# Patient Record
Sex: FEMALE | Race: WHITE | NOT HISPANIC OR LATINO | Employment: OTHER | URBAN - METROPOLITAN AREA
[De-identification: names, ages, dates, MRNs, and addresses within clinical notes are randomized per-mention and may not be internally consistent; named-entity substitution may affect disease eponyms.]

---

## 2019-12-04 ENCOUNTER — HOSPITAL ENCOUNTER (EMERGENCY)
Facility: HOSPITAL | Age: 79
Discharge: HOME | End: 2019-12-04
Attending: EMERGENCY MEDICINE
Payer: MEDICARE

## 2019-12-04 VITALS
BODY MASS INDEX: 27.38 KG/M2 | TEMPERATURE: 99 F | OXYGEN SATURATION: 99 % | DIASTOLIC BLOOD PRESSURE: 74 MMHG | HEIGHT: 61 IN | RESPIRATION RATE: 18 BRPM | HEART RATE: 72 BPM | WEIGHT: 145 LBS | SYSTOLIC BLOOD PRESSURE: 158 MMHG

## 2019-12-04 DIAGNOSIS — R79.1 SUPRATHERAPEUTIC INTERNATIONAL NORMALIZED RATIO (INR): Primary | ICD-10-CM

## 2019-12-04 DIAGNOSIS — Z79.01 CHRONIC ANTICOAGULATION: ICD-10-CM

## 2019-12-04 DIAGNOSIS — Z87.898 HISTORY OF EPISTAXIS: ICD-10-CM

## 2019-12-04 DIAGNOSIS — Z86.79 HISTORY OF ATRIAL FIBRILLATION: ICD-10-CM

## 2019-12-04 LAB
ALBUMIN SERPL BCP-MCNC: 3.3 G/DL (ref 3.5–5.2)
ALP SERPL-CCNC: 92 U/L (ref 55–135)
ALT SERPL W/O P-5'-P-CCNC: 15 U/L (ref 10–44)
ANION GAP SERPL CALC-SCNC: 10 MMOL/L (ref 8–16)
APTT BLDCRRT: 56.1 SEC (ref 21–32)
AST SERPL-CCNC: 18 U/L (ref 10–40)
BASOPHILS # BLD AUTO: 0.04 K/UL (ref 0–0.2)
BASOPHILS NFR BLD: 0.6 % (ref 0–1.9)
BILIRUB SERPL-MCNC: 0.7 MG/DL (ref 0.1–1)
BUN SERPL-MCNC: 20 MG/DL (ref 8–23)
CALCIUM SERPL-MCNC: 9.1 MG/DL (ref 8.7–10.5)
CHLORIDE SERPL-SCNC: 106 MMOL/L (ref 95–110)
CO2 SERPL-SCNC: 25 MMOL/L (ref 23–29)
CREAT SERPL-MCNC: 0.8 MG/DL (ref 0.5–1.4)
DIFFERENTIAL METHOD: ABNORMAL
EOSINOPHIL # BLD AUTO: 0.1 K/UL (ref 0–0.5)
EOSINOPHIL NFR BLD: 1.8 % (ref 0–8)
ERYTHROCYTE [DISTWIDTH] IN BLOOD BY AUTOMATED COUNT: 14 % (ref 11.5–14.5)
EST. GFR  (AFRICAN AMERICAN): >60 ML/MIN/1.73 M^2
EST. GFR  (NON AFRICAN AMERICAN): >60 ML/MIN/1.73 M^2
GLUCOSE SERPL-MCNC: 110 MG/DL (ref 70–110)
HCT VFR BLD AUTO: 37.6 % (ref 37–48.5)
HGB BLD-MCNC: 11.9 G/DL (ref 12–16)
IMM GRANULOCYTES # BLD AUTO: 0.02 K/UL (ref 0–0.04)
IMM GRANULOCYTES NFR BLD AUTO: 0.3 % (ref 0–0.5)
INR PPP: 6.5 (ref 0.8–1.2)
LYMPHOCYTES # BLD AUTO: 1.7 K/UL (ref 1–4.8)
LYMPHOCYTES NFR BLD: 23.7 % (ref 18–48)
MCH RBC QN AUTO: 29.5 PG (ref 27–31)
MCHC RBC AUTO-ENTMCNC: 31.6 G/DL (ref 32–36)
MCV RBC AUTO: 93 FL (ref 82–98)
MONOCYTES # BLD AUTO: 1 K/UL (ref 0.3–1)
MONOCYTES NFR BLD: 13.3 % (ref 4–15)
NEUTROPHILS # BLD AUTO: 4.4 K/UL (ref 1.8–7.7)
NEUTROPHILS NFR BLD: 60.3 % (ref 38–73)
NRBC BLD-RTO: 0 /100 WBC
PLATELET # BLD AUTO: 270 K/UL (ref 150–350)
PMV BLD AUTO: 10 FL (ref 9.2–12.9)
POTASSIUM SERPL-SCNC: 3.7 MMOL/L (ref 3.5–5.1)
PROT SERPL-MCNC: 6.8 G/DL (ref 6–8.4)
PROTHROMBIN TIME: 65.6 SEC (ref 9–12.5)
RBC # BLD AUTO: 4.04 M/UL (ref 4–5.4)
SODIUM SERPL-SCNC: 141 MMOL/L (ref 136–145)
WBC # BLD AUTO: 7.22 K/UL (ref 3.9–12.7)

## 2019-12-04 PROCEDURE — 80053 COMPREHEN METABOLIC PANEL: CPT

## 2019-12-04 PROCEDURE — 85730 THROMBOPLASTIN TIME PARTIAL: CPT

## 2019-12-04 PROCEDURE — 36415 COLL VENOUS BLD VENIPUNCTURE: CPT

## 2019-12-04 PROCEDURE — 99283 EMERGENCY DEPT VISIT LOW MDM: CPT

## 2019-12-04 PROCEDURE — 85025 COMPLETE CBC W/AUTO DIFF WBC: CPT

## 2019-12-04 PROCEDURE — 85610 PROTHROMBIN TIME: CPT

## 2019-12-04 RX ORDER — ARIPIPRAZOLE 5 MG/1
TABLET ORAL
Status: ON HOLD | COMMUNITY
Start: 2019-10-02 | End: 2022-07-23

## 2019-12-04 RX ORDER — WARFARIN 2.5 MG/1
TABLET ORAL
COMMUNITY
Start: 2019-10-16

## 2019-12-04 RX ORDER — METOPROLOL TARTRATE 25 MG/1
25 TABLET, FILM COATED ORAL
COMMUNITY
Start: 2019-10-16 | End: 2022-07-31

## 2019-12-04 RX ORDER — LISINOPRIL 10 MG/1
10 TABLET ORAL
COMMUNITY
Start: 2019-10-16 | End: 2022-07-31

## 2019-12-04 RX ORDER — TRAMADOL HYDROCHLORIDE 50 MG/1
50 TABLET ORAL
COMMUNITY
Start: 2019-09-25 | End: 2022-07-31

## 2019-12-04 RX ORDER — TRIAMTERENE/HYDROCHLOROTHIAZID 37.5-25 MG
0.5 TABLET ORAL 2 TIMES DAILY
COMMUNITY
Start: 2019-10-16 | End: 2023-11-29

## 2019-12-05 NOTE — ED NOTES
Pt and pt family member request to be moved from bed 16 due to construction noise.  Pt moved to  to initiate workup.  Charge nurse, Jena, notified.

## 2019-12-05 NOTE — DISCHARGE INSTRUCTIONS
Please hold your Coumadin/Warfarin for the next 2 days and get your Coumadin level recheck on Friday.

## 2019-12-05 NOTE — ED PROVIDER NOTES
12/4/2019, 6:47 PM   History obtained from the patient      History of Present Illness: Serena Post is a 79 y.o. female patient who presents to the Emergency Department for elevated INR (7.0). Sent by home health for further evaluation. Associated s/s include irritability and nose bleed this morning.     6:48 PM: Pt was placed back in [facility]. I explained to pt that due to lack of available rooms pt was seen by myself to begin the workup. Pt understands they will be seen and dispositioned by the next available physician. Pt voices understanding and agrees with plan. Pt also understands the longer than normal wait time. I am removing myself from the care of pt and pt will now be assigned to the next available physician.     - Harlan Trivedi Jr., [facility]    SCRIBE #1 NOTE: I, More Brady, am scribing for, and in the presence of, Chely Bailey MD. I have scribed the entire note.       History     Chief Complaint   Patient presents with    Abnormal Lab     Home Health saw pt today after patient had a nose bleed today. PT-INR elevated     Review of patient's allergies indicates:   Allergen Reactions    Amlodipine Other (See Comments)     Not sure    Chlorthalidone Other (See Comments)     Not sure    Clonidine Other (See Comments)     Not sure    Codeine Other (See Comments)     Not sure    Escitalopram Other (See Comments)     Not sure    Lisinopril Other (See Comments)     Not sure    Losartan Other (See Comments)     Not sure    Meperidine Other (See Comments)     Not sure    Morphine Other (See Comments)     Not sure         History of Present Illness     HPI    12/4/2019, 7:50 PM  History obtained from the patient and family members      History of Present Illness: Serena Post is a 79 y.o. female patient with a PMHx of Afib, CVA, and s/p TANYA, heart valve replacement, and pacemaker who presents to the Emergency Department for evaluation of an abnormal PT-INR level (7.0) which was noted  today by home health. Pt had an episode of epistaxis earlier today when home health came to visit the pt. Blood levels were then drawn which showed an elevated PT-INR. Pt was advised to come to the ED for further evaluation. Pt takes 5 mg Coumadin every evening. Pt's INR was last checked on 11/13/19 with a level of 2.4. Symptoms are constant and moderate in severity. No mitigating or exacerbating factors reported. Associated sxs include epistaxis (earlier today) . Patient denies any fever/ chills, SOB, cough, CP, palpations, numbness, HA, dizziness, lightheadedness, rash, wound, abdominal pain, n/v/d, back/ neck pain, sore throat, congestion, dysuria, hematuria, localized weakness, recent injuries/ falls, and all other sxs at this time. No prior tx reported. No further complaints or concerns at this time.     Arrival mode: Personal vehicle      PCP: Primary Doctor No        Past Medical History:  Past Medical History:   Diagnosis Date    A-fib     Anticoagulant long-term use     Cancer     brain tumor, 10 years ago    Hx of heart valve replacement with mechanical valve     Pacemaker     Stroke     2007, residual weakness       Past Surgical History:  Past Surgical History:   Procedure Laterality Date    CHOLECYSTECTOMY      HYSTERECTOMY      TONSILLECTOMY           Family History:  History reviewed. No pertinent family history.    Social History:  Social History     Tobacco Use    Smoking status: Never Smoker   Substance and Sexual Activity    Alcohol use: Not Currently    Drug use: Never    Sexual activity: Unknown        Review of Systems     Review of Systems   Constitutional: Negative for chills and fever.        + Abnormal INR level (7.0)   HENT: Positive for nosebleeds (earlier today). Negative for congestion and sore throat.    Respiratory: Negative for cough and shortness of breath.    Cardiovascular: Negative for chest pain and palpitations.   Gastrointestinal: Negative for abdominal pain,  "diarrhea, nausea and vomiting.   Genitourinary: Negative for dysuria and hematuria.   Musculoskeletal: Negative for back pain and neck pain.        - Recent injuries/ falls   Skin: Negative for rash and wound.   Neurological: Negative for dizziness, weakness, light-headedness, numbness and headaches.   All other systems reviewed and are negative.     Physical Exam     Initial Vitals [12/04/19 1847]   BP Pulse Resp Temp SpO2   (!) 149/83 68 16 98.3 °F (36.8 °C) 96 %      MAP       --          Physical Exam  Nursing Notes and Vital Signs Reviewed.   Constitutional: Patient is in no obvious distress. Well-developed and well-nourished. No obvious bleeding noted.   Head: Atraumatic. Normocephalic.  Eyes: PERRL. EOM intact. Conjunctivae are not pale. No scleral icterus.  ENT: Mucous membranes are moist. Oropharynx is clear and symmetric. No septal hematoma. No epistaxis.   Neck: Supple. Full ROM. No lymphadenopathy.  Cardiovascular: Regular rate. Regular rhythm. No murmurs, rubs, or gallops. Distal pulses are 2+ and symmetric.  Pulmonary/Chest: No respiratory distress. Clear to auscultation bilaterally. No wheezing or rales.  Abdominal: Soft and non-distended.  There is no tenderness.  No rebound, guarding, or rigidity. Good bowel sounds.  Genitourinary: No CVA tenderness  Musculoskeletal: Moves all extremities. No obvious deformities. No edema. No calf tenderness.  Skin: Warm and dry.  Neurological:  Alert, awake, and appropriate.  Normal speech.  No acute focal neurological deficits are appreciated.  Psychiatric: Normal affect. Good eye contact. Appropriate in content.     ED Course   Procedures  ED Vital Signs:  Vitals:    12/04/19 1847 12/1942 12/04/19 2042   BP: (!) 149/83 (!) 167/81 (!) 158/74   Pulse: 68 71 72   Resp: 16 18 18   Temp: 98.3 °F (36.8 °C)  98.6 °F (37 °C)   TempSrc: Oral  Oral   SpO2: 96% 96% 99%   Weight: 65.8 kg (145 lb)     Height: 5' 1" (1.549 m)         Abnormal Lab Results:  Labs Reviewed "   CBC W/ AUTO DIFFERENTIAL - Abnormal; Notable for the following components:       Result Value    Hemoglobin 11.9 (*)     Mean Corpuscular Hemoglobin Conc 31.6 (*)     All other components within normal limits   COMPREHENSIVE METABOLIC PANEL - Abnormal; Notable for the following components:    Albumin 3.3 (*)     All other components within normal limits   PROTIME-INR - Abnormal; Notable for the following components:    Prothrombin Time 65.6 (*)     INR 6.5 (*)     All other components within normal limits    Narrative:       INR critical result(s) called and verbal readback obtained from   davian Mcdaniel RN by MANUEL 12/04/2019 20:15   APTT - Abnormal; Notable for the following components:    aPTT 56.1 (*)     All other components within normal limits        All Lab Results:  Results for orders placed or performed during the hospital encounter of 12/04/19   CBC auto differential   Result Value Ref Range    WBC 7.22 3.90 - 12.70 K/uL    RBC 4.04 4.00 - 5.40 M/uL    Hemoglobin 11.9 (L) 12.0 - 16.0 g/dL    Hematocrit 37.6 37.0 - 48.5 %    Mean Corpuscular Volume 93 82 - 98 fL    Mean Corpuscular Hemoglobin 29.5 27.0 - 31.0 pg    Mean Corpuscular Hemoglobin Conc 31.6 (L) 32.0 - 36.0 g/dL    RDW 14.0 11.5 - 14.5 %    Platelets 270 150 - 350 K/uL    MPV 10.0 9.2 - 12.9 fL    Immature Granulocytes 0.3 0.0 - 0.5 %    Gran # (ANC) 4.4 1.8 - 7.7 K/uL    Immature Grans (Abs) 0.02 0.00 - 0.04 K/uL    Lymph # 1.7 1.0 - 4.8 K/uL    Mono # 1.0 0.3 - 1.0 K/uL    Eos # 0.1 0.0 - 0.5 K/uL    Baso # 0.04 0.00 - 0.20 K/uL    nRBC 0 0 /100 WBC    Gran% 60.3 38.0 - 73.0 %    Lymph% 23.7 18.0 - 48.0 %    Mono% 13.3 4.0 - 15.0 %    Eosinophil% 1.8 0.0 - 8.0 %    Basophil% 0.6 0.0 - 1.9 %    Differential Method Automated    Comprehensive metabolic panel   Result Value Ref Range    Sodium 141 136 - 145 mmol/L    Potassium 3.7 3.5 - 5.1 mmol/L    Chloride 106 95 - 110 mmol/L    CO2 25 23 - 29 mmol/L    Glucose 110 70 - 110 mg/dL    BUN, Bld 20  8 - 23 mg/dL    Creatinine 0.8 0.5 - 1.4 mg/dL    Calcium 9.1 8.7 - 10.5 mg/dL    Total Protein 6.8 6.0 - 8.4 g/dL    Albumin 3.3 (L) 3.5 - 5.2 g/dL    Total Bilirubin 0.7 0.1 - 1.0 mg/dL    Alkaline Phosphatase 92 55 - 135 U/L    AST 18 10 - 40 U/L    ALT 15 10 - 44 U/L    Anion Gap 10 8 - 16 mmol/L    eGFR if African American >60 >60 mL/min/1.73 m^2    eGFR if non African American >60 >60 mL/min/1.73 m^2   Protime-INR   Result Value Ref Range    Prothrombin Time 65.6 (H) 9.0 - 12.5 sec    INR 6.5 (HH) 0.8 - 1.2   APTT   Result Value Ref Range    aPTT 56.1 (H) 21.0 - 32.0 sec       Imaging Results:  Imaging Results    None                 The Emergency Provider reviewed the vital signs and test results, which are outlined above.     ED Discussion     8:20 PM: Reassessed pt at this time. Advised pt to hold Coumadin for two days and have it rechecked on Friday. Discussed with pt all pertinent ED information and results. Discussed pt dx and plan of tx. Gave pt all f/u and return to the ED instructions. All questions and concerns were addressed at this time. Pt expresses understanding of information and instructions, and is comfortable with plan to discharge. Pt is stable for discharge.    I discussed with patient and/or family/caretaker that evaluation in the ED does not suggest any emergent or life threatening medical conditions requiring immediate intervention beyond what was provided in the ED, and I believe patient is safe for discharge.  Regardless, an unremarkable evaluation in the ED does not preclude the development or presence of a serious of life threatening condition. As such, patient was instructed to return immediately for any worsening or change in current symptoms.         Medical Decision Making:   Clinical Tests:   Lab Tests: Ordered and Reviewed           ED Medication(s):  Medications - No data to display    Discharge Medication List as of 12/4/2019  8:29 PM          Follow-up Information     Local  Coumadin clinic or Home Health Nurse. Schedule an appointment as soon as possible for a visit in 2 days.    Why:  Return to the Emergency Room, If symptoms worsen                     Scribe Attestation:   Scribe #1: I performed the above scribed service and the documentation accurately describes the services I performed. I attest to the accuracy of the note.     Attending:   Physician Attestation Statement for Scribe #1: I, Chely Bailey MD, personally performed the services described in this documentation, as scribed by More Brady, in my presence, and it is both accurate and complete.           Clinical Impression       ICD-10-CM ICD-9-CM   1. Supratherapeutic international normalized ratio (INR) R79.1 790.92   2. Chronic anticoagulation Z79.01 V58.61   3. History of atrial fibrillation Z86.79 V12.59   4. History of epistaxis Z87.898 V12.69       Disposition:   Disposition: Discharged  Condition: Stable           Chely Bailey MD  12/04/19 9970

## 2020-02-17 ENCOUNTER — HOSPITAL ENCOUNTER (EMERGENCY)
Facility: HOSPITAL | Age: 80
Discharge: HOME | End: 2020-02-17
Attending: EMERGENCY MEDICINE
Payer: MEDICARE

## 2020-02-17 VITALS
WEIGHT: 142 LBS | BODY MASS INDEX: 26.83 KG/M2 | SYSTOLIC BLOOD PRESSURE: 134 MMHG | DIASTOLIC BLOOD PRESSURE: 68 MMHG | TEMPERATURE: 99 F | OXYGEN SATURATION: 99 % | RESPIRATION RATE: 18 BRPM | HEART RATE: 80 BPM
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
## 2020-02-17 DIAGNOSIS — S52.125A CLOSED NONDISPLACED FRACTURE OF HEAD OF LEFT RADIUS, INITIAL ENCOUNTER: Primary | ICD-10-CM

## 2020-02-17 DIAGNOSIS — N39.0 ACUTE LOWER UTI: ICD-10-CM

## 2020-02-17 LAB
BACTERIA #/AREA URNS HPF: ABNORMAL /HPF
BILIRUB UR QL STRIP: NEGATIVE
CLARITY UR: CLEAR
COLOR UR: YELLOW
GLUCOSE UR QL STRIP: NEGATIVE
HGB UR QL STRIP: NEGATIVE
KETONES UR QL STRIP: NEGATIVE
LEUKOCYTE ESTERASE UR QL STRIP: NEGATIVE
MICROSCOPIC COMMENT: ABNORMAL
NITRITE UR QL STRIP: POSITIVE
PH UR STRIP: 6 [PH] (ref 5–8)
PROT UR QL STRIP: NEGATIVE
RBC #/AREA URNS HPF: 2 /HPF (ref 0–4)
SP GR UR STRIP: 1.02 (ref 1–1.03)
SQUAMOUS #/AREA URNS HPF: 0 /HPF
URN SPEC COLLECT METH UR: ABNORMAL
UROBILINOGEN UR STRIP-ACNC: NEGATIVE EU/DL
WBC #/AREA URNS HPF: 25 /HPF (ref 0–5)

## 2020-02-17 PROCEDURE — 81000 URINALYSIS NONAUTO W/SCOPE: CPT

## 2020-02-17 PROCEDURE — 87088 URINE BACTERIA CULTURE: CPT

## 2020-02-17 PROCEDURE — 99284 EMERGENCY DEPT VISIT MOD MDM: CPT | Mod: 25

## 2020-02-17 PROCEDURE — 87186 SC STD MICRODIL/AGAR DIL: CPT

## 2020-02-17 PROCEDURE — 87077 CULTURE AEROBIC IDENTIFY: CPT

## 2020-02-17 PROCEDURE — 87086 URINE CULTURE/COLONY COUNT: CPT

## 2020-02-17 PROCEDURE — 25000003 PHARM REV CODE 250: Performed by: EMERGENCY MEDICINE

## 2020-02-17 RX ORDER — CEPHALEXIN 500 MG/1
500 CAPSULE ORAL 4 TIMES DAILY
Qty: 28 CAPSULE | Refills: 0 | Status: SHIPPED | OUTPATIENT
Start: 2020-02-17 | End: 2020-02-24

## 2020-02-17 RX ORDER — CEPHALEXIN 500 MG/1
500 CAPSULE ORAL
Status: COMPLETED | OUTPATIENT
Start: 2020-02-17 | End: 2020-02-17

## 2020-02-17 RX ORDER — TRAMADOL HYDROCHLORIDE 50 MG/1
50 TABLET ORAL EVERY 6 HOURS PRN
Qty: 12 TABLET | Refills: 0 | Status: SHIPPED | OUTPATIENT
Start: 2020-02-17 | End: 2020-02-27

## 2020-02-17 RX ADMIN — CEPHALEXIN 500 MG: 500 CAPSULE ORAL at 07:02

## 2020-02-17 NOTE — ED PROVIDER NOTES
SCRIBE #1 NOTE: I, Lisa Parkinson, am scribing for, and in the presence of, Christiano Watters Jr., MD. I have scribed the entire note.       History     Chief Complaint   Patient presents with    Fall     patient states she tripped over her walker, -LOC +coumadin, pain to L arm and shoulder      Review of patient's allergies indicates:   Allergen Reactions    Amlodipine Other (See Comments)     Not sure    Chlorthalidone Other (See Comments)     Not sure    Clonidine Other (See Comments)     Not sure    Codeine Other (See Comments)     Not sure    Escitalopram Other (See Comments)     Not sure    Lisinopril Other (See Comments)     Not sure    Losartan Other (See Comments)     Not sure    Meperidine Other (See Comments)     Not sure    Morphine Other (See Comments)     Not sure         History of Present Illness     HPI    2/17/2020, 4:52 PM  History obtained from the patient      History of Present Illness: Serena Post is a 79 y.o. female patient with a h/o a-fib, pacemaker, who presents to the Emergency Department for evaluation of a fall which onset PTA. Pt reports her walker fell beneath her as she fell back. Pt denies LOC. Pt's son reports pt has had darker urine than usual and would like for her to get a urinalysis. Symptoms are constant and moderate in severity. No mitigating or exacerbating factors reported. Associated sxs include neck pain, L arm pain, and L shoulder pain. Patient denies any fever, sore throat, LOC, SOB, CP, n/v/d, dysuria, back pain, HA, rash, bruises/blds easily, and all other sxs at this time. No prior Tx reported. No further complaints or concerns at this time.       Arrival mode: EMS    PCP: Primary Doctor No      Past Medical History:  Past Medical History:   Diagnosis Date    A-fib     Anticoagulant long-term use     Cancer     brain tumor, 10 years ago    Hx of heart valve replacement with mechanical valve     Pacemaker     Stroke     2007, residual weakness        Past Surgical History:  Past Surgical History:   Procedure Laterality Date    CHOLECYSTECTOMY      HYSTERECTOMY      TONSILLECTOMY           Family History:  History reviewed. No pertinent family history.    Social History:   Social History     Tobacco Use    Smoking status: Never Smoker   Substance and Sexual Activity    Alcohol use: Not Currently    Drug use: Never    Sexual activity: Unknown        Review of Systems     Review of Systems   Constitutional: Negative for fever.   HENT: Negative for sore throat.    Respiratory: Negative for shortness of breath.    Cardiovascular: Negative for chest pain.   Gastrointestinal: Negative for diarrhea, nausea and vomiting.   Genitourinary: Negative for dysuria.   Musculoskeletal: Positive for neck pain. Negative for back pain.        (+) L arm pain  (+) L shoulder pain   Skin: Negative for rash.   Neurological: Negative for headaches.        (-) LOC   Hematological: Does not bruise/bleed easily.   All other systems reviewed and are negative.       Physical Exam     Initial Vitals [02/17/20 1610]   BP Pulse Resp Temp SpO2   (!) 137/57 90 18 98.9 °F (37.2 °C) 98 %      MAP       --          Physical Exam  GEN:  Alert and oriented to person place and time.  No acute distress.  HEENT:  Normocephalic atraumatic. Extraocular muscles intact bilaterally. No evidence of entrapment.  No nasal deformity.  Nasal septum is midline.  There is no septal hematoma.  Tympanic membranes are normal. No hemotympanum.  Negative Harris sign. No CSF leak  CV:.  Regular rate and rhythm without gallops murmurs or rubs. 2+ pulses bilateral upper and lower extremities.  PULM:  Clear to auscultation bilaterally. No respiratory distress    Gi:  Soft nontender nondistended with normoactive bowel sounds  :.  No CVA tenderness. No suprapubic tenderness.  MS:  There is no point C/T/L/S tenderness. Normal spinal curvature.  Full active range of motion of the neck.  Pelvis is stable nontender.   There is no chest wall tenderness.  Clavicles are nontender. Is mild tenderness over the left proximal humerus.  Full active range of motion shoulder and elbow.  All other bones been palpated and joints ranged fully without tenderness or deformity.  NEURO:  II-XII intact bilaterally. No focal lateralizing signs. Bilateral median radial ulnar musculocutaneous axillary nerves are intact on exam.  SKIN:  Intact. No rash or laceration.       ED Course   Orthopedic Injury  Date/Time: 2020 7:13 PM  Performed by: Christiano Watters Jr., MD  Authorized by: Christiano Watters Jr., MD     Consent Done?:  Yes  Universal Protocol:     Verbal consent obtained?: Yes      Risks and benefits: Risks, benefits and alternatives were discussed      Consent given by:  Patient    Patient states understanding of procedure being performed: Yes      Patient's understanding of procedure matches consent: Yes      Required items: Required blood products, implants, devices and special equipment avialable      Patient identity confirmed:  , name and verbally with patient  Injury:     Injury location:  Forearm    Location details:  Left forearm    Injury type:  Fracture    Fracture type: radial head      Fracture type: radial head        Pre-procedure assessment:     Neurovascular status: Neurovascularly intact      Distal perfusion: normal      Neurological function: normal      Range of motion: reduced      Local anesthesia used?: No      Patient sedated?: No        Selections made in this section will also lock the Injury type section above.:     Manipulation performed?: No      Immobilization:  Sling    Complications: No      Specimens: No      Implants: No    Post-procedure assessment:     Neurovascular status: Neurovascularly intact      Distal perfusion: normal      Neurological function: normal      Range of motion: improved      Patient tolerance:  Patient tolerated the procedure well with no immediate complications      ED Vital  Signs:  Vitals:    02/17/20 1610 02/17/20 1706 02/17/20 1823   BP: (!) 137/57  132/60   Pulse: 90  87   Resp: 18  16   Temp: 98.9 °F (37.2 °C)     SpO2: 98%  99%   Weight:  64.4 kg (142 lb)        Abnormal Lab Results:  Labs Reviewed   URINALYSIS, REFLEX TO URINE CULTURE - Abnormal; Notable for the following components:       Result Value    Nitrite, UA Positive (*)     All other components within normal limits    Narrative:     Preferred Collection Type->Urine, Clean Catch   URINALYSIS MICROSCOPIC - Abnormal; Notable for the following components:    WBC, UA 25 (*)     Bacteria Moderate (*)     All other components within normal limits    Narrative:     Preferred Collection Type->Urine, Clean Catch   CULTURE, URINE        All Lab Results:  Results for orders placed or performed during the hospital encounter of 02/17/20   Urinalysis, Reflex to Urine Culture Urine, Clean Catch   Result Value Ref Range    Specimen UA Urine, Catheterized     Color, UA Yellow Yellow, Straw, Kamla    Appearance, UA Clear Clear    pH, UA 6.0 5.0 - 8.0    Specific Gravity, UA 1.020 1.005 - 1.030    Protein, UA Negative Negative    Glucose, UA Negative Negative    Ketones, UA Negative Negative    Bilirubin (UA) Negative Negative    Occult Blood UA Negative Negative    Nitrite, UA Positive (A) Negative    Urobilinogen, UA Negative <2.0 EU/dL    Leukocytes, UA Negative Negative   Urinalysis Microscopic   Result Value Ref Range    RBC, UA 2 0 - 4 /hpf    WBC, UA 25 (H) 0 - 5 /hpf    Bacteria Moderate (A) None-Occ /hpf    Squam Epithel, UA 0 /hpf    Microscopic Comment SEE COMMENT          Imaging Results          X-Ray Elbow Complete Left (Final result)  Result time 02/17/20 17:27:20    Final result by Emery Arrieta MD (02/17/20 17:27:20)                 Impression:      1.  Radial head fracture noted, of unknown acuity.  Is difficult to determine if there is an elbow joint effusion due to the presence of marked degenerative changes of the  elbow joint with multiple intra-articular loose bodies within the posterior joint.    2.  Soft tissue swelling overlies the olecranon process, concerning for olecranon bursitis or possibly related to trauma.      Electronically signed by: Emery Arrieta MD  Date:    02/17/2020  Time:    17:27             Narrative:    EXAMINATION:  XR ELBOW COMPLETE 3 VIEW LEFT    CLINICAL HISTORY:  Unspecified fall, initial encounter    TECHNIQUE:  AP, lateral, and oblique views of the left elbow were performed.    COMPARISON:  None    FINDINGS:  There is a radial head fracture, mildly displaced.  The acuity of this finding is unknown, as there are marked degenerative changes of the elbow joint with multiple intra-articular loose bodies seen within the posterior elbow joint. It is difficult to determine if there is an elbow joint effusion.    Soft tissue swelling overlies the olecranon process.                               X-Ray Cervical Spine AP And Lateral (Final result)  Result time 02/17/20 17:28:58    Final result by Emery Arrieta MD (02/17/20 17:28:58)                 Impression:      1.  Negative for obvious acute process involving the cervical spine.    2.  Multilevel marginal spondylosis.  Disc heights and vertebral body heights appear well maintained.    3.  Incidental findings as noted above.      Electronically signed by: Emery Arrieta MD  Date:    02/17/2020  Time:    17:28             Narrative:    EXAMINATION:  XR CERVICAL SPINE AP LATERAL    CLINICAL HISTORY:  Unspecified fall, initial encounter    COMPARISON:  No comparison studies are available.    FINDINGS:  There is grossly normal alignment of the 7 cervical vertebra. Multilevel marginal spondylosis.  The vertebral body heights and intervertebral disc heights are   well maintained. The posterior elements are intact and the prevertebral soft tissues are normal thickness. The orientation of the dens and the lateral masses are normal.    The lung apices are  clear. Dual lead right subclavian pacemaker and median sternotomy wires noted.  Aortic calcifications.                               CT Head Without Contrast (Final result)  Result time 02/17/20 17:15:56    Final result by Emery Arrieta MD (02/17/20 17:15:56)                 Impression:      1.  Negative for acute intracranial process. Negative for hemorrhage, or skull fracture.    2.  Atrophy, intracranial atherosclerotic disease and small vessel ischemic changes.  Bilateral basal ganglia lacunar infarcts.    3.  Nonemergent findings as described above.    All CT scans at this facility are performed  using dose modulation techniques as appropriate to performed exam including the following:  automated exposure control; adjustment of mA and/or kV according to the patients size (this includes techniques or standardized protocols for targeted exams where dose is matched to indication/reason for exam: i.e. extremities or head);  iterative reconstruction technique.      Electronically signed by: Emery Arrieta MD  Date:    02/17/2020  Time:    17:15             Narrative:    EXAMINATION:  CT HEAD WITHOUT CONTRAST    CLINICAL HISTORY:  Head trauma, intracranial venous inj suspected;    TECHNIQUE:  Axial images through the brain and posterior fossa were obtained without the use of IV contrast.  Sagittal and coronal reconstructions are provided for review.    COMPARISON:  No comparison studies are available.    FINDINGS:  The ventricles are midline and the CSF spaces are prominent.  The gray-white matter junction is well preserved. Negative for intracranial vascular abnormalities. Negative for mass, mass effect, cerebral edema, hemorrhage or abnormal fluid collections.  Intracranial atherosclerotic disease.  Small vessel ischemic changes.  Small bilateral basal ganglia lacunar infarcts.    The skull and scalp are intact.  Leftward nasal septal deviation.  The   paranasal sinuses, mastoid air cells, middle ears and ear  canals are clear. The globes are intact.                                 The Emergency Provider reviewed the vital signs and test results, which are outlined above.     ED Discussion       7:27 PM: Reassessed pt at this time.  Pt states her condition has improved at this time. Discussed with pt all pertinent ED information and results. Discussed pt dx and plan of tx. Gave pt all f/u and return to the ED instructions. All questions and concerns were addressed at this time. Pt expresses understanding of information and instructions, and is comfortable with plan to discharge. Pt is stable for discharge.    I discussed with patient and/or family/caretaker that evaluation in the ED does not suggest any emergent or life threatening medical conditions requiring immediate intervention beyond what was provided in the ED, and I believe patient is safe for discharge.  Regardless, an unremarkable evaluation in the ED does not preclude the development or presence of a serious of life threatening condition. As such, patient was instructed to return immediately for any worsening or change in current symptoms.    7:32 PM  Patient is stable nontoxic.  She has mild radial head fracture and is now in a sling for this.  Advised patient to follow up with Orthopedics use ibuprofen for pain and Ultram for breakthrough pain she is not requesting pain medication at this time.  She also has a mild bladder infection which will treat with Keflex.  Cultures are pending.  Discussed all findings with the patient as well as the plan of care.  She verbalized understanding room with all and seems reliable.     MDM        Medical Decision Making:   Clinical Tests:   Lab Tests: Ordered and Reviewed  Radiological Study: Ordered and Reviewed           ED Medication(s):  Medications   cephALEXin capsule 500 mg (500 mg Oral Given 2/17/20 1921)       New Prescriptions    No medications on file               Scribe Attestation:   Scribe #1: I performed the  above scribed service and the documentation accurately describes the services I performed. I attest to the accuracy of the note.     Attending:   Physician Attestation Statement for Scribe #1: I, Christiano Watters Jr., MD, personally performed the services described in this documentation, as scribed by Lisa Parkinson, in my presence, and it is both accurate and complete.           Clinical Impression       ICD-10-CM ICD-9-CM   1. Closed nondisplaced fracture of head of left radius, initial encounter S52.125A 813.05   2. Fall W19.XXXA E888.9   3. Acute lower UTI N39.0 599.0       Disposition:   Disposition: Discharged  Condition: Stable         Christiano Watters Jr., MD  02/17/20 1932

## 2020-02-17 NOTE — ED NOTES
Patient identifiers verified and correct for Serena A Post.    Pt son reports pt is at baseline. Pt alert and oriented only to person, place, event- NOT time. Pt reports neck/head/L elbow pain after fall. Son reports pt fell by backing up out of restroom with walker. She fell backwards. Denies LOC.   APPEARANCE: Patient resting comfortably and in no acute distress, patient is clean and well groomed, patient's clothing is properly fastened.  SKIN: The skin is warm and dry, color consistent with ethnicity, patient has normal skin turgor and moist mucus membranes, skin intact, no breakdown or bruising noted.  MUSCULOSKELETAL: Patient moving all extremities spontaneously.  RESPIRATORY: Airway is open and patent, respirations are spontaneous.  CARDIAC: Patient has a normal rate, no periphreal edema noted, capillary refill < 3 seconds.  ABDOMEN: Soft and non tender to palpation.

## 2020-02-18 NOTE — ED NOTES
Pt lying in bed comfortably. AAO x 4. Resp even and unlabored with equal chest rise and fall. Skin warm and dry. 20G PIV noted to L AC. Flushes well, drg CDI. Side rails up x 2. Call light within reach. No distress noted. Pt denies any needs or assist at this time.

## 2020-02-18 NOTE — DISCHARGE INSTRUCTIONS
Have a small fracture of her left radial head.  Keep the sling in place.  Use ibuprofen for pain. Ultram for breakthrough pain. Also have a mild bladderInfection.  Take Keflex for this infection.  Follow up with Dr. Wahl at next available.  Call tomorrow for an appointment.  Return as needed for any worsening symptoms, problems, questions or concerns.

## 2020-02-20 LAB — BACTERIA UR CULT: ABNORMAL

## 2020-03-11 ENCOUNTER — HOSPITAL ENCOUNTER (EMERGENCY)
Facility: HOSPITAL | Age: 80
Discharge: HOME | End: 2020-03-12
Attending: EMERGENCY MEDICINE
Payer: MEDICARE

## 2020-03-11 DIAGNOSIS — R41.82 ALTERED MENTAL STATUS: ICD-10-CM

## 2020-03-11 DIAGNOSIS — N39.0 URINARY TRACT INFECTION WITHOUT HEMATURIA, SITE UNSPECIFIED: Primary | ICD-10-CM

## 2020-03-11 LAB
ALBUMIN SERPL BCP-MCNC: 3.6 G/DL (ref 3.5–5.2)
ALP SERPL-CCNC: 83 U/L (ref 55–135)
ALT SERPL W/O P-5'-P-CCNC: 12 U/L (ref 10–44)
ANION GAP SERPL CALC-SCNC: 13 MMOL/L (ref 8–16)
APTT BLDCRRT: 33.9 SEC (ref 21–32)
AST SERPL-CCNC: 14 U/L (ref 10–40)
BACTERIA #/AREA URNS HPF: ABNORMAL /HPF
BASOPHILS # BLD AUTO: 0.05 K/UL (ref 0–0.2)
BASOPHILS NFR BLD: 0.7 % (ref 0–1.9)
BILIRUB SERPL-MCNC: 0.5 MG/DL (ref 0.1–1)
BILIRUB UR QL STRIP: NEGATIVE
BUN SERPL-MCNC: 29 MG/DL (ref 8–23)
CALCIUM SERPL-MCNC: 9.5 MG/DL (ref 8.7–10.5)
CHLORIDE SERPL-SCNC: 103 MMOL/L (ref 95–110)
CLARITY UR: CLEAR
CO2 SERPL-SCNC: 24 MMOL/L (ref 23–29)
COLOR UR: YELLOW
CREAT SERPL-MCNC: 1.2 MG/DL (ref 0.5–1.4)
DIFFERENTIAL METHOD: ABNORMAL
EOSINOPHIL # BLD AUTO: 0.1 K/UL (ref 0–0.5)
EOSINOPHIL NFR BLD: 2.1 % (ref 0–8)
ERYTHROCYTE [DISTWIDTH] IN BLOOD BY AUTOMATED COUNT: 13.4 % (ref 11.5–14.5)
EST. GFR  (AFRICAN AMERICAN): 50 ML/MIN/1.73 M^2
EST. GFR  (NON AFRICAN AMERICAN): 44 ML/MIN/1.73 M^2
GLUCOSE SERPL-MCNC: 120 MG/DL (ref 70–110)
GLUCOSE UR QL STRIP: NEGATIVE
HCT VFR BLD AUTO: 40.3 % (ref 37–48.5)
HGB BLD-MCNC: 12.8 G/DL (ref 12–16)
HGB UR QL STRIP: ABNORMAL
IMM GRANULOCYTES # BLD AUTO: 0.01 K/UL (ref 0–0.04)
IMM GRANULOCYTES NFR BLD AUTO: 0.1 % (ref 0–0.5)
INR PPP: 1.6 (ref 0.8–1.2)
KETONES UR QL STRIP: NEGATIVE
LACTATE SERPL-SCNC: 1.5 MMOL/L (ref 0.5–2.2)
LEUKOCYTE ESTERASE UR QL STRIP: ABNORMAL
LYMPHOCYTES # BLD AUTO: 2 K/UL (ref 1–4.8)
LYMPHOCYTES NFR BLD: 29.1 % (ref 18–48)
MCH RBC QN AUTO: 29.4 PG (ref 27–31)
MCHC RBC AUTO-ENTMCNC: 31.8 G/DL (ref 32–36)
MCV RBC AUTO: 92 FL (ref 82–98)
MICROSCOPIC COMMENT: ABNORMAL
MONOCYTES # BLD AUTO: 0.7 K/UL (ref 0.3–1)
MONOCYTES NFR BLD: 10.3 % (ref 4–15)
NEUTROPHILS # BLD AUTO: 3.9 K/UL (ref 1.8–7.7)
NEUTROPHILS NFR BLD: 57.7 % (ref 38–73)
NITRITE UR QL STRIP: POSITIVE
NRBC BLD-RTO: 0 /100 WBC
PH UR STRIP: 6 [PH] (ref 5–8)
PLATELET # BLD AUTO: 314 K/UL (ref 150–350)
PMV BLD AUTO: 9.4 FL (ref 9.2–12.9)
POTASSIUM SERPL-SCNC: 3.7 MMOL/L (ref 3.5–5.1)
PROT SERPL-MCNC: 7.4 G/DL (ref 6–8.4)
PROT UR QL STRIP: NEGATIVE
PROTHROMBIN TIME: 16.5 SEC (ref 9–12.5)
RBC # BLD AUTO: 4.36 M/UL (ref 4–5.4)
RBC #/AREA URNS HPF: 2 /HPF (ref 0–4)
SODIUM SERPL-SCNC: 140 MMOL/L (ref 136–145)
SP GR UR STRIP: 1.02 (ref 1–1.03)
TROPONIN I SERPL DL<=0.01 NG/ML-MCNC: <0.006 NG/ML (ref 0–0.03)
URN SPEC COLLECT METH UR: ABNORMAL
UROBILINOGEN UR STRIP-ACNC: NEGATIVE EU/DL
WBC # BLD AUTO: 6.73 K/UL (ref 3.9–12.7)
WBC #/AREA URNS HPF: 6 /HPF (ref 0–5)
WBC CLUMPS URNS QL MICRO: ABNORMAL

## 2020-03-11 PROCEDURE — 80053 COMPREHEN METABOLIC PANEL: CPT

## 2020-03-11 PROCEDURE — 81000 URINALYSIS NONAUTO W/SCOPE: CPT

## 2020-03-11 PROCEDURE — 85730 THROMBOPLASTIN TIME PARTIAL: CPT

## 2020-03-11 PROCEDURE — 99285 EMERGENCY DEPT VISIT HI MDM: CPT | Mod: 25

## 2020-03-11 PROCEDURE — 25000003 PHARM REV CODE 250: Performed by: EMERGENCY MEDICINE

## 2020-03-11 PROCEDURE — 84484 ASSAY OF TROPONIN QUANT: CPT

## 2020-03-11 PROCEDURE — 36415 COLL VENOUS BLD VENIPUNCTURE: CPT

## 2020-03-11 PROCEDURE — 93005 ELECTROCARDIOGRAM TRACING: CPT

## 2020-03-11 PROCEDURE — 93010 EKG 12-LEAD: ICD-10-PCS | Mod: ,,, | Performed by: INTERNAL MEDICINE

## 2020-03-11 PROCEDURE — 85610 PROTHROMBIN TIME: CPT

## 2020-03-11 PROCEDURE — 83605 ASSAY OF LACTIC ACID: CPT

## 2020-03-11 PROCEDURE — 93010 ELECTROCARDIOGRAM REPORT: CPT | Mod: ,,, | Performed by: INTERNAL MEDICINE

## 2020-03-11 PROCEDURE — 85025 COMPLETE CBC W/AUTO DIFF WBC: CPT

## 2020-03-11 PROCEDURE — 63600175 PHARM REV CODE 636 W HCPCS: Performed by: EMERGENCY MEDICINE

## 2020-03-11 PROCEDURE — 96365 THER/PROPH/DIAG IV INF INIT: CPT

## 2020-03-11 RX ORDER — WARFARIN SODIUM 5 MG/1
5 TABLET ORAL
Status: COMPLETED | OUTPATIENT
Start: 2020-03-11 | End: 2020-03-11

## 2020-03-11 RX ORDER — WARFARIN SODIUM 5 MG/1
5 TABLET ORAL DAILY
Status: DISCONTINUED | OUTPATIENT
Start: 2020-03-12 | End: 2020-03-12 | Stop reason: HOSPADM

## 2020-03-11 RX ORDER — CIPROFLOXACIN 500 MG/1
500 TABLET ORAL 2 TIMES DAILY
Qty: 10 TABLET | Refills: 0 | Status: ON HOLD | OUTPATIENT
Start: 2020-03-11 | End: 2022-07-26 | Stop reason: HOSPADM

## 2020-03-11 RX ADMIN — CEFTRIAXONE 1 G: 1 INJECTION, SOLUTION INTRAVENOUS at 11:03

## 2020-03-11 RX ADMIN — WARFARIN SODIUM 5 MG: 5 TABLET ORAL at 09:03

## 2020-03-12 VITALS
DIASTOLIC BLOOD PRESSURE: 70 MMHG | RESPIRATION RATE: 20 BRPM | SYSTOLIC BLOOD PRESSURE: 118 MMHG | OXYGEN SATURATION: 96 % | HEART RATE: 64 BPM | BODY MASS INDEX: 26.83 KG/M2 | TEMPERATURE: 98 F | HEIGHT: 61 IN

## 2020-03-12 NOTE — ED PROVIDER NOTES
"SCRIBE #1 NOTE: I, Elizabeth Cummins, am scribing for, and in the presence of, Wilfred Gonzales MD. I have scribed the entire note.       History     Chief Complaint   Patient presents with    Altered Mental Status     patient son reports patient began to become altered today. states has been having a UTI on and off     Review of patient's allergies indicates:   Allergen Reactions    Amlodipine Other (See Comments)     Not sure    Chlorthalidone Other (See Comments)     Not sure    Clonidine Other (See Comments)     Not sure    Codeine Other (See Comments)     Not sure    Escitalopram Other (See Comments)     Not sure    Lisinopril Other (See Comments)     Not sure    Losartan Other (See Comments)     Not sure    Meperidine Other (See Comments)     Not sure    Morphine Other (See Comments)     Not sure         History of Present Illness     HPI    3/11/2020, 8:33 PM  History obtained from the son and patient      History of Present Illness: Serena Post is a 79 y.o. female patient with a PMHx of Afib, stroke who presents to the Emergency Department for AMS which onset gradually a few hrs PTA. Son reports pt seems "confused and puzzled." He reports she was at her baseline around 2 PM. He reports pt has recurrent UTIs. Son reports a home health nurse instructed them to increase pt's Coumadin dose today because her "level was low." Symptoms are constant and moderate in severity. No mitigating or exacerbating factors reported. No associated sxs reported. Patient/son denies any fever, chills, N/V/D, CP, SOB, dizziness, extremity numbness/weakness, cough, HA, abd pain, and all other sxs at this time. Son reports pt hasn't taken Coumadin today yet. No further complaints or concerns at this time.       Arrival mode: Personal vehicle      PCP: Primary Doctor No        Past Medical History:  Past Medical History:   Diagnosis Date    A-fib     Anticoagulant long-term use     Cancer     brain tumor, 10 years ago    Hx " of heart valve replacement with mechanical valve     Pacemaker     Stroke     2007, residual weakness       Past Surgical History:  Past Surgical History:   Procedure Laterality Date    CHOLECYSTECTOMY      HYSTERECTOMY      TONSILLECTOMY           Family History:  History reviewed. No pertinent family history.    Social History:  Social History     Tobacco Use    Smoking status: Never Smoker   Substance and Sexual Activity    Alcohol use: Not Currently    Drug use: Never    Sexual activity: Unknown        Review of Systems     Review of Systems   Constitutional: Negative for chills and fever.   HENT: Negative for sore throat.    Respiratory: Negative for cough and shortness of breath.    Cardiovascular: Negative for chest pain.   Gastrointestinal: Negative for abdominal pain, diarrhea, nausea and vomiting.   Genitourinary: Negative for dysuria.   Musculoskeletal: Negative for back pain.   Skin: Negative for rash.   Neurological: Negative for dizziness, weakness, numbness and headaches.   Hematological: Does not bruise/bleed easily.   Psychiatric/Behavioral: Positive for confusion.   All other systems reviewed and are negative.       Physical Exam     Initial Vitals [03/11/20 1937]   BP Pulse Resp Temp SpO2   115/63 61 18 97.9 °F (36.6 °C) 96 %      MAP       --          Physical Exam  Nursing Notes and Vital Signs Reviewed.  Constitutional: Patient is in no acute distress. Well-developed and well-nourished.  Head: Atraumatic. Normocephalic.  Eyes: PERRL. EOM intact. Conjunctivae are not pale. No scleral icterus.  ENT: Mucous membranes are moist. Oropharynx is clear and symmetric.    Neck: Supple. Full ROM. No lymphadenopathy.  Cardiovascular: Regular rate. Regular rhythm. No murmurs, rubs, or gallops. Distal pulses are 2+ and symmetric.  Pulmonary/Chest: No respiratory distress. Clear to auscultation bilaterally. No wheezing or rales.  Abdominal: Soft and non-distended.  There is no tenderness.  No  "rebound, guarding, or rigidity. Good bowel sounds.  Genitourinary: No CVA tenderness  Musculoskeletal: Moves all extremities. No obvious deformities. No edema. No calf tenderness.  Skin: Warm and dry.  Neurological: Patient is alert and oriented to person and place, not time (son reports this is pt's baseline). Pupils ERRL and EOM normal. Cranial nerves II-XII are intact. Strength is full bilaterally; it is equal and 5/5 in bilateral upper and lower extremities. There is no pronator drift of outstretched arms. Speech is clear and normal. No acute focal neurological deficits noted.  Psychiatric: Normal affect. Good eye contact. Appropriate in content.     ED Course   Procedures  ED Vital Signs:  Vitals:    03/11/20 1937 03/11/20 2000 03/11/20 2017 03/11/20 2145   BP: 115/63 110/64  115/62   Pulse: 61 71 67 73   Resp: 18 17  13   Temp: 97.9 °F (36.6 °C)      TempSrc: Oral      SpO2: 96% 95%  96%   Height: 5' 1" (1.549 m)       03/11/20 2300 03/12/20 0000   BP: 111/66 118/70   Pulse: 60 64   Resp: 20 20   Temp: 97.8 °F (36.6 °C) 97.9 °F (36.6 °C)   TempSrc: Oral Oral   SpO2: 96% 96%   Height:         Abnormal Lab Results:  Labs Reviewed   CBC W/ AUTO DIFFERENTIAL - Abnormal; Notable for the following components:       Result Value    Mean Corpuscular Hemoglobin Conc 31.8 (*)     All other components within normal limits   COMPREHENSIVE METABOLIC PANEL - Abnormal; Notable for the following components:    Glucose 120 (*)     BUN, Bld 29 (*)     eGFR if  50 (*)     eGFR if non  44 (*)     All other components within normal limits   PROTIME-INR - Abnormal; Notable for the following components:    Prothrombin Time 16.5 (*)     INR 1.6 (*)     All other components within normal limits   APTT - Abnormal; Notable for the following components:    aPTT 33.9 (*)     All other components within normal limits   URINALYSIS, REFLEX TO URINE CULTURE - Abnormal; Notable for the following components:    " Occult Blood UA Trace (*)     Nitrite, UA Positive (*)     Leukocytes, UA Trace (*)     All other components within normal limits    Narrative:     Preferred Collection Type->Urine, Catheterized   URINALYSIS MICROSCOPIC - Abnormal; Notable for the following components:    WBC, UA 6 (*)     Bacteria Few (*)     All other components within normal limits    Narrative:     Preferred Collection Type->Urine, Catheterized   LACTIC ACID, PLASMA   TROPONIN I        All Lab Results:  Results for orders placed or performed during the hospital encounter of 03/11/20   CBC auto differential   Result Value Ref Range    WBC 6.73 3.90 - 12.70 K/uL    RBC 4.36 4.00 - 5.40 M/uL    Hemoglobin 12.8 12.0 - 16.0 g/dL    Hematocrit 40.3 37.0 - 48.5 %    Mean Corpuscular Volume 92 82 - 98 fL    Mean Corpuscular Hemoglobin 29.4 27.0 - 31.0 pg    Mean Corpuscular Hemoglobin Conc 31.8 (L) 32.0 - 36.0 g/dL    RDW 13.4 11.5 - 14.5 %    Platelets 314 150 - 350 K/uL    MPV 9.4 9.2 - 12.9 fL    Immature Granulocytes 0.1 0.0 - 0.5 %    Gran # (ANC) 3.9 1.8 - 7.7 K/uL    Immature Grans (Abs) 0.01 0.00 - 0.04 K/uL    Lymph # 2.0 1.0 - 4.8 K/uL    Mono # 0.7 0.3 - 1.0 K/uL    Eos # 0.1 0.0 - 0.5 K/uL    Baso # 0.05 0.00 - 0.20 K/uL    nRBC 0 0 /100 WBC    Gran% 57.7 38.0 - 73.0 %    Lymph% 29.1 18.0 - 48.0 %    Mono% 10.3 4.0 - 15.0 %    Eosinophil% 2.1 0.0 - 8.0 %    Basophil% 0.7 0.0 - 1.9 %    Differential Method Automated    Comprehensive metabolic panel   Result Value Ref Range    Sodium 140 136 - 145 mmol/L    Potassium 3.7 3.5 - 5.1 mmol/L    Chloride 103 95 - 110 mmol/L    CO2 24 23 - 29 mmol/L    Glucose 120 (H) 70 - 110 mg/dL    BUN, Bld 29 (H) 8 - 23 mg/dL    Creatinine 1.2 0.5 - 1.4 mg/dL    Calcium 9.5 8.7 - 10.5 mg/dL    Total Protein 7.4 6.0 - 8.4 g/dL    Albumin 3.6 3.5 - 5.2 g/dL    Total Bilirubin 0.5 0.1 - 1.0 mg/dL    Alkaline Phosphatase 83 55 - 135 U/L    AST 14 10 - 40 U/L    ALT 12 10 - 44 U/L    Anion Gap 13 8 - 16 mmol/L     eGFR if African American 50 (A) >60 mL/min/1.73 m^2    eGFR if non African American 44 (A) >60 mL/min/1.73 m^2   Protime-INR   Result Value Ref Range    Prothrombin Time 16.5 (H) 9.0 - 12.5 sec    INR 1.6 (H) 0.8 - 1.2   APTT   Result Value Ref Range    aPTT 33.9 (H) 21.0 - 32.0 sec   Lactic acid, plasma   Result Value Ref Range    Lactate (Lactic Acid) 1.5 0.5 - 2.2 mmol/L   Urinalysis, Reflex to Urine Culture Urine, Catheterized   Result Value Ref Range    Specimen UA Urine, Clean Catch     Color, UA Yellow Yellow, Straw, Kamla    Appearance, UA Clear Clear    pH, UA 6.0 5.0 - 8.0    Specific Gravity, UA 1.025 1.005 - 1.030    Protein, UA Negative Negative    Glucose, UA Negative Negative    Ketones, UA Negative Negative    Bilirubin (UA) Negative Negative    Occult Blood UA Trace (A) Negative    Nitrite, UA Positive (A) Negative    Urobilinogen, UA Negative <2.0 EU/dL    Leukocytes, UA Trace (A) Negative   Troponin I   Result Value Ref Range    Troponin I <0.006 0.000 - 0.026 ng/mL   Urinalysis Microscopic   Result Value Ref Range    RBC, UA 2 0 - 4 /hpf    WBC, UA 6 (H) 0 - 5 /hpf    WBC Clumps, UA Rare None-Rare    Bacteria Few (A) None-Occ /hpf    Microscopic Comment SEE COMMENT          Imaging Results:  Imaging Results          X-Ray Chest AP Portable (Final result)  Result time 03/11/20 21:34:00    Final result by Сергей Mccurdy MD (03/11/20 21:34:00)                 Impression:      Multi lead pacemaker.  Heart size normal.  Remote median sternotomy.  Atherosclerotic tortuous thoracic aorta.  Remote valvular repair.      Electronically signed by: Сергей Mccurdy MD  Date:    03/11/2020  Time:    21:34             Narrative:    EXAMINATION:  XR CHEST AP PORTABLE    CLINICAL HISTORY:  Altered mental status, unspecified    TECHNIQUE:  Single frontal view of the chest was performed.    COMPARISON:  None    FINDINGS:  The lungs are clear, with normal appearance of pulmonary vasculature.  No pleural effusion or  pneumothorax.    The cardiac silhouette is normal in size. Atherosclerotic tortuous thoracic aorta.                               CT Head Without Contrast (Final result)  Result time 03/11/20 20:53:13    Final result by Сергей Mccurdy MD (03/11/20 20:53:13)                 Impression:      Atrophy.  Small vessel disease.  Intracranial atherosclerotic calcifications.  No acute intracranial abnormality.    All CT scans at this facility use dose modulation, iterative reconstruction and/or weight based dosing when appropriate to reduce radiation dose to as low as reasonably achievable.      Electronically signed by: Сергей Mccurdy MD  Date:    03/11/2020  Time:    20:53             Narrative:    EXAMINATION:  CT HEAD WITHOUT CONTRAST    CLINICAL HISTORY:  Confusion/delirium, altered LOC, unexplained;    TECHNIQUE:  Axial CT images of the head were obtained without  contrast.    COMPARISON:  02/17/2020    FINDINGS:  Ventricles, sulci, and cisterns are symmetric without evidence of mass effect.  Low-density changes in deep white matter both cerebral hemispheres consistent with small vessel disease.  Intracranial atherosclerotic calcifications..  No intra or extraaxial masses, hemorrhages, abnormal fluid collections or abnormal calcifications. The cranium and extracranial structures are unremarkable.  Visualized sinuses and mastoid air cells are clear.                                 The EKG was ordered, reviewed, and independently interpreted by the ED provider.  Interpretation time: 2032  Rate: 60 BPM  Rhythm: atrial-paced rhythm with prolonged AV conduction  Interpretation: RBBB. L anterior fascicular block. Bifascicular block. Possible Lateral infarct, age undetermined. No STEMI.         The Emergency Provider reviewed the vital signs and test results, which are outlined above.     ED Discussion       11:09 PM: Reassessed pt at this time.  Pt states her condition has improved at this time. Discussed with pt all pertinent  ED information and results. Discussed pt dx and plan of tx. Gave pt all f/u and return to the ED instructions. All questions and concerns were addressed at this time. Pt expresses understanding of information and instructions, and is comfortable with plan to discharge. Pt is stable for discharge.    I discussed with patient and/or family/caretaker that evaluation in the ED does not suggest any emergent or life threatening medical conditions requiring immediate intervention beyond what was provided in the ED, and I believe patient is safe for discharge.  Regardless, an unremarkable evaluation in the ED does not preclude the development or presence of a serious of life threatening condition. As such, patient was instructed to return immediately for any worsening or change in current symptoms.         Medical Decision Making:   Clinical Tests:   Lab Tests: Ordered and Reviewed  Radiological Study: Ordered and Reviewed  Medical Tests: Ordered and Reviewed           ED Medication(s):  Medications   warfarin (COUMADIN) tablet 5 mg (5 mg Oral Given 3/11/20 6423)   cefTRIAXone (ROCEPHIN) 1 g in dextrose 5 % 50 mL IVPB (0 g Intravenous Stopped 3/11/20 3155)       Discharge Medication List as of 3/11/2020 11:01 PM      START taking these medications    Details   ciprofloxacin HCl (CIPRO) 500 MG tablet Take 1 tablet (500 mg total) by mouth 2 (two) times daily., Starting Wed 3/11/2020, Print             Follow-up Information     Your doctor In 2 days.           [facility]    Specialty:  Emergency Medicine  Why:  As needed, If symptoms worsen  Contact information:  [address]  [place] [address] [phone]                     Scribe Attestation:   Scribe #1: I performed the above scribed service and the documentation accurately describes the services I performed. I attest to the accuracy of the note.     Attending:   Physician Attestation Statement for Scribe #1: Wilfred GUERRA  MD Janet, personally performed the services described in this documentation, as scribed by Elizabeth Cummins, in my presence, and it is both accurate and complete.           Clinical Impression       ICD-10-CM ICD-9-CM   1. Urinary tract infection without hematuria, site unspecified N39.0 599.0   2. Altered mental status R41.82 780.97       Disposition:   Disposition: Discharged  Condition: Stable         Wilfred Gonzales MD  03/12/20 0569

## 2020-05-21 ENCOUNTER — HOSPITAL ENCOUNTER (EMERGENCY)
Facility: HOSPITAL | Age: 80
Discharge: HOME | End: 2020-05-21
Attending: FAMILY MEDICINE
Payer: MEDICARE

## 2020-05-21 VITALS
TEMPERATURE: 99 F | BODY MASS INDEX: 26.83 KG/M2 | RESPIRATION RATE: 15 BRPM | DIASTOLIC BLOOD PRESSURE: 61 MMHG | OXYGEN SATURATION: 96 % | HEART RATE: 68 BPM | SYSTOLIC BLOOD PRESSURE: 117 MMHG | HEIGHT: 61 IN

## 2020-05-21 DIAGNOSIS — R07.9 CHEST PAIN: Primary | ICD-10-CM

## 2020-05-21 LAB
ALBUMIN SERPL BCP-MCNC: 3.3 G/DL (ref 3.5–5.2)
ALP SERPL-CCNC: 78 U/L (ref 55–135)
ALT SERPL W/O P-5'-P-CCNC: 14 U/L (ref 10–44)
ANION GAP SERPL CALC-SCNC: 8 MMOL/L (ref 8–16)
AST SERPL-CCNC: 18 U/L (ref 10–40)
BASOPHILS # BLD AUTO: 0.06 K/UL (ref 0–0.2)
BASOPHILS NFR BLD: 0.9 % (ref 0–1.9)
BILIRUB SERPL-MCNC: 0.3 MG/DL (ref 0.1–1)
BNP SERPL-MCNC: 114 PG/ML (ref 0–99)
BUN SERPL-MCNC: 36 MG/DL (ref 8–23)
CALCIUM SERPL-MCNC: 9 MG/DL (ref 8.7–10.5)
CHLORIDE SERPL-SCNC: 105 MMOL/L (ref 95–110)
CO2 SERPL-SCNC: 23 MMOL/L (ref 23–29)
CREAT SERPL-MCNC: 1 MG/DL (ref 0.5–1.4)
DIFFERENTIAL METHOD: ABNORMAL
EOSINOPHIL # BLD AUTO: 0.2 K/UL (ref 0–0.5)
EOSINOPHIL NFR BLD: 2.7 % (ref 0–8)
ERYTHROCYTE [DISTWIDTH] IN BLOOD BY AUTOMATED COUNT: 13.7 % (ref 11.5–14.5)
EST. GFR  (AFRICAN AMERICAN): >60 ML/MIN/1.73 M^2
EST. GFR  (NON AFRICAN AMERICAN): 54 ML/MIN/1.73 M^2
GLUCOSE SERPL-MCNC: 118 MG/DL (ref 70–110)
HCT VFR BLD AUTO: 39.1 % (ref 37–48.5)
HGB BLD-MCNC: 12.2 G/DL (ref 12–16)
IMM GRANULOCYTES # BLD AUTO: 0.03 K/UL (ref 0–0.04)
IMM GRANULOCYTES NFR BLD AUTO: 0.4 % (ref 0–0.5)
INR PPP: 2.5 (ref 0.8–1.2)
LYMPHOCYTES # BLD AUTO: 2.6 K/UL (ref 1–4.8)
LYMPHOCYTES NFR BLD: 37.9 % (ref 18–48)
MCH RBC QN AUTO: 29.3 PG (ref 27–31)
MCHC RBC AUTO-ENTMCNC: 31.2 G/DL (ref 32–36)
MCV RBC AUTO: 94 FL (ref 82–98)
MONOCYTES # BLD AUTO: 0.8 K/UL (ref 0.3–1)
MONOCYTES NFR BLD: 11.6 % (ref 4–15)
NEUTROPHILS # BLD AUTO: 3.1 K/UL (ref 1.8–7.7)
NEUTROPHILS NFR BLD: 46.5 % (ref 38–73)
NRBC BLD-RTO: 0 /100 WBC
PLATELET # BLD AUTO: 286 K/UL (ref 150–350)
PMV BLD AUTO: 9.4 FL (ref 9.2–12.9)
POTASSIUM SERPL-SCNC: 4 MMOL/L (ref 3.5–5.1)
PROT SERPL-MCNC: 7.1 G/DL (ref 6–8.4)
PROTHROMBIN TIME: 25.5 SEC (ref 9–12.5)
RBC # BLD AUTO: 4.16 M/UL (ref 4–5.4)
SODIUM SERPL-SCNC: 136 MMOL/L (ref 136–145)
TROPONIN I SERPL DL<=0.01 NG/ML-MCNC: 0.01 NG/ML (ref 0–0.03)
WBC # BLD AUTO: 6.72 K/UL (ref 3.9–12.7)

## 2020-05-21 PROCEDURE — 85610 PROTHROMBIN TIME: CPT

## 2020-05-21 PROCEDURE — 96374 THER/PROPH/DIAG INJ IV PUSH: CPT

## 2020-05-21 PROCEDURE — 63600175 PHARM REV CODE 636 W HCPCS: Performed by: FAMILY MEDICINE

## 2020-05-21 PROCEDURE — 80053 COMPREHEN METABOLIC PANEL: CPT

## 2020-05-21 PROCEDURE — 25000003 PHARM REV CODE 250: Performed by: FAMILY MEDICINE

## 2020-05-21 PROCEDURE — 83880 ASSAY OF NATRIURETIC PEPTIDE: CPT

## 2020-05-21 PROCEDURE — 93005 ELECTROCARDIOGRAM TRACING: CPT

## 2020-05-21 PROCEDURE — 99285 EMERGENCY DEPT VISIT HI MDM: CPT | Mod: 25

## 2020-05-21 PROCEDURE — 93010 EKG 12-LEAD: ICD-10-PCS | Mod: ,,, | Performed by: INTERNAL MEDICINE

## 2020-05-21 PROCEDURE — 36415 COLL VENOUS BLD VENIPUNCTURE: CPT

## 2020-05-21 PROCEDURE — 85025 COMPLETE CBC W/AUTO DIFF WBC: CPT

## 2020-05-21 PROCEDURE — 93010 ELECTROCARDIOGRAM REPORT: CPT | Mod: ,,, | Performed by: INTERNAL MEDICINE

## 2020-05-21 PROCEDURE — 84484 ASSAY OF TROPONIN QUANT: CPT

## 2020-05-21 RX ORDER — ASPIRIN 325 MG
325 TABLET ORAL
Status: COMPLETED | OUTPATIENT
Start: 2020-05-21 | End: 2020-05-21

## 2020-05-21 RX ORDER — KETOROLAC TROMETHAMINE 30 MG/ML
15 INJECTION, SOLUTION INTRAMUSCULAR; INTRAVENOUS
Status: COMPLETED | OUTPATIENT
Start: 2020-05-21 | End: 2020-05-21

## 2020-05-21 RX ADMIN — KETOROLAC TROMETHAMINE 15 MG: 30 INJECTION, SOLUTION INTRAMUSCULAR; INTRAVENOUS at 10:05

## 2020-05-21 RX ADMIN — ASPIRIN 325 MG: 325 TABLET, FILM COATED ORAL at 10:05

## 2020-05-22 NOTE — ED NOTES
Pt complains of left side lateral chest pain.  States worse with movement and inspiration.  No pain while at rest.

## 2020-05-22 NOTE — ED PROVIDER NOTES
SCRIBE #1 NOTE: I, Herman Ryan, am scribing for, and in the presence of, Emili Kenney MD. I have scribed the entire note.      History      Chief Complaint   Patient presents with    Chest Pain     patient reports midsternal chest pain for the past few hours       Review of patient's allergies indicates:   Allergen Reactions    Amlodipine Other (See Comments)     Not sure    Chlorthalidone Other (See Comments)     Not sure    Clonidine Other (See Comments)     Not sure    Codeine Other (See Comments)     Not sure    Escitalopram Other (See Comments)     Not sure    Lisinopril Other (See Comments)     Not sure    Losartan Other (See Comments)     Not sure    Meperidine Other (See Comments)     Not sure    Methadone Other (See Comments)     Not sure  Not sure      Morphine Other (See Comments)     Not sure    Nebivolol Other (See Comments)     Not sure  Not sure      Nitrofurantoin Other (See Comments)     Not sure  Not sure      Omeprazole Other (See Comments)     Not sure  Not sure      Pravastatin Other (See Comments)     Not sure  Not sure      Propoxyphene Other (See Comments)     Not sure  Not sure      Sulfamethoxazole-trimethoprim Other (See Comments)     Not sure  Not sure          HPI   HPI    5/21/2020, 10:23 PM   History obtained from the patient      History of Present Illness: Serena Post is a 79 y.o. female patient who presents to the Emergency Department for midsternal chest pain, onset 3 hours PTA. Symptoms are constant and moderate in severity. Sxs are worse when leaning forward, or when taking deep breaths. No associated sxs reported. Patient denies any fever, chills,n/v/d, SOB, weakness, numbness, dizziness, headache, and all other sxs at this time. No prior Tx reported. No further complaints or concerns at this time.     Arrival mode: Personal vehicle    PCP: Primary Doctor No       Past Medical History:  Past Medical History:   Diagnosis Date    A-fib      Anticoagulant long-term use     Cancer     brain tumor, 10 years ago    Hx of heart valve replacement with mechanical valve     Pacemaker     Stroke     2007, residual weakness       Past Surgical History:  Past Surgical History:   Procedure Laterality Date    CHOLECYSTECTOMY      HYSTERECTOMY      TONSILLECTOMY           Family History:  No family history on file.    Social History:  Social History     Tobacco Use    Smoking status: Never Smoker   Substance and Sexual Activity    Alcohol use: Not Currently    Drug use: Never    Sexual activity: Not on file       ROS   Review of Systems   Constitutional: Negative for chills, diaphoresis, fatigue and fever.   HENT: Negative for sore throat.    Respiratory: Negative for shortness of breath.    Cardiovascular: Positive for chest pain (midsternal).   Gastrointestinal: Negative for diarrhea, nausea and vomiting.   Genitourinary: Negative for dysuria.   Musculoskeletal: Negative for back pain.   Skin: Negative for rash.   Neurological: Negative for dizziness, weakness, light-headedness, numbness and headaches.   Hematological: Does not bruise/bleed easily.   All other systems reviewed and are negative.    Physical Exam      Initial Vitals [05/21/20 2125]   BP Pulse Resp Temp SpO2   122/65 70 20 98.7 °F (37.1 °C) 96 %      MAP       --          Physical Exam  Nursing Notes and Vital Signs Reviewed.  Constitutional: Patient is in no acute distress. Well-developed and well-nourished.  Head: Atraumatic. Normocephalic.  Eyes: PERRL. EOM intact. Conjunctivae are not pale. No scleral icterus.  ENT: Mucous membranes are moist. Oropharynx is clear and symmetric.    Neck: Supple. Full ROM. No lymphadenopathy.  Cardiovascular: Regular rate. Regular rhythm. No murmurs, rubs, or gallops. Distal pulses are 2+ and symmetric.  Pulmonary/Chest: Reproducible chest wall pain. No respiratory distress. Clear to auscultation bilaterally. No wheezing or rales.  Abdominal: Soft and  "non-distended.  There is no tenderness.  No rebound, guarding, or rigidity.   Musculoskeletal: Moves all extremities. No obvious deformities. No edema.  Skin: Warm and dry.  Neurological:  Alert, awake, and appropriate.  Normal speech.  No acute focal neurological deficits are appreciated.  Psychiatric: Normal affect. Good eye contact. Appropriate in content.    ED Course    Procedures  ED Vital Signs:  Vitals:    05/21/20 2125 05/21/20 2155 05/21/20 2216 05/21/20 2301   BP: 122/65  123/65 117/61   Pulse: 70 66 65 68   Resp: 20  15 15   Temp: 98.7 °F (37.1 °C)      TempSrc: Oral      SpO2: 96%  97% 96%   Height: 5' 1" (1.549 m)          Abnormal Lab Results:  Labs Reviewed   CBC W/ AUTO DIFFERENTIAL - Abnormal; Notable for the following components:       Result Value    Mean Corpuscular Hemoglobin Conc 31.2 (*)     All other components within normal limits   COMPREHENSIVE METABOLIC PANEL - Abnormal; Notable for the following components:    Glucose 118 (*)     BUN, Bld 36 (*)     Albumin 3.3 (*)     eGFR if non  54 (*)     All other components within normal limits   B-TYPE NATRIURETIC PEPTIDE - Abnormal; Notable for the following components:     (*)     All other components within normal limits   PROTIME-INR - Abnormal; Notable for the following components:    Prothrombin Time 25.5 (*)     INR 2.5 (*)     All other components within normal limits   TROPONIN I        All Lab Results:  Results for orders placed or performed during the hospital encounter of 05/21/20   CBC auto differential   Result Value Ref Range    WBC 6.72 3.90 - 12.70 K/uL    RBC 4.16 4.00 - 5.40 M/uL    Hemoglobin 12.2 12.0 - 16.0 g/dL    Hematocrit 39.1 37.0 - 48.5 %    Mean Corpuscular Volume 94 82 - 98 fL    Mean Corpuscular Hemoglobin 29.3 27.0 - 31.0 pg    Mean Corpuscular Hemoglobin Conc 31.2 (L) 32.0 - 36.0 g/dL    RDW 13.7 11.5 - 14.5 %    Platelets 286 150 - 350 K/uL    MPV 9.4 9.2 - 12.9 fL    Immature Granulocytes " 0.4 0.0 - 0.5 %    Gran # (ANC) 3.1 1.8 - 7.7 K/uL    Immature Grans (Abs) 0.03 0.00 - 0.04 K/uL    Lymph # 2.6 1.0 - 4.8 K/uL    Mono # 0.8 0.3 - 1.0 K/uL    Eos # 0.2 0.0 - 0.5 K/uL    Baso # 0.06 0.00 - 0.20 K/uL    nRBC 0 0 /100 WBC    Gran% 46.5 38.0 - 73.0 %    Lymph% 37.9 18.0 - 48.0 %    Mono% 11.6 4.0 - 15.0 %    Eosinophil% 2.7 0.0 - 8.0 %    Basophil% 0.9 0.0 - 1.9 %    Differential Method Automated    Comprehensive metabolic panel   Result Value Ref Range    Sodium 136 136 - 145 mmol/L    Potassium 4.0 3.5 - 5.1 mmol/L    Chloride 105 95 - 110 mmol/L    CO2 23 23 - 29 mmol/L    Glucose 118 (H) 70 - 110 mg/dL    BUN, Bld 36 (H) 8 - 23 mg/dL    Creatinine 1.0 0.5 - 1.4 mg/dL    Calcium 9.0 8.7 - 10.5 mg/dL    Total Protein 7.1 6.0 - 8.4 g/dL    Albumin 3.3 (L) 3.5 - 5.2 g/dL    Total Bilirubin 0.3 0.1 - 1.0 mg/dL    Alkaline Phosphatase 78 55 - 135 U/L    AST 18 10 - 40 U/L    ALT 14 10 - 44 U/L    Anion Gap 8 8 - 16 mmol/L    eGFR if African American >60 >60 mL/min/1.73 m^2    eGFR if non African American 54 (A) >60 mL/min/1.73 m^2   Troponin I #1   Result Value Ref Range    Troponin I 0.006 0.000 - 0.026 ng/mL   B-Type natriuretic peptide (BNP)   Result Value Ref Range     (H) 0 - 99 pg/mL   Protime-INR   Result Value Ref Range    Prothrombin Time 25.5 (H) 9.0 - 12.5 sec    INR 2.5 (H) 0.8 - 1.2     Imaging Results:  Imaging Results          X-Ray Chest AP Portable (Final result)  Result time 05/21/20 22:58:21    Final result by Israel Marshall MD (05/21/20 22:58:21)                 Impression:      Stable chest x-ray with no acute pulmonary infiltrate.      Electronically signed by: Israel Marshall MD  Date:    05/21/2020  Time:    22:58             Narrative:    EXAMINATION:  XR CHEST AP PORTABLE    CLINICAL HISTORY:  Acute chest pain, Chest Pain;    COMPARISON:  03/11/2020    FINDINGS:  Sternal wires and valvular prosthesis noted.  Right pacemaker.    Heart size is normal.  Aortic  atherosclerosis is present.    Chronic scarring at the left lung base.    No acute infiltrate.                               The EKG was ordered, reviewed, and independently interpreted by the ED provider.  Interpretation time: 21:33  Rate: 71 BPM  Rhythm: AV dual-paced rhythm with prolonged AV conduction  Interpretation: No acute ST changes. No STEMI.           The Emergency Provider reviewed the vital signs and test results, which are outlined above.    ED Discussion     10:50 PM: Reassessed pt at this time. Discussed with pt all pertinent ED information and results. Discussed pt dx and plan of tx. Gave pt all f/u and return to the ED instructions. All questions and concerns were addressed at this time. Pt expresses understanding of information and instructions, and is comfortable with plan to discharge. Pt is stable for discharge.    I discussed with patient and/or family/caretaker that evaluation in the ED does not suggest any emergent or life threatening medical conditions requiring immediate intervention beyond what was provided in the ED, and I believe patient is safe for discharge.  Regardless, an unremarkable evaluation in the ED does not preclude the development or presence of a serious of life threatening condition. As such, patient was instructed to return immediately for any worsening or change in current symptoms.         ED Medication(s):  Medications   aspirin tablet 325 mg (325 mg Oral Given 5/21/20 2244)   ketorolac injection 15 mg (15 mg Intravenous Given 5/21/20 2244)     Discharge Medication List as of 5/21/2020 10:43 PM          Follow-up Information     Schedule an appointment as soon as possible for a visit  with Primary Doctor No.    Why:  As needed                   Medical Decision Making    Medical Decision Making:   Clinical Tests:   Lab Tests: Ordered and Reviewed  Radiological Study: Ordered and Reviewed  Medical Tests: Ordered and Reviewed           Scribe Attestation:   Scribe #1: I  performed the above scribed service and the documentation accurately describes the services I performed. I attest to the accuracy of the note.    Attending:   Physician Attestation Statement for Scribe #1: I, Emili Kenney MD, personally performed the services described in this documentation, as scribed by Herman Ryan, in my presence, and it is both accurate and complete.          Clinical Impression       ICD-10-CM ICD-9-CM   1. Chest pain R07.9 786.50       Disposition:   Disposition: Discharged  Condition: Stable         Emili Kenney MD  05/22/20 1045

## 2020-08-06 ENCOUNTER — HOSPITAL ENCOUNTER (EMERGENCY)
Facility: HOSPITAL | Age: 80
Discharge: HOME | End: 2020-08-06
Attending: STUDENT IN AN ORGANIZED HEALTH CARE EDUCATION/TRAINING PROGRAM
Payer: MEDICARE

## 2020-08-06 VITALS
HEIGHT: 61 IN | RESPIRATION RATE: 19 BRPM | BODY MASS INDEX: 26.83 KG/M2 | DIASTOLIC BLOOD PRESSURE: 70 MMHG | SYSTOLIC BLOOD PRESSURE: 139 MMHG | TEMPERATURE: 98 F | HEART RATE: 69 BPM | OXYGEN SATURATION: 97 %

## 2020-08-06 DIAGNOSIS — N39.0 URINARY TRACT INFECTION WITHOUT HEMATURIA, SITE UNSPECIFIED: Primary | ICD-10-CM

## 2020-08-06 DIAGNOSIS — R41.82 AMS (ALTERED MENTAL STATUS): ICD-10-CM

## 2020-08-06 LAB
ALBUMIN SERPL BCP-MCNC: 3.3 G/DL (ref 3.5–5.2)
ALP SERPL-CCNC: 79 U/L (ref 55–135)
ALT SERPL W/O P-5'-P-CCNC: 12 U/L (ref 10–44)
ANION GAP SERPL CALC-SCNC: 10 MMOL/L (ref 8–16)
AST SERPL-CCNC: 16 U/L (ref 10–40)
BACTERIA #/AREA URNS HPF: ABNORMAL /HPF
BASOPHILS # BLD AUTO: 0.05 K/UL (ref 0–0.2)
BASOPHILS NFR BLD: 0.8 % (ref 0–1.9)
BILIRUB SERPL-MCNC: 0.6 MG/DL (ref 0.1–1)
BILIRUB UR QL STRIP: NEGATIVE
BUN SERPL-MCNC: 30 MG/DL (ref 8–23)
CALCIUM SERPL-MCNC: 9 MG/DL (ref 8.7–10.5)
CHLORIDE SERPL-SCNC: 103 MMOL/L (ref 95–110)
CLARITY UR: CLEAR
CO2 SERPL-SCNC: 25 MMOL/L (ref 23–29)
COLOR UR: YELLOW
CREAT SERPL-MCNC: 1.3 MG/DL (ref 0.5–1.4)
DIFFERENTIAL METHOD: ABNORMAL
EOSINOPHIL # BLD AUTO: 0.2 K/UL (ref 0–0.5)
EOSINOPHIL NFR BLD: 2.9 % (ref 0–8)
ERYTHROCYTE [DISTWIDTH] IN BLOOD BY AUTOMATED COUNT: 13.3 % (ref 11.5–14.5)
EST. GFR  (AFRICAN AMERICAN): 45 ML/MIN/1.73 M^2
EST. GFR  (NON AFRICAN AMERICAN): 39 ML/MIN/1.73 M^2
GLUCOSE SERPL-MCNC: 130 MG/DL (ref 70–110)
GLUCOSE UR QL STRIP: NEGATIVE
HCT VFR BLD AUTO: 39.8 % (ref 37–48.5)
HGB BLD-MCNC: 12.7 G/DL (ref 12–16)
HGB UR QL STRIP: ABNORMAL
HYALINE CASTS #/AREA URNS LPF: 1 /LPF
IMM GRANULOCYTES # BLD AUTO: 0.02 K/UL (ref 0–0.04)
IMM GRANULOCYTES NFR BLD AUTO: 0.3 % (ref 0–0.5)
KETONES UR QL STRIP: NEGATIVE
LEUKOCYTE ESTERASE UR QL STRIP: NEGATIVE
LYMPHOCYTES # BLD AUTO: 1.9 K/UL (ref 1–4.8)
LYMPHOCYTES NFR BLD: 29.5 % (ref 18–48)
MAGNESIUM SERPL-MCNC: 2 MG/DL (ref 1.6–2.6)
MCH RBC QN AUTO: 29.8 PG (ref 27–31)
MCHC RBC AUTO-ENTMCNC: 31.9 G/DL (ref 32–36)
MCV RBC AUTO: 93 FL (ref 82–98)
MICROSCOPIC COMMENT: ABNORMAL
MONOCYTES # BLD AUTO: 0.9 K/UL (ref 0.3–1)
MONOCYTES NFR BLD: 13.3 % (ref 4–15)
NEUTROPHILS # BLD AUTO: 3.5 K/UL (ref 1.8–7.7)
NEUTROPHILS NFR BLD: 53.2 % (ref 38–73)
NITRITE UR QL STRIP: POSITIVE
NRBC BLD-RTO: 0 /100 WBC
PH UR STRIP: 6 [PH] (ref 5–8)
PLATELET # BLD AUTO: 303 K/UL (ref 150–350)
PMV BLD AUTO: 9.7 FL (ref 9.2–12.9)
POTASSIUM SERPL-SCNC: 4.1 MMOL/L (ref 3.5–5.1)
PROT SERPL-MCNC: 7.2 G/DL (ref 6–8.4)
PROT UR QL STRIP: NEGATIVE
RBC # BLD AUTO: 4.26 M/UL (ref 4–5.4)
RBC #/AREA URNS HPF: 1 /HPF (ref 0–4)
SODIUM SERPL-SCNC: 138 MMOL/L (ref 136–145)
SP GR UR STRIP: 1.02 (ref 1–1.03)
TROPONIN I SERPL DL<=0.01 NG/ML-MCNC: <0.006 NG/ML (ref 0–0.03)
URN SPEC COLLECT METH UR: ABNORMAL
UROBILINOGEN UR STRIP-ACNC: NEGATIVE EU/DL
WBC # BLD AUTO: 6.48 K/UL (ref 3.9–12.7)
WBC #/AREA URNS HPF: 12 /HPF (ref 0–5)

## 2020-08-06 PROCEDURE — 81000 URINALYSIS NONAUTO W/SCOPE: CPT

## 2020-08-06 PROCEDURE — 93005 ELECTROCARDIOGRAM TRACING: CPT

## 2020-08-06 PROCEDURE — 93010 ELECTROCARDIOGRAM REPORT: CPT | Mod: ,,, | Performed by: INTERNAL MEDICINE

## 2020-08-06 PROCEDURE — 85025 COMPLETE CBC W/AUTO DIFF WBC: CPT

## 2020-08-06 PROCEDURE — 96360 HYDRATION IV INFUSION INIT: CPT

## 2020-08-06 PROCEDURE — 84484 ASSAY OF TROPONIN QUANT: CPT

## 2020-08-06 PROCEDURE — 87186 SC STD MICRODIL/AGAR DIL: CPT

## 2020-08-06 PROCEDURE — 93010 EKG 12-LEAD: ICD-10-PCS | Mod: ,,, | Performed by: INTERNAL MEDICINE

## 2020-08-06 PROCEDURE — 87086 URINE CULTURE/COLONY COUNT: CPT

## 2020-08-06 PROCEDURE — 87088 URINE BACTERIA CULTURE: CPT

## 2020-08-06 PROCEDURE — 25000003 PHARM REV CODE 250: Performed by: STUDENT IN AN ORGANIZED HEALTH CARE EDUCATION/TRAINING PROGRAM

## 2020-08-06 PROCEDURE — 87077 CULTURE AEROBIC IDENTIFY: CPT

## 2020-08-06 PROCEDURE — 83735 ASSAY OF MAGNESIUM: CPT

## 2020-08-06 PROCEDURE — 99285 EMERGENCY DEPT VISIT HI MDM: CPT | Mod: 25

## 2020-08-06 PROCEDURE — 80053 COMPREHEN METABOLIC PANEL: CPT

## 2020-08-06 RX ORDER — OXYMETAZOLINE HCL 0.05 %
1 SPRAY, NON-AEROSOL (ML) NASAL 2 TIMES DAILY
Qty: 15 ML | Refills: 0 | Status: SHIPPED | OUTPATIENT
Start: 2020-08-06 | End: 2020-08-09

## 2020-08-06 RX ORDER — CEPHALEXIN 500 MG/1
500 CAPSULE ORAL EVERY 12 HOURS
Qty: 14 CAPSULE | Refills: 0 | Status: SHIPPED | OUTPATIENT
Start: 2020-08-06 | End: 2020-08-13

## 2020-08-06 RX ORDER — OXYMETAZOLINE HCL 0.05 %
1 SPRAY, NON-AEROSOL (ML) NASAL 2 TIMES DAILY
Qty: 15 ML | Refills: 0 | COMMUNITY
Start: 2020-08-06 | End: 2020-08-06 | Stop reason: SDUPTHER

## 2020-08-06 RX ADMIN — SODIUM CHLORIDE 1000 ML: 0.9 INJECTION, SOLUTION INTRAVENOUS at 05:08

## 2020-08-06 NOTE — ED PROVIDER NOTES
SCRIBE #1 NOTE: I, Eric Hankins, am scribing for, and in the presence of, Jacob Sanz MD. I have scribed the entire note.       History     Chief Complaint   Patient presents with    Altered Mental Status     son states pt was more confused and sleepy yesterday     Dysuria     son states pt complained about burning with urination     Review of patient's allergies indicates:   Allergen Reactions    Amlodipine Other (See Comments)     Not sure    Chlorthalidone Other (See Comments)     Not sure    Clonidine Other (See Comments)     Not sure    Codeine Other (See Comments)     Not sure    Escitalopram Other (See Comments)     Not sure    Lisinopril Other (See Comments)     Not sure    Losartan Other (See Comments)     Not sure    Meperidine Other (See Comments)     Not sure    Methadone Other (See Comments)     Not sure  Not sure      Morphine Other (See Comments)     Not sure    Nebivolol Other (See Comments)     Not sure  Not sure      Nitrofurantoin Other (See Comments)     Not sure  Not sure      Omeprazole Other (See Comments)     Not sure  Not sure      Pravastatin Other (See Comments)     Not sure  Not sure      Propoxyphene Other (See Comments)     Not sure  Not sure      Sulfamethoxazole-trimethoprim Other (See Comments)     Not sure  Not sure           History of Present Illness     HPI    8/6/2020, 4:32 PM   History obtained from the son      History of Present Illness: Serena Post is a 80 y.o. female patient with a PMHx of Afib, Cancer, heart valve replacement, and stroke who presents to the Emergency Department for evaluation of AMS which onset gradually x 1 day ago. Pt's son states pt is more confused and sleepy lately and that she is not as responsive as she usually is.     Symptoms are constant and moderate in severity. No mitigating or exacerbating factors reported. Associated sxs include dysuria, confusion, and fatigue. History limited due to AMS. No prior Tx reported.  No further complaints or concerns at this time.        Arrival mode: Personal vehicle     PCP: Primary Doctor No        Past Medical History:  Past Medical History:   Diagnosis Date    A-fib     Anticoagulant long-term use     Cancer     brain tumor, 10 years ago    Hx of heart valve replacement with mechanical valve     Pacemaker     Stroke     2007, residual weakness       Past Surgical History:  Past Surgical History:   Procedure Laterality Date    CHOLECYSTECTOMY      HYSTERECTOMY      TONSILLECTOMY           Family History:  History reviewed. No pertinent family history.      Social History:  Social History     Tobacco Use    Smoking status: Never Smoker   Substance and Sexual Activity    Alcohol use: Not Currently    Drug use: Never    Sexual activity: Unknown         Review of Systems     Review of Systems   Unable to perform ROS: Mental status change   Constitutional: Positive for fatigue.   Genitourinary: Positive for dysuria.   Psychiatric/Behavioral: Positive for confusion.        Physical Exam     Initial Vitals [08/06/20 1625]   BP Pulse Resp Temp SpO2   113/60 68 18 97.9 °F (36.6 °C) 95 %      MAP       --          Physical Exam  Nursing Notes and Vital Signs Reviewed.  Constitutional: Patient is in no acute distress. Well-developed and well-nourished.  Head: Atraumatic. Normocephalic.  Eyes: PERRL. EOM intact. Conjunctivae are not pale. No scleral icterus.  ENT: Mucous membranes are moist. Oropharynx is clear and symmetric.    Neck: Supple. Full ROM. No lymphadenopathy.  Cardiovascular: Regular rate. Regular rhythm. No murmurs, rubs, or gallops. Distal pulses are 2+ and symmetric.  Pulmonary/Chest: No respiratory distress. Clear to auscultation bilaterally. No wheezing or rales.  Abdominal: Soft and non-distended.  There is no tenderness.  No rebound, guarding, or rigidity. Good bowel sounds.  Genitourinary: No CVA tenderness  Musculoskeletal: Moves all extremities. No obvious  "deformities. No edema. No calf tenderness.  Skin: Warm and dry.  Neurological: Patient is alert and oriented to self. Pupils ERRL and EOM normal. Cranial nerves II-XII are intact. Strength is full bilaterally; it is equal and 5/5 in bilateral upper and lower extremities. There is no pronator drift of outstretched arms. Light touch sense is intact. Speech is clear and normal. Decreased responsiveness. No acute focal neurological deficits noted.   Psychiatric: Normal affect. Good eye contact. Appropriate in content.     ED Course   Procedures  ED Vital Signs:  Vitals:    08/06/20 1625 08/06/20 1650 08/06/20 1651 08/06/20 1701   BP: 113/60 137/64  (!) 146/66   Pulse: 68  69 64   Resp: 18      Temp: 97.9 °F (36.6 °C)      TempSrc: Oral      SpO2: 95% 96% 96% 95%   Height: 5' 1" (1.549 m)       08/06/20 1732 08/06/20 1809 08/06/20 1810 08/06/20 2007   BP: (!) 143/67 127/77  139/70   Pulse: 61 67 64 69   Resp: 17  16 19   Temp:       TempSrc:       SpO2: 97%  98% 97%   Height:        08/06/20 2015   BP:    Pulse:    Resp:    Temp: 98 °F (36.7 °C)   TempSrc:    SpO2:    Height:        Abnormal Lab Results:  Labs Reviewed   CBC W/ AUTO DIFFERENTIAL - Abnormal; Notable for the following components:       Result Value    Mean Corpuscular Hemoglobin Conc 31.9 (*)     All other components within normal limits   COMPREHENSIVE METABOLIC PANEL - Abnormal; Notable for the following components:    Glucose 130 (*)     BUN, Bld 30 (*)     Albumin 3.3 (*)     eGFR if  45 (*)     eGFR if non  39 (*)     All other components within normal limits   URINALYSIS, REFLEX TO URINE CULTURE - Abnormal; Notable for the following components:    Occult Blood UA Trace (*)     Nitrite, UA Positive (*)     All other components within normal limits    Narrative:     Specimen Source->Urine   URINALYSIS MICROSCOPIC - Abnormal; Notable for the following components:    WBC, UA 12 (*)     Bacteria Many (*)     All other " components within normal limits    Narrative:     Specimen Source->Urine   CULTURE, URINE   TROPONIN I   MAGNESIUM        All Lab Results:  Results for orders placed or performed during the hospital encounter of 08/06/20   CBC auto differential   Result Value Ref Range    WBC 6.48 3.90 - 12.70 K/uL    RBC 4.26 4.00 - 5.40 M/uL    Hemoglobin 12.7 12.0 - 16.0 g/dL    Hematocrit 39.8 37.0 - 48.5 %    Mean Corpuscular Volume 93 82 - 98 fL    Mean Corpuscular Hemoglobin 29.8 27.0 - 31.0 pg    Mean Corpuscular Hemoglobin Conc 31.9 (L) 32.0 - 36.0 g/dL    RDW 13.3 11.5 - 14.5 %    Platelets 303 150 - 350 K/uL    MPV 9.7 9.2 - 12.9 fL    Immature Granulocytes 0.3 0.0 - 0.5 %    Gran # (ANC) 3.5 1.8 - 7.7 K/uL    Immature Grans (Abs) 0.02 0.00 - 0.04 K/uL    Lymph # 1.9 1.0 - 4.8 K/uL    Mono # 0.9 0.3 - 1.0 K/uL    Eos # 0.2 0.0 - 0.5 K/uL    Baso # 0.05 0.00 - 0.20 K/uL    nRBC 0 0 /100 WBC    Gran% 53.2 38.0 - 73.0 %    Lymph% 29.5 18.0 - 48.0 %    Mono% 13.3 4.0 - 15.0 %    Eosinophil% 2.9 0.0 - 8.0 %    Basophil% 0.8 0.0 - 1.9 %    Differential Method Automated    Comprehensive metabolic panel   Result Value Ref Range    Sodium 138 136 - 145 mmol/L    Potassium 4.1 3.5 - 5.1 mmol/L    Chloride 103 95 - 110 mmol/L    CO2 25 23 - 29 mmol/L    Glucose 130 (H) 70 - 110 mg/dL    BUN, Bld 30 (H) 8 - 23 mg/dL    Creatinine 1.3 0.5 - 1.4 mg/dL    Calcium 9.0 8.7 - 10.5 mg/dL    Total Protein 7.2 6.0 - 8.4 g/dL    Albumin 3.3 (L) 3.5 - 5.2 g/dL    Total Bilirubin 0.6 0.1 - 1.0 mg/dL    Alkaline Phosphatase 79 55 - 135 U/L    AST 16 10 - 40 U/L    ALT 12 10 - 44 U/L    Anion Gap 10 8 - 16 mmol/L    eGFR if African American 45 (A) >60 mL/min/1.73 m^2    eGFR if non African American 39 (A) >60 mL/min/1.73 m^2   Troponin I   Result Value Ref Range    Troponin I <0.006 0.000 - 0.026 ng/mL   Magnesium   Result Value Ref Range    Magnesium 2.0 1.6 - 2.6 mg/dL   Urinalysis, Reflex to Urine Culture Urine, Clean Catch    Specimen: Urine    Result Value Ref Range    Specimen UA Urine, Catheterized     Color, UA Yellow Yellow, Straw, Kamla    Appearance, UA Clear Clear    pH, UA 6.0 5.0 - 8.0    Specific Gravity, UA 1.025 1.005 - 1.030    Protein, UA Negative Negative    Glucose, UA Negative Negative    Ketones, UA Negative Negative    Bilirubin (UA) Negative Negative    Occult Blood UA Trace (A) Negative    Nitrite, UA Positive (A) Negative    Urobilinogen, UA Negative <2.0 EU/dL    Leukocytes, UA Negative Negative   Urinalysis Microscopic   Result Value Ref Range    RBC, UA 1 0 - 4 /hpf    WBC, UA 12 (H) 0 - 5 /hpf    Bacteria Many (A) None-Occ /hpf    Hyaline Casts, UA 1 0-1/lpf /lpf    Microscopic Comment SEE COMMENT          Imaging Results:  Imaging Results          CT Head Without Contrast (Final result)  Result time 08/06/20 18:20:37    Final result by Sara Knight MD (Timothy) (08/06/20 18:20:37)                 Impression:      No acute intracranial abnormality.    All CT scans at this facility use dose modulation, iterative reconstructions, and/or weight base dosing when appropriate to reduce radiation dose to as low as reasonably achievable.      Electronically signed by: Sara Knight MD  Date:    08/06/2020  Time:    18:20             Narrative:    EXAMINATION:  CT HEAD WITHOUT CONTRAST    CLINICAL HISTORY:  Altered mental status;    COMPARISON:  Comparison 03/11/2020    FINDINGS:  No intracranial acute hemorrhage or acute focal brain parenchymal abnormality is identified.  Stable atrophy with chronic small vessel ischemic changes.  Calvarium is intact.    Motion artifact limits evaluation.                                 The EKG was ordered, reviewed, and independently interpreted by the ED provider.  Interpretation time: 1653  Rate: 66 BPM  Rhythm: Sinus rhythm with 1st degree AV block  Interpretation: RBBB. Left anterior fascicular block. Bifascicular block. Q-II, III, AVF. No STEMI.         The Emergency Provider reviewed the  vital signs and test results, which are outlined above.     ED Discussion     6:52 PM: Reassessed pt at this time.  Pt states her condition has improved at this time. Discussed with pt all pertinent ED information and results. Discussed pt dx and plan of tx. Gave pt all f/u and return to the ED instructions. All questions and concerns were addressed at this time. Pt expresses understanding of information and instructions, and is comfortable with plan to discharge. Pt is stable for discharge.    I discussed with patient and/or family/caretaker that evaluation in the ED does not suggest any emergent or life threatening medical conditions requiring immediate intervention beyond what was provided in the ED, and I believe patient is safe for discharge.  Regardless, an unremarkable evaluation in the ED does not preclude the development or presence of a serious of life threatening condition. As such, patient was instructed to return immediately for any worsening or change in current symptoms.     ED Medication(s):  Medications   sodium chloride 0.9% bolus 1,000 mL (0 mLs Intravenous Stopped 8/6/20 1800)       Discharge Medication List as of 8/6/2020  6:51 PM      START taking these medications    Details   cephALEXin (KEFLEX) 500 MG capsule Take 1 capsule (500 mg total) by mouth every 12 (twelve) hours. for 7 days, Starting Thu 8/6/2020, Until Thu 8/13/2020, Print             Follow-up Information     Your Primary Care Provider. Schedule an appointment as soon as possible for a visit in 3 days.    Why: For follow-up on today's visit.           Go to  [facility] - .    Specialty: Emergency Medicine  Why: As needed, If symptoms worsen  Contact information:  [address]  [facility] [address] [phone]                       Medical Decision Making:   Independently Interpreted Test(s):   I have ordered and independently interpreted EKG Reading(s) - see prior notes  Clinical Tests:    Lab Tests: Ordered and Reviewed  Radiological Study: Ordered and Reviewed  Medical Tests: Ordered and Reviewed  ED Management:  80-year-old  female presents due to decreased mental status and concern for family that she has a urinary tract infection.  Her son who is accompanying her says that she has very mental status from day-to-day and this is around where she is normally on the low day.  She is essentially wheelchair-bound.  CT of the head was normal.  Patient was without any neurological deficits indicative of CVA.  Given IV fluid bolus due to concerns for dehydration while in the emergency department.  Urinalysis shows evidence of possible urinary tract infection.  No signs of systemic infection concerning for sepsis.  Patient was given prescription for Keflex.  Told to follow-up with primary care provider.  Family understands plan to return to emergency department should symptoms worsen or change in character.             Scribe Attestation:   Scribe #1: I performed the above scribed service and the documentation accurately describes the services I performed. I attest to the accuracy of the note.     Attending:   Physician Attestation Statement for Scribe #1: I, Jacob Sanz MD, personally performed the services described in this documentation, as scribed by Eric Hankins, in my presence, and it is both accurate and complete.           Clinical Impression       ICD-10-CM ICD-9-CM   1. Urinary tract infection without hematuria, site unspecified  N39.0 599.0   2. AMS (altered mental status)  R41.82 780.97       Disposition:   Disposition: Discharged  Condition: Stable       Jacob Sanz MD  08/06/20 9148

## 2020-08-09 LAB
BACTERIA UR CULT: ABNORMAL
BACTERIA UR CULT: ABNORMAL

## 2020-11-19 ENCOUNTER — HOSPITAL ENCOUNTER (EMERGENCY)
Facility: HOSPITAL | Age: 80
Discharge: HOME | End: 2020-11-19
Attending: EMERGENCY MEDICINE
Payer: MEDICARE

## 2020-11-19 VITALS
HEART RATE: 68 BPM | BODY MASS INDEX: 43.5 KG/M2 | OXYGEN SATURATION: 96 % | SYSTOLIC BLOOD PRESSURE: 112 MMHG | WEIGHT: 230.38 LBS | DIASTOLIC BLOOD PRESSURE: 68 MMHG | HEIGHT: 61 IN | TEMPERATURE: 99 F | RESPIRATION RATE: 18 BRPM

## 2020-11-19 DIAGNOSIS — I95.9 HYPOTENSION, UNSPECIFIED HYPOTENSION TYPE: Primary | ICD-10-CM

## 2020-11-19 DIAGNOSIS — R41.82 ALTERED MENTAL STATE: ICD-10-CM

## 2020-11-19 DIAGNOSIS — R53.1 WEAKNESS: ICD-10-CM

## 2020-11-19 LAB
ALBUMIN SERPL BCP-MCNC: 3.3 G/DL (ref 3.5–5.2)
ALP SERPL-CCNC: 86 U/L (ref 55–135)
ALT SERPL W/O P-5'-P-CCNC: 10 U/L (ref 10–44)
ANION GAP SERPL CALC-SCNC: 9 MMOL/L (ref 8–16)
APTT BLDCRRT: 37.6 SEC (ref 21–32)
AST SERPL-CCNC: 17 U/L (ref 10–40)
BASOPHILS # BLD AUTO: 0.03 K/UL (ref 0–0.2)
BASOPHILS NFR BLD: 0.4 % (ref 0–1.9)
BILIRUB SERPL-MCNC: 0.7 MG/DL (ref 0.1–1)
BILIRUB UR QL STRIP: NEGATIVE
BNP SERPL-MCNC: 124 PG/ML (ref 0–99)
BUN SERPL-MCNC: 24 MG/DL (ref 8–23)
CALCIUM SERPL-MCNC: 9 MG/DL (ref 8.7–10.5)
CHLORIDE SERPL-SCNC: 100 MMOL/L (ref 95–110)
CLARITY UR: CLEAR
CO2 SERPL-SCNC: 27 MMOL/L (ref 23–29)
COLOR UR: YELLOW
CREAT SERPL-MCNC: 1.3 MG/DL (ref 0.5–1.4)
DIFFERENTIAL METHOD: ABNORMAL
EOSINOPHIL # BLD AUTO: 0.1 K/UL (ref 0–0.5)
EOSINOPHIL NFR BLD: 1.6 % (ref 0–8)
ERYTHROCYTE [DISTWIDTH] IN BLOOD BY AUTOMATED COUNT: 13.4 % (ref 11.5–14.5)
EST. GFR  (AFRICAN AMERICAN): 45 ML/MIN/1.73 M^2
EST. GFR  (NON AFRICAN AMERICAN): 39 ML/MIN/1.73 M^2
GLUCOSE SERPL-MCNC: 102 MG/DL (ref 70–110)
GLUCOSE UR QL STRIP: NEGATIVE
HCT VFR BLD AUTO: 40.5 % (ref 37–48.5)
HGB BLD-MCNC: 12.8 G/DL (ref 12–16)
HGB UR QL STRIP: ABNORMAL
IMM GRANULOCYTES # BLD AUTO: 0.03 K/UL (ref 0–0.04)
IMM GRANULOCYTES NFR BLD AUTO: 0.4 % (ref 0–0.5)
INR PPP: 2.1 (ref 0.8–1.2)
KETONES UR QL STRIP: NEGATIVE
LACTATE SERPL-SCNC: 0.9 MMOL/L (ref 0.5–2.2)
LACTATE SERPL-SCNC: 1.4 MMOL/L (ref 0.5–2.2)
LEUKOCYTE ESTERASE UR QL STRIP: NEGATIVE
LYMPHOCYTES # BLD AUTO: 1.8 K/UL (ref 1–4.8)
LYMPHOCYTES NFR BLD: 23.2 % (ref 18–48)
MAGNESIUM SERPL-MCNC: 2.1 MG/DL (ref 1.6–2.6)
MCH RBC QN AUTO: 29.6 PG (ref 27–31)
MCHC RBC AUTO-ENTMCNC: 31.6 G/DL (ref 32–36)
MCV RBC AUTO: 94 FL (ref 82–98)
MONOCYTES # BLD AUTO: 0.7 K/UL (ref 0.3–1)
MONOCYTES NFR BLD: 9.2 % (ref 4–15)
NEUTROPHILS # BLD AUTO: 5 K/UL (ref 1.8–7.7)
NEUTROPHILS NFR BLD: 65.2 % (ref 38–73)
NITRITE UR QL STRIP: NEGATIVE
NRBC BLD-RTO: 0 /100 WBC
PH UR STRIP: 7 [PH] (ref 5–8)
PHOSPHATE SERPL-MCNC: 4 MG/DL (ref 2.7–4.5)
PLATELET # BLD AUTO: 298 K/UL (ref 150–350)
PMV BLD AUTO: 9.6 FL (ref 9.2–12.9)
POTASSIUM SERPL-SCNC: 4.2 MMOL/L (ref 3.5–5.1)
PROCALCITONIN SERPL IA-MCNC: 0.14 NG/ML
PROT SERPL-MCNC: 7.3 G/DL (ref 6–8.4)
PROT UR QL STRIP: NEGATIVE
PROTHROMBIN TIME: 21.9 SEC (ref 9–12.5)
RBC # BLD AUTO: 4.32 M/UL (ref 4–5.4)
SARS-COV-2 RDRP RESP QL NAA+PROBE: NEGATIVE
SODIUM SERPL-SCNC: 136 MMOL/L (ref 136–145)
SP GR UR STRIP: 1.02 (ref 1–1.03)
TROPONIN I SERPL DL<=0.01 NG/ML-MCNC: <0.006 NG/ML (ref 0–0.03)
URN SPEC COLLECT METH UR: ABNORMAL
UROBILINOGEN UR STRIP-ACNC: NEGATIVE EU/DL
WBC # BLD AUTO: 7.7 K/UL (ref 3.9–12.7)

## 2020-11-19 PROCEDURE — 85610 PROTHROMBIN TIME: CPT

## 2020-11-19 PROCEDURE — 83605 ASSAY OF LACTIC ACID: CPT | Mod: 91

## 2020-11-19 PROCEDURE — U0002 COVID-19 LAB TEST NON-CDC: HCPCS

## 2020-11-19 PROCEDURE — 93010 ELECTROCARDIOGRAM REPORT: CPT | Mod: GW,,, | Performed by: INTERNAL MEDICINE

## 2020-11-19 PROCEDURE — 87040 BLOOD CULTURE FOR BACTERIA: CPT | Mod: 59

## 2020-11-19 PROCEDURE — 80053 COMPREHEN METABOLIC PANEL: CPT

## 2020-11-19 PROCEDURE — 84145 PROCALCITONIN (PCT): CPT

## 2020-11-19 PROCEDURE — 85025 COMPLETE CBC W/AUTO DIFF WBC: CPT

## 2020-11-19 PROCEDURE — 93010 EKG 12-LEAD: ICD-10-PCS | Mod: GW,,, | Performed by: INTERNAL MEDICINE

## 2020-11-19 PROCEDURE — 25000003 PHARM REV CODE 250: Performed by: EMERGENCY MEDICINE

## 2020-11-19 PROCEDURE — 84100 ASSAY OF PHOSPHORUS: CPT

## 2020-11-19 PROCEDURE — 93005 ELECTROCARDIOGRAM TRACING: CPT

## 2020-11-19 PROCEDURE — 83735 ASSAY OF MAGNESIUM: CPT

## 2020-11-19 PROCEDURE — 81003 URINALYSIS AUTO W/O SCOPE: CPT

## 2020-11-19 PROCEDURE — 83880 ASSAY OF NATRIURETIC PEPTIDE: CPT

## 2020-11-19 PROCEDURE — 96360 HYDRATION IV INFUSION INIT: CPT

## 2020-11-19 PROCEDURE — 99285 EMERGENCY DEPT VISIT HI MDM: CPT | Mod: 25

## 2020-11-19 PROCEDURE — 84484 ASSAY OF TROPONIN QUANT: CPT

## 2020-11-19 PROCEDURE — 85730 THROMBOPLASTIN TIME PARTIAL: CPT

## 2020-11-19 RX ADMIN — SODIUM CHLORIDE 500 ML: 0.9 INJECTION, SOLUTION INTRAVENOUS at 06:11

## 2020-11-19 NOTE — ED PROVIDER NOTES
11/19/2020, 12:44 PM   History obtained from the patient      History of Present Illness: Serena Post is a 80 y.o. female patient who presents to the Emergency Department for AMS and SOB which onset 3 days ago. Son reports low BP at home.    12:44 PM: Pt understands they will be seen and dispositioned by the next available physician. Pt voices understanding and agrees with plan. Pt also understands the longer than normal wait time. I am removing myself from the care of pt and pt will now be assigned to the next available physician.     ELTON Trivedi FNP-C      SCRIBE #1 NOTE: I, Marni Cosbyon, am scribing for, and in the presence of, Fernando Lynn MD. I have scribed the entire note.      History      Chief Complaint   Patient presents with    Hypotension     Pt family reports hypotension x 1 week, AMS and drowsiness x 3 days       Review of patient's allergies indicates:   Allergen Reactions    Amlodipine Other (See Comments)     Not sure    Chlorthalidone Other (See Comments)     Not sure    Clonidine Other (See Comments)     Not sure    Codeine Other (See Comments)     Not sure    Escitalopram Other (See Comments)     Not sure    Lisinopril Other (See Comments)     Not sure    Losartan Other (See Comments)     Not sure    Meperidine Other (See Comments)     Not sure    Methadone Other (See Comments)     Not sure  Not sure      Morphine Other (See Comments)     Not sure    Nebivolol Other (See Comments)     Not sure  Not sure      Nitrofurantoin Other (See Comments)     Not sure  Not sure      Omeprazole Other (See Comments)     Not sure  Not sure      Pravastatin Other (See Comments)     Not sure  Not sure      Propoxyphene Other (See Comments)     Not sure  Not sure      Sulfamethoxazole-trimethoprim Other (See Comments)     Not sure  Not sure          HPI   HPI    11/19/2020, 6:57 PM   History obtained from the nursing attendant and family   HPI limited due to: mental status change      History  "of Present Illness: Serena Post is a 80 y.o. female patient with a PMHx of A-Fib, CHF, HTN, Pacemaker, and Stroke who presents to the Emergency Department for evaluation of low blood pressure. Per family, patient has been hypotensive for the past week with decrease in mental status for x3 days now. Patient is a poor historian and states "I dont know" when asked who and what brought her into the ED. No further complaints or concerns at this time.       Arrival mode: Personal vehicle      PCP: Primary Doctor No       Past Medical History:  Past Medical History:   Diagnosis Date    A-fib     Anticoagulant long-term use     Aortic valve disease     Cancer     brain tumor, 10 years ago    Cardiomyopathy     CHF (congestive heart failure)     Hx of heart valve replacement with mechanical valve     Hyperlipidemia     Hypertension     Pacemaker     Pacemaker     Stroke     2007, residual weakness       Past Surgical History:  Past Surgical History:   Procedure Laterality Date    AORTIC VALVE REPLACEMENT      CHOLECYSTECTOMY      CORONARY ARTERY BYPASS GRAFT      HYSTERECTOMY      INSERTION OF PACEMAKER      TONSILLECTOMY           Family History:  Family History   Problem Relation Age of Onset    No Known Problems Mother     No Known Problems Father        Social History:  Social History     Tobacco Use    Smoking status: Never Smoker    Smokeless tobacco: Never Used   Substance and Sexual Activity    Alcohol use: Not Currently    Drug use: Never    Sexual activity: Unknown       ROS   Review of Systems   Unable to perform ROS: Mental status change     Physical Exam      Initial Vitals [11/19/20 1245]   BP Pulse Resp Temp SpO2   110/60 75 18 98.5 °F (36.9 °C) (!) 93 %      MAP       --          Physical Exam  Nursing Notes and Vital Signs Reviewed.  Constitutional: Patient is in no acute distress. Obese.  Head: Atraumatic. Normocephalic.  Eyes: PERRL. EOM intact. Conjunctivae are not pale. No " "scleral icterus.  ENT: Mucous membranes are moist. Oropharynx is clear and symmetric.    Neck: Supple. Full ROM. No lymphadenopathy.  Cardiovascular: Regular rate. Regular rhythm. No murmurs, rubs, or gallops. Distal pulses are 2+ and symmetric.  Pulmonary/Chest: No respiratory distress. Clear to auscultation bilaterally. No wheezing or rales.  Abdominal: Soft and non-distended.  There is no tenderness.    Musculoskeletal: Moves all extremities. No obvious deformities. Symmetrical edema to the BLE. No calf tenderness.  Skin: Warm and dry.  Neurological:  Alert, awake, and appropriate. No acute focal neurological deficits are appreciated.  Psychiatric: Normal affect. Good eye contact. Appropriate in content.    ED Course    Procedures  ED Vital Signs:  Vitals:    11/19/20 1245 11/19/20 1528 11/19/20 1529 11/19/20 1530   BP: 110/60  125/76    Pulse: 75   69   Resp: 18      Temp: 98.5 °F (36.9 °C)      TempSrc: Oral      SpO2: (!) 93%      Weight:  104.5 kg (230 lb 6.1 oz)     Height: 5' 1" (1.549 m)       11/19/20 1531 11/19/20 1607 11/19/20 1631 11/19/20 1656   BP: 121/75 127/69 130/69    Pulse: 73 65 70    Resp:  (!) 22 20 18   Temp:       TempSrc:       SpO2: 96% 97% 96%    Weight:       Height:        11/19/20 1701 11/19/20 1831 11/19/20 2000 11/19/20 2001   BP: 121/70 113/69 112/68    Pulse: 66 73 68    Resp:    18   Temp:       TempSrc:       SpO2: 96% 98% 96%    Weight:       Height:           Abnormal Lab Results:  Labs Reviewed   CBC W/ AUTO DIFFERENTIAL - Abnormal; Notable for the following components:       Result Value    MCHC 31.6 (*)     All other components within normal limits   COMPREHENSIVE METABOLIC PANEL - Abnormal; Notable for the following components:    BUN 24 (*)     Albumin 3.3 (*)     eGFR if  45 (*)     eGFR if non  39 (*)     All other components within normal limits   URINALYSIS, REFLEX TO URINE CULTURE - Abnormal; Notable for the following components:    " Occult Blood UA Trace (*)     All other components within normal limits    Narrative:     Specimen Source->Urine   APTT - Abnormal; Notable for the following components:    aPTT 37.6 (*)     All other components within normal limits   PROTIME-INR - Abnormal; Notable for the following components:    Prothrombin Time 21.9 (*)     INR 2.1 (*)     All other components within normal limits   B-TYPE NATRIURETIC PEPTIDE - Abnormal; Notable for the following components:     (*)     All other components within normal limits   CULTURE, BLOOD    Narrative:     Aerobic and anaerobic   CULTURE, BLOOD    Narrative:     Aerobic and anaerobic   LACTIC ACID, PLASMA   MAGNESIUM   PHOSPHORUS   TROPONIN I   PROCALCITONIN   SARS-COV-2 RNA AMPLIFICATION, QUAL   HEPATITIS C ANTIBODY   LACTIC ACID, PLASMA        All Lab Results:  Results for orders placed or performed during the hospital encounter of 11/19/20   Blood culture x two cultures. Draw prior to antibiotics.    Specimen: Peripheral, Antecubital, Right; Blood   Result Value Ref Range    Blood Culture, Routine No Growth to date     Blood Culture, Routine No Growth to date    Blood culture x two cultures. Draw prior to antibiotics.    Specimen: Peripheral, Hand, Left; Blood   Result Value Ref Range    Blood Culture, Routine No Growth to date     Blood Culture, Routine No Growth to date    CBC auto differential   Result Value Ref Range    WBC 7.70 3.90 - 12.70 K/uL    RBC 4.32 4.00 - 5.40 M/uL    Hemoglobin 12.8 12.0 - 16.0 g/dL    Hematocrit 40.5 37.0 - 48.5 %    MCV 94 82 - 98 fL    MCH 29.6 27.0 - 31.0 pg    MCHC 31.6 (L) 32.0 - 36.0 g/dL    RDW 13.4 11.5 - 14.5 %    Platelets 298 150 - 350 K/uL    MPV 9.6 9.2 - 12.9 fL    Immature Granulocytes 0.4 0.0 - 0.5 %    Gran # (ANC) 5.0 1.8 - 7.7 K/uL    Immature Grans (Abs) 0.03 0.00 - 0.04 K/uL    Lymph # 1.8 1.0 - 4.8 K/uL    Mono # 0.7 0.3 - 1.0 K/uL    Eos # 0.1 0.0 - 0.5 K/uL    Baso # 0.03 0.00 - 0.20 K/uL    nRBC 0 0 /100  WBC    Gran % 65.2 38.0 - 73.0 %    Lymph % 23.2 18.0 - 48.0 %    Mono % 9.2 4.0 - 15.0 %    Eosinophil % 1.6 0.0 - 8.0 %    Basophil % 0.4 0.0 - 1.9 %    Differential Method Automated    Comprehensive metabolic panel   Result Value Ref Range    Sodium 136 136 - 145 mmol/L    Potassium 4.2 3.5 - 5.1 mmol/L    Chloride 100 95 - 110 mmol/L    CO2 27 23 - 29 mmol/L    Glucose 102 70 - 110 mg/dL    BUN 24 (H) 8 - 23 mg/dL    Creatinine 1.3 0.5 - 1.4 mg/dL    Calcium 9.0 8.7 - 10.5 mg/dL    Total Protein 7.3 6.0 - 8.4 g/dL    Albumin 3.3 (L) 3.5 - 5.2 g/dL    Total Bilirubin 0.7 0.1 - 1.0 mg/dL    Alkaline Phosphatase 86 55 - 135 U/L    AST 17 10 - 40 U/L    ALT 10 10 - 44 U/L    Anion Gap 9 8 - 16 mmol/L    eGFR if African American 45 (A) >60 mL/min/1.73 m^2    eGFR if non African American 39 (A) >60 mL/min/1.73 m^2   Lactic acid, plasma #1   Result Value Ref Range    Lactate (Lactic Acid) 1.4 0.5 - 2.2 mmol/L   Urinalysis, Reflex to Urine Culture Urine, Clean Catch    Specimen: Urine   Result Value Ref Range    Specimen UA Urine, Clean Catch     Color, UA Yellow Yellow, Straw, Kamla    Appearance, UA Clear Clear    pH, UA 7.0 5.0 - 8.0    Specific Gravity, UA 1.020 1.005 - 1.030    Protein, UA Negative Negative    Glucose, UA Negative Negative    Ketones, UA Negative Negative    Bilirubin (UA) Negative Negative    Occult Blood UA Trace (A) Negative    Nitrite, UA Negative Negative    Urobilinogen, UA Negative <2.0 EU/dL    Leukocytes, UA Negative Negative   Magnesium   Result Value Ref Range    Magnesium 2.1 1.6 - 2.6 mg/dL   Phosphorus   Result Value Ref Range    Phosphorus 4.0 2.7 - 4.5 mg/dL   APTT   Result Value Ref Range    aPTT 37.6 (H) 21.0 - 32.0 sec   Protime-INR   Result Value Ref Range    Prothrombin Time 21.9 (H) 9.0 - 12.5 sec    INR 2.1 (H) 0.8 - 1.2   Brain natriuretic peptide   Result Value Ref Range     (H) 0 - 99 pg/mL   Troponin I   Result Value Ref Range    Troponin I <0.006 0.000 - 0.026  ng/mL   Procalcitonin   Result Value Ref Range    Procalcitonin 0.14 <0.25 ng/mL   COVID-19 Rapid Screening   Result Value Ref Range    SARS-CoV-2 RNA, Amplification, Qual Negative Negative   Lactic acid, plasma #2   Result Value Ref Range    Lactate (Lactic Acid) 0.9 0.5 - 2.2 mmol/L         Imaging Results:  Imaging Results          X-Ray Chest AP Portable (Final result)  Result time 11/19/20 14:40:18    Final result by Emery Arrieta MD (11/19/20 14:40:18)                 Impression:      1.  Negative for acute process involving the chest.    2.  Stable findings as noted above.      Electronically signed by: Emery Arrieta MD  Date:    11/19/2020  Time:    14:40             Narrative:    EXAMINATION:  XR CHEST AP PORTABLE    CLINICAL HISTORY:  Sepsis;    COMPARISON:  May 21, 2020    FINDINGS:  Patient's chin overlies the right lung apex.  Stable left lower lobe scarring or chronic atelectasis.  The lungs are free of new pulmonary opacities.  The cardiac silhouette size is on the upper limits of normal.  The trachea is midline and the mediastinal width is normal. Negative for focal infiltrate, effusion or pneumothorax. Pulmonary vasculature is normal. Negative for osseous abnormalities. Tortuous aorta with calcifications of the aortic knob.  There are degenerative changes of the spine and both shoulder girdles.  Stable dual lead right subclavian pacemaker, median sternotomy wires and prosthetic valve device in place.  There are bilateral cardiophrenic fat pads.                               CT Head Without Contrast (Final result)  Result time 11/19/20 14:06:16    Final result by Emery Arrieta MD (11/19/20 14:06:16)                 Impression:      1.  Negative for acute intracranial process. Negative for hemorrhage, or skull fracture.    2.  Cerebral atrophy noted.  Intracranial atherosclerotic disease noted.  Small vessel ischemic changes noted.  Bilateral insular cortex lacunar infarcts again seen.    3.   Persistent mild patchy inferior right mastoid air cell disease, nonspecific finding.  Other stable findings as noted above.    All CT scans at this facility are performed  using dose modulation techniques as appropriate to performed exam including the following:  automated exposure control; adjustment of mA and/or kV according to the patients size (this includes techniques or standardized protocols for targeted exams where dose is matched to indication/reason for exam: i.e. extremities or head);  iterative reconstruction technique.      Electronically signed by: Emery Arrieta MD  Date:    11/19/2020  Time:    14:06             Narrative:    EXAMINATION:  CT HEAD WITHOUT CONTRAST    CLINICAL HISTORY:  Altered mental status;    TECHNIQUE:  Axial images through the brain and posterior fossa were obtained without the use of IV contrast.  Sagittal and coronal reconstructions are provided for review.    COMPARISON:  August 6, 2020    FINDINGS:  The ventricles are midline and the CSF spaces are prominent.  The gray-white matter junction is well preserved. Negative for intracranial vascular abnormalities. Negative for mass, mass effect, cerebral edema, hemorrhage or abnormal fluid collections.  Intracranial atherosclerotic disease noted.  Small vessel ischemic changes noted.  Small bilateral insular cortex lacunar infarcts again seen.    The skull and scalp are intact.  Patchy right mastoid air cell disease.  The rest of the paranasal sinuses, mastoid air cells, middle ears and ear canals are clear. The globes are intact.  Postoperative changes to the lens regions.                                    The EKG was ordered, reviewed, and independently interpreted by the ED provider.  Interpretation time: 15:48  Rate: 73 BPM  Rhythm: AV dual-paced rhythm   Interpretation: No acute ST changes. No STEMI.      The Emergency Provider reviewed the vital signs and test results, which are outlined above.    ED Discussion     7:11 PM:  Family reports that patient's mental status today is her baseline typically and that she does not communicate often. Additionally, family notes that the lowest her blood pressure has been was 97/43.    7:54 PM: Reassessed pt at this time. Patient's family agree with treatment plan and is comfortable having the patient discharged home. Discussed with pt all pertinent ED information and results. Discussed pt dx and plan of tx. Gave pt all f/u and return to the ED instructions. All questions and concerns were addressed at this time. Pt expresses understanding of information and instructions, and is comfortable with plan to discharge. Pt is stable for discharge.    I discussed with patient and/or family/caretaker that evaluation in the ED does not suggest any emergent or life threatening medical conditions requiring immediate intervention beyond what was provided in the ED, and I believe patient is safe for discharge.  Regardless, an unremarkable evaluation in the ED does not preclude the development or presence of a serious of life threatening condition. As such, patient was instructed to return immediately for any worsening or change in current symptoms.           ED Medication(s):  Medications   sodium chloride 0.9% bolus 500 mL (0 mLs Intravenous Stopped 11/19/20 1946)     Current Discharge Medication List          Follow-up Information     Your primary care physician In 1 day.                 Medical Decision Making    Medical Decision Making:   Clinical Tests:   Lab Tests: Ordered and Reviewed  Radiological Study: Ordered and Reviewed  Medical Tests: Ordered and Reviewed           Scribe Attestation:   Scribe #1: I performed the above scribed service and the documentation accurately describes the services I performed. I attest to the accuracy of the note.    Attending:   Physician Attestation Statement for Scribe #1: I, Fernando Lynn MD, personally performed the services described in this documentation, as scribed  by Marni Druant, in my presence, and it is both accurate and complete.          Clinical Impression       ICD-10-CM ICD-9-CM   1. Hypotension, unspecified hypotension type  I95.9 458.9   2. Altered mental state  R41.82 780.97   3. Weakness  R53.1 780.79       Disposition:   Disposition: Discharged  Condition: Stable           Fernando Lynn MD  11/21/20 1312

## 2020-11-24 LAB
BACTERIA BLD CULT: NORMAL
BACTERIA BLD CULT: NORMAL

## 2021-06-08 ENCOUNTER — HOSPITAL ENCOUNTER (EMERGENCY)
Facility: HOSPITAL | Age: 81
Discharge: HOME | End: 2021-06-09
Attending: EMERGENCY MEDICINE
Payer: MEDICARE

## 2021-06-08 DIAGNOSIS — N39.0 URINARY TRACT INFECTION WITH HEMATURIA, SITE UNSPECIFIED: ICD-10-CM

## 2021-06-08 DIAGNOSIS — R53.1 WEAKNESS: Primary | ICD-10-CM

## 2021-06-08 DIAGNOSIS — R31.9 URINARY TRACT INFECTION WITH HEMATURIA, SITE UNSPECIFIED: ICD-10-CM

## 2021-06-08 LAB
ALBUMIN SERPL BCP-MCNC: 3.2 G/DL (ref 3.5–5.2)
ALP SERPL-CCNC: 100 U/L (ref 55–135)
ALT SERPL W/O P-5'-P-CCNC: 14 U/L (ref 10–44)
ANION GAP SERPL CALC-SCNC: 12 MMOL/L (ref 8–16)
AST SERPL-CCNC: 22 U/L (ref 10–40)
BASOPHILS # BLD AUTO: 0.05 K/UL (ref 0–0.2)
BASOPHILS NFR BLD: 0.6 % (ref 0–1.9)
BILIRUB SERPL-MCNC: 0.7 MG/DL (ref 0.1–1)
BILIRUB UR QL STRIP: NEGATIVE
BUN SERPL-MCNC: 19 MG/DL (ref 8–23)
CALCIUM SERPL-MCNC: 8.4 MG/DL (ref 8.7–10.5)
CHLORIDE SERPL-SCNC: 102 MMOL/L (ref 95–110)
CLARITY UR: CLEAR
CO2 SERPL-SCNC: 23 MMOL/L (ref 23–29)
COLOR UR: YELLOW
CREAT SERPL-MCNC: 1.4 MG/DL (ref 0.5–1.4)
CTP QC/QA: YES
DIFFERENTIAL METHOD: ABNORMAL
EOSINOPHIL # BLD AUTO: 0.2 K/UL (ref 0–0.5)
EOSINOPHIL NFR BLD: 1.7 % (ref 0–8)
ERYTHROCYTE [DISTWIDTH] IN BLOOD BY AUTOMATED COUNT: 14.2 % (ref 11.5–14.5)
EST. GFR  (AFRICAN AMERICAN): 42 ML/MIN/1.73 M^2
EST. GFR  (NON AFRICAN AMERICAN): 36 ML/MIN/1.73 M^2
GLUCOSE SERPL-MCNC: 109 MG/DL (ref 70–110)
GLUCOSE UR QL STRIP: NEGATIVE
HCT VFR BLD AUTO: 41.6 % (ref 37–48.5)
HCV AB SERPL QL IA: NEGATIVE
HGB BLD-MCNC: 13.2 G/DL (ref 12–16)
HGB UR QL STRIP: ABNORMAL
IMM GRANULOCYTES # BLD AUTO: 0.04 K/UL (ref 0–0.04)
IMM GRANULOCYTES NFR BLD AUTO: 0.5 % (ref 0–0.5)
INR PPP: 3.5 (ref 0.8–1.2)
KETONES UR QL STRIP: ABNORMAL
LACTATE SERPL-SCNC: 1.8 MMOL/L (ref 0.5–2.2)
LEUKOCYTE ESTERASE UR QL STRIP: ABNORMAL
LYMPHOCYTES # BLD AUTO: 1.7 K/UL (ref 1–4.8)
LYMPHOCYTES NFR BLD: 19.7 % (ref 18–48)
MAGNESIUM SERPL-MCNC: 2 MG/DL (ref 1.6–2.6)
MCH RBC QN AUTO: 29.2 PG (ref 27–31)
MCHC RBC AUTO-ENTMCNC: 31.7 G/DL (ref 32–36)
MCV RBC AUTO: 92 FL (ref 82–98)
MONOCYTES # BLD AUTO: 1.1 K/UL (ref 0.3–1)
MONOCYTES NFR BLD: 12.1 % (ref 4–15)
NEUTROPHILS # BLD AUTO: 5.8 K/UL (ref 1.8–7.7)
NEUTROPHILS NFR BLD: 65.4 % (ref 38–73)
NITRITE UR QL STRIP: POSITIVE
NRBC BLD-RTO: 0 /100 WBC
PH UR STRIP: 6 [PH] (ref 5–8)
PHOSPHATE SERPL-MCNC: 4.1 MG/DL (ref 2.7–4.5)
PLATELET # BLD AUTO: 299 K/UL (ref 150–450)
PMV BLD AUTO: 9.5 FL (ref 9.2–12.9)
POTASSIUM SERPL-SCNC: 4.3 MMOL/L (ref 3.5–5.1)
PROCALCITONIN SERPL IA-MCNC: 0.14 NG/ML
PROT SERPL-MCNC: 7.6 G/DL (ref 6–8.4)
PROT UR QL STRIP: NEGATIVE
PROTHROMBIN TIME: 36.2 SEC (ref 9–12.5)
RBC # BLD AUTO: 4.52 M/UL (ref 4–5.4)
SARS-COV-2 RDRP RESP QL NAA+PROBE: NEGATIVE
SODIUM SERPL-SCNC: 137 MMOL/L (ref 136–145)
SP GR UR STRIP: 1.02 (ref 1–1.03)
TROPONIN I SERPL DL<=0.01 NG/ML-MCNC: 0.01 NG/ML (ref 0–0.03)
URN SPEC COLLECT METH UR: ABNORMAL
UROBILINOGEN UR STRIP-ACNC: 1 EU/DL
WBC # BLD AUTO: 8.84 K/UL (ref 3.9–12.7)

## 2021-06-08 PROCEDURE — 87088 URINE BACTERIA CULTURE: CPT | Performed by: EMERGENCY MEDICINE

## 2021-06-08 PROCEDURE — 87077 CULTURE AEROBIC IDENTIFY: CPT | Performed by: EMERGENCY MEDICINE

## 2021-06-08 PROCEDURE — 84145 PROCALCITONIN (PCT): CPT | Performed by: EMERGENCY MEDICINE

## 2021-06-08 PROCEDURE — 85610 PROTHROMBIN TIME: CPT | Performed by: EMERGENCY MEDICINE

## 2021-06-08 PROCEDURE — 93005 ELECTROCARDIOGRAM TRACING: CPT

## 2021-06-08 PROCEDURE — 80053 COMPREHEN METABOLIC PANEL: CPT | Performed by: EMERGENCY MEDICINE

## 2021-06-08 PROCEDURE — 87040 BLOOD CULTURE FOR BACTERIA: CPT | Mod: 59 | Performed by: EMERGENCY MEDICINE

## 2021-06-08 PROCEDURE — 93010 ELECTROCARDIOGRAM REPORT: CPT | Mod: GW,,, | Performed by: INTERNAL MEDICINE

## 2021-06-08 PROCEDURE — 86803 HEPATITIS C AB TEST: CPT | Performed by: EMERGENCY MEDICINE

## 2021-06-08 PROCEDURE — 81000 URINALYSIS NONAUTO W/SCOPE: CPT | Performed by: EMERGENCY MEDICINE

## 2021-06-08 PROCEDURE — U0002 COVID-19 LAB TEST NON-CDC: HCPCS | Performed by: EMERGENCY MEDICINE

## 2021-06-08 PROCEDURE — 96361 HYDRATE IV INFUSION ADD-ON: CPT

## 2021-06-08 PROCEDURE — 83605 ASSAY OF LACTIC ACID: CPT | Performed by: EMERGENCY MEDICINE

## 2021-06-08 PROCEDURE — 87186 SC STD MICRODIL/AGAR DIL: CPT | Mod: 59 | Performed by: EMERGENCY MEDICINE

## 2021-06-08 PROCEDURE — 84100 ASSAY OF PHOSPHORUS: CPT | Performed by: EMERGENCY MEDICINE

## 2021-06-08 PROCEDURE — 83735 ASSAY OF MAGNESIUM: CPT | Performed by: EMERGENCY MEDICINE

## 2021-06-08 PROCEDURE — 25000003 PHARM REV CODE 250: Performed by: EMERGENCY MEDICINE

## 2021-06-08 PROCEDURE — 87086 URINE CULTURE/COLONY COUNT: CPT | Performed by: EMERGENCY MEDICINE

## 2021-06-08 PROCEDURE — 84484 ASSAY OF TROPONIN QUANT: CPT | Performed by: EMERGENCY MEDICINE

## 2021-06-08 PROCEDURE — 99285 EMERGENCY DEPT VISIT HI MDM: CPT | Mod: 25

## 2021-06-08 PROCEDURE — 93010 EKG 12-LEAD: ICD-10-PCS | Mod: GW,,, | Performed by: INTERNAL MEDICINE

## 2021-06-08 PROCEDURE — 85025 COMPLETE CBC W/AUTO DIFF WBC: CPT | Performed by: EMERGENCY MEDICINE

## 2021-06-08 RX ADMIN — SODIUM CHLORIDE 3162 ML: 0.9 INJECTION, SOLUTION INTRAVENOUS at 10:06

## 2021-06-09 VITALS
WEIGHT: 232.38 LBS | SYSTOLIC BLOOD PRESSURE: 132 MMHG | HEIGHT: 62 IN | DIASTOLIC BLOOD PRESSURE: 81 MMHG | HEART RATE: 67 BPM | OXYGEN SATURATION: 93 % | TEMPERATURE: 99 F | RESPIRATION RATE: 33 BRPM | BODY MASS INDEX: 42.76 KG/M2

## 2021-06-09 LAB
BACTERIA #/AREA URNS HPF: ABNORMAL /HPF
HYALINE CASTS #/AREA URNS LPF: 2 /LPF
MICROSCOPIC COMMENT: ABNORMAL
RBC #/AREA URNS HPF: 3 /HPF (ref 0–4)
WBC #/AREA URNS HPF: 55 /HPF (ref 0–5)
WBC CLUMPS URNS QL MICRO: ABNORMAL

## 2021-06-09 PROCEDURE — 63600175 PHARM REV CODE 636 W HCPCS: Performed by: EMERGENCY MEDICINE

## 2021-06-09 PROCEDURE — 96374 THER/PROPH/DIAG INJ IV PUSH: CPT

## 2021-06-09 RX ORDER — CEFUROXIME AXETIL 500 MG/1
500 TABLET ORAL 2 TIMES DAILY
Qty: 14 TABLET | Refills: 0 | Status: SHIPPED | OUTPATIENT
Start: 2021-06-09 | End: 2021-06-16

## 2021-06-09 RX ADMIN — CEFTRIAXONE 1 G: 1 INJECTION, SOLUTION INTRAVENOUS at 12:06

## 2021-06-13 LAB
BACTERIA BLD CULT: ABNORMAL

## 2021-06-14 LAB — BACTERIA UR CULT: ABNORMAL

## 2021-07-21 ENCOUNTER — HOSPITAL ENCOUNTER (EMERGENCY)
Facility: HOSPITAL | Age: 81
Discharge: HOME | End: 2021-07-22
Attending: EMERGENCY MEDICINE
Payer: MEDICARE

## 2021-07-21 DIAGNOSIS — I10 ESSENTIAL HYPERTENSION: Primary | ICD-10-CM

## 2021-07-21 DIAGNOSIS — R07.9 CHEST PAIN: ICD-10-CM

## 2021-07-21 LAB
ALBUMIN SERPL BCP-MCNC: 3.1 G/DL (ref 3.5–5.2)
ALP SERPL-CCNC: 90 U/L (ref 55–135)
ALT SERPL W/O P-5'-P-CCNC: 12 U/L (ref 10–44)
ANION GAP SERPL CALC-SCNC: 10 MMOL/L (ref 8–16)
AST SERPL-CCNC: 16 U/L (ref 10–40)
BASOPHILS # BLD AUTO: 0.05 K/UL (ref 0–0.2)
BASOPHILS NFR BLD: 0.6 % (ref 0–1.9)
BILIRUB SERPL-MCNC: 0.7 MG/DL (ref 0.1–1)
BNP SERPL-MCNC: 138 PG/ML (ref 0–99)
BUN SERPL-MCNC: 17 MG/DL (ref 8–23)
CALCIUM SERPL-MCNC: 8.7 MG/DL (ref 8.7–10.5)
CHLORIDE SERPL-SCNC: 104 MMOL/L (ref 95–110)
CO2 SERPL-SCNC: 23 MMOL/L (ref 23–29)
CREAT SERPL-MCNC: 1 MG/DL (ref 0.5–1.4)
DIFFERENTIAL METHOD: NORMAL
EOSINOPHIL # BLD AUTO: 0.2 K/UL (ref 0–0.5)
EOSINOPHIL NFR BLD: 2.1 % (ref 0–8)
ERYTHROCYTE [DISTWIDTH] IN BLOOD BY AUTOMATED COUNT: 13.8 % (ref 11.5–14.5)
EST. GFR  (AFRICAN AMERICAN): >60 ML/MIN/1.73 M^2
EST. GFR  (NON AFRICAN AMERICAN): 54 ML/MIN/1.73 M^2
GLUCOSE SERPL-MCNC: 107 MG/DL (ref 70–110)
HCT VFR BLD AUTO: 39.2 % (ref 37–48.5)
HGB BLD-MCNC: 12.6 G/DL (ref 12–16)
IMM GRANULOCYTES # BLD AUTO: 0.04 K/UL (ref 0–0.04)
IMM GRANULOCYTES NFR BLD AUTO: 0.5 % (ref 0–0.5)
INR PPP: 1.6 (ref 0.8–1.2)
LIPASE SERPL-CCNC: 87 U/L (ref 4–60)
LYMPHOCYTES # BLD AUTO: 2.4 K/UL (ref 1–4.8)
LYMPHOCYTES NFR BLD: 28 % (ref 18–48)
MCH RBC QN AUTO: 29.4 PG (ref 27–31)
MCHC RBC AUTO-ENTMCNC: 32.1 G/DL (ref 32–36)
MCV RBC AUTO: 91 FL (ref 82–98)
MONOCYTES # BLD AUTO: 0.9 K/UL (ref 0.3–1)
MONOCYTES NFR BLD: 11 % (ref 4–15)
NEUTROPHILS # BLD AUTO: 4.9 K/UL (ref 1.8–7.7)
NEUTROPHILS NFR BLD: 57.8 % (ref 38–73)
NRBC BLD-RTO: 0 /100 WBC
PLATELET # BLD AUTO: 274 K/UL (ref 150–450)
PMV BLD AUTO: 9.7 FL (ref 9.2–12.9)
POTASSIUM SERPL-SCNC: 3.7 MMOL/L (ref 3.5–5.1)
PROT SERPL-MCNC: 7 G/DL (ref 6–8.4)
PROTHROMBIN TIME: 17.7 SEC (ref 9–12.5)
RBC # BLD AUTO: 4.29 M/UL (ref 4–5.4)
SODIUM SERPL-SCNC: 137 MMOL/L (ref 136–145)
TROPONIN I SERPL DL<=0.01 NG/ML-MCNC: 0.01 NG/ML (ref 0–0.03)
WBC # BLD AUTO: 8.42 K/UL (ref 3.9–12.7)

## 2021-07-21 PROCEDURE — 84484 ASSAY OF TROPONIN QUANT: CPT | Performed by: EMERGENCY MEDICINE

## 2021-07-21 PROCEDURE — 93010 EKG 12-LEAD: ICD-10-PCS | Mod: GW,,, | Performed by: INTERNAL MEDICINE

## 2021-07-21 PROCEDURE — 96374 THER/PROPH/DIAG INJ IV PUSH: CPT

## 2021-07-21 PROCEDURE — 25000003 PHARM REV CODE 250: Performed by: EMERGENCY MEDICINE

## 2021-07-21 PROCEDURE — 83690 ASSAY OF LIPASE: CPT | Performed by: EMERGENCY MEDICINE

## 2021-07-21 PROCEDURE — 85025 COMPLETE CBC W/AUTO DIFF WBC: CPT | Performed by: EMERGENCY MEDICINE

## 2021-07-21 PROCEDURE — 85610 PROTHROMBIN TIME: CPT | Performed by: EMERGENCY MEDICINE

## 2021-07-21 PROCEDURE — 99284 EMERGENCY DEPT VISIT MOD MDM: CPT | Mod: 25

## 2021-07-21 PROCEDURE — 83880 ASSAY OF NATRIURETIC PEPTIDE: CPT | Performed by: EMERGENCY MEDICINE

## 2021-07-21 PROCEDURE — 93010 ELECTROCARDIOGRAM REPORT: CPT | Mod: GW,,, | Performed by: INTERNAL MEDICINE

## 2021-07-21 PROCEDURE — 80053 COMPREHEN METABOLIC PANEL: CPT | Performed by: EMERGENCY MEDICINE

## 2021-07-21 PROCEDURE — 93005 ELECTROCARDIOGRAM TRACING: CPT

## 2021-07-21 PROCEDURE — 63600175 PHARM REV CODE 636 W HCPCS: Performed by: EMERGENCY MEDICINE

## 2021-07-21 RX ORDER — ONDANSETRON 2 MG/ML
4 INJECTION INTRAMUSCULAR; INTRAVENOUS
Status: COMPLETED | OUTPATIENT
Start: 2021-07-21 | End: 2021-07-21

## 2021-07-21 RX ORDER — ASPIRIN 325 MG
325 TABLET ORAL
Status: COMPLETED | OUTPATIENT
Start: 2021-07-21 | End: 2021-07-21

## 2021-07-21 RX ORDER — NITROGLYCERIN 0.4 MG/1
0.4 TABLET SUBLINGUAL EVERY 5 MIN PRN
Status: DISCONTINUED | OUTPATIENT
Start: 2021-07-21 | End: 2021-07-22 | Stop reason: HOSPADM

## 2021-07-21 RX ADMIN — ASPIRIN 325 MG ORAL TABLET 325 MG: 325 PILL ORAL at 10:07

## 2021-07-21 RX ADMIN — ONDANSETRON 4 MG: 2 INJECTION INTRAMUSCULAR; INTRAVENOUS at 10:07

## 2021-07-22 VITALS
WEIGHT: 246.5 LBS | RESPIRATION RATE: 16 BRPM | BODY MASS INDEX: 45.36 KG/M2 | HEIGHT: 62 IN | HEART RATE: 74 BPM | TEMPERATURE: 98 F | OXYGEN SATURATION: 97 % | SYSTOLIC BLOOD PRESSURE: 137 MMHG | DIASTOLIC BLOOD PRESSURE: 77 MMHG

## 2021-07-22 LAB
BACTERIA #/AREA URNS HPF: ABNORMAL /HPF
BILIRUB UR QL STRIP: NEGATIVE
CLARITY UR: CLEAR
COLOR UR: YELLOW
GLUCOSE UR QL STRIP: NEGATIVE
HGB UR QL STRIP: ABNORMAL
HYALINE CASTS #/AREA URNS LPF: 1 /LPF
KETONES UR QL STRIP: NEGATIVE
LEUKOCYTE ESTERASE UR QL STRIP: NEGATIVE
MICROSCOPIC COMMENT: ABNORMAL
NITRITE UR QL STRIP: NEGATIVE
PH UR STRIP: 6 [PH] (ref 5–8)
PROT UR QL STRIP: NEGATIVE
RBC #/AREA URNS HPF: 8 /HPF (ref 0–4)
SP GR UR STRIP: 1.02 (ref 1–1.03)
TROPONIN I SERPL DL<=0.01 NG/ML-MCNC: <0.006 NG/ML (ref 0–0.03)
URN SPEC COLLECT METH UR: ABNORMAL
UROBILINOGEN UR STRIP-ACNC: NEGATIVE EU/DL
WBC #/AREA URNS HPF: 6 /HPF (ref 0–5)
WBC CLUMPS URNS QL MICRO: ABNORMAL

## 2021-07-22 PROCEDURE — 81000 URINALYSIS NONAUTO W/SCOPE: CPT | Performed by: EMERGENCY MEDICINE

## 2021-07-22 PROCEDURE — 84484 ASSAY OF TROPONIN QUANT: CPT | Performed by: EMERGENCY MEDICINE

## 2022-07-22 ENCOUNTER — HOSPITAL ENCOUNTER (INPATIENT)
Facility: HOSPITAL | Age: 82
LOS: 4 days | Discharge: HOSPICE | DRG: 871 | End: 2022-07-26
Attending: EMERGENCY MEDICINE | Admitting: INTERNAL MEDICINE
Payer: MEDICARE

## 2022-07-22 DIAGNOSIS — R65.20 SEVERE SEPSIS: Primary | ICD-10-CM

## 2022-07-22 DIAGNOSIS — R41.82 ALTERED MENTAL STATUS: ICD-10-CM

## 2022-07-22 DIAGNOSIS — U07.1 COVID-19 VIRUS INFECTION: ICD-10-CM

## 2022-07-22 DIAGNOSIS — N17.9 ACUTE RENAL FAILURE, UNSPECIFIED ACUTE RENAL FAILURE TYPE: ICD-10-CM

## 2022-07-22 DIAGNOSIS — R79.89 TROPONIN I ABOVE REFERENCE RANGE: ICD-10-CM

## 2022-07-22 DIAGNOSIS — N39.0 ACUTE LOWER UTI: ICD-10-CM

## 2022-07-22 DIAGNOSIS — A41.9 SEVERE SEPSIS: Primary | ICD-10-CM

## 2022-07-22 DIAGNOSIS — U07.1 COVID-19 VIRUS DETECTED: ICD-10-CM

## 2022-07-22 LAB
ALBUMIN SERPL BCP-MCNC: 3.1 G/DL (ref 3.5–5.2)
ALP SERPL-CCNC: 117 U/L (ref 55–135)
ALT SERPL W/O P-5'-P-CCNC: 23 U/L (ref 10–44)
ANION GAP SERPL CALC-SCNC: 16 MMOL/L (ref 8–16)
AST SERPL-CCNC: 63 U/L (ref 10–40)
BACTERIA #/AREA URNS HPF: ABNORMAL /HPF
BASOPHILS # BLD AUTO: 0.02 K/UL (ref 0–0.2)
BASOPHILS NFR BLD: 0.5 % (ref 0–1.9)
BILIRUB SERPL-MCNC: 0.4 MG/DL (ref 0.1–1)
BILIRUB UR QL STRIP: NEGATIVE
BNP SERPL-MCNC: 66 PG/ML (ref 0–99)
BUN SERPL-MCNC: 45 MG/DL (ref 8–23)
CALCIUM SERPL-MCNC: 8.2 MG/DL (ref 8.7–10.5)
CHLORIDE SERPL-SCNC: 99 MMOL/L (ref 95–110)
CK SERPL-CCNC: 230 U/L (ref 20–180)
CLARITY UR: ABNORMAL
CO2 SERPL-SCNC: 19 MMOL/L (ref 23–29)
COLOR UR: YELLOW
CREAT SERPL-MCNC: 4.8 MG/DL (ref 0.5–1.4)
CTP QC/QA: YES
DIFFERENTIAL METHOD: ABNORMAL
EOSINOPHIL # BLD AUTO: 0 K/UL (ref 0–0.5)
EOSINOPHIL NFR BLD: 0.3 % (ref 0–8)
ERYTHROCYTE [DISTWIDTH] IN BLOOD BY AUTOMATED COUNT: 13.7 % (ref 11.5–14.5)
EST. GFR  (AFRICAN AMERICAN): 9 ML/MIN/1.73 M^2
EST. GFR  (NON AFRICAN AMERICAN): 8 ML/MIN/1.73 M^2
GLUCOSE SERPL-MCNC: 142 MG/DL (ref 70–110)
GLUCOSE UR QL STRIP: NEGATIVE
HCT VFR BLD AUTO: 41.5 % (ref 37–48.5)
HGB BLD-MCNC: 13.8 G/DL (ref 12–16)
HGB UR QL STRIP: NEGATIVE
HYALINE CASTS #/AREA URNS LPF: 21 /LPF
IMM GRANULOCYTES # BLD AUTO: 0.01 K/UL (ref 0–0.04)
IMM GRANULOCYTES NFR BLD AUTO: 0.3 % (ref 0–0.5)
KETONES UR QL STRIP: NEGATIVE
LACTATE SERPL-SCNC: 3.3 MMOL/L (ref 0.5–2.2)
LEUKOCYTE ESTERASE UR QL STRIP: ABNORMAL
LYMPHOCYTES # BLD AUTO: 1.4 K/UL (ref 1–4.8)
LYMPHOCYTES NFR BLD: 36.3 % (ref 18–48)
MCH RBC QN AUTO: 30.4 PG (ref 27–31)
MCHC RBC AUTO-ENTMCNC: 33.3 G/DL (ref 32–36)
MCV RBC AUTO: 91 FL (ref 82–98)
MICROSCOPIC COMMENT: ABNORMAL
MONOCYTES # BLD AUTO: 0.6 K/UL (ref 0.3–1)
MONOCYTES NFR BLD: 16.4 % (ref 4–15)
NEUTROPHILS # BLD AUTO: 1.8 K/UL (ref 1.8–7.7)
NEUTROPHILS NFR BLD: 46.2 % (ref 38–73)
NITRITE UR QL STRIP: NEGATIVE
NRBC BLD-RTO: 0 /100 WBC
PH UR STRIP: 6 [PH] (ref 5–8)
PLATELET # BLD AUTO: 236 K/UL (ref 150–450)
PMV BLD AUTO: 10.7 FL (ref 9.2–12.9)
POTASSIUM SERPL-SCNC: 4.3 MMOL/L (ref 3.5–5.1)
PROCALCITONIN SERPL IA-MCNC: 0.39 NG/ML
PROT SERPL-MCNC: 7.9 G/DL (ref 6–8.4)
PROT UR QL STRIP: NEGATIVE
RBC # BLD AUTO: 4.54 M/UL (ref 4–5.4)
SARS-COV-2 RDRP RESP QL NAA+PROBE: POSITIVE
SODIUM SERPL-SCNC: 134 MMOL/L (ref 136–145)
SP GR UR STRIP: 1.01 (ref 1–1.03)
SQUAMOUS #/AREA URNS HPF: 7 /HPF
TROPONIN I SERPL DL<=0.01 NG/ML-MCNC: 0.08 NG/ML (ref 0–0.03)
URN SPEC COLLECT METH UR: ABNORMAL
UROBILINOGEN UR STRIP-ACNC: NEGATIVE EU/DL
WBC # BLD AUTO: 3.83 K/UL (ref 3.9–12.7)
WBC #/AREA URNS HPF: 27 /HPF (ref 0–5)

## 2022-07-22 PROCEDURE — 27000207 HC ISOLATION

## 2022-07-22 PROCEDURE — 85025 COMPLETE CBC W/AUTO DIFF WBC: CPT | Performed by: EMERGENCY MEDICINE

## 2022-07-22 PROCEDURE — 87077 CULTURE AEROBIC IDENTIFY: CPT | Performed by: EMERGENCY MEDICINE

## 2022-07-22 PROCEDURE — 83605 ASSAY OF LACTIC ACID: CPT | Performed by: EMERGENCY MEDICINE

## 2022-07-22 PROCEDURE — 96360 HYDRATION IV INFUSION INIT: CPT

## 2022-07-22 PROCEDURE — 11000001 HC ACUTE MED/SURG PRIVATE ROOM

## 2022-07-22 PROCEDURE — 63600175 PHARM REV CODE 636 W HCPCS: Performed by: EMERGENCY MEDICINE

## 2022-07-22 PROCEDURE — 81000 URINALYSIS NONAUTO W/SCOPE: CPT | Performed by: EMERGENCY MEDICINE

## 2022-07-22 PROCEDURE — 87088 URINE BACTERIA CULTURE: CPT | Performed by: EMERGENCY MEDICINE

## 2022-07-22 PROCEDURE — 25000003 PHARM REV CODE 250: Performed by: EMERGENCY MEDICINE

## 2022-07-22 PROCEDURE — 93005 ELECTROCARDIOGRAM TRACING: CPT

## 2022-07-22 PROCEDURE — 80053 COMPREHEN METABOLIC PANEL: CPT | Performed by: EMERGENCY MEDICINE

## 2022-07-22 PROCEDURE — U0002 COVID-19 LAB TEST NON-CDC: HCPCS | Performed by: EMERGENCY MEDICINE

## 2022-07-22 PROCEDURE — 87040 BLOOD CULTURE FOR BACTERIA: CPT | Mod: 59 | Performed by: EMERGENCY MEDICINE

## 2022-07-22 PROCEDURE — C1751 CATH, INF, PER/CENT/MIDLINE: HCPCS

## 2022-07-22 PROCEDURE — 84145 PROCALCITONIN (PCT): CPT | Performed by: EMERGENCY MEDICINE

## 2022-07-22 PROCEDURE — 93010 ELECTROCARDIOGRAM REPORT: CPT | Mod: ,,, | Performed by: INTERNAL MEDICINE

## 2022-07-22 PROCEDURE — 93010 EKG 12-LEAD: ICD-10-PCS | Mod: ,,, | Performed by: INTERNAL MEDICINE

## 2022-07-22 PROCEDURE — 36573 INSJ PICC RS&I 5 YR+: CPT

## 2022-07-22 PROCEDURE — 87186 SC STD MICRODIL/AGAR DIL: CPT | Performed by: EMERGENCY MEDICINE

## 2022-07-22 PROCEDURE — 84484 ASSAY OF TROPONIN QUANT: CPT | Performed by: EMERGENCY MEDICINE

## 2022-07-22 PROCEDURE — 96365 THER/PROPH/DIAG IV INF INIT: CPT | Mod: 59

## 2022-07-22 PROCEDURE — 87086 URINE CULTURE/COLONY COUNT: CPT | Performed by: EMERGENCY MEDICINE

## 2022-07-22 PROCEDURE — 99291 CRITICAL CARE FIRST HOUR: CPT | Mod: 25

## 2022-07-22 PROCEDURE — 82550 ASSAY OF CK (CPK): CPT | Performed by: EMERGENCY MEDICINE

## 2022-07-22 PROCEDURE — 83880 ASSAY OF NATRIURETIC PEPTIDE: CPT | Performed by: EMERGENCY MEDICINE

## 2022-07-22 RX ORDER — CEFEPIME HYDROCHLORIDE 1 G/50ML
1 INJECTION, SOLUTION INTRAVENOUS
Status: COMPLETED | OUTPATIENT
Start: 2022-07-22 | End: 2022-07-22

## 2022-07-22 RX ADMIN — CEFEPIME 1 G: 1 INJECTION, POWDER, FOR SOLUTION INTRAMUSCULAR; INTRAVENOUS at 07:07

## 2022-07-22 RX ADMIN — SODIUM CHLORIDE, SODIUM LACTATE, POTASSIUM CHLORIDE, AND CALCIUM CHLORIDE 3195 ML: .6; .31; .03; .02 INJECTION, SOLUTION INTRAVENOUS at 07:07

## 2022-07-23 PROBLEM — I47.10 SVT (SUPRAVENTRICULAR TACHYCARDIA): Status: ACTIVE | Noted: 2019-10-01

## 2022-07-23 PROBLEM — I50.20 SYSTOLIC CONGESTIVE HEART FAILURE: Status: ACTIVE | Noted: 2019-10-01

## 2022-07-23 PROBLEM — Z99.3 WHEELCHAIR BOUND: Status: ACTIVE | Noted: 2019-09-26

## 2022-07-23 PROBLEM — I10 ESSENTIAL HYPERTENSION: Status: ACTIVE | Noted: 2019-09-26

## 2022-07-23 PROBLEM — Z95.2 HISTORY OF AORTIC VALVE REPLACEMENT: Status: ACTIVE | Noted: 2019-09-26

## 2022-07-23 PROBLEM — R53.81 PHYSICAL DECONDITIONING: Status: ACTIVE | Noted: 2019-09-26

## 2022-07-23 PROBLEM — I48.0 PAROXYSMAL ATRIAL FIBRILLATION: Status: ACTIVE | Noted: 2019-09-26

## 2022-07-23 PROBLEM — N30.01 ACUTE CYSTITIS WITH HEMATURIA: Status: ACTIVE | Noted: 2022-07-23

## 2022-07-23 PROBLEM — Z79.01 ANTICOAGULANT LONG-TERM USE: Status: ACTIVE | Noted: 2019-09-26

## 2022-07-23 PROBLEM — M06.9 RHEUMATOID ARTHRITIS: Status: ACTIVE | Noted: 2019-09-25

## 2022-07-23 PROBLEM — Z85.828 HISTORY OF BASAL CELL CARCINOMA: Status: ACTIVE | Noted: 2019-10-01

## 2022-07-23 PROBLEM — H25.9 AGE-RELATED CATARACT OF BOTH EYES: Status: ACTIVE | Noted: 2019-09-26

## 2022-07-23 PROBLEM — N17.9 AKI (ACUTE KIDNEY INJURY): Status: ACTIVE | Noted: 2022-07-23

## 2022-07-23 PROBLEM — Z95.0 PACEMAKER: Status: ACTIVE | Noted: 2019-09-26

## 2022-07-23 PROBLEM — A41.9 SEPSIS: Status: ACTIVE | Noted: 2022-07-23

## 2022-07-23 PROBLEM — E78.5 DYSLIPIDEMIA: Status: ACTIVE | Noted: 2019-09-26

## 2022-07-23 PROBLEM — U07.1 COVID-19: Status: ACTIVE | Noted: 2022-07-23

## 2022-07-23 PROBLEM — Z86.718 HISTORY OF DVT (DEEP VEIN THROMBOSIS): Status: ACTIVE | Noted: 2019-09-26

## 2022-07-23 LAB
ANION GAP SERPL CALC-SCNC: 13 MMOL/L (ref 8–16)
BASOPHILS # BLD AUTO: 0.01 K/UL (ref 0–0.2)
BASOPHILS NFR BLD: 0.3 % (ref 0–1.9)
BUN SERPL-MCNC: 42 MG/DL (ref 8–23)
CALCIUM SERPL-MCNC: 7.7 MG/DL (ref 8.7–10.5)
CHLORIDE SERPL-SCNC: 102 MMOL/L (ref 95–110)
CO2 SERPL-SCNC: 23 MMOL/L (ref 23–29)
CREAT SERPL-MCNC: 3.8 MG/DL (ref 0.5–1.4)
DIFFERENTIAL METHOD: ABNORMAL
EOSINOPHIL # BLD AUTO: 0 K/UL (ref 0–0.5)
EOSINOPHIL NFR BLD: 0 % (ref 0–8)
ERYTHROCYTE [DISTWIDTH] IN BLOOD BY AUTOMATED COUNT: 13.5 % (ref 11.5–14.5)
EST. GFR  (AFRICAN AMERICAN): 12 ML/MIN/1.73 M^2
EST. GFR  (NON AFRICAN AMERICAN): 11 ML/MIN/1.73 M^2
GLUCOSE SERPL-MCNC: 82 MG/DL (ref 70–110)
HCT VFR BLD AUTO: 37.2 % (ref 37–48.5)
HGB BLD-MCNC: 12.4 G/DL (ref 12–16)
IMM GRANULOCYTES # BLD AUTO: 0 K/UL (ref 0–0.04)
IMM GRANULOCYTES NFR BLD AUTO: 0 % (ref 0–0.5)
INR PPP: 4.7 (ref 0.8–1.2)
LACTATE SERPL-SCNC: 1.2 MMOL/L (ref 0.5–2.2)
LYMPHOCYTES # BLD AUTO: 1.8 K/UL (ref 1–4.8)
LYMPHOCYTES NFR BLD: 52.9 % (ref 18–48)
MCH RBC QN AUTO: 30.2 PG (ref 27–31)
MCHC RBC AUTO-ENTMCNC: 33.3 G/DL (ref 32–36)
MCV RBC AUTO: 91 FL (ref 82–98)
MONOCYTES # BLD AUTO: 0.6 K/UL (ref 0.3–1)
MONOCYTES NFR BLD: 16.5 % (ref 4–15)
NEUTROPHILS # BLD AUTO: 1.1 K/UL (ref 1.8–7.7)
NEUTROPHILS NFR BLD: 30.3 % (ref 38–73)
NRBC BLD-RTO: 0 /100 WBC
PLATELET # BLD AUTO: 173 K/UL (ref 150–450)
PLATELET BLD QL SMEAR: ABNORMAL
PMV BLD AUTO: 10 FL (ref 9.2–12.9)
POTASSIUM SERPL-SCNC: 3.9 MMOL/L (ref 3.5–5.1)
PROTHROMBIN TIME: 46.7 SEC (ref 9–12.5)
RBC # BLD AUTO: 4.1 M/UL (ref 4–5.4)
SODIUM SERPL-SCNC: 138 MMOL/L (ref 136–145)
WBC # BLD AUTO: 3.46 K/UL (ref 3.9–12.7)

## 2022-07-23 PROCEDURE — 83605 ASSAY OF LACTIC ACID: CPT | Performed by: EMERGENCY MEDICINE

## 2022-07-23 PROCEDURE — 99900035 HC TECH TIME PER 15 MIN (STAT)

## 2022-07-23 PROCEDURE — 27000207 HC ISOLATION

## 2022-07-23 PROCEDURE — 36415 COLL VENOUS BLD VENIPUNCTURE: CPT | Performed by: INTERNAL MEDICINE

## 2022-07-23 PROCEDURE — 21400001 HC TELEMETRY ROOM

## 2022-07-23 PROCEDURE — 63600175 PHARM REV CODE 636 W HCPCS: Performed by: INTERNAL MEDICINE

## 2022-07-23 PROCEDURE — 25000003 PHARM REV CODE 250: Performed by: INTERNAL MEDICINE

## 2022-07-23 PROCEDURE — 80048 BASIC METABOLIC PNL TOTAL CA: CPT | Performed by: INTERNAL MEDICINE

## 2022-07-23 PROCEDURE — 27000221 HC OXYGEN, UP TO 24 HOURS

## 2022-07-23 PROCEDURE — 94761 N-INVAS EAR/PLS OXIMETRY MLT: CPT

## 2022-07-23 PROCEDURE — 85025 COMPLETE CBC W/AUTO DIFF WBC: CPT | Performed by: INTERNAL MEDICINE

## 2022-07-23 PROCEDURE — 85610 PROTHROMBIN TIME: CPT | Performed by: INTERNAL MEDICINE

## 2022-07-23 RX ORDER — SODIUM CHLORIDE 0.9 % (FLUSH) 0.9 %
10 SYRINGE (ML) INJECTION EVERY 8 HOURS PRN
Status: DISCONTINUED | OUTPATIENT
Start: 2022-07-23 | End: 2022-07-26 | Stop reason: HOSPADM

## 2022-07-23 RX ORDER — SODIUM CHLORIDE 9 MG/ML
INJECTION, SOLUTION INTRAVENOUS CONTINUOUS
Status: DISCONTINUED | OUTPATIENT
Start: 2022-07-23 | End: 2022-07-25

## 2022-07-23 RX ORDER — HYDROCODONE BITARTRATE AND ACETAMINOPHEN 5; 325 MG/1; MG/1
1 TABLET ORAL EVERY 6 HOURS PRN
Status: DISCONTINUED | OUTPATIENT
Start: 2022-07-23 | End: 2022-07-26 | Stop reason: HOSPADM

## 2022-07-23 RX ORDER — IBUPROFEN 200 MG
24 TABLET ORAL
Status: DISCONTINUED | OUTPATIENT
Start: 2022-07-23 | End: 2022-07-26 | Stop reason: HOSPADM

## 2022-07-23 RX ORDER — POLYETHYLENE GLYCOL 3350 17 G/17G
17 POWDER, FOR SOLUTION ORAL DAILY PRN
Status: DISCONTINUED | OUTPATIENT
Start: 2022-07-23 | End: 2022-07-26 | Stop reason: HOSPADM

## 2022-07-23 RX ORDER — SODIUM CHLORIDE 0.9 % (FLUSH) 0.9 %
10 SYRINGE (ML) INJECTION EVERY 8 HOURS
Status: DISCONTINUED | OUTPATIENT
Start: 2022-07-23 | End: 2022-07-23

## 2022-07-23 RX ORDER — NALOXONE HCL 0.4 MG/ML
0.02 VIAL (ML) INJECTION
Status: DISCONTINUED | OUTPATIENT
Start: 2022-07-23 | End: 2022-07-26 | Stop reason: HOSPADM

## 2022-07-23 RX ORDER — ARIPIPRAZOLE 5 MG/1
5 TABLET ORAL DAILY
Status: DISCONTINUED | OUTPATIENT
Start: 2022-07-23 | End: 2022-07-26 | Stop reason: HOSPADM

## 2022-07-23 RX ORDER — TALC
6 POWDER (GRAM) TOPICAL NIGHTLY PRN
Status: DISCONTINUED | OUTPATIENT
Start: 2022-07-23 | End: 2022-07-26 | Stop reason: HOSPADM

## 2022-07-23 RX ORDER — GLUCAGON 1 MG
1 KIT INJECTION
Status: DISCONTINUED | OUTPATIENT
Start: 2022-07-23 | End: 2022-07-26 | Stop reason: HOSPADM

## 2022-07-23 RX ORDER — ACETAMINOPHEN 325 MG/1
650 TABLET ORAL EVERY 4 HOURS PRN
Status: DISCONTINUED | OUTPATIENT
Start: 2022-07-23 | End: 2022-07-26 | Stop reason: HOSPADM

## 2022-07-23 RX ORDER — METOPROLOL TARTRATE 25 MG/1
12.5 TABLET ORAL 2 TIMES DAILY
Status: DISCONTINUED | OUTPATIENT
Start: 2022-07-23 | End: 2022-07-26 | Stop reason: HOSPADM

## 2022-07-23 RX ORDER — IBUPROFEN 200 MG
16 TABLET ORAL
Status: DISCONTINUED | OUTPATIENT
Start: 2022-07-23 | End: 2022-07-26 | Stop reason: HOSPADM

## 2022-07-23 RX ORDER — MUPIROCIN 20 MG/G
OINTMENT TOPICAL 2 TIMES DAILY
Status: DISCONTINUED | OUTPATIENT
Start: 2022-07-23 | End: 2022-07-26 | Stop reason: HOSPADM

## 2022-07-23 RX ORDER — METOPROLOL TARTRATE 25 MG/1
25 TABLET, FILM COATED ORAL 2 TIMES DAILY
Status: DISCONTINUED | OUTPATIENT
Start: 2022-07-23 | End: 2022-07-23

## 2022-07-23 RX ADMIN — MUPIROCIN: 20 OINTMENT TOPICAL at 08:07

## 2022-07-23 RX ADMIN — ARIPIPRAZOLE 5 MG: 5 TABLET ORAL at 09:07

## 2022-07-23 RX ADMIN — DEXAMETHASONE 6 MG: 4 TABLET ORAL at 05:07

## 2022-07-23 RX ADMIN — SODIUM CHLORIDE 500 ML: 0.9 INJECTION, SOLUTION INTRAVENOUS at 12:07

## 2022-07-23 RX ADMIN — CEFTRIAXONE 2 G: 2 INJECTION, SOLUTION INTRAVENOUS at 08:07

## 2022-07-23 RX ADMIN — SODIUM CHLORIDE: 0.9 INJECTION, SOLUTION INTRAVENOUS at 11:07

## 2022-07-23 RX ADMIN — SODIUM CHLORIDE: 0.9 INJECTION, SOLUTION INTRAVENOUS at 05:07

## 2022-07-23 RX ADMIN — METOPROLOL TARTRATE 25 MG: 25 TABLET, FILM COATED ORAL at 09:07

## 2022-07-23 RX ADMIN — METOPROLOL TARTRATE 12.5 MG: 25 TABLET, FILM COATED ORAL at 08:07

## 2022-07-23 NOTE — ASSESSMENT & PLAN NOTE
Patient with acute kidney injury likely due to IVVD/dehydration MARCUS is currently stable. Labs reviewed- Renal function/electrolytes with CrCl cannot be calculated (Unknown ideal weight.). according to latest data. Monitor urine output and serial BMP and adjust therapy as needed. Avoid nephrotoxins and renally dose meds for GFR listed above.   Check renal ultrasound

## 2022-07-23 NOTE — ASSESSMENT & PLAN NOTE
- COVID-19 testing   - Infection Control notified     - Isolation:   - Airborne, Contact and Droplet Precautions  - Cohort patients into COVID units              - Limit visitors per hospital policy              - Consolidating lab draws, nursing care, provider visits, and interventions      - Management:  Supplemental O2 to maintain SpO2 >92%  Continuous/intermittent Pulse Ox  Albuterol treatment PRN    Advance Care Planning     Will start Remdisivir , monitor clinical progress , will add decadron if hypoxia c

## 2022-07-23 NOTE — PLAN OF CARE
81 y/o WF with a PMHx of Aortic mechanical valve , HTN , Afib on coumadin and CHF admitted with a Dx of UTI, MARCUS ,  COVID-19 infection and metabolic encephalopathy . She is on 4 lt by nasal canula . Started on decadron 6 mg po qd . Remdesevir on hold due to MARCUS  And GFR< 30 . Cont IVF and IVAB .  Pharmacy consulted  For coumadin management . Tx reviewed .

## 2022-07-23 NOTE — ED PROVIDER NOTES
SCRIBE #1 NOTE: I, Emery Jc, am scribing for, and in the presence of, Christiano Watters Jr., MD. I have scribed the entire note.       History     Chief Complaint   Patient presents with    Altered Mental Status     Pt not able to give SS. Appears confused to time, person, place, Fall 2 nights ago and struck head. Only CC nausea. Hypotensive in triage. On ABX for UTI.     Review of patient's allergies indicates:   Allergen Reactions    Amlodipine Other (See Comments)     Not sure    Chlorthalidone Other (See Comments)     Not sure    Clonidine Other (See Comments)     Not sure    Codeine Other (See Comments)     Not sure    Escitalopram Other (See Comments)     Not sure    Lisinopril Other (See Comments)     Not sure    Losartan Other (See Comments)     Not sure    Meperidine Other (See Comments)     Not sure    Methadone Other (See Comments)     Not sure  Not sure      Morphine Other (See Comments)     Not sure    Nebivolol Other (See Comments)     Not sure  Not sure      Nitrofurantoin Other (See Comments)     Not sure  Not sure      Omeprazole Other (See Comments)     Not sure  Not sure      Pravastatin Other (See Comments)     Not sure  Not sure      Propoxyphene Other (See Comments)     Not sure  Not sure      Sulfamethoxazole-trimethoprim Other (See Comments)     Not sure  Not sure           History of Present Illness     HPI    7/22/2022, 7:07 PM  History obtained from the patient and spouse      History of Present Illness: Serena Post is a 82 y.o. female patient with a PMHx of A-fib, HTN, and CHF who presents to the Emergency Department for evaluation of weakness. Symptoms are constant and moderate in severity. No mitigating or exacerbating factors reported. Associated sxs include fever, chills, cough, abdominal pain, appetite change, and diarrhea.. Patient denies any blood in stool, n/v, CP, HA, dizziness, and all other sxs at this time.  No further complaints or concerns at this  time.       Arrival mode: Personal vehicle    PCP: Primary Doctor No        Past Medical History:  Past Medical History:   Diagnosis Date    A-fib     Anticoagulant long-term use     Aortic valve disease     Cancer     brain tumor, 10 years ago    Cardiomyopathy     CHF (congestive heart failure)     Hx of heart valve replacement with mechanical valve     Hyperlipidemia     Hypertension     Pacemaker     Pacemaker     Stroke     2007, residual weakness       Past Surgical History:  Past Surgical History:   Procedure Laterality Date    AORTIC VALVE REPLACEMENT      CHOLECYSTECTOMY      CORONARY ARTERY BYPASS GRAFT      HYSTERECTOMY      INSERTION OF PACEMAKER      TONSILLECTOMY           Family History:  Family History   Problem Relation Age of Onset    No Known Problems Mother     No Known Problems Father        Social History:  Social History     Tobacco Use    Smoking status: Never Smoker    Smokeless tobacco: Never Used   Substance and Sexual Activity    Alcohol use: Not Currently    Drug use: Never    Sexual activity: Not on file        Review of Systems     Review of Systems   Constitutional: Positive for appetite change, chills and fever.   HENT: Negative for sore throat.    Respiratory: Positive for cough. Negative for shortness of breath.    Cardiovascular: Negative for chest pain.   Gastrointestinal: Positive for abdominal pain and diarrhea. Negative for blood in stool, nausea and vomiting.   Genitourinary: Negative for dysuria.   Musculoskeletal: Negative for back pain.   Skin: Negative for rash.   Neurological: Positive for weakness. Negative for dizziness and headaches.   Hematological: Does not bruise/bleed easily.   All other systems reviewed and are negative.       Physical Exam     Initial Vitals   BP Pulse Resp Temp SpO2   07/22/22 1853 07/22/22 1853 07/22/22 1853 07/22/22 2226 07/22/22 1853   (!) 64/53 64 14 98.2 °F (36.8 °C) (!) 91 %      MAP       --                  Physical Exam  Nursing Notes and Vital Signs Reviewed.  Constitutional: Patient is in no acute distress. Well-developed and well-nourished.  Head: Atraumatic. Normocephalic.  Eyes:  EOM intact.  No scleral icterus.  ENT: Mucous membranes are moist.  Nares clear   Neck:  Full ROM. No JVD.  Cardiovascular: Regular rate. Regular rhythm No murmurs, rubs, or gallops. Distal pulses are 2+ and symmetric  Pulmonary/Chest: No respiratory distress. Clear to auscultation bilaterally. No wheezing or rales.  Equal chest wall rise bilaterally  Abdominal: Soft and non-distended.  There is no tenderness.  No rebound, guarding, or rigidity. Good bowel sounds.  Genitourinary: No CVA tenderness.  No suprapubic tenderness  Musculoskeletal: Moves all extremities. No obvious deformities.  5 x 5 strength in all extremities   Skin: Warm and dry.  Neurological:  Alert, awake, and appropriate.  Normal speech.  No acute focal neurological deficits are appreciated.  Two through 12 intact bilaterally.  Psychiatric: Normal affect. Good eye contact. Appropriate in content.       ED Course   Critical Care    Date/Time: 7/22/2022 7:34 PM  Performed by: Christiano Watters Jr., MD  Authorized by: Christiano Watters Jr., MD   Direct patient critical care time: 35 minutes  Additional history critical care time: 6 minutes  Ordering / reviewing critical care time: 8 minutes  Documentation critical care time: 4 minutes  Consulting other physicians critical care time: 5 minutes  Consult with family critical care time: 5 minutes  Total critical care time (exclusive of procedural time) : 63 minutes  Critical care time was exclusive of teaching time and separately billable procedures and treating other patients.  Critical care was necessary to treat or prevent imminent or life-threatening deterioration of the following conditions: sepsis (hypotension).  Critical care was time spent personally by me on the following activities: blood draw for specimens,  development of treatment plan with patient or surrogate, discussions with consultants, evaluation of patient's response to treatment, interpretation of cardiac output measurements, examination of patient, obtaining history from patient or surrogate, ordering and performing treatments and interventions, ordering and review of radiographic studies, ordering and review of laboratory studies, pulse oximetry, re-evaluation of patient's condition and review of old charts.        ED Vital Signs:  Vitals:    07/22/22 1853 07/22/22 1903 07/22/22 1906 07/22/22 1907   BP: (!) 64/53   (!) 123/53   Pulse: 64 93  85   Resp: 14   (!) 31   Temp:       TempSrc: Oral      SpO2: (!) 91%   (!) 86%   Weight:   106.5 kg (234 lb 12.8 oz)     07/22/22 1909 07/22/22 1922 07/22/22 1954 07/22/22 2012   BP:  (!) 106/53  (!) 99/56   Pulse: 69 68 64 67   Resp: (!) 21 (!) 44 18 (!) 28   Temp:       TempSrc:       SpO2: 95% (!) 93% 97% 96%   Weight:        07/22/22 2013 07/22/22 2031 07/22/22 2054 07/22/22 2216   BP:  (!) 96/59     Pulse: 68 66 72 68   Resp: (!) 22 (!) 24 (!) 24 (!) 21   Temp:       TempSrc:       SpO2: 96% 97% 96% 97%   Weight:        07/22/22 2221 07/22/22 2226   BP: 118/63    Pulse: 63    Resp: (!) 24    Temp:  98.2 °F (36.8 °C)   TempSrc:  Oral   SpO2: 97%    Weight:         Abnormal Lab Results:  Labs Reviewed   CBC W/ AUTO DIFFERENTIAL - Abnormal; Notable for the following components:       Result Value    WBC 3.83 (*)     Mono % 16.4 (*)     All other components within normal limits   COMPREHENSIVE METABOLIC PANEL - Abnormal; Notable for the following components:    Sodium 134 (*)     CO2 19 (*)     Glucose 142 (*)     BUN 45 (*)     Creatinine 4.8 (*)     Calcium 8.2 (*)     Albumin 3.1 (*)     AST 63 (*)     eGFR if  9 (*)     eGFR if non  8 (*)     All other components within normal limits   LACTIC ACID, PLASMA - Abnormal; Notable for the following components:    Lactate (Lactic Acid) 3.3  (*)     All other components within normal limits   URINALYSIS, REFLEX TO URINE CULTURE - Abnormal; Notable for the following components:    Appearance, UA Hazy (*)     Leukocytes, UA 1+ (*)     All other components within normal limits    Narrative:     Specimen Source->Urine   TROPONIN I - Abnormal; Notable for the following components:    Troponin I 0.079 (*)     All other components within normal limits   PROCALCITONIN - Abnormal; Notable for the following components:    Procalcitonin 0.39 (*)     All other components within normal limits   URINALYSIS MICROSCOPIC - Abnormal; Notable for the following components:    WBC, UA 27 (*)     Bacteria Moderate (*)     Hyaline Casts, UA 21 (*)     All other components within normal limits    Narrative:     Specimen Source->Urine   SARS-COV-2 RDRP GENE - Abnormal; Notable for the following components:    POC Rapid COVID Positive (*)     All other components within normal limits   CULTURE, BLOOD   CULTURE, BLOOD   CULTURE, URINE   B-TYPE NATRIURETIC PEPTIDE   CK   LACTIC ACID, PLASMA   CK        All Lab Results:  Results for orders placed or performed during the hospital encounter of 07/22/22   CBC auto differential   Result Value Ref Range    WBC 3.83 (L) 3.90 - 12.70 K/uL    RBC 4.54 4.00 - 5.40 M/uL    Hemoglobin 13.8 12.0 - 16.0 g/dL    Hematocrit 41.5 37.0 - 48.5 %    MCV 91 82 - 98 fL    MCH 30.4 27.0 - 31.0 pg    MCHC 33.3 32.0 - 36.0 g/dL    RDW 13.7 11.5 - 14.5 %    Platelets 236 150 - 450 K/uL    MPV 10.7 9.2 - 12.9 fL    Immature Granulocytes 0.3 0.0 - 0.5 %    Gran # (ANC) 1.8 1.8 - 7.7 K/uL    Immature Grans (Abs) 0.01 0.00 - 0.04 K/uL    Lymph # 1.4 1.0 - 4.8 K/uL    Mono # 0.6 0.3 - 1.0 K/uL    Eos # 0.0 0.0 - 0.5 K/uL    Baso # 0.02 0.00 - 0.20 K/uL    nRBC 0 0 /100 WBC    Gran % 46.2 38.0 - 73.0 %    Lymph % 36.3 18.0 - 48.0 %    Mono % 16.4 (H) 4.0 - 15.0 %    Eosinophil % 0.3 0.0 - 8.0 %    Basophil % 0.5 0.0 - 1.9 %    Differential Method Automated     Comprehensive metabolic panel   Result Value Ref Range    Sodium 134 (L) 136 - 145 mmol/L    Potassium 4.3 3.5 - 5.1 mmol/L    Chloride 99 95 - 110 mmol/L    CO2 19 (L) 23 - 29 mmol/L    Glucose 142 (H) 70 - 110 mg/dL    BUN 45 (H) 8 - 23 mg/dL    Creatinine 4.8 (H) 0.5 - 1.4 mg/dL    Calcium 8.2 (L) 8.7 - 10.5 mg/dL    Total Protein 7.9 6.0 - 8.4 g/dL    Albumin 3.1 (L) 3.5 - 5.2 g/dL    Total Bilirubin 0.4 0.1 - 1.0 mg/dL    Alkaline Phosphatase 117 55 - 135 U/L    AST 63 (H) 10 - 40 U/L    ALT 23 10 - 44 U/L    Anion Gap 16 8 - 16 mmol/L    eGFR if African American 9 (A) >60 mL/min/1.73 m^2    eGFR if non African American 8 (A) >60 mL/min/1.73 m^2   Lactic acid, plasma #1   Result Value Ref Range    Lactate (Lactic Acid) 3.3 (H) 0.5 - 2.2 mmol/L   Urinalysis, Reflex to Urine Culture Urine, Clean Catch    Specimen: Urine   Result Value Ref Range    Specimen UA Urine, Unspecified     Color, UA Yellow Yellow, Straw, Kamla    Appearance, UA Hazy (A) Clear    pH, UA 6.0 5.0 - 8.0    Specific Gravity, UA 1.010 1.005 - 1.030    Protein, UA Negative Negative    Glucose, UA Negative Negative    Ketones, UA Negative Negative    Bilirubin (UA) Negative Negative    Occult Blood UA Negative Negative    Nitrite, UA Negative Negative    Urobilinogen, UA Negative <2.0 EU/dL    Leukocytes, UA 1+ (A) Negative   Brain natriuretic peptide   Result Value Ref Range    BNP 66 0 - 99 pg/mL   Troponin I   Result Value Ref Range    Troponin I 0.079 (H) 0.000 - 0.026 ng/mL   Procalcitonin   Result Value Ref Range    Procalcitonin 0.39 (H) <0.25 ng/mL   Urinalysis Microscopic   Result Value Ref Range    WBC, UA 27 (H) 0 - 5 /hpf    Bacteria Moderate (A) None-Occ /hpf    Squam Epithel, UA 7 /hpf    Hyaline Casts, UA 21 (A) 0-1/lpf /lpf    Microscopic Comment SEE COMMENT    POCT COVID-19 Rapid Screening   Result Value Ref Range    POC Rapid COVID Positive (A) Negative     Acceptable Yes          Imaging Results:  Imaging  Results          X-Ray Chest AP Portable (Final result)  Result time 07/22/22 21:32:53    Final result by Elidia Cruz MD (07/22/22 21:32:53)                 Impression:      As above      Electronically signed by: Anthony Brenner  Date:    07/22/2022  Time:    21:32             Narrative:    EXAMINATION:  XR CHEST AP PORTABLE    CLINICAL HISTORY:  picc line placement;    TECHNIQUE:  Single frontal view of the chest was performed.    COMPARISON:  None    FINDINGS:  Median sternotomy.  Right pacemaker.  Left-sided PICC line catheter appears with tip in the left para still sternal region.  PICC line may be in a duplicated SVC.  Recommend correlation to blood return.  No definite evidence for pneumonia.Mild perihilar interstitial opacities.                               X-Ray Chest AP Portable (Final result)  Result time 07/22/22 20:02:40    Final result by Elidia Cruz MD (07/22/22 20:02:40)                 Impression:      No pleural effusion pneumothorax or focal consolidation.  Mild perihilar interstitial opacities may relate to element of pulmonary edema and subsegmental atelectasis.      Electronically signed by: Anthony Brenner  Date:    07/22/2022  Time:    20:02             Narrative:    EXAMINATION:  XR CHEST AP PORTABLE    CLINICAL HISTORY:  Sepsis;    TECHNIQUE:  Single frontal view of the chest was performed.    COMPARISON:  None    FINDINGS:  Median sternotomy.  Pacemaker.  Valvular replacement.  Mild perihilar interstitial opacities.  Mild subsegmental atelectasis.    Bones are intact.                               CT Head Without Contrast (Final result)  Result time 07/22/22 19:45:22    Final result by Elidia Cruz MD (07/22/22 19:45:22)                 Impression:      No acute process    All CT scans   are performed using dose optimization techniques including the following: automated exposure control; adjustment of the mA and/or kV; use of iterative reconstruction technique.  Dose modulation was  employed for ALARA by means of: Automated exposure control; adjustment of the mA and/or kV according to patient size (this includes techniques or standardized protocols for targeted exams where dose is matched to indication/reason for exam; i.e. extremities or head); and/or use of iterative reconstructive technique.      Electronically signed by: Anthony Elidia  Date:    07/22/2022  Time:    19:45             Narrative:    EXAMINATION:  CT HEAD WITHOUT CONTRAST    CLINICAL HISTORY:  Mental status change, unknown cause;    TECHNIQUE:  Low dose axial CT images obtained throughout the head without intravenous contrast. Sagittal and coronal reconstructions were performed.    COMPARISON:  None.    FINDINGS:  Atrophy and chronic white matter changes.  Old infarct in the left posterior periventricular region.  This appears new since the prior 2020 exam.  No hemorrhage mass effect or midline shift.                                 The EKG was ordered, reviewed, and independently interpreted by the ED provider.  Interpretation time: 20:07  Rate: 79 BPM  Rhythm: Sinus Rhythm with marked sinus arrythmia with 1st degree AV block with frequent AV dual-paced complexes and premature ventricular complexes or fusion complexes  Interpretation: Low voltage QRS, Prolonged QT. No STEMI.             The Emergency Provider reviewed the vital signs and test results, which are outlined above.     ED Discussion       10:22 PM: 30mL/kg IVF have been administered within 3 hours of identification of SIRS criteria. Vital signs were reviewed and a focal sepsis perfusion assessment was performed.  Skin is warm to the touch.  Cap refill less than 2 seconds.  Cardiopulmonary exam unchanged.  Vital signs have been reviewed.    10:27 PM: Discussed case with Mago Cruz NP (Hospital Medicine). Dr. Jaramillo agrees with current care and management of pt and accepts admission.   Admitting Service: Hospital Medicine  Admitting Physician: Dr. Jaramillo  Admit  to: Inpatient tele    10:28 PM: Re-evaluated pt. I have discussed test results, shared treatment plan, and the need for admission with patient and family at bedside. Pt and family express understanding at this time and agree with all information. All questions answered. Pt and family have no further questions or concerns at this time. Pt is ready for admit.    ED Course as of 07/22/22 2230 Fri Jul 22, 2022 2124 X-Ray Chest AP Portable [RT]      ED Course User Index  [RT] Christiano Watters Jr., MD     Medical Decision Making:   Clinical Tests:   Lab Tests: Ordered and Reviewed  Radiological Study: Ordered and Reviewed  Medical Tests: Ordered and Reviewed           ED Medication(s):  Medications   lactated ringers bolus 3,195 mL (0 mLs Intravenous Stopped 7/22/22 2017)   cefepime in dextrose 5 % 1 gram/50 mL IVPB 1 g (0 g Intravenous Stopped 7/22/22 2020)       New Prescriptions    No medications on file               Scribe Attestation:   Scribe #1: I performed the above scribed service and the documentation accurately describes the services I performed. I attest to the accuracy of the note.     Attending:   Physician Attestation Statement for Scribe #1: I, Christiano Watters Jr., MD, personally performed the services described in this documentation, as scribed by Emery Jc, in my presence, and it is both accurate and complete.           Clinical Impression       ICD-10-CM ICD-9-CM   1. Severe sepsis  A41.9 038.9    R65.20 995.92   2. Altered mental status  R41.82 780.97   3. Acute renal failure, unspecified acute renal failure type  N17.9 584.9   4. COVID-19 virus infection  U07.1 079.89   5. Acute lower UTI  N39.0 599.0   6. Troponin I above reference range  R77.8 790.6       Disposition:   Disposition: Admitted  Condition: Fair       Chirstiano Watters Jr., MD  07/22/22 2230

## 2022-07-23 NOTE — ASSESSMENT & PLAN NOTE
Patient with Long standing persistent (>12 months) atrial fibrillation which is controlled currently with Beta Blocker. Patient is currently in atrial fibrillation.[id number] Score: 3. HASBLED Score: Unable to calculate. Anticoagulation indicated. Anticoagulation done with coumadin.

## 2022-07-23 NOTE — HPI
History was taken from electronic chart . Patient did not provide much history and son is at bed side. History is very limited   82 year old woman with  PMHx of A-fib on coumadin , HTN, and CHF who presents to the Emergency Department for evaluation of weakness over the last week . There is associated history of cough and fever . She did not take the Covid vaccine and son says she will never get the vaccine. Patient denies any blood in stool, nausea,vomiting   and all other sxs at this time.  Labs and imaging test reviwed .  Covid assay - positive   Procal -0.3  Troponin-0.07  Component      Latest Ref Rng & Units 7/23/2022 7/22/2022              Lactate, Rico      0.5 - 2.2 mmol/L 1.2 3.3 (H)   Serum creatinine - 4.8  Chest x-ray -No definite evidence for pneumonia.Mild perihilar interstitial opacities.  ED course-  She was  initially hypotensive with SBP in the 60s but now improved post resuscitation..

## 2022-07-23 NOTE — H&P
[facility] - Emergency Dept.  [facility] Medicine  History & Physical    Patient Name: Serena Post  MRN: [id number]  Patient Class: IP- Inpatient  Admission Date: 7/22/2022  Attending Physician: Felix Villalobos, *   Primary Care Provider: Primary Doctor No         Patient information was obtained from relative(s), past medical records and ER records.     Subjective:     Principal Problem:MARCUS (acute kidney injury)    Chief Complaint:   Chief Complaint   Patient presents with    Altered Mental Status     Pt not able to give SS. Appears confused to time, person, place, Fall 2 nights ago and struck head. Only CC nausea. Hypotensive in triage. On ABX for UTI.        HPI:   History was taken from electronic chart . Patient did not provide much history and son is at bed side. History is very limited   82 year old woman with  PMHx of A-fib on coumadin , HTN, and CHF who presents to the Emergency Department for evaluation of weakness over the last week . There is associated history of cough and fever . She did not take the Covid vaccine and son says she will never get the vaccine. Patient denies any blood in stool, nausea,vomiting   and all other sxs at this time.  Labs and imaging test reviwed .  Covid assay - positive   Procal -0.3  Troponin-0.07  Component      Latest Ref Rng & Units 7/23/2022 7/22/2022              Lactate, Rico      0.5 - 2.2 mmol/L 1.2 3.3 (H)   Serum creatinine - 4.8  Chest x-ray -No definite evidence for pneumonia.Mild perihilar interstitial opacities.  ED course-  She was  initially hypotensive with SBP in the 60s but now improved post resuscitation..        Past Medical History:   Diagnosis Date    A-fib     Anticoagulant long-term use     Aortic valve disease     Cancer     brain tumor, 10 years ago    Cardiomyopathy     CHF (congestive heart failure)     Hx of heart valve replacement with mechanical valve     Hyperlipidemia     Hypertension     Pacemaker     Pacemaker      Stroke     2007, residual weakness       Past Surgical History:   Procedure Laterality Date    AORTIC VALVE REPLACEMENT      CHOLECYSTECTOMY      CORONARY ARTERY BYPASS GRAFT      HYSTERECTOMY      INSERTION OF PACEMAKER      TONSILLECTOMY         Review of patient's allergies indicates:   Allergen Reactions    Amlodipine Other (See Comments)     Not sure    Chlorthalidone Other (See Comments)     Not sure    Clonidine Other (See Comments)     Not sure    Codeine Other (See Comments)     Not sure    Escitalopram Other (See Comments)     Not sure    Lisinopril Other (See Comments)     Not sure    Losartan Other (See Comments)     Not sure    Meperidine Other (See Comments)     Not sure    Methadone Other (See Comments)     Not sure  Not sure      Morphine Other (See Comments)     Not sure    Nebivolol Other (See Comments)     Not sure  Not sure      Nitrofurantoin Other (See Comments)     Not sure  Not sure      Omeprazole Other (See Comments)     Not sure  Not sure      Pravastatin Other (See Comments)     Not sure  Not sure      Propoxyphene Other (See Comments)     Not sure  Not sure      Sulfamethoxazole-trimethoprim Other (See Comments)     Not sure  Not sure         No current facility-administered medications on file prior to encounter.     Current Outpatient Medications on File Prior to Encounter   Medication Sig    ARIPiprazole (ABILIFY) 5 MG Tab TK 1 T PO ONCE D    ciprofloxacin HCl (CIPRO) 500 MG tablet Take 1 tablet (500 mg total) by mouth 2 (two) times daily.    lisinopril 10 MG tablet Take 10 mg by mouth.    metoprolol tartrate (LOPRESSOR) 25 MG tablet Take 25 mg by mouth.    traMADol (ULTRAM) 50 mg tablet Take 50 mg by mouth.    triamterene-hydrochlorothiazide 37.5-25 mg (MAXZIDE-25) 37.5-25 mg per tablet Take 1 tablet by mouth every other day.     warfarin (COUMADIN) 2.5 MG tablet 5 mg.      Family History       Problem Relation (Age of Onset)    No Known Problems Mother,  Father          Tobacco Use    Smoking status: Never Smoker    Smokeless tobacco: Never Used   Substance and Sexual Activity    Alcohol use: Not Currently    Drug use: Never    Sexual activity: Not on file     Review of Systems   Unable to perform ROS: Mental status change   Objective:     Vital Signs (Most Recent):  Temp: 98.2 °F (36.8 °C) (07/22/22 2226)  Pulse: 64 (07/23/22 0522)  Resp: (!) 24 (07/23/22 0522)  BP: 106/68 (07/23/22 0522)  SpO2: 98 % (07/23/22 0522)   Vital Signs (24h Range):  Temp:  [98.2 °F (36.8 °C)] 98.2 °F (36.8 °C)  Pulse:  [61-93] 64  Resp:  [14-44] 24  SpO2:  [86 %-99 %] 98 %  BP: ()/(50-73) 106/68     Weight: 106.5 kg (234 lb 12.8 oz)  Body mass index is 42.95 kg/m².    Physical Exam  Vitals and nursing note reviewed.   Constitutional:       Appearance: She is well-developed.   HENT:      Head: Normocephalic and atraumatic.   Eyes:      Pupils: Pupils are equal, round, and reactive to light.   Neck:      Thyroid: No thyromegaly.      Trachea: No tracheal deviation.   Cardiovascular:      Rate and Rhythm: Normal rate and regular rhythm.   Pulmonary:      Effort: No respiratory distress.      Breath sounds: Wheezing present. No rales.   Abdominal:      General: Bowel sounds are normal. There is no distension.      Palpations: Abdomen is soft.      Tenderness: There is no abdominal tenderness.   Musculoskeletal:      Cervical back: Normal range of motion and neck supple.   Skin:     General: Skin is warm and dry.      Coloration: Skin is not pale.   Neurological:      Mental Status: She is alert. She is disoriented.      Cranial Nerves: No cranial nerve deficit.      Coordination: Coordination normal.   Psychiatric:         Thought Content: Thought content normal.         Judgment: Judgment normal.         CRANIAL NERVES     CN III, IV, VI   Pupils are equal, round, and reactive to light.     Significant Labs: All pertinent labs within the past 24 hours have been reviewed.  BMP:    Recent Labs   Lab 07/22/22 1921   *   *   K 4.3   CL 99   CO2 19*   BUN 45*   CREATININE 4.8*   CALCIUM 8.2*     CMP:   Recent Labs   Lab 07/22/22 1921   *   K 4.3   CL 99   CO2 19*   *   BUN 45*   CREATININE 4.8*   CALCIUM 8.2*   PROT 7.9   ALBUMIN 3.1*   BILITOT 0.4   ALKPHOS 117   AST 63*   ALT 23   ANIONGAP 16   EGFRNONAA 8*       Significant Imaging: I have reviewed all pertinent imaging results/findings within the past 24 hours.    Assessment/Plan:     * MARCUS (acute kidney injury)  Patient with acute kidney injury likely due to IVVD/dehydration MARCUS is currently stable. Labs reviewed- Renal function/electrolytes with CrCl cannot be calculated (Unknown ideal weight.). according to latest data. Monitor urine output and serial BMP and adjust therapy as needed. Avoid nephrotoxins and renally dose meds for GFR listed above.   Check renal ultrasound    Sepsis  Related to UTI, will follow blood and urine culture   Start Rocephin       Acute cystitis with hematuria  Will follow urine cultures, start Rocephin      COVID-19  - COVID-19 testing   - Infection Control notified     - Isolation:   - Airborne, Contact and Droplet Precautions  - Cohort patients into COVID units              - Limit visitors per hospital policy              - Consolidating lab draws, nursing care, provider visits, and interventions      - Management:  Supplemental O2 to maintain SpO2 >92%  Continuous/intermittent Pulse Ox  Albuterol treatment PRN    Advance Care Planning     Will start Remdisivir , monitor clinical progress , will add decadron if hypoxia c    Paroxysmal atrial fibrillation  Patient with Long standing persistent (>12 months) atrial fibrillation which is controlled currently with Beta Blocker. Patient is currently in atrial fibrillation.[id number] Score: 3. HASBLED Score: Unable to calculate. Anticoagulation indicated. Anticoagulation done with coumadin.        Pacemaker  Continue telemetry monitoring        Essential hypertension    She was hypotensive in the Ed , will hold  Lisinopril     Anticoagulant long-term use    Will resume coumadin, pharmacy consult       VTE Risk Mitigation (From admission, onward)         Ordered     IP VTE HIGH RISK PATIENT  Once         07/23/22 0320     Place sequential compression device  Until discontinued         07/23/22 0320                   Reyes Jaramillo MD  Department of Hospital Medicine   [facility] - Emergency Dept.

## 2022-07-23 NOTE — SUBJECTIVE & OBJECTIVE
Past Medical History:   Diagnosis Date    A-fib     Anticoagulant long-term use     Aortic valve disease     Cancer     brain tumor, 10 years ago    Cardiomyopathy     CHF (congestive heart failure)     Hx of heart valve replacement with mechanical valve     Hyperlipidemia     Hypertension     Pacemaker     Pacemaker     Stroke     2007, residual weakness       Past Surgical History:   Procedure Laterality Date    AORTIC VALVE REPLACEMENT      CHOLECYSTECTOMY      CORONARY ARTERY BYPASS GRAFT      HYSTERECTOMY      INSERTION OF PACEMAKER      TONSILLECTOMY         Review of patient's allergies indicates:   Allergen Reactions    Amlodipine Other (See Comments)     Not sure    Chlorthalidone Other (See Comments)     Not sure    Clonidine Other (See Comments)     Not sure    Codeine Other (See Comments)     Not sure    Escitalopram Other (See Comments)     Not sure    Lisinopril Other (See Comments)     Not sure    Losartan Other (See Comments)     Not sure    Meperidine Other (See Comments)     Not sure    Methadone Other (See Comments)     Not sure  Not sure      Morphine Other (See Comments)     Not sure    Nebivolol Other (See Comments)     Not sure  Not sure      Nitrofurantoin Other (See Comments)     Not sure  Not sure      Omeprazole Other (See Comments)     Not sure  Not sure      Pravastatin Other (See Comments)     Not sure  Not sure      Propoxyphene Other (See Comments)     Not sure  Not sure      Sulfamethoxazole-trimethoprim Other (See Comments)     Not sure  Not sure         No current facility-administered medications on file prior to encounter.     Current Outpatient Medications on File Prior to Encounter   Medication Sig    ARIPiprazole (ABILIFY) 5 MG Tab TK 1 T PO ONCE D    ciprofloxacin HCl (CIPRO) 500 MG tablet Take 1 tablet (500 mg total) by mouth 2 (two) times daily.    lisinopril 10 MG tablet Take 10 mg by mouth.    metoprolol tartrate (LOPRESSOR) 25 MG tablet Take 25 mg by mouth.    traMADol  (ULTRAM) 50 mg tablet Take 50 mg by mouth.    triamterene-hydrochlorothiazide 37.5-25 mg (MAXZIDE-25) 37.5-25 mg per tablet Take 1 tablet by mouth every other day.     warfarin (COUMADIN) 2.5 MG tablet 5 mg.      Family History       Problem Relation (Age of Onset)    No Known Problems Mother, Father          Tobacco Use    Smoking status: Never Smoker    Smokeless tobacco: Never Used   Substance and Sexual Activity    Alcohol use: Not Currently    Drug use: Never    Sexual activity: Not on file     Review of Systems   Unable to perform ROS: Mental status change   Objective:     Vital Signs (Most Recent):  Temp: 98.2 °F (36.8 °C) (07/22/22 2226)  Pulse: 64 (07/23/22 0522)  Resp: (!) 24 (07/23/22 0522)  BP: 106/68 (07/23/22 0522)  SpO2: 98 % (07/23/22 0522)   Vital Signs (24h Range):  Temp:  [98.2 °F (36.8 °C)] 98.2 °F (36.8 °C)  Pulse:  [61-93] 64  Resp:  [14-44] 24  SpO2:  [86 %-99 %] 98 %  BP: ()/(50-73) 106/68     Weight: 106.5 kg (234 lb 12.8 oz)  Body mass index is 42.95 kg/m².    Physical Exam  Vitals and nursing note reviewed.   Constitutional:       Appearance: She is well-developed.   HENT:      Head: Normocephalic and atraumatic.   Eyes:      Pupils: Pupils are equal, round, and reactive to light.   Neck:      Thyroid: No thyromegaly.      Trachea: No tracheal deviation.   Cardiovascular:      Rate and Rhythm: Normal rate and regular rhythm.   Pulmonary:      Effort: No respiratory distress.      Breath sounds: Wheezing present. No rales.   Abdominal:      General: Bowel sounds are normal. There is no distension.      Palpations: Abdomen is soft.      Tenderness: There is no abdominal tenderness.   Musculoskeletal:      Cervical back: Normal range of motion and neck supple.   Skin:     General: Skin is warm and dry.      Coloration: Skin is not pale.   Neurological:      Mental Status: She is alert. She is disoriented.      Cranial Nerves: No cranial nerve deficit.      Coordination: Coordination  normal.   Psychiatric:         Thought Content: Thought content normal.         Judgment: Judgment normal.         CRANIAL NERVES     CN III, IV, VI   Pupils are equal, round, and reactive to light.     Significant Labs: All pertinent labs within the past 24 hours have been reviewed.  BMP:   Recent Labs   Lab 07/22/22 1921   *   *   K 4.3   CL 99   CO2 19*   BUN 45*   CREATININE 4.8*   CALCIUM 8.2*     CMP:   Recent Labs   Lab 07/22/22 1921   *   K 4.3   CL 99   CO2 19*   *   BUN 45*   CREATININE 4.8*   CALCIUM 8.2*   PROT 7.9   ALBUMIN 3.1*   BILITOT 0.4   ALKPHOS 117   AST 63*   ALT 23   ANIONGAP 16   EGFRNONAA 8*       Significant Imaging: I have reviewed all pertinent imaging results/findings within the past 24 hours.

## 2022-07-23 NOTE — PROCEDURES
Serena Post is a 82 y.o. female patient.    Pulse: 72 (07/22/22 2054)  Resp: (!) 24 (07/22/22 2054)  BP: (!) 96/59 (07/22/22 2031)  SpO2: 96 % (07/22/22 2054)  Weight: 106.5 kg (234 lb 12.8 oz) (07/22/22 1906)    PICC  Date/Time: 7/22/2022 9:15 PM  Performed by: Edwin Coulter RN  Consent Done: Yes  Time out: Immediately prior to procedure a time out was called to verify the correct patient, procedure, equipment, support staff and site/side marked as required  Indications: med administration and vascular access  Anesthesia: local infiltration  Local anesthetic: lidocaine 1% without epinephrine  Anesthetic Total (mL): 2  Preparation: skin prepped with chlorhexidine (without alcohol)  Skin prep agent dried: skin prep agent completely dried prior to procedure  Sterile barriers: all five maximum sterile barriers used - cap, mask, sterile gown, sterile gloves, and large sterile sheet  Hand hygiene: hand hygiene performed prior to central venous catheter insertion  Location details: left basilic  Catheter type: double lumen  Catheter size: 4 Fr  Catheter Length: 38cm    Ultrasound guidance: yes  Vessel Caliber: medium, compressibility normal  Needle advanced into vessel with real time Ultrasound guidance.  Guidewire confirmed in vessel.  Sterile sheath used.  Number of attempts: 1  Post-procedure: blood return through all ports, chlorhexidine patch and sterile dressing applied  Estimated blood loss (mL): 0  Specimens: No  Implants: No  Assessment: placement verified by x-ray, free fluid flow, no pneumothorax on x-ray and successful placement          Edwin Coulter RN  7/22/2022

## 2022-07-23 NOTE — PLAN OF CARE
Got message from patient about prenatal vitamins.  These have been sent in yesterday.  Called patient said she did not know they have been called in set if they were not available at the pharmacy to let me know   POC reviewed with patient. VSS stable, medications administered as ordered throughout shift. No acute issues or needs at this time. Safety remains in place.    Problem: Adult Inpatient Plan of Care  Goal: Plan of Care Review  Outcome: Ongoing, Progressing  Goal: Patient-Specific Goal (Individualized)  Outcome: Ongoing, Progressing  Goal: Absence of Hospital-Acquired Illness or Injury  Outcome: Ongoing, Progressing  Goal: Optimal Comfort and Wellbeing  Outcome: Ongoing, Progressing  Goal: Readiness for Transition of Care  Outcome: Ongoing, Progressing     Problem: Bariatric Environmental Safety  Goal: Safety Maintained with Care  Outcome: Ongoing, Progressing     Problem: Adjustment to Illness (Sepsis/Septic Shock)  Goal: Optimal Coping  Outcome: Ongoing, Progressing     Problem: Bleeding (Sepsis/Septic Shock)  Goal: Absence of Bleeding  Outcome: Ongoing, Progressing     Problem: Glycemic Control Impaired (Sepsis/Septic Shock)  Goal: Blood Glucose Level Within Desired Range  Outcome: Ongoing, Progressing     Problem: Infection Progression (Sepsis/Septic Shock)  Goal: Absence of Infection Signs and Symptoms  Outcome: Ongoing, Progressing     Problem: Nutrition Impaired (Sepsis/Septic Shock)  Goal: Optimal Nutrition Intake  Outcome: Ongoing, Progressing     Problem: Fluid and Electrolyte Imbalance (Acute Kidney Injury/Impairment)  Goal: Fluid and Electrolyte Balance  Outcome: Ongoing, Progressing     Problem: Oral Intake Inadequate (Acute Kidney Injury/Impairment)  Goal: Optimal Nutrition Intake  Outcome: Ongoing, Progressing     Problem: Renal Function Impairment (Acute Kidney Injury/Impairment)  Goal: Effective Renal Function  Outcome: Ongoing, Progressing     Problem: Infection  Goal: Absence of Infection Signs and Symptoms  Outcome: Ongoing, Progressing     Problem: Skin Injury Risk Increased  Goal: Skin Health and Integrity  Outcome: Ongoing, Progressing     Problem: Fall Injury Risk  Goal: Absence of Fall and  Fall-Related Injury  Outcome: Ongoing, Progressing

## 2022-07-23 NOTE — CONSULTS
Pharmacy Consult Note: Warfarin    Pharmacy is now consulted to dose warfarin by request of Reyes Jaramillo MD.    Indication: paroxysmal atrial fibrillation, s/p Aortic valve replacement w/ a mechanical valve, history of DVT  INR goal: 2.5 - 3.5    Today's INR: 4.7    Today's labs:  Hgb = 12.4 g/dL  Plt = 173 K/uL    Potential Drug/Disease Interactions:  Acetaminophen and ceftriaxone may interact with warfarin and affect INR.     Home dose:  1.25 mg every Tues, Thurs, Sun  2.5 mg on all other days    Recent Dosing History:  Patient stated that she last took a dose on Thursday, 7/21/22.    Due to an elevated INR level, no dose will be given today.      Daily INR levels are ordered.  A Coumadin restriction is included in the diet orders.    Pharmacy will continue to monitor daily INR levels and make dosing adjustments, if necessary.   Thank you for allowing us to participate in this patient's care.  Venu Laguerre, PharmD 7/23/2022 11:31 AM

## 2022-07-24 LAB
ALBUMIN SERPL BCP-MCNC: 2.6 G/DL (ref 3.5–5.2)
ALP SERPL-CCNC: 89 U/L (ref 55–135)
ALT SERPL W/O P-5'-P-CCNC: 19 U/L (ref 10–44)
ANION GAP SERPL CALC-SCNC: 9 MMOL/L (ref 8–16)
AST SERPL-CCNC: 34 U/L (ref 10–40)
BASOPHILS # BLD AUTO: ABNORMAL K/UL (ref 0–0.2)
BASOPHILS NFR BLD: 0 % (ref 0–1.9)
BILIRUB SERPL-MCNC: 0.3 MG/DL (ref 0.1–1)
BNP SERPL-MCNC: 292 PG/ML (ref 0–99)
BUN SERPL-MCNC: 32 MG/DL (ref 8–23)
CALCIUM SERPL-MCNC: 7.8 MG/DL (ref 8.7–10.5)
CHLORIDE SERPL-SCNC: 106 MMOL/L (ref 95–110)
CO2 SERPL-SCNC: 21 MMOL/L (ref 23–29)
CREAT SERPL-MCNC: 2.4 MG/DL (ref 0.5–1.4)
CRP SERPL-MCNC: 9.1 MG/L (ref 0–8.2)
D DIMER PPP IA.FEU-MCNC: 0.39 MG/L FEU
DIFFERENTIAL METHOD: ABNORMAL
EOSINOPHIL # BLD AUTO: ABNORMAL K/UL (ref 0–0.5)
EOSINOPHIL NFR BLD: 0 % (ref 0–8)
ERYTHROCYTE [DISTWIDTH] IN BLOOD BY AUTOMATED COUNT: 13.7 % (ref 11.5–14.5)
ERYTHROCYTE [SEDIMENTATION RATE] IN BLOOD BY WESTERGREN METHOD: 45 MM/HR (ref 0–20)
EST. GFR  (AFRICAN AMERICAN): 21 ML/MIN/1.73 M^2
EST. GFR  (NON AFRICAN AMERICAN): 19 ML/MIN/1.73 M^2
FERRITIN SERPL-MCNC: 468 NG/ML (ref 20–300)
GLUCOSE SERPL-MCNC: 137 MG/DL (ref 70–110)
HCT VFR BLD AUTO: 36.2 % (ref 37–48.5)
HGB BLD-MCNC: 11.7 G/DL (ref 12–16)
IMM GRANULOCYTES # BLD AUTO: ABNORMAL K/UL (ref 0–0.04)
IMM GRANULOCYTES NFR BLD AUTO: ABNORMAL % (ref 0–0.5)
INR PPP: 5.1 (ref 0.8–1.2)
LYMPHOCYTES # BLD AUTO: ABNORMAL K/UL (ref 1–4.8)
LYMPHOCYTES NFR BLD: 32 % (ref 18–48)
MCH RBC QN AUTO: 29.6 PG (ref 27–31)
MCHC RBC AUTO-ENTMCNC: 32.3 G/DL (ref 32–36)
MCV RBC AUTO: 92 FL (ref 82–98)
MONOCYTES # BLD AUTO: ABNORMAL K/UL (ref 0.3–1)
MONOCYTES NFR BLD: 4 % (ref 4–15)
NEUTROPHILS NFR BLD: 64 % (ref 38–73)
NRBC BLD-RTO: 0 /100 WBC
PLATELET # BLD AUTO: 176 K/UL (ref 150–450)
PMV BLD AUTO: 10.2 FL (ref 9.2–12.9)
POTASSIUM SERPL-SCNC: 4.2 MMOL/L (ref 3.5–5.1)
PROCALCITONIN SERPL IA-MCNC: 0.17 NG/ML
PROT SERPL-MCNC: 6.2 G/DL (ref 6–8.4)
PROTHROMBIN TIME: 50.6 SEC (ref 9–12.5)
RBC # BLD AUTO: 3.95 M/UL (ref 4–5.4)
SODIUM SERPL-SCNC: 136 MMOL/L (ref 136–145)
TROPONIN I SERPL DL<=0.01 NG/ML-MCNC: 0.04 NG/ML (ref 0–0.03)
WBC # BLD AUTO: 1.34 K/UL (ref 3.9–12.7)

## 2022-07-24 PROCEDURE — 21400001 HC TELEMETRY ROOM

## 2022-07-24 PROCEDURE — 85007 BL SMEAR W/DIFF WBC COUNT: CPT | Performed by: INTERNAL MEDICINE

## 2022-07-24 PROCEDURE — 82728 ASSAY OF FERRITIN: CPT | Performed by: INTERNAL MEDICINE

## 2022-07-24 PROCEDURE — 84484 ASSAY OF TROPONIN QUANT: CPT | Performed by: INTERNAL MEDICINE

## 2022-07-24 PROCEDURE — 63600175 PHARM REV CODE 636 W HCPCS: Performed by: INTERNAL MEDICINE

## 2022-07-24 PROCEDURE — 94761 N-INVAS EAR/PLS OXIMETRY MLT: CPT

## 2022-07-24 PROCEDURE — 85379 FIBRIN DEGRADATION QUANT: CPT | Performed by: INTERNAL MEDICINE

## 2022-07-24 PROCEDURE — 27000221 HC OXYGEN, UP TO 24 HOURS

## 2022-07-24 PROCEDURE — 85610 PROTHROMBIN TIME: CPT | Performed by: INTERNAL MEDICINE

## 2022-07-24 PROCEDURE — 84145 PROCALCITONIN (PCT): CPT | Performed by: INTERNAL MEDICINE

## 2022-07-24 PROCEDURE — 85027 COMPLETE CBC AUTOMATED: CPT | Performed by: INTERNAL MEDICINE

## 2022-07-24 PROCEDURE — 25000003 PHARM REV CODE 250: Performed by: INTERNAL MEDICINE

## 2022-07-24 PROCEDURE — 85651 RBC SED RATE NONAUTOMATED: CPT | Performed by: INTERNAL MEDICINE

## 2022-07-24 PROCEDURE — 27000207 HC ISOLATION

## 2022-07-24 PROCEDURE — 36415 COLL VENOUS BLD VENIPUNCTURE: CPT | Performed by: INTERNAL MEDICINE

## 2022-07-24 PROCEDURE — 83880 ASSAY OF NATRIURETIC PEPTIDE: CPT | Performed by: INTERNAL MEDICINE

## 2022-07-24 PROCEDURE — 86140 C-REACTIVE PROTEIN: CPT | Performed by: INTERNAL MEDICINE

## 2022-07-24 PROCEDURE — 99900035 HC TECH TIME PER 15 MIN (STAT)

## 2022-07-24 PROCEDURE — 80053 COMPREHEN METABOLIC PANEL: CPT | Performed by: INTERNAL MEDICINE

## 2022-07-24 RX ORDER — LINEZOLID 2 MG/ML
600 INJECTION, SOLUTION INTRAVENOUS
Status: DISCONTINUED | OUTPATIENT
Start: 2022-07-24 | End: 2022-07-26 | Stop reason: HOSPADM

## 2022-07-24 RX ADMIN — MUPIROCIN: 20 OINTMENT TOPICAL at 08:07

## 2022-07-24 RX ADMIN — LINEZOLID 600 MG: 600 INJECTION, SOLUTION INTRAVENOUS at 09:07

## 2022-07-24 RX ADMIN — METOPROLOL TARTRATE 12.5 MG: 25 TABLET, FILM COATED ORAL at 09:07

## 2022-07-24 RX ADMIN — MUPIROCIN: 20 OINTMENT TOPICAL at 09:07

## 2022-07-24 RX ADMIN — DEXAMETHASONE 6 MG: 4 TABLET ORAL at 09:07

## 2022-07-24 RX ADMIN — LINEZOLID 600 MG: 600 INJECTION, SOLUTION INTRAVENOUS at 08:07

## 2022-07-24 RX ADMIN — SODIUM CHLORIDE: 0.9 INJECTION, SOLUTION INTRAVENOUS at 09:07

## 2022-07-24 RX ADMIN — METOPROLOL TARTRATE 12.5 MG: 25 TABLET, FILM COATED ORAL at 08:07

## 2022-07-24 NOTE — CONSULTS
Pharmacy Consult Note: Warfarin     Indication: paroxysmal atrial fibrillation, s/p Aortic valve replacement w/ a mechanical valve, history of DVT  INR goal: 2.5 - 3.5     Today's INR: 5.1     Today's labs:  Hgb = 11.7 g/dL  Plt = 176 K/uL     Potential Drug/Disease Interactions:  Acetaminophen and ceftriaxone may affect INR and increase the risk of bleeding.      Home dose:  1.25 mg every Tues, Thurs, Sun  2.5 mg on all other days     Recent Dosing History:  7/23 - Dose held due to supra-therapeutic INR     No dose will be given today, due to an elevated INR level.        Daily INR levels are ordered.  A Coumadin restriction is included in the diet orders.     Pharmacy will continue to monitor daily INR levels and make dosing adjustments, if necessary.   Thank you for allowing us to participate in this patient's care.  Andres Ribeiro, PharmD 7/24/2022 10:50 AM

## 2022-07-24 NOTE — PLAN OF CARE
Aox4. Able to verbalize needs & follow commands. Calm & cooperative throughout shift.   POC reviewed with pt & son. Interventions implemented as appropriate.    VSS; Paced rhythm w/ BBB & PVCs on tele-monitor.  On 2L NC. Respiratory treatments as ordered.    BLAKE PICC; saline locked. PIV patent w/ NS infusing at 75 mL/hr. Integrity maintained.  Receiving IV abx. No adverse reactions noted.  On Coumadin. Bleeding precautions.  Moisture associated redness to groin noted. Citric skin barrier cream applied. Four eyes on skin w/ Rosaura, PCT.  No c/o pain.    Incontinence pad changed as needed. External female catheter in place.   Bed bound. Activity ad stephen. Frequent position changes encouraged. Turned q2h.  Educated on s/sx of pressure injury;  verbalized understanding.  NADN. Resting quietly in bed.   Free of falls. Hourly rounding complete.   All safety measures remain in place. SR up x2; bed low & locked. Bed alarm on & audible. Call light w/in reach.   Will continue to monitor throughout shift.

## 2022-07-24 NOTE — ASSESSMENT & PLAN NOTE
- COVID-19 testing   - Infection Control notified     - Isolation:   - Airborne, Contact and Droplet Precautions  - Cohort patients into COVID units              - Limit visitors per hospital policy              - Consolidating lab draws, nursing care, provider visits, and interventions      - Management:  Supplemental O2 to maintain SpO2 >92%  Continuous/intermittent Pulse Ox  Albuterol treatment PRN    Advance Care Planning     Remdesevir on HOLD due to GFR< than 30   Started on decadron  6 mg po qd f

## 2022-07-24 NOTE — ASSESSMENT & PLAN NOTE
This patient does have evidence of infective focus  My overall impression is sepsis. Vital signs were reviewed and noted in progress note.  Antibiotics given-   Antibiotics (From admission, onward)            Start     Stop Route Frequency Ordered    07/24/22 0830  linezolid 600 mg/300 mL IVPB 600 mg         -- IV Every 12 hours (non-standard times) 07/24/22 0729    07/23/22 2100  mupirocin 2 % ointment         07/28 2059 Nasl 2 times daily 07/23/22 1639        Cultures were taken-   Microbiology Results (last 7 days)     Procedure Component Value Units Date/Time    Urine culture [[id number]]  (Abnormal) Collected: 07/22/22 2155    Order Status: Completed Specimen: Urine Updated: 07/24/22 0627     Urine Culture, Routine ENTEROCOCCUS SPECIES  > 100,000 cfu/ml  Identification and susceptibility pending  No other significant isolate      Narrative:      Specimen Source->Urine    Blood culture x two cultures. Draw prior to antibiotics. [[id number]] Collected: 07/22/22 1922    Order Status: Completed Specimen: Blood from Peripheral, Hand, Right Updated: 07/24/22 0614     Blood Culture, Routine No Growth to date      No Growth to date    Narrative:      Aerobic and anaerobic    Blood culture x two cultures. Draw prior to antibiotics. [[id number]] Collected: 07/22/22 1915    Order Status: Completed Specimen: Blood from Peripheral, Wrist, Left Updated: 07/24/22 0614     Blood Culture, Routine No Growth to date      No Growth to date    Narrative:      Aerobic and anaerobic        Latest lactate reviewed, they are-  Recent Labs   Lab 07/22/22 1921 07/23/22  0051   LACTATE 3.3* 1.2       Organ dysfunction indicated by Acute kidney injury  Source- Urine    Source control Achieved by- IVAB

## 2022-07-24 NOTE — SUBJECTIVE & OBJECTIVE
Interval History:     Review of Systems   Unable to perform ROS: Other   Constitutional:  Positive for activity change and fatigue.   HENT: Negative.     Eyes: Negative.    Respiratory: Negative.     Cardiovascular: Negative.    Gastrointestinal: Negative.    Endocrine: Negative.    Genitourinary: Negative.    Musculoskeletal: Negative.    Skin: Negative.    Allergic/Immunologic: Negative.    Neurological:  Positive for weakness.   Hematological: Negative.    Psychiatric/Behavioral:  Positive for confusion.    Objective:     Vital Signs (Most Recent):  Temp: 98.7 °F (37.1 °C) (07/24/22 0419)  Pulse: 67 (07/24/22 0759)  Resp: 18 (07/24/22 0759)  BP: 123/65 (07/24/22 0759)  SpO2: (!) 94 % (07/24/22 0759)   Vital Signs (24h Range):  Temp:  [97.6 °F (36.4 °C)-98.7 °F (37.1 °C)] 98.7 °F (37.1 °C)  Pulse:  [56-86] 67  Resp:  [16-20] 18  SpO2:  [91 %-96 %] 94 %  BP: ()/(47-66) 123/65     Weight: 105 kg (231 lb 7.7 oz)  Body mass index is 43.74 kg/m².  No intake or output data in the 24 hours ending 07/24/22 0951   Physical Exam  Vitals and nursing note reviewed.   Constitutional:       Appearance: She is well-developed.   HENT:      Head: Normocephalic and atraumatic.      Nose: Nose normal. No congestion or rhinorrhea.   Eyes:      Pupils: Pupils are equal, round, and reactive to light.   Neck:      Thyroid: No thyromegaly.      Trachea: No tracheal deviation.   Cardiovascular:      Rate and Rhythm: Normal rate and regular rhythm.   Pulmonary:      Effort: No respiratory distress.      Breath sounds: No wheezing or rales.   Abdominal:      General: Bowel sounds are normal. There is no distension.      Palpations: Abdomen is soft.      Tenderness: There is no abdominal tenderness.   Musculoskeletal:         General: No swelling. Normal range of motion.      Cervical back: Normal range of motion and neck supple.      Right lower leg: No edema.      Left lower leg: No edema.   Skin:     General: Skin is warm and dry.       Coloration: Skin is not pale.   Neurological:      Mental Status: She is alert. She is disoriented.      Cranial Nerves: No cranial nerve deficit.      Sensory: No sensory deficit.      Motor: No weakness.   Psychiatric:         Thought Content: Thought content normal.         Judgment: Judgment normal.       Significant Labs: All pertinent labs within the past 24 hours have been reviewed.      Significant Imaging: I have reviewed all pertinent imaging results/findings within the past 24 hours.

## 2022-07-24 NOTE — HOSPITAL COURSE
81 y/o WF with a PMHx of Aortic mechanical valve , HTN , Afib on coumadin and CHF admitted with a Dx of UTI, MARCUS ,  COVID-19 infection and metabolic encephalopathy . She is on 4 lt by nasal canula . Started on decadron 6 mg po qd . Remdesevir on hold due to MARCUS  And GFR< 30 . Cont IVF and IVAB .  Pharmacy consulted  For coumadin management . Tx reviewed   7/24 The urine cx is (+) for enterococus  and IVAB changed from rocephin to zyvox . The kidney function is improving . The Retroperitoneal US did not show any acute finding .   7/25 She is more alert and awake today . She is tolerating  po intake . The kidney  function is improving .  Remdesevir on Hold due to GFR < 30 .  Case D/W Pt and son . Urine cx (+) for enterococcus pending final result .   7/26/22-treat Enterococcus UTI with Zyvox. She qualifies for supplemental oxygen per desat study. She has oxygen already with [facility]. She will resume hospice care once discharged. She will continue dexamethasone for total of 10 days. She is stable for discharge.

## 2022-07-24 NOTE — PROGRESS NOTES
[facility] - Telemetry ([facility] Medicine  Progress Note    Patient Name: Serena Post  MRN: [id number]  Patient Class: IP- Inpatient   Admission Date: 7/22/2022  Length of Stay: 2 days  Attending Physician: Felix Villalobos, *  Primary Care Provider: Primary Doctor No        Subjective:     Principal Problem:COVID-19        HPI:  History was taken from electronic chart . Patient did not provide much history and son is at bed side. History is very limited   82 year old woman with  PMHx of A-fib on coumadin , HTN, and CHF who presents to the Emergency Department for evaluation of weakness over the last week . There is associated history of cough and fever . She did not take the Covid vaccine and son says she will never get the vaccine. Patient denies any blood in stool, nausea,vomiting   and all other sxs at this time.  Labs and imaging test reviwed .  Covid assay - positive   Procal -0.3  Troponin-0.07  Component      Latest Ref Rng & Units 7/23/2022 7/22/2022              Lactate, Rico      0.5 - 2.2 mmol/L 1.2 3.3 (H)   Serum creatinine - 4.8  Chest x-ray -No definite evidence for pneumonia.Mild perihilar interstitial opacities.  ED course-  She was  initially hypotensive with SBP in the 60s but now improved post resuscitation..        Overview/Hospital Course:  81 y/o WF with a PMHx of Aortic mechanical valve , HTN , Afib on coumadin and CHF admitted with a Dx of UTI, MARCUS ,  COVID-19 infection and metabolic encephalopathy . She is on 4 lt by nasal canula . Started on decadron 6 mg po qd . Remdesevir on hold due to MARCUS  And GFR< 30 . Cont IVF and IVAB .  Pharmacy consulted  For coumadin management . Tx reviewed   7/24 The urine cx is (+) for enterococus  and IVAB changed from rocephin to zyvox . The kidney function is improving . The Retroperitoneal US did not show any acute finding .       Interval History:     Review of Systems   Unable to perform ROS: Other   Constitutional:  Positive for  activity change and fatigue.   HENT: Negative.     Eyes: Negative.    Respiratory: Negative.     Cardiovascular: Negative.    Gastrointestinal: Negative.    Endocrine: Negative.    Genitourinary: Negative.    Musculoskeletal: Negative.    Skin: Negative.    Allergic/Immunologic: Negative.    Neurological:  Positive for weakness.   Hematological: Negative.    Psychiatric/Behavioral:  Positive for confusion.    Objective:     Vital Signs (Most Recent):  Temp: 98.7 °F (37.1 °C) (07/24/22 0419)  Pulse: 67 (07/24/22 0759)  Resp: 18 (07/24/22 0759)  BP: 123/65 (07/24/22 0759)  SpO2: (!) 94 % (07/24/22 0759)   Vital Signs (24h Range):  Temp:  [97.6 °F (36.4 °C)-98.7 °F (37.1 °C)] 98.7 °F (37.1 °C)  Pulse:  [56-86] 67  Resp:  [16-20] 18  SpO2:  [91 %-96 %] 94 %  BP: ()/(47-66) 123/65     Weight: 105 kg (231 lb 7.7 oz)  Body mass index is 43.74 kg/m².  No intake or output data in the 24 hours ending 07/24/22 0951   Physical Exam  Vitals and nursing note reviewed.   Constitutional:       Appearance: She is well-developed.   HENT:      Head: Normocephalic and atraumatic.      Nose: Nose normal. No congestion or rhinorrhea.   Eyes:      Pupils: Pupils are equal, round, and reactive to light.   Neck:      Thyroid: No thyromegaly.      Trachea: No tracheal deviation.   Cardiovascular:      Rate and Rhythm: Normal rate and regular rhythm.   Pulmonary:      Effort: No respiratory distress.      Breath sounds: No wheezing or rales.   Abdominal:      General: Bowel sounds are normal. There is no distension.      Palpations: Abdomen is soft.      Tenderness: There is no abdominal tenderness.   Musculoskeletal:         General: No swelling. Normal range of motion.      Cervical back: Normal range of motion and neck supple.      Right lower leg: No edema.      Left lower leg: No edema.   Skin:     General: Skin is warm and dry.      Coloration: Skin is not pale.   Neurological:      Mental Status: She is alert. She is disoriented.       Cranial Nerves: No cranial nerve deficit.      Sensory: No sensory deficit.      Motor: No weakness.   Psychiatric:         Thought Content: Thought content normal.         Judgment: Judgment normal.       Significant Labs: All pertinent labs within the past 24 hours have been reviewed.      Significant Imaging: I have reviewed all pertinent imaging results/findings within the past 24 hours.        Assessment/Plan:      * COVID-19  - COVID-19 testing   - Infection Control notified     - Isolation:   - Airborne, Contact and Droplet Precautions  - Cohort patients into COVID units              - Limit visitors per hospital policy              - Consolidating lab draws, nursing care, provider visits, and interventions      - Management:  Supplemental O2 to maintain SpO2 >92%  Continuous/intermittent Pulse Ox  Albuterol treatment PRN    Advance Care Planning     Remdesevir on HOLD due to GFR< than 30   Started on decadron  6 mg po qd f      Sepsis  This patient does have evidence of infective focus  My overall impression is sepsis. Vital signs were reviewed and noted in progress note.  Antibiotics given-   Antibiotics (From admission, onward)            Start     Stop Route Frequency Ordered    07/24/22 0830  linezolid 600 mg/300 mL IVPB 600 mg         -- IV Every 12 hours (non-standard times) 07/24/22 0729    07/23/22 2100  mupirocin 2 % ointment         07/28 2059 Nasl 2 times daily 07/23/22 1639        Cultures were taken-   Microbiology Results (last 7 days)     Procedure Component Value Units Date/Time    Urine culture [[id number]]  (Abnormal) Collected: 07/22/22 2155    Order Status: Completed Specimen: Urine Updated: 07/24/22 0627     Urine Culture, Routine ENTEROCOCCUS SPECIES  > 100,000 cfu/ml  Identification and susceptibility pending  No other significant isolate      Narrative:      Specimen Source->Urine    Blood culture x two cultures. Draw prior to antibiotics. [[id number]] Collected: 07/22/22 1922     Order Status: Completed Specimen: Blood from Peripheral, Hand, Right Updated: 07/24/22 0614     Blood Culture, Routine No Growth to date      No Growth to date    Narrative:      Aerobic and anaerobic    Blood culture x two cultures. Draw prior to antibiotics. [[id number]] Collected: 07/22/22 1915    Order Status: Completed Specimen: Blood from Peripheral, Wrist, Left Updated: 07/24/22 0614     Blood Culture, Routine No Growth to date      No Growth to date    Narrative:      Aerobic and anaerobic        Latest lactate reviewed, they are-  Recent Labs   Lab 07/22/22 1921 07/23/22  0051   LACTATE 3.3* 1.2       Organ dysfunction indicated by Acute kidney injury  Source- Urine    Source control Achieved by- IVAB      Acute cystitis with hematuria  Urine cx (+) for enteroccocus   IVAB change from rocephin to zyvox       MARCUS (acute kidney injury)  Patient with acute kidney injury likely due to IVVD/dehydration MARCUS is currently stable. Labs reviewed- Renal function/electrolytes with Estimated Creatinine Clearance: 20.9 mL/min (A) (based on SCr of 2.4 mg/dL (H)). according to latest data. Monitor urine output and serial BMP and adjust therapy as needed. Avoid nephrotoxins and renally dose meds for GFR listed above.   Check renal ultrasound  Retroperitonel US  wnl  Improving       Paroxysmal atrial fibrillation  Patient with Long standing persistent (>12 months) atrial fibrillation which is controlled currently with Beta Blocker. Patient is currently in atrial fibrillation.[id number] Score: 3. HASBLED Score: Unable to calculate. Anticoagulation indicated. Anticoagulation done with coumadin.        Pacemaker  Continue telemetry monitoring       Essential hypertension  Hypotension  POA  BP is wnl  HOD ACEi due to marcus     Anticoagulant long-term use  pharmacy consult   INR 5      VTE Risk Mitigation (From admission, onward)         Ordered     warfarin (COUMADIN) PLACEHOLDER DOSE ONLY  Daily         07/23/22 1130     IP VTE  HIGH RISK PATIENT  Once         07/23/22 0320     Place sequential compression device  Until discontinued         07/23/22 0320                Discharge Planning   RENE:      Code Status: Full Code   Is the patient medically ready for discharge?:     Reason for patient still in hospital (select all that apply): Treatment                     Felix Villalobos MD  Department of Hospital Medicine   [facility] ([facility])

## 2022-07-24 NOTE — PLAN OF CARE
[facility] - Telemetry (Hospital)  Initial Discharge Assessment       Primary Care Provider: Primary Doctor No    Admission Diagnosis: Altered mental status [R41.82]  Acute lower UTI [N39.0]  Troponin I above reference range [R77.8]  Severe sepsis [A41.9, R65.20]  Acute renal failure, unspecified acute renal failure type [N17.9]  COVID-19 virus infection [U07.1]    Admission Date: 7/22/2022  Expected Discharge Date:          Payor: MEDICARE / Plan: MEDICARE PART A & B / Product Type: Government /     Extended Emergency Contact Information  Primary Emergency Contact: Amish Post  Mobile Phone: [phone]  Relation: Son  Preferred language: English   needed? No    Discharge Plan A:  (tbd)       No Pharmacies Listed    Initial Assessment (most recent)     Adult Discharge Assessment - 07/24/22 1553        Discharge Assessment    Assessment Type Discharge Planning Assessment     Confirmed/corrected address, phone number and insurance Yes     Confirmed Demographics Correct on Facesheet     Source of Information family     When was your last doctors appointment? --   unknown, son states they have been using hospice as their PCP.    Reason For Admission weakness     Lives With child(solange), adult     Facility Arrived From: Home/ED     Do you have help at home or someone to help you manage your care at home? Yes     Who are your caregiver(s) and their phone number(s)? Kb Wellington     Prior to hospitilization cognitive status: Alert/Oriented     Dressing/Bathing Difficulty bathing difficulty, dependent     Equipment Currently Used at Home hospital bed;wheelchair     Patient currently being followed by outpatient case management? Unable to determine (comments)     Do you currently have service(s) that help you manage your care at home? Yes     Name and Contact number of agency [facility]     Is the pt/caregiver preference to resume services with current agency Yes     Do you take prescription medications? Yes     Do  you have prescription coverage? Yes     Do you have any problems affording any of your prescribed medications? No     Is the patient taking medications as prescribed? yes     Who is going to help you get home at discharge? Son Amish     How do you get to doctors appointments? --   n/a    Are you on dialysis? No     Do you take coumadin? Yes     Who monitors your labs? per son hasn't been monitored in about 6 months     Discharge Plan A --   tbd    DME Needed Upon Discharge  --   tbd    Discharge Plan discussed with: Adult children        Relationship/Environment    Name(s) of Who Lives With Patient Son Amish                 PT current with  [facility] prior to admit.  Per Kb Hutchins, they will like to be re-admitted to their services.  Referral sent via care port.

## 2022-07-24 NOTE — ASSESSMENT & PLAN NOTE
Patient with Long standing persistent (>12 months) atrial fibrillation which is controlled currently with Beta Blocker. Patient is currently in atrial fibrillation.[id number] Score: 3. HASBLED Score: Unable to calculate. Anticoagulation indicated. Anticoagulation done with coumadin.

## 2022-07-24 NOTE — ASSESSMENT & PLAN NOTE
Patient with acute kidney injury likely due to IVVD/dehydration MARCUS is currently stable. Labs reviewed- Renal function/electrolytes with Estimated Creatinine Clearance: 20.9 mL/min (A) (based on SCr of 2.4 mg/dL (H)). according to latest data. Monitor urine output and serial BMP and adjust therapy as needed. Avoid nephrotoxins and renally dose meds for GFR listed above.   Check renal ultrasound  Retroperitonel US  wnl  Improving

## 2022-07-24 NOTE — PLAN OF CARE
Problem: Adult Inpatient Plan of Care  Goal: Plan of Care Review  Outcome: Ongoing, Progressing  Goal: Patient-Specific Goal (Individualized)  Outcome: Ongoing, Progressing  Goal: Absence of Hospital-Acquired Illness or Injury  Outcome: Ongoing, Progressing  Goal: Optimal Comfort and Wellbeing  Outcome: Ongoing, Progressing  Goal: Readiness for Transition of Care  Outcome: Ongoing, Progressing     Problem: Bariatric Environmental Safety  Goal: Safety Maintained with Care  Outcome: Ongoing, Progressing     Problem: Adjustment to Illness (Sepsis/Septic Shock)  Goal: Optimal Coping  Outcome: Ongoing, Progressing     Problem: Bleeding (Sepsis/Septic Shock)  Goal: Absence of Bleeding  Outcome: Ongoing, Progressing     Problem: Glycemic Control Impaired (Sepsis/Septic Shock)  Goal: Blood Glucose Level Within Desired Range  Outcome: Ongoing, Progressing     Problem: Infection Progression (Sepsis/Septic Shock)  Goal: Absence of Infection Signs and Symptoms  Outcome: Ongoing, Progressing     Problem: Nutrition Impaired (Sepsis/Septic Shock)  Goal: Optimal Nutrition Intake  Outcome: Ongoing, Progressing     Problem: Fluid and Electrolyte Imbalance (Acute Kidney Injury/Impairment)  Goal: Fluid and Electrolyte Balance  Outcome: Ongoing, Progressing     Problem: Oral Intake Inadequate (Acute Kidney Injury/Impairment)  Goal: Optimal Nutrition Intake  Outcome: Ongoing, Progressing     Problem: Renal Function Impairment (Acute Kidney Injury/Impairment)  Goal: Effective Renal Function  Outcome: Ongoing, Progressing     Problem: Infection  Goal: Absence of Infection Signs and Symptoms  Outcome: Ongoing, Progressing     Problem: Skin Injury Risk Increased  Goal: Skin Health and Integrity  Outcome: Ongoing, Progressing     Problem: Fall Injury Risk  Goal: Absence of Fall and Fall-Related Injury  Outcome: Ongoing, Progressing

## 2022-07-25 LAB
ALBUMIN SERPL BCP-MCNC: 2.7 G/DL (ref 3.5–5.2)
ALP SERPL-CCNC: 78 U/L (ref 55–135)
ALT SERPL W/O P-5'-P-CCNC: 15 U/L (ref 10–44)
ANION GAP SERPL CALC-SCNC: 9 MMOL/L (ref 8–16)
AST SERPL-CCNC: 31 U/L (ref 10–40)
BACTERIA UR CULT: ABNORMAL
BASOPHILS # BLD AUTO: 0 K/UL (ref 0–0.2)
BASOPHILS NFR BLD: 0 % (ref 0–1.9)
BILIRUB SERPL-MCNC: 0.4 MG/DL (ref 0.1–1)
BUN SERPL-MCNC: 28 MG/DL (ref 8–23)
CALCIUM SERPL-MCNC: 8 MG/DL (ref 8.7–10.5)
CHLORIDE SERPL-SCNC: 106 MMOL/L (ref 95–110)
CO2 SERPL-SCNC: 22 MMOL/L (ref 23–29)
CREAT SERPL-MCNC: 1.7 MG/DL (ref 0.5–1.4)
DIFFERENTIAL METHOD: ABNORMAL
EOSINOPHIL # BLD AUTO: 0 K/UL (ref 0–0.5)
EOSINOPHIL NFR BLD: 0 % (ref 0–8)
ERYTHROCYTE [DISTWIDTH] IN BLOOD BY AUTOMATED COUNT: 13.7 % (ref 11.5–14.5)
EST. GFR  (AFRICAN AMERICAN): 32 ML/MIN/1.73 M^2
EST. GFR  (NON AFRICAN AMERICAN): 28 ML/MIN/1.73 M^2
GLUCOSE SERPL-MCNC: 124 MG/DL (ref 70–110)
HCT VFR BLD AUTO: 36.5 % (ref 37–48.5)
HGB BLD-MCNC: 11.4 G/DL (ref 12–16)
IMM GRANULOCYTES # BLD AUTO: 0.01 K/UL (ref 0–0.04)
IMM GRANULOCYTES NFR BLD AUTO: 0.4 % (ref 0–0.5)
INR PPP: 4 (ref 0.8–1.2)
LYMPHOCYTES # BLD AUTO: 0.8 K/UL (ref 1–4.8)
LYMPHOCYTES NFR BLD: 29.1 % (ref 18–48)
MAGNESIUM SERPL-MCNC: 1.7 MG/DL (ref 1.6–2.6)
MCH RBC QN AUTO: 29.5 PG (ref 27–31)
MCHC RBC AUTO-ENTMCNC: 31.2 G/DL (ref 32–36)
MCV RBC AUTO: 94 FL (ref 82–98)
MONOCYTES # BLD AUTO: 0.5 K/UL (ref 0.3–1)
MONOCYTES NFR BLD: 17.2 % (ref 4–15)
NEUTROPHILS # BLD AUTO: 1.4 K/UL (ref 1.8–7.7)
NEUTROPHILS NFR BLD: 53.3 % (ref 38–73)
NRBC BLD-RTO: 0 /100 WBC
PLATELET # BLD AUTO: 195 K/UL (ref 150–450)
PMV BLD AUTO: 10.8 FL (ref 9.2–12.9)
POTASSIUM SERPL-SCNC: 4.1 MMOL/L (ref 3.5–5.1)
PROCALCITONIN SERPL IA-MCNC: 0.15 NG/ML
PROT SERPL-MCNC: 6.3 G/DL (ref 6–8.4)
PROTHROMBIN TIME: 40.1 SEC (ref 9–12.5)
RBC # BLD AUTO: 3.87 M/UL (ref 4–5.4)
SODIUM SERPL-SCNC: 137 MMOL/L (ref 136–145)
TROPONIN I SERPL DL<=0.01 NG/ML-MCNC: 0.03 NG/ML (ref 0–0.03)
WBC # BLD AUTO: 2.61 K/UL (ref 3.9–12.7)

## 2022-07-25 PROCEDURE — 25000003 PHARM REV CODE 250: Performed by: INTERNAL MEDICINE

## 2022-07-25 PROCEDURE — 85025 COMPLETE CBC W/AUTO DIFF WBC: CPT | Performed by: INTERNAL MEDICINE

## 2022-07-25 PROCEDURE — 94761 N-INVAS EAR/PLS OXIMETRY MLT: CPT

## 2022-07-25 PROCEDURE — 84484 ASSAY OF TROPONIN QUANT: CPT | Performed by: INTERNAL MEDICINE

## 2022-07-25 PROCEDURE — 84145 PROCALCITONIN (PCT): CPT | Performed by: INTERNAL MEDICINE

## 2022-07-25 PROCEDURE — 21400001 HC TELEMETRY ROOM

## 2022-07-25 PROCEDURE — 80053 COMPREHEN METABOLIC PANEL: CPT | Performed by: INTERNAL MEDICINE

## 2022-07-25 PROCEDURE — 27000207 HC ISOLATION

## 2022-07-25 PROCEDURE — 85610 PROTHROMBIN TIME: CPT | Performed by: INTERNAL MEDICINE

## 2022-07-25 PROCEDURE — 83735 ASSAY OF MAGNESIUM: CPT | Performed by: INTERNAL MEDICINE

## 2022-07-25 PROCEDURE — 36415 COLL VENOUS BLD VENIPUNCTURE: CPT | Performed by: INTERNAL MEDICINE

## 2022-07-25 PROCEDURE — 63600175 PHARM REV CODE 636 W HCPCS: Performed by: INTERNAL MEDICINE

## 2022-07-25 PROCEDURE — 27000221 HC OXYGEN, UP TO 24 HOURS

## 2022-07-25 RX ORDER — SODIUM CHLORIDE 9 MG/ML
INJECTION, SOLUTION INTRAVENOUS
Status: DISCONTINUED | OUTPATIENT
Start: 2022-07-25 | End: 2022-07-26 | Stop reason: HOSPADM

## 2022-07-25 RX ORDER — MAGNESIUM SULFATE HEPTAHYDRATE 40 MG/ML
2 INJECTION, SOLUTION INTRAVENOUS ONCE
Status: COMPLETED | OUTPATIENT
Start: 2022-07-25 | End: 2022-07-25

## 2022-07-25 RX ADMIN — SODIUM CHLORIDE: 0.9 INJECTION, SOLUTION INTRAVENOUS at 02:07

## 2022-07-25 RX ADMIN — DEXAMETHASONE 6 MG: 4 TABLET ORAL at 08:07

## 2022-07-25 RX ADMIN — ARIPIPRAZOLE 5 MG: 5 TABLET ORAL at 08:07

## 2022-07-25 RX ADMIN — LINEZOLID 600 MG: 600 INJECTION, SOLUTION INTRAVENOUS at 08:07

## 2022-07-25 RX ADMIN — SODIUM CHLORIDE: 0.9 INJECTION, SOLUTION INTRAVENOUS at 12:07

## 2022-07-25 RX ADMIN — MUPIROCIN: 20 OINTMENT TOPICAL at 08:07

## 2022-07-25 RX ADMIN — METOPROLOL TARTRATE 12.5 MG: 25 TABLET, FILM COATED ORAL at 08:07

## 2022-07-25 RX ADMIN — LINEZOLID 600 MG: 600 INJECTION, SOLUTION INTRAVENOUS at 09:07

## 2022-07-25 RX ADMIN — MAGNESIUM SULFATE HEPTAHYDRATE 2 G: 40 INJECTION, SOLUTION INTRAVENOUS at 02:07

## 2022-07-25 NOTE — ASSESSMENT & PLAN NOTE
Patient with Long standing persistent (>12 months) atrial fibrillation which is controlled currently with Beta Blocker. Patient is currently in atrial fibrillation.[id number] Score: 3. HASBLED Score: Unable to calculate. Anticoagulation indicated. Anticoagulation done with coumadin.

## 2022-07-25 NOTE — PLAN OF CARE
Problem: Adult Inpatient Plan of Care  Goal: Patient-Specific Goal (Individualized)  Outcome: Ongoing, Progressing     Pt AAO   Paced on tele   Blood pressures stable   Good urinary out put   IV antibx continued for uti   IVF continues dc'd due to swelling increase upon MD exam   Pts son wants to hold of on remdesivir infusion until the pts kidney function improves   Pt transferred to specialty bed   Pt refuses to turn

## 2022-07-25 NOTE — PLAN OF CARE
Aox4. Able to verbalize needs & follow commands. Calm & cooperative throughout shift.   POC reviewed with pt & son. Interventions implemented as appropriate.    VSS; Paced rhythm w/ BBB & PVCs on tele-monitor.  On 2L NC. Respiratory treatments as ordered.    BLAKE PICC; saline locked. NS infusing at 75 mL/hr. Integrity maintained.  Receiving IV abx. No adverse reactions noted.  On Coumadin. Bleeding precautions.  Moisture associated redness to groin noted. Citric skin barrier cream applied.    No c/o pain.    Incontinence pad changed as needed. External female catheter in place.   Bed bound. Activity ad stephen. Frequent position changes encouraged. Turned q2h.  Educated on s/sx of pressure injury;  verbalized understanding.  NADN. Resting quietly in bed.   Free of falls. Hourly rounding complete.   All safety measures remain in place. SR up x2; bed low & locked. Bed alarm on & audible. Call light w/in reach.   Will continue to monitor throughout shift.

## 2022-07-25 NOTE — ASSESSMENT & PLAN NOTE
This patient does have evidence of infective focus  My overall impression is sepsis. Vital signs were reviewed and noted in progress note.  Antibiotics given-   Antibiotics (From admission, onward)            Start     Stop Route Frequency Ordered    07/24/22 0830  linezolid 600 mg/300 mL IVPB 600 mg         -- IV Every 12 hours (non-standard times) 07/24/22 0729    07/23/22 2100  mupirocin 2 % ointment         07/28 2059 Nasl 2 times daily 07/23/22 1639        Cultures were taken-   Microbiology Results (last 7 days)     Procedure Component Value Units Date/Time    Blood culture x two cultures. Draw prior to antibiotics. [[id number]] Collected: 07/22/22 1915    Order Status: Completed Specimen: Blood from Peripheral, Wrist, Left Updated: 07/25/22 0613     Blood Culture, Routine No Growth to date      No Growth to date      No Growth to date    Narrative:      Aerobic and anaerobic    Blood culture x two cultures. Draw prior to antibiotics. [[id number]] Collected: 07/22/22 1922    Order Status: Completed Specimen: Blood from Peripheral, Hand, Right Updated: 07/25/22 0613     Blood Culture, Routine No Growth to date      No Growth to date      No Growth to date    Narrative:      Aerobic and anaerobic    Urine culture [[id number]]  (Abnormal) Collected: 07/22/22 2155    Order Status: Completed Specimen: Urine Updated: 07/24/22 0627     Urine Culture, Routine ENTEROCOCCUS SPECIES  > 100,000 cfu/ml  Identification and susceptibility pending  No other significant isolate      Narrative:      Specimen Source->Urine        Latest lactate reviewed, they are-  Recent Labs   Lab 07/22/22 1921 07/23/22  0051   LACTATE 3.3* 1.2       Organ dysfunction indicated by Acute kidney injury  Source- Urine    Source control Achieved by- IVAB

## 2022-07-25 NOTE — PROGRESS NOTES
Pharmacy Consult Note: Warfarin     Serena Post 's Coumadin will be dosed and monitored by Pharmacy.      Target INR goal is 2.5-3.5    INR   Date Value Ref Range Status   07/25/2022 4.0 (H) 0.8 - 1.2 Final     Comment:     Coumadin Therapy:  2.0 - 3.0 for INR for all indicators except mechanical heart valves  and antiphospholipid syndromes which should use 2.5 - 3.5.         Indication: paroxysmal atrial fibrillation, s/p Aortic valve replacement w/ a mechanical valve, history of DVT    Home dose: 1.25 mg every Tues, Thurs, Sun  2.5 mg on all other days    Doses will be HELD due to supra-therapeutic INR.     Acetaminophen, ceftriaxone, zyvox, & decadron may affect INR and increase the risk of bleeding.     PT/INR will be monitored daily. Dose adjustments will be made accordingly.      Thank you for allowing us to participate in this patient's care.     Ade Mari 7/25/2022 9:39 AM

## 2022-07-25 NOTE — SUBJECTIVE & OBJECTIVE
Interval History:     Review of Systems   Constitutional:  Positive for activity change and fatigue.   HENT: Negative.     Eyes: Negative.    Respiratory:  Positive for shortness of breath.    Cardiovascular: Negative.    Gastrointestinal: Negative.    Endocrine: Negative.    Genitourinary: Negative.    Musculoskeletal: Negative.    Allergic/Immunologic: Negative.    Neurological: Negative.    Hematological: Negative.    Psychiatric/Behavioral: Negative.     Objective:     Vital Signs (Most Recent):  Temp: 98.6 °F (37 °C) (07/25/22 1133)  Pulse: 69 (07/25/22 1133)  Resp: 20 (07/25/22 1133)  BP: 137/70 (07/25/22 1133)  SpO2: 95 % (07/25/22 1133) Vital Signs (24h Range):  Temp:  [97.6 °F (36.4 °C)-98.6 °F (37 °C)] 98.6 °F (37 °C)  Pulse:  [57-77] 69  Resp:  [18-22] 20  SpO2:  [93 %-95 %] 95 %  BP: (119-137)/(62-74) 137/70     Weight: 110.3 kg (243 lb 2.7 oz)  Body mass index is 45.95 kg/m².    Intake/Output Summary (Last 24 hours) at 7/25/2022 1142  Last data filed at 7/25/2022 0600  Gross per 24 hour   Intake --   Output 702 ml   Net -702 ml      Physical Exam  Vitals and nursing note reviewed.   Constitutional:       Appearance: She is well-developed.   HENT:      Head: Normocephalic and atraumatic.      Nose: Nose normal. No congestion or rhinorrhea.   Eyes:      Pupils: Pupils are equal, round, and reactive to light.   Neck:      Thyroid: No thyromegaly.      Trachea: No tracheal deviation.   Cardiovascular:      Rate and Rhythm: Normal rate and regular rhythm.   Pulmonary:      Effort: No respiratory distress.      Breath sounds: No wheezing or rales.   Abdominal:      General: Bowel sounds are normal. There is no distension.      Palpations: Abdomen is soft.      Tenderness: There is no abdominal tenderness.   Musculoskeletal:         General: No swelling. Normal range of motion.      Cervical back: Normal range of motion and neck supple.      Right lower leg: No edema.      Left lower leg: No edema.   Skin:      General: Skin is warm and dry.      Coloration: Skin is not pale.   Neurological:      General: No focal deficit present.      Mental Status: She is alert and oriented to person, place, and time.      Cranial Nerves: No cranial nerve deficit.      Sensory: No sensory deficit.      Motor: No weakness.   Psychiatric:         Thought Content: Thought content normal.         Judgment: Judgment normal.       Significant Labs: All pertinent labs within the past 24 hours have been reviewed.      Significant Imaging: I have reviewed all pertinent imaging results/findings within the past 24 hours.

## 2022-07-25 NOTE — ASSESSMENT & PLAN NOTE
- COVID-19 testing   - Infection Control notified     - Isolation:   - Airborne, Contact and Droplet Precautions  - Cohort patients into COVID units              - Limit visitors per hospital policy              - Consolidating lab draws, nursing care, provider visits, and interventions      - Management:  Supplemental O2 to maintain SpO2 >92%  Continuous/intermittent Pulse Ox  Albuterol treatment PRN    Advance Care Planning     Remdesevir on HOLD due to GFR< than 30   Cont  on decadron  6 mg po qd f

## 2022-07-25 NOTE — PROGRESS NOTES
[facility] - Telemetry ([facility] Medicine  Progress Note    Patient Name: Serena Post  MRN: [id number]  Patient Class: IP- Inpatient   Admission Date: 7/22/2022  Length of Stay: 3 days  Attending Physician: Felix Villalobos, *  Primary Care Provider: Primary Doctor No        Subjective:     Principal Problem:COVID-19        HPI:  History was taken from electronic chart . Patient did not provide much history and son is at bed side. History is very limited   82 year old woman with  PMHx of A-fib on coumadin , HTN, and CHF who presents to the Emergency Department for evaluation of weakness over the last week . There is associated history of cough and fever . She did not take the Covid vaccine and son says she will never get the vaccine. Patient denies any blood in stool, nausea,vomiting   and all other sxs at this time.  Labs and imaging test reviwed .  Covid assay - positive   Procal -0.3  Troponin-0.07  Component      Latest Ref Rng & Units 7/23/2022 7/22/2022              Lactate, Rico      0.5 - 2.2 mmol/L 1.2 3.3 (H)   Serum creatinine - 4.8  Chest x-ray -No definite evidence for pneumonia.Mild perihilar interstitial opacities.  ED course-  She was  initially hypotensive with SBP in the 60s but now improved post resuscitation..        Overview/Hospital Course:  83 y/o WF with a PMHx of Aortic mechanical valve , HTN , Afib on coumadin and CHF admitted with a Dx of UTI, MARCUS ,  COVID-19 infection and metabolic encephalopathy . She is on 4 lt by nasal canula . Started on decadron 6 mg po qd . Remdesevir on hold due to MARCUS  And GFR< 30 . Cont IVF and IVAB .  Pharmacy consulted  For coumadin management . Tx reviewed   7/24 The urine cx is (+) for enterococus  and IVAB changed from rocephin to zyvox . The kidney function is improving . The Retroperitoneal US did not show any acute finding .   7/25 She is more alert and awake today . She is tolerating  po intake . The kidney  function is improving .   Remdesevir on Hold due to GFR < 30 .  Case D/W Pt and son . Urine cx (+) for enterococcus pending final result .       Interval History:     Review of Systems   Constitutional:  Positive for activity change and fatigue.   HENT: Negative.     Eyes: Negative.    Respiratory:  Positive for shortness of breath.    Cardiovascular: Negative.    Gastrointestinal: Negative.    Endocrine: Negative.    Genitourinary: Negative.    Musculoskeletal: Negative.    Allergic/Immunologic: Negative.    Neurological: Negative.    Hematological: Negative.    Psychiatric/Behavioral: Negative.     Objective:     Vital Signs (Most Recent):  Temp: 98.6 °F (37 °C) (07/25/22 1133)  Pulse: 69 (07/25/22 1133)  Resp: 20 (07/25/22 1133)  BP: 137/70 (07/25/22 1133)  SpO2: 95 % (07/25/22 1133) Vital Signs (24h Range):  Temp:  [97.6 °F (36.4 °C)-98.6 °F (37 °C)] 98.6 °F (37 °C)  Pulse:  [57-77] 69  Resp:  [18-22] 20  SpO2:  [93 %-95 %] 95 %  BP: (119-137)/(62-74) 137/70     Weight: 110.3 kg (243 lb 2.7 oz)  Body mass index is 45.95 kg/m².    Intake/Output Summary (Last 24 hours) at 7/25/2022 1142  Last data filed at 7/25/2022 0600  Gross per 24 hour   Intake --   Output 702 ml   Net -702 ml      Physical Exam  Vitals and nursing note reviewed.   Constitutional:       Appearance: She is well-developed.   HENT:      Head: Normocephalic and atraumatic.      Nose: Nose normal. No congestion or rhinorrhea.   Eyes:      Pupils: Pupils are equal, round, and reactive to light.   Neck:      Thyroid: No thyromegaly.      Trachea: No tracheal deviation.   Cardiovascular:      Rate and Rhythm: Normal rate and regular rhythm.   Pulmonary:      Effort: No respiratory distress.      Breath sounds: No wheezing or rales.   Abdominal:      General: Bowel sounds are normal. There is no distension.      Palpations: Abdomen is soft.      Tenderness: There is no abdominal tenderness.   Musculoskeletal:         General: No swelling. Normal range of motion.      Cervical  back: Normal range of motion and neck supple.      Right lower leg: No edema.      Left lower leg: No edema.   Skin:     General: Skin is warm and dry.      Coloration: Skin is not pale.   Neurological:      General: No focal deficit present.      Mental Status: She is alert and oriented to person, place, and time.      Cranial Nerves: No cranial nerve deficit.      Sensory: No sensory deficit.      Motor: No weakness.   Psychiatric:         Thought Content: Thought content normal.         Judgment: Judgment normal.       Significant Labs: All pertinent labs within the past 24 hours have been reviewed.      Significant Imaging: I have reviewed all pertinent imaging results/findings within the past 24 hours.        Assessment/Plan:      * COVID-19  - COVID-19 testing   - Infection Control notified     - Isolation:   - Airborne, Contact and Droplet Precautions  - Cohort patients into COVID units              - Limit visitors per hospital policy              - Consolidating lab draws, nursing care, provider visits, and interventions      - Management:  Supplemental O2 to maintain SpO2 >92%  Continuous/intermittent Pulse Ox  Albuterol treatment PRN    Advance Care Planning     Remdesevir on HOLD due to GFR< than 30   Cont  on decadron  6 mg po qd f      Sepsis  This patient does have evidence of infective focus  My overall impression is sepsis. Vital signs were reviewed and noted in progress note.  Antibiotics given-   Antibiotics (From admission, onward)            Start     Stop Route Frequency Ordered    07/24/22 0830  linezolid 600 mg/300 mL IVPB 600 mg         -- IV Every 12 hours (non-standard times) 07/24/22 0729    07/23/22 2100  mupirocin 2 % ointment         07/28 2059 Nasl 2 times daily 07/23/22 1639        Cultures were taken-   Microbiology Results (last 7 days)     Procedure Component Value Units Date/Time    Blood culture x two cultures. Draw prior to antibiotics. [[id number]] Collected: 07/22/22 4265     Order Status: Completed Specimen: Blood from Peripheral, Wrist, Left Updated: 07/25/22 0613     Blood Culture, Routine No Growth to date      No Growth to date      No Growth to date    Narrative:      Aerobic and anaerobic    Blood culture x two cultures. Draw prior to antibiotics. [[id number]] Collected: 07/22/22 1922    Order Status: Completed Specimen: Blood from Peripheral, Hand, Right Updated: 07/25/22 0613     Blood Culture, Routine No Growth to date      No Growth to date      No Growth to date    Narrative:      Aerobic and anaerobic    Urine culture [[id number]]  (Abnormal) Collected: 07/22/22 2155    Order Status: Completed Specimen: Urine Updated: 07/24/22 0627     Urine Culture, Routine ENTEROCOCCUS SPECIES  > 100,000 cfu/ml  Identification and susceptibility pending  No other significant isolate      Narrative:      Specimen Source->Urine        Latest lactate reviewed, they are-  Recent Labs   Lab 07/22/22 1921 07/23/22  0051   LACTATE 3.3* 1.2       Organ dysfunction indicated by Acute kidney injury  Source- Urine    Source control Achieved by- IVAB      Acute cystitis with hematuria  Urine cx (+) for enteroccocus   IVAB change from rocephin to zyvox       MARCUS (acute kidney injury)  Patient with acute kidney injury likely due to IVVD/dehydration MARCUS is currently stable. Labs reviewed- Renal function/electrolytes with Estimated Creatinine Clearance: 30.3 mL/min (A) (based on SCr of 1.7 mg/dL (H)). according to latest data. Monitor urine output and serial BMP and adjust therapy as needed. Avoid nephrotoxins and renally dose meds for GFR listed above.   Check renal ultrasound  Retroperitonel US  wnl  Improving       Paroxysmal atrial fibrillation  Patient with Long standing persistent (>12 months) atrial fibrillation which is controlled currently with Beta Blocker. Patient is currently in atrial fibrillation.[id number] Score: 3. HASBLED Score: Unable to calculate. Anticoagulation indicated.  Anticoagulation done with coumadin.        Pacemaker  Continue telemetry monitoring       Essential hypertension  Hypotension  POA  BP is wnl  HOD ACEi due to andreea     Anticoagulant long-term use  pharmacy consult   INR 4      VTE Risk Mitigation (From admission, onward)         Ordered     warfarin (COUMADIN) PLACEHOLDER DOSE ONLY  Daily         07/23/22 1130     IP VTE HIGH RISK PATIENT  Once         07/23/22 0320     Place sequential compression device  Until discontinued         07/23/22 0320                Discharge Planning   RENE:      Code Status: Full Code   Is the patient medically ready for discharge?:     Reason for patient still in hospital (select all that apply): Treatment  Discharge Plan A:  (tbd)                  Felxi Villalobos MD  Department of Hospital Medicine   [facility] - Telemetry ([facility])

## 2022-07-25 NOTE — ASSESSMENT & PLAN NOTE
Patient with acute kidney injury likely due to IVVD/dehydration MARCUS is currently stable. Labs reviewed- Renal function/electrolytes with Estimated Creatinine Clearance: 30.3 mL/min (A) (based on SCr of 1.7 mg/dL (H)). according to latest data. Monitor urine output and serial BMP and adjust therapy as needed. Avoid nephrotoxins and renally dose meds for GFR listed above.   Check renal ultrasound  Retroperitonel US  wnl  Improving

## 2022-07-25 NOTE — PROGRESS NOTES
Notified MD of pts 11 beat run of [email or web address]   Mag rider ordered   Continue to monitor cardiac monitoring

## 2022-07-25 NOTE — PROGRESS NOTES
Pt refusing to turn, educated and encouraged pt multiple times on the purpose of turning and it benefits     Pt refused to turn all shift     Pt agreeable to specialty bed   Transferred pt into a specialty bed

## 2022-07-26 VITALS
TEMPERATURE: 98 F | WEIGHT: 243.19 LBS | BODY MASS INDEX: 45.91 KG/M2 | DIASTOLIC BLOOD PRESSURE: 86 MMHG | OXYGEN SATURATION: 94 % | SYSTOLIC BLOOD PRESSURE: 134 MMHG | RESPIRATION RATE: 18 BRPM | HEIGHT: 61 IN | HEART RATE: 67 BPM

## 2022-07-26 LAB
ANION GAP SERPL CALC-SCNC: 10 MMOL/L (ref 8–16)
BUN SERPL-MCNC: 24 MG/DL (ref 8–23)
CALCIUM SERPL-MCNC: 8.1 MG/DL (ref 8.7–10.5)
CHLORIDE SERPL-SCNC: 106 MMOL/L (ref 95–110)
CO2 SERPL-SCNC: 20 MMOL/L (ref 23–29)
CREAT SERPL-MCNC: 1.4 MG/DL (ref 0.5–1.4)
EST. GFR  (AFRICAN AMERICAN): 41 ML/MIN/1.73 M^2
EST. GFR  (NON AFRICAN AMERICAN): 36 ML/MIN/1.73 M^2
GLUCOSE SERPL-MCNC: 113 MG/DL (ref 70–110)
INR PPP: 3.2 (ref 0.8–1.2)
POTASSIUM SERPL-SCNC: 4.2 MMOL/L (ref 3.5–5.1)
PROTHROMBIN TIME: 32.2 SEC (ref 9–12.5)
SODIUM SERPL-SCNC: 136 MMOL/L (ref 136–145)

## 2022-07-26 PROCEDURE — 36415 COLL VENOUS BLD VENIPUNCTURE: CPT | Performed by: NURSE PRACTITIONER

## 2022-07-26 PROCEDURE — 63600175 PHARM REV CODE 636 W HCPCS: Performed by: INTERNAL MEDICINE

## 2022-07-26 PROCEDURE — 25000003 PHARM REV CODE 250: Performed by: INTERNAL MEDICINE

## 2022-07-26 PROCEDURE — 36415 COLL VENOUS BLD VENIPUNCTURE: CPT | Performed by: INTERNAL MEDICINE

## 2022-07-26 PROCEDURE — 85610 PROTHROMBIN TIME: CPT | Performed by: INTERNAL MEDICINE

## 2022-07-26 PROCEDURE — 27000221 HC OXYGEN, UP TO 24 HOURS

## 2022-07-26 PROCEDURE — 94761 N-INVAS EAR/PLS OXIMETRY MLT: CPT

## 2022-07-26 PROCEDURE — 80048 BASIC METABOLIC PNL TOTAL CA: CPT | Performed by: NURSE PRACTITIONER

## 2022-07-26 RX ORDER — WARFARIN 2.5 MG/1
2.5 TABLET ORAL
Status: DISCONTINUED | OUTPATIENT
Start: 2022-07-27 | End: 2022-07-26 | Stop reason: HOSPADM

## 2022-07-26 RX ORDER — LINEZOLID 600 MG/1
600 TABLET, FILM COATED ORAL EVERY 12 HOURS
Qty: 8 TABLET | Refills: 0 | Status: SHIPPED | OUTPATIENT
Start: 2022-07-26 | End: 2022-07-31

## 2022-07-26 RX ORDER — DEXAMETHASONE 6 MG/1
6 TABLET ORAL DAILY
Qty: 7 TABLET | Refills: 0 | Status: SHIPPED | OUTPATIENT
Start: 2022-07-27 | End: 2022-07-31

## 2022-07-26 RX ADMIN — ARIPIPRAZOLE 5 MG: 5 TABLET ORAL at 09:07

## 2022-07-26 RX ADMIN — WARFARIN SODIUM 1.25 MG: 2.5 TABLET ORAL at 05:07

## 2022-07-26 RX ADMIN — LINEZOLID 600 MG: 600 INJECTION, SOLUTION INTRAVENOUS at 09:07

## 2022-07-26 RX ADMIN — DEXAMETHASONE 6 MG: 4 TABLET ORAL at 09:07

## 2022-07-26 RX ADMIN — MUPIROCIN: 20 OINTMENT TOPICAL at 09:07

## 2022-07-26 RX ADMIN — METOPROLOL TARTRATE 12.5 MG: 25 TABLET, FILM COATED ORAL at 09:07

## 2022-07-26 NOTE — PROGRESS NOTES
Pharmacy Consult Note: Warfarin     Serena Post 's Coumadin will be dosed and monitored by Pharmacy.      Target INR goal is 2.5-3.5    INR   Date Value Ref Range Status   07/26/2022 3.2 (H) 0.8 - 1.2 Final     Comment:     Coumadin Therapy:  2.0 - 3.0 for INR for all indicators except mechanical heart valves  and antiphospholipid syndromes which should use 2.5 - 3.5.         Indication: paroxysmal atrial fibrillation, s/p Aortic valve replacement w/ a mechanical valve, history of DVT  Home dose: 1.25 mg every Tues, Thurs, Sun  2.5 mg on all other days    Patient will be continued on home regimen.      Dose for Today: 1.25 mg     DDI: Acetaminophen, zyvox, & decadron may affect INR and increase the risk of bleeding.     PT/INR will be monitored daily. Dose adjustments will be made accordingly.      Thank you for allowing us to participate in this patient's care.     Ade Mari 7/26/2022 9:30 AM

## 2022-07-26 NOTE — PLAN OF CARE
Home Oxygen Evaluation    Date Performed: 7/26/2022    1) Patient's Home O2 Sat on room air, while at rest: 88        If O2 sats on room air at rest are 88% or below, patient qualifies. No additional testing needed. Document N/A in steps 2 and 3. If 89% or above, complete steps 2.      2) Patient's O2 Sat on room air while exercising: Pt uable to walk      If O2 sats on room air while exercising remain 89% or above patient does not qualify, no further testing needed Document N/A in step 3. If O2 sats on room air while exercising are 88% or below, continue to step 3.      3) Patient's O2 Sat while exercising on O2: 94 at 2 LPM         (Must show improvement from #2 for patients to qualify)    If O2 sats improve on oxygen, patient qualifies for portable oxygen. If not, the patient does not qualify.

## 2022-07-26 NOTE — PLAN OF CARE
[facility] - Telemetry (Hospital)  Discharge Final Note    Primary Care Provider: Primary Doctor No    Expected Discharge Date: 7/26/2022    Final Discharge Note (most recent)     Final Note - 07/26/22 1703        Final Note    Assessment Type Final Discharge Note     Anticipated Discharge Disposition Hospice/Home        Post-Acute Status    Post-Acute Authorization Hospice     Hospice Status Referrals Sent   [facility]    Discharge Delays None known at this time                 Important Message from Medicare

## 2022-07-26 NOTE — PLAN OF CARE
Aox4. Able to verbalize needs & follow commands. Calm & cooperative throughout shift.   POC reviewed with pt. Interventions implemented as appropriate.    VSS; Paced rhythm w/ BBB & PVCs on tele-monitor.  On 2L NC. Respiratory treatments as ordered.    BLAKE PICC; saline locked. Integrity maintained.  Receiving IV abx. No adverse reactions noted.  On Coumadin. Bleeding precautions.  Moisture associated redness to groin noted. Citric skin barrier cream applied.    No c/o pain.    Incontinence pad changed as needed. External female catheter in place.   Bed bound. Activity ad stephen. Frequent position changes encouraged. Turned q2h.  Educated on s/sx of pressure injury;  verbalized understanding. Placed on bariatric bed during day shift.  NADN. Resting quietly in bed.   Free of falls. Hourly rounding complete.   All safety measures remain in place. SR up x2; bed low & locked. Call light w/in reach.   Will continue to monitor throughout shift.

## 2022-07-26 NOTE — NURSING
Telemetry monitor removed and returned to monitor tech.  Discharge instructions reviewed with patient including discharge medications, follow-up appointments, diet, and activity restrictions. LUE PICC line removed. Medication w/pt's son. Son at bedside w/ home portable O2 tank and personal wheelchair. Patient and family member verbalizes understanding and voices no concerns.  Discharged home via wheelchair to private vehicle in no acute distress.  Kaylee Lees RN  7/26/2022 6:07 PM

## 2022-07-26 NOTE — PLAN OF CARE
Spoke with son, Cuong.  They wish to resign with [facility].  Son is bringing portable 02 tank to the hospital for discharge.    Spoke with [facility].  Faxed discharge order and requested clinicals to [facility].  They will re-evaluate patient for admission.  Advised of patient needing home 02.  They will obtain home 02 for the patient if she does not meet criteria for re-admission.

## 2022-07-27 NOTE — DISCHARGE SUMMARY
[facility] - Telemetry ([facility]  [facility] Medicine  Discharge Summary      Patient Name: Serena Post  MRN: [id number]  Patient Class: IP- Inpatient  Admission Date: 7/22/2022  Hospital Length of Stay: 4 days  Discharge Date and Time:  07/27/2022 3:34 PM  Attending Physician: No att. providers found   Discharging Provider: Vicky Benites NP  Primary Care Provider: Primary Doctor Joslyn      HPI:   History was taken from electronic chart . Patient did not provide much history and son is at bed side. History is very limited   82 year old woman with  PMHx of A-fib on coumadin , HTN, and CHF who presents to the Emergency Department for evaluation of weakness over the last week . There is associated history of cough and fever . She did not take the Covid vaccine and son says she will never get the vaccine. Patient denies any blood in stool, nausea,vomiting   and all other sxs at this time.  Labs and imaging test reviwed .  Covid assay - positive   Procal -0.3  Troponin-0.07  Component      Latest Ref Rng & Units 7/23/2022 7/22/2022              Lactate, Rico      0.5 - 2.2 mmol/L 1.2 3.3 (H)   Serum creatinine - 4.8  Chest x-ray -No definite evidence for pneumonia.Mild perihilar interstitial opacities.  ED course-  She was  initially hypotensive with SBP in the 60s but now improved post resuscitation..        * No surgery found *      Hospital Course:   81 y/o WF with a PMHx of Aortic mechanical valve , HTN , Afib on coumadin and CHF admitted with a Dx of UTI, MARCUS ,  COVID-19 infection and metabolic encephalopathy . She is on 4 lt by nasal canula . Started on decadron 6 mg po qd . Remdesevir on hold due to MARCUS  And GFR< 30 . Cont IVF and IVAB .  Pharmacy consulted  For coumadin management . Tx reviewed   7/24 The urine cx is (+) for enterococus  and IVAB changed from rocephin to zyvox . The kidney function is improving . The Retroperitoneal US did not show any acute finding .   7/25 She is more alert and awake  today . She is tolerating  po intake . The kidney  function is improving .  Remdesevir on Hold due to GFR < 30 .  Case D/W Pt and son . Urine cx (+) for enterococcus pending final result .   7/26/22-treat Enterococcus UTI with Zyvox. She qualifies for supplemental oxygen per desat study. She has oxygen already with [facility] hospice. She will resume hospice care once discharged. She will continue dexamethasone for total of 10 days. She is stable for discharge.       Goals of Care Treatment Preferences:  Code Status: Full Code      Consults:     No new Assessment & Plan notes have been filed under this hospital service since the last note was generated.  Service: Hospital Medicine    Final Active Diagnoses:    Diagnosis Date Noted POA    PRINCIPAL PROBLEM:  COVID-19 [U07.1] 07/23/2022 Yes    MARCUS (acute kidney injury) [N17.9] 07/23/2022 Yes    Acute cystitis with hematuria [N30.01] 07/23/2022 Yes    Sepsis [A41.9] 07/23/2022 Yes    Anticoagulant long-term use [Z79.01] 09/26/2019 Not Applicable    Essential hypertension [I10] 09/26/2019 Yes    Pacemaker [Z95.0] 09/26/2019 Yes    Paroxysmal atrial fibrillation [I48.0] 09/26/2019 Yes      Problems Resolved During this Admission:       Discharged Condition: stable    Disposition: Hospice/Home    Follow Up:    Patient Instructions:   No discharge procedures on file.    Significant Diagnostic Studies: Labs:   [facility]   Recent Labs   Lab 07/26/22  0932      K 4.2      CO2 20*   *   BUN 24*   CREATININE 1.4   CALCIUM 8.1*   ANIONGAP 10   ESTGFRAFRICA 41*   EGFRNONAA 36*    and CBC No results for input(s): WBC, HGB, HCT, PLT in the last 48 hours.    Pending Diagnostic Studies:     None         Medications:  Reconciled Home Medications:      Medication List      START taking these medications    dexAMETHasone 6 MG tablet  Commonly known as: DECADRON  Take 1 tablet (6 mg total) by mouth once daily.  for 7 more days     linezolid 600 mg Tab  Commonly known  as: ZYVOX  Take 1 tablet (600 mg total) by mouth every 12 (twelve) hours. for 4 days        CONTINUE taking these medications    lisinopriL 10 MG tablet  Take 10 mg by mouth.     metoprolol tartrate 25 MG tablet  Commonly known as: LOPRESSOR  Take 25 mg by mouth.     traMADoL 50 mg tablet  Commonly known as: ULTRAM  Take 50 mg by mouth.     triamterene-hydrochlorothiazide 37.5-25 mg 37.5-25 mg per tablet  Commonly known as: MAXZIDE-25  Take 1 tablet by mouth every other day.     warfarin 2.5 MG tablet  Commonly known as: COUMADIN  5 mg.        STOP taking these medications    ciprofloxacin HCl 500 MG tablet  Commonly known as: CIPRO            Indwelling Lines/Drains at time of discharge:   Lines/Drains/Airways     None                 Time spent on the discharge of patient: >35 minutes         Vicky Benites NP  Department of Hospital Medicine  [facility] - Telemetry ([facility])

## 2022-07-27 NOTE — PHYSICIAN QUERY
PT Name: Serena Post  MR #: [id number]    DOCUMENTATION CLARIFICATION     CDS/: Maagli Mosley RN CCDS              Contact information:[email or web address]    This form is a permanent document in the medical record.     Query Date: July 27, 2022    By submitting this query, we are merely seeking further clarification of documentation. Please utilize your independent clinical judgment when addressing the question(s) below.    Supporting Clinical Findings Location in Medical Record     Sepsis  Related to UTI, will follow blood and urine culture  Start Rocephin      Acute cystitis with hematuria  Will follow urine cultures, start Rocephin   H&P     The urine cx is (+) for enterococus  and IVAB changed from rocephin to zyvox .   Hospital medicine PN 7/25       Provider, please clarify if there is any clinical correlation between __Enterococcus_______ and ___Sepsis________.           Are the conditions:      [  x] Due to or associated with each other   [  ] Unrelated to each other   [  ] Other explanation (Please Specify): ______________   [  ] Clinically Undetermined                                                                               Please document in your progress notes daily for the duration of treatment until resolved and include in your discharge summary.

## 2022-07-28 LAB
BACTERIA BLD CULT: NORMAL
BACTERIA BLD CULT: NORMAL

## 2022-07-31 ENCOUNTER — HOSPITAL ENCOUNTER (OUTPATIENT)
Facility: HOSPITAL | Age: 82
Discharge: HOSPICE | End: 2022-08-03
Attending: EMERGENCY MEDICINE | Admitting: INTERNAL MEDICINE
Payer: MEDICARE

## 2022-07-31 DIAGNOSIS — R53.1 WEAKNESS: ICD-10-CM

## 2022-07-31 DIAGNOSIS — N17.9 AKI (ACUTE KIDNEY INJURY): Primary | ICD-10-CM

## 2022-07-31 DIAGNOSIS — R07.9 CHEST PAIN: ICD-10-CM

## 2022-07-31 DIAGNOSIS — R10.10 PAIN OF UPPER ABDOMEN: ICD-10-CM

## 2022-07-31 DIAGNOSIS — U07.1 LAB TEST POSITIVE FOR DETECTION OF COVID-19 VIRUS: ICD-10-CM

## 2022-07-31 DIAGNOSIS — N28.9 ACUTE RENAL INSUFFICIENCY: ICD-10-CM

## 2022-07-31 DIAGNOSIS — G93.40 ENCEPHALOPATHY: ICD-10-CM

## 2022-07-31 DIAGNOSIS — R09.02 HYPOXIA: ICD-10-CM

## 2022-07-31 DIAGNOSIS — R19.7 INTRACTABLE DIARRHEA: ICD-10-CM

## 2022-07-31 PROBLEM — R41.82 AMS (ALTERED MENTAL STATUS): Status: ACTIVE | Noted: 2022-07-31

## 2022-07-31 PROBLEM — R74.8 ELEVATED LIPASE: Status: ACTIVE | Noted: 2022-07-31

## 2022-07-31 PROBLEM — A41.9 SEPSIS: Status: RESOLVED | Noted: 2022-07-23 | Resolved: 2022-07-31

## 2022-07-31 LAB
ALBUMIN SERPL BCP-MCNC: 2.9 G/DL (ref 3.5–5.2)
ALP SERPL-CCNC: 81 U/L (ref 55–135)
ALT SERPL W/O P-5'-P-CCNC: 56 U/L (ref 10–44)
ANION GAP SERPL CALC-SCNC: 13 MMOL/L (ref 8–16)
AST SERPL-CCNC: 35 U/L (ref 10–40)
BASOPHILS # BLD AUTO: 0.01 K/UL (ref 0–0.2)
BASOPHILS NFR BLD: 0.1 % (ref 0–1.9)
BILIRUB SERPL-MCNC: 0.9 MG/DL (ref 0.1–1)
BILIRUB UR QL STRIP: NEGATIVE
BNP SERPL-MCNC: 81 PG/ML (ref 0–99)
BUN SERPL-MCNC: 43 MG/DL (ref 8–23)
C DIFF GDH STL QL: NEGATIVE
C DIFF TOX A+B STL QL IA: NEGATIVE
CALCIUM SERPL-MCNC: 8.7 MG/DL (ref 8.7–10.5)
CHLORIDE SERPL-SCNC: 97 MMOL/L (ref 95–110)
CLARITY UR: CLEAR
CO2 SERPL-SCNC: 26 MMOL/L (ref 23–29)
COLOR UR: YELLOW
CREAT SERPL-MCNC: 1.8 MG/DL (ref 0.5–1.4)
DIFFERENTIAL METHOD: ABNORMAL
EOSINOPHIL # BLD AUTO: 0 K/UL (ref 0–0.5)
EOSINOPHIL NFR BLD: 0.2 % (ref 0–8)
ERYTHROCYTE [DISTWIDTH] IN BLOOD BY AUTOMATED COUNT: 13.6 % (ref 11.5–14.5)
EST. GFR  (AFRICAN AMERICAN): 30 ML/MIN/1.73 M^2
EST. GFR  (NON AFRICAN AMERICAN): 26 ML/MIN/1.73 M^2
GLUCOSE SERPL-MCNC: 101 MG/DL (ref 70–110)
GLUCOSE UR QL STRIP: NEGATIVE
HCT VFR BLD AUTO: 40.1 % (ref 37–48.5)
HGB BLD-MCNC: 13.5 G/DL (ref 12–16)
HGB UR QL STRIP: ABNORMAL
HYALINE CASTS #/AREA URNS LPF: 1 /LPF
IMM GRANULOCYTES # BLD AUTO: 0.08 K/UL (ref 0–0.04)
IMM GRANULOCYTES NFR BLD AUTO: 0.8 % (ref 0–0.5)
INR PPP: 2.1 (ref 0.8–1.2)
KETONES UR QL STRIP: NEGATIVE
LACTATE SERPL-SCNC: 1.6 MMOL/L (ref 0.5–2.2)
LEUKOCYTE ESTERASE UR QL STRIP: NEGATIVE
LIPASE SERPL-CCNC: 92 U/L (ref 4–60)
LYMPHOCYTES # BLD AUTO: 1.3 K/UL (ref 1–4.8)
LYMPHOCYTES NFR BLD: 12.9 % (ref 18–48)
MAGNESIUM SERPL-MCNC: 1.6 MG/DL (ref 1.6–2.6)
MCH RBC QN AUTO: 29.9 PG (ref 27–31)
MCHC RBC AUTO-ENTMCNC: 33.7 G/DL (ref 32–36)
MCV RBC AUTO: 89 FL (ref 82–98)
MICROSCOPIC COMMENT: NORMAL
MONOCYTES # BLD AUTO: 2 K/UL (ref 0.3–1)
MONOCYTES NFR BLD: 19.9 % (ref 4–15)
NEUTROPHILS # BLD AUTO: 6.8 K/UL (ref 1.8–7.7)
NEUTROPHILS NFR BLD: 66.1 % (ref 38–73)
NITRITE UR QL STRIP: NEGATIVE
NRBC BLD-RTO: 0 /100 WBC
PH UR STRIP: 7 [PH] (ref 5–8)
PLATELET # BLD AUTO: 235 K/UL (ref 150–450)
PMV BLD AUTO: 10 FL (ref 9.2–12.9)
POTASSIUM SERPL-SCNC: 3.8 MMOL/L (ref 3.5–5.1)
PROT SERPL-MCNC: 6.7 G/DL (ref 6–8.4)
PROT UR QL STRIP: NEGATIVE
PROTHROMBIN TIME: 22 SEC (ref 9–12.5)
RBC # BLD AUTO: 4.52 M/UL (ref 4–5.4)
RBC #/AREA URNS HPF: 1 /HPF (ref 0–4)
SODIUM SERPL-SCNC: 136 MMOL/L (ref 136–145)
SP GR UR STRIP: 1.01 (ref 1–1.03)
TROPONIN I SERPL DL<=0.01 NG/ML-MCNC: 0.02 NG/ML (ref 0–0.03)
URN SPEC COLLECT METH UR: ABNORMAL
UROBILINOGEN UR STRIP-ACNC: NEGATIVE EU/DL
WBC # BLD AUTO: 10.27 K/UL (ref 3.9–12.7)
WBC #/AREA STL HPF: NORMAL /[HPF]
WBC #/AREA URNS HPF: 2 /HPF (ref 0–5)

## 2022-07-31 PROCEDURE — 80053 COMPREHEN METABOLIC PANEL: CPT | Performed by: EMERGENCY MEDICINE

## 2022-07-31 PROCEDURE — 96361 HYDRATE IV INFUSION ADD-ON: CPT

## 2022-07-31 PROCEDURE — 83605 ASSAY OF LACTIC ACID: CPT | Performed by: EMERGENCY MEDICINE

## 2022-07-31 PROCEDURE — 84484 ASSAY OF TROPONIN QUANT: CPT | Performed by: EMERGENCY MEDICINE

## 2022-07-31 PROCEDURE — 93010 ELECTROCARDIOGRAM REPORT: CPT | Mod: ,,, | Performed by: INTERNAL MEDICINE

## 2022-07-31 PROCEDURE — 25000003 PHARM REV CODE 250: Performed by: NURSE PRACTITIONER

## 2022-07-31 PROCEDURE — 87449 NOS EACH ORGANISM AG IA: CPT | Performed by: EMERGENCY MEDICINE

## 2022-07-31 PROCEDURE — 93010 EKG 12-LEAD: ICD-10-PCS | Mod: ,,, | Performed by: INTERNAL MEDICINE

## 2022-07-31 PROCEDURE — 93005 ELECTROCARDIOGRAM TRACING: CPT

## 2022-07-31 PROCEDURE — 85025 COMPLETE CBC W/AUTO DIFF WBC: CPT | Performed by: EMERGENCY MEDICINE

## 2022-07-31 PROCEDURE — 25000003 PHARM REV CODE 250: Performed by: INTERNAL MEDICINE

## 2022-07-31 PROCEDURE — 87045 FECES CULTURE AEROBIC BACT: CPT | Performed by: EMERGENCY MEDICINE

## 2022-07-31 PROCEDURE — 83735 ASSAY OF MAGNESIUM: CPT | Performed by: EMERGENCY MEDICINE

## 2022-07-31 PROCEDURE — 83690 ASSAY OF LIPASE: CPT | Performed by: EMERGENCY MEDICINE

## 2022-07-31 PROCEDURE — 87046 STOOL CULTR AEROBIC BACT EA: CPT | Performed by: EMERGENCY MEDICINE

## 2022-07-31 PROCEDURE — 81000 URINALYSIS NONAUTO W/SCOPE: CPT | Performed by: EMERGENCY MEDICINE

## 2022-07-31 PROCEDURE — G0378 HOSPITAL OBSERVATION PER HR: HCPCS

## 2022-07-31 PROCEDURE — 99285 EMERGENCY DEPT VISIT HI MDM: CPT | Mod: 25

## 2022-07-31 PROCEDURE — 83880 ASSAY OF NATRIURETIC PEPTIDE: CPT | Performed by: EMERGENCY MEDICINE

## 2022-07-31 PROCEDURE — 87427 SHIGA-LIKE TOXIN AG IA: CPT | Mod: 59 | Performed by: EMERGENCY MEDICINE

## 2022-07-31 PROCEDURE — 89055 LEUKOCYTE ASSESSMENT FECAL: CPT | Performed by: EMERGENCY MEDICINE

## 2022-07-31 PROCEDURE — 96360 HYDRATION IV INFUSION INIT: CPT

## 2022-07-31 PROCEDURE — 85610 PROTHROMBIN TIME: CPT | Performed by: EMERGENCY MEDICINE

## 2022-07-31 PROCEDURE — A4216 STERILE WATER/SALINE, 10 ML: HCPCS | Performed by: NURSE PRACTITIONER

## 2022-07-31 PROCEDURE — 25000003 PHARM REV CODE 250: Performed by: EMERGENCY MEDICINE

## 2022-07-31 RX ORDER — HEPARIN SODIUM 5000 [USP'U]/ML
5000 INJECTION, SOLUTION INTRAVENOUS; SUBCUTANEOUS EVERY 8 HOURS
Status: DISCONTINUED | OUTPATIENT
Start: 2022-07-31 | End: 2022-07-31

## 2022-07-31 RX ORDER — LISINOPRIL 5 MG/1
5 TABLET ORAL 2 TIMES DAILY
Status: ON HOLD | COMMUNITY
Start: 2022-06-02 | End: 2024-02-02 | Stop reason: ALTCHOICE

## 2022-07-31 RX ORDER — TALC
6 POWDER (GRAM) TOPICAL NIGHTLY PRN
Status: DISCONTINUED | OUTPATIENT
Start: 2022-07-31 | End: 2022-08-03 | Stop reason: HOSPADM

## 2022-07-31 RX ORDER — METOPROLOL TARTRATE 25 MG/1
25 TABLET, FILM COATED ORAL 2 TIMES DAILY
Status: DISCONTINUED | OUTPATIENT
Start: 2022-07-31 | End: 2022-08-03 | Stop reason: HOSPADM

## 2022-07-31 RX ORDER — NALOXONE HYDROCHLORIDE 1 MG/ML
0.02 INJECTION INTRAMUSCULAR; INTRAVENOUS; SUBCUTANEOUS
Status: DISCONTINUED | OUTPATIENT
Start: 2022-07-31 | End: 2022-08-03 | Stop reason: HOSPADM

## 2022-07-31 RX ORDER — FUROSEMIDE 20 MG/1
20 TABLET ORAL DAILY PRN
Status: ON HOLD | COMMUNITY
Start: 2022-06-21 | End: 2022-08-02 | Stop reason: HOSPADM

## 2022-07-31 RX ORDER — SODIUM CHLORIDE 9 MG/ML
INJECTION, SOLUTION INTRAVENOUS CONTINUOUS
Status: DISCONTINUED | OUTPATIENT
Start: 2022-07-31 | End: 2022-08-01

## 2022-07-31 RX ORDER — PROMETHAZINE HYDROCHLORIDE 25 MG/1
25 TABLET ORAL EVERY 6 HOURS PRN
Status: DISCONTINUED | OUTPATIENT
Start: 2022-07-31 | End: 2022-08-03 | Stop reason: HOSPADM

## 2022-07-31 RX ORDER — CHOLESTYRAMINE 4 G/9G
1 POWDER, FOR SUSPENSION ORAL 2 TIMES DAILY
Status: DISCONTINUED | OUTPATIENT
Start: 2022-07-31 | End: 2022-08-03 | Stop reason: HOSPADM

## 2022-07-31 RX ORDER — HYDRALAZINE HYDROCHLORIDE 20 MG/ML
10 INJECTION INTRAMUSCULAR; INTRAVENOUS EVERY 6 HOURS PRN
Status: DISCONTINUED | OUTPATIENT
Start: 2022-07-31 | End: 2022-08-03 | Stop reason: HOSPADM

## 2022-07-31 RX ORDER — METOPROLOL TARTRATE 25 MG/1
1 TABLET, FILM COATED ORAL 2 TIMES DAILY
COMMUNITY
Start: 2022-07-22

## 2022-07-31 RX ORDER — ACETAMINOPHEN 325 MG/1
650 TABLET ORAL EVERY 8 HOURS PRN
Status: DISCONTINUED | OUTPATIENT
Start: 2022-07-31 | End: 2022-08-03 | Stop reason: HOSPADM

## 2022-07-31 RX ORDER — SODIUM CHLORIDE 0.9 % (FLUSH) 0.9 %
10 SYRINGE (ML) INJECTION EVERY 8 HOURS
Status: DISCONTINUED | OUTPATIENT
Start: 2022-07-31 | End: 2022-08-03 | Stop reason: HOSPADM

## 2022-07-31 RX ORDER — SIMETHICONE 80 MG
1 TABLET,CHEWABLE ORAL 4 TIMES DAILY PRN
Status: DISCONTINUED | OUTPATIENT
Start: 2022-07-31 | End: 2022-08-03 | Stop reason: HOSPADM

## 2022-07-31 RX ORDER — WARFARIN 2.5 MG/1
2.5 TABLET ORAL
Status: DISCONTINUED | OUTPATIENT
Start: 2022-08-01 | End: 2022-08-03 | Stop reason: HOSPADM

## 2022-07-31 RX ORDER — ONDANSETRON 8 MG/1
8 TABLET, ORALLY DISINTEGRATING ORAL EVERY 8 HOURS PRN
Status: DISCONTINUED | OUTPATIENT
Start: 2022-07-31 | End: 2022-08-03 | Stop reason: HOSPADM

## 2022-07-31 RX ORDER — MAG HYDROX/ALUMINUM HYD/SIMETH 200-200-20
30 SUSPENSION, ORAL (FINAL DOSE FORM) ORAL 4 TIMES DAILY PRN
Status: DISCONTINUED | OUTPATIENT
Start: 2022-07-31 | End: 2022-08-03 | Stop reason: HOSPADM

## 2022-07-31 RX ADMIN — WARFARIN SODIUM 1.25 MG: 2.5 TABLET ORAL at 05:07

## 2022-07-31 RX ADMIN — Medication 10 ML: at 03:07

## 2022-07-31 RX ADMIN — SODIUM CHLORIDE 1000 ML: 0.9 INJECTION, SOLUTION INTRAVENOUS at 10:07

## 2022-07-31 RX ADMIN — SODIUM CHLORIDE: 0.9 INJECTION, SOLUTION INTRAVENOUS at 04:07

## 2022-07-31 RX ADMIN — Medication 10 ML: at 09:07

## 2022-07-31 RX ADMIN — CHOLESTYRAMINE 4 G: 4 POWDER, FOR SUSPENSION ORAL at 07:07

## 2022-07-31 NOTE — SUBJECTIVE & OBJECTIVE
Past Medical History:   Diagnosis Date    A-fib     Anticoagulant long-term use     Aortic valve disease     Cancer     brain tumor, 10 years ago    Cardiomyopathy     CHF (congestive heart failure)     Hx of heart valve replacement with mechanical valve     Hyperlipidemia     Hypertension     Pacemaker     Pacemaker     Stroke     2007, residual weakness       Past Surgical History:   Procedure Laterality Date    AORTIC VALVE REPLACEMENT      CHOLECYSTECTOMY      CORONARY ARTERY BYPASS GRAFT      HYSTERECTOMY      INSERTION OF PACEMAKER      TONSILLECTOMY         Review of patient's allergies indicates:   Allergen Reactions    Amlodipine Other (See Comments)     Not sure    Chlorthalidone Other (See Comments)     Not sure    Clonidine Other (See Comments)     Not sure    Codeine Other (See Comments)     Not sure    Escitalopram Other (See Comments)     Not sure    Lisinopril Other (See Comments)     Not sure    Losartan Other (See Comments)     Not sure    Meperidine Other (See Comments)     Not sure    Methadone Other (See Comments)     Not sure      Morphine Other (See Comments)     Not sure    Nebivolol Other (See Comments)     Not sure        Nitrofurantoin Other (See Comments)     Not sure        Omeprazole Other (See Comments)     Not sure      Pravastatin Other (See Comments)     Not sure      Propoxyphene Other (See Comments)     Not sure      Sulfamethoxazole-trimethoprim Other (See Comments)     Not sure           No current facility-administered medications on file prior to encounter.     Current Outpatient Medications on File Prior to Encounter   Medication Sig    furosemide (LASIX) 20 MG tablet Take 20 mg by mouth daily as needed (increased fluid).    lisinopriL (PRINIVIL,ZESTRIL) 5 MG tablet Take 5 mg by mouth 2 (two) times daily.    metoprolol tartrate (LOPRESSOR) 25 MG tablet Take 1 tablet by mouth 2 (two) times daily.    triamterene-hydrochlorothiazide 37.5-25 mg (MAXZIDE-25) 37.5-25 mg per tablet  Take 0.5 tablets by mouth 2 (two) times a day.    warfarin (COUMADIN) 2.5 MG tablet Take 2.5 mg by mouth nightly. on Monday, Wednesday, Friday, and Saturday; take 1/2 tablet (1.25 mg) by mouth nightly on Tuesday, Thursday, and Sunday.    [DISCONTINUED] dexAMETHasone (DECADRON) 6 MG tablet Take 1 tablet (6 mg total) by mouth once daily.  for 7 more days    [DISCONTINUED] linezolid (ZYVOX) 600 mg Tab Take 1 tablet (600 mg total) by mouth every 12 (twelve) hours. for 4 days    [DISCONTINUED] lisinopril 10 MG tablet Take 10 mg by mouth.    [DISCONTINUED] metoprolol tartrate (LOPRESSOR) 25 MG tablet Take 25 mg by mouth.    [DISCONTINUED] traMADol (ULTRAM) 50 mg tablet Take 50 mg by mouth.     Family History       Problem Relation (Age of Onset)    No Known Problems Mother, Father          Tobacco Use    Smoking status: Never Smoker    Smokeless tobacco: Never Used   Substance and Sexual Activity    Alcohol use: Not Currently    Drug use: Never    Sexual activity: Not on file     Review of Systems   Constitutional:  Negative for activity change, appetite change, chills, fatigue and fever.   HENT:  Negative for congestion, dental problem, ear pain, hearing loss, mouth sores, sinus pressure, sore throat, tinnitus and trouble swallowing.    Eyes:  Negative for pain, discharge, redness and visual disturbance.   Respiratory:  Negative for apnea, cough, choking, chest tightness, shortness of breath and wheezing.    Cardiovascular:  Negative for chest pain, palpitations and leg swelling.   Gastrointestinal:  Positive for abdominal pain and diarrhea. Negative for abdominal distention, anal bleeding, blood in stool, constipation, nausea, rectal pain and vomiting.   Endocrine: Negative for cold intolerance, heat intolerance, polydipsia and polyuria.   Genitourinary:  Negative for difficulty urinating, dysuria, flank pain, frequency, hematuria and urgency.   Musculoskeletal:  Negative for arthralgias, back pain, gait problem, joint  swelling, myalgias, neck pain and neck stiffness.   Skin:  Negative for color change, rash and wound.   Allergic/Immunologic: Negative.    Neurological:  Negative for dizziness, tremors, seizures, syncope, speech difficulty, light-headedness and headaches.   Hematological:  Negative for adenopathy. Bruises/bleeds easily.   Psychiatric/Behavioral:  Positive for confusion (mild confusion). Negative for agitation, behavioral problems and sleep disturbance. The patient is not nervous/anxious.    All other systems reviewed and are negative.  Objective:     Vital Signs (Most Recent):  Temp: 97.6 °F (36.4 °C) (07/31/22 0940)  Pulse: 64 (07/31/22 1120)  Resp: 16 (07/31/22 1120)  BP: (!) 105/58 (07/31/22 1120)  SpO2: 99 % (07/31/22 1120)   Vital Signs (24h Range):  Temp:  [97.6 °F (36.4 °C)] 97.6 °F (36.4 °C)  Pulse:  [64-80] 64  Resp:  [16-19] 16  SpO2:  [89 %-99 %] 99 %  BP: ()/(58-60) 105/58     Weight: 95.4 kg (210 lb 5.1 oz)  Body mass index is 39.74 kg/m².    Physical Exam  Vitals and nursing note reviewed.   Constitutional:       General: She is not in acute distress.     Appearance: She is well-developed. She is obese. She is not diaphoretic.   HENT:      Head: Normocephalic and atraumatic.      Nose: Nose normal.   Eyes:      General: No scleral icterus.     Extraocular Movements: Extraocular movements intact.      Pupils: Pupils are equal, round, and reactive to light.   Neck:      Thyroid: No thyromegaly.      Vascular: No JVD.      Trachea: No tracheal deviation.   Cardiovascular:      Rate and Rhythm: Normal rate and regular rhythm.      Heart sounds: Normal heart sounds. No murmur heard.    No friction rub. No gallop.   Pulmonary:      Effort: Pulmonary effort is normal. No respiratory distress.      Breath sounds: Normal breath sounds. No wheezing or rales.   Chest:      Chest wall: No tenderness.   Abdominal:      General: Bowel sounds are normal. There is no distension.      Palpations: Abdomen is  soft. There is no mass.      Tenderness: There is abdominal tenderness.   Genitourinary:     Comments: deferred  Musculoskeletal:         General: No tenderness or deformity. Normal range of motion.      Cervical back: Normal range of motion and neck supple.   Skin:     General: Skin is warm and dry.      Capillary Refill: Capillary refill takes less than 2 seconds.      Coloration: Skin is not pale.      Findings: No erythema or rash.   Neurological:      Mental Status: She is alert and oriented to person, place, and time. Mental status is at baseline.      Cranial Nerves: No cranial nerve deficit.      Motor: No abnormal muscle tone.      Coordination: Coordination normal.   Psychiatric:         Mood and Affect: Mood normal.         Behavior: Behavior normal.         CRANIAL NERVES     CN III, IV, VI   Pupils are equal, round, and reactive to light.     Significant Labs: All pertinent labs within the past 24 hours have been reviewed.  Recent Lab Results         07/31/22  1319   07/31/22  1106   07/31/22  1105        Albumin     2.9       Alkaline Phosphatase     81       ALT     56       Anion Gap     13       Appearance, UA Clear           AST     35       Baso #     0.01       Basophil %     0.1       Bilirubin (UA) Negative           BILIRUBIN TOTAL     0.9  Comment: For infants and newborns, interpretation of results should be based  on gestational age, weight and in agreement with clinical  observations.    Premature Infant recommended reference ranges:  Up to 24 hours.............<8.0 mg/dL  Up to 48 hours............<12.0 mg/dL  3-5 days..................<15.0 mg/dL  6-29 days.................<15.0 mg/dL         BNP     81  Comment: Values of less than 100 pg/ml are consistent with non-CHF populations.       BUN     43       Calcium     8.7       Chloride     97       CO2     26       Color, UA Yellow           Creatinine     1.8       Differential Method     Automated       eGFR if       30       eGFR if non      26  Comment: Calculation used to obtain the estimated glomerular filtration  rate (eGFR) is the CKD-EPI equation.          Eos #     0.0       Eosinophil %     0.2       Glucose     101       Glucose, UA Negative           Gran # (ANC)     6.8       Gran %     66.1       Hematocrit     40.1       Hemoglobin     13.5       Hyaline Casts, UA 1           Immature Grans (Abs)     0.08  Comment: Mild elevation in immature granulocytes is non specific and   can be seen in a variety of conditions including stress response,   acute inflammation, trauma and pregnancy. Correlation with other   laboratory and clinical findings is essential.         Immature Granulocytes     0.8       INR     2.1  Comment: Coumadin Therapy:  2.0 - 3.0 for INR for all indicators except mechanical heart valves  and antiphospholipid syndromes which should use 2.5 - 3.5.         Ketones, UA Negative           Lactate, Rico   1.6  Comment: Falsely low lactic acid results can be found in samples   containing >=13.0 mg/dL total bilirubin and/or >=3.5 mg/dL   direct bilirubin.           Leukocytes, UA Negative           Lipase     92       Lymph #     1.3       Lymph %     12.9       Magnesium     1.6       MCH     29.9       MCHC     33.7       MCV     89       Microscopic Comment SEE COMMENT  Comment: Other formed elements not mentioned in the report are not   present in the microscopic examination.              Mono #     2.0       Mono %     19.9       MPV     10.0       NITRITE UA Negative           nRBC     0       Occult Blood UA 1+           pH, UA 7.0           Platelets     235       Potassium     3.8       PROTEIN TOTAL     6.7       Protein, UA Negative  Comment: Recommend a 24 hour urine protein or a urine   protein/creatinine ratio if globulin induced proteinuria is  clinically suspected.             Protime     22.0       RBC     4.52       RBC, UA 1           RDW     13.6       Sodium     136        Specific Merrick, UA 1.015           Specimen UA Urine, Clean Catch           Troponin I     0.023  Comment: The reference interval for Troponin I represents the 99th percentile   cutoff   for our facility and is consistent with 3rd generation assay   performance.         UROBILINOGEN UA Negative           WBC, UA 2           WBC     10.27               Significant Imaging: I have reviewed all pertinent imaging results/findings within the past 24 hours.

## 2022-07-31 NOTE — ASSESSMENT & PLAN NOTE
Patient with Persistent (7 days or more) atrial fibrillation which is controlled currently with Beta Blocker. Patient is currently in sinus rhythm.[id number] Score: 3. HASBLED Score: Unable to calculate. Anticoagulation indicated. Anticoagulation done with coumadin.  --pharmacy to manage coumadin

## 2022-07-31 NOTE — PHARMACY MED REC
"Admission Medication History     The home medication history was taken by Mohamud Bonner.    You may go to "Admission" then "Reconcile Home Medications" tabs to review and/or act upon these items.      The home medication list has been updated by the Pharmacy department.    Please read ALL comments highlighted in yellow.    Please address this information as you see fit.     Feel free to contact us if you have any questions or require assistance.      The medications listed below were removed from the home medication list. Please reorder if appropriate:  Patient reports no longer taking the following medication(s):   DEXAMETHASONE 6 MG TABLET (THERAPY COMPLETED)   LINEZOLID 600 MG TABLET (THERAPY COMPLETED)   TRAMADOL 50 MG TABLET    Medications listed below were obtained from: Patient/family, Medications brought from home, Analytic software- [employer] and Patient's pharmacy  (Not in a hospital admission)      Potential issues to be addressed PRIOR TO DISCHARGE: NONE    Mohamud Bonner, [facility]  Pharmacy Technician Specialist-Medication History  [facility] [phone]  Secure chat preferred     Current Outpatient Medications on File Prior to Encounter   Medication Sig Dispense Refill Last Dose    furosemide (LASIX) 20 MG tablet Take 20 mg by mouth daily as needed (increased fluid).   7/31/2022 at Unknown time    lisinopriL (PRINIVIL,ZESTRIL) 5 MG tablet Take 5 mg by mouth 2 (two) times daily.   7/31/2022 at Unknown time    metoprolol tartrate (LOPRESSOR) 25 MG tablet Take 1 tablet by mouth 2 (two) times daily.   7/31/2022 at Unknown time    triamterene-hydrochlorothiazide 37.5-25 mg (MAXZIDE-25) 37.5-25 mg per tablet Take 0.5 tablets by mouth 2 (two) times a day.   7/31/2022 at Unknown time    warfarin (COUMADIN) 2.5 MG tablet Take 2.5 mg by mouth nightly. on Monday, Wednesday, Friday, and Saturday; take 1/2 tablet (1.25 mg) by mouth nightly on Tuesday, Thursday, and Sunday.   7/30/2022 at Unknown time    " [DISCONTINUED] dexAMETHasone (DECADRON) 6 MG tablet Take 1 tablet (6 mg total) by mouth once daily.  for 7 more days 7 tablet 0     [DISCONTINUED] linezolid (ZYVOX) 600 mg Tab Take 1 tablet (600 mg total) by mouth every 12 (twelve) hours. for 4 days 8 tablet 0     [DISCONTINUED] lisinopril 10 MG tablet Take 10 mg by mouth.       [DISCONTINUED] metoprolol tartrate (LOPRESSOR) 25 MG tablet Take 25 mg by mouth.       [DISCONTINUED] traMADol (ULTRAM) 50 mg tablet Take 50 mg by mouth.                                .

## 2022-07-31 NOTE — ASSESSMENT & PLAN NOTE
Patient with acute kidney injury likely due to diarrhea MARCUS is currently stable. Labs reviewed- Renal function/electrolytes with Estimated Creatinine Clearance: 26.3 mL/min (A) (based on SCr of 1.8 mg/dL (H)). according to latest data. Monitor urine output and serial BMP and adjust therapy as needed. Avoid nephrotoxins and renally dose meds for GFR listed above.   --gentle IVF's  --repeat BMP in AM

## 2022-07-31 NOTE — PLAN OF CARE
Patient updated on plan of care. Report received from Kendy TRAN. Instructed patient to use call light for assistance, call light in reach.Hourly rounding performed, bed locked, side rails up, and in low position. Bed alarm on. Telemetry on #[id number]. Education provided, questions answered as of now. Patient remained free from falls during shift. Will continue to monitor per unit practice/policy. Ade Lugo RN.

## 2022-07-31 NOTE — CONSULTS
Coumadin Consult Note    Consult day # 1  Indication: paroxysmal atrial fibrillation, mechanical AVR, history of DVT  Goal INR: 2.5-3.5   Today's INR: 2.1 (trending down)  Pt was just here last week with supratherapeutic INR  History from last admission:     -pt reported last took a dose on 7/21     -came to ED on 7/22 & missed dose that evening     -INR on 7/23 = 4.7 --> dose held     -INR on 7/24 = 5.1 --> dose held again     -INR on 7/25 = 4.0 --> dose held (doses held x 4 days total)     -INR on 7/26 = 3.2 --> home dose resumed (given 1.25 mg) then pt was discharged   Home regimen:  1.25 mg every Tues, Thurs, Sun  2.5 mg on all other days     We will continue home regimen & give 1.25 mg dose today    Added Coumadin restriction to diet orders  Daily INR ordered x 3 days  No drug-drug interactions currently      Thank you for allowing us to participate in this patient's care.   Katherine McArdle, Pharm.D. 7/31/2022 4:42 PM

## 2022-07-31 NOTE — HPI
81 y/o female with PMHx of HTN, HLD, CHF, a fib, cardiac pacemaker,  mechanical aortic valve, RA, DVT, CVA, cognitive impairment, UTI, sepsis, alt mental status and recent covid who presented to the ED with c/o diarrhea that onset gradually 1 week ago. Pt was dc'd from this facility on 7/26 for sepsis, UTI, and covid. Sx are episodic and moderate in severity. No exacerbating or mitigating factors reported. Associated sx include abd pain and confusion. Pt denies fever, chills, N/V, blood in stool, dysuria, and all other sx at this time.  ED workup shows: BUN 43, Cr. 1.8, eGFR 26, ALT 56, lipase 92. U/a pending. Stool studies pending  She is a full code and her SDM is her son, Amish  She will be kept on OBS under the care of hospital medicine.

## 2022-07-31 NOTE — ED PROVIDER NOTES
SCRIBE #1 NOTE: I, Reyes Mandel, am scribing for, and in the presence of, Chely Bailey MD. I have scribed the entire note.       History     Chief Complaint   Patient presents with    Diarrhea     Pt son reports that after having COVID on 7/22, pt has had N/V/D, increased fatigue, sleeping more. Pt appears to be AMS in Triage, repeating self multiple times     Review of patient's allergies indicates:   Allergen Reactions    Amlodipine Other (See Comments)     Not sure    Chlorthalidone Other (See Comments)     Not sure    Clonidine Other (See Comments)     Not sure    Codeine Other (See Comments)     Not sure    Escitalopram Other (See Comments)     Not sure    Lisinopril Other (See Comments)     Not sure    Losartan Other (See Comments)     Not sure    Meperidine Other (See Comments)     Not sure    Methadone Other (See Comments)     Not sure      Morphine Other (See Comments)     Not sure    Nebivolol Other (See Comments)     Not sure        Nitrofurantoin Other (See Comments)     Not sure        Omeprazole Other (See Comments)     Not sure      Pravastatin Other (See Comments)     Not sure      Propoxyphene Other (See Comments)     Not sure      Sulfamethoxazole-trimethoprim Other (See Comments)     Not sure             History of Present Illness     HPI    7/31/2022, 9:54 AM  History obtained from the son and patient      History of Present Illness: Serena Post is a 82 y.o. female patient with a PMHx of heart valve replacement, pacemaker, stroke, a-fib, HTN, HLD, CHF who presents to the Emergency Department for evaluation of diarrhea which onset gradually 1 week PTA. Per son, pt has been experiencing diarrhea since being released from the hospital on 7/26. Son says the diarrhea has been constant and yellow in color. Per son, pt was rx linezolid and dexamethasone at last hospital visit. Symptoms are constant and moderate in severity. No mitigating or exacerbating factors reported.  Associated sxs include abdominal pain. Patient denies any fever, chills, n/v, blood in stool, urinary frequency, and all other sxs at this time. No further complaints or concerns at this time.       Arrival mode: Personal vehicle    PCP: Primary Doctor No        Past Medical History:  Past Medical History:   Diagnosis Date    A-fib     Anticoagulant long-term use     Aortic valve disease     Cancer     brain tumor, 10 years ago    Cardiomyopathy     CHF (congestive heart failure)     COVID-19 7/23/2022    Hx of heart valve replacement with mechanical valve     Hyperlipidemia     Hypertension     Pacemaker     Pacemaker     Sepsis 7/23/2022    Stroke     2007, residual weakness       Past Surgical History:  Past Surgical History:   Procedure Laterality Date    AORTIC VALVE REPLACEMENT      CHOLECYSTECTOMY      CORONARY ARTERY BYPASS GRAFT      HYSTERECTOMY      INSERTION OF PACEMAKER      TONSILLECTOMY           Family History:  Family History   Problem Relation Age of Onset    No Known Problems Mother     No Known Problems Father        Social History:  Social History     Tobacco Use    Smoking status: Never Smoker    Smokeless tobacco: Never Used   Substance and Sexual Activity    Alcohol use: Not Currently    Drug use: Never    Sexual activity: Not on file        Review of Systems     Review of Systems   Constitutional: Negative for chills and fever.   HENT: Negative for sore throat.    Respiratory: Negative for shortness of breath.    Cardiovascular: Negative for chest pain.   Gastrointestinal: Positive for abdominal pain and diarrhea. Negative for blood in stool, nausea and vomiting.   Genitourinary: Negative for dysuria and frequency.   Musculoskeletal: Negative for back pain.   Skin: Negative for rash.   Neurological: Negative for weakness.   Hematological: Does not bruise/bleed easily.   All other systems reviewed and are negative.       Physical Exam     Initial Vitals [07/31/22  "0940]   BP Pulse Resp Temp SpO2   98/60 80 18 97.6 °F (36.4 °C) (!) 93 %      MAP       --          Physical Exam  Nursing Notes and Vital Signs Reviewed.  Constitutional: Patient is in mild distress. Morbidly obese.  Head: Atraumatic. Normocephalic.  Eyes: PERRL. EOM intact. Conjunctivae are not pale. No scleral icterus.  ENT: Mucous membranes are moist. Oropharynx is clear and symmetric.    Neck: Supple. Full ROM. No lymphadenopathy.  Cardiovascular: Regular rate. Regular rhythm. No murmurs, rubs, or gallops. Distal pulses are 2+ and symmetric.  Pulmonary/Chest: No respiratory distress. Clear to auscultation bilaterally. No wheezing or rales.  Abdominal: Soft and non-distended. Generalized tenderness.  No rebound, guarding, or rigidity. Good bowel sounds.  Genitourinary: No CVA tenderness  Musculoskeletal: Moves all extremities. No obvious deformities. No edema. No calf tenderness.  Skin: Warm and dry.  Neurological:  Alert, awake, and confused, disoriented, anxious and restless.  Mumbled speech.  No acute focal neurological deficits are appreciated.   Psychiatric: Normal affect. Good eye contact. Appropriate in content. Disoriented.     ED Course   Procedures  ED Vital Signs:  Vitals:    07/31/22 0940 07/31/22 1005 07/31/22 1014 07/31/22 1015   BP: 98/60      Pulse: 80   69   Resp: 18  18    Temp: 97.6 °F (36.4 °C)      TempSrc: Oral      SpO2: (!) 93%  (!) 89%    Weight:  95.4 kg (210 lb 5.1 oz)     Height: 5' 1" (1.549 m)       07/31/22 1017 07/31/22 1020 07/31/22 1115 07/31/22 1120   BP:  100/60 (!) 105/58 (!) 105/58   Pulse:  70 66 64   Resp: 16 19 18 16   Temp:       TempSrc:       SpO2: (!) 93% 97% 99% 99%   Weight:       Height:        07/31/22 1405   BP: 104/61   Pulse: 64   Resp: 16   Temp:    TempSrc:    SpO2: 97%   Weight:    Height:        Abnormal Lab Results:  Labs Reviewed   CBC W/ AUTO DIFFERENTIAL - Abnormal; Notable for the following components:       Result Value    Immature Granulocytes 0.8 " (*)     Immature Grans (Abs) 0.08 (*)     Mono # 2.0 (*)     Lymph % 12.9 (*)     Mono % 19.9 (*)     All other components within normal limits   COMPREHENSIVE METABOLIC PANEL - Abnormal; Notable for the following components:    BUN 43 (*)     Creatinine 1.8 (*)     Albumin 2.9 (*)     ALT 56 (*)     eGFR if  30 (*)     eGFR if non  26 (*)     All other components within normal limits   LIPASE - Abnormal; Notable for the following components:    Lipase 92 (*)     All other components within normal limits   URINALYSIS, REFLEX TO URINE CULTURE - Abnormal; Notable for the following components:    Occult Blood UA 1+ (*)     All other components within normal limits    Narrative:     Specimen Source->Urine   PROTIME-INR - Abnormal; Notable for the following components:    Prothrombin Time 22.0 (*)     INR 2.1 (*)     All other components within normal limits   CLOSTRIDIUM DIFFICILE   CULTURE, STOOL   ENTEROHEMORRHAGIC E.COLI   LACTIC ACID, PLASMA   MAGNESIUM   TROPONIN I   B-TYPE NATRIURETIC PEPTIDE   URINALYSIS MICROSCOPIC    Narrative:     Specimen Source->Urine   WBC, STOOL        All Lab Results:  Results for orders placed or performed during the hospital encounter of 07/31/22   Clostridium difficile EIA    Specimen: Stool   Result Value Ref Range    C. diff Antigen Negative Negative    C difficile Toxins A+B, EIA Negative Negative   Lactic acid, plasma   Result Value Ref Range    Lactate (Lactic Acid) 1.6 0.5 - 2.2 mmol/L   CBC W/ AUTO DIFFERENTIAL   Result Value Ref Range    WBC 10.27 3.90 - 12.70 K/uL    RBC 4.52 4.00 - 5.40 M/uL    Hemoglobin 13.5 12.0 - 16.0 g/dL    Hematocrit 40.1 37.0 - 48.5 %    MCV 89 82 - 98 fL    MCH 29.9 27.0 - 31.0 pg    MCHC 33.7 32.0 - 36.0 g/dL    RDW 13.6 11.5 - 14.5 %    Platelets 235 150 - 450 K/uL    MPV 10.0 9.2 - 12.9 fL    Immature Granulocytes 0.8 (H) 0.0 - 0.5 %    Gran # (ANC) 6.8 1.8 - 7.7 K/uL    Immature Grans (Abs) 0.08 (H) 0.00 - 0.04  K/uL    Lymph # 1.3 1.0 - 4.8 K/uL    Mono # 2.0 (H) 0.3 - 1.0 K/uL    Eos # 0.0 0.0 - 0.5 K/uL    Baso # 0.01 0.00 - 0.20 K/uL    nRBC 0 0 /100 WBC    Gran % 66.1 38.0 - 73.0 %    Lymph % 12.9 (L) 18.0 - 48.0 %    Mono % 19.9 (H) 4.0 - 15.0 %    Eosinophil % 0.2 0.0 - 8.0 %    Basophil % 0.1 0.0 - 1.9 %    Differential Method Automated    Comp. Metabolic Panel   Result Value Ref Range    Sodium 136 136 - 145 mmol/L    Potassium 3.8 3.5 - 5.1 mmol/L    Chloride 97 95 - 110 mmol/L    CO2 26 23 - 29 mmol/L    Glucose 101 70 - 110 mg/dL    BUN 43 (H) 8 - 23 mg/dL    Creatinine 1.8 (H) 0.5 - 1.4 mg/dL    Calcium 8.7 8.7 - 10.5 mg/dL    Total Protein 6.7 6.0 - 8.4 g/dL    Albumin 2.9 (L) 3.5 - 5.2 g/dL    Total Bilirubin 0.9 0.1 - 1.0 mg/dL    Alkaline Phosphatase 81 55 - 135 U/L    AST 35 10 - 40 U/L    ALT 56 (H) 10 - 44 U/L    Anion Gap 13 8 - 16 mmol/L    eGFR if African American 30 (A) >60 mL/min/1.73 m^2    eGFR if non African American 26 (A) >60 mL/min/1.73 m^2   Lipase   Result Value Ref Range    Lipase 92 (H) 4 - 60 U/L   Urinalysis, Reflex to Urine Culture Urine, Catheterized    Specimen: Urine   Result Value Ref Range    Specimen UA Urine, Clean Catch     Color, UA Yellow Yellow, Straw, Kamla    Appearance, UA Clear Clear    pH, UA 7.0 5.0 - 8.0    Specific Gravity, UA 1.015 1.005 - 1.030    Protein, UA Negative Negative    Glucose, UA Negative Negative    Ketones, UA Negative Negative    Bilirubin (UA) Negative Negative    Occult Blood UA 1+ (A) Negative    Nitrite, UA Negative Negative    Urobilinogen, UA Negative <2.0 EU/dL    Leukocytes, UA Negative Negative   Magnesium   Result Value Ref Range    Magnesium 1.6 1.6 - 2.6 mg/dL   Troponin I   Result Value Ref Range    Troponin I 0.023 0.000 - 0.026 ng/mL   Brain natriuretic peptide   Result Value Ref Range    BNP 81 0 - 99 pg/mL   Protime-INR   Result Value Ref Range    Prothrombin Time 22.0 (H) 9.0 - 12.5 sec    INR 2.1 (H) 0.8 - 1.2   Urinalysis  Microscopic   Result Value Ref Range    RBC, UA 1 0 - 4 /hpf    WBC, UA 2 0 - 5 /hpf    Hyaline Casts, UA 1 0-1/lpf /lpf    Microscopic Comment SEE COMMENT          Imaging Results:  Imaging Results          CT Abdomen Pelvis  Without Contrast (Final result)  Result time 07/31/22 10:53:06    Final result by Yasir Mandel MD (07/31/22 10:53:06)                 Impression:      1. Colonic air-fluid levels.  Correlate for diarrheal illness.  No bowel obstruction or focal inflammatory process.  Normal appendix.  2. Left renal cyst, 3 cm.  3. Cholecystectomy.  Hysterectomy.  4. Moderate levoscoliosis lumbar spine with multilevel advanced degenerative disc disease sequela.  Severe right moderate left hip osteoarthritis.  All CT scans at this facility are performed  using dose modulation techniques as appropriate to performed exam including the following:  automated exposure control; adjustment of mA and/or kV according to the patients size (this includes techniques or standardized protocols for targeted exams where dose is matched to indication/reason for exam: i.e. extremities or head);  iterative reconstruction technique.      Electronically signed by: Yasir Mandel MD  Date:    07/31/2022  Time:    10:53             Narrative:    EXAMINATION:  CT ABDOMEN PELVIS WITHOUT CONTRAST    CLINICAL HISTORY:  Abdominal pain, acute, nonlocalized;    TECHNIQUE:  Abdominal and pelvic CT without contrast.  Coronal and sagittal reformations.    COMPARISON:  None    FINDINGS:  Abdominal CT.  Bibasilar pleuroparenchymal bands of atelectasis or scarring.  Otherwise, lung bases are clear.    Normal size heart.  Aortic valve replacement.    Noncontrast evaluation liver, spleen, pancreas, adrenal glands, and kidneys is normal other than a 3 cm cyst mid left kidney.    Normal caliber atherosclerotic aorta.  There is no lymphadenopathy.  Cholecystectomy.  There is no bile duct dilatation.    No bowel obstruction.  The appendix is normal.   No stool retention.  Colonic air-fluid levels.  Correlate for diarrheal illness.  No colonic bowel wall thickening or focal inflammatory stranding.  The GI tract is otherwise unremarkable.    There is moderate levoscoliosis thoracic spine with multilevel advanced degenerative disc disease sequela.    Pelvic CT.  The pelvic ring is intact.  Severe right and moderate left hip osteoarthritis sequela.    Pelvic bowel loops are unremarkable.  Ureters and urinary bladder are normal.    Hysterectomy.  Ovaries are absent.    There is no pelvic mass, free fluid, or lymphadenopathy.                               CT Head Without Contrast (Final result)  Result time 07/31/22 10:46:44    Final result by Yasir Mandel MD (07/31/22 10:46:44)                 Impression:      Stable head CT.  Remote left parietal lobe infarct with encephalomalacia.  Moderate deep white matter microangiopathy.  Atrophy.  No bleed.    All CT scans at this facility are performed  using dose modulation techniques as appropriate to performed exam including the following:  automated exposure control; adjustment of mA and/or kV according to the patients size (this includes techniques or standardized protocols for targeted exams where dose is matched to indication/reason for exam: i.e. extremities or head);  iterative reconstruction technique.      Electronically signed by: Yasir Mandel MD  Date:    07/31/2022  Time:    10:46             Narrative:    EXAMINATION:  CT HEAD WITHOUT CONTRAST    CLINICAL HISTORY:  Stable head CT.  Atrophy.  Moderate deep white matter microangiopathy plate.  Remote left parietal lobe infarction with encephalomalacia.    TECHNIQUE:  Routine noncontrast head CT.    COMPARISON:  07/22/2022.    FINDINGS:  There has been no interval change.    There is no acute intracranial hemorrhage or abnormal extra-axial fluid collection.  There is advanced cerebral atrophy with ventricular-sulcal congruence.  Moderate deep white matter  microangiopathy low attenuation.  Again, left parietal lobe remote infarction sequela with encephalomalacia.  Gray-white differentiation is otherwise well preserved.  There is no additional abnormal increased or decreased density within the brain parenchyma.    There is no intracranial mass or mass effect.    The calvarium is intact.  Visualized paranasal sinuses and mastoid air cells are well aerated.                               X-Ray Chest AP Portable (Final result)  Result time 07/31/22 10:20:43    Final result by Yasir Mandel MD (07/31/22 10:20:43)                 Impression:      No acute process.      Electronically signed by: Yasir Mandel MD  Date:    07/31/2022  Time:    10:20             Narrative:    EXAMINATION:  XR CHEST AP PORTABLE    CLINICAL HISTORY:  Weakness    FINDINGS:  Comparison study 07/25/2022.  There has been interval removal of the left upper extremity PICC line.  Right chest wall cardiac pacemaker with intact leads.  Normal size heart.  Mild aortic tortuosity with aortic arch calcification.  No congestion.  The lungs are clear.                                 The EKG was ordered, reviewed, and independently interpreted by the ED provider.  Interpretation time: 09:49  Rate: 92 BPM  Rhythm: Sinus bradycardia with marked sinus arrhythmia with occasional premature ventricular complexes  Interpretation: Marked ST abnormality, possible inferior subendocardial injury. Prolonged QT. No STEMI.             The Emergency Provider reviewed the vital signs and test results, which are outlined above.     ED Discussion   1:05 PM - Re-evaluation:  Discussed test results, shared treatment plan, and the need for admission with patient and family. They understand and agree to the plan as discussed. Answered questions at this time.         1:23 PM: Discussed case with Salomón Kamara NP (Hospital Medicine). Dr. Espinal agrees with current care and management of pt and accepts admission.   Admitting Service:  Hospital Medicine  Admitting Physician: Dr. Espinal  Admit to: On-M/T           Medical Decision Making:   Clinical Tests:   Lab Tests: Ordered and Reviewed  Radiological Study: Reviewed and Ordered  Medical Tests: Ordered and Reviewed           ED Medication(s):  Medications   metoprolol tartrate (LOPRESSOR) tablet 25 mg (has no administration in time range)   sodium chloride 0.9% flush 10 mL (has no administration in time range)   melatonin tablet 6 mg (has no administration in time range)   acetaminophen tablet 650 mg (has no administration in time range)   naloxone injection 0.02 mg (has no administration in time range)   dextrose 10% bolus 125 mL (has no administration in time range)   dextrose 10% bolus 250 mL (has no administration in time range)   ondansetron disintegrating tablet 8 mg (has no administration in time range)   promethazine tablet 25 mg (has no administration in time range)   simethicone chewable tablet 80 mg (has no administration in time range)   aluminum-magnesium hydroxide-simethicone 200-200-20 mg/5 mL suspension 30 mL (has no administration in time range)   0.9%  NaCl infusion (has no administration in time range)   hydrALAZINE injection 10 mg (has no administration in time range)   sodium chloride 0.9% bolus 1,000 mL (0 mLs Intravenous Stopped 7/31/22 1303)       New Prescriptions    No medications on file               Scribe Attestation:   Scribe #1: I performed the above scribed service and the documentation accurately describes the services I performed. I attest to the accuracy of the note.     Attending:   Physician Attestation Statement for Scribe #1: I, Chely Bailey MD, personally performed the services described in this documentation, as scribed by Reyes Mandel, in my presence, and it is both accurate and complete.           Clinical Impression       ICD-10-CM ICD-9-CM   1. Intractable diarrhea  R19.7 787.91   2. Weakness  R53.1 780.79   3. Lab test positive for detection of  COVID-19 virus  U07.1 079.89   4. Encephalopathy  G93.40 348.30   5. Hypoxia  R09.02 799.02   6. Pain of upper abdomen  R10.10 789.09   7. Acute renal insufficiency  N28.9 593.9   8. Chest pain  R07.9 786.50       Disposition:   Disposition: Placed in Observation  Condition: Stable         Chely Bailey MD  07/31/22 7307
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
## 2022-07-31 NOTE — ASSESSMENT & PLAN NOTE
--pt reports diarrhea X 6 days  --CT abd shows   1. Colonic air-fluid levels.  Correlate for diarrheal illness.  No bowel obstruction or focal inflammatory process.  Normal appendix.  2. Left renal cyst, 3 cm.  3. Cholecystectomy.  Hysterectomy.  4. Moderate levoscoliosis lumbar spine with multilevel advanced degenerative disc disease sequela.  Severe right moderate left hip osteoarthritis.  --stool studies pending, including c diff  --gentle IVF's  --monitor electrolytes  --repeat BMP in AM

## 2022-07-31 NOTE — ASSESSMENT & PLAN NOTE
--UTI vs MARCUS vs long term covid  --CT head shows Stable head CT. Remote left parietal lobe infarct with encephalomalacia. Moderate deep white matter microangiopathy. Atrophy. No bleed.  --u/a pending  --gentle IVF's  --neuro checks

## 2022-07-31 NOTE — H&P
[facility] - Emergency Dept.  [facility] Medicine  History & Physical    Patient Name: Serena Post  MRN: [id number]  Patient Class: OP- Observation  Admission Date: 7/31/2022  Attending Physician: Valente Espinal MD   Primary Care Provider: Primary Doctor No         Patient information was obtained from patient, relative(s), past medical records and ER records.     Subjective:     Principal Problem:Diarrhea    Chief Complaint:   Chief Complaint   Patient presents with    Diarrhea     Pt son reports that after having COVID on 7/22, pt has had N/V/D, increased fatigue, sleeping more. Pt appears to be AMS in Triage, repeating self multiple times        HPI: 81 y/o female with PMHx of HTN, HLD, CHF, a fib, cardiac pacemaker,  mechanical aortic valve, RA, DVT, CVA, cognitive impairment, UTI, sepsis, alt mental status and recent covid who presented to the ED with c/o diarrhea that onset gradually 1 week ago. Pt was dc'd from this facility on 7/26 for sepsis, UTI, and covid. Sx are episodic and moderate in severity. No exacerbating or mitigating factors reported. Associated sx include abd pain and confusion. Pt denies fever, chills, N/V, blood in stool, dysuria, and all other sx at this time.  ED workup shows: BUN 43, Cr. 1.8, eGFR 26, ALT 56, lipase 92. U/a pending. Stool studies pending  She is a full code and her SDM is her son, Amish  She will be kept on OBS under the care of hospital medicine.  Patient is unable to quantify or describe abd pain 2/2 confusion but flinches when I palpate over any part of her abd.    Past Medical History:   Diagnosis Date    A-fib     Anticoagulant long-term use     Aortic valve disease     Cancer     brain tumor, 10 years ago    Cardiomyopathy     CHF (congestive heart failure)     Hx of heart valve replacement with mechanical valve     Hyperlipidemia     Hypertension     Pacemaker     Pacemaker     Stroke     2007, residual weakness       Past Surgical History:   Procedure Laterality  Date    AORTIC VALVE REPLACEMENT      CHOLECYSTECTOMY      CORONARY ARTERY BYPASS GRAFT      HYSTERECTOMY      INSERTION OF PACEMAKER      TONSILLECTOMY         Review of patient's allergies indicates:   Allergen Reactions    Amlodipine Other (See Comments)     Not sure    Chlorthalidone Other (See Comments)     Not sure    Clonidine Other (See Comments)     Not sure    Codeine Other (See Comments)     Not sure    Escitalopram Other (See Comments)     Not sure    Lisinopril Other (See Comments)     Not sure    Losartan Other (See Comments)     Not sure    Meperidine Other (See Comments)     Not sure    Methadone Other (See Comments)     Not sure      Morphine Other (See Comments)     Not sure    Nebivolol Other (See Comments)     Not sure        Nitrofurantoin Other (See Comments)     Not sure        Omeprazole Other (See Comments)     Not sure      Pravastatin Other (See Comments)     Not sure      Propoxyphene Other (See Comments)     Not sure      Sulfamethoxazole-trimethoprim Other (See Comments)     Not sure           No current facility-administered medications on file prior to encounter.     Current Outpatient Medications on File Prior to Encounter   Medication Sig    furosemide (LASIX) 20 MG tablet Take 20 mg by mouth daily as needed (increased fluid).    lisinopriL (PRINIVIL,ZESTRIL) 5 MG tablet Take 5 mg by mouth 2 (two) times daily.    metoprolol tartrate (LOPRESSOR) 25 MG tablet Take 1 tablet by mouth 2 (two) times daily.    triamterene-hydrochlorothiazide 37.5-25 mg (MAXZIDE-25) 37.5-25 mg per tablet Take 0.5 tablets by mouth 2 (two) times a day.    warfarin (COUMADIN) 2.5 MG tablet Take 2.5 mg by mouth nightly. on Monday, Wednesday, Friday, and Saturday; take 1/2 tablet (1.25 mg) by mouth nightly on Tuesday, Thursday, and Sunday.    [DISCONTINUED] dexAMETHasone (DECADRON) 6 MG tablet Take 1 tablet (6 mg total) by mouth once daily.  for 7 more days    [DISCONTINUED] linezolid (ZYVOX) 600 mg Tab Take 1  tablet (600 mg total) by mouth every 12 (twelve) hours. for 4 days    [DISCONTINUED] lisinopril 10 MG tablet Take 10 mg by mouth.    [DISCONTINUED] metoprolol tartrate (LOPRESSOR) 25 MG tablet Take 25 mg by mouth.    [DISCONTINUED] traMADol (ULTRAM) 50 mg tablet Take 50 mg by mouth.     Family History       Problem Relation (Age of Onset)    No Known Problems Mother, Father          Tobacco Use    Smoking status: Never Smoker    Smokeless tobacco: Never Used   Substance and Sexual Activity    Alcohol use: Not Currently    Drug use: Never    Sexual activity: Not on file     Review of Systems   Constitutional:  Negative for activity change, appetite change, chills, fatigue and fever.   HENT:  Negative for congestion, dental problem, ear pain, hearing loss, mouth sores, sinus pressure, sore throat, tinnitus and trouble swallowing.    Eyes:  Negative for pain, discharge, redness and visual disturbance.   Respiratory:  Negative for apnea, cough, choking, chest tightness, shortness of breath and wheezing.    Cardiovascular:  Negative for chest pain, palpitations and leg swelling.   Gastrointestinal:  Positive for abdominal pain and diarrhea. Negative for abdominal distention, anal bleeding, blood in stool, constipation, nausea, rectal pain and vomiting.   Endocrine: Negative for cold intolerance, heat intolerance, polydipsia and polyuria.   Genitourinary:  Negative for difficulty urinating, dysuria, flank pain, frequency, hematuria and urgency.   Musculoskeletal:  Negative for arthralgias, back pain, gait problem, joint swelling, myalgias, neck pain and neck stiffness.   Skin:  Negative for color change, rash and wound.   Allergic/Immunologic: Negative.    Neurological:  Negative for dizziness, tremors, seizures, syncope, speech difficulty, light-headedness and headaches.   Hematological:  Negative for adenopathy. Bruises/bleeds easily.   Psychiatric/Behavioral:  Positive for confusion (mild confusion). Negative for  agitation, behavioral problems and sleep disturbance. The patient is not nervous/anxious.    All other systems reviewed and are negative.  Objective:     Vital Signs (Most Recent):  Temp: 97.6 °F (36.4 °C) (07/31/22 0940)  Pulse: 64 (07/31/22 1120)  Resp: 16 (07/31/22 1120)  BP: (!) 105/58 (07/31/22 1120)  SpO2: 99 % (07/31/22 1120)   Vital Signs (24h Range):  Temp:  [97.6 °F (36.4 °C)] 97.6 °F (36.4 °C)  Pulse:  [64-80] 64  Resp:  [16-19] 16  SpO2:  [89 %-99 %] 99 %  BP: ()/(58-60) 105/58     Weight: 95.4 kg (210 lb 5.1 oz)  Body mass index is 39.74 kg/m².    Physical Exam  Vitals and nursing note reviewed.   Constitutional:       General: She is not in acute distress.     Appearance: She is well-developed. She is obese. She is not diaphoretic.   HENT:      Head: Normocephalic and atraumatic.      Nose: Nose normal.   Eyes:      General: No scleral icterus.     Extraocular Movements: Extraocular movements intact.      Pupils: Pupils are equal, round, and reactive to light.   Neck:      Thyroid: No thyromegaly.      Vascular: No JVD.      Trachea: No tracheal deviation.   Cardiovascular:      Rate and Rhythm: Normal rate and regular rhythm.      Heart sounds: Normal heart sounds. No murmur heard.    No friction rub. No gallop.   Pulmonary:      Effort: Pulmonary effort is normal. No respiratory distress.      Breath sounds: Normal breath sounds. No wheezing or rales.   Chest:      Chest wall: No tenderness.   Abdominal:      General: Bowel sounds are normal. There is no distension.      Palpations: Abdomen is soft. There is no mass.      Tenderness: There is abdominal tenderness.   Genitourinary:     Comments: deferred  Musculoskeletal:         General: No tenderness or deformity. Normal range of motion.      Cervical back: Normal range of motion and neck supple.   Skin:     General: Skin is warm and dry.      Capillary Refill: Capillary refill takes less than 2 seconds.      Coloration: Skin is not pale.       Findings: No erythema or rash.   Neurological:      Mental Status: She is alert and oriented to person, place, and time. Mental status is at baseline.      Cranial Nerves: No cranial nerve deficit.      Motor: No abnormal muscle tone.      Coordination: Coordination normal.   Psychiatric:         Mood and Affect: Mood normal.         Behavior: Behavior normal.         CRANIAL NERVES     CN III, IV, VI   Pupils are equal, round, and reactive to light.     Significant Labs: All pertinent labs within the past 24 hours have been reviewed.  Recent Lab Results         07/31/22  1319   07/31/22  1106   07/31/22  1105        Albumin     2.9       Alkaline Phosphatase     81       ALT     56       Anion Gap     13       Appearance, UA Clear           AST     35       Baso #     0.01       Basophil %     0.1       Bilirubin (UA) Negative           BILIRUBIN TOTAL     0.9  Comment: For infants and newborns, interpretation of results should be based  on gestational age, weight and in agreement with clinical  observations.    Premature Infant recommended reference ranges:  Up to 24 hours.............<8.0 mg/dL  Up to 48 hours............<12.0 mg/dL  3-5 days..................<15.0 mg/dL  6-29 days.................<15.0 mg/dL         BNP     81  Comment: Values of less than 100 pg/ml are consistent with non-CHF populations.       BUN     43       Calcium     8.7       Chloride     97       CO2     26       Color, UA Yellow           Creatinine     1.8       Differential Method     Automated       eGFR if      30       eGFR if non      26  Comment: Calculation used to obtain the estimated glomerular filtration  rate (eGFR) is the CKD-EPI equation.          Eos #     0.0       Eosinophil %     0.2       Glucose     101       Glucose, UA Negative           Gran # (ANC)     6.8       Gran %     66.1       Hematocrit     40.1       Hemoglobin     13.5       Hyaline Casts, UA 1           Immature Grans  (Abs)     0.08  Comment: Mild elevation in immature granulocytes is non specific and   can be seen in a variety of conditions including stress response,   acute inflammation, trauma and pregnancy. Correlation with other   laboratory and clinical findings is essential.         Immature Granulocytes     0.8       INR     2.1  Comment: Coumadin Therapy:  2.0 - 3.0 for INR for all indicators except mechanical heart valves  and antiphospholipid syndromes which should use 2.5 - 3.5.         Ketones, UA Negative           Lactate, Rico   1.6  Comment: Falsely low lactic acid results can be found in samples   containing >=13.0 mg/dL total bilirubin and/or >=3.5 mg/dL   direct bilirubin.           Leukocytes, UA Negative           Lipase     92       Lymph #     1.3       Lymph %     12.9       Magnesium     1.6       MCH     29.9       MCHC     33.7       MCV     89       Microscopic Comment SEE COMMENT  Comment: Other formed elements not mentioned in the report are not   present in the microscopic examination.              Mono #     2.0       Mono %     19.9       MPV     10.0       NITRITE UA Negative           nRBC     0       Occult Blood UA 1+           pH, UA 7.0           Platelets     235       Potassium     3.8       PROTEIN TOTAL     6.7       Protein, UA Negative  Comment: Recommend a 24 hour urine protein or a urine   protein/creatinine ratio if globulin induced proteinuria is  clinically suspected.             Protime     22.0       RBC     4.52       RBC, UA 1           RDW     13.6       Sodium     136       Specific [employer], UA 1.015           Specimen UA Urine, Clean Catch           Troponin I     0.023  Comment: The reference interval for Troponin I represents the 99th percentile   cutoff   for our facility and is consistent with 3rd generation assay   performance.         UROBILINOGEN UA Negative           WBC, UA 2           WBC     10.27               Significant Imaging: I have reviewed all pertinent  imaging results/findings within the past 24 hours.    Assessment/Plan:     * Diarrhea  --pt reports diarrhea X 6 days  --CT abd shows   1. Colonic air-fluid levels.  Correlate for diarrheal illness.  No bowel obstruction or focal inflammatory process.  Normal appendix.  2. Left renal cyst, 3 cm.  3. Cholecystectomy.  Hysterectomy.  4. Moderate levoscoliosis lumbar spine with multilevel advanced degenerative disc disease sequela.  Severe right moderate left hip osteoarthritis.  --stool studies pending, including c diff  --gentle IVF's  --monitor electrolytes  --repeat BMP in AM      Elevated lipase  --gentle IVF's 2/2 heart failure  --repeat lipase in AM      AMS (altered mental status)  --UTI vs MARCUS vs long term covid  --CT head shows Stable head CT. Remote left parietal lobe infarct with encephalomalacia. Moderate deep white matter microangiopathy. Atrophy. No bleed.  --u/a pending  --gentle IVF's  --neuro checks      MARCUS (acute kidney injury)  Patient with acute kidney injury likely due to diarrhea MARCUS is currently stable. Labs reviewed- Renal function/electrolytes with Estimated Creatinine Clearance: 26.3 mL/min (A) (based on SCr of 1.8 mg/dL (H)). according to latest data. Monitor urine output and serial BMP and adjust therapy as needed. Avoid nephrotoxins and renally dose meds for GFR listed above.   --gentle IVF's  --repeat BMP in AM      Paroxysmal atrial fibrillation  Patient with Persistent (7 days or more) atrial fibrillation which is controlled currently with Beta Blocker. Patient is currently in sinus rhythm.[id number] Score: 3. HASBLED Score: Unable to calculate. Anticoagulation indicated. Anticoagulation done with coumadin.  --pharmacy to manage coumadin        Pacemaker  --tele monitoring        History of aortic valve replacement  --on coumadin  --daily INR  --pharmacy to manage coumadin      Essential hypertension  --stable  --hold hctz and lisinopril 2/2 marcus  --continue BB  --PRN  hydralazine      Anticoagulant long-term use  --mechanical aortic valve and a fib  --INR 2.1 on admit  --pharmacy to manage coumadin      VTE Risk Mitigation (From admission, onward)           Ordered     IP VTE HIGH RISK PATIENT  Once         07/31/22 1426     Place sequential compression device  Until discontinued         07/31/22 1426                       BRIAN Jarvis  Department of Hospital Medicine   '[facility] - Emergency Dept.

## 2022-08-01 LAB
ANION GAP SERPL CALC-SCNC: 7 MMOL/L (ref 8–16)
BASOPHILS # BLD AUTO: 0.01 K/UL (ref 0–0.2)
BASOPHILS NFR BLD: 0.1 % (ref 0–1.9)
BUN SERPL-MCNC: 34 MG/DL (ref 8–23)
CALCIUM SERPL-MCNC: 7.7 MG/DL (ref 8.7–10.5)
CHLORIDE SERPL-SCNC: 105 MMOL/L (ref 95–110)
CO2 SERPL-SCNC: 24 MMOL/L (ref 23–29)
CREAT SERPL-MCNC: 1.1 MG/DL (ref 0.5–1.4)
DIFFERENTIAL METHOD: ABNORMAL
EOSINOPHIL # BLD AUTO: 0.1 K/UL (ref 0–0.5)
EOSINOPHIL NFR BLD: 1.6 % (ref 0–8)
ERYTHROCYTE [DISTWIDTH] IN BLOOD BY AUTOMATED COUNT: 13.8 % (ref 11.5–14.5)
EST. GFR  (NO RACE VARIABLE): 51 ML/MIN/1.73 M^2
GLUCOSE SERPL-MCNC: 80 MG/DL (ref 70–110)
HCT VFR BLD AUTO: 41.2 % (ref 37–48.5)
HGB BLD-MCNC: 12.7 G/DL (ref 12–16)
IMM GRANULOCYTES # BLD AUTO: 0.06 K/UL (ref 0–0.04)
IMM GRANULOCYTES NFR BLD AUTO: 0.7 % (ref 0–0.5)
INR PPP: 2 (ref 0.8–1.2)
LIPASE SERPL-CCNC: 72 U/L (ref 4–60)
LYMPHOCYTES # BLD AUTO: 1.1 K/UL (ref 1–4.8)
LYMPHOCYTES NFR BLD: 13.8 % (ref 18–48)
MCH RBC QN AUTO: 28.9 PG (ref 27–31)
MCHC RBC AUTO-ENTMCNC: 30.8 G/DL (ref 32–36)
MCV RBC AUTO: 94 FL (ref 82–98)
MONOCYTES # BLD AUTO: 1.4 K/UL (ref 0.3–1)
MONOCYTES NFR BLD: 16.5 % (ref 4–15)
NEUTROPHILS # BLD AUTO: 5.5 K/UL (ref 1.8–7.7)
NEUTROPHILS NFR BLD: 67.3 % (ref 38–73)
NRBC BLD-RTO: 0 /100 WBC
PLATELET # BLD AUTO: 151 K/UL (ref 150–450)
PLATELET BLD QL SMEAR: ABNORMAL
PMV BLD AUTO: 10.8 FL (ref 9.2–12.9)
POTASSIUM SERPL-SCNC: 4.2 MMOL/L (ref 3.5–5.1)
PROTHROMBIN TIME: 21.2 SEC (ref 9–12.5)
RBC # BLD AUTO: 4.4 M/UL (ref 4–5.4)
SODIUM SERPL-SCNC: 136 MMOL/L (ref 136–145)
WBC # BLD AUTO: 8.19 K/UL (ref 3.9–12.7)

## 2022-08-01 PROCEDURE — 97530 THERAPEUTIC ACTIVITIES: CPT

## 2022-08-01 PROCEDURE — 36415 COLL VENOUS BLD VENIPUNCTURE: CPT | Performed by: NURSE PRACTITIONER

## 2022-08-01 PROCEDURE — 96361 HYDRATE IV INFUSION ADD-ON: CPT

## 2022-08-01 PROCEDURE — 97167 OT EVAL HIGH COMPLEX 60 MIN: CPT

## 2022-08-01 PROCEDURE — 85610 PROTHROMBIN TIME: CPT | Performed by: NURSE PRACTITIONER

## 2022-08-01 PROCEDURE — 25000003 PHARM REV CODE 250: Performed by: NURSE PRACTITIONER

## 2022-08-01 PROCEDURE — 97163 PT EVAL HIGH COMPLEX 45 MIN: CPT

## 2022-08-01 PROCEDURE — 80048 BASIC METABOLIC PNL TOTAL CA: CPT | Performed by: NURSE PRACTITIONER

## 2022-08-01 PROCEDURE — G0378 HOSPITAL OBSERVATION PER HR: HCPCS

## 2022-08-01 PROCEDURE — A4216 STERILE WATER/SALINE, 10 ML: HCPCS | Performed by: NURSE PRACTITIONER

## 2022-08-01 PROCEDURE — 83690 ASSAY OF LIPASE: CPT | Performed by: NURSE PRACTITIONER

## 2022-08-01 PROCEDURE — 25000003 PHARM REV CODE 250: Performed by: INTERNAL MEDICINE

## 2022-08-01 PROCEDURE — 85025 COMPLETE CBC W/AUTO DIFF WBC: CPT | Performed by: NURSE PRACTITIONER

## 2022-08-01 RX ORDER — SODIUM CHLORIDE 9 MG/ML
INJECTION, SOLUTION INTRAVENOUS CONTINUOUS
Status: ACTIVE | OUTPATIENT
Start: 2022-08-01 | End: 2022-08-02

## 2022-08-01 RX ADMIN — SODIUM CHLORIDE: 0.9 INJECTION, SOLUTION INTRAVENOUS at 05:08

## 2022-08-01 RX ADMIN — Medication 10 ML: at 09:08

## 2022-08-01 RX ADMIN — Medication 6 MG: at 09:08

## 2022-08-01 RX ADMIN — METOPROLOL TARTRATE 25 MG: 25 TABLET, FILM COATED ORAL at 09:08

## 2022-08-01 RX ADMIN — SODIUM CHLORIDE: 0.9 INJECTION, SOLUTION INTRAVENOUS at 04:08

## 2022-08-01 RX ADMIN — MICONAZOLE NITRATE: 2 OINTMENT TOPICAL at 04:08

## 2022-08-01 RX ADMIN — Medication 10 ML: at 05:08

## 2022-08-01 RX ADMIN — SODIUM CHLORIDE: 0.9 INJECTION, SOLUTION INTRAVENOUS at 03:08

## 2022-08-01 NOTE — SUBJECTIVE & OBJECTIVE
Interval History: Plan discharge home tomorrow with hospice.     Review of Systems   Constitutional:  Negative for activity change, appetite change, chills, fatigue and fever.   HENT:  Negative for congestion, dental problem, ear pain, hearing loss, mouth sores, sinus pressure, sore throat, tinnitus and trouble swallowing.    Eyes:  Negative for pain, discharge, redness and visual disturbance.   Respiratory:  Negative for apnea, cough, choking, chest tightness, shortness of breath and wheezing.    Cardiovascular:  Negative for chest pain, palpitations and leg swelling.   Gastrointestinal:  Positive for abdominal pain and diarrhea (decreasing). Negative for abdominal distention, anal bleeding, blood in stool, constipation, nausea, rectal pain and vomiting.   Endocrine: Negative for cold intolerance, heat intolerance, polydipsia and polyuria.   Genitourinary:  Negative for difficulty urinating, dysuria, flank pain, frequency, hematuria and urgency.   Musculoskeletal:  Negative for arthralgias, back pain, gait problem, joint swelling, myalgias, neck pain and neck stiffness.   Skin:  Negative for color change, rash and wound.   Allergic/Immunologic: Negative.    Neurological:  Negative for dizziness, tremors, seizures, syncope, speech difficulty, light-headedness and headaches.   Hematological:  Negative for adenopathy. Bruises/bleeds easily.   Psychiatric/Behavioral:  Positive for confusion (mild confusion). Negative for agitation, behavioral problems and sleep disturbance. The patient is not nervous/anxious.    All other systems reviewed and are negative.  Objective:     Vital Signs (Most Recent):  Temp: 97.6 °F (36.4 °C) (08/01/22 1557)  Pulse: 87 (08/01/22 1557)  Resp: 20 (08/01/22 1557)  BP: 115/68 (08/01/22 1557)  SpO2: 98 % (08/01/22 1557) Vital Signs (24h Range):  Temp:  [97.4 °F (36.3 °C)-98.2 °F (36.8 °C)] 97.6 °F (36.4 °C)  Pulse:  [] 87  Resp:  [18-20] 20  SpO2:  [95 %-99 %] 98 %  BP: (110-125)/(63-78)  115/68     Weight: 101.2 kg (223 lb 1.7 oz)  Body mass index is 42.16 kg/m².  No intake or output data in the 24 hours ending 08/01/22 1717   Physical Exam  Vitals and nursing note reviewed.   Constitutional:       General: She is not in acute distress.     Appearance: She is well-developed. She is obese. She is not diaphoretic.   HENT:      Head: Normocephalic and atraumatic.      Nose: Nose normal.   Eyes:      General: No scleral icterus.     Extraocular Movements: Extraocular movements intact.      Pupils: Pupils are equal, round, and reactive to light.   Neck:      Thyroid: No thyromegaly.      Vascular: No JVD.      Trachea: No tracheal deviation.   Cardiovascular:      Rate and Rhythm: Normal rate and regular rhythm.      Heart sounds: Normal heart sounds. No murmur heard.    No friction rub. No gallop.   Pulmonary:      Effort: Pulmonary effort is normal. No respiratory distress.      Breath sounds: Normal breath sounds. No wheezing or rales.   Chest:      Chest wall: No tenderness.   Abdominal:      General: Bowel sounds are normal. There is no distension.      Palpations: Abdomen is soft. There is no mass.      Tenderness: There is no abdominal tenderness.      Comments: Large obese abdomen      Genitourinary:     Comments: deferred  Musculoskeletal:         General: No tenderness or deformity. Normal range of motion.      Cervical back: Normal range of motion and neck supple.   Skin:     General: Skin is warm and dry.      Capillary Refill: Capillary refill takes less than 2 seconds.      Coloration: Skin is not pale.      Findings: No erythema or rash.   Neurological:      Mental Status: She is alert and oriented to person, place, and time. Mental status is at baseline.      Cranial Nerves: No cranial nerve deficit.      Motor: No abnormal muscle tone.      Coordination: Coordination normal.   Psychiatric:         Mood and Affect: Mood normal.         Behavior: Behavior normal.       Significant Labs:  All pertinent labs within the past 24 hours have been reviewed.  CBC:   Recent Labs   Lab 07/31/22  1105 08/01/22  0609   WBC 10.27 8.19   HGB 13.5 12.7   HCT 40.1 41.2    151     CMP:   Recent Labs   Lab 07/31/22  1105 08/01/22  0609    136   K 3.8 4.2   CL 97 105   CO2 26 24    80   BUN 43* 34*   CREATININE 1.8* 1.1   CALCIUM 8.7 7.7*   PROT 6.7  --    ALBUMIN 2.9*  --    BILITOT 0.9  --    ALKPHOS 81  --    AST 35  --    ALT 56*  --    ANIONGAP 13 7*   EGFRNONAA 26*  --        Significant Imaging:     Imaging Results              CT Abdomen Pelvis  Without Contrast (Final result)  Result time 07/31/22 10:53:06      Final result by Yasir Mandel MD (07/31/22 10:53:06)                   Impression:      1. Colonic air-fluid levels.  Correlate for diarrheal illness.  No bowel obstruction or focal inflammatory process.  Normal appendix.  2. Left renal cyst, 3 cm.  3. Cholecystectomy.  Hysterectomy.  4. Moderate levoscoliosis lumbar spine with multilevel advanced degenerative disc disease sequela.  Severe right moderate left hip osteoarthritis.  All CT scans at this facility are performed  using dose modulation techniques as appropriate to performed exam including the following:  automated exposure control; adjustment of mA and/or kV according to the patients size (this includes techniques or standardized protocols for targeted exams where dose is matched to indication/reason for exam: i.e. extremities or head);  iterative reconstruction technique.      Electronically signed by: Yasir Mandel MD  Date:    07/31/2022  Time:    10:53               Narrative:    EXAMINATION:  CT ABDOMEN PELVIS WITHOUT CONTRAST    CLINICAL HISTORY:  Abdominal pain, acute, nonlocalized;    TECHNIQUE:  Abdominal and pelvic CT without contrast.  Coronal and sagittal reformations.    COMPARISON:  None    FINDINGS:  Abdominal CT.  Bibasilar pleuroparenchymal bands of atelectasis or scarring.  Otherwise, lung bases are  clear.    Normal size heart.  Aortic valve replacement.    Noncontrast evaluation liver, spleen, pancreas, adrenal glands, and kidneys is normal other than a 3 cm cyst mid left kidney.    Normal caliber atherosclerotic aorta.  There is no lymphadenopathy.  Cholecystectomy.  There is no bile duct dilatation.    No bowel obstruction.  The appendix is normal.  No stool retention.  Colonic air-fluid levels.  Correlate for diarrheal illness.  No colonic bowel wall thickening or focal inflammatory stranding.  The GI tract is otherwise unremarkable.    There is moderate levoscoliosis thoracic spine with multilevel advanced degenerative disc disease sequela.    Pelvic CT.  The pelvic ring is intact.  Severe right and moderate left hip osteoarthritis sequela.    Pelvic bowel loops are unremarkable.  Ureters and urinary bladder are normal.    Hysterectomy.  Ovaries are absent.    There is no pelvic mass, free fluid, or lymphadenopathy.                                       CT Head Without Contrast (Final result)  Result time 07/31/22 10:46:44      Final result by Yasir Mandel MD (07/31/22 10:46:44)                   Impression:      Stable head CT.  Remote left parietal lobe infarct with encephalomalacia.  Moderate deep white matter microangiopathy.  Atrophy.  No bleed.    All CT scans at this facility are performed  using dose modulation techniques as appropriate to performed exam including the following:  automated exposure control; adjustment of mA and/or kV according to the patients size (this includes techniques or standardized protocols for targeted exams where dose is matched to indication/reason for exam: i.e. extremities or head);  iterative reconstruction technique.      Electronically signed by: Yasir Mandel MD  Date:    07/31/2022  Time:    10:46               Narrative:    EXAMINATION:  CT HEAD WITHOUT CONTRAST    CLINICAL HISTORY:  Stable head CT.  Atrophy.  Moderate deep white matter microangiopathy plate.   Remote left parietal lobe infarction with encephalomalacia.    TECHNIQUE:  Routine noncontrast head CT.    COMPARISON:  07/22/2022.    FINDINGS:  There has been no interval change.    There is no acute intracranial hemorrhage or abnormal extra-axial fluid collection.  There is advanced cerebral atrophy with ventricular-sulcal congruence.  Moderate deep white matter microangiopathy low attenuation.  Again, left parietal lobe remote infarction sequela with encephalomalacia.  Gray-white differentiation is otherwise well preserved.  There is no additional abnormal increased or decreased density within the brain parenchyma.    There is no intracranial mass or mass effect.    The calvarium is intact.  Visualized paranasal sinuses and mastoid air cells are well aerated.                                       X-Ray Chest AP Portable (Final result)  Result time 07/31/22 10:20:43      Final result by Yasir Mandel MD (07/31/22 10:20:43)                   Impression:      No acute process.      Electronically signed by: Yasir Mandel MD  Date:    07/31/2022  Time:    10:20               Narrative:    EXAMINATION:  XR CHEST AP PORTABLE    CLINICAL HISTORY:  Weakness    FINDINGS:  Comparison study 07/25/2022.  There has been interval removal of the left upper extremity PICC line.  Right chest wall cardiac pacemaker with intact leads.  Normal size heart.  Mild aortic tortuosity with aortic arch calcification.  No congestion.  The lungs are clear.

## 2022-08-01 NOTE — NURSING
Pt refusing all medications. NP notified. Continues to refuse repositioning and screaming during any attempts to turn. Attempted to call son, Amish, no answer.

## 2022-08-01 NOTE — PLAN OF CARE
[facility] - Telemetry (Hospital)  Initial Discharge Assessment       Primary Care Provider: Primary Doctor No    Admission Diagnosis: Weakness [R53.1]  Encephalopathy [G93.40]  Acute renal insufficiency [N28.9]  Hypoxia [R09.02]  Pain of upper abdomen [R10.10]  Chest pain [R07.9]  Intractable diarrhea [R19.7]  Lab test positive for detection of COVID-19 virus [U07.1]    Admission Date: 7/31/2022  Expected Discharge Date:     Discharge Barriers Identified: None    Payor: MEDICARE / Plan: MEDICARE PART A & B / Product Type: Government /     Extended Emergency Contact Information  Primary Emergency Contact: PostAmish  Mobile Phone: [phone]  Relation: Son  Preferred language: English   needed? No    Discharge Plan A: Skilled Nursing Facility  Discharge Plan B: Home with family      [id number] [place], [place] - [address]  [address] [facility] [address]  Phone: [phone] Fax: [phone]      Initial Assessment (most recent)     Adult Discharge Assessment - 08/01/22 1509        Discharge Assessment    Assessment Type Discharge Planning Assessment     Confirmed/corrected address, phone number and insurance Yes     Reason No family to provide information/patient unable to answer     Source of Information health record     Communicated RENE with patient/caregiver Date not available/Unable to determine     Reason For Admission diarrhea     Lives With child(solange), adult     Facility Arrived From: home     Do you expect to return to your current living situation? Yes     Do you have help at home or someone to help you manage your care at home? Yes     Who are your caregiver(s) and their phone number(s)? son     Prior to hospitilization cognitive status: Alert/Oriented     Current cognitive status: Unable to Assess;Not Oriented to Time;Not Oriented to Place     Walking or Climbing Stairs Difficulty ambulation difficulty, dependent      Dressing/Bathing Difficulty bathing difficulty, assistance 1 person;dressing difficulty, assistance 1 person     Equipment Currently Used at Home hospital bed     Readmission within 30 days? No     Patient currently being followed by outpatient case management? No     Do you currently have service(s) that help you manage your care at home? --   unknown    Do you take prescription medications? Yes     Do you have prescription coverage? Yes     Do you have any problems affording any of your prescribed medications? TBD     Who is going to help you get home at discharge? unknown     Are you on dialysis? No     Discharge Plan A Skilled Nursing Facility     Discharge Plan B Home with family     DME Needed Upon Discharge  other (see comments)   pending hospital course    Discharge Barriers Identified None               Anticipated DC Dispo: SNF vs possibly home with son, per clinic records patient previously on hospice  Prior Level of Function: unknown, patient owns hospital bed per Epic record review and needs assist dressing/bathing.  PCP: [facility] (Mary Carrillo, [place])  Comments:  Patient not appropriate to answer questions at this time, attempted to contact son for initial assessment, no answer. CM awaiting return call from son. CM attempted to contact PCP clinic as records show patient was previously enrolled in hospice.

## 2022-08-01 NOTE — PLAN OF CARE
P.T. EVAL COMPLETE.  PT CURRENTLY REQUIRES TOTAL ASSIST FOR BED MOBILITY.  P.T. RECOMMENDS SNF AT D/C

## 2022-08-01 NOTE — ASSESSMENT & PLAN NOTE
--pt reports diarrhea X 6 days  --CT abd shows   1. Colonic air-fluid levels.  Correlate for diarrheal illness.  No bowel obstruction or focal inflammatory process.  Normal appendix.  2. Left renal cyst, 3 cm.  3. Cholecystectomy.  Hysterectomy.  4. Moderate levoscoliosis lumbar spine with multilevel advanced degenerative disc disease sequela.  Severe right moderate left hip osteoarthritis.  --stool studies pending, including c diff  --gentle IVF's  --monitor electrolytes  --repeat BMP in AM      8/1/2022  Diarrhea decreasing   Decrease IVF rate to 50 ml/hr

## 2022-08-01 NOTE — ASSESSMENT & PLAN NOTE
Patient with acute kidney injury likely due to diarrhea MARCUS is currently stable. Labs reviewed- Renal function/electrolytes with Estimated Creatinine Clearance: 44.6 mL/min (based on SCr of 1.1 mg/dL). according to latest data. Monitor urine output and serial BMP and adjust therapy as needed. Avoid nephrotoxins and renally dose meds for GFR listed above.   --gentle IVF's  --repeat BMP in AM      8/1/2022  Renal function improve   Creatinine down to 1.1   Will follow

## 2022-08-01 NOTE — PT/OT/SLP EVAL
Physical Therapy Evaluation    Patient Name:  Serena Post   MRN:  [id number]    Recommendations:     Discharge Recommendations:  nursing facility, skilled   Discharge Equipment Recommendations:  (TBD AT NEXT LEVEL OF CARE)   Barriers to discharge: UNKNOWN AT THIS TIME    Assessment:     Serena Post is a 82 y.o. female admitted with a medical diagnosis of Diarrhea.  She presents with the following impairments/functional limitations:  weakness, impaired endurance, impaired functional mobility, gait instability, impaired balance, decreased safety awareness, decreased lower extremity function, decreased upper extremity function, decreased coordination.    Rehab Prognosis: Fair; patient would benefit from acute skilled PT services to address these deficits and reach maximum level of function.    Recent Surgery: * No surgery found *     Plan:     During this hospitalization, patient to be seen 3 x/week to address the identified rehab impairments via gait training, therapeutic activities, therapeutic exercises and progress toward the following goals:    · Plan of Care Expires:  08/15/22    Subjective     Chief Complaint: NONE VERBALIZED BUT PT SCREAMING OUT IN PAIN WITH MOVEMENT TO BLE  Patient/Family Comments/goals:   Pain/Comfort:  · Pain Rating 1: 0/10 (NO PAIN MENTIONED BUT PT SCREAMING OUT IN PAIN WITH ALL MOVEMENT TO BLE)  · Location - Side 1: Bilateral  · Location - Orientation 1: generalized  · Location 1: leg  · Pain Addressed 1: Cessation of Activity, Reposition    Patients cultural, spiritual, Sabianism conflicts given the current situation:      Living Environment:  PT IS POOR HISTORIAN, HOLDING EYES CLOSED ON PURPOSE, MUMBLING, REPEATING MUMBLING WORDS, LIMITED HISTORY OBTAINED FROM PT: PT REPORTS SHE LIVES ALONE AND WAS ABLE TO WALK PTA  Prior to admission, patients level of function was ?.  Equipment used at home:  (UNKNOWN).  DME owned (not currently used): none.  Upon discharge, patient  "will have assistance from ?.    Objective:     Communicated with NURSE KNIGHT prior to session.  Patient found supine with telemetry, oxygen, bed alarm, peripheral IV  upon PT entry to room.    General Precautions: Standard, fall, respiratory, airborne, contact, droplet   Orthopedic Precautions:N/A   Braces: N/A  Respiratory Status: Nasal cannula, flow 3 L/min    Exams:  · Cognitive Exam:  Patient is oriented to Person and UNABLE TO ASSESS FULLY, PT NOT ANSWERING QUESTIONS OR FOLLOWING ANY COMMANDS, HOLDING EYES CLOSED THROUGHOUT EVAL AND TREAT HOWEVER EYES OPENED WIDE WHEN I WAS LEAVING HER ROOM  · Sensation:    · -       Impaired  BLE  · RUE ROM: LIMITED AND PAINFUL WITH PROM, NO AROM DESPITE ENCOURAGEMENT  · LUE ROM: LIMITED AND PAINFUL WITH PROM, NO AROM DESPITE ENCOURAGEMENT  · RLE ROM: LIMITED AND PAINFUL WITH PROM, NO AROM DESPITE ENCOURAGEMENT  · LLE ROM: LIMITED AND PAINFUL WITH PROM, NO AROM DESPITE ENCOURAGEMENT    Functional Mobility:  · Bed Mobility:     · Rolling Left:  dependence  · Rolling Right: dependence  · Scooting: dependence  · Supine to Sit: dependence    Therapeutic Activities and Exercises:   PT EDUCATED IN ROLE OF P.T. AND POC IN ACUTE CARE HOSPITAL SETTING, ENCOURAGED TO INCREASE TIME OOB IN CHAIR TO TOLERANCE, EDUCATED IN AND ENCOURAGED TO PERFORM BLE AND BUE THEREX WHILE SUPINE THROUGHOUT THE DAY TO TOLERANCE: HIP FLEX/EXT, QUAD SET, LAQ, HEEL SLIDES, AP'S, SHOULDER FLEX/EXT, ELBOW FLEX/EXT.  PT NOT AGREEABLE.  LETHARGIC AND MUMBLING THROUGHOUT EVAL AND TREAT, SCREAMING IN PAIN WITH PROM TO BLE AND BUE  PT EDUCATED ON RISK FOR FALLS DUE TO GENERALIZED WEAKNESS, EDUCATED ON "CALL DON'T FALL", ENCOURAGED TO CALL FOR ASSISTANCE WITH ALL NEEDS    AM-PAC 6 CLICK MOBILITY  Total Score:6     Patient left HOB elevated with all lines intact, call button in reach, bed alarm on and NURSE notified.    GOALS:   Multidisciplinary Problems     Physical Therapy Goals        Problem: Physical Therapy    " Goal Priority Disciplines Outcome Goal Variances Interventions   Physical Therapy Goal     PT, PT/OT      Description: LTG'S TO BE MET IN 14 DAYS (8-15-22)  PT WILL REQUIRE FENG FOR BED MOBILITY  PT WILL REQUIRE FENG FOR BED<>CHAIR TF'S  PT WILL AMB 50 FEET WITH RW AND FENG                     History:     Past Medical History:   Diagnosis Date    A-fib     Anticoagulant long-term use     Aortic valve disease     Cancer     brain tumor, 10 years ago    Cardiomyopathy     CHF (congestive heart failure)     COVID-19 7/23/2022    Hx of heart valve replacement with mechanical valve     Hyperlipidemia     Hypertension     Pacemaker     Pacemaker     Sepsis 7/23/2022    Stroke     2007, residual weakness       Past Surgical History:   Procedure Laterality Date    AORTIC VALVE REPLACEMENT      CHOLECYSTECTOMY      CORONARY ARTERY BYPASS GRAFT      HYSTERECTOMY      INSERTION OF PACEMAKER      TONSILLECTOMY         Time Tracking:     PT Received On: 08/01/22  PT Start Time: 0735     PT Stop Time: 0758  PT Total Time (min): 23 min     Billable Minutes: Evaluation 15 and Therapeutic Activity 8    08/01/2022

## 2022-08-01 NOTE — CONSULTS
[facility] - Tele[facility] ([facility])  Wound Care    Patient Name:  Serena Post   MRN:  [id number]  Date: 8/1/2022  Diagnosis: Diarrhea    History:     Past Medical History:   Diagnosis Date    A-fib     Anticoagulant long-term use     Aortic valve disease     Cancer     brain tumor, 10 years ago    Cardiomyopathy     CHF (congestive heart failure)     COVID-19 7/23/2022    Hx of heart valve replacement with mechanical valve     Hyperlipidemia     Hypertension     Pacemaker     Pacemaker     Sepsis 7/23/2022    Stroke     2007, residual weakness       Social History     Socioeconomic History    Marital status:    Tobacco Use    Smoking status: Never Smoker    Smokeless tobacco: Never Used   Substance and Sexual Activity    Alcohol use: Not Currently    Drug use: Never       Precautions:     Allergies as of 07/31/2022 - Reviewed 07/31/2022   Allergen Reaction Noted    Amlodipine Other (See Comments) 09/25/2019    Chlorthalidone Other (See Comments) 09/25/2019    Clonidine Other (See Comments) 09/25/2019    Codeine Other (See Comments) 09/25/2019    Escitalopram Other (See Comments) 09/25/2019    Lisinopril Other (See Comments) 09/25/2019    Losartan Other (See Comments) 09/25/2019    Meperidine Other (See Comments) 09/25/2019    Methadone Other (See Comments) 09/25/2019    Morphine Other (See Comments) 09/25/2019    Nebivolol Other (See Comments) 09/25/2019    Nitrofurantoin Other (See Comments) 09/25/2019    Omeprazole Other (See Comments) 09/25/2019    Pravastatin Other (See Comments) 09/25/2019    Propoxyphene Other (See Comments) 09/25/2019    Sulfamethoxazole-trimethoprim Other (See Comments) 09/25/2019       [facility] Assessment Details/Treatment     Consulted on Ms. Post due to present on admission skin breakdown to bilateral buttocks, bilateral medial and posterior thighs, and bilateral groins. She is resting with eyes closed, does not open eyes or respond verbally at this  time, however, upon turning, does scream out and have nonsensical language.   Bilateral heels intact with blanchable redness noted. Incontinent of urine and stool, currently wearing brief. Brief removed, small amount loose stool noted, incontinence care provided with bath wipes. IAD noted to bilateral buttocks, bilateral upper and medial thighs, and bilateral groins with deep red discoloration and satellite lesions. Recommend avoid diapers, female external urinary management system, Antifungal moisture barrier ointment BID, turn q2 hours, waffle mattress overlay, float heels. Will follow.        08/01/22 1115   WOCN Assessment   WOCN Total Time (mins) 30   Visit Date 08/01/22   Visit Time 1115   Consult Type New   WOCN Speciality Wound   Wound moisture   Continence Type Urinary;Fecal   Intervention assessed;applied;chart review;coordination of care;team conference;orders   Teaching on-going   Pressure Injury Prevention    Check Moisture Management Pad Done   Sacral Foam Dressing Remove   Check Medical Devices Done        Altered Skin Integrity 07/31/22 2000 Groin Incontinence associated dermatitis   Date First Assessed/Time First Assessed: 07/31/22 2000   Altered Skin Integrity Present on Admission: yes  Location: Groin  Primary Wound Type: Incontinence associated dermatitis   Dressing Appearance Open to air   Drainage Amount Scant   Drainage Characteristics/Odor Serous   Appearance Red   Tissue loss description Partial thickness   Periwound Area Satellite lesion   Wound Edges Irregular   Care Cleansed with:;Soap and water;Applied:;Skin Barrier        Altered Skin Integrity 07/31/22 2000 Buttocks Incontinence associated dermatitis   Date First Assessed/Time First Assessed: 07/31/22 2000   Altered Skin Integrity Present on Admission: yes  Location: Buttocks  Primary Wound Type: Incontinence associated dermatitis   Wound Image    Dressing Appearance Open to air   Drainage Amount Scant   Drainage Characteristics/Odor  Serous   Appearance Red   Tissue loss description Partial thickness   Periwound Area Satellite lesion   Wound Edges Irregular   Care Cleansed with:;Soap and water;Applied:;Skin Barrier       08/01/2022

## 2022-08-01 NOTE — CONSULTS
CM received phone call from son regarding patient. Patient was recently with [facility], son states he has now found and is meeting with them tomorrow ([facility]). Son states only d/c plan for patient is home, at baseline patient is able to transfer from bed to w/c and back x1 handheld assist. Patient has w/c and hospital bed. SHIKHA notified MD of conversation via secure chat.    [facility] ([facility])  [phone]  Fax: [phone]

## 2022-08-01 NOTE — PLAN OF CARE
Problem: Adult Inpatient Plan of Care  Goal: Plan of Care Review  Outcome: Ongoing, Not Progressing     Problem: Adult Inpatient Plan of Care  Goal: Readiness for Transition of Care  Outcome: Ongoing, Not Progressing     Problem: Impaired Wound Healing  Goal: Optimal Wound Healing  Outcome: Ongoing, Not Progressing

## 2022-08-01 NOTE — HOSPITAL COURSE
Ms Post is a 82 year old female who presented to [facility] Emergency Room for evaluation of diarrhea that started about one week ago. Creatinine mildly elevated above baseline at 1.8. She was started on IVF's. Today, creatinine treading downward, 1.1 today. Will follow. As of 8/2/2022, patient vitals signs and labs. No distress, no complaint of pain or discomfort. Spoke with patient's son the telephone. He was updated on patient overall condition and treatment plan. As of 8/3/2022, vital signs and labs stable, patient having no distress, doing well. She was seen, examined and deemed suitable for discharge home with Hospice.

## 2022-08-01 NOTE — PROGRESS NOTES
[facility] Medicine  Progress Note    Patient Name: Serena Post  MRN: [id number]  Patient Class: OP- Observation   Admission Date: 7/31/2022  Length of Stay: 0 days  Attending Physician: Valente Espinal MD  Primary Care Provider: Primary Doctor No        Subjective:     Principal Problem:Diarrhea        HPI:  81 y/o female with PMHx of HTN, HLD, CHF, a fib, cardiac pacemaker,  mechanical aortic valve, RA, DVT, CVA, cognitive impairment, UTI, sepsis, alt mental status and recent covid who presented to the ED with c/o diarrhea that onset gradually 1 week ago. Pt was dc'd from this facility on 7/26 for sepsis, UTI, and covid. Sx are episodic and moderate in severity. No exacerbating or mitigating factors reported. Associated sx include abd pain and confusion. Pt denies fever, chills, N/V, blood in stool, dysuria, and all other sx at this time.  ED workup shows: BUN 43, Cr. 1.8, eGFR 26, ALT 56, lipase 92. U/a pending. Stool studies pending  She is a full code and her SDM is her son, Amish      Overview/Hospital Course:  Ms Post is a 82 year old female who presented to [facility] for evaluation of diarrhea that started about one week ago. Creatinine mildly elevated above baseline at 1.8. She was started on IVF's. Today, creatinine treading downward, 1.1 today. Will follow.       Interval History: Plan discharge home tomorrow with hospice.     Review of Systems   Constitutional:  Negative for activity change, appetite change, chills, fatigue and fever.   HENT:  Negative for congestion, dental problem, ear pain, hearing loss, mouth sores, sinus pressure, sore throat, tinnitus and trouble swallowing.    Eyes:  Negative for pain, discharge, redness and visual disturbance.   Respiratory:  Negative for apnea, cough, choking, chest tightness, shortness of breath and wheezing.    Cardiovascular:  Negative for chest pain, palpitations and leg swelling.   Gastrointestinal:  Positive  for abdominal pain and diarrhea (decreasing). Negative for abdominal distention, anal bleeding, blood in stool, constipation, nausea, rectal pain and vomiting.   Endocrine: Negative for cold intolerance, heat intolerance, polydipsia and polyuria.   Genitourinary:  Negative for difficulty urinating, dysuria, flank pain, frequency, hematuria and urgency.   Musculoskeletal:  Negative for arthralgias, back pain, gait problem, joint swelling, myalgias, neck pain and neck stiffness.   Skin:  Negative for color change, rash and wound.   Allergic/Immunologic: Negative.    Neurological:  Negative for dizziness, tremors, seizures, syncope, speech difficulty, light-headedness and headaches.   Hematological:  Negative for adenopathy. Bruises/bleeds easily.   Psychiatric/Behavioral:  Positive for confusion (mild confusion). Negative for agitation, behavioral problems and sleep disturbance. The patient is not nervous/anxious.    All other systems reviewed and are negative.  Objective:     Vital Signs (Most Recent):  Temp: 97.6 °F (36.4 °C) (08/01/22 1557)  Pulse: 87 (08/01/22 1557)  Resp: 20 (08/01/22 1557)  BP: 115/68 (08/01/22 1557)  SpO2: 98 % (08/01/22 1557) Vital Signs (24h Range):  Temp:  [97.4 °F (36.3 °C)-98.2 °F (36.8 °C)] 97.6 °F (36.4 °C)  Pulse:  [] 87  Resp:  [18-20] 20  SpO2:  [95 %-99 %] 98 %  BP: (110-125)/(63-78) 115/68     Weight: 101.2 kg (223 lb 1.7 oz)  Body mass index is 42.16 kg/m².  No intake or output data in the 24 hours ending 08/01/22 1717   Physical Exam  Vitals and nursing note reviewed.   Constitutional:       General: She is not in acute distress.     Appearance: She is well-developed. She is obese. She is not diaphoretic.   HENT:      Head: Normocephalic and atraumatic.      Nose: Nose normal.   Eyes:      General: No scleral icterus.     Extraocular Movements: Extraocular movements intact.      Pupils: Pupils are equal, round, and reactive to light.   Neck:      Thyroid: No thyromegaly.       Vascular: No JVD.      Trachea: No tracheal deviation.   Cardiovascular:      Rate and Rhythm: Normal rate and regular rhythm.      Heart sounds: Normal heart sounds. No murmur heard.    No friction rub. No gallop.   Pulmonary:      Effort: Pulmonary effort is normal. No respiratory distress.      Breath sounds: Normal breath sounds. No wheezing or rales.   Chest:      Chest wall: No tenderness.   Abdominal:      General: Bowel sounds are normal. There is no distension.      Palpations: Abdomen is soft. There is no mass.      Tenderness: There is no abdominal tenderness.      Comments: Large obese abdomen      Genitourinary:     Comments: deferred  Musculoskeletal:         General: No tenderness or deformity. Normal range of motion.      Cervical back: Normal range of motion and neck supple.   Skin:     General: Skin is warm and dry.      Capillary Refill: Capillary refill takes less than 2 seconds.      Coloration: Skin is not pale.      Findings: No erythema or rash.   Neurological:      Mental Status: She is alert and oriented to person, place, and time. Mental status is at baseline.      Cranial Nerves: No cranial nerve deficit.      Motor: No abnormal muscle tone.      Coordination: Coordination normal.   Psychiatric:         Mood and Affect: Mood normal.         Behavior: Behavior normal.       Significant Labs: All pertinent labs within the past 24 hours have been reviewed.  CBC:   Recent Labs   Lab 07/31/22  1105 08/01/22  0609   WBC 10.27 8.19   HGB 13.5 12.7   HCT 40.1 41.2    151     CMP:   Recent Labs   Lab 07/31/22  1105 08/01/22  0609    136   K 3.8 4.2   CL 97 105   CO2 26 24    80   BUN 43* 34*   CREATININE 1.8* 1.1   CALCIUM 8.7 7.7*   PROT 6.7  --    ALBUMIN 2.9*  --    BILITOT 0.9  --    ALKPHOS 81  --    AST 35  --    ALT 56*  --    ANIONGAP 13 7*   EGFRNONAA 26*  --        Significant Imaging:     Imaging Results              CT Abdomen Pelvis  Without Contrast (Final  result)  Result time 07/31/22 10:53:06      Final result by Yasir Mandel MD (07/31/22 10:53:06)                   Impression:      1. Colonic air-fluid levels.  Correlate for diarrheal illness.  No bowel obstruction or focal inflammatory process.  Normal appendix.  2. Left renal cyst, 3 cm.  3. Cholecystectomy.  Hysterectomy.  4. Moderate levoscoliosis lumbar spine with multilevel advanced degenerative disc disease sequela.  Severe right moderate left hip osteoarthritis.  All CT scans at this facility are performed  using dose modulation techniques as appropriate to performed exam including the following:  automated exposure control; adjustment of mA and/or kV according to the patients size (this includes techniques or standardized protocols for targeted exams where dose is matched to indication/reason for exam: i.e. extremities or head);  iterative reconstruction technique.      Electronically signed by: Yasir Mandel MD  Date:    07/31/2022  Time:    10:53               Narrative:    EXAMINATION:  CT ABDOMEN PELVIS WITHOUT CONTRAST    CLINICAL HISTORY:  Abdominal pain, acute, nonlocalized;    TECHNIQUE:  Abdominal and pelvic CT without contrast.  Coronal and sagittal reformations.    COMPARISON:  None    FINDINGS:  Abdominal CT.  Bibasilar pleuroparenchymal bands of atelectasis or scarring.  Otherwise, lung bases are clear.    Normal size heart.  Aortic valve replacement.    Noncontrast evaluation liver, spleen, pancreas, adrenal glands, and kidneys is normal other than a 3 cm cyst mid left kidney.    Normal caliber atherosclerotic aorta.  There is no lymphadenopathy.  Cholecystectomy.  There is no bile duct dilatation.    No bowel obstruction.  The appendix is normal.  No stool retention.  Colonic air-fluid levels.  Correlate for diarrheal illness.  No colonic bowel wall thickening or focal inflammatory stranding.  The GI tract is otherwise unremarkable.    There is moderate levoscoliosis thoracic spine with  multilevel advanced degenerative disc disease sequela.    Pelvic CT.  The pelvic ring is intact.  Severe right and moderate left hip osteoarthritis sequela.    Pelvic bowel loops are unremarkable.  Ureters and urinary bladder are normal.    Hysterectomy.  Ovaries are absent.    There is no pelvic mass, free fluid, or lymphadenopathy.                                       CT Head Without Contrast (Final result)  Result time 07/31/22 10:46:44      Final result by Yasir Mandel MD (07/31/22 10:46:44)                   Impression:      Stable head CT.  Remote left parietal lobe infarct with encephalomalacia.  Moderate deep white matter microangiopathy.  Atrophy.  No bleed.    All CT scans at this facility are performed  using dose modulation techniques as appropriate to performed exam including the following:  automated exposure control; adjustment of mA and/or kV according to the patients size (this includes techniques or standardized protocols for targeted exams where dose is matched to indication/reason for exam: i.e. extremities or head);  iterative reconstruction technique.      Electronically signed by: Yasir Mandel MD  Date:    07/31/2022  Time:    10:46               Narrative:    EXAMINATION:  CT HEAD WITHOUT CONTRAST    CLINICAL HISTORY:  Stable head CT.  Atrophy.  Moderate deep white matter microangiopathy plate.  Remote left parietal lobe infarction with encephalomalacia.    TECHNIQUE:  Routine noncontrast head CT.    COMPARISON:  07/22/2022.    FINDINGS:  There has been no interval change.    There is no acute intracranial hemorrhage or abnormal extra-axial fluid collection.  There is advanced cerebral atrophy with ventricular-sulcal congruence.  Moderate deep white matter microangiopathy low attenuation.  Again, left parietal lobe remote infarction sequela with encephalomalacia.  Gray-white differentiation is otherwise well preserved.  There is no additional abnormal increased or decreased density within  the brain parenchyma.    There is no intracranial mass or mass effect.    The calvarium is intact.  Visualized paranasal sinuses and mastoid air cells are well aerated.                                       X-Ray Chest AP Portable (Final result)  Result time 07/31/22 10:20:43      Final result by Yasir Mandel MD (07/31/22 10:20:43)                   Impression:      No acute process.      Electronically signed by: Yasir Mandel MD  Date:    07/31/2022  Time:    10:20               Narrative:    EXAMINATION:  XR CHEST AP PORTABLE    CLINICAL HISTORY:  Weakness    FINDINGS:  Comparison study 07/25/2022.  There has been interval removal of the left upper extremity PICC line.  Right chest wall cardiac pacemaker with intact leads.  Normal size heart.  Mild aortic tortuosity with aortic arch calcification.  No congestion.  The lungs are clear.                                         Assessment/Plan:      * Diarrhea  --pt reports diarrhea X 6 days  --CT abd shows   1. Colonic air-fluid levels.  Correlate for diarrheal illness.  No bowel obstruction or focal inflammatory process.  Normal appendix.  2. Left renal cyst, 3 cm.  3. Cholecystectomy.  Hysterectomy.  4. Moderate levoscoliosis lumbar spine with multilevel advanced degenerative disc disease sequela.  Severe right moderate left hip osteoarthritis.  --stool studies pending, including c diff  --gentle IVF's  --monitor electrolytes  --repeat BMP in AM      8/1/2022  Diarrhea decreasing   Decrease IVF rate to 50 ml/hr     Elevated lipase  --gentle IVF's 2/2 heart failure  --repeat lipase in AM      AMS (altered mental status)  --UTI vs MARCUS vs long term covid  --CT head shows Stable head CT. Remote left parietal lobe infarct with encephalomalacia. Moderate deep white matter microangiopathy. Atrophy. No bleed.  --u/a pending  --gentle IVF's  --neuro checks      MARCUS (acute kidney injury)  Patient with acute kidney injury likely due to diarrhea MARCUS is currently stable.  Labs reviewed- Renal function/electrolytes with Estimated Creatinine Clearance: 44.6 mL/min (based on SCr of 1.1 mg/dL). according to latest data. Monitor urine output and serial BMP and adjust therapy as needed. Avoid nephrotoxins and renally dose meds for GFR listed above.   --gentle IVF's  --repeat BMP in AM      8/1/2022  Renal function improve   Creatinine down to 1.1   Will follow      Paroxysmal atrial fibrillation  Patient with Persistent (7 days or more) atrial fibrillation which is controlled currently with Beta Blocker. Patient is currently in sinus rhythm.[id number] Score: 3. HASBLED Score: Unable to calculate. Anticoagulation indicated. Anticoagulation done with coumadin.  --pharmacy to manage coumadin        Pacemaker  --tele monitoring        History of aortic valve replacement  --on coumadin  --daily INR  --pharmacy to manage coumadin      Essential hypertension  --stable  --hold hctz and lisinopril 2/2 andreea  --continue BB  --PRN hydralazine      Anticoagulant long-term use  --mechanical aortic valve and a fib  --INR 2.1 on admit  --pharmacy to manage coumadin      8/1/2022  INR 2.0   Pharmacy dosing coumadin        VTE Risk Mitigation (From admission, onward)         Ordered     warfarin (COUMADIN) tablet 2.5 mg  Once per day on Mon Wed Fri Sat         07/31/22 1604     warfarin (COUMADIN) split tablet 1.25 mg  Once per day on Sun Tue Thu         07/31/22 1604     IP VTE HIGH RISK PATIENT  Once         07/31/22 1426     Place sequential compression device  Until discontinued         07/31/22 1426                Discharge Planning   RENE:      Code Status: Full Code   Is the patient medically ready for discharge?:     Reason for patient still in hospital (select all that apply): Patient trending condition and Treatment  Discharge Plan A: Skilled Nursing Facility                  Charlie Merino NP  Department of Hospital Medicine   [facility] - Telemetry ([facility])

## 2022-08-01 NOTE — PROGRESS NOTES
Coumadin Progress Notes:    Day #2  Indication: paroxysmal atrial fibrillation, mechanical AVR, history of DVT  Goal INR: 2.5-3.5   INR = 2 trended down from 2.1 yesterday  Home regimen:  1.25 mg every Tues, Thurs, Sun  2.5 mg on all other days   Pt was just here last week with supratherapeutic INR  Will continue 2.5 mg home dose today  Will continue to monitor daily INR's and make adjustments as needed  Nurse kingsley to inform pharmacy when son is available so we can  him over the phone since he manages her meds/Erika Juárez [facility] 8/1/2022 1:04 PM      /Erika Juárez [facility] 8/1/2022 1:02 PM

## 2022-08-01 NOTE — ASSESSMENT & PLAN NOTE
--mechanical aortic valve and a fib  --INR 2.1 on admit  --pharmacy to manage coumadin      8/1/2022  INR 2.0   Pharmacy dosing coumadin

## 2022-08-01 NOTE — PLAN OF CARE
Plan of care reviewed with pt. A&O x2, disoriented to place and situation. IV intact, dry, and clean. Refused medications. NS infusing at 100 mL/hr. Pt turned q2h. Wound care consult placed. No pain noted. On 2L NC. Neuro check q4h completed. NS on monitor. Bed alarm on. Instructed to call for assistance. Hourly rounding completed.

## 2022-08-01 NOTE — ASSESSMENT & PLAN NOTE
Patient with Persistent (7 days or more) atrial fibrillation which is controlled currently with Beta Blocker. Patient is currently in sinus rhythm.[id number] Score: 3. HASBLED Score: Unable to calculate. Anticoagulation indicated. Anticoagulation done with coumadin.  --pharmacy to manage coumadin

## 2022-08-02 PROBLEM — R74.8 ELEVATED LIPASE: Status: RESOLVED | Noted: 2022-07-31 | Resolved: 2022-08-02

## 2022-08-02 PROBLEM — R19.7 DIARRHEA: Status: RESOLVED | Noted: 2022-07-31 | Resolved: 2022-08-02

## 2022-08-02 PROBLEM — N17.9 AKI (ACUTE KIDNEY INJURY): Status: RESOLVED | Noted: 2022-07-23 | Resolved: 2022-08-02

## 2022-08-02 PROBLEM — R41.82 AMS (ALTERED MENTAL STATUS): Status: RESOLVED | Noted: 2022-07-31 | Resolved: 2022-08-02

## 2022-08-02 LAB
ANION GAP SERPL CALC-SCNC: 8 MMOL/L (ref 8–16)
BASOPHILS # BLD AUTO: 0 K/UL (ref 0–0.2)
BASOPHILS NFR BLD: 0 % (ref 0–1.9)
BUN SERPL-MCNC: 22 MG/DL (ref 8–23)
CALCIUM SERPL-MCNC: 7.9 MG/DL (ref 8.7–10.5)
CHLORIDE SERPL-SCNC: 104 MMOL/L (ref 95–110)
CO2 SERPL-SCNC: 25 MMOL/L (ref 23–29)
CREAT SERPL-MCNC: 1.1 MG/DL (ref 0.5–1.4)
DIFFERENTIAL METHOD: ABNORMAL
EOSINOPHIL # BLD AUTO: 0.1 K/UL (ref 0–0.5)
EOSINOPHIL NFR BLD: 1 % (ref 0–8)
ERYTHROCYTE [DISTWIDTH] IN BLOOD BY AUTOMATED COUNT: 14 % (ref 11.5–14.5)
EST. GFR  (NO RACE VARIABLE): 51 ML/MIN/1.73 M^2
GLUCOSE SERPL-MCNC: 93 MG/DL (ref 70–110)
HCT VFR BLD AUTO: 38.7 % (ref 37–48.5)
HGB BLD-MCNC: 11.8 G/DL (ref 12–16)
IMM GRANULOCYTES # BLD AUTO: 0.02 K/UL (ref 0–0.04)
IMM GRANULOCYTES NFR BLD AUTO: 0.3 % (ref 0–0.5)
INR PPP: 1.9 (ref 0.8–1.2)
LYMPHOCYTES # BLD AUTO: 1.4 K/UL (ref 1–4.8)
LYMPHOCYTES NFR BLD: 19.7 % (ref 18–48)
MCH RBC QN AUTO: 28.8 PG (ref 27–31)
MCHC RBC AUTO-ENTMCNC: 30.5 G/DL (ref 32–36)
MCV RBC AUTO: 94 FL (ref 82–98)
MONOCYTES # BLD AUTO: 1.8 K/UL (ref 0.3–1)
MONOCYTES NFR BLD: 26.1 % (ref 4–15)
NEUTROPHILS # BLD AUTO: 3.7 K/UL (ref 1.8–7.7)
NEUTROPHILS NFR BLD: 52.9 % (ref 38–73)
NRBC BLD-RTO: 0 /100 WBC
PLATELET # BLD AUTO: 180 K/UL (ref 150–450)
PMV BLD AUTO: 10.4 FL (ref 9.2–12.9)
POTASSIUM SERPL-SCNC: 4 MMOL/L (ref 3.5–5.1)
PROTHROMBIN TIME: 19.6 SEC (ref 9–12.5)
RBC # BLD AUTO: 4.1 M/UL (ref 4–5.4)
SODIUM SERPL-SCNC: 137 MMOL/L (ref 136–145)
WBC # BLD AUTO: 7 K/UL (ref 3.9–12.7)

## 2022-08-02 PROCEDURE — 96361 HYDRATE IV INFUSION ADD-ON: CPT

## 2022-08-02 PROCEDURE — 80048 BASIC METABOLIC PNL TOTAL CA: CPT | Performed by: NURSE PRACTITIONER

## 2022-08-02 PROCEDURE — 85610 PROTHROMBIN TIME: CPT | Performed by: NURSE PRACTITIONER

## 2022-08-02 PROCEDURE — 36415 COLL VENOUS BLD VENIPUNCTURE: CPT | Performed by: NURSE PRACTITIONER

## 2022-08-02 PROCEDURE — A4216 STERILE WATER/SALINE, 10 ML: HCPCS | Performed by: NURSE PRACTITIONER

## 2022-08-02 PROCEDURE — 25000003 PHARM REV CODE 250: Performed by: NURSE PRACTITIONER

## 2022-08-02 PROCEDURE — G0378 HOSPITAL OBSERVATION PER HR: HCPCS

## 2022-08-02 PROCEDURE — 85025 COMPLETE CBC W/AUTO DIFF WBC: CPT | Performed by: NURSE PRACTITIONER

## 2022-08-02 RX ORDER — WARFARIN 2.5 MG/1
2.5 TABLET ORAL ONCE
Status: DISCONTINUED | OUTPATIENT
Start: 2022-08-02 | End: 2022-08-03 | Stop reason: HOSPADM

## 2022-08-02 RX ADMIN — Medication 10 ML: at 06:08

## 2022-08-02 NOTE — ASSESSMENT & PLAN NOTE
Patient with acute kidney injury likely due to diarrhea MARCUS is currently stable. Labs reviewed- Renal function/electrolytes with Estimated Creatinine Clearance: 46 mL/min (based on SCr of 1.1 mg/dL). according to latest data. Monitor urine output and serial BMP and adjust therapy as needed. Avoid nephrotoxins and renally dose meds for GFR listed above.   --gentle IVF's  --repeat BMP in AM      8/1/2022  Renal function improve   Creatinine down to 1.1   Will follow

## 2022-08-02 NOTE — PLAN OF CARE
Plan of Care: RN Shift Summary & Bedside Handover Report  08/02/2022 1:57 AM    Pt admitted on 7/31/2022 for Diarrhea under Valente Espinal MD service   Code Status Full Code    Past Medical/ Surgical Hx Past Medical History:   Diagnosis Date    A-fib     Anticoagulant long-term use     Aortic valve disease     Cancer     brain tumor, 10 years ago    Cardiomyopathy     CHF (congestive heart failure)     COVID-19 7/23/2022    Hx of heart valve replacement with mechanical valve     Hyperlipidemia     Hypertension     Pacemaker     Pacemaker     Sepsis 7/23/2022    Stroke     2007, residual weakness      Past Surgical History:   Procedure Laterality Date    AORTIC VALVE REPLACEMENT      CHOLECYSTECTOMY      CORONARY ARTERY BYPASS GRAFT      HYSTERECTOMY      INSERTION OF PACEMAKER      TONSILLECTOMY         HEENT HEENT WDL: WDL except, vision aid   Cognitive/ Neuro Cognitive/Neuro/Behavioral WDL: WDL except  Level of Consciousness (AVPU): alert  Orientation: oriented x 4  Chase Coma Scale Score: 15  Additional Documentation: Chhaya Coma Scale (Group)   Resp Respiratory WDL: WDL except, breath sounds  All Lung Fields Breath Sounds: diminished  Flow (L/min): 3     O2 Device (Oxygen Therapy): nasal cannula   Cardiac Cardiac WDL: WDL  Lead Monitored: Lead II  Rhythm: normal sinus rhythm, conduction defect (BBB noted)  Frequency/Ectopy: Bigeminy  Cardiac/Telemetry Box Number: [id number]   Peripheral/ Neurovascular Peripheral Neurovascular WDL: WDL except, edema   VTE core VTE Required Core Measure: Pharmacological prophylaxis initiated/maintained   GI GI WDL: WDL except, appearance/characteristics  All Quadrants Bowel Sounds: audible and normoactive  Last Bowel Movement: 08/01/22   Diet Diet Cardiac [facility]; Coumadin Restriction    Genitourinary WDL: WDL except, voiding ability/characteristics  Voiding Characteristics: incontinence, external catheter      Skin Skin WDL: WDL except, all  Skin Integrity: bruised  (ecchymotic), drain/device(s), excoriation (excoriated groin area)   Jacobo Score Jacobo Score: 10   Musculoskeletal  Musculoskeletal WDL: WDL except, mobility  General Mobility: significantly impaired   Safety Safety WDL: WDL  Safety Factors: ID band on, upper side rails raised x 2, call light in reach, wheels locked, bed in low position      Fall Risk Score Fall Risk Score: 14   LDA(s) Lines/Drains/Airways       Peripheral Intravenous Line  Duration                  Peripheral IV - Single Lumen 07/31/22 1103 22 G Left Wrist 1 day                   Consults Consults (From admission, onward)          Status Ordering Provider     Inpatient consult to Social Work  Once        Provider:  (Not yet assigned)    Completed DEANNA GODDARD     Pharmacy to dose Warfarin consult  Once        Provider:  (Not yet assigned)    Acknowledged ROEL HONG            Shift events Gave family member education on Coumadin and foods    Plan of Care for RN report  Continue care as ordered, COVID management   Discharge Planning Discharge Plan A: Skilled Nursing Facility    Patient/ family updated on plan of care, all questions answered up to now regarding plan of care, will continue to monitor per hourly rounding & unit practice/ policy    Note: Other exception details noted in flowsheet if not present in summary

## 2022-08-02 NOTE — PLAN OF CARE
CM called and spoke with son, he has not met with Hospice liaison at this time. States he plans to meet with Anupama later this evening. Hospice unable to set up HME or admit patient until consents signed.

## 2022-08-02 NOTE — SUBJECTIVE & OBJECTIVE
Interval History: Awaiting discharge home with Hospice.     Review of Systems   Constitutional:  Negative for activity change, appetite change, chills, fatigue and fever.   HENT:  Negative for congestion, dental problem, ear pain, hearing loss, mouth sores, sinus pressure, sore throat, tinnitus and trouble swallowing.    Eyes:  Negative for pain, discharge, redness and visual disturbance.   Respiratory:  Negative for apnea, cough, choking, chest tightness, shortness of breath and wheezing.    Cardiovascular:  Negative for chest pain, palpitations and leg swelling.   Gastrointestinal:  Negative for abdominal distention, abdominal pain, anal bleeding, blood in stool, constipation, diarrhea (decreasing), nausea, rectal pain and vomiting.   Endocrine: Negative for cold intolerance, heat intolerance, polydipsia and polyuria.   Genitourinary:  Negative for difficulty urinating, dysuria, flank pain, frequency, hematuria and urgency.   Musculoskeletal:  Negative for arthralgias, back pain, gait problem, joint swelling, myalgias, neck pain and neck stiffness.   Skin:  Negative for color change, rash and wound.   Allergic/Immunologic: Negative.    Neurological:  Negative for dizziness, tremors, seizures, syncope, speech difficulty, light-headedness and headaches.   Hematological:  Negative for adenopathy. Bruises/bleeds easily.   Psychiatric/Behavioral:  Positive for confusion (mild confusion). Negative for agitation, behavioral problems and sleep disturbance. The patient is not nervous/anxious.    All other systems reviewed and are negative.  Objective:     Vital Signs (Most Recent):  Temp: 99.3 °F (37.4 °C) (08/02/22 0459)  Pulse: (P) 69 (08/02/22 0520)  Resp: 20 (08/02/22 0459)  BP: (!) 111/59 (08/02/22 0459)  SpO2: 98 % (08/02/22 0459)   Vital Signs (24h Range):  Temp:  [99.2 °F (37.3 °C)-99.7 °F (37.6 °C)] 99.3 °F (37.4 °C)  Pulse:  [69-89] (P) 69  Resp:  [18-22] 20  SpO2:  [95 %-98 %] 98 %  BP: (105-129)/(55-65) 111/59      Weight: 106.9 kg (235 lb 10.8 oz)  Body mass index is 44.53 kg/m².    Intake/Output Summary (Last 24 hours) at 8/2/2022 1713  Last data filed at 8/2/2022 0500  Gross per 24 hour   Intake 1562.48 ml   Output 1000 ml   Net 562.48 ml      Physical Exam  Vitals and nursing note reviewed.   Constitutional:       General: She is not in acute distress.     Appearance: She is well-developed. She is obese. She is not diaphoretic.   HENT:      Head: Normocephalic and atraumatic.      Nose: Nose normal.   Eyes:      General: No scleral icterus.     Extraocular Movements: Extraocular movements intact.      Pupils: Pupils are equal, round, and reactive to light.   Neck:      Thyroid: No thyromegaly.      Vascular: No JVD.      Trachea: No tracheal deviation.   Cardiovascular:      Rate and Rhythm: Normal rate and regular rhythm.      Heart sounds: Normal heart sounds. No murmur heard.    No friction rub. No gallop.   Pulmonary:      Effort: Pulmonary effort is normal. No respiratory distress.      Breath sounds: Normal breath sounds. No wheezing or rales.   Chest:      Chest wall: No tenderness.   Abdominal:      General: Bowel sounds are normal. There is no distension.      Palpations: Abdomen is soft. There is no mass.      Tenderness: There is no abdominal tenderness.      Comments: Large obese abdomen      Genitourinary:     Comments: deferred  Musculoskeletal:         General: No tenderness or deformity. Normal range of motion.      Cervical back: Normal range of motion and neck supple.   Skin:     General: Skin is warm and dry.      Capillary Refill: Capillary refill takes less than 2 seconds.      Coloration: Skin is not pale.      Findings: No erythema or rash.   Neurological:      Mental Status: She is alert and oriented to person, place, and time. Mental status is at baseline.      Cranial Nerves: No cranial nerve deficit.      Motor: No abnormal muscle tone.      Coordination: Coordination normal.   Psychiatric:          Mood and Affect: Mood normal.         Behavior: Behavior normal.       Significant Labs: All pertinent labs within the past 24 hours have been reviewed.  CBC:   Recent Labs   Lab 08/01/22  0609 08/02/22  0512   WBC 8.19 7.00   HGB 12.7 11.8*   HCT 41.2 38.7    180     CMP:   Recent Labs   Lab 08/01/22  0609 08/02/22  0512    137   K 4.2 4.0    104   CO2 24 25   GLU 80 93   BUN 34* 22   CREATININE 1.1 1.1   CALCIUM 7.7* 7.9*   ANIONGAP 7* 8       Significant Imaging:     Imaging Results              CT Abdomen Pelvis  Without Contrast (Final result)  Result time 07/31/22 10:53:06      Final result by Yasir Mandel MD (07/31/22 10:53:06)                   Impression:      1. Colonic air-fluid levels.  Correlate for diarrheal illness.  No bowel obstruction or focal inflammatory process.  Normal appendix.  2. Left renal cyst, 3 cm.  3. Cholecystectomy.  Hysterectomy.  4. Moderate levoscoliosis lumbar spine with multilevel advanced degenerative disc disease sequela.  Severe right moderate left hip osteoarthritis.  All CT scans at this facility are performed  using dose modulation techniques as appropriate to performed exam including the following:  automated exposure control; adjustment of mA and/or kV according to the patients size (this includes techniques or standardized protocols for targeted exams where dose is matched to indication/reason for exam: i.e. extremities or head);  iterative reconstruction technique.      Electronically signed by: Yasir Mandel MD  Date:    07/31/2022  Time:    10:53               Narrative:    EXAMINATION:  CT ABDOMEN PELVIS WITHOUT CONTRAST    CLINICAL HISTORY:  Abdominal pain, acute, nonlocalized;    TECHNIQUE:  Abdominal and pelvic CT without contrast.  Coronal and sagittal reformations.    COMPARISON:  None    FINDINGS:  Abdominal CT.  Bibasilar pleuroparenchymal bands of atelectasis or scarring.  Otherwise, lung bases are clear.    Normal size heart.   Aortic valve replacement.    Noncontrast evaluation liver, spleen, pancreas, adrenal glands, and kidneys is normal other than a 3 cm cyst mid left kidney.    Normal caliber atherosclerotic aorta.  There is no lymphadenopathy.  Cholecystectomy.  There is no bile duct dilatation.    No bowel obstruction.  The appendix is normal.  No stool retention.  Colonic air-fluid levels.  Correlate for diarrheal illness.  No colonic bowel wall thickening or focal inflammatory stranding.  The GI tract is otherwise unremarkable.    There is moderate levoscoliosis thoracic spine with multilevel advanced degenerative disc disease sequela.    Pelvic CT.  The pelvic ring is intact.  Severe right and moderate left hip osteoarthritis sequela.    Pelvic bowel loops are unremarkable.  Ureters and urinary bladder are normal.    Hysterectomy.  Ovaries are absent.    There is no pelvic mass, free fluid, or lymphadenopathy.                                       CT Head Without Contrast (Final result)  Result time 07/31/22 10:46:44      Final result by Yasir Mandel MD (07/31/22 10:46:44)                   Impression:      Stable head CT.  Remote left parietal lobe infarct with encephalomalacia.  Moderate deep white matter microangiopathy.  Atrophy.  No bleed.    All CT scans at this facility are performed  using dose modulation techniques as appropriate to performed exam including the following:  automated exposure control; adjustment of mA and/or kV according to the patients size (this includes techniques or standardized protocols for targeted exams where dose is matched to indication/reason for exam: i.e. extremities or head);  iterative reconstruction technique.      Electronically signed by: Yasir Mandel MD  Date:    07/31/2022  Time:    10:46               Narrative:    EXAMINATION:  CT HEAD WITHOUT CONTRAST    CLINICAL HISTORY:  Stable head CT.  Atrophy.  Moderate deep white matter microangiopathy plate.  Remote left parietal lobe  infarction with encephalomalacia.    TECHNIQUE:  Routine noncontrast head CT.    COMPARISON:  07/22/2022.    FINDINGS:  There has been no interval change.    There is no acute intracranial hemorrhage or abnormal extra-axial fluid collection.  There is advanced cerebral atrophy with ventricular-sulcal congruence.  Moderate deep white matter microangiopathy low attenuation.  Again, left parietal lobe remote infarction sequela with encephalomalacia.  Gray-white differentiation is otherwise well preserved.  There is no additional abnormal increased or decreased density within the brain parenchyma.    There is no intracranial mass or mass effect.    The calvarium is intact.  Visualized paranasal sinuses and mastoid air cells are well aerated.                                       X-Ray Chest AP Portable (Final result)  Result time 07/31/22 10:20:43      Final result by Yasir Mandel MD (07/31/22 10:20:43)                   Impression:      No acute process.      Electronically signed by: Yasir Mandel MD  Date:    07/31/2022  Time:    10:20               Narrative:    EXAMINATION:  XR CHEST AP PORTABLE    CLINICAL HISTORY:  Weakness    FINDINGS:  Comparison study 07/25/2022.  There has been interval removal of the left upper extremity PICC line.  Right chest wall cardiac pacemaker with intact leads.  Normal size heart.  Mild aortic tortuosity with aortic arch calcification.  No congestion.  The lungs are clear.

## 2022-08-02 NOTE — PROGRESS NOTES
Clinical Pharmacy Progress Note: Coumadin Dosing and Monitoring     Day #3    Goal INR: 2.5-3.5  Indication: paroxysmal atrial fibrillation, mechanical AVR, history of DVT  Lab Results   Component Value Date    INR 1.9 (H) 08/02/2022    INR 2.0 (H) 08/01/2022    INR 2.1 (H) 07/31/2022       Home dosing regimen:   1.25 mg every Tues, Thurs, Sun  2.5 mg on all other days     Lab order for daily PT/INR? Yes  Coumadin diet ordered? Yes    Plan:   Current INR 1.9 trended down from 2 yesterday  Give booster dose of 2.5 mg once today @ 1700  - Pharmacy will continue to monitor daily PT/INR. Dose adjustments will be made accordingly.      Thank you for allowing us to participate in this patient's care.      Myrna Nicole PharmD 08/02/2022 2:28 PM

## 2022-08-02 NOTE — ASSESSMENT & PLAN NOTE
Patient with Persistent (7 days or more) atrial fibrillation which is controlled currently with Beta Blocker. Patient is currently in sinus rhythm.[id number] Score: 3. HASBLED Score: Unable to calculate. Anticoagulation indicated. Anticoagulation done with coumadin.  --pharmacy to manage coumadin

## 2022-08-02 NOTE — PROGRESS NOTES
[facility] Medicine  Progress Note    Patient Name: Serena Post  MRN: [id number]  Patient Class: OP- Observation   Admission Date: 7/31/2022  Length of Stay: 0 days  Attending Physician: Valente Espinal MD  Primary Care Provider: Primary Doctor No        Subjective:     Principal Problem:Diarrhea        HPI:  83 y/o female with PMHx of HTN, HLD, CHF, a fib, cardiac pacemaker,  mechanical aortic valve, RA, DVT, CVA, cognitive impairment, UTI, sepsis, alt mental status and recent covid who presented to the ED with c/o diarrhea that onset gradually 1 week ago. Pt was dc'd from this facility on 7/26 for sepsis, UTI, and covid. Sx are episodic and moderate in severity. No exacerbating or mitigating factors reported. Associated sx include abd pain and confusion. Pt denies fever, chills, N/V, blood in stool, dysuria, and all other sx at this time.  ED workup shows: BUN 43, Cr. 1.8, eGFR 26, ALT 56, lipase 92. U/a pending. Stool studies pending  She is a full code and her SDM is her son, Amish      Overview/Hospital Course:  Ms Post is a 82 year old female who presented to [facility] Emergency Room for evaluation of diarrhea that started about one week ago. Creatinine mildly elevated above baseline at 1.8. She was started on IVF's. Today, creatinine treading downward, 1.1 today. Will follow. As of 8/2/2022, patient vitals signs and labs. Awaiting discharge home with hospice.       Interval History: Awaiting discharge home with Hospice.     Review of Systems   Constitutional:  Negative for activity change, appetite change, chills, fatigue and fever.   HENT:  Negative for congestion, dental problem, ear pain, hearing loss, mouth sores, sinus pressure, sore throat, tinnitus and trouble swallowing.    Eyes:  Negative for pain, discharge, redness and visual disturbance.   Respiratory:  Negative for apnea, cough, choking, chest tightness, shortness of breath and wheezing.    Cardiovascular:   Negative for chest pain, palpitations and leg swelling.   Gastrointestinal:  Negative for abdominal distention, abdominal pain, anal bleeding, blood in stool, constipation, diarrhea (decreasing), nausea, rectal pain and vomiting.   Endocrine: Negative for cold intolerance, heat intolerance, polydipsia and polyuria.   Genitourinary:  Negative for difficulty urinating, dysuria, flank pain, frequency, hematuria and urgency.   Musculoskeletal:  Negative for arthralgias, back pain, gait problem, joint swelling, myalgias, neck pain and neck stiffness.   Skin:  Negative for color change, rash and wound.   Allergic/Immunologic: Negative.    Neurological:  Negative for dizziness, tremors, seizures, syncope, speech difficulty, light-headedness and headaches.   Hematological:  Negative for adenopathy. Bruises/bleeds easily.   Psychiatric/Behavioral:  Positive for confusion (mild confusion). Negative for agitation, behavioral problems and sleep disturbance. The patient is not nervous/anxious.    All other systems reviewed and are negative.  Objective:     Vital Signs (Most Recent):  Temp: 99.3 °F (37.4 °C) (08/02/22 0459)  Pulse: (P) 69 (08/02/22 0520)  Resp: 20 (08/02/22 0459)  BP: (!) 111/59 (08/02/22 0459)  SpO2: 98 % (08/02/22 0459)   Vital Signs (24h Range):  Temp:  [99.2 °F (37.3 °C)-99.7 °F (37.6 °C)] 99.3 °F (37.4 °C)  Pulse:  [69-89] (P) 69  Resp:  [18-22] 20  SpO2:  [95 %-98 %] 98 %  BP: (105-129)/(55-65) 111/59     Weight: 106.9 kg (235 lb 10.8 oz)  Body mass index is 44.53 kg/m².    Intake/Output Summary (Last 24 hours) at 8/2/2022 1713  Last data filed at 8/2/2022 0500  Gross per 24 hour   Intake 1562.48 ml   Output 1000 ml   Net 562.48 ml      Physical Exam  Vitals and nursing note reviewed.   Constitutional:       General: She is not in acute distress.     Appearance: She is well-developed. She is obese. She is not diaphoretic.   HENT:      Head: Normocephalic and atraumatic.      Nose: Nose normal.   Eyes:       General: No scleral icterus.     Extraocular Movements: Extraocular movements intact.      Pupils: Pupils are equal, round, and reactive to light.   Neck:      Thyroid: No thyromegaly.      Vascular: No JVD.      Trachea: No tracheal deviation.   Cardiovascular:      Rate and Rhythm: Normal rate and regular rhythm.      Heart sounds: Normal heart sounds. No murmur heard.    No friction rub. No gallop.   Pulmonary:      Effort: Pulmonary effort is normal. No respiratory distress.      Breath sounds: Normal breath sounds. No wheezing or rales.   Chest:      Chest wall: No tenderness.   Abdominal:      General: Bowel sounds are normal. There is no distension.      Palpations: Abdomen is soft. There is no mass.      Tenderness: There is no abdominal tenderness.      Comments: Large obese abdomen      Genitourinary:     Comments: deferred  Musculoskeletal:         General: No tenderness or deformity. Normal range of motion.      Cervical back: Normal range of motion and neck supple.   Skin:     General: Skin is warm and dry.      Capillary Refill: Capillary refill takes less than 2 seconds.      Coloration: Skin is not pale.      Findings: No erythema or rash.   Neurological:      Mental Status: She is alert and oriented to person, place, and time. Mental status is at baseline.      Cranial Nerves: No cranial nerve deficit.      Motor: No abnormal muscle tone.      Coordination: Coordination normal.   Psychiatric:         Mood and Affect: Mood normal.         Behavior: Behavior normal.       Significant Labs: All pertinent labs within the past 24 hours have been reviewed.  CBC:   Recent Labs   Lab 08/01/22  0609 08/02/22  0512   WBC 8.19 7.00   HGB 12.7 11.8*   HCT 41.2 38.7    180     CMP:   Recent Labs   Lab 08/01/22  0609 08/02/22  0512    137   K 4.2 4.0    104   CO2 24 25   GLU 80 93   BUN 34* 22   CREATININE 1.1 1.1   CALCIUM 7.7* 7.9*   ANIONGAP 7* 8       Significant Imaging:     Imaging  Results              CT Abdomen Pelvis  Without Contrast (Final result)  Result time 07/31/22 10:53:06      Final result by Yasir Mandel MD (07/31/22 10:53:06)                   Impression:      1. Colonic air-fluid levels.  Correlate for diarrheal illness.  No bowel obstruction or focal inflammatory process.  Normal appendix.  2. Left renal cyst, 3 cm.  3. Cholecystectomy.  Hysterectomy.  4. Moderate levoscoliosis lumbar spine with multilevel advanced degenerative disc disease sequela.  Severe right moderate left hip osteoarthritis.  All CT scans at this facility are performed  using dose modulation techniques as appropriate to performed exam including the following:  automated exposure control; adjustment of mA and/or kV according to the patients size (this includes techniques or standardized protocols for targeted exams where dose is matched to indication/reason for exam: i.e. extremities or head);  iterative reconstruction technique.      Electronically signed by: Yasir Mandel MD  Date:    07/31/2022  Time:    10:53               Narrative:    EXAMINATION:  CT ABDOMEN PELVIS WITHOUT CONTRAST    CLINICAL HISTORY:  Abdominal pain, acute, nonlocalized;    TECHNIQUE:  Abdominal and pelvic CT without contrast.  Coronal and sagittal reformations.    COMPARISON:  None    FINDINGS:  Abdominal CT.  Bibasilar pleuroparenchymal bands of atelectasis or scarring.  Otherwise, lung bases are clear.    Normal size heart.  Aortic valve replacement.    Noncontrast evaluation liver, spleen, pancreas, adrenal glands, and kidneys is normal other than a 3 cm cyst mid left kidney.    Normal caliber atherosclerotic aorta.  There is no lymphadenopathy.  Cholecystectomy.  There is no bile duct dilatation.    No bowel obstruction.  The appendix is normal.  No stool retention.  Colonic air-fluid levels.  Correlate for diarrheal illness.  No colonic bowel wall thickening or focal inflammatory stranding.  The GI tract is otherwise  unremarkable.    There is moderate levoscoliosis thoracic spine with multilevel advanced degenerative disc disease sequela.    Pelvic CT.  The pelvic ring is intact.  Severe right and moderate left hip osteoarthritis sequela.    Pelvic bowel loops are unremarkable.  Ureters and urinary bladder are normal.    Hysterectomy.  Ovaries are absent.    There is no pelvic mass, free fluid, or lymphadenopathy.                                       CT Head Without Contrast (Final result)  Result time 07/31/22 10:46:44      Final result by Yasir Mandel MD (07/31/22 10:46:44)                   Impression:      Stable head CT.  Remote left parietal lobe infarct with encephalomalacia.  Moderate deep white matter microangiopathy.  Atrophy.  No bleed.    All CT scans at this facility are performed  using dose modulation techniques as appropriate to performed exam including the following:  automated exposure control; adjustment of mA and/or kV according to the patients size (this includes techniques or standardized protocols for targeted exams where dose is matched to indication/reason for exam: i.e. extremities or head);  iterative reconstruction technique.      Electronically signed by: Yasir Mandel MD  Date:    07/31/2022  Time:    10:46               Narrative:    EXAMINATION:  CT HEAD WITHOUT CONTRAST    CLINICAL HISTORY:  Stable head CT.  Atrophy.  Moderate deep white matter microangiopathy plate.  Remote left parietal lobe infarction with encephalomalacia.    TECHNIQUE:  Routine noncontrast head CT.    COMPARISON:  07/22/2022.    FINDINGS:  There has been no interval change.    There is no acute intracranial hemorrhage or abnormal extra-axial fluid collection.  There is advanced cerebral atrophy with ventricular-sulcal congruence.  Moderate deep white matter microangiopathy low attenuation.  Again, left parietal lobe remote infarction sequela with encephalomalacia.  Gray-white differentiation is otherwise well preserved.   There is no additional abnormal increased or decreased density within the brain parenchyma.    There is no intracranial mass or mass effect.    The calvarium is intact.  Visualized paranasal sinuses and mastoid air cells are well aerated.                                       X-Ray Chest AP Portable (Final result)  Result time 07/31/22 10:20:43      Final result by Yasir Mandel MD (07/31/22 10:20:43)                   Impression:      No acute process.      Electronically signed by: Yasir Mandel MD  Date:    07/31/2022  Time:    10:20               Narrative:    EXAMINATION:  XR CHEST AP PORTABLE    CLINICAL HISTORY:  Weakness    FINDINGS:  Comparison study 07/25/2022.  There has been interval removal of the left upper extremity PICC line.  Right chest wall cardiac pacemaker with intact leads.  Normal size heart.  Mild aortic tortuosity with aortic arch calcification.  No congestion.  The lungs are clear.                                         Assessment/Plan:      * Diarrhea  --pt reports diarrhea X 6 days  --CT abd shows   1. Colonic air-fluid levels.  Correlate for diarrheal illness.  No bowel obstruction or focal inflammatory process.  Normal appendix.  2. Left renal cyst, 3 cm.  3. Cholecystectomy.  Hysterectomy.  4. Moderate levoscoliosis lumbar spine with multilevel advanced degenerative disc disease sequela.  Severe right moderate left hip osteoarthritis.  --stool studies pending, including c diff  --gentle IVF's  --monitor electrolytes  --repeat BMP in AM      8/1/2022  Diarrhea decreasing   Decrease IVF rate to 50 ml/hr     AMS (altered mental status)  --UTI vs MARCUS vs long term covid  --CT head shows Stable head CT. Remote left parietal lobe infarct with encephalomalacia. Moderate deep white matter microangiopathy. Atrophy. No bleed.  --u/a pending  --gentle IVF's  --neuro checks      MARCUS (acute kidney injury)  Patient with acute kidney injury likely due to diarrhea MARCUS is currently stable. Labs  reviewed- Renal function/electrolytes with Estimated Creatinine Clearance: 46 mL/min (based on SCr of 1.1 mg/dL). according to latest data. Monitor urine output and serial BMP and adjust therapy as needed. Avoid nephrotoxins and renally dose meds for GFR listed above.   --gentle IVF's  --repeat BMP in AM      8/1/2022  Renal function improve   Creatinine down to 1.1   Will follow      Paroxysmal atrial fibrillation  Patient with Persistent (7 days or more) atrial fibrillation which is controlled currently with Beta Blocker. Patient is currently in sinus rhythm.[id number] Score: 3. HASBLED Score: Unable to calculate. Anticoagulation indicated. Anticoagulation done with coumadin.  --pharmacy to manage coumadin        Pacemaker  --tele monitoring        History of aortic valve replacement  --on coumadin  --daily INR  --pharmacy to manage coumadin      Essential hypertension  --stable  --hold hctz and lisinopril 2/2 andreea  --continue BB  --PRN hydralazine      Anticoagulant long-term use  --mechanical aortic valve and a fib  --INR 2.1 on admit  --pharmacy to manage coumadin      8/1/2022  INR 2.0   Pharmacy dosing coumadin        VTE Risk Mitigation (From admission, onward)         Ordered     warfarin (COUMADIN) split tablet 1.25 mg  Once per day on Sun Tue Thu         08/02/22 1425     warfarin (COUMADIN) tablet 2.5 mg  Once         08/02/22 1426     warfarin (COUMADIN) tablet 2.5 mg  Once per day on Mon Wed Fri Sat         07/31/22 1604     IP VTE HIGH RISK PATIENT  Once         07/31/22 1426     Place sequential compression device  Until discontinued         07/31/22 1426                Discharge Planning   RENE: 8/2/2022     Code Status: Full Code   Is the patient medically ready for discharge?:     Reason for patient still in hospital (select all that apply): Patient trending condition and Treatment  Discharge Plan A: Skilled Nursing Facility                  Charlie Merino NP  Department of Hospital Medicine    [facility] - Telemetry ([facility])

## 2022-08-02 NOTE — CONSULTS
"SHIKHA spoke with Anupama from Hospice agency, confirmed fax was received by [facility] admissions staff. Anupama states son called her today, he is working on "a job" and requested to push hospice meeting back until the afternoon. Anupama aware of anticipated d/c today. CM will f/u with son.  "

## 2022-08-03 VITALS
HEART RATE: 91 BPM | TEMPERATURE: 98 F | BODY MASS INDEX: 45.75 KG/M2 | DIASTOLIC BLOOD PRESSURE: 60 MMHG | HEIGHT: 61 IN | RESPIRATION RATE: 18 BRPM | OXYGEN SATURATION: 93 % | SYSTOLIC BLOOD PRESSURE: 112 MMHG | WEIGHT: 242.31 LBS

## 2022-08-03 LAB
ANION GAP SERPL CALC-SCNC: 7 MMOL/L (ref 8–16)
BASOPHILS # BLD AUTO: 0.01 K/UL (ref 0–0.2)
BASOPHILS NFR BLD: 0.1 % (ref 0–1.9)
BUN SERPL-MCNC: 16 MG/DL (ref 8–23)
CALCIUM SERPL-MCNC: 7.6 MG/DL (ref 8.7–10.5)
CHLORIDE SERPL-SCNC: 104 MMOL/L (ref 95–110)
CO2 SERPL-SCNC: 24 MMOL/L (ref 23–29)
CREAT SERPL-MCNC: 0.8 MG/DL (ref 0.5–1.4)
DIFFERENTIAL METHOD: ABNORMAL
EOSINOPHIL # BLD AUTO: 0.1 K/UL (ref 0–0.5)
EOSINOPHIL NFR BLD: 1.8 % (ref 0–8)
ERYTHROCYTE [DISTWIDTH] IN BLOOD BY AUTOMATED COUNT: 13.5 % (ref 11.5–14.5)
EST. GFR  (NO RACE VARIABLE): >60 ML/MIN/1.73 M^2
GLUCOSE SERPL-MCNC: 99 MG/DL (ref 70–110)
HCT VFR BLD AUTO: 34.9 % (ref 37–48.5)
HGB BLD-MCNC: 11.3 G/DL (ref 12–16)
IMM GRANULOCYTES # BLD AUTO: 0.05 K/UL (ref 0–0.04)
IMM GRANULOCYTES NFR BLD AUTO: 0.7 % (ref 0–0.5)
INR PPP: 1.8 (ref 0.8–1.2)
LYMPHOCYTES # BLD AUTO: 1.5 K/UL (ref 1–4.8)
LYMPHOCYTES NFR BLD: 19.9 % (ref 18–48)
MCH RBC QN AUTO: 29.9 PG (ref 27–31)
MCHC RBC AUTO-ENTMCNC: 32.4 G/DL (ref 32–36)
MCV RBC AUTO: 92 FL (ref 82–98)
MONOCYTES # BLD AUTO: 1.7 K/UL (ref 0.3–1)
MONOCYTES NFR BLD: 22.7 % (ref 4–15)
NEUTROPHILS # BLD AUTO: 4.1 K/UL (ref 1.8–7.7)
NEUTROPHILS NFR BLD: 54.8 % (ref 38–73)
NRBC BLD-RTO: 0 /100 WBC
PLATELET # BLD AUTO: 164 K/UL (ref 150–450)
PMV BLD AUTO: 10.3 FL (ref 9.2–12.9)
POTASSIUM SERPL-SCNC: 3.9 MMOL/L (ref 3.5–5.1)
PROTHROMBIN TIME: 18.7 SEC (ref 9–12.5)
RBC # BLD AUTO: 3.78 M/UL (ref 4–5.4)
SODIUM SERPL-SCNC: 135 MMOL/L (ref 136–145)
WBC # BLD AUTO: 7.4 K/UL (ref 3.9–12.7)

## 2022-08-03 PROCEDURE — 80048 BASIC METABOLIC PNL TOTAL CA: CPT | Performed by: NURSE PRACTITIONER

## 2022-08-03 PROCEDURE — 85610 PROTHROMBIN TIME: CPT | Performed by: NURSE PRACTITIONER

## 2022-08-03 PROCEDURE — 36415 COLL VENOUS BLD VENIPUNCTURE: CPT | Performed by: NURSE PRACTITIONER

## 2022-08-03 PROCEDURE — G0378 HOSPITAL OBSERVATION PER HR: HCPCS

## 2022-08-03 PROCEDURE — 85025 COMPLETE CBC W/AUTO DIFF WBC: CPT | Performed by: NURSE PRACTITIONER

## 2022-08-03 RX ORDER — DOXYLAMINE SUCCINATE 25 MG
TABLET ORAL 2 TIMES DAILY
Qty: 30 G | Refills: 0 | Status: SHIPPED | OUTPATIENT
Start: 2022-08-03 | End: 2024-02-01

## 2022-08-03 NOTE — NURSING
Call received from Anupama with [facility]. Anupama was able to speak with son. Anupama stated pt can be d/c home and hospice NP will come to home to assess pt.

## 2022-08-03 NOTE — PLAN OF CARE
CM spoke with son who is requesting to have staff call when ambulance arrives at hospital. Son is also requesting to have medicated diaper rash cream sent with patient. CM updated IDT team.

## 2022-08-03 NOTE — PLAN OF CARE
Message left with Anupama from [facility] regarding patient. Awaiting return call.

## 2022-08-03 NOTE — PLAN OF CARE
Pt AAOx1. Pt oriented to self. No signs of distress. Able to verbalize needs and follow commands. Pt fearful and agitated. Denies any pain at this time. Vital signs stable. Sinus Rhythm on tele monitor. On 3L via NC.  PIV is patent.  Pt incontinent of b/b. Assist x2. Pt remains free of falls. Meds given as prescribed. Interventions implemented as appropriate. Pt turned q2h by staff. Resting quietly in bed.Bed low. Side rails up x2, wheels locked, call light within reach.

## 2022-08-03 NOTE — PLAN OF CARE
[facility] - Telemetry (Hospital)  Discharge Final Note    Primary Care Provider: Primary Doctor No    Expected Discharge Date: 8/3/2022    Final Discharge Note (most recent)     Final Note - 08/03/22 0904        Final Note    Assessment Type Final Discharge Note     Anticipated Discharge Disposition Hospice/Home     Hospital Resources/Appts/Education Provided Post-Acute resouces added to AVS        Post-Acute Status    Post-Acute Authorization Hospice     Hospice Status Set-up Complete/Auth obtained                 Important Message from Medicare             Contact Info     No, Primary Doctor   Relationship: PCP - General        Next Steps: Follow up    [facility]   Specialty: Home Hospice, Respite Care    [address]  [place] [address]   Phone: [phone]       Next Steps: Follow up    Instructions: HOSPICE        DC Dispo: home with [facility], consents have been signed and orders faxed  Transport: stretcher transport  Family notified:  called son, Amish. Amish states he is calling family at this time to make sure someone is home to accept patient. Amish's son is home.

## 2022-08-03 NOTE — DISCHARGE SUMMARY
[facility] Medicine  Discharge Summary      Patient Name: Serena Post  MRN: [id number]  Patient Class: OP- Observation  Admission Date: 7/31/2022  Hospital Length of Stay: 0 days  Discharge Date and Time: 8/3/2022  1:10 PM  Attending Physician: Valente Espinal MD   Discharging Provider: Charlie Merino NP  Primary Care Provider: Primary Doctor No      HPI:   81 y/o female with PMHx of HTN, HLD, CHF, a fib, cardiac pacemaker,  mechanical aortic valve, RA, DVT, CVA, cognitive impairment, UTI, sepsis, alt mental status and recent covid who presented to the ED with c/o diarrhea that onset gradually 1 week ago. Pt was dc'd from this facility on 7/26 for sepsis, UTI, and covid. Sx are episodic and moderate in severity. No exacerbating or mitigating factors reported. Associated sx include abd pain and confusion. Pt denies fever, chills, N/V, blood in stool, dysuria, and all other sx at this time.  ED workup shows: BUN 43, Cr. 1.8, eGFR 26, ALT 56, lipase 92. U/a pending. Stool studies pending  She is a full code and her SDM is her son, Amish      * No surgery found *      Hospital Course:   Ms Post is a 82 year old female who presented to [facility] Emergency Room for evaluation of diarrhea that started about one week ago. Creatinine mildly elevated above baseline at 1.8. She was started on IVF's. Today, creatinine treading downward, 1.1 today. Will follow. As of 8/2/2022, patient vitals signs and labs. No distress, no complaint of pain or discomfort. Spoke with patient's son the telephone. He was updated on patient overall condition and treatment plan. As of 8/3/2022, vital signs and labs stable, patient having no distress, doing well. She was seen, examined and deemed suitable for discharge home with Hospice.        Goals of Care Treatment Preferences:  Code Status: Full Code      Consults:   Consults (From admission, onward)        Status Ordering Provider     Inpatient consult to  Social Work  Once        Provider:  (Not yet assigned)    Completed OMEGA JIMENEZ     Inpatient consult to Social Work  Once        Provider:  (Not yet assigned)    Completed DEANNA GODDARD          No new Assessment & Plan notes have been filed under this hospital service since the last note was generated.  Service: Hospital Medicine    Final Active Diagnoses:    Diagnosis Date Noted POA    PRINCIPAL PROBLEM:  Diarrhea [R19.7] 07/31/2022 Unknown    AMS (altered mental status) [R41.82] 07/31/2022 Unknown    MARCUS (acute kidney injury) [N17.9] 07/23/2022 Unknown    Anticoagulant long-term use [Z79.01] 09/26/2019 Not Applicable    Essential hypertension [I10] 09/26/2019 Yes    Paroxysmal atrial fibrillation [I48.0] 09/26/2019 Yes    Pacemaker [Z95.0] 09/26/2019 Yes    History of aortic valve replacement [Z95.2] 09/26/2019 Not Applicable      Problems Resolved During this Admission:    Diagnosis Date Noted Date Resolved POA    Elevated lipase [R74.8] 07/31/2022 08/02/2022 Yes       Discharged Condition: stable    Disposition: Hospice/Home    Follow Up:   Follow-up Information     [facility]  HOSPICE Follow up.    Specialties: Home Hospice, Respite Care  Why: HOSPICE  Contact information:  [address] [address] [phone]                     Patient Instructions:      Diet Dysphagia Mechanical Soft     Activity as tolerated       Significant Diagnostic Studies: Labs:   CMP   Recent Labs   Lab 08/02/22  0512 08/03/22  0335    135*   K 4.0 3.9    104   CO2 25 24   GLU 93 99   BUN 22 16   CREATININE 1.1 0.8   CALCIUM 7.9* 7.6*   ANIONGAP 8 7*    and CBC   Recent Labs   Lab 08/02/22  0512 08/03/22  0335   WBC 7.00 7.40   HGB 11.8* 11.3*   HCT 38.7 34.9*    164       Pending Diagnostic Studies:     None         Medications:  Reconciled Home Medications:      Medication List      START taking these medications    miconazole 2 %  cream  Commonly known as: MICOTIN  Apply topically 2 (two) times daily. Apply to bilateral buttocks, thighs, perineum, groins        CONTINUE taking these medications    lisinopriL 5 MG tablet  Commonly known as: PRINIVIL,ZESTRIL  Take 5 mg by mouth 2 (two) times daily.     metoprolol tartrate 25 MG tablet  Commonly known as: LOPRESSOR  Take 1 tablet by mouth 2 (two) times daily.     triamterene-hydrochlorothiazide 37.5-25 mg 37.5-25 mg per tablet  Commonly known as: MAXZIDE-25  Take 0.5 tablets by mouth 2 (two) times a day.     warfarin 2.5 MG tablet  Commonly known as: COUMADIN  Take 2.5 mg by mouth nightly. Take 2.5mg on Monday, Wednesday, Friday, and Saturday;   take 1.25 mg on Tuesday, Thursday, and Sunday.        STOP taking these medications    furosemide 20 MG tablet  Commonly known as: LASIX            Indwelling Lines/Drains at time of discharge:   Lines/Drains/Airways     None                 Time spent on the discharge of patient: 40 minutes         Charlie Merino NP  Department of Hospital Medicine  '[place] - Telemetry ([facility])

## 2022-08-04 LAB
BACTERIA STL CULT: NORMAL
BACTERIA STL CULT: NORMAL

## 2023-11-19 ENCOUNTER — HOSPITAL ENCOUNTER (INPATIENT)
Facility: HOSPITAL | Age: 83
LOS: 3 days | Discharge: HOME HEALTH | DRG: 193 | End: 2023-11-22
Attending: EMERGENCY MEDICINE | Admitting: INTERNAL MEDICINE
Payer: MEDICARE

## 2023-11-19 DIAGNOSIS — N18.31 STAGE 3A CHRONIC KIDNEY DISEASE: ICD-10-CM

## 2023-11-19 DIAGNOSIS — I48.0 PAROXYSMAL ATRIAL FIBRILLATION: ICD-10-CM

## 2023-11-19 DIAGNOSIS — J10.00 PNEUMONIA DUE TO INFLUENZA A VIRUS: ICD-10-CM

## 2023-11-19 DIAGNOSIS — R53.1 WEAKNESS: ICD-10-CM

## 2023-11-19 DIAGNOSIS — I50.23 ACUTE ON CHRONIC SYSTOLIC HEART FAILURE: ICD-10-CM

## 2023-11-19 DIAGNOSIS — R07.9 CHEST PAIN: ICD-10-CM

## 2023-11-19 DIAGNOSIS — R06.00 DYSPNEA, UNSPECIFIED TYPE: ICD-10-CM

## 2023-11-19 DIAGNOSIS — J10.1 INFLUENZA A: Primary | ICD-10-CM

## 2023-11-19 DIAGNOSIS — R93.89 ABNORMAL CHEST X-RAY: ICD-10-CM

## 2023-11-19 DIAGNOSIS — J96.01 ACUTE HYPOXEMIC RESPIRATORY FAILURE: ICD-10-CM

## 2023-11-19 PROBLEM — I71.9 AORTIC ANEURYSM: Status: ACTIVE | Noted: 2019-09-26

## 2023-11-19 PROBLEM — R41.89 COGNITIVE IMPAIRMENT: Status: ACTIVE | Noted: 2019-09-26

## 2023-11-19 PROBLEM — J18.9 PNEUMONIA: Status: ACTIVE | Noted: 2023-11-19

## 2023-11-19 PROBLEM — E78.2 MIXED HYPERLIPIDEMIA: Status: ACTIVE | Noted: 2019-10-16

## 2023-11-19 PROBLEM — D49.6 INTRACRANIAL TUMOR: Status: ACTIVE | Noted: 2019-10-01

## 2023-11-19 PROBLEM — Z86.79 HISTORY OF RHEUMATIC FEVER AS A CHILD: Status: ACTIVE | Noted: 2019-10-16

## 2023-11-19 PROBLEM — I25.10 ARTERIOSCLEROTIC CARDIOVASCULAR DISEASE: Status: ACTIVE | Noted: 2019-10-01

## 2023-11-19 PROBLEM — Z86.73 HISTORY OF STROKE: Status: ACTIVE | Noted: 2019-09-26

## 2023-11-19 LAB
ALBUMIN SERPL BCP-MCNC: 3.2 G/DL (ref 3.5–5.2)
ALP SERPL-CCNC: 87 U/L (ref 55–135)
ALT SERPL W/O P-5'-P-CCNC: 11 U/L (ref 10–44)
ANION GAP SERPL CALC-SCNC: 14 MMOL/L (ref 8–16)
APTT PPP: 41.7 SEC (ref 21–32)
AST SERPL-CCNC: 24 U/L (ref 10–40)
BASOPHILS # BLD AUTO: 0.02 K/UL (ref 0–0.2)
BASOPHILS NFR BLD: 0.3 % (ref 0–1.9)
BILIRUB SERPL-MCNC: 1.3 MG/DL (ref 0.1–1)
BILIRUB UR QL STRIP: NEGATIVE
BNP SERPL-MCNC: 2693 PG/ML (ref 0–99)
BUN SERPL-MCNC: 12 MG/DL (ref 8–23)
CALCIUM SERPL-MCNC: 8.5 MG/DL (ref 8.7–10.5)
CHLORIDE SERPL-SCNC: 104 MMOL/L (ref 95–110)
CLARITY UR: CLEAR
CO2 SERPL-SCNC: 22 MMOL/L (ref 23–29)
COLOR UR: YELLOW
CREAT SERPL-MCNC: 1.2 MG/DL (ref 0.5–1.4)
DIFFERENTIAL METHOD: ABNORMAL
EOSINOPHIL # BLD AUTO: 0 K/UL (ref 0–0.5)
EOSINOPHIL NFR BLD: 0.3 % (ref 0–8)
ERYTHROCYTE [DISTWIDTH] IN BLOOD BY AUTOMATED COUNT: 13.5 % (ref 11.5–14.5)
EST. GFR  (NO RACE VARIABLE): 45 ML/MIN/1.73 M^2
GLUCOSE SERPL-MCNC: 127 MG/DL (ref 70–110)
GLUCOSE UR QL STRIP: NEGATIVE
HCT VFR BLD AUTO: 47.2 % (ref 37–48.5)
HGB BLD-MCNC: 15.7 G/DL (ref 12–16)
HGB UR QL STRIP: ABNORMAL
IMM GRANULOCYTES # BLD AUTO: 0.02 K/UL (ref 0–0.04)
IMM GRANULOCYTES NFR BLD AUTO: 0.3 % (ref 0–0.5)
INFLUENZA A, MOLECULAR: POSITIVE
INFLUENZA B, MOLECULAR: NEGATIVE
INR PPP: 2.3 (ref 0.8–1.2)
KETONES UR QL STRIP: ABNORMAL
LACTATE SERPL-SCNC: 1.3 MMOL/L (ref 0.5–2.2)
LACTATE SERPL-SCNC: 1.4 MMOL/L (ref 0.5–2.2)
LEUKOCYTE ESTERASE UR QL STRIP: NEGATIVE
LYMPHOCYTES # BLD AUTO: 1 K/UL (ref 1–4.8)
LYMPHOCYTES NFR BLD: 14 % (ref 18–48)
MCH RBC QN AUTO: 30.6 PG (ref 27–31)
MCHC RBC AUTO-ENTMCNC: 33.3 G/DL (ref 32–36)
MCV RBC AUTO: 92 FL (ref 82–98)
MONOCYTES # BLD AUTO: 1 K/UL (ref 0.3–1)
MONOCYTES NFR BLD: 13.3 % (ref 4–15)
NEUTROPHILS # BLD AUTO: 5.3 K/UL (ref 1.8–7.7)
NEUTROPHILS NFR BLD: 71.8 % (ref 38–73)
NITRITE UR QL STRIP: NEGATIVE
NRBC BLD-RTO: 0 /100 WBC
PH UR STRIP: 6 [PH] (ref 5–8)
PLATELET # BLD AUTO: 174 K/UL (ref 150–450)
PMV BLD AUTO: 11.1 FL (ref 9.2–12.9)
POTASSIUM SERPL-SCNC: 3.9 MMOL/L (ref 3.5–5.1)
PROCALCITONIN SERPL IA-MCNC: 0.19 NG/ML
PROT SERPL-MCNC: 7.3 G/DL (ref 6–8.4)
PROT UR QL STRIP: ABNORMAL
PROTHROMBIN TIME: 23 SEC (ref 9–12.5)
RBC # BLD AUTO: 5.13 M/UL (ref 4–5.4)
SARS-COV-2 RDRP RESP QL NAA+PROBE: NEGATIVE
SODIUM SERPL-SCNC: 140 MMOL/L (ref 136–145)
SP GR UR STRIP: 1.01 (ref 1–1.03)
SPECIMEN SOURCE: ABNORMAL
TROPONIN I SERPL DL<=0.01 NG/ML-MCNC: 0.09 NG/ML (ref 0–0.03)
TROPONIN I SERPL DL<=0.01 NG/ML-MCNC: 0.09 NG/ML (ref 0–0.03)
TSH SERPL DL<=0.005 MIU/L-ACNC: 2.58 UIU/ML (ref 0.4–4)
URN SPEC COLLECT METH UR: ABNORMAL
UROBILINOGEN UR STRIP-ACNC: NEGATIVE EU/DL
WBC # BLD AUTO: 7.42 K/UL (ref 3.9–12.7)

## 2023-11-19 PROCEDURE — 94640 AIRWAY INHALATION TREATMENT: CPT

## 2023-11-19 PROCEDURE — 63600175 PHARM REV CODE 636 W HCPCS: Performed by: EMERGENCY MEDICINE

## 2023-11-19 PROCEDURE — 36415 COLL VENOUS BLD VENIPUNCTURE: CPT | Performed by: EMERGENCY MEDICINE

## 2023-11-19 PROCEDURE — 84145 PROCALCITONIN (PCT): CPT | Performed by: NURSE PRACTITIONER

## 2023-11-19 PROCEDURE — 81003 URINALYSIS AUTO W/O SCOPE: CPT | Performed by: EMERGENCY MEDICINE

## 2023-11-19 PROCEDURE — 84443 ASSAY THYROID STIM HORMONE: CPT | Performed by: NURSE PRACTITIONER

## 2023-11-19 PROCEDURE — 85610 PROTHROMBIN TIME: CPT | Performed by: EMERGENCY MEDICINE

## 2023-11-19 PROCEDURE — 87502 INFLUENZA DNA AMP PROBE: CPT

## 2023-11-19 PROCEDURE — 99900035 HC TECH TIME PER 15 MIN (STAT)

## 2023-11-19 PROCEDURE — 83880 ASSAY OF NATRIURETIC PEPTIDE: CPT | Performed by: EMERGENCY MEDICINE

## 2023-11-19 PROCEDURE — 25000003 PHARM REV CODE 250: Performed by: INTERNAL MEDICINE

## 2023-11-19 PROCEDURE — 87502 INFLUENZA DNA AMP PROBE: CPT | Performed by: EMERGENCY MEDICINE

## 2023-11-19 PROCEDURE — 93010 ELECTROCARDIOGRAM REPORT: CPT | Mod: ,,, | Performed by: INTERNAL MEDICINE

## 2023-11-19 PROCEDURE — 25500020 PHARM REV CODE 255: Performed by: INTERNAL MEDICINE

## 2023-11-19 PROCEDURE — 80053 COMPREHEN METABOLIC PANEL: CPT | Performed by: EMERGENCY MEDICINE

## 2023-11-19 PROCEDURE — 85730 THROMBOPLASTIN TIME PARTIAL: CPT | Performed by: EMERGENCY MEDICINE

## 2023-11-19 PROCEDURE — 27000207 HC ISOLATION

## 2023-11-19 PROCEDURE — U0002 COVID-19 LAB TEST NON-CDC: HCPCS | Performed by: EMERGENCY MEDICINE

## 2023-11-19 PROCEDURE — 94761 N-INVAS EAR/PLS OXIMETRY MLT: CPT

## 2023-11-19 PROCEDURE — 27000221 HC OXYGEN, UP TO 24 HOURS

## 2023-11-19 PROCEDURE — 99285 EMERGENCY DEPT VISIT HI MDM: CPT | Mod: 25

## 2023-11-19 PROCEDURE — 84484 ASSAY OF TROPONIN QUANT: CPT | Performed by: NURSE PRACTITIONER

## 2023-11-19 PROCEDURE — 93010 EKG 12-LEAD: ICD-10-PCS | Mod: ,,, | Performed by: INTERNAL MEDICINE

## 2023-11-19 PROCEDURE — 87040 BLOOD CULTURE FOR BACTERIA: CPT | Performed by: EMERGENCY MEDICINE

## 2023-11-19 PROCEDURE — 25000242 PHARM REV CODE 250 ALT 637 W/ HCPCS: Performed by: NURSE PRACTITIONER

## 2023-11-19 PROCEDURE — 11000001 HC ACUTE MED/SURG PRIVATE ROOM

## 2023-11-19 PROCEDURE — 93005 ELECTROCARDIOGRAM TRACING: CPT

## 2023-11-19 PROCEDURE — 25000003 PHARM REV CODE 250: Performed by: EMERGENCY MEDICINE

## 2023-11-19 PROCEDURE — 25000242 PHARM REV CODE 250 ALT 637 W/ HCPCS: Performed by: EMERGENCY MEDICINE

## 2023-11-19 PROCEDURE — 85025 COMPLETE CBC W/AUTO DIFF WBC: CPT | Performed by: EMERGENCY MEDICINE

## 2023-11-19 PROCEDURE — 96360 HYDRATION IV INFUSION INIT: CPT

## 2023-11-19 PROCEDURE — 83605 ASSAY OF LACTIC ACID: CPT | Performed by: EMERGENCY MEDICINE

## 2023-11-19 PROCEDURE — 83036 HEMOGLOBIN GLYCOSYLATED A1C: CPT | Performed by: NURSE PRACTITIONER

## 2023-11-19 RX ORDER — OSELTAMIVIR PHOSPHATE 30 MG/1
30 CAPSULE ORAL 2 TIMES DAILY
Status: DISCONTINUED | OUTPATIENT
Start: 2023-11-19 | End: 2023-11-22 | Stop reason: HOSPADM

## 2023-11-19 RX ORDER — OSELTAMIVIR PHOSPHATE 75 MG/1
75 CAPSULE ORAL 2 TIMES DAILY
Status: DISCONTINUED | OUTPATIENT
Start: 2023-11-19 | End: 2023-11-19 | Stop reason: DRUGHIGH

## 2023-11-19 RX ORDER — FUROSEMIDE 10 MG/ML
60 INJECTION INTRAMUSCULAR; INTRAVENOUS
Status: COMPLETED | OUTPATIENT
Start: 2023-11-19 | End: 2023-11-19

## 2023-11-19 RX ORDER — MAG HYDROX/ALUMINUM HYD/SIMETH 200-200-20
30 SUSPENSION, ORAL (FINAL DOSE FORM) ORAL 4 TIMES DAILY PRN
Status: DISCONTINUED | OUTPATIENT
Start: 2023-11-19 | End: 2023-11-22 | Stop reason: HOSPADM

## 2023-11-19 RX ORDER — ONDANSETRON 2 MG/ML
4 INJECTION INTRAMUSCULAR; INTRAVENOUS EVERY 8 HOURS PRN
Status: DISCONTINUED | OUTPATIENT
Start: 2023-11-19 | End: 2023-11-22 | Stop reason: HOSPADM

## 2023-11-19 RX ORDER — POLYETHYLENE GLYCOL 3350 17 G/17G
17 POWDER, FOR SOLUTION ORAL DAILY
Status: DISCONTINUED | OUTPATIENT
Start: 2023-11-20 | End: 2023-11-22 | Stop reason: HOSPADM

## 2023-11-19 RX ORDER — TALC
6 POWDER (GRAM) TOPICAL NIGHTLY PRN
Status: DISCONTINUED | OUTPATIENT
Start: 2023-11-19 | End: 2023-11-22 | Stop reason: HOSPADM

## 2023-11-19 RX ORDER — NALOXONE HCL 0.4 MG/ML
0.02 VIAL (ML) INJECTION
Status: DISCONTINUED | OUTPATIENT
Start: 2023-11-19 | End: 2023-11-22 | Stop reason: HOSPADM

## 2023-11-19 RX ORDER — DEXTROMETHORPHAN HBR, PHENYLEPHRINE HCL, PYRILAMINE MALEATE 7.5; 5; 12.5 MG/5ML; MG/5ML; MG/5ML
10 SYRUP ORAL 2 TIMES DAILY
COMMUNITY
End: 2024-02-01

## 2023-11-19 RX ORDER — IBUPROFEN 200 MG
16 TABLET ORAL
Status: DISCONTINUED | OUTPATIENT
Start: 2023-11-19 | End: 2023-11-22 | Stop reason: HOSPADM

## 2023-11-19 RX ORDER — FUROSEMIDE 10 MG/ML
60 INJECTION INTRAMUSCULAR; INTRAVENOUS
Status: DISCONTINUED | OUTPATIENT
Start: 2023-11-20 | End: 2023-11-22

## 2023-11-19 RX ORDER — PROCHLORPERAZINE EDISYLATE 5 MG/ML
5 INJECTION INTRAMUSCULAR; INTRAVENOUS EVERY 6 HOURS PRN
Status: DISCONTINUED | OUTPATIENT
Start: 2023-11-19 | End: 2023-11-22 | Stop reason: HOSPADM

## 2023-11-19 RX ORDER — FUROSEMIDE 20 MG/1
1 TABLET ORAL EVERY MORNING
Status: ON HOLD | COMMUNITY
Start: 2023-05-15 | End: 2023-11-22 | Stop reason: HOSPADM

## 2023-11-19 RX ORDER — SODIUM CHLORIDE 0.9 % (FLUSH) 0.9 %
10 SYRINGE (ML) INJECTION
Status: DISCONTINUED | OUTPATIENT
Start: 2023-11-19 | End: 2023-11-22 | Stop reason: HOSPADM

## 2023-11-19 RX ORDER — ACETAMINOPHEN 325 MG/1
650 TABLET ORAL EVERY 4 HOURS PRN
Status: DISCONTINUED | OUTPATIENT
Start: 2023-11-19 | End: 2023-11-22 | Stop reason: HOSPADM

## 2023-11-19 RX ORDER — AMOXICILLIN AND CLAVULANATE POTASSIUM 875; 125 MG/1; MG/1
1 TABLET, FILM COATED ORAL
Status: ON HOLD | COMMUNITY
Start: 2023-11-14 | End: 2023-11-22 | Stop reason: HOSPADM

## 2023-11-19 RX ORDER — WARFARIN 2.5 MG/1
2.5 TABLET ORAL
Status: DISCONTINUED | OUTPATIENT
Start: 2023-11-20 | End: 2023-11-19

## 2023-11-19 RX ORDER — ALBUTEROL SULFATE 0.83 MG/ML
2.5 SOLUTION RESPIRATORY (INHALATION)
Status: COMPLETED | OUTPATIENT
Start: 2023-11-19 | End: 2023-11-19

## 2023-11-19 RX ORDER — GLUCAGON 1 MG
1 KIT INJECTION
Status: DISCONTINUED | OUTPATIENT
Start: 2023-11-19 | End: 2023-11-22 | Stop reason: HOSPADM

## 2023-11-19 RX ORDER — IBUPROFEN 200 MG
24 TABLET ORAL
Status: DISCONTINUED | OUTPATIENT
Start: 2023-11-19 | End: 2023-11-22 | Stop reason: HOSPADM

## 2023-11-19 RX ORDER — ALBUTEROL SULFATE 90 UG/1
2 AEROSOL, METERED RESPIRATORY (INHALATION) EVERY 4 HOURS PRN
COMMUNITY
Start: 2023-11-14 | End: 2024-02-23

## 2023-11-19 RX ORDER — SODIUM CHLORIDE 0.9 % (FLUSH) 0.9 %
10 SYRINGE (ML) INJECTION EVERY 12 HOURS PRN
Status: DISCONTINUED | OUTPATIENT
Start: 2023-11-19 | End: 2023-11-22 | Stop reason: HOSPADM

## 2023-11-19 RX ORDER — LEVALBUTEROL INHALATION SOLUTION 0.63 MG/3ML
1.25 SOLUTION RESPIRATORY (INHALATION) EVERY 12 HOURS
Status: DISCONTINUED | OUTPATIENT
Start: 2023-11-19 | End: 2023-11-22 | Stop reason: HOSPADM

## 2023-11-19 RX ORDER — ALBUTEROL SULFATE 0.83 MG/ML
2.5 SOLUTION RESPIRATORY (INHALATION) EVERY 4 HOURS PRN
COMMUNITY

## 2023-11-19 RX ADMIN — LEVALBUTEROL HYDROCHLORIDE 1.25 MG: 0.63 SOLUTION RESPIRATORY (INHALATION) at 07:11

## 2023-11-19 RX ADMIN — SODIUM CHLORIDE 500 ML: 9 INJECTION, SOLUTION INTRAVENOUS at 04:11

## 2023-11-19 RX ADMIN — IOHEXOL 100 ML: 350 INJECTION, SOLUTION INTRAVENOUS at 06:11

## 2023-11-19 RX ADMIN — FUROSEMIDE 60 MG: 10 INJECTION, SOLUTION INTRAMUSCULAR; INTRAVENOUS at 06:11

## 2023-11-19 RX ADMIN — WARFARIN SODIUM 1.25 MG: 2.5 TABLET ORAL at 06:11

## 2023-11-19 RX ADMIN — ALBUTEROL SULFATE 2.5 MG: 2.5 SOLUTION RESPIRATORY (INHALATION) at 04:11

## 2023-11-19 NOTE — ED PROVIDER NOTES
SCRIBE #1 NOTE: I, Randi Zavala, am scribing for, and in the presence of, Olman Phoenix MD. I have scribed the entire note.       History     Chief Complaint   Patient presents with    Pneumonia     Dx PNE left lower Tuesday, placed on ABX, has been having diarrhea, loss of appetite and decreased oral intake. Pt has productive cough     Review of patient's allergies indicates:   Allergen Reactions    Amlodipine Other (See Comments)     Not sure    Chlorthalidone Other (See Comments)     Not sure    Clonidine Other (See Comments)     Not sure    Codeine Other (See Comments)     Not sure    Escitalopram Other (See Comments)     Not sure    Lisinopril Other (See Comments)     Not sure    Losartan Other (See Comments)     Not sure    Meperidine Other (See Comments)     Not sure    Methadone Other (See Comments)     Not sure      Morphine Other (See Comments)     Not sure    Nebivolol Other (See Comments)     Not sure        Nitrofurantoin Other (See Comments)     Not sure        Omeprazole Other (See Comments)     Not sure      Pravastatin Other (See Comments)     Not sure      Propoxyphene Other (See Comments)     Not sure      Sulfamethoxazole-trimethoprim Other (See Comments)     Not sure             History of Present Illness     [facility]    11/19/2023, 3:00 PM  History obtained from the patient and son      History of Present Illness: Serena Post is a 83 y.o. female patient with a PMHx of CHF and Afib who presents to the Emergency Department for evaluation of pneumonia. Pt was dx with pneumonia of the left lower lobe on Tuesday and sent home with augmentin. Pt has experienced a loss of appetite, severe yellow diarrhea, and a productive cough. Son reports she has not eaten or drank anything and has been fatigued for the past few days.Symptoms are constant and moderate in severity. No mitigating or exacerbating factors reported. No other sxs reported. Patient denies any fever, chills, N/V, SOB, and all  other sxs at this time. No other tx reported. No further complaints or concerns at this time.       Arrival mode: Personal vehicle      PCP: Joslyn, Primary Doctor        Past Medical History:  Past Medical History:   Diagnosis Date    A-fib     Anticoagulant long-term use     Aortic valve disease     Cancer     brain tumor, 10 years ago    Cardiomyopathy     CHF (congestive heart failure)     COVID-19 7/23/2022    Hx of heart valve replacement with mechanical valve     Hyperlipidemia     Hypertension     Pacemaker     Pacemaker     Sepsis 7/23/2022    Stroke     2007, residual weakness       Past Surgical History:  Past Surgical History:   Procedure Laterality Date    AORTIC VALVE REPLACEMENT      CHOLECYSTECTOMY      CORONARY ARTERY BYPASS GRAFT      HYSTERECTOMY      INSERTION OF PACEMAKER      TONSILLECTOMY           Family History:  Family History   Problem Relation Age of Onset    No Known Problems Mother     No Known Problems Father        Social History:  Social History     Tobacco Use    Smoking status: Never    Smokeless tobacco: Never   Substance and Sexual Activity    Alcohol use: Not Currently    Drug use: Never    Sexual activity: Not on file        Review of Systems     Review of Systems   Constitutional:  Positive for appetite change (loss) and fatigue. Negative for chills and fever.   HENT:  Negative for congestion and sore throat.    Eyes:  Negative for pain and redness.   Respiratory:  Positive for cough (productive). Negative for shortness of breath.    Cardiovascular:  Negative for chest pain and palpitations.   Gastrointestinal:  Positive for diarrhea. Negative for nausea and vomiting.   Genitourinary:  Negative for dysuria and hematuria.   Musculoskeletal:  Negative for back pain and neck pain.   Skin:  Negative for rash and wound.   Neurological:  Negative for dizziness and weakness.   Psychiatric/Behavioral:  Negative for agitation and confusion.    All other systems reviewed and are  negative.       Physical Exam     Initial Vitals [11/19/23 1412]   BP Pulse Resp Temp SpO2   (!) 152/78 70 18 98.7 °F (37.1 °C) (!) 92 %      MAP       --          Physical Exam  Nursing Notes and Vital Signs Reviewed.  Constitutional: Patient is in no acute distress. Well-developed and well-nourished.  Head: Atraumatic. Normocephalic.  Eyes: PERRL. EOM intact. Conjunctivae are not pale. No scleral icterus.  ENT: Mucous membranes are moist. Oropharynx is clear and symmetric.    Neck: Supple. Full ROM. No lymphadenopathy.  Cardiovascular: Regular rate. Regular rhythm. No murmurs, rubs, or gallops. Distal pulses are 2+ and symmetric.  Pulmonary/Chest: Clear to auscultation bilaterally. No wheezing or rales.  Abdominal: Soft and non-distended.  There is no tenderness.  No rebound, guarding, or rigidity. Good bowel sounds.  Genitourinary: No CVA tenderness  Musculoskeletal: Moves all extremities. No obvious deformities. No edema.   Skin: Warm and dry.  Neurological:  Alert, awake, and appropriate.  Normal speech.  No acute focal neurological deficits are appreciated.  Psychiatric: Normal affect. Good eye contact. Appropriate in content.     ED Course   Critical Care    Date/Time: 11/19/2023 6:56 PM    Performed by: Olman Phoenix MD  Authorized by: Olman Phoenix MD  Total critical care time (exclusive of procedural time) : 0 minutes  Critical care was necessary to treat or prevent imminent or life-threatening deterioration of the following conditions: pneumonia.  Critical care was time spent personally by me on the following activities: blood draw for specimens, development of treatment plan with patient or surrogate, discussions with consultants, interpretation of cardiac output measurements, evaluation of patient's response to treatment, obtaining history from patient or surrogate, ordering and performing treatments and interventions, examination of patient, ordering and review of laboratory studies,  "ordering and review of radiographic studies, pulse oximetry, re-evaluation of patient's condition and review of old charts.        ED Vital Signs:  Vitals:    11/19/23 1412 11/19/23 1609 11/19/23 1626   BP: (!) 152/78  139/72   Pulse: 70  71   Resp: 18  (!) 24   Temp: 98.7 °F (37.1 °C)     TempSrc: Oral     SpO2: (!) 92%  96%   Weight:  102.7 kg (226 lb 6.6 oz)    Height: 5' 3" (1.6 m)         Abnormal Lab Results:  Labs Reviewed   INFLUENZA A & B BY MOLECULAR - Abnormal; Notable for the following components:       Result Value    Influenza A, Molecular Positive (*)     All other components within normal limits   CBC W/ AUTO DIFFERENTIAL - Abnormal; Notable for the following components:    Lymph % 14.0 (*)     All other components within normal limits   COMPREHENSIVE METABOLIC PANEL - Abnormal; Notable for the following components:    CO2 22 (*)     Glucose 127 (*)     Calcium 8.5 (*)     Albumin 3.2 (*)     Total Bilirubin 1.3 (*)     eGFR 45 (*)     All other components within normal limits   URINALYSIS, REFLEX TO URINE CULTURE - Abnormal; Notable for the following components:    Protein, UA Trace (*)     Ketones, UA Trace (*)     Occult Blood UA Trace (*)     All other components within normal limits    Narrative:     Specimen Source->Urine   APTT - Abnormal; Notable for the following components:    aPTT 41.7 (*)     All other components within normal limits   PROTIME-INR - Abnormal; Notable for the following components:    Prothrombin Time 23.0 (*)     INR 2.3 (*)     All other components within normal limits   B-TYPE NATRIURETIC PEPTIDE - Abnormal; Notable for the following components:    BNP 2,693 (*)     All other components within normal limits   CULTURE, BLOOD   CULTURE, BLOOD   LACTIC ACID, PLASMA   SARS-COV-2 RNA AMPLIFICATION, QUAL   B-TYPE NATRIURETIC PEPTIDE   LACTIC ACID, PLASMA   PROCALCITONIN   TROPONIN I   TROPONIN I   HEMOGLOBIN A1C   TSH        All Lab Results:  Results for orders placed or " performed during the hospital encounter of 11/19/23   Influenza A & B by Molecular    Specimen: Nasopharyngeal Swab   Result Value Ref Range    Influenza A, Molecular Positive (A) Negative    Influenza B, Molecular Negative Negative    Flu A & B Source NP    CBC auto differential   Result Value Ref Range    WBC 7.42 3.90 - 12.70 K/uL    RBC 5.13 4.00 - 5.40 M/uL    Hemoglobin 15.7 12.0 - 16.0 g/dL    Hematocrit 47.2 37.0 - 48.5 %    MCV 92 82 - 98 fL    MCH 30.6 27.0 - 31.0 pg    MCHC 33.3 32.0 - 36.0 g/dL    RDW 13.5 11.5 - 14.5 %    Platelets 174 150 - 450 K/uL    MPV 11.1 9.2 - 12.9 fL    Immature Granulocytes 0.3 0.0 - 0.5 %    Gran # (ANC) 5.3 1.8 - 7.7 K/uL    Immature Grans (Abs) 0.02 0.00 - 0.04 K/uL    Lymph # 1.0 1.0 - 4.8 K/uL    Mono # 1.0 0.3 - 1.0 K/uL    Eos # 0.0 0.0 - 0.5 K/uL    Baso # 0.02 0.00 - 0.20 K/uL    nRBC 0 0 /100 WBC    Gran % 71.8 38.0 - 73.0 %    Lymph % 14.0 (L) 18.0 - 48.0 %    Mono % 13.3 4.0 - 15.0 %    Eosinophil % 0.3 0.0 - 8.0 %    Basophil % 0.3 0.0 - 1.9 %    Differential Method Automated    Comprehensive metabolic panel   Result Value Ref Range    Sodium 140 136 - 145 mmol/L    Potassium 3.9 3.5 - 5.1 mmol/L    Chloride 104 95 - 110 mmol/L    CO2 22 (L) 23 - 29 mmol/L    Glucose 127 (H) 70 - 110 mg/dL    BUN 12 8 - 23 mg/dL    Creatinine 1.2 0.5 - 1.4 mg/dL    Calcium 8.5 (L) 8.7 - 10.5 mg/dL    Total Protein 7.3 6.0 - 8.4 g/dL    Albumin 3.2 (L) 3.5 - 5.2 g/dL    Total Bilirubin 1.3 (H) 0.1 - 1.0 mg/dL    Alkaline Phosphatase 87 55 - 135 U/L    AST 24 10 - 40 U/L    ALT 11 10 - 44 U/L    eGFR 45 (A) >60 mL/min/1.73 m^2    Anion Gap 14 8 - 16 mmol/L   Lactic acid, plasma #1   Result Value Ref Range    Lactate (Lactic Acid) 1.3 0.5 - 2.2 mmol/L   Urinalysis, Reflex to Urine Culture Urine, Clean Catch    Specimen: Urine   Result Value Ref Range    Specimen UA Urine, Clean Catch     Color, UA Yellow Yellow, Straw, Kamla    Appearance, UA Clear Clear    pH, UA 6.0 5.0 - 8.0     Specific Gravity, UA 1.015 1.005 - 1.030    Protein, UA Trace (A) Negative    Glucose, UA Negative Negative    Ketones, UA Trace (A) Negative    Bilirubin (UA) Negative Negative    Occult Blood UA Trace (A) Negative    Nitrite, UA Negative Negative    Urobilinogen, UA Negative <2.0 EU/dL    Leukocytes, UA Negative Negative   APTT   Result Value Ref Range    aPTT 41.7 (H) 21.0 - 32.0 sec   Protime-INR   Result Value Ref Range    Prothrombin Time 23.0 (H) 9.0 - 12.5 sec    INR 2.3 (H) 0.8 - 1.2   COVID-19 Rapid Screening   Result Value Ref Range    SARS-CoV-2 RNA, Amplification, Qual Negative Negative   BNP   Result Value Ref Range    BNP 2,693 (H) 0 - 99 pg/mL         Imaging Results:  Imaging Results              CTA Chest Non-Coronary (PE Studies) (In process)  Result time 11/19/23 18:50:20                     X-Ray Chest AP Portable (Final result)  Result time 11/19/23 15:46:26      Final result by Weston Escobar MD (11/19/23 15:46:26)                   Impression:      No acute abnormality.      Electronically signed by: Weston Escobar  Date:    11/19/2023  Time:    15:46               Narrative:    EXAMINATION:  XR CHEST AP PORTABLE    CLINICAL HISTORY:  Sepsis;    TECHNIQUE:  Single frontal view of the chest was performed.    COMPARISON:  None    FINDINGS:  Right chest cardiac pacing device.    The lungs are clear, with normal appearance of pulmonary vasculature and no pleural effusion or pneumothorax.    The cardiac silhouette is normal in size. The hilar and mediastinal contours are unremarkable.    Bones are intact.                                       The EKG was ordered, reviewed, and independently interpreted by the ED provider.  Interpretation time: 14:56  Rate: 75 BPM  Rhythm: Atrial-paced rhythm with prolonged AV conduction  Interpretation: Left axis deviation. Right bundle branch block, Possible lateral infarct, age undetermined. Inferior infarct, age undetermined. No STEMI.             The  Emergency Provider reviewed the vital signs and test results, which are outlined above.     ED Discussion       6:26 PM: Discussed case with Mary Fontanez NP (Hospitalist). Dr. Cantu agrees with current care and management of pt and accepts admission.   Admitting Service: Hospital Medicine  Admitting Physician: Dr. Cantu  Admit to: inpatient /tele    6:27 PM: Re-evaluated pt. I have discussed test results, shared treatment plan, and the need for admission with patient and family at bedside. Pt and family express understanding at this time and agree with all information. All questions answered. Pt and family have no further questions or concerns at this time. Pt is ready for admit.           Medical Decision Making  Patient with recent diagnosis of shortness breath and pneumonia just finished a course of Augmentin    Amount and/or Complexity of Data Reviewed  Independent Historian:      Details: Son  Labs: ordered. Decision-making details documented in ED Course.  Radiology: ordered. Decision-making details documented in ED Course.  ECG/medicine tests: ordered. Decision-making details documented in ED Course.     Details: No STEMI  Discussion of management or test interpretation with external provider(s): Patient with sure worsening shortness breath at home.  Workup positive for influenza A.  Her BNP was very elevated 2402.  Will plan to admit her to the hospital for shortness of breath and CHF workup.  She was given fluids 500 mg but now Lasix has been ordered 6 mg.  We will also get Tamiflu for the flu    Risk  Prescription drug management.  Decision regarding hospitalization.  Risk Details: Differential diagnoses:  Pneumonia, Congestive heart failure,  Acute Myocardial infarction, Pleural effusion, Pulmonary edema, Pulmonary embolism, Electrolyte imbalance, Infectious etiology, pneumothorax, Anemia, Deconditioning, bronchospasm, COPD exacerbation, Asthma exacerbation, Pneumothorax,                 ED  Medication(s):  Medications   oseltamivir capsule 30 mg (has no administration in time range)   sodium chloride 0.9% flush 10 mL (has no administration in time range)   melatonin tablet 6 mg (has no administration in time range)   ondansetron injection 4 mg (has no administration in time range)   prochlorperazine injection Soln 5 mg (has no administration in time range)   polyethylene glycol packet 17 g (has no administration in time range)   aluminum-magnesium hydroxide-simethicone 200-200-20 mg/5 mL suspension 30 mL (has no administration in time range)   acetaminophen tablet 650 mg (has no administration in time range)   naloxone 0.4 mg/mL injection 0.02 mg (has no administration in time range)   glucose chewable tablet 16 g (has no administration in time range)   glucose chewable tablet 24 g (has no administration in time range)   glucagon (human recombinant) injection 1 mg (has no administration in time range)   dextrose 10% bolus 125 mL 125 mL (has no administration in time range)   dextrose 10% bolus 250 mL 250 mL (has no administration in time range)   sodium chloride 0.9% flush 10 mL (has no administration in time range)   furosemide injection 60 mg (has no administration in time range)   warfarin (COUMADIN) tablet 2.5 mg (has no administration in time range)   warfarin (COUMADIN) split tablet 1.25 mg (1.25 mg Oral Given 11/19/23 1851)   levalbuterol nebulizer solution 1.2495 mg (has no administration in time range)   albuterol nebulizer solution 2.5 mg (2.5 mg Nebulization Given 11/19/23 1626)   sodium chloride 0.9% bolus 500 mL 500 mL (0 mLs Intravenous Stopped 11/19/23 1809)   furosemide injection 60 mg (60 mg Intravenous Given 11/19/23 1851)   iohexoL (OMNIPAQUE 350) injection 100 mL (100 mLs Intravenous Given 11/19/23 1838)       New Prescriptions    No medications on file               Scribe Attestation:   Scribe #1: I performed the above scribed service and the documentation accurately describes the  services I performed. I attest to the accuracy of the note.     Attending:   Physician Attestation Statement for Scribe #1: I, Olman Phoenix MD, personally performed the services described in this documentation, as scribed by Randi Zavala, in my presence, and it is both accurate and complete.           Clinical Impression       ICD-10-CM ICD-9-CM   1. Influenza A  J10.1 487.1   2. Weakness  R53.1 780.79   3. Chest pain  R07.9 786.50   4. Abnormal chest x-ray  R93.89 793.2   5. Dyspnea, unspecified type  R06.00 786.09       Disposition:   Disposition: Admitted  Condition: Fair         Olman Phoenix MD  11/19/23 2892

## 2023-11-19 NOTE — ASSESSMENT & PLAN NOTE
+ Flu A, recent Dx PNA at Urgent care, denies significant improvement with Abx, IH    --Xopenex Q12H Neb  --Tamiflu 75mg PO BID x5 days  --Tele

## 2023-11-19 NOTE — PROGRESS NOTES
Pharmacist Renal Dose Adjustment Note    Serena Post is a 83 y.o. female being treated with the medication tamiflu     Patient Data:    Vital Signs (Most Recent):  Temp: 98.7 °F (37.1 °C) (11/19/23 1412)  Pulse: 71 (11/19/23 1626)  Resp: (!) 24 (11/19/23 1626)  BP: 139/72 (11/19/23 1626)  SpO2: 96 % (11/19/23 1626) Vital Signs (72h Range):  Temp:  [98.7 °F (37.1 °C)]   Pulse:  [70-71]   Resp:  [18-24]   BP: (139-152)/(72-78)   SpO2:  [92 %-96 %]      Recent Labs   Lab 11/19/23  1620   CREATININE 1.2     Serum creatinine: 1.2 mg/dL 11/19/23 1620  Estimated creatinine clearance: 42.1 mL/min    Medication:tamiflu 75mg BID will be changed to tamiflu 30mg BID for crcl 30-60 ml/min    Pharmacist's Name: Ade Mari  Pharmacist's Extension: [phone]

## 2023-11-19 NOTE — ASSESSMENT & PLAN NOTE
REcent Dx PNA at , reviewed CXR report. No images to review in Care Everywhere. Reported Left lower lobe PNA.     --Hold on Abx for now, CXR did not show PNA  --CTA of chest

## 2023-11-20 PROBLEM — E87.6 HYPOKALEMIA: Status: ACTIVE | Noted: 2023-11-20

## 2023-11-20 LAB
ALBUMIN SERPL BCP-MCNC: 3.1 G/DL (ref 3.5–5.2)
ALP SERPL-CCNC: 85 U/L (ref 55–135)
ALT SERPL W/O P-5'-P-CCNC: 11 U/L (ref 10–44)
ANION GAP SERPL CALC-SCNC: 17 MMOL/L (ref 8–16)
AORTIC ROOT ANNULUS: 3.51 CM
ASCENDING AORTA: 4.24 CM
AST SERPL-CCNC: 22 U/L (ref 10–40)
AV INDEX (PROSTH): 0.44
AV MEAN GRADIENT: 23 MMHG
AV PEAK GRADIENT: 41 MMHG
AV REGURGITATION PRESSURE HALF TIME: 608.64 MS
AV VALVE AREA BY VELOCITY RATIO: 1.57 CM²
AV VALVE AREA: 1.45 CM²
AV VELOCITY RATIO: 0.48
BASOPHILS # BLD AUTO: 0.03 K/UL (ref 0–0.2)
BASOPHILS NFR BLD: 0.4 % (ref 0–1.9)
BILIRUB SERPL-MCNC: 1.2 MG/DL (ref 0.1–1)
BSA FOR ECHO PROCEDURE: 2.07 M2
BUN SERPL-MCNC: 12 MG/DL (ref 8–23)
CALCIUM SERPL-MCNC: 8.4 MG/DL (ref 8.7–10.5)
CHLORIDE SERPL-SCNC: 102 MMOL/L (ref 95–110)
CO2 SERPL-SCNC: 24 MMOL/L (ref 23–29)
CREAT SERPL-MCNC: 1.1 MG/DL (ref 0.5–1.4)
CV ECHO LV RWT: 0.52 CM
DIFFERENTIAL METHOD: ABNORMAL
DOP CALC AO PEAK VEL: 3.2 M/S
DOP CALC AO VTI: 72.3 CM
DOP CALC LVOT AREA: 3.3 CM2
DOP CALC LVOT DIAMETER: 2.04 CM
DOP CALC LVOT PEAK VEL: 1.54 M/S
DOP CALC LVOT STROKE VOLUME: 104.54 CM3
DOP CALC RVOT PEAK VEL: 0.69 M/S
DOP CALC RVOT VTI: 13.5 CM
DOP CALCLVOT PEAK VEL VTI: 32 CM
E WAVE DECELERATION TIME: 190 MSEC
E/A RATIO: 0.45
ECHO LV POSTERIOR WALL: 1.16 CM (ref 0.6–1.1)
EOSINOPHIL # BLD AUTO: 0 K/UL (ref 0–0.5)
EOSINOPHIL NFR BLD: 0.4 % (ref 0–8)
ERYTHROCYTE [DISTWIDTH] IN BLOOD BY AUTOMATED COUNT: 13.7 % (ref 11.5–14.5)
EST. GFR  (NO RACE VARIABLE): 50 ML/MIN/1.73 M^2
ESTIMATED AVG GLUCOSE: 108 MG/DL (ref 68–131)
FRACTIONAL SHORTENING: 26 % (ref 28–44)
GLUCOSE SERPL-MCNC: 101 MG/DL (ref 70–110)
HBA1C MFR BLD: 5.4 % (ref 4–5.6)
HCT VFR BLD AUTO: 49.5 % (ref 37–48.5)
HGB BLD-MCNC: 15.8 G/DL (ref 12–16)
IMM GRANULOCYTES # BLD AUTO: 0.01 K/UL (ref 0–0.04)
IMM GRANULOCYTES NFR BLD AUTO: 0.1 % (ref 0–0.5)
INR PPP: 2.8 (ref 0.8–1.2)
INTERVENTRICULAR SEPTUM: 1.56 CM (ref 0.6–1.1)
IVC DIAMETER: 1.63 CM
LA WIDTH: 3.3 CM
LEFT ATRIUM SIZE: 3.25 CM
LEFT INTERNAL DIMENSION IN SYSTOLE: 3.32 CM (ref 2.1–4)
LEFT VENTRICLE DIASTOLIC VOLUME INDEX: 45.98 ML/M2
LEFT VENTRICLE DIASTOLIC VOLUME: 91.05 ML
LEFT VENTRICLE MASS INDEX: 119 G/M2
LEFT VENTRICLE SYSTOLIC VOLUME INDEX: 22.7 ML/M2
LEFT VENTRICLE SYSTOLIC VOLUME: 44.9 ML
LEFT VENTRICULAR INTERNAL DIMENSION IN DIASTOLE: 4.47 CM (ref 3.5–6)
LEFT VENTRICULAR MASS: 235.54 G
LVOT MG: 5.88 MMHG
LVOT MV: 1.09 CM/S
LYMPHOCYTES # BLD AUTO: 2 K/UL (ref 1–4.8)
LYMPHOCYTES NFR BLD: 28.9 % (ref 18–48)
MAGNESIUM SERPL-MCNC: 1.7 MG/DL (ref 1.6–2.6)
MCH RBC QN AUTO: 29.8 PG (ref 27–31)
MCHC RBC AUTO-ENTMCNC: 31.9 G/DL (ref 32–36)
MCV RBC AUTO: 93 FL (ref 82–98)
MONOCYTES # BLD AUTO: 1 K/UL (ref 0.3–1)
MONOCYTES NFR BLD: 14.2 % (ref 4–15)
MV PEAK A VEL: 0.94 M/S
MV PEAK E VEL: 0.42 M/S
MV STENOSIS PRESSURE HALF TIME: 55.1 MS
MV VALVE AREA P 1/2 METHOD: 3.99 CM2
NEUTROPHILS # BLD AUTO: 3.9 K/UL (ref 1.8–7.7)
NEUTROPHILS NFR BLD: 56 % (ref 38–73)
NRBC BLD-RTO: 0 /100 WBC
PHOSPHATE SERPL-MCNC: 3.3 MG/DL (ref 2.7–4.5)
PISA AR MAX VEL: 3.82 M/S
PISA TR MAX VEL: 1.93 M/S
PLATELET # BLD AUTO: 171 K/UL (ref 150–450)
PMV BLD AUTO: 11.8 FL (ref 9.2–12.9)
POTASSIUM SERPL-SCNC: 3.4 MMOL/L (ref 3.5–5.1)
PROT SERPL-MCNC: 7.3 G/DL (ref 6–8.4)
PROTHROMBIN TIME: 27.4 SEC (ref 9–12.5)
PV MEAN GRADIENT: 1 MMHG
RA PRESSURE ESTIMATED: 8 MMHG
RBC # BLD AUTO: 5.3 M/UL (ref 4–5.4)
RV TB RVSP: 10 MMHG
SODIUM SERPL-SCNC: 143 MMOL/L (ref 136–145)
STJ: 3.67 CM
TR MAX PG: 15 MMHG
TROPONIN I SERPL DL<=0.01 NG/ML-MCNC: 0.09 NG/ML (ref 0–0.03)
TV REST PULMONARY ARTERY PRESSURE: 23 MMHG
WBC # BLD AUTO: 6.98 K/UL (ref 3.9–12.7)
Z-SCORE OF LEFT VENTRICULAR DIMENSION IN END DIASTOLE: -2.46
Z-SCORE OF LEFT VENTRICULAR DIMENSION IN END SYSTOLE: -0.44

## 2023-11-20 PROCEDURE — 11000001 HC ACUTE MED/SURG PRIVATE ROOM

## 2023-11-20 PROCEDURE — 99223 PR INITIAL HOSPITAL CARE,LEVL III: ICD-10-PCS | Mod: ,,, | Performed by: PHYSICIAN ASSISTANT

## 2023-11-20 PROCEDURE — 27000207 HC ISOLATION

## 2023-11-20 PROCEDURE — 63600175 PHARM REV CODE 636 W HCPCS: Performed by: NURSE PRACTITIONER

## 2023-11-20 PROCEDURE — 85610 PROTHROMBIN TIME: CPT | Performed by: INTERNAL MEDICINE

## 2023-11-20 PROCEDURE — 80053 COMPREHEN METABOLIC PANEL: CPT | Performed by: NURSE PRACTITIONER

## 2023-11-20 PROCEDURE — 25000242 PHARM REV CODE 250 ALT 637 W/ HCPCS: Performed by: NURSE PRACTITIONER

## 2023-11-20 PROCEDURE — 25000003 PHARM REV CODE 250: Performed by: NURSE PRACTITIONER

## 2023-11-20 PROCEDURE — 84484 ASSAY OF TROPONIN QUANT: CPT | Performed by: NURSE PRACTITIONER

## 2023-11-20 PROCEDURE — 94640 AIRWAY INHALATION TREATMENT: CPT

## 2023-11-20 PROCEDURE — 99223 1ST HOSP IP/OBS HIGH 75: CPT | Mod: ,,, | Performed by: PHYSICIAN ASSISTANT

## 2023-11-20 PROCEDURE — 84100 ASSAY OF PHOSPHORUS: CPT | Performed by: NURSE PRACTITIONER

## 2023-11-20 PROCEDURE — 25000003 PHARM REV CODE 250: Performed by: INTERNAL MEDICINE

## 2023-11-20 PROCEDURE — 36415 COLL VENOUS BLD VENIPUNCTURE: CPT | Performed by: NURSE PRACTITIONER

## 2023-11-20 PROCEDURE — 83735 ASSAY OF MAGNESIUM: CPT | Performed by: NURSE PRACTITIONER

## 2023-11-20 PROCEDURE — 94799 UNLISTED PULMONARY SVC/PX: CPT | Mod: XB

## 2023-11-20 PROCEDURE — 85025 COMPLETE CBC W/AUTO DIFF WBC: CPT | Performed by: NURSE PRACTITIONER

## 2023-11-20 PROCEDURE — 99900035 HC TECH TIME PER 15 MIN (STAT)

## 2023-11-20 PROCEDURE — 36415 COLL VENOUS BLD VENIPUNCTURE: CPT | Performed by: INTERNAL MEDICINE

## 2023-11-20 RX ORDER — WARFARIN 2.5 MG/1
2.5 TABLET ORAL
Status: DISCONTINUED | OUTPATIENT
Start: 2023-11-22 | End: 2023-11-22

## 2023-11-20 RX ORDER — DOXYCYCLINE HYCLATE 100 MG
100 TABLET ORAL EVERY 12 HOURS
Status: DISCONTINUED | OUTPATIENT
Start: 2023-11-20 | End: 2023-11-22 | Stop reason: HOSPADM

## 2023-11-20 RX ADMIN — OSELTAMIVIR PHOSPHATE 30 MG: 30 CAPSULE ORAL at 01:11

## 2023-11-20 RX ADMIN — OSELTAMIVIR PHOSPHATE 30 MG: 30 CAPSULE ORAL at 09:11

## 2023-11-20 RX ADMIN — POTASSIUM BICARBONATE 25 MEQ: 978 TABLET, EFFERVESCENT ORAL at 05:11

## 2023-11-20 RX ADMIN — LEVALBUTEROL HYDROCHLORIDE 1.25 MG: 0.63 SOLUTION RESPIRATORY (INHALATION) at 08:11

## 2023-11-20 RX ADMIN — FUROSEMIDE 60 MG: 10 INJECTION, SOLUTION INTRAMUSCULAR; INTRAVENOUS at 05:11

## 2023-11-20 RX ADMIN — LEVALBUTEROL HYDROCHLORIDE 1.25 MG: 0.63 SOLUTION RESPIRATORY (INHALATION) at 07:11

## 2023-11-20 RX ADMIN — DOXYCYCLINE HYCLATE 100 MG: 100 TABLET, COATED ORAL at 09:11

## 2023-11-20 RX ADMIN — DOXYCYCLINE HYCLATE 100 MG: 100 TABLET, COATED ORAL at 11:11

## 2023-11-20 RX ADMIN — WARFARIN SODIUM 1.25 MG: 2.5 TABLET ORAL at 05:11

## 2023-11-20 RX ADMIN — POLYETHYLENE GLYCOL 3350 17 G: 17 POWDER, FOR SOLUTION ORAL at 09:11

## 2023-11-20 NOTE — H&P
OPending sale to [facility] - Emergency Dept.  Hospital Medicine  History & Physical    Patient Name: Serena Post  MRN: [id number]  Patient Class: IP- Inpatient  Admission Date: 11/19/2023  Attending Physician: Laz Cantu MD   Primary Care Provider: Joslyn, Primary Doctor         Patient information was obtained from relative(s), past medical records, and ER records.     Subjective:     Principal Problem:<principal problem not specified>    Chief Complaint:   Chief Complaint   Patient presents with    Pneumonia     Dx PNE left lower Tuesday, placed on ABX, has been having diarrhea, loss of appetite and decreased oral intake. Pt has productive cough        HPI: 83 y.o. female patient with a PMHx of CHF, HTN, CKD and Afib who presents to the Emergency Department for evaluation of pneumonia. Pt was dx with pneumonia of the left lower lobe on 11/14 at urgent care, prescribed Augmentin, Albuterol IH, Decongestant. Pt has experienced a loss of appetite, severe yellow diarrhea, and a productive cough. Son reports she has not eaten or drank anything and has been fatigued for the past few days.Symptoms are constant and moderate in severity. No mitigating or exacerbating factors reported. No other sxs reported. Patient denies any fever, chills, N/V, SOB, and all other sxs at this time. No other tx reported. In the ED, vitals: 152/78, 70, 18, 98.7, 92% RA. Labs: INR: 2.3, CO2: 22, Glu: 127, Bili: 1.3, BNP: 2693. CXR: No acute findings per report. + FLU A. Treated with Neb, initiated on tamiflu, diuretic. Patient is a full code. Admitted to hospital medicine for management of Acute on Chronic CHF, Influenza, Acute Respiratory Failure.     Past Medical History:   Diagnosis Date    A-fib     Anticoagulant long-term use     Aortic valve disease     Cancer     brain tumor, 10 years ago    Cardiomyopathy     CHF (congestive heart failure)     COVID-19 7/23/2022    Hx of heart valve replacement with mechanical valve     Hyperlipidemia      Hypertension     Pacemaker     Pacemaker     Sepsis 7/23/2022    Stroke     2007, residual weakness       Past Surgical History:   Procedure Laterality Date    AORTIC VALVE REPLACEMENT      CHOLECYSTECTOMY      CORONARY ARTERY BYPASS GRAFT      HYSTERECTOMY      INSERTION OF PACEMAKER      TONSILLECTOMY         Review of patient's allergies indicates:   Allergen Reactions    Amlodipine Other (See Comments)     Not sure    Chlorthalidone Other (See Comments)     Not sure    Clonidine Other (See Comments)     Not sure    Codeine Other (See Comments)     Not sure    Escitalopram Other (See Comments)     Not sure    Lisinopril Other (See Comments)     Not sure    Losartan Other (See Comments)     Not sure    Meperidine Other (See Comments)     Not sure    Methadone Other (See Comments)     Not sure      Morphine Other (See Comments)     Not sure    Nebivolol Other (See Comments)     Not sure        Nitrofurantoin Other (See Comments)     Not sure        Omeprazole Other (See Comments)     Not sure      Pravastatin Other (See Comments)     Not sure      Propoxyphene Other (See Comments)     Not sure      Sulfamethoxazole-trimethoprim Other (See Comments)     Not sure           No current facility-administered medications on file prior to encounter.     Current Outpatient Medications on File Prior to Encounter   Medication Sig    albuterol (PROVENTIL) 2.5 mg /3 mL (0.083 %) nebulizer solution Inhale 2.5 mg into the lungs.    albuterol (PROVENTIL/VENTOLIN HFA) 90 mcg/actuation inhaler Inhale 2 puffs into the lungs.    amoxicillin-clavulanate 875-125mg (AUGMENTIN) 875-125 mg per tablet Take 1 tablet by mouth.    furosemide (LASIX) 20 MG tablet Take 1 tablet by mouth every morning.    lisinopriL (PRINIVIL,ZESTRIL) 5 MG tablet Take 5 mg by mouth 2 (two) times daily.    metoprolol tartrate (LOPRESSOR) 25 MG tablet Take 1 tablet by mouth 2 (two) times daily.    miconazole (MICOTIN) 2 % cream Apply topically 2 (two) times daily.  Apply to bilateral buttocks, thighs, perineum, groins    POLYTUSSIN DM,PYRILAMINE, 12.5-5-7.5 mg/5 mL Liqd Take 10 mLs by mouth 2 (two) times daily.    triamterene-hydrochlorothiazide 37.5-25 mg (MAXZIDE-25) 37.5-25 mg per tablet Take 0.5 tablets by mouth 2 (two) times a day.    warfarin (COUMADIN) 2.5 MG tablet Take 2.5 mg by mouth nightly. Take 2.5mg on Monday, Wednesday, Friday  take 1.25 mg on Tuesday, Thursday, Saturday and Sunday     Family History       Problem Relation (Age of Onset)    No Known Problems Mother, Father          Tobacco Use    Smoking status: Never    Smokeless tobacco: Never   Substance and Sexual Activity    Alcohol use: Not Currently    Drug use: Never    Sexual activity: Not on file     Review of Systems   Unable to perform ROS: Dementia     Objective:     Vital Signs (Most Recent):  Temp: 98.7 °F (37.1 °C) (11/19/23 1412)  Pulse: 71 (11/19/23 1626)  Resp: (!) 24 (11/19/23 1626)  BP: 139/72 (11/19/23 1626)  SpO2: 96 % (11/19/23 1626) Vital Signs (24h Range):  Temp:  [98.7 °F (37.1 °C)] 98.7 °F (37.1 °C)  Pulse:  [70-71] 71  Resp:  [18-24] 24  SpO2:  [92 %-96 %] 96 %  BP: (139-152)/(72-78) 139/72     Weight: 102.7 kg (226 lb 6.6 oz)  Body mass index is 40.11 kg/m².     Physical Exam  Vitals and nursing note reviewed.   Constitutional:       General: She is not in acute distress.     Appearance: She is well-developed. She is obese. She is ill-appearing.      Interventions: Nasal cannula in place.   HENT:      Head: Normocephalic and atraumatic.      Right Ear: Hearing and external ear normal.      Left Ear: Hearing and external ear normal.      Nose: No rhinorrhea.      Right Sinus: No maxillary sinus tenderness or frontal sinus tenderness.      Left Sinus: No maxillary sinus tenderness or frontal sinus tenderness.      Mouth/Throat:      Mouth: No oral lesions.      Pharynx: Uvula midline.   Eyes:      General:         Right eye: No discharge.         Left eye: No discharge.       Conjunctiva/sclera: Conjunctivae normal.      Pupils: Pupils are equal, round, and reactive to light.   Neck:      Thyroid: No thyromegaly.      Vascular: No carotid bruit.      Trachea: No tracheal deviation.   Cardiovascular:      Rate and Rhythm: Normal rate and regular rhythm.      Pulses:           Dorsalis pedis pulses are 2+ on the right side and 2+ on the left side.      Heart sounds: S1 normal and S2 normal. Heart sounds are distant. No murmur heard.  Pulmonary:      Effort: Pulmonary effort is normal. No respiratory distress.      Breath sounds: Examination of the right-lower field reveals decreased breath sounds. Examination of the left-lower field reveals decreased breath sounds. Decreased breath sounds present.   Abdominal:      General: Bowel sounds are decreased. There is distension.      Palpations: Abdomen is soft. There is no mass.      Tenderness: There is no abdominal tenderness.   Musculoskeletal:         General: Normal range of motion.      Cervical back: Normal range of motion.   Lymphadenopathy:      Cervical: No cervical adenopathy.      Upper Body:      Right upper body: No supraclavicular adenopathy.      Left upper body: No supraclavicular adenopathy.   Skin:     General: Skin is warm and dry.      Capillary Refill: Capillary refill takes less than 2 seconds.      Findings: No rash.   Neurological:      Mental Status: She is lethargic and disoriented.      Sensory: No sensory deficit.      Coordination: Coordination normal.      Gait: Gait normal.   Psychiatric:         Cognition and Memory: She exhibits impaired recent memory and impaired remote memory.              CRANIAL NERVES     CN III, IV, VI   Pupils are equal, round, and reactive to light.       Significant Labs: All pertinent labs within the past 24 hours have been reviewed.  CBC:   Recent Labs   Lab 11/19/23  1620   WBC 7.42   HGB 15.7   HCT 47.2        CMP:   Recent Labs   Lab 11/19/23  1620      K 3.9       CO2 22*   *   BUN 12   CREATININE 1.2   CALCIUM 8.5*   PROT 7.3   ALBUMIN 3.2*   BILITOT 1.3*   ALKPHOS 87   AST 24   ALT 11   ANIONGAP 14     Cardiac Markers:   Recent Labs   Lab 11/19/23  1659   BNP 2,693*       Significant Imaging: I have reviewed all pertinent imaging results/findings within the past 24 hours.  Assessment/Plan:     Abnormal chest x-ray  Per review of CXR in ED, enlarged heart border when compared to CXR 7/2023. + viral infection.    --CTA  --ECHO      Acute hypoxemic respiratory failure  Patient with Hypoxic Respiratory failure which is Acute.  she is not on home oxygen. Supplemental oxygen was provided and noted-      .   Signs/symptoms of respiratory failure include- tachypnea, increased work of breathing, and wheezing. Contributing diagnoses includes - CHF and Pneumonia Labs and images were reviewed. Patient Has not had a recent ABG. Will treat underlying causes and adjust management of respiratory failure as follows-     --Diuresis  --Eval for Lung Pathology- CTA    Acute on chronic systolic heart failure  Patient is identified as having Systolic (HFrEF) heart failure that is Acute on chronic. CHF is currently uncontrolled due to Rales/crackles on pulmonary exam. Latest ECHO performed and demonstrates- No results found for this or any previous visit.  . Continue Beta Blocker and Furosemide and monitor clinical status closely. Monitor on telemetry. Patient is on CHF pathway.  Monitor strict Is&Os and daily weights.  Place on fluid restriction of 1.5 L. Continue to stress to patient importance of self efficacy and  on diet for CHF. Last BNP reviewed- and noted below   Recent Labs   Lab 11/19/23  1659   BNP 2,693*   .     --ECHO  --Lasix 60mg BID x2 doses, reeval in AM      Pneumonia due to influenza A virus  REcent Dx PNA at , reviewed CXR report. No images to review in Care Everywhere. Reported Left lower lobe PNA.     --Hold on Abx for now, CXR did not show PNA  --CTA of  chest       Influenza A  + Flu A, recent Dx PNA at Urgent care, denies significant improvement with Abx, IH    --Xopenex Q12H Neb  --Tamiflu 75mg PO BID x5 days  --Tele        Anticoagulant long-term use  Chronic mgmt with Coumadin, INR: 2.3 on admission    --Consult Pharmacy to dose      VTE Risk Mitigation (From admission, onward)           Ordered     warfarin (COUMADIN) tablet 2.5 mg  Once per day on Mon Wed Fri 11/19/23 1818     warfarin (COUMADIN) split tablet 1.25 mg  Once per day on Sun Tue Thu Sat         11/19/23 1818     IP VTE HIGH RISK PATIENT  Once         11/19/23 1747     Place sequential compression device  Until discontinued         11/19/23 1747     Reason for No Pharmacological VTE Prophylaxis  Once        Question:  Reasons:  Answer:  Already adequately anticoagulated on oral Anticoagulants    11/19/23 1747                               Pharmacist Renal Dose Adjustment Note    Serena Post is a 83 y.o. female being treated with the medication tamiflu     Patient Data:    Vital Signs (Most Recent):  Temp: 98.7 °F (37.1 °C) (11/19/23 1412)  Pulse: 71 (11/19/23 1626)  Resp: (!) 24 (11/19/23 1626)  BP: 139/72 (11/19/23 1626)  SpO2: 96 % (11/19/23 1626) Vital Signs (72h Range):  Temp:  [98.7 °F (37.1 °C)]   Pulse:  [70-71]   Resp:  [18-24]   BP: (139-152)/(72-78)   SpO2:  [92 %-96 %]      Recent Labs   Lab 11/19/23  1620   CREATININE 1.2     Serum creatinine: 1.2 mg/dL 11/19/23 1620  Estimated creatinine clearance: 42.1 mL/min    Medication:tamiflu 75mg BID will be changed to tamiflu 30mg BID for crcl 30-60 ml/min    Pharmacist's Name: Ade Mari  Pharmacist's Extension: [phone]      Mary Fontanez NP  Department of Hospital Medicine  [facility] - Emergency Dept.

## 2023-11-20 NOTE — ASSESSMENT & PLAN NOTE
Per review of CXR in ED, enlarged heart border when compared to CXR 7/2023. + viral infection.    --CTA negative for pulmonary embolus but large food bolus and midesophagus  --ECHO    Left Ventricle: The left ventricle is normal in size. Normal wall thickness. There is mild concentric hypertrophy. Normal wall motion. There is normal systolic function with a visually estimated ejection fraction of 55 - 60%. Grade I diastolic dysfunction.    Right Ventricle: Normal right ventricular cavity size. Wall thickness is normal. Right ventricle wall motion  is normal. Systolic function is normal.    Aortic Valve: There is mild aortic valve sclerosis. There is moderate stenosis. Aortic valve area by VTI is 1.45 cm². Aortic valve peak velocity is 3.20 m/s. Mean gradient is 23 mmHg. The dimensionless index is 0.44. There is mild aortic regurgitation.    Mitral Valve: There is mild regurgitation.    Tricuspid Valve: There is mild regurgitation.    IVC/SVC: Intermediate venous pressure at 8 mmHg.

## 2023-11-20 NOTE — CONSULTS
Food & Nutrition Education    Diet Education: Heart Failure  Time Spent: 5 minutes.    Learners: Pt    Nutrition Education provided with handouts:  Healthy-Heart Nutrition Therapy, Fluid-Restricted Diet, Low-Sodium Nutrition Therapy ([email or web address].org)    Comments:  83 y.o. female patient with a PMHx of CHF, HTN, CKD and Afib who presents to the Emergency Department for evaluation of pneumonia. Pt was dx with pneumonia of the left lower lobe on 11/14 at urgent care, prescribed Augmentin, Albuterol IH, Decongestant. Pt has experienced a loss of appetite, severe yellow diarrhea, and a productive cough. Son reports she has not eaten or drank anything and has been fatigued for the past few days.Symptoms are constant and moderate in severity. No mitigating or exacerbating factors reported. No other sxs reported. Patient denies any fever, chills, N/V, SOB, and all other sxs at this time. RD consulted to provide heart failure nutrition education.    Pt currently not appropriate for nutrition education. Pt remains not oriented. Spoke with pt RN whom will keep a close eye on pt fluid intake to ensure the pt does not consume excess fluids. RD will continue to follow and provide nutrition education during RD follow up on when appropriate.    NFPE not performed, NFPE will be assessed during RD follow up.    Provided handout with dietitian's contact information to RN who will attach with discharge documents.    Please re-consult as needed.    Thank You!   Sen Garcia, Registration Eligible, Provisional LDN

## 2023-11-20 NOTE — ASSESSMENT & PLAN NOTE
CT suggested there may be a food bolus, but patient and son deny any symptoms of dysphagia. Patient not in distress and tolerating her secretions.   Could consider a dose of glucagon, but would recommend starting a clear liquid diet. She needs to be upright while doing this. Monitor for symptoms suggestive of dysphagia.   No plans for inpatient EGD while being treated for PNA and INR >1.5.   Outpatient GI follow up.   Contact GI if any questions.

## 2023-11-20 NOTE — HPI
83 y.o. female patient with a PMHx of CHF, HTN, CKD and Afib who presents to the Emergency Department for evaluation of pneumonia. Pt was dx with pneumonia of the left lower lobe on 11/14 at urgent care, prescribed Augmentin, Albuterol IH, Decongestant. Pt has experienced a loss of appetite, severe yellow diarrhea, and a productive cough. Son reports she has not eaten or drank anything and has been fatigued for the past few days.Symptoms are constant and moderate in severity. No mitigating or exacerbating factors reported. No other sxs reported. Patient denies any fever, chills, N/V, SOB, and all other sxs at this time. No other tx reported. In the ED, vitals: 152/78, 70, 18, 98.7, 92% RA. Labs: INR: 2.3, CO2: 22, Glu: 127, Bili: 1.3, BNP: 2693. CXR: No acute findings per report. + FLU A. Treated with Neb, initiated on tamiflu, diuretic. Patient is a full code. Admitted to hospital medicine for management of Acute on Chronic CHF, Influenza, Acute Respiratory Failure.

## 2023-11-20 NOTE — PHARMACY MED REC
"  Admission Medication History     The home medication history was taken by Goldie Mason.    You may go to "Admission" then "Reconcile Home Medications" tabs to review and/or act upon these items.     The home medication list has been updated by the Pharmacy department.   Please read ALL comments highlighted in yellow.   Please address this information as you see fit.    Feel free to contact us if you have any questions or require assistance.      The medications listed below were removed from the home medication list. Please reorder if appropriate:  Patient reports no longer taking the following medication(s):  Lasix 20mg        Medications listed below were obtained from: Patient/family and Analytic software- [employer], patient had male in the room who helped  (Not in a hospital admission)      LAST [facility] REC COMPLETED:     Goldiemarquita Mason  [id number]      Current Outpatient Medications on File Prior to Encounter   Medication Sig Dispense Refill Last Dose    albuterol (PROVENTIL) 2.5 mg /3 mL (0.083 %) nebulizer solution Inhale 2.5 mg into the lungs.   Past Week    albuterol (PROVENTIL/VENTOLIN HFA) 90 mcg/actuation inhaler Inhale 2 puffs into the lungs.   Past Week    amoxicillin-clavulanate 875-125mg (AUGMENTIN) 875-125 mg per tablet Take 1 tablet by mouth.   11/18/2023    furosemide (LASIX) 20 MG tablet Take 1 tablet by mouth every morning.       lisinopriL (PRINIVIL,ZESTRIL) 5 MG tablet Take 5 mg by mouth 2 (two) times daily.   11/19/2023    metoprolol tartrate (LOPRESSOR) 25 MG tablet Take 1 tablet by mouth 2 (two) times daily.   11/19/2023    miconazole (MICOTIN) 2 % cream Apply topically 2 (two) times daily. Apply to bilateral buttocks, thighs, perineum, groins 30 g 0 11/18/2023    POLYTUSSIN DM,PYRILAMINE, 12.5-5-7.5 mg/5 mL Liqd Take 10 mLs by mouth 2 (two) times daily.   11/18/2023    triamterene-hydrochlorothiazide 37.5-25 mg (MAXZIDE-25) 37.5-25 mg per tablet Take 0.5 tablets by mouth 2 (two) times a " day.   11/18/2023    warfarin (COUMADIN) 2.5 MG tablet Take 2.5 mg by mouth nightly. Take 2.5mg on Monday, Wednesday, Friday  take 1.25 mg on Tuesday, Thursday, Saturday and Sunday 11/18/2023                         .

## 2023-11-20 NOTE — PROGRESS NOTES
[facility] Medicine  Progress Note    Patient Name: Serena Post  MRN: [id number]  Patient Class: IP- Inpatient   Admission Date: 11/19/2023  Length of Stay: 1 days  Attending Physician: June Acosta MD  Primary Care Provider: Joslyn, Primary Doctor        Subjective:     Principal Problem:Pneumonia due to influenza A virus        HPI:  83 y.o. female patient with a PMHx of CHF, HTN, CKD and Afib who presents to the Emergency Department for evaluation of pneumonia. Pt was dx with pneumonia of the left lower lobe on 11/14 at urgent care, prescribed Augmentin, Albuterol IH, Decongestant. Pt has experienced a loss of appetite, severe yellow diarrhea, and a productive cough. Son reports she has not eaten or drank anything and has been fatigued for the past few days.Symptoms are constant and moderate in severity. No mitigating or exacerbating factors reported. No other sxs reported. Patient denies any fever, chills, N/V, SOB, and all other sxs at this time. No other tx reported. In the ED, vitals: 152/78, 70, 18, 98.7, 92% RA. Labs: INR: 2.3, CO2: 22, Glu: 127, Bili: 1.3, BNP: 2693. CXR: No acute findings per report. + FLU A. Treated with Neb, initiated on tamiflu, diuretic. Patient is a full code. Admitted to hospital medicine for management of Acute on Chronic CHF, Influenza, Acute Respiratory Failure.     Overview/Hospital Course:  83-year-old female admitted with left lower lobe pneumonia with cough, fever, chills, nausea, vomiting.  The diarrhea began after taking amoxicillin for pneumonia.  Patient and son report no further diarrhea but significant skin breakdown secondary to same.    Interval History:  Patient seen and examined with son at bedside.  Reports diarrhea after initiation of amoxicillin for presumed pneumonia.  Wound care consult for skin abrasions.  CT scan of chest revealed large food bolus in esophagus.  GI consulted with no recommendations for endoscopy but aspiration  precautions and clear liquid diet to advance as tolerated.    Review of Systems   Constitutional:  Positive for activity change, appetite change, fatigue and fever.   Respiratory:  Negative for cough and shortness of breath.    Cardiovascular:  Negative for chest pain and leg swelling.   Gastrointestinal:  Positive for diarrhea. Negative for nausea and vomiting.   Neurological:  Positive for weakness.   All other systems reviewed and are negative.    Objective:     Vital Signs (Most Recent):  Temp: 97.8 °F (36.6 °C) (11/20/23 1605)  Pulse: 108 (11/20/23 1605)  Resp: 20 (11/20/23 1605)  BP: 134/67 (11/20/23 1605)  SpO2: (!) 90 % (11/20/23 1605) Vital Signs (24h Range):  Temp:  [97.7 °F (36.5 °C)-97.9 °F (36.6 °C)] 97.8 °F (36.6 °C)  Pulse:  [] 108  Resp:  [18-24] 20  SpO2:  [90 %-97 %] 90 %  BP: (100-134)/(57-69) 134/67     Weight: 96.2 kg (212 lb)  Body mass index is 37.55 kg/m².    Intake/Output Summary (Last 24 hours) at 11/20/2023 1744  Last data filed at 11/20/2023 1730  Gross per 24 hour   Intake 250 ml   Output 1900 ml   Net -1650 ml         Physical Exam  Vitals reviewed.   Constitutional:       Appearance: Normal appearance. She is ill-appearing (chronically).   HENT:      Head: Normocephalic and atraumatic.      Mouth/Throat:      Mouth: Mucous membranes are moist.      Pharynx: Oropharynx is clear.   Eyes:      Extraocular Movements: Extraocular movements intact.      Conjunctiva/sclera: Conjunctivae normal.   Cardiovascular:      Rate and Rhythm: Regular rhythm. Tachycardia present.      Pulses: Normal pulses.      Heart sounds: Normal heart sounds.   Pulmonary:      Effort: Pulmonary effort is normal.      Breath sounds: Normal breath sounds.   Abdominal:      General: Bowel sounds are normal. There is no distension.      Palpations: Abdomen is soft. There is no mass.      Tenderness: There is no abdominal tenderness. There is no guarding or rebound.      Hernia: No hernia is present.    Musculoskeletal:         General: Normal range of motion.      Cervical back: Normal range of motion and neck supple.   Skin:     General: Skin is warm and dry.   Neurological:      General: No focal deficit present.      Mental Status: She is alert. Mental status is at baseline.             Significant Labs: All pertinent labs within the past 24 hours have been reviewed.  Blood Culture:   Recent Labs   Lab 11/19/23  1620 11/19/23  1635   LABBLOO No Growth to date No Growth to date     CBC:   Recent Labs   Lab 11/19/23  1620 11/20/23  0713   WBC 7.42 6.98   HGB 15.7 15.8   HCT 47.2 49.5*    171     CMP:   Recent Labs   Lab 11/19/23  1620 11/20/23  0713    143   K 3.9 3.4*    102   CO2 22* 24   * 101   BUN 12 12   CREATININE 1.2 1.1   CALCIUM 8.5* 8.4*   PROT 7.3 7.3   ALBUMIN 3.2* 3.1*   BILITOT 1.3* 1.2*   ALKPHOS 87 85   AST 24 22   ALT 11 11   ANIONGAP 14 17*       Significant Imaging: I have reviewed all pertinent imaging results/findings within the past 24 hours.    Assessment/Plan:      * Pneumonia due to influenza A virus  REcent Dx PNA at , reviewed CXR report. No images to review in Care Everywhere. Reported Left lower lobe PNA.     --treat with doxycycline due to underlying influenza, CXR did not show PNA  --CTA of chest negative for PE      Hypokalemia  Patient has hypokalemia which is Acute and currently controlled. Most recent potassium levels reviewed-   Lab Results   Component Value Date    K 3.4 (L) 11/20/2023   . Will continue potassium replacement per protocol and recheck repeat levels after replacement com leted.    Replete     Abnormal chest CT  Per review of CXR in ED, enlarged heart border when compared to CXR 7/2023. + viral infection.    --CTA negative for pulmonary embolus but large food bolus and midesophagus  --ECHO    Left Ventricle: The left ventricle is normal in size. Normal wall thickness. There is mild concentric hypertrophy. Normal wall motion. There is  normal systolic function with a visually estimated ejection fraction of 55 - 60%. Grade I diastolic dysfunction.    Right Ventricle: Normal right ventricular cavity size. Wall thickness is normal. Right ventricle wall motion  is normal. Systolic function is normal.    Aortic Valve: There is mild aortic valve sclerosis. There is moderate stenosis. Aortic valve area by VTI is 1.45 cm². Aortic valve peak velocity is 3.20 m/s. Mean gradient is 23 mmHg. The dimensionless index is 0.44. There is mild aortic regurgitation.    Mitral Valve: There is mild regurgitation.    Tricuspid Valve: There is mild regurgitation.    IVC/SVC: Intermediate venous pressure at 8 mmHg.      Acute hypoxemic respiratory failure  Patient with Hypoxic Respiratory failure which is Acute.  she is not on home oxygen. Supplemental oxygen was provided and noted-      .   Signs/symptoms of respiratory failure include- tachypnea, increased work of breathing, and wheezing. Contributing diagnoses includes - CHF and Pneumonia Labs and images were reviewed. Patient Has not had a recent ABG. Will treat underlying causes and adjust management of respiratory failure as follows-     --Diuresis  --Eval for Lung Pathology- CTA negative for pulmonary embolus    Acute on chronic systolic heart failure  Patient is identified as having Systolic (HFrEF) heart failure that is Acute on chronic. CHF is currently uncontrolled due to Rales/crackles on pulmonary exam. Latest ECHO performed and demonstrates- No results found for this or any previous visit.  . Continue Beta Blocker and Furosemide and monitor clinical status closely. Monitor on telemetry. Patient is on CHF pathway.  Monitor strict Is&Os and daily weights.  Place on fluid restriction of 1.5 L. Continue to stress to patient importance of self efficacy and  on diet for CHF. Last BNP reviewed- and noted below   Recent Labs   Lab 11/19/23  1659   BNP 2,693*     .     --ECHO-as above  --Lasix 60mg BID x2  doses, reeval in AM      Influenza A  + Flu A, recent Dx PNA at Urgent care, denies significant improvement with Abx, IH    --Xopenex Q12H Neb  --Tamiflu 75mg PO BID x5 days          Anticoagulant long-term use  Chronic mgmt with Coumadin, INR: 2.3 on admission    --Consult Pharmacy to dose      VTE Risk Mitigation (From admission, onward)           Ordered     warfarin (COUMADIN) tablet 2.5 mg  Once per day on Mon Wed Fri 11/20/23 1049     warfarin (COUMADIN) split tablet 1.25 mg  Once per day on Sun Tue Thu Sat         11/20/23 1049     IP VTE HIGH RISK PATIENT  Once         11/19/23 1747     Place sequential compression device  Until discontinued         11/19/23 1747     Reason for No Pharmacological VTE Prophylaxis  Once        Question:  Reasons:  Answer:  Already adequately anticoagulated on oral Anticoagulants    11/19/23 1747                    Discharge Planning   RENE:      Code Status: Full Code   Is the patient medically ready for discharge?:     Reason for patient still in hospital (select all that apply): Patient trending condition, Laboratory test, and PT / OT recommendations  Discharge Plan A: Home Health                  June Boykin MD  Department of Hospital Medicine   [facility] - Med Surg

## 2023-11-20 NOTE — SUBJECTIVE & OBJECTIVE
Past Medical History:   Diagnosis Date    A-fib     Anticoagulant long-term use     Aortic valve disease     Cancer     brain tumor, 10 years ago    Cardiomyopathy     CHF (congestive heart failure)     COVID-19 7/23/2022    Hx of heart valve replacement with mechanical valve     Hyperlipidemia     Hypertension     Pacemaker     Pacemaker     Sepsis 7/23/2022    Stroke     2007, residual weakness       Past Surgical History:   Procedure Laterality Date    AORTIC VALVE REPLACEMENT      CHOLECYSTECTOMY      CORONARY ARTERY BYPASS GRAFT      HYSTERECTOMY      INSERTION OF PACEMAKER      TONSILLECTOMY         Review of patient's allergies indicates:   Allergen Reactions    Amlodipine Other (See Comments)     Not sure    Chlorthalidone Other (See Comments)     Not sure    Clonidine Other (See Comments)     Not sure    Codeine Other (See Comments)     Not sure    Escitalopram Other (See Comments)     Not sure    Lisinopril Other (See Comments)     Not sure    Losartan Other (See Comments)     Not sure    Meperidine Other (See Comments)     Not sure    Methadone Other (See Comments)     Not sure      Morphine Other (See Comments)     Not sure    Nebivolol Other (See Comments)     Not sure        Nitrofurantoin Other (See Comments)     Not sure        Omeprazole Other (See Comments)     Not sure      Pravastatin Other (See Comments)     Not sure      Propoxyphene Other (See Comments)     Not sure      Sulfamethoxazole-trimethoprim Other (See Comments)     Not sure           No current facility-administered medications on file prior to encounter.     Current Outpatient Medications on File Prior to Encounter   Medication Sig    albuterol (PROVENTIL) 2.5 mg /3 mL (0.083 %) nebulizer solution Inhale 2.5 mg into the lungs.    albuterol (PROVENTIL/VENTOLIN HFA) 90 mcg/actuation inhaler Inhale 2 puffs into the lungs.    amoxicillin-clavulanate 875-125mg (AUGMENTIN) 875-125 mg per tablet Take 1 tablet by mouth.    furosemide  (LASIX) 20 MG tablet Take 1 tablet by mouth every morning.    lisinopriL (PRINIVIL,ZESTRIL) 5 MG tablet Take 5 mg by mouth 2 (two) times daily.    metoprolol tartrate (LOPRESSOR) 25 MG tablet Take 1 tablet by mouth 2 (two) times daily.    miconazole (MICOTIN) 2 % cream Apply topically 2 (two) times daily. Apply to bilateral buttocks, thighs, perineum, groins    POLYTUSSIN DM,PYRILAMINE, 12.5-5-7.5 mg/5 mL Liqd Take 10 mLs by mouth 2 (two) times daily.    triamterene-hydrochlorothiazide 37.5-25 mg (MAXZIDE-25) 37.5-25 mg per tablet Take 0.5 tablets by mouth 2 (two) times a day.    warfarin (COUMADIN) 2.5 MG tablet Take 2.5 mg by mouth nightly. Take 2.5mg on Monday, Wednesday, Friday  take 1.25 mg on Tuesday, Thursday, Saturday and Sunday     Family History       Problem Relation (Age of Onset)    No Known Problems Mother, Father          Tobacco Use    Smoking status: Never    Smokeless tobacco: Never   Substance and Sexual Activity    Alcohol use: Not Currently    Drug use: Never    Sexual activity: Not on file     Review of Systems   Unable to perform ROS: Dementia     Objective:     Vital Signs (Most Recent):  Temp: 98.7 °F (37.1 °C) (11/19/23 1412)  Pulse: 71 (11/19/23 1626)  Resp: (!) 24 (11/19/23 1626)  BP: 139/72 (11/19/23 1626)  SpO2: 96 % (11/19/23 1626) Vital Signs (24h Range):  Temp:  [98.7 °F (37.1 °C)] 98.7 °F (37.1 °C)  Pulse:  [70-71] 71  Resp:  [18-24] 24  SpO2:  [92 %-96 %] 96 %  BP: (139-152)/(72-78) 139/72     Weight: 102.7 kg (226 lb 6.6 oz)  Body mass index is 40.11 kg/m².     Physical Exam  Vitals and nursing note reviewed.   Constitutional:       General: She is not in acute distress.     Appearance: She is well-developed. She is obese. She is ill-appearing.      Interventions: Nasal cannula in place.   HENT:      Head: Normocephalic and atraumatic.      Right Ear: Hearing and external ear normal.      Left Ear: Hearing and external ear normal.      Nose: No rhinorrhea.      Right Sinus: No  maxillary sinus tenderness or frontal sinus tenderness.      Left Sinus: No maxillary sinus tenderness or frontal sinus tenderness.      Mouth/Throat:      Mouth: No oral lesions.      Pharynx: Uvula midline.   Eyes:      General:         Right eye: No discharge.         Left eye: No discharge.      Conjunctiva/sclera: Conjunctivae normal.      Pupils: Pupils are equal, round, and reactive to light.   Neck:      Thyroid: No thyromegaly.      Vascular: No carotid bruit.      Trachea: No tracheal deviation.   Cardiovascular:      Rate and Rhythm: Normal rate and regular rhythm.      Pulses:           Dorsalis pedis pulses are 2+ on the right side and 2+ on the left side.      Heart sounds: S1 normal and S2 normal. Heart sounds are distant. No murmur heard.  Pulmonary:      Effort: Pulmonary effort is normal. No respiratory distress.      Breath sounds: Examination of the right-lower field reveals decreased breath sounds. Examination of the left-lower field reveals decreased breath sounds. Decreased breath sounds present.   Abdominal:      General: Bowel sounds are decreased. There is distension.      Palpations: Abdomen is soft. There is no mass.      Tenderness: There is no abdominal tenderness.   Musculoskeletal:         General: Normal range of motion.      Cervical back: Normal range of motion.   Lymphadenopathy:      Cervical: No cervical adenopathy.      Upper Body:      Right upper body: No supraclavicular adenopathy.      Left upper body: No supraclavicular adenopathy.   Skin:     General: Skin is warm and dry.      Capillary Refill: Capillary refill takes less than 2 seconds.      Findings: No rash.   Neurological:      Mental Status: She is lethargic and disoriented.      Sensory: No sensory deficit.      Coordination: Coordination normal.      Gait: Gait normal.   Psychiatric:         Cognition and Memory: She exhibits impaired recent memory and impaired remote memory.              CRANIAL NERVES     CN  III, IV, VI   Pupils are equal, round, and reactive to light.       Significant Labs: All pertinent labs within the past 24 hours have been reviewed.  CBC:   Recent Labs   Lab 11/19/23  1620   WBC 7.42   HGB 15.7   HCT 47.2        CMP:   Recent Labs   Lab 11/19/23  1620      K 3.9      CO2 22*   *   BUN 12   CREATININE 1.2   CALCIUM 8.5*   PROT 7.3   ALBUMIN 3.2*   BILITOT 1.3*   ALKPHOS 87   AST 24   ALT 11   ANIONGAP 14     Cardiac Markers:   Recent Labs   Lab 11/19/23  1659   BNP 2,693*       Significant Imaging: I have reviewed all pertinent imaging results/findings within the past 24 hours.

## 2023-11-20 NOTE — CONSULTS
[facility] Surg  Wound Care    Patient Name:  Serena Post   MRN:  [id number]  Date: 11/20/2023  Diagnosis: <principal problem not specified>    History:     Past Medical History:   Diagnosis Date    A-fib     Anticoagulant long-term use     Aortic valve disease     Cancer     brain tumor, 10 years ago    Cardiomyopathy     CHF (congestive heart failure)     COVID-19 7/23/2022    Hx of heart valve replacement with mechanical valve     Hyperlipidemia     Hypertension     Pacemaker     Pacemaker     Sepsis 7/23/2022    Stroke     2007, residual weakness       Social History     Socioeconomic History    Marital status:    Tobacco Use    Smoking status: Never    Smokeless tobacco: Never   Substance and Sexual Activity    Alcohol use: Not Currently    Drug use: Never       Precautions:     Allergies as of 11/19/2023 - Reviewed 11/19/2023   Allergen Reaction Noted    Amlodipine Other (See Comments) 09/25/2019    Chlorthalidone Other (See Comments) 09/25/2019    Clonidine Other (See Comments) 09/25/2019    Codeine Other (See Comments) 09/25/2019    Escitalopram Other (See Comments) 09/25/2019    Lisinopril Other (See Comments) 09/25/2019    Losartan Other (See Comments) 09/25/2019    Meperidine Other (See Comments) 09/25/2019    Methadone Other (See Comments) 09/25/2019    Morphine Other (See Comments) 09/25/2019    Nebivolol Other (See Comments) 09/25/2019    Nitrofurantoin Other (See Comments) 09/25/2019    Omeprazole Other (See Comments) 09/25/2019    Pravastatin Other (See Comments) 09/25/2019    Propoxyphene Other (See Comments) 09/25/2019    Sulfamethoxazole-trimethoprim Other (See Comments) 09/25/2019       [facility] Assessment Details/Treatment     Consulted on this 82 y/o F patient due to present on admission altered skin integrity. She is awake and alert, not oriented, does follow commands. Male family member at bedside.  Bilateral heels with blanchable redness noted, heel offloading boots  applied.  MASD to perineum and buttocks with redness, moisture barrier paste applied, turned with foam wedge.       11/20/23 1015   WOCN Assessment   WOCN Total Time (mins) 45   Visit Date 11/20/23   Visit Time 1015   Consult Type New   WOCN Speciality Wound   Wound moisture   Intervention assessed;applied;chart review;coordination of care;team conference;orders   Teaching on-going;complication;discharge        Altered Skin Integrity 11/19/23 2300 Perineum #1 Moisture associated dermatitis   Date First Assessed/Time First Assessed: 11/19/23 2300   Altered Skin Integrity Present on Admission - Did Patient arrive to the hospital with altered skin?: yes  Location: Perineum  Wound Number: #1  Primary Wound Type: Moisture associated dermatitis   Drainage Amount None   Appearance Red;Moist   Tissue loss description Not applicable   Care Cleansed with:;Soap and water;Applied:;Skin Barrier        Altered Skin Integrity 11/19/23 2300 Buttocks Incontinence associated dermatitis   Date First Assessed/Time First Assessed: 11/19/23 2300   Altered Skin Integrity Present on Admission - Did Patient arrive to the hospital with altered skin?: yes  Location: Buttocks  Primary Wound Type: Incontinence associated dermatitis   Wound Image    Dressing Appearance Open to air   Drainage Amount None   Appearance Red   Tissue loss description Not applicable   Care Cleansed with:;Soap and water;Applied:;Skin Barrier       Recommendations made to primary team for moisture management, avoid diapers, pressure injury prevention interventions . Orders placed.     11/20/2023

## 2023-11-20 NOTE — PLAN OF CARE
Discussed poc with pt, pt verbalized understanding    Purposeful rounding every 2hours    VS wnl  Cardiac monitoring in use, pt is NSR, tele monitor #[id number]  Fall precautions in place, remains injury free  Pt denies c/o pain and nausea  Pain and nausea under control with PRN meds    IVFs  Accurate I&Os  Abx given as prescribed  Bed locked at lowest position  Call light within reach    Chart check complete  Will cont with POC

## 2023-11-20 NOTE — PROGRESS NOTES
Pharmacy Consult Note: Warfarin     Serena Post 's Coumadin will be dosed and monitored by Pharmacy.      Target INR goal is 2-3     INR   Date Value Ref Range Status   11/19/2023 2.3 (H) 0.8 - 1.2 Final     Comment:     Coumadin Therapy:  2.0 - 3.0 for INR for all indicators except mechanical heart valves  and antiphospholipid syndromes which should use 2.5 - 3.5.         Indication: afib, hx of DVT   Home dose: 2.5 mg MWF and 1.25 mg all other days     Patient will be continued on home regimen.    Dose for Today: 1.25 mg     PT/INR will be monitored daily. Dose adjustments will be made accordingly.      Thank you for allowing us to participate in this patient's care.     Ade Mari 11/19/2023 6:22 PM

## 2023-11-20 NOTE — PLAN OF CARE
[facility] - Med Surg  Initial Discharge Assessment       Primary Care Provider: No, Primary Doctor    Admission Diagnosis: Weakness [R53.1]  Influenza A [J10.1]  Abnormal chest x-ray [R93.89]  Chest pain [R07.9]  Dyspnea, unspecified type [R06.00]    Admission Date: 11/19/2023  Expected Discharge Date: Per Atending    Transition of Care Barriers: None    Payor: MEDICARE / Plan: MEDICARE PART A & B / Product Type: Government /     Extended Emergency Contact Information  Primary Emergency Contact: Amish Post  Mobile Phone: [phone]  Relation: Son  Preferred language: English   needed? No    Discharge Plan A: [id number] - [place], [place] - [address]  [address] [facility] [address]  Phone: [phone] Fax: [phone]      Initial Assessment (most recent)       Adult Discharge Assessment - 11/20/23 1119          Discharge Assessment    Assessment Type Discharge Planning Assessment     Confirmed/corrected address, phone number and insurance Yes     Confirmed Demographics Correct on Facesheet     Source of Information family     Communicated RENE with patient/caregiver Date not available/Unable to determine     Reason For Admission weakness     People in Home child(solange), adult     Do you expect to return to your current living situation? Yes     Do you have help at home or someone to help you manage your care at home? Yes     Who are your caregiver(s) and their phone number(s)? family and a nurse for 24/7 supervision     Walking or Climbing Stairs ambulation difficulty, assistance 1 person     Dressing/Bathing bathing difficulty, assistance 1 person     Home Accessibility wheelchair accessible     Home Layout Able to live on 1st floor     Equipment Currently Used at Home walker, rolling;wheelchair;shower chair;bedside commode     Readmission within 30 days? No     Patient currently being followed by outpatient case  management? No     Do you currently have service(s) that help you manage your care at home? No     Do you take prescription medications? Yes     Do you have prescription coverage? Yes     Coverage medicare     Do you have any problems affording any of your prescribed medications? No     Is the patient taking medications as prescribed? yes     Who is going to help you get home at discharge? amily     How do you get to doctors appointments? family or friend will provide     Are you on dialysis? No     Do you take coumadin? No     DME Needed Upon Discharge  none     Discharge Plan discussed with: Adult children     Transition of Care Barriers None     Discharge Plan A Home Health                   Pt lives with sonAmish, and has 24/7 care from son and a Nurse at home. Pt has all HME needed. HH and [facility] in the past; doesn't wish to resume hospice care upon d/c.

## 2023-11-20 NOTE — ASSESSMENT & PLAN NOTE
Patient with Hypoxic Respiratory failure which is Acute.  she is not on home oxygen. Supplemental oxygen was provided and noted-      .   Signs/symptoms of respiratory failure include- tachypnea, increased work of breathing, and wheezing. Contributing diagnoses includes - CHF and Pneumonia Labs and images were reviewed. Patient Has not had a recent ABG. Will treat underlying causes and adjust management of respiratory failure as follows-     --Diuresis  --Eval for Lung Pathology- CTA negative for pulmonary embolus

## 2023-11-20 NOTE — SUBJECTIVE & OBJECTIVE
Past Medical History:   Diagnosis Date    A-fib     Anticoagulant long-term use     Aortic valve disease     Cancer     brain tumor, 10 years ago    Cardiomyopathy     CHF (congestive heart failure)     COVID-19 7/23/2022    Hx of heart valve replacement with mechanical valve     Hyperlipidemia     Hypertension     Pacemaker     Pacemaker     Sepsis 7/23/2022    Stroke     2007, residual weakness       Past Surgical History:   Procedure Laterality Date    AORTIC VALVE REPLACEMENT      CHOLECYSTECTOMY      CORONARY ARTERY BYPASS GRAFT      HYSTERECTOMY      INSERTION OF PACEMAKER      TONSILLECTOMY         Review of patient's allergies indicates:   Allergen Reactions    Amlodipine Other (See Comments)     Not sure    Chlorthalidone Other (See Comments)     Not sure    Clonidine Other (See Comments)     Not sure    Codeine Other (See Comments)     Not sure    Escitalopram Other (See Comments)     Not sure    Lisinopril Other (See Comments)     Not sure    Losartan Other (See Comments)     Not sure    Meperidine Other (See Comments)     Not sure    Methadone Other (See Comments)     Not sure      Morphine Other (See Comments)     Not sure    Nebivolol Other (See Comments)     Not sure        Nitrofurantoin Other (See Comments)     Not sure        Omeprazole Other (See Comments)     Not sure      Pravastatin Other (See Comments)     Not sure      Propoxyphene Other (See Comments)     Not sure      Sulfamethoxazole-trimethoprim Other (See Comments)     Not sure         Family History       Problem Relation (Age of Onset)    No Known Problems Mother, Father          Tobacco Use    Smoking status: Never    Smokeless tobacco: Never   Substance and Sexual Activity    Alcohol use: Not Currently    Drug use: Never    Sexual activity: Not on file     Review of Systems   Constitutional:  Negative for fever.   Respiratory:  Negative for shortness of breath.    Cardiovascular:  Negative for chest pain.   Gastrointestinal:   Positive for diarrhea (Per son, she had some at home which was worse after starting antibiotics; none since admit). Negative for blood in stool.   Genitourinary:  Negative for difficulty urinating.     Objective:     Vital Signs (Most Recent):  Temp: 97.9 °F (36.6 °C) (11/20/23 0721)  Pulse: 80 (11/20/23 0852)  Resp: 18 (11/20/23 0852)  BP: (!) 115/57 (11/20/23 0721)  SpO2: 95 % (11/20/23 0852) Vital Signs (24h Range):  Temp:  [97.7 °F (36.5 °C)-98.7 °F (37.1 °C)] 97.9 °F (36.6 °C)  Pulse:  [67-96] 80  Resp:  [18-24] 18  SpO2:  [92 %-97 %] 95 %  BP: (100-152)/(57-78) 115/57     Weight: 96.2 kg (212 lb 1.3 oz) (11/20/23 0600)  Body mass index is 37.57 kg/m².      Intake/Output Summary (Last 24 hours) at 11/20/2023 0936  Last data filed at 11/20/2023 0620  Gross per 24 hour   Intake --   Output 1200 ml   Net -1200 ml       Lines/Drains/Airways       Peripheral Intravenous Line  Duration                  Peripheral IV - Single Lumen 11/19/23 1615 20 G Right Antecubital <1 day                     Physical Exam  Constitutional:       General: She is not in acute distress.     Appearance: Normal appearance. She is well-developed.   HENT:      Head: Normocephalic and atraumatic.   Eyes:      Extraocular Movements: Extraocular movements intact.   Cardiovascular:      Rate and Rhythm: Normal rate and regular rhythm.      Heart sounds: Murmur heard.   Pulmonary:      Effort: Pulmonary effort is normal. No respiratory distress.      Breath sounds: Normal breath sounds. No wheezing.   Abdominal:      General: Bowel sounds are normal. There is no distension.      Palpations: Abdomen is soft. There is no mass.      Tenderness: There is no abdominal tenderness.   Musculoskeletal:      Right lower leg: No edema.      Left lower leg: No edema.   Skin:     General: Skin is warm and dry.      Findings: No rash.   Neurological:      Mental Status: She is alert.      Cranial Nerves: No cranial nerve deficit.   Psychiatric:          Behavior: Behavior normal.          Significant Labs:  CBC:   Recent Labs   Lab 11/19/23  1620 11/20/23  0713   WBC 7.42 6.98   HGB 15.7 15.8   HCT 47.2 49.5*    171     CMP:   Recent Labs   Lab 11/20/23  0713      CALCIUM 8.4*   ALBUMIN 3.1*   PROT 7.3      K 3.4*   CO2 24      BUN 12   CREATININE 1.1   ALKPHOS 85   ALT 11   AST 22   BILITOT 1.2*     Coagulation:   Recent Labs   Lab 11/19/23  1620 11/20/23  0713   INR 2.3* 2.8*   APTT 41.7*  --        Significant Imaging:  Imaging results within the past 24 hours have been reviewed.

## 2023-11-20 NOTE — ASSESSMENT & PLAN NOTE
Patient has hypokalemia which is Acute and currently controlled. Most recent potassium levels reviewed-   Lab Results   Component Value Date    K 3.4 (L) 11/20/2023   . Will continue potassium replacement per protocol and recheck repeat levels after replacement com leted.    Replete

## 2023-11-20 NOTE — HOSPITAL COURSE
83-year-old female admitted with left lower lobe pneumonia with cough, fever, chills, nausea, vomiting.  The diarrhea began after taking amoxicillin for pneumonia.  Patient and son report no further diarrhea but significant skin breakdown secondary to same. Instructions for keeping pt dry and clean given. Diarrhea resolved.   Pt started on Doxy for pneumonia asssociated with influenza. The had CTA of chest negative for PE.but with large food bolus seen. Pt seen by ST and able to tolerate diet. She was instructed to use aspiration precautions.   Her SOB improved and she was stable to dc home.  She will complete a course of tamiflu.  Pt seen and examined on day of discharge and stable for dc.

## 2023-11-20 NOTE — SUBJECTIVE & OBJECTIVE
Interval History:  Patient seen and examined with son at bedside.  Reports diarrhea after initiation of amoxicillin for presumed pneumonia.  Wound care consult for skin abrasions.  CT scan of chest revealed large food bolus in esophagus.  GI consulted with no recommendations for endoscopy but aspiration precautions and clear liquid diet to advance as tolerated.    Review of Systems   Constitutional:  Positive for activity change, appetite change, fatigue and fever.   Respiratory:  Negative for cough and shortness of breath.    Cardiovascular:  Negative for chest pain and leg swelling.   Gastrointestinal:  Positive for diarrhea. Negative for nausea and vomiting.   Neurological:  Positive for weakness.   All other systems reviewed and are negative.    Objective:     Vital Signs (Most Recent):  Temp: 97.8 °F (36.6 °C) (11/20/23 1605)  Pulse: 108 (11/20/23 1605)  Resp: 20 (11/20/23 1605)  BP: 134/67 (11/20/23 1605)  SpO2: (!) 90 % (11/20/23 1605) Vital Signs (24h Range):  Temp:  [97.7 °F (36.5 °C)-97.9 °F (36.6 °C)] 97.8 °F (36.6 °C)  Pulse:  [] 108  Resp:  [18-24] 20  SpO2:  [90 %-97 %] 90 %  BP: (100-134)/(57-69) 134/67     Weight: 96.2 kg (212 lb)  Body mass index is 37.55 kg/m².    Intake/Output Summary (Last 24 hours) at 11/20/2023 1744  Last data filed at 11/20/2023 1730  Gross per 24 hour   Intake 250 ml   Output 1900 ml   Net -1650 ml         Physical Exam  Vitals reviewed.   Constitutional:       Appearance: Normal appearance. She is ill-appearing (chronically).   HENT:      Head: Normocephalic and atraumatic.      Mouth/Throat:      Mouth: Mucous membranes are moist.      Pharynx: Oropharynx is clear.   Eyes:      Extraocular Movements: Extraocular movements intact.      Conjunctiva/sclera: Conjunctivae normal.   Cardiovascular:      Rate and Rhythm: Regular rhythm. Tachycardia present.      Pulses: Normal pulses.      Heart sounds: Normal heart sounds.   Pulmonary:      Effort: Pulmonary effort is  normal.      Breath sounds: Normal breath sounds.   Abdominal:      General: Bowel sounds are normal. There is no distension.      Palpations: Abdomen is soft. There is no mass.      Tenderness: There is no abdominal tenderness. There is no guarding or rebound.      Hernia: No hernia is present.   Musculoskeletal:         General: Normal range of motion.      Cervical back: Normal range of motion and neck supple.   Skin:     General: Skin is warm and dry.   Neurological:      General: No focal deficit present.      Mental Status: She is alert. Mental status is at baseline.             Significant Labs: All pertinent labs within the past 24 hours have been reviewed.  Blood Culture:   Recent Labs   Lab 11/19/23  1620 11/19/23  1635   LABBLOO No Growth to date No Growth to date     CBC:   Recent Labs   Lab 11/19/23  1620 11/20/23  0713   WBC 7.42 6.98   HGB 15.7 15.8   HCT 47.2 49.5*    171     CMP:   Recent Labs   Lab 11/19/23  1620 11/20/23  0713    143   K 3.9 3.4*    102   CO2 22* 24   * 101   BUN 12 12   CREATININE 1.2 1.1   CALCIUM 8.5* 8.4*   PROT 7.3 7.3   ALBUMIN 3.2* 3.1*   BILITOT 1.3* 1.2*   ALKPHOS 87 85   AST 24 22   ALT 11 11   ANIONGAP 14 17*       Significant Imaging: I have reviewed all pertinent imaging results/findings within the past 24 hours.

## 2023-11-20 NOTE — ASSESSMENT & PLAN NOTE
Patient is identified as having Systolic (HFrEF) heart failure that is Acute on chronic. CHF is currently uncontrolled due to Rales/crackles on pulmonary exam. Latest ECHO performed and demonstrates- No results found for this or any previous visit.  . Continue Beta Blocker and Furosemide and monitor clinical status closely. Monitor on telemetry. Patient is on CHF pathway.  Monitor strict Is&Os and daily weights.  Place on fluid restriction of 1.5 L. Continue to stress to patient importance of self efficacy and  on diet for CHF. Last BNP reviewed- and noted below   Recent Labs   Lab 11/19/23  1659   BNP 2,693*   .     --ECHO  --Lasix 60mg BID x2 doses, reeval in AM

## 2023-11-20 NOTE — ASSESSMENT & PLAN NOTE
+ Flu A, recent Dx PNA at Urgent care, denies significant improvement with Abx, IH    --Xopenex Q12H Neb  --Tamiflu 75mg PO BID x5 days

## 2023-11-20 NOTE — PROGRESS NOTES
Pharmacy Consult Note: Warfarin     Serena Post 's Coumadin will be dosed and monitored by Pharmacy.      Target INR goal is 2-3.    INR   Date Value Ref Range Status   11/20/2023 2.8 (H) 0.8 - 1.2 Final     Comment:     Coumadin Therapy:  2.0 - 3.0 for INR for all indicators except mechanical heart valves  and antiphospholipid syndromes which should use 2.5 - 3.5.         Indication: afib with history of DVT (acute CHF exacerbation)  Home dose: 2.5 mg MWF, 1.25 mg all other days  Patient will be given a reduced dose today for jump in INR of 0.5.   Dose for Today: 1.25 mg    PT/INR will be monitored daily. Dose adjustments will be made accordingly.      Thank you for allowing us to participate in this patient's care.     Sabiha Villa, PharmD 11/20/2023 10:53 AM

## 2023-11-20 NOTE — ASSESSMENT & PLAN NOTE
Per review of CXR in ED, enlarged heart border when compared to CXR 7/2023. + viral infection.    --CTA  --ECHO

## 2023-11-20 NOTE — CONSULTS
[facility]  Gastroenterology  Consult Note    Patient Name: Serena Post  MRN: [id number]  Admission Date: 11/19/2023  Hospital Length of Stay: 1 days  Code Status: Full Code   Attending Provider: June Acosta MD   Consulting Provider: Nirav Molina PA-C  Primary Care Physician: No, Primary Doctor  Principal Problem:<principal problem not specified>    Inpatient consult to Gastroenterology  Consult performed by: Nirav Molina PA-C  Consult ordered by: Mary Fontanez NP  Reason for consult: Food bolus      Subjective:     HPI:  The patient presented to the ER for decreased appetite and diarrhea. She was diagnosed with pneumonia a few days ago and started on antibiotics. She is noted to be positive for flu. Antibiotics stopped and Tamiflu started. WBC count normal, and she is afebrile. INR 2.8, Hgb 15.8, lactate 1.4, procal 0.19, BNP 2,693. She was given 60 mg of furosemide. CXR was normal. CTA chest showed clear lungs but there was a suspected food bolus in the mid-esophagus. The patient and her son deny any signs of dysphagia. She denies vomiting.      Past Medical History:   Diagnosis Date    A-fib     Anticoagulant long-term use     Aortic valve disease     Cancer     brain tumor, 10 years ago    Cardiomyopathy     CHF (congestive heart failure)     COVID-19 7/23/2022    Hx of heart valve replacement with mechanical valve     Hyperlipidemia     Hypertension     Pacemaker     Pacemaker     Sepsis 7/23/2022    Stroke     2007, residual weakness       Past Surgical History:   Procedure Laterality Date    AORTIC VALVE REPLACEMENT      CHOLECYSTECTOMY      CORONARY ARTERY BYPASS GRAFT      HYSTERECTOMY      INSERTION OF PACEMAKER      TONSILLECTOMY         Review of patient's allergies indicates:   Allergen Reactions    Amlodipine Other (See Comments)     Not sure    Chlorthalidone Other (See Comments)     Not sure    Clonidine Other (See Comments)     Not sure     Codeine Other (See Comments)     Not sure    Escitalopram Other (See Comments)     Not sure    Lisinopril Other (See Comments)     Not sure    Losartan Other (See Comments)     Not sure    Meperidine Other (See Comments)     Not sure    Methadone Other (See Comments)     Not sure      Morphine Other (See Comments)     Not sure    Nebivolol Other (See Comments)     Not sure        Nitrofurantoin Other (See Comments)     Not sure        Omeprazole Other (See Comments)     Not sure      Pravastatin Other (See Comments)     Not sure      Propoxyphene Other (See Comments)     Not sure      Sulfamethoxazole-trimethoprim Other (See Comments)     Not sure         Family History       Problem Relation (Age of Onset)    No Known Problems Mother, Father          Tobacco Use    Smoking status: Never    Smokeless tobacco: Never   Substance and Sexual Activity    Alcohol use: Not Currently    Drug use: Never    Sexual activity: Not on file     Review of Systems   Constitutional:  Negative for fever.   Respiratory:  Negative for shortness of breath.    Cardiovascular:  Negative for chest pain.   Gastrointestinal:  Positive for diarrhea (Per son, she had some at home which was worse after starting antibiotics; none since admit). Negative for blood in stool.   Genitourinary:  Negative for difficulty urinating.     Objective:     Vital Signs (Most Recent):  Temp: 97.9 °F (36.6 °C) (11/20/23 0721)  Pulse: 80 (11/20/23 0852)  Resp: 18 (11/20/23 0852)  BP: (!) 115/57 (11/20/23 0721)  SpO2: 95 % (11/20/23 0852) Vital Signs (24h Range):  Temp:  [97.7 °F (36.5 °C)-98.7 °F (37.1 °C)] 97.9 °F (36.6 °C)  Pulse:  [67-96] 80  Resp:  [18-24] 18  SpO2:  [92 %-97 %] 95 %  BP: (100-152)/(57-78) 115/57     Weight: 96.2 kg (212 lb 1.3 oz) (11/20/23 0600)  Body mass index is 37.57 kg/m².      Intake/Output Summary (Last 24 hours) at 11/20/2023 0936  Last data filed at 11/20/2023 0620  Gross per 24 hour   Intake --   Output 1200 ml    Net -1200 ml       Lines/Drains/Airways       Peripheral Intravenous Line  Duration                  Peripheral IV - Single Lumen 11/19/23 1615 20 G Right Antecubital <1 day                     Physical Exam  Constitutional:       General: She is not in acute distress.     Appearance: Normal appearance. She is well-developed.   HENT:      Head: Normocephalic and atraumatic.   Eyes:      Extraocular Movements: Extraocular movements intact.   Cardiovascular:      Rate and Rhythm: Normal rate and regular rhythm.      Heart sounds: Murmur heard.   Pulmonary:      Effort: Pulmonary effort is normal. No respiratory distress.      Breath sounds: Normal breath sounds. No wheezing.   Abdominal:      General: Bowel sounds are normal. There is no distension.      Palpations: Abdomen is soft. There is no mass.      Tenderness: There is no abdominal tenderness.   Musculoskeletal:      Right lower leg: No edema.      Left lower leg: No edema.   Skin:     General: Skin is warm and dry.      Findings: No rash.   Neurological:      Mental Status: She is alert.      Cranial Nerves: No cranial nerve deficit.   Psychiatric:         Behavior: Behavior normal.          Significant Labs:  CBC:   Recent Labs   Lab 11/19/23  1620 11/20/23  0713   WBC 7.42 6.98   HGB 15.7 15.8   HCT 47.2 49.5*    171     CMP:   Recent Labs   Lab 11/20/23  0713      CALCIUM 8.4*   ALBUMIN 3.1*   PROT 7.3      K 3.4*   CO2 24      BUN 12   CREATININE 1.1   ALKPHOS 85   ALT 11   AST 22   BILITOT 1.2*     Coagulation:   Recent Labs   Lab 11/19/23  1620 11/20/23  0713   INR 2.3* 2.8*   APTT 41.7*  --        Significant Imaging:  Imaging results within the past 24 hours have been reviewed.  Assessment/Plan:     Pulmonary  Pneumonia due to influenza A virus  Treatment per  team.     Other  Abnormal chest CT  CT suggested there may be a food bolus, but patient and son deny any symptoms of dysphagia. Patient not in distress and  tolerating her secretions.   Could consider a dose of glucagon, but would recommend starting a clear liquid diet. She needs to be upright while doing this. Monitor for symptoms suggestive of dysphagia.   No plans for inpatient EGD while being treated for PNA and INR >1.5.   Outpatient GI follow up.   Contact GI if any questions.     Thank you for your consult. I will follow-up with patient. Please contact us if you have any additional questions.    Nirav Molina PA-C  Gastroenterology  [facility]

## 2023-11-20 NOTE — ASSESSMENT & PLAN NOTE
REcent Dx PNA at , reviewed CXR report. No images to review in Care Everywhere. Reported Left lower lobe PNA.     --treat with doxycycline due to underlying influenza, CXR did not show PNA  --CTA of chest negative for PE

## 2023-11-20 NOTE — ASSESSMENT & PLAN NOTE
Patient is identified as having Systolic (HFrEF) heart failure that is Acute on chronic. CHF is currently uncontrolled due to Rales/crackles on pulmonary exam. Latest ECHO performed and demonstrates- No results found for this or any previous visit.  . Continue Beta Blocker and Furosemide and monitor clinical status closely. Monitor on telemetry. Patient is on CHF pathway.  Monitor strict Is&Os and daily weights.  Place on fluid restriction of 1.5 L. Continue to stress to patient importance of self efficacy and  on diet for CHF. Last BNP reviewed- and noted below   Recent Labs   Lab 11/19/23  1659   BNP 2,693*     .     --ECHO-as above  --Lasix 60mg BID x2 doses, reeval in AM

## 2023-11-20 NOTE — HPI
The patient presented to the ER for decreased appetite and diarrhea. She was diagnosed with pneumonia a few days ago and started on antibiotics. She is noted to be positive for flu. Antibiotics stopped and Tamiflu started. WBC count normal, and she is afebrile. INR 2.8, Hgb 15.8, lactate 1.4, procal 0.19, BNP 2,693. She was given 60 mg of furosemide. CXR was normal. CTA chest showed clear lungs but there was a suspected food bolus in the mid-esophagus. The patient and her son deny any signs of dysphagia. She denies vomiting.

## 2023-11-20 NOTE — ASSESSMENT & PLAN NOTE
Patient with Hypoxic Respiratory failure which is Acute.  she is not on home oxygen. Supplemental oxygen was provided and noted-      .   Signs/symptoms of respiratory failure include- tachypnea, increased work of breathing, and wheezing. Contributing diagnoses includes - CHF and Pneumonia Labs and images were reviewed. Patient Has not had a recent ABG. Will treat underlying causes and adjust management of respiratory failure as follows-     --Diuresis  --Eval for Lung Pathology- CTA

## 2023-11-21 LAB
ALBUMIN SERPL BCP-MCNC: 3 G/DL (ref 3.5–5.2)
ANION GAP SERPL CALC-SCNC: 17 MMOL/L (ref 8–16)
BUN SERPL-MCNC: 17 MG/DL (ref 8–23)
CALCIUM SERPL-MCNC: 8.5 MG/DL (ref 8.7–10.5)
CHLORIDE SERPL-SCNC: 102 MMOL/L (ref 95–110)
CO2 SERPL-SCNC: 24 MMOL/L (ref 23–29)
CREAT SERPL-MCNC: 1.4 MG/DL (ref 0.5–1.4)
EST. GFR  (NO RACE VARIABLE): 38 ML/MIN/1.73 M^2
GLUCOSE SERPL-MCNC: 98 MG/DL (ref 70–110)
INR PPP: 2.8 (ref 0.8–1.2)
PHOSPHATE SERPL-MCNC: 3.2 MG/DL (ref 2.7–4.5)
POTASSIUM SERPL-SCNC: 3.8 MMOL/L (ref 3.5–5.1)
PROTHROMBIN TIME: 28.5 SEC (ref 9–12.5)
SODIUM SERPL-SCNC: 143 MMOL/L (ref 136–145)

## 2023-11-21 PROCEDURE — 25000003 PHARM REV CODE 250: Performed by: INTERNAL MEDICINE

## 2023-11-21 PROCEDURE — 99900035 HC TECH TIME PER 15 MIN (STAT)

## 2023-11-21 PROCEDURE — 94761 N-INVAS EAR/PLS OXIMETRY MLT: CPT

## 2023-11-21 PROCEDURE — 85610 PROTHROMBIN TIME: CPT | Performed by: INTERNAL MEDICINE

## 2023-11-21 PROCEDURE — 36415 COLL VENOUS BLD VENIPUNCTURE: CPT | Performed by: INTERNAL MEDICINE

## 2023-11-21 PROCEDURE — 97163 PT EVAL HIGH COMPLEX 45 MIN: CPT

## 2023-11-21 PROCEDURE — 27000221 HC OXYGEN, UP TO 24 HOURS

## 2023-11-21 PROCEDURE — 11000001 HC ACUTE MED/SURG PRIVATE ROOM

## 2023-11-21 PROCEDURE — 80069 RENAL FUNCTION PANEL: CPT | Performed by: INTERNAL MEDICINE

## 2023-11-21 PROCEDURE — 97530 THERAPEUTIC ACTIVITIES: CPT

## 2023-11-21 PROCEDURE — 25000242 PHARM REV CODE 250 ALT 637 W/ HCPCS: Performed by: NURSE PRACTITIONER

## 2023-11-21 PROCEDURE — 94799 UNLISTED PULMONARY SVC/PX: CPT | Mod: XB

## 2023-11-21 PROCEDURE — 92610 EVALUATE SWALLOWING FUNCTION: CPT

## 2023-11-21 PROCEDURE — 27000207 HC ISOLATION

## 2023-11-21 PROCEDURE — 63600175 PHARM REV CODE 636 W HCPCS: Performed by: NURSE PRACTITIONER

## 2023-11-21 PROCEDURE — 94640 AIRWAY INHALATION TREATMENT: CPT

## 2023-11-21 PROCEDURE — 97167 OT EVAL HIGH COMPLEX 60 MIN: CPT

## 2023-11-21 PROCEDURE — 25000003 PHARM REV CODE 250: Performed by: NURSE PRACTITIONER

## 2023-11-21 RX ADMIN — DOXYCYCLINE HYCLATE 100 MG: 100 TABLET, COATED ORAL at 09:11

## 2023-11-21 RX ADMIN — DOXYCYCLINE HYCLATE 100 MG: 100 TABLET, COATED ORAL at 08:11

## 2023-11-21 RX ADMIN — LEVALBUTEROL HYDROCHLORIDE 1.25 MG: 0.63 SOLUTION RESPIRATORY (INHALATION) at 07:11

## 2023-11-21 RX ADMIN — POTASSIUM BICARBONATE 25 MEQ: 978 TABLET, EFFERVESCENT ORAL at 09:11

## 2023-11-21 RX ADMIN — FUROSEMIDE 60 MG: 10 INJECTION, SOLUTION INTRAMUSCULAR; INTRAVENOUS at 05:11

## 2023-11-21 RX ADMIN — OSELTAMIVIR PHOSPHATE 30 MG: 30 CAPSULE ORAL at 08:11

## 2023-11-21 RX ADMIN — ACETAMINOPHEN 650 MG: 325 TABLET ORAL at 08:11

## 2023-11-21 RX ADMIN — POLYETHYLENE GLYCOL 3350 17 G: 17 POWDER, FOR SOLUTION ORAL at 09:11

## 2023-11-21 RX ADMIN — LEVALBUTEROL HYDROCHLORIDE 1.25 MG: 0.63 SOLUTION RESPIRATORY (INHALATION) at 08:11

## 2023-11-21 RX ADMIN — WARFARIN SODIUM 1.25 MG: 2.5 TABLET ORAL at 06:11

## 2023-11-21 RX ADMIN — POTASSIUM BICARBONATE 25 MEQ: 978 TABLET, EFFERVESCENT ORAL at 08:11

## 2023-11-21 RX ADMIN — OSELTAMIVIR PHOSPHATE 30 MG: 30 CAPSULE ORAL at 09:11

## 2023-11-21 NOTE — ASSESSMENT & PLAN NOTE
Per review of CXR in ED, enlarged heart border when compared to CXR 7/2023. + viral infection.    --CTA negative for pulmonary embolus but large food bolus and midesophagus  --ECHO    Left Ventricle: The left ventricle is normal in size. Normal wall thickness. There is mild concentric hypertrophy. Normal wall motion. There is normal systolic function with a visually estimated ejection fraction of 55 - 60%. Grade I diastolic dysfunction.    Right Ventricle: Normal right ventricular cavity size. Wall thickness is normal. Right ventricle wall motion  is normal. Systolic function is normal.    Aortic Valve: There is mild aortic valve sclerosis. There is moderate stenosis. Aortic valve area by VTI is 1.45 cm². Aortic valve peak velocity is 3.20 m/s. Mean gradient is 23 mmHg. The dimensionless index is 0.44. There is mild aortic regurgitation.    Mitral Valve: There is mild regurgitation.    Tricuspid Valve: There is mild regurgitation.    IVC/SVC: Intermediate venous pressure at 8 mmHg.

## 2023-11-21 NOTE — PT/OT/SLP EVAL
"Speech Language Pathology Evaluation  Bedside Swallow    Patient Name:  Serena Post   MRN:  [id number]  Admitting Diagnosis: Pneumonia due to influenza A virus    Recommendations:                 General Recommendations:   ongoing swallow assessment for diet advancement and tolerance   Diet recommendations:   , Thin liquids - IDDSI Level 0, Full liquids   Aspiration Precautions: HOB to 90 degrees and Remain upright 30 minutes post meal   General Precautions: Standard, fall, droplet  Communication strategies:  none    Assessment:   Pt admitted with Influenza A, hypokalemia, acute hypoxemic respiratory failure, and acute on chronic CHF.  Chest x-ray did not show pneumonia and chest CT showed possible food bolus in mid esophagus.  GI consulted and no need for EGD at this time as patient is tolerating her secretions with no N/V.  SLP consulted for a bedside swallow evaluation.    Pt only agreeable to minimal intake of thin liquids as she stated "I just want to sleep and my stomach hurts".  Pt with no overt signs of swallow difficulty with intake of thin liquids, however pt will require a full swallow evaluation to assess for dysphagia.      SLP recommends to continue liquid diet (IDDSI 0) with ongoing swallow assessment when patient agreeable.     History:     Past Medical History:   Diagnosis Date    A-fib     Anticoagulant long-term use     Aortic valve disease     Cancer     brain tumor, 10 years ago    Cardiomyopathy     CHF (congestive heart failure)     COVID-19 7/23/2022    Hx of heart valve replacement with mechanical valve     Hyperlipidemia     Hypertension     Pacemaker     Pacemaker     Sepsis 7/23/2022    Stroke     2007, residual weakness       Past Surgical History:   Procedure Laterality Date    AORTIC VALVE REPLACEMENT      CHOLECYSTECTOMY      CORONARY ARTERY BYPASS GRAFT      HYSTERECTOMY      INSERTION OF PACEMAKER      TONSILLECTOMY         Subjective   Serena Post is a 83 y.o. " female with a PMHx of CHF, HTN, CKD and Afib who presents to the Emergency Department for evaluation of pneumonia. Pt was dx with pneumonia of the left lower lobe on 11/14 at urgent care, prescribed Augmentin, Albuterol IH, Decongestant. Pt has experienced a loss of appetite, severe yellow diarrhea, and a productive cough. Son reports she has not eaten or drank anything and has been fatigued for the past few days.     Pt was seen at bedside with no family present.  She is sleepy and required cueing to participate in SLP evaluation.     Chest x-ray 11/19/23:  FINDINGS:  Right chest cardiac pacing device.     The lungs are clear, with normal appearance of pulmonary vasculature and no pleural effusion or pneumothorax.     The cardiac silhouette is normal in size. The hilar and mediastinal contours are unremarkable.     Bones are intact.    Patient goals: none stated     Pain/Comfort:  Pain Rating 1:  (pt reported that her stomach hurt but did not assign a rating or specify location)  Pain Addressed 1: Reposition, Distraction    Respiratory Status: Nasal cannula, flow 3 L/min    Objective:     Oral Musculature Evaluation  Oral Musculature: general weakness    Bedside Swallow Eval:   Swallowing assessed with head of bed at 90 degrees and patient awake.  She was able to hold the cup for intake of thin liquids.  Pt took 3 sips of water via cup rim with no overt signs of swallow difficulty observed, however she refused further trails due to fatigue.      No family at bedside to provide history, and pt's cognition is questionable.  She reported that at times she has a globus sensation with nausea and vomiting.  Per EPIC review no previous dysphagia has been reported.      Goals:   Multidisciplinary Problems       SLP Goals          Problem: SLP    Goal Priority Disciplines Outcome   SLP Goal     SLP Ongoing, Progressing   Description: 1. Pt will tolerate lest restrictive diet without overt signs of swallow difficulty                         Plan:     Patient to be seen:  2 x/week   Plan of Care expires:  11/28/23  Plan of Care reviewed with:  patient   SLP Follow-Up:  Yes       Discharge recommendations:  Low Intensity Therapy   Barriers to Discharge:  None    Time Tracking:     SLP Treatment Date:   11/21/23  Speech Start Time:  1055  Speech Stop Time:  1106     Speech Total Time (min):  11 min    Billable Minutes: Eval Swallow and Oral Function 11    11/21/2023

## 2023-11-21 NOTE — PROGRESS NOTES
Pharmacy Consult Note: Warfarin     Serena Post 's Coumadin will be dosed and monitored by Pharmacy.      Target INR goal is 2-3.     INR   Date Value Ref Range Status   11/21/2023 2.8 (H) 0.8 - 1.2 Final     Comment:     Coumadin Therapy:  2.0 - 3.0 for INR for all indicators except mechanical heart valves  and antiphospholipid syndromes which should use 2.5 - 3.5.         Indication: afib  Home dose: 2.5 mg MWF, 1.25 mg all other days  Patient will be continued on home dose today.    Dose for Today: 1.25 mg    PT/INR will be monitored daily. Dose adjustments will be made accordingly.      Thank you for allowing us to participate in this patient's care.     Sabiha Villa, PharmD 11/21/2023 9:28 AM

## 2023-11-21 NOTE — PLAN OF CARE
Free from injury. Bed alarm set. Purewick in use. Tried to pocket meds. No s/s of acute distress. 12hr chart check complete.

## 2023-11-21 NOTE — SUBJECTIVE & OBJECTIVE
Interval History:  patient seen and examined.  Son is not at bedside.  She is drinking her breakfast.  She denies any worsening shortness of breath reports diarrhea has improved.    Review of Systems   Constitutional:  Positive for activity change, appetite change and fatigue. Negative for fever.   Respiratory:  Negative for cough and shortness of breath.    Cardiovascular:  Negative for chest pain and leg swelling.   Gastrointestinal:  Negative for diarrhea, nausea and vomiting.   Neurological:  Positive for weakness.   All other systems reviewed and are negative.    Objective:     Vital Signs (Most Recent):  Temp: 98.7 °F (37.1 °C) (11/21/23 1707)  Pulse: 86 (11/21/23 1707)  Resp: 18 (11/21/23 1707)  BP: (!) 119/56 (11/21/23 1707)  SpO2: 95 % (11/21/23 1707) Vital Signs (24h Range):  Temp:  [97 °F (36.1 °C)-98.7 °F (37.1 °C)] 98.7 °F (37.1 °C)  Pulse:  [] 86  Resp:  [18] 18  SpO2:  [93 %-96 %] 95 %  BP: (101-123)/(56-78) 119/56     Weight: 95.7 kg (210 lb 15.7 oz)  Body mass index is 37.37 kg/m².    Intake/Output Summary (Last 24 hours) at 11/21/2023 1737  Last data filed at 11/21/2023 1321  Gross per 24 hour   Intake 300 ml   Output 700 ml   Net -400 ml         Physical Exam  Vitals reviewed.   Constitutional:       Appearance: Normal appearance. She is ill-appearing (chronically).   HENT:      Head: Normocephalic and atraumatic.      Mouth/Throat:      Mouth: Mucous membranes are moist.      Pharynx: Oropharynx is clear.   Eyes:      Extraocular Movements: Extraocular movements intact.      Conjunctiva/sclera: Conjunctivae normal.   Cardiovascular:      Rate and Rhythm: Normal rate and regular rhythm.      Pulses: Normal pulses.      Heart sounds: Normal heart sounds.   Pulmonary:      Effort: Pulmonary effort is normal.      Breath sounds: Normal breath sounds.   Abdominal:      General: Bowel sounds are normal. There is no distension.      Palpations: Abdomen is soft. There is no mass.      Tenderness:  "There is no abdominal tenderness. There is no guarding or rebound.      Hernia: No hernia is present.   Musculoskeletal:         General: Normal range of motion.      Cervical back: Normal range of motion and neck supple.   Skin:     General: Skin is warm and dry.      Findings: Lesion present.   Neurological:      General: No focal deficit present.      Mental Status: She is alert. Mental status is at baseline.             Significant Labs: All pertinent labs within the past 24 hours have been reviewed.  Blood Culture: No results for input(s): "LABBLOO" in the last 48 hours.  CBC:   Recent Labs   Lab 11/20/23  0713   WBC 6.98   HGB 15.8   HCT 49.5*        CMP:   Recent Labs   Lab 11/20/23  0713 11/21/23  0542    143   K 3.4* 3.8    102   CO2 24 24    98   BUN 12 17   CREATININE 1.1 1.4   CALCIUM 8.4* 8.5*   PROT 7.3  --    ALBUMIN 3.1* 3.0*   BILITOT 1.2*  --    ALKPHOS 85  --    AST 22  --    ALT 11  --    ANIONGAP 17* 17*       Significant Imaging: I have reviewed all pertinent imaging results/findings within the past 24 hours.  "

## 2023-11-21 NOTE — PROGRESS NOTES
[facility] Medicine  Progress Note    Patient Name: Serena Post  MRN: [id number]  Patient Class: IP- Inpatient   Admission Date: 11/19/2023  Length of Stay: 2 days  Attending Physician: June Acosta MD  Primary Care Provider: Joslyn, Primary Doctor        Subjective:     Principal Problem:Pneumonia due to influenza A virus        HPI:  83 y.o. female patient with a PMHx of CHF, HTN, CKD and Afib who presents to the Emergency Department for evaluation of pneumonia. Pt was dx with pneumonia of the left lower lobe on 11/14 at urgent care, prescribed Augmentin, Albuterol IH, Decongestant. Pt has experienced a loss of appetite, severe yellow diarrhea, and a productive cough. Son reports she has not eaten or drank anything and has been fatigued for the past few days.Symptoms are constant and moderate in severity. No mitigating or exacerbating factors reported. No other sxs reported. Patient denies any fever, chills, N/V, SOB, and all other sxs at this time. No other tx reported. In the ED, vitals: 152/78, 70, 18, 98.7, 92% RA. Labs: INR: 2.3, CO2: 22, Glu: 127, Bili: 1.3, BNP: 2693. CXR: No acute findings per report. + FLU A. Treated with Neb, initiated on tamiflu, diuretic. Patient is a full code. Admitted to hospital medicine for management of Acute on Chronic CHF, Influenza, Acute Respiratory Failure.     Overview/Hospital Course:  83-year-old female admitted with left lower lobe pneumonia with cough, fever, chills, nausea, vomiting.  The diarrhea began after taking amoxicillin for pneumonia.  Patient and son report no further diarrhea but significant skin breakdown secondary to same.    Interval History:  patient seen and examined.  Son is not at bedside.  She is drinking her breakfast.  She denies any worsening shortness of breath reports diarrhea has improved.    Review of Systems   Constitutional:  Positive for activity change, appetite change and fatigue. Negative for fever.    Respiratory:  Negative for cough and shortness of breath.    Cardiovascular:  Negative for chest pain and leg swelling.   Gastrointestinal:  Negative for diarrhea, nausea and vomiting.   Neurological:  Positive for weakness.   All other systems reviewed and are negative.    Objective:     Vital Signs (Most Recent):  Temp: 98.7 °F (37.1 °C) (11/21/23 1707)  Pulse: 86 (11/21/23 1707)  Resp: 18 (11/21/23 1707)  BP: (!) 119/56 (11/21/23 1707)  SpO2: 95 % (11/21/23 1707) Vital Signs (24h Range):  Temp:  [97 °F (36.1 °C)-98.7 °F (37.1 °C)] 98.7 °F (37.1 °C)  Pulse:  [] 86  Resp:  [18] 18  SpO2:  [93 %-96 %] 95 %  BP: (101-123)/(56-78) 119/56     Weight: 95.7 kg (210 lb 15.7 oz)  Body mass index is 37.37 kg/m².    Intake/Output Summary (Last 24 hours) at 11/21/2023 1737  Last data filed at 11/21/2023 1321  Gross per 24 hour   Intake 300 ml   Output 700 ml   Net -400 ml         Physical Exam  Vitals reviewed.   Constitutional:       Appearance: Normal appearance. She is ill-appearing (chronically).   HENT:      Head: Normocephalic and atraumatic.      Mouth/Throat:      Mouth: Mucous membranes are moist.      Pharynx: Oropharynx is clear.   Eyes:      Extraocular Movements: Extraocular movements intact.      Conjunctiva/sclera: Conjunctivae normal.   Cardiovascular:      Rate and Rhythm: Normal rate and regular rhythm.      Pulses: Normal pulses.      Heart sounds: Normal heart sounds.   Pulmonary:      Effort: Pulmonary effort is normal.      Breath sounds: Normal breath sounds.   Abdominal:      General: Bowel sounds are normal. There is no distension.      Palpations: Abdomen is soft. There is no mass.      Tenderness: There is no abdominal tenderness. There is no guarding or rebound.      Hernia: No hernia is present.   Musculoskeletal:         General: Normal range of motion.      Cervical back: Normal range of motion and neck supple.   Skin:     General: Skin is warm and dry.      Findings: Lesion present.  "  Neurological:      General: No focal deficit present.      Mental Status: She is alert. Mental status is at baseline.             Significant Labs: All pertinent labs within the past 24 hours have been reviewed.  Blood Culture: No results for input(s): "LABBLOO" in the last 48 hours.  CBC:   Recent Labs   Lab 11/20/23  0713   WBC 6.98   HGB 15.8   HCT 49.5*        CMP:   Recent Labs   Lab 11/20/23  0713 11/21/23  0542    143   K 3.4* 3.8    102   CO2 24 24    98   BUN 12 17   CREATININE 1.1 1.4   CALCIUM 8.4* 8.5*   PROT 7.3  --    ALBUMIN 3.1* 3.0*   BILITOT 1.2*  --    ALKPHOS 85  --    AST 22  --    ALT 11  --    ANIONGAP 17* 17*       Significant Imaging: I have reviewed all pertinent imaging results/findings within the past 24 hours.    Assessment/Plan:      * Pneumonia due to influenza A virus  REcent Dx PNA at , reviewed CXR report. No images to review in Care Everywhere. Reported Left lower lobe PNA.     --treat with doxycycline due to underlying influenza, CXR did not show PNA  --CTA of chest negative for PE      Hypokalemia  Patient has hypokalemia which is Acute and currently controlled. Most recent potassium levels reviewed-   Lab Results   Component Value Date    K 3.8 11/21/2023   . Will continue potassium replacement per protocol and recheck repeat levels after replacement com leted.    Replete     Abnormal chest CT  Per review of CXR in ED, enlarged heart border when compared to CXR 7/2023. + viral infection.    --CTA negative for pulmonary embolus but large food bolus and midesophagus  --ECHO    Left Ventricle: The left ventricle is normal in size. Normal wall thickness. There is mild concentric hypertrophy. Normal wall motion. There is normal systolic function with a visually estimated ejection fraction of 55 - 60%. Grade I diastolic dysfunction.    Right Ventricle: Normal right ventricular cavity size. Wall thickness is normal. Right ventricle wall motion  is normal. " Systolic function is normal.    Aortic Valve: There is mild aortic valve sclerosis. There is moderate stenosis. Aortic valve area by VTI is 1.45 cm². Aortic valve peak velocity is 3.20 m/s. Mean gradient is 23 mmHg. The dimensionless index is 0.44. There is mild aortic regurgitation.    Mitral Valve: There is mild regurgitation.    Tricuspid Valve: There is mild regurgitation.    IVC/SVC: Intermediate venous pressure at 8 mmHg.      Acute hypoxemic respiratory failure  Patient with Hypoxic Respiratory failure which is Acute.  she is not on home oxygen. Supplemental oxygen was provided and noted- Oxygen Concentration (%):  [32] 32    .   Signs/symptoms of respiratory failure include- tachypnea, increased work of breathing, and wheezing. Contributing diagnoses includes - CHF and Pneumonia Labs and images were reviewed. Patient Has not had a recent ABG. Will treat underlying causes and adjust management of respiratory failure as follows-     --Diuresis  --Eval for Lung Pathology- CTA negative for pulmonary embolus    Acute on chronic systolic heart failure  Patient is identified as having Systolic (HFrEF) heart failure that is Acute on chronic. CHF is currently uncontrolled due to Rales/crackles on pulmonary exam. Latest ECHO performed and demonstrates- No results found for this or any previous visit.  . Continue Beta Blocker and Furosemide and monitor clinical status closely. Monitor on telemetry. Patient is on CHF pathway.  Monitor strict Is&Os and daily weights.  Place on fluid restriction of 1.5 L. Continue to stress to patient importance of self efficacy and  on diet for CHF. Last BNP reviewed- and noted below   Recent Labs   Lab 11/19/23  1659   BNP 2,693*     .     --ECHO-as above  --Lasix 60mg BID x2 doses, reeval in AM      Influenza A  + Flu A, recent Dx PNA at Urgent care, denies significant improvement with Abx, IH    --Xopenex Q12H Neb  --Tamiflu 75mg PO BID x5 days          Anticoagulant long-term  use  Chronic mgmt with Coumadin, INR: 2.3 on admission    --Consult Pharmacy to dose      VTE Risk Mitigation (From admission, onward)           Ordered     warfarin (COUMADIN) tablet 2.5 mg  Once per day on Mon Wed Fri 11/20/23 1049     warfarin (COUMADIN) split tablet 1.25 mg  Once per day on Sun Tue Thu Sat         11/20/23 1049     IP VTE HIGH RISK PATIENT  Once         11/19/23 1747     Place sequential compression device  Until discontinued         11/19/23 1747     Reason for No Pharmacological VTE Prophylaxis  Once        Question:  Reasons:  Answer:  Already adequately anticoagulated on oral Anticoagulants    11/19/23 1747                    Discharge Planning   RENE:      Code Status: Full Code   Is the patient medically ready for discharge?: No    Reason for patient still in hospital (select all that apply): Patient trending condition, Treatment, and PT / OT recommendations  Discharge Plan A: Home Health                  June Boykin MD  Department of Hospital Medicine   [facility] - Med Surg

## 2023-11-21 NOTE — ASSESSMENT & PLAN NOTE
Patient has hypokalemia which is Acute and currently controlled. Most recent potassium levels reviewed-   Lab Results   Component Value Date    K 3.8 11/21/2023   . Will continue potassium replacement per protocol and recheck repeat levels after replacement com leted.    Replete

## 2023-11-21 NOTE — PT/OT/SLP EVAL
Physical Therapy Evaluation and Discharge Note    Patient Name:  Serena Post   MRN:  [id number]    Recommendations:     Discharge Recommendations: No Therapy Indicated (24 HOUR CAREGIVERS)  Discharge Equipment Recommendations: lift device, hospital bed   Barriers to discharge: None    Assessment:     Serena Post is a 83 y.o. female admitted with a medical diagnosis of Pneumonia due to influenza A virus. .  At this time, patient is functioning at their prior level of function and does not require further acute PT services.     Recent Surgery: * No surgery found *      Plan:     During this hospitalization, patient does not require further acute PT services.  Please re-consult if situation changes.      Subjective     Chief Complaint: WEAKNESS   Patient/Family Comments/goals: NONE STATED   Pain/Comfort:  Location 1:  (REPORTED L SHOULDER PAIN HOWEVER NO NUMBER REPORTED)    Patients cultural, spiritual, Lutheran conflicts given the current situation:      Living Environment:   PT LIVES AT HOME WITH SON IN A ONE STORY HOME WITH 24 HOUR CAREGIVERS.   Prior to admission, patients level of function was  TOTAL CARE AT HOME FOR GROSS FUNC MOBIILTY. PT IS NONAMBULATORY .  Equipment used at home: wheelchair.  DME owned (not currently used): none.  Upon discharge, patient will have assistance from SON AND CAREGIVERS.    Objective:     Communicated with NURSE IVLLALOBOS AND EPIC CHART REVIEW  prior to session.  Patient found supine with peripheral IV, oxygen, PureWick upon PT entry to room.    General Precautions: Standard, fall, droplet    Orthopedic Precautions:N/A   Braces: N/A  Respiratory Status: Room air    Exams:  Postural Exam:  Patient presented with the following abnormalities:    -       Rounded shoulders  -       Forward head  -       Posterior pelvic tilt  RLE ROM: LIMITED  RLE Strength: LIMITED  LLE ROM: LIMITED  LLE Strength: LIMITED    Functional Mobility:  Bed Mobility:     Rolling Left:   maximal assistance and of 2 persons  Scooting: maximal assistance and of 2 persons  Supine to Sit: maximal assistance and of 2 persons  Sit to Supine: maximal assistance and of 2 persons    AM-PAC 6 CLICK MOBILITY  Total Score:8       Treatment and Education:  PT SEATED EOB WITH CONT POST LEAN AND MAX A TO MAINTAIN EOB. PT UNABLE TO SUPPORT SELF EOB WITH B UE SUPPORT. PT RETURNED SUP IN BED WITH MAX A X 2 AND SCOOTED TO HOB WITH BED IN [place] AND TOTAL A X 2. P.T. DONNED PRESSURE RELIEF BOOTS AND SET BED IN CHAIR POSITION. PT LEFT WITH ALL NEEDS MET .     AM-PAC 6 CLICK MOBILITY  Total Score:8     Patient left HOB elevated with all lines intact and call button in reach.    GOALS:   Multidisciplinary Problems       Physical Therapy Goals       Not on file                    History:     Past Medical History:   Diagnosis Date    A-fib     Anticoagulant long-term use     Aortic valve disease     Cancer     brain tumor, 10 years ago    Cardiomyopathy     CHF (congestive heart failure)     COVID-19 7/23/2022    Hx of heart valve replacement with mechanical valve     Hyperlipidemia     Hypertension     Pacemaker     Pacemaker     Sepsis 7/23/2022    Stroke     2007, residual weakness       Past Surgical History:   Procedure Laterality Date    AORTIC VALVE REPLACEMENT      CHOLECYSTECTOMY      CORONARY ARTERY BYPASS GRAFT      HYSTERECTOMY      INSERTION OF PACEMAKER      TONSILLECTOMY         Time Tracking:     PT Received On: 11/21/23  PT Start Time: 1000     PT Stop Time: 1023  PT Total Time (min): 23 min     Billable Minutes: Evaluation 13 and Therapeutic Activity 10      11/21/2023

## 2023-11-21 NOTE — PT/OT/SLP EVAL
Occupational Therapy   Evaluation and Discharge Note    Name: Serena Post  MRN: [id number]  Admitting Diagnosis: Pneumonia due to influenza A virus  Recent Surgery: * No surgery found *      Recommendations:     Discharge Recommendations: No Therapy Indicated  Discharge Equipment Recommendations: lift device, to be determined by next level of care  Barriers to discharge:       Assessment:     Serena Post is a 83 y.o. female with a medical diagnosis of Pneumonia due to influenza A virus. At this time, patient is functioning at their prior level of function and does not require further acute OT services.     Plan:     During this hospitalization, patient does not require further acute OT services.  Please re-consult if situation changes.    Plan of Care Reviewed with:      Subjective     Chief Complaint: DEBILITY AND GENERALIZED WEAKNESS  Patient/Family Comments/goals: RETURN HOME WITH 24 HOUR SITTERS    Occupational Profile:  Living Environment: LIVES  WITH SON    Previous level of function:   TOTAL A WITH  ADL'S AND FUNCTIONAL MOBILITY  Roles and Routines: CARE GIVER  Equipment Used at home: wheelchair  Assistance upon Discharge:     Pain/Comfort:  Pain Rating 1: 7/10  Location - Side 1: Bilateral  Location - Orientation 1: lower  Location 1: leg  Pain Rating Post-Intervention 2: 0/10    Patients cultural, spiritual, Hoahaoism conflicts given the current situation:      Objective:     Communicated with: NURSE AND EPIC CHART REVIEW prior to session.  Patient found HOB elevated with telemetry upon OT entry to room.    General Precautions: Standard, fall  Orthopedic Precautions: N/A  Braces: N/A  Respiratory Status: Nasal cannula, flow 4 L/min     Occupational Performance:    Bed Mobility:    Patient completed Rolling/Turning to Left with  total assistance and 2 persons  Patient completed Rolling/Turning to Right with total assistance and 2 persons  Patient completed Scooting/Bridging with total  assistance and 2 persons  Patient completed Supine to Sit with total assistance and 2 persons  Patient completed Sit to Supine with total assistance and 2 persons    Activities of Daily Living:  Upper Body Dressing: maximal assistance    Lower Body Dressing: total assistance .    Cognitive/Visual Perceptual:  Cognitive/Psychosocial Skills:     -       Oriented to: Person and Place   -       Follows Commands/attention:Easily distracted  -       Communication: SLURRED  -       Memory: UNABLE TO ASSESS  -       Safety awareness/insight to disability: impaired     Physical Exam:  Upper Extremity Range of Motion:     -       Right Upper Extremity: WFL  -       Left Upper Extremity: WFL  Upper Extremity Strength:    -       Right Upper Extremity: MMT: 2/5 GROSSLY  -       Left Upper Extremity: MMT: 2/5 GROSSLY   Strength:    -       Right Upper Extremity: MMT: -3/5 GROSSLY  -       Left Upper Extremity: MMT: -3/5 GROSSLY    AMPAC 6 Click ADL:  AMPAC Total Score: 11    Treatment & Education:  Patient educated on role of OT in acute setting and benefits of REMAINING SUPINE IN BED, WITH BED IN CHAIR POSITION.  ENCOURAGE TO PERFORM B UE ROM EXERCISE PT DISCHARGED FROM SKILLED O.T. DUE TO NO ANTICIPATED FUNCTIONAL GAINS.Patient stated understanding and in agreement with POC.       Patient left HOB elevated with all lines intact, call button in reach, bed alarm on, and NURSE notified    GOALS:   Multidisciplinary Problems       Occupational Therapy Goals       Not on file                    History:     Past Medical History:   Diagnosis Date    A-fib     Anticoagulant long-term use     Aortic valve disease     Cancer     brain tumor, 10 years ago    Cardiomyopathy     CHF (congestive heart failure)     COVID-19 7/23/2022    Hx of heart valve replacement with mechanical valve     Hyperlipidemia     Hypertension     Pacemaker     Pacemaker     Sepsis 7/23/2022    Stroke     2007, residual weakness         Past Surgical  History:   Procedure Laterality Date    AORTIC VALVE REPLACEMENT      CHOLECYSTECTOMY      CORONARY ARTERY BYPASS GRAFT      HYSTERECTOMY      INSERTION OF PACEMAKER      TONSILLECTOMY         Time Tracking:     OT Date of Treatment: 11/21/23  OT Start Time: 0905  OT Stop Time: 0930  OT Total Time (min): 25 min    Billable Minutes:Evaluation 15 MINUTES  Therapeutic Activity 10 MINUTES    11/21/2023

## 2023-11-21 NOTE — ASSESSMENT & PLAN NOTE
Patient with Hypoxic Respiratory failure which is Acute.  she is not on home oxygen. Supplemental oxygen was provided and noted- Oxygen Concentration (%):  [32] 32    .   Signs/symptoms of respiratory failure include- tachypnea, increased work of breathing, and wheezing. Contributing diagnoses includes - CHF and Pneumonia Labs and images were reviewed. Patient Has not had a recent ABG. Will treat underlying causes and adjust management of respiratory failure as follows-     --Diuresis  --Eval for Lung Pathology- CTA negative for pulmonary embolus

## 2023-11-22 VITALS
TEMPERATURE: 98 F | WEIGHT: 211.88 LBS | BODY MASS INDEX: 37.54 KG/M2 | SYSTOLIC BLOOD PRESSURE: 120 MMHG | HEIGHT: 63 IN | RESPIRATION RATE: 18 BRPM | DIASTOLIC BLOOD PRESSURE: 74 MMHG | OXYGEN SATURATION: 94 % | HEART RATE: 77 BPM

## 2023-11-22 LAB
ALBUMIN SERPL BCP-MCNC: 3.1 G/DL (ref 3.5–5.2)
ANION GAP SERPL CALC-SCNC: 14 MMOL/L (ref 8–16)
BUN SERPL-MCNC: 22 MG/DL (ref 8–23)
CALCIUM SERPL-MCNC: 8.7 MG/DL (ref 8.7–10.5)
CHLORIDE SERPL-SCNC: 102 MMOL/L (ref 95–110)
CO2 SERPL-SCNC: 26 MMOL/L (ref 23–29)
CREAT SERPL-MCNC: 1.5 MG/DL (ref 0.5–1.4)
EST. GFR  (NO RACE VARIABLE): 35 ML/MIN/1.73 M^2
GLUCOSE SERPL-MCNC: 118 MG/DL (ref 70–110)
INR PPP: 3.2 (ref 0.8–1.2)
PHOSPHATE SERPL-MCNC: 2.6 MG/DL (ref 2.7–4.5)
POCT GLUCOSE: 118 MG/DL (ref 70–110)
POTASSIUM SERPL-SCNC: 3.4 MMOL/L (ref 3.5–5.1)
PROTHROMBIN TIME: 31 SEC (ref 9–12.5)
SODIUM SERPL-SCNC: 142 MMOL/L (ref 136–145)

## 2023-11-22 PROCEDURE — 80069 RENAL FUNCTION PANEL: CPT | Performed by: INTERNAL MEDICINE

## 2023-11-22 PROCEDURE — 94618 PULMONARY STRESS TESTING: CPT

## 2023-11-22 PROCEDURE — 94640 AIRWAY INHALATION TREATMENT: CPT

## 2023-11-22 PROCEDURE — 36415 COLL VENOUS BLD VENIPUNCTURE: CPT | Performed by: INTERNAL MEDICINE

## 2023-11-22 PROCEDURE — 85610 PROTHROMBIN TIME: CPT | Performed by: INTERNAL MEDICINE

## 2023-11-22 PROCEDURE — 27000221 HC OXYGEN, UP TO 24 HOURS

## 2023-11-22 PROCEDURE — 25000242 PHARM REV CODE 250 ALT 637 W/ HCPCS: Performed by: NURSE PRACTITIONER

## 2023-11-22 PROCEDURE — 94761 N-INVAS EAR/PLS OXIMETRY MLT: CPT

## 2023-11-22 PROCEDURE — 94799 UNLISTED PULMONARY SVC/PX: CPT | Mod: XB

## 2023-11-22 PROCEDURE — 25000003 PHARM REV CODE 250: Performed by: INTERNAL MEDICINE

## 2023-11-22 PROCEDURE — 99900035 HC TECH TIME PER 15 MIN (STAT)

## 2023-11-22 PROCEDURE — 25000003 PHARM REV CODE 250: Performed by: NURSE PRACTITIONER

## 2023-11-22 PROCEDURE — 63600175 PHARM REV CODE 636 W HCPCS: Performed by: NURSE PRACTITIONER

## 2023-11-22 RX ORDER — TORSEMIDE 20 MG/1
20 TABLET ORAL DAILY
Qty: 30 TABLET | Refills: 0 | Status: SHIPPED | OUTPATIENT
Start: 2023-11-23 | End: 2024-02-01 | Stop reason: ALTCHOICE

## 2023-11-22 RX ORDER — DOXYCYCLINE HYCLATE 100 MG
100 TABLET ORAL EVERY 12 HOURS
Qty: 5 TABLET | Refills: 0 | Status: SHIPPED | OUTPATIENT
Start: 2023-11-22 | End: 2023-11-25

## 2023-11-22 RX ORDER — OSELTAMIVIR PHOSPHATE 30 MG/1
30 CAPSULE ORAL 2 TIMES DAILY
Qty: 3 CAPSULE | Refills: 0 | Status: SHIPPED | OUTPATIENT
Start: 2023-11-22 | End: 2023-11-24

## 2023-11-22 RX ORDER — TORSEMIDE 10 MG/1
20 TABLET ORAL DAILY
Status: DISCONTINUED | OUTPATIENT
Start: 2023-11-23 | End: 2023-11-22 | Stop reason: HOSPADM

## 2023-11-22 RX ADMIN — LEVALBUTEROL HYDROCHLORIDE 1.25 MG: 0.63 SOLUTION RESPIRATORY (INHALATION) at 07:11

## 2023-11-22 RX ADMIN — POLYETHYLENE GLYCOL 3350 17 G: 17 POWDER, FOR SOLUTION ORAL at 10:11

## 2023-11-22 RX ADMIN — FUROSEMIDE 60 MG: 10 INJECTION, SOLUTION INTRAMUSCULAR; INTRAVENOUS at 06:11

## 2023-11-22 RX ADMIN — POTASSIUM BICARBONATE 25 MEQ: 978 TABLET, EFFERVESCENT ORAL at 10:11

## 2023-11-22 RX ADMIN — OSELTAMIVIR PHOSPHATE 30 MG: 30 CAPSULE ORAL at 10:11

## 2023-11-22 RX ADMIN — DOXYCYCLINE HYCLATE 100 MG: 100 TABLET, COATED ORAL at 10:11

## 2023-11-22 NOTE — PLAN OF CARE
[facility] - Med Surg  Discharge Final Note    Primary Care Provider: No, Primary Doctor    Expected Discharge Date: 11/22/2023    Final Discharge Note (most recent)       Final Note - 11/22/23 1258          Final Note    Assessment Type Final Discharge Note     Anticipated Discharge Disposition Home-Health Care [facility]     Hospital Resources/Appts/Education Provided Post-Acute resouces added to AVS        Post-Acute Status    Post-Acute Authorization Home Health;HME     HME Status Pending Delivery     Home Health Status Set-up Complete/Auth obtained     Discharge Delays None known at this time                     Important Message from Medicare  Important Message from Medicare regarding Discharge Appeal Rights: Given to patient/caregiver, Explained to patient/caregiver, Signed/date by patient/caregiver     Date IMM was signed: 11/22/23  Time IMM was signed: 0945     Follow-up providers       No, Primary Doctor   Relationship: PCP - General        Next Steps: Follow up in 3 day(s)    Instructions: ESTABLISH CARE              After-discharge care                Durable Medical Equipment       [facility]   Service: Durable Medical Equipment    [address] [address]   Phone: [phone]                 Home Medical Care       [facility]   Service: Home Health Services    [address] [address]   Phone: [phone]                             [facility] arranged; plan for admit on Friday.     Carlie lift and O2 pending approval and delivery by [facility].     No additional d/c needs.     [address] unable to provide O2 and carlie lift at this time due to issues with insurance. Ashley spoke with Ade at [facility] who requested that  send the referral; Ashley agreed. Portable tank to be delivered to bedside by Ashley. Carlie lift to be delivered by [facility] on Friday.      Ashley left message for pt's son, Amish, to call [facility] once they are  home for concentrator delivery.

## 2023-11-22 NOTE — PLAN OF CARE
Discussed poc with pt, pt verbalized understanding    Purposeful rounding every 2hours    VS wnl  Fall precautions in place, remains injury free  Pt denies c/o pain or nausea at this time  Pain and nausea under control with PRN meds    IVFs  Accurate I&Os  Abx given as prescribed  Bed locked at lowest position  Call light within reach    Chart check complete  Will cont with POC

## 2023-11-22 NOTE — PLAN OF CARE
Patient is up HOB. No difficulty breathing noted at this time. Patient remains on 2 L NC. Patient remains of neb treatment. No S/S of hypo/hyperglycemia noted at this time .  Problem: Adult Inpatient Plan of Care  Goal: Plan of Care Review  Outcome: Ongoing, Progressing  Goal: Patient-Specific Goal (Individualized)  Outcome: Ongoing, Progressing  Flowsheets (Taken 11/22/2023 0321)  Anxieties, Fears or Concerns: none at this time  Individualized Care Needs: none  Goal: Absence of Hospital-Acquired Illness or Injury  Outcome: Ongoing, Progressing  Goal: Optimal Comfort and Wellbeing  Outcome: Ongoing, Progressing  Goal: Readiness for Transition of Care  Outcome: Ongoing, Progressing     Problem: Bariatric Environmental Safety  Goal: Safety Maintained with Care  Outcome: Ongoing, Progressing     Problem: Fluid and Electrolyte Imbalance (Acute Kidney Injury/Impairment)  Goal: Fluid and Electrolyte Balance  Outcome: Ongoing, Progressing     Problem: Oral Intake Inadequate (Acute Kidney Injury/Impairment)  Goal: Optimal Nutrition Intake  Outcome: Ongoing, Progressing     Problem: Renal Function Impairment (Acute Kidney Injury/Impairment)  Goal: Effective Renal Function  Outcome: Ongoing, Progressing     Problem: Infection  Goal: Absence of Infection Signs and Symptoms  Outcome: Ongoing, Progressing     Problem: Skin Injury Risk Increased  Goal: Skin Health and Integrity  Outcome: Ongoing, Progressing     Problem: Impaired Wound Healing  Goal: Optimal Wound Healing  Outcome: Ongoing, Progressing

## 2023-11-22 NOTE — PROGRESS NOTES
Clinical Pharmacy Progress Note: Coumadin Dosing and Monitoring     Indication: Atrial fibrillation with H/O DVT  Goal INR: 2-3    Lab Results   Component Value Date    INR 3.2 (H) 11/22/2023    INR 2.8 (H) 11/21/2023    INR 2.8 (H) 11/20/2023     INR: 3.2, supratherapeutic  H/H: 15.8/49.5, stable  Plts: 171, stable    Recent dosing history: Received warfarin 1.25 mg from 11/19-11/21/23  Home dosing regimen: 2.5 mg MWF, 1.25 mg all other days   Potential Drug interactions: Doxycycline may increase anticoagulant effects of warfarin     Lab order for daily PT/INR? Yes  Coumadin diet ordered? Yes    Plan:   - Hold warfarin dose today. INR is supra-therapeutic  - Pharmacy will continue to monitor daily PT/INR. Dose adjustments will be made accordingly.      Thank you for allowing us to participate in this patient's care.      Myrna Nicole PharmD 11/22/2023 12:28 PM

## 2023-11-22 NOTE — RESPIRATORY THERAPY
Home Oxygen Evaluation - [facility] - Cardiopulmonary Department      Date Performed: 11/22/2023      1) Patient's Home O2 Sat on room air, while at rest: Room Air SpO2 At Rest: 91 %        If O2 sats on room air at rest are 88% or below, patient qualifies.  Document O2 liter flow needed in Step 2.  If O2 sats are 89% or above, complete Step 3.        2)  If patient is not ambulated and O2 sats are <88%, what is the O2 liter flow required to meet ordered saturation?      If O2 sats on room air while exercising remain 89% or above patient does not qualify, no further testing needed Document N/A in step 3. If O2 sats on room air while exercising are 88% or below, continue to Step 4.    3) Patient's O2 Sat on room air while exercising: Room Air SpO2 During Ambulation: (!) 88 %        4) Patient's O2 Sat while exercising on O2: SpO2 During Ambulation on O2: 95 % at Ambulation O2 LPM: 2 LPM         (Must show improvement from #4 for patients to qualify)

## 2023-11-24 ENCOUNTER — TELEPHONE (OUTPATIENT)
Dept: CARDIOLOGY | Facility: CLINIC | Age: 83
End: 2023-11-24
Payer: MEDICARE

## 2023-11-24 ENCOUNTER — PATIENT OUTREACH (OUTPATIENT)
Dept: ADMINISTRATIVE | Facility: HOSPITAL | Age: 83
End: 2023-11-24
Payer: MEDICARE

## 2023-11-24 LAB — BACTERIA BLD CULT: NORMAL

## 2023-11-24 NOTE — TELEPHONE ENCOUNTER
I called pt. No answer. LVM for her to return my call.     ----- Message from Yary Hay LPN sent at 2023  2:44 PM CST -----  Regardinhr

## 2023-11-24 NOTE — PROGRESS NOTES
Called patient to confirm hospital follow up scheduled on 11/27/2023.   Patient is confirmed Encouraged patient to bring all medications and BP cuff to follow up visit.     Spoke with Amish (son) to confirm appt. States she does not have a home bp machine.

## 2023-11-24 NOTE — TELEPHONE ENCOUNTER
Returned call to Power with [facility].  Ashton called to find out if patient's INR is supposed to be checked by home health.  Advised Power that this is a patient of [facility].  Power states she will contact [facility].

## 2023-11-25 LAB — BACTERIA BLD CULT: NORMAL

## 2023-11-27 ENCOUNTER — TELEPHONE (OUTPATIENT)
Dept: CARDIOLOGY | Facility: CLINIC | Age: 83
End: 2023-11-27
Payer: MEDICARE

## 2023-11-27 ENCOUNTER — LAB VISIT (OUTPATIENT)
Dept: LAB | Facility: HOSPITAL | Age: 83
End: 2023-11-27
Attending: FAMILY MEDICINE
Payer: MEDICARE

## 2023-11-27 ENCOUNTER — OFFICE VISIT (OUTPATIENT)
Dept: FAMILY MEDICINE | Facility: CLINIC | Age: 83
End: 2023-11-27
Attending: FAMILY MEDICINE
Payer: MEDICARE

## 2023-11-27 VITALS
DIASTOLIC BLOOD PRESSURE: 72 MMHG | SYSTOLIC BLOOD PRESSURE: 124 MMHG | TEMPERATURE: 99 F | BODY MASS INDEX: 37.56 KG/M2 | OXYGEN SATURATION: 96 % | HEART RATE: 73 BPM | WEIGHT: 212 LBS | HEIGHT: 63 IN

## 2023-11-27 DIAGNOSIS — R73.01 IFG (IMPAIRED FASTING GLUCOSE): ICD-10-CM

## 2023-11-27 DIAGNOSIS — R30.0 DYSURIA: ICD-10-CM

## 2023-11-27 DIAGNOSIS — I50.31 CHF (CONGESTIVE HEART FAILURE), NYHA CLASS II, ACUTE, DIASTOLIC: ICD-10-CM

## 2023-11-27 DIAGNOSIS — J96.00 ACUTE RESPIRATORY FAILURE, UNSPECIFIED WHETHER WITH HYPOXIA OR HYPERCAPNIA: ICD-10-CM

## 2023-11-27 DIAGNOSIS — I50.31 CHF (CONGESTIVE HEART FAILURE), NYHA CLASS II, ACUTE, DIASTOLIC: Primary | ICD-10-CM

## 2023-11-27 DIAGNOSIS — J10.1 INFLUENZA A: ICD-10-CM

## 2023-11-27 DIAGNOSIS — E87.6 HYPOKALEMIA: ICD-10-CM

## 2023-11-27 PROBLEM — N17.9 AKI (ACUTE KIDNEY INJURY): Status: RESOLVED | Noted: 2022-07-23 | Resolved: 2023-11-27

## 2023-11-27 PROBLEM — R19.7 DIARRHEA: Status: RESOLVED | Noted: 2022-07-31 | Resolved: 2023-11-27

## 2023-11-27 PROBLEM — R41.82 AMS (ALTERED MENTAL STATUS): Status: RESOLVED | Noted: 2022-07-31 | Resolved: 2023-11-27

## 2023-11-27 PROBLEM — Z99.3 WHEELCHAIR DEPENDENCE: Status: RESOLVED | Noted: 2019-09-26 | Resolved: 2023-11-27

## 2023-11-27 PROBLEM — R53.81 PHYSICAL DECONDITIONING: Status: RESOLVED | Noted: 2019-09-26 | Resolved: 2023-11-27

## 2023-11-27 PROBLEM — N30.01 ACUTE CYSTITIS WITH HEMATURIA: Status: RESOLVED | Noted: 2022-07-23 | Resolved: 2023-11-27

## 2023-11-27 PROBLEM — J96.01 ACUTE HYPOXEMIC RESPIRATORY FAILURE: Status: RESOLVED | Noted: 2023-11-19 | Resolved: 2023-11-27

## 2023-11-27 PROBLEM — R93.89 ABNORMAL CHEST CT: Status: RESOLVED | Noted: 2023-11-19 | Resolved: 2023-11-27

## 2023-11-27 PROBLEM — J10.00 PNEUMONIA DUE TO INFLUENZA A VIRUS: Status: RESOLVED | Noted: 2023-11-19 | Resolved: 2023-11-27

## 2023-11-27 LAB
ALBUMIN SERPL BCP-MCNC: 3 G/DL (ref 3.5–5.2)
ALP SERPL-CCNC: 102 U/L (ref 55–135)
ALT SERPL W/O P-5'-P-CCNC: 13 U/L (ref 10–44)
ANION GAP SERPL CALC-SCNC: 12 MMOL/L (ref 8–16)
AST SERPL-CCNC: 27 U/L (ref 10–40)
BACTERIA #/AREA URNS AUTO: ABNORMAL /HPF
BILIRUB SERPL-MCNC: 1.5 MG/DL (ref 0.1–1)
BNP SERPL-MCNC: 445 PG/ML (ref 0–99)
BUN SERPL-MCNC: 33 MG/DL (ref 8–23)
CALCIUM SERPL-MCNC: 8.8 MG/DL (ref 8.7–10.5)
CHLORIDE SERPL-SCNC: 104 MMOL/L (ref 95–110)
CO2 SERPL-SCNC: 26 MMOL/L (ref 23–29)
CREAT SERPL-MCNC: 1.3 MG/DL (ref 0.5–1.4)
EST. GFR  (NO RACE VARIABLE): 41.3 ML/MIN/1.73 M^2
ESTIMATED AVG GLUCOSE: 100 MG/DL (ref 68–131)
GLUCOSE SERPL-MCNC: 109 MG/DL (ref 70–110)
HBA1C MFR BLD: 5.1 % (ref 4–5.6)
MICROSCOPIC COMMENT: ABNORMAL
NON-SQ EPI CELLS #/AREA URNS AUTO: 9 /HPF
POTASSIUM SERPL-SCNC: 3.5 MMOL/L (ref 3.5–5.1)
PROT SERPL-MCNC: 7.1 G/DL (ref 6–8.4)
RBC #/AREA URNS AUTO: 16 /HPF (ref 0–4)
SODIUM SERPL-SCNC: 142 MMOL/L (ref 136–145)
SQUAMOUS #/AREA URNS AUTO: 16 /HPF
WBC #/AREA URNS AUTO: 56 /HPF (ref 0–5)
YEAST UR QL AUTO: ABNORMAL

## 2023-11-27 PROCEDURE — 87086 URINE CULTURE/COLONY COUNT: CPT | Performed by: FAMILY MEDICINE

## 2023-11-27 PROCEDURE — 99999 PR PBB SHADOW E&M-EST. PATIENT-LVL IV: CPT | Mod: PBBFAC,,, | Performed by: FAMILY MEDICINE

## 2023-11-27 PROCEDURE — 83036 HEMOGLOBIN GLYCOSYLATED A1C: CPT | Performed by: FAMILY MEDICINE

## 2023-11-27 PROCEDURE — 36415 COLL VENOUS BLD VENIPUNCTURE: CPT | Mod: PO | Performed by: FAMILY MEDICINE

## 2023-11-27 PROCEDURE — 81001 URINALYSIS AUTO W/SCOPE: CPT | Performed by: FAMILY MEDICINE

## 2023-11-27 PROCEDURE — 99213 PR OFFICE/OUTPT VISIT, EST, LEVL III, 20-29 MIN: ICD-10-PCS | Mod: S$PBB,,, | Performed by: FAMILY MEDICINE

## 2023-11-27 PROCEDURE — 99214 OFFICE O/P EST MOD 30 MIN: CPT | Mod: PBBFAC,PO | Performed by: FAMILY MEDICINE

## 2023-11-27 PROCEDURE — 83880 ASSAY OF NATRIURETIC PEPTIDE: CPT | Performed by: FAMILY MEDICINE

## 2023-11-27 PROCEDURE — 99213 OFFICE O/P EST LOW 20 MIN: CPT | Mod: S$PBB,,, | Performed by: FAMILY MEDICINE

## 2023-11-27 PROCEDURE — 99999 PR PBB SHADOW E&M-EST. PATIENT-LVL IV: ICD-10-PCS | Mod: PBBFAC,,, | Performed by: FAMILY MEDICINE

## 2023-11-27 PROCEDURE — 80053 COMPREHEN METABOLIC PANEL: CPT | Performed by: FAMILY MEDICINE

## 2023-11-27 NOTE — PROGRESS NOTES
"Subjective:       Patient ID: Serena Post is a 83 y.o. female.    Chief Complaint: Hospital Follow Up    Transitional Care Note    Family and/or Caretaker present at visit?  Ye .  Diagnostic tests reviewed/disposition: I have reviewed all completed as well as pending diagnostic tests at the time of discharge.  Disease/illness education: yes  Home health/community services discussion/referrals: Patient has home health established at  [facility] .   Establishment or re-establishment of referral orders for community resources: No other necessary community resources.   Discussion with other health care providers: No discussion with other health care providers necessary.        83 y old female with S CHF , s/v AVR , albin . HTN  Stage 3ck, cognitive impairment , CVA  f/u on hospitalization  today on  11/19 .  3 d prior she was diagnosed with pneumonia at   and  was treated outpatient with Augmentin , albuterol and decongestant . She started having diarrhea and anorexia , hence son transported her to get evaluated "In the ED, vitals: 152/78, 70, 18, 98.7, 92% RA. Labs: INR: 2.3, CO2: 22, Glu: 127, Bili: 1.3, BNP: 2693. CXR: No acute findings per report. + FLU A. Treated with Neb, initiated on tamiflu, diuretic. Patient is a full code.     CT lungs did not demonstrated pneumonia . She was started on Tamiflu .. She had large bolus in epiphegus. Aspirations precautions were followed . She also qualified for outpatient oxygen . She was on 1.5  of fluid restrictions, lasix was continued. Currently on torsemide . Discharged on 11/22. NO salt restriction at home  . No ARIZA , sleeps with one pillow. She has not scheduled bert with Card. She lives with son and a nurse takes care of her when  he is at work .       Review of Systems   Constitutional:  Positive for activity change.   Psychiatric/Behavioral:  Positive for agitation.        Objective:      Physical Exam  Constitutional:       General: She is sleeping. She is not in " acute distress.     Appearance: She is obese. She is not diaphoretic.   HENT:      Head: Normocephalic and atraumatic.      Right Ear: External ear normal.      Left Ear: External ear normal.      Mouth/Throat:      Pharynx: No oropharyngeal exudate.   Eyes:      General: No scleral icterus.        Right eye: No discharge.         Left eye: No discharge.      Conjunctiva/sclera: Conjunctivae normal.      Pupils: Pupils are equal, round, and reactive to light.   Neck:      Thyroid: No thyromegaly.      Vascular: No JVD.      Trachea: No tracheal deviation.   Cardiovascular:      Rate and Rhythm: Normal rate and regular rhythm.      Heart sounds: Normal heart sounds. No murmur heard.     No friction rub. No gallop.   Pulmonary:      Effort: Pulmonary effort is normal. No respiratory distress.      Breath sounds: No stridor. Examination of the right-middle field reveals rhonchi. Examination of the left-middle field reveals rhonchi. Rhonchi present. No wheezing or rales.   Chest:      Chest wall: No tenderness.   Abdominal:      General: Bowel sounds are normal. There is no distension.      Palpations: Abdomen is soft.      Tenderness: There is no abdominal tenderness. There is no guarding or rebound.   Musculoskeletal:         General: Normal range of motion.      Cervical back: Normal range of motion and neck supple.   Lymphadenopathy:      Cervical: No cervical adenopathy.   Skin:     General: Skin is warm and dry.   Neurological:      Mental Status: She is oriented to person, place, and time.   Psychiatric:         Behavior: Behavior is uncooperative.         Thought Content: Thought content normal.         Judgment: Judgment normal.         Assessment:     Serena was seen today for hospital follow up.    Diagnoses and all orders for this visit:    CHF (congestive heart failure), NYHA class II, acute, diastolic  -     Comprehensive Metabolic Panel; Future  -     BNP; Future    IFG (impaired fasting glucose)  -      Hemoglobin A1C; Future    Dysuria  -     Urine culture; Future  -     Urinalysis Microscopic    Influenza A    Hypokalemia    Acute respiratory failure, unspecified whether with hypoxia or hypercapnia       Plan:   Fluid restriction . Advised to schedule hosp f.u bert with Card   Labs   Urine  Resolved   Labs   Resolved   Declined recommended vaccinations . F./u in 1 m

## 2023-11-27 NOTE — TELEPHONE ENCOUNTER
[facility]) hospital discharge 48-72 hour phone follow up completed.     Most Recent Hospital Discharge Date: 11/22/23  Last admission Diagnosis/chief complaint:  Pneumonia due to influenza A virus    TCC nurse Navigator spoke with pts son Amish     Current Patient reported weight: 212lbs at pcp office last week    Current Patient reported blood pressure and heart rate: 124/72 P73, O2 sats 95-96%    Pt reports the following:  []  Shortness of Breath with Activity  []  Shortness of Breath at rest   []  Fatigue  []  Edema   [] Chest pain or tightness  [] Weight Increase since discharge  [x] None of the above    Medications:   Discharge medication reviewed with pt.  Pt reports having medication list available and has all medications at home for use per list.     Education:   Reviewed key points as listed below.     Recommend 2 -3 gram sodium restriction and 1500 cc-2000 cc fluid restriction.  Encourage physical activity with graded exercise program.  Requested patient to weigh themselves daily, and to notify us if their weight increases by more than 3 lbs in 1 day or 5 lbs in 3 days.       Watch for these Signs and Symptoms: If any of these occur, contact [facility] immediately:   Increase in shortness of breath with movement   Increase in swelling in your legs and ankles   Weight gain of more than 3 pounds in a day or 5 pounds in 3 days.   Difficulty breathing when you are lying down   Worsening fatigue or tiredness   Stomach bloating, a full feeling or a loss of appetite   Increased coughing--especially when you are lying down      Pt was able to verbalize back to nurse in their own words correct diet/fluid restrictions, necessity for exercise, warning signs and symptoms, when and how to contact their TCC team.      Pt educated on follow-up plan while in [facility] program to include:   Week 1 -  F/u appt with Provider and HF nurse (Date) pt sees Dr PABLO Perkins at [facility]. Amish wants to call to  see if they can get in sooner with Dr Perkins. If not he says he can call us back and we can see pt for 1 time visit until they can get in with Dr Perkins   Week 2-5 - In person/ Virtual/ phone call check ins    Week 5-7 - Pt will discharge from [facility] and transition to longterm care provider (Cardiology/PCP/ Advanced Heart Failure).      Patient active on myChart? No      Pt given the following contact information for ease of communication: [phone] (Mon-Fri, 8a-5p) & for urgent issues on the weekend call Lino on-call [phone] to page the Cardiology MD on call.  Pt also encouraged utilize [facility] messaging as well.      Will follow up with pt at first clinic visit and HF nurse navigator available for pt questions, issues or concerns.

## 2023-11-27 NOTE — PROGRESS NOTES
Subjective:       Patient ID: Serena Post is a 83 y.o. female.    Chief Complaint: Hospital Follow Up    HPI  Review of Systems    Objective:      Physical Exam    Assessment:       1. CHF (congestive heart failure), NYHA class II, acute, diastolic    2. IFG (impaired fasting glucose)    3. Dysuria        Plan:     Serena was seen today for hospital follow up.    Diagnoses and all orders for this visit:    CHF (congestive heart failure), NYHA class II, acute, diastolic  -     Comprehensive Metabolic Panel; Future  -     BNP; Future    IFG (impaired fasting glucose)  -     Hemoglobin A1C; Future    Dysuria  -     Urine culture; Future  -     Urinalysis Microscopic

## 2023-11-28 ENCOUNTER — TELEPHONE (OUTPATIENT)
Dept: FAMILY MEDICINE | Facility: CLINIC | Age: 83
End: 2023-11-28
Payer: MEDICARE

## 2023-11-28 DIAGNOSIS — R31.9 HEMATURIA, UNSPECIFIED TYPE: Primary | ICD-10-CM

## 2023-11-28 RX ORDER — CEFDINIR 300 MG/1
300 CAPSULE ORAL 2 TIMES DAILY
Qty: 20 CAPSULE | Refills: 0 | Status: SHIPPED | OUTPATIENT
Start: 2023-11-28 | End: 2023-12-08

## 2023-11-29 ENCOUNTER — OFFICE VISIT (OUTPATIENT)
Dept: CARDIOLOGY | Facility: CLINIC | Age: 83
End: 2023-11-29
Payer: MEDICARE

## 2023-11-29 VITALS
SYSTOLIC BLOOD PRESSURE: 100 MMHG | BODY MASS INDEX: 34.91 KG/M2 | DIASTOLIC BLOOD PRESSURE: 60 MMHG | WEIGHT: 197 LBS | HEIGHT: 63 IN

## 2023-11-29 DIAGNOSIS — I71.9 AORTIC ANEURYSM, UNSPECIFIED PORTION OF AORTA, UNSPECIFIED WHETHER RUPTURED: ICD-10-CM

## 2023-11-29 DIAGNOSIS — I48.0 PAROXYSMAL ATRIAL FIBRILLATION: Primary | ICD-10-CM

## 2023-11-29 DIAGNOSIS — E66.01 SEVERE OBESITY (BMI 35.0-39.9) WITH COMORBIDITY: Primary | ICD-10-CM

## 2023-11-29 DIAGNOSIS — I50.23 ACUTE ON CHRONIC SYSTOLIC HEART FAILURE: ICD-10-CM

## 2023-11-29 LAB
BACTERIA UR CULT: NORMAL
BACTERIA UR CULT: NORMAL

## 2023-11-29 PROCEDURE — 99204 PR OFFICE/OUTPT VISIT, NEW, LEVL IV, 45-59 MIN: ICD-10-PCS | Mod: S$PBB,,, | Performed by: PHYSICIAN ASSISTANT

## 2023-11-29 PROCEDURE — 99213 OFFICE O/P EST LOW 20 MIN: CPT | Mod: PBBFAC | Performed by: PHYSICIAN ASSISTANT

## 2023-11-29 PROCEDURE — 99204 OFFICE O/P NEW MOD 45 MIN: CPT | Mod: S$PBB,,, | Performed by: PHYSICIAN ASSISTANT

## 2023-11-29 PROCEDURE — 99999 PR PBB SHADOW E&M-EST. PATIENT-LVL III: ICD-10-PCS | Mod: PBBFAC,,, | Performed by: PHYSICIAN ASSISTANT

## 2023-11-29 PROCEDURE — 99999 PR PBB SHADOW E&M-EST. PATIENT-LVL III: CPT | Mod: PBBFAC,,, | Performed by: PHYSICIAN ASSISTANT

## 2023-11-29 NOTE — PROGRESS NOTES
[facility] Provider Note (Initial Clinic) Consult Note    Date of original referral:   Age: 83 y.o.  Gender: female  Ethnicity: Not  or /a   Number of admissions for CHF within the preceding year:    Duration of CHF:   Type of Congestive Heart Failure: Diastolic   Etiology: Non-ischemic    Social history:   Enrolled in Infusion suite: no    Diagnostic Labs:   EKG - 11/20/2023  CXR - 11/19/2023  ECHO - 11/20/2023  Stress test -   Stress echo -   Pharmacologic stress -   Cardiac catheterization -    Cardiac MRI -     Lab Results   Component Value Date     11/27/2023     11/22/2023    K 3.5 11/27/2023    K 3.4 (L) 11/22/2023     11/27/2023     11/22/2023    CO2 26 11/27/2023    CO2 26 11/22/2023     11/27/2023     (H) 11/22/2023    BUN 33 (H) 11/27/2023    BUN 22 11/22/2023    CREATININE 1.3 11/27/2023    CREATININE 1.5 (H) 11/22/2023    CALCIUM 8.8 11/27/2023    CALCIUM 8.7 11/22/2023    PROT 7.1 11/27/2023    PROT 7.3 11/20/2023    ALBUMIN 3.0 (L) 11/27/2023    ALBUMIN 3.1 (L) 11/22/2023    BILITOT 1.5 (H) 11/27/2023    BILITOT 1.2 (H) 11/20/2023    ALKPHOS 102 11/27/2023    ALKPHOS 85 11/20/2023    AST 27 11/27/2023    AST 22 11/20/2023    ALT 13 11/27/2023    ALT 11 11/20/2023    ANIONGAP 12 11/27/2023    ANIONGAP 14 11/22/2023    ESTGFRAFRICA 30 (A) 07/31/2022    ESTGFRAFRICA 41 (A) 07/26/2022    EGFRNONAA 26 (A) 07/31/2022    EGFRNONAA 36 (A) 07/26/2022       Lab Results   Component Value Date    WBC 6.98 11/20/2023    WBC 7.42 11/19/2023    RBC 5.30 11/20/2023    RBC 5.13 11/19/2023    HGB 15.8 11/20/2023    HGB 15.7 11/19/2023    HCT 49.5 (H) 11/20/2023    HCT 47.2 11/19/2023    MCV 93 11/20/2023    MCV 92 11/19/2023    MCH 29.8 11/20/2023    MCH 30.6 11/19/2023    MCHC 31.9 (L) 11/20/2023    MCHC 33.3 11/19/2023    RDW 13.7 11/20/2023    RDW 13.5 11/19/2023     11/20/2023     11/19/2023    MPV 11.8 11/20/2023    MPV 11.1 11/19/2023    IMMGR 0.1  11/20/2023    IMMGR 0.3 11/19/2023    IGABS 0.01 11/20/2023    IGABS 0.02 11/19/2023    LYMPH 2.0 11/20/2023    LYMPH 28.9 11/20/2023    MONO 1.0 11/20/2023    MONO 14.2 11/20/2023    EOS 0.0 11/20/2023    EOS 0.0 11/19/2023    BASO 0.03 11/20/2023    BASO 0.02 11/19/2023    NRBC 0 11/20/2023    NRBC 0 11/19/2023    GRAN 3.9 11/20/2023    GRAN 56.0 11/20/2023    EOSINOPHIL 0.4 11/20/2023    EOSINOPHIL 0.3 11/19/2023    BASOPHIL 0.4 11/20/2023    BASOPHIL 0.3 11/19/2023    PLTEST Appears normal 08/01/2022    PLTEST Appears normal 07/23/2022       Lab Results   Component Value Date     (H) 11/27/2023    BNP 2,693 (H) 11/19/2023    MG 1.7 11/20/2023    MG 1.6 07/31/2022    PHOS 2.6 (L) 11/22/2023    PHOS 3.2 11/21/2023    TROPONINI 0.090 (H) 11/20/2023    TROPONINI 0.092 (H) 11/19/2023    TROPONINI 0.092 (H) 11/19/2023    HGBA1C 5.1 11/27/2023    HGBA1C 5.4 11/19/2023    TSH 2.576 11/19/2023       Lab Results   Component Value Date    FERRITIN 468 (H) 07/24/2022    CHOL 225 (H) 11/01/2022    TRIG 180 11/01/2022    HDL 48 11/01/2022    LDLCALC 141 (H) 11/01/2022    CHOLHDL 4.7 (H) 11/01/2022    COLORU Yellow 11/19/2023    APPEARANCEUA Clear 11/19/2023    PHUR 6.0 11/19/2023    SPECGRAV 1.015 11/19/2023    PROTEINUA Trace (A) 11/19/2023    GLUCUA Negative 11/19/2023    KETONESU Trace (A) 11/19/2023    BILIRUBINUA Negative 11/19/2023    OCCULTUA Trace (A) 11/19/2023    NITRITE Negative 11/19/2023    LEUKOCYTESUR Negative 11/19/2023       List all implanted cardiac devices:     Cardiomems: no    Current Outpatient Medications on File Prior to Visit   Medication Sig Dispense Refill    albuterol (PROVENTIL) 2.5 mg /3 mL (0.083 %) nebulizer solution Inhale 2.5 mg into the lungs.      albuterol (PROVENTIL/VENTOLIN HFA) 90 mcg/actuation inhaler Inhale 2 puffs into the lungs.      cefdinir (OMNICEF) 300 MG capsule Take 1 capsule (300 mg total) by mouth 2 (two) times daily. for 10 days 20 capsule 0    lisinopriL  "(PRINIVIL,ZESTRIL) 5 MG tablet Take 5 mg by mouth 2 (two) times daily.      metoprolol tartrate (LOPRESSOR) 25 MG tablet Take 1 tablet by mouth 2 (two) times daily.      miconazole (MICOTIN) 2 % cream Apply topically 2 (two) times daily. Apply to bilateral buttocks, thighs, perineum, groins 30 g 0    POLYTUSSIN DM,PYRILAMINE, 12.5-5-7.5 mg/5 mL Liqd Take 10 mLs by mouth 2 (two) times daily.      potassium bicarbonate (K-LYTE) disintegrating tablet Take 1 tablet (25 mEq total) by mouth 2 (two) times a day. 60 tablet 0    torsemide (DEMADEX) 20 MG Tab Take 1 tablet (20 mg total) by mouth once daily. 30 tablet 0    warfarin (COUMADIN) 2.5 MG tablet Take 2.5 mg by mouth nightly. Take 2.5mg on Monday, Wednesday, Friday  take 1.25 mg on Tuesday, Thursday, Saturday and Sunday      triamterene-hydrochlorothiazide 37.5-25 mg (MAXZIDE-25) 37.5-25 mg per tablet Take 0.5 tablets by mouth 2 (two) times a day.       No current facility-administered medications on file prior to visit.         HPI: 83 y old female with S CHF , s/v AVR , albin . HTN  Stage 3ck, cognitive impairment , CVA  f/u on hospitalization  today on  11/19 .  3 d prior she was diagnosed with pneumonia at   and  was treated outpatient with Augmentin , albuterol and decongestant . She started having diarrhea and anorexia , hence son transported her to get evaluated "In the ED, vitals: 152/78, 70, 18, 98.7, 92% RA. Labs: INR: 2.3, CO2: 22, Glu: 127, Bili: 1.3, BNP: 2693. CXR: No acute findings per report. + FLU A. Treated with Neb, initiated on tamiflu, diuretic. Patient is a full code.      CT lungs did not demonstrated pneumonia . She was started on Tamiflu .. She had large bolus in epiphegus. Aspirations precautions were followed . She also qualified for outpatient oxygen . She was on 1.5  of fluid restrictions, lasix was continued. Currently on torsemide . Discharged on 11/22. NO salt restriction at home  . No ARIZA , sleeps with one pillow. She has not " scheduled bert with Card. She lives with son and a nurse takes care of her when  he is at work .      No dc summary available    Here today for first visit, feels ok    No SOB, PND, orthopnea    Le edema improving    Watching Na    Stopped lasix before admit for diarrhea         PHYSICAL:   Vitals:    11/29/23 0902   BP: 100/60   Pulse: (P) 100      Wt Readings from Last 3 Encounters:   11/29/23 89.4 kg (197 lb)   11/27/23 96.2 kg (212 lb)   11/22/23 96.1 kg (211 lb 13.8 oz)       JVD: no,    Heart rhythm: regular  Cardiac murmur: No    S3: no  S4: no  Lungs: clear  Liver span: 10 cm:   Hepatojugular reflux: no  Edema: no,       ASSESSMENT: chronic diastolic HF    PLAN:      Patient Instructions:   Instruct the patient to notify this clinic if HH, a physician or an advanced care provider wants to change medication one of their HF medications   Activity and Diet restrictions:   Recommend 2-3 gram sodium restriction and 1500cc- 2000cc fluid restriction.  Encourage physical activity with graded exercise program.  Requested patient to weigh themselves daily, and to notify us if their weight increases by more than 3 lbs in 1 day or 5 lbs in 3 days.    Assigned dry weight on home scale: 89 kg  Medication changes (include current dose and changed dose)  HF education done  Went over Na and fluid restriction  Continue torsemide 20 mg daily  ACE for GDMT  Close HF episode as she will see her regular cardiologist Dr Perkins next month

## 2023-11-30 NOTE — PHYSICIAN QUERY
PT Name: Serena Post  MR #: [id number]     DOCUMENTATION CLARIFICATION      CDS/: Shannon Chiang RN              Contact information:[email or web address]    This form is a permanent document in the medical record.     Query Date: November 30, 2023    Dear Provider,  By submitting this query, we are merely seeking further clarification of documentation.  Please utilize your independent clinical judgment when addressing the question(s) below.     The Medical Record contains the following:    Supporting Clinical Findings Location in Medical Record   Pneumonia due to influenza A virus  REcent Dx PNA at , reviewed CXR report. No images to review in Care Everywhere. Reported Left lower lobe PNA.   --Hold on Abx for now, CXR did not show PNA  --CTA of chest      Influenza A  + Flu A, recent Dx PNA at Urgent care, denies significant improvement with Abx, IH     --Xopenex Q12H Neb  --Tamiflu 75mg PO BID x5 days   H&P   Pneumonia due to influenza A virus  Treatment per HM team.      Other  Abnormal chest CT  CT suggested there may be a food bolus, but patient and son deny any symptoms of dysphagia. Patient not in distress and tolerating her secretions.   Could consider a dose of glucagon, but would recommend starting a clear liquid diet. She needs to be upright while doing this. Monitor for symptoms suggestive of dysphagia.   No plans for inpatient EGD while being treated for PNA and INR >1.5.   GI note 11-20   --treat with doxycycline due to underlying influenza,  HM note 11-21         Please clarify if the Pneumonia diagnosis has been:    [ x ] Ruled In   [  ] Ruled Out   [  ] Other/Clarification of findings (please specify): _______________    [   ] Clinically undetermined     Please document in your progress notes daily for the duration of treatment, until resolved, and include in your discharge summary.    Form No. [id number]

## 2023-12-11 NOTE — DISCHARGE SUMMARY
[facility] Medicine  Discharge Summary      Patient Name: Serena Post  MRN: [id number]  ANDRES: [id number]  Patient Class: IP- Inpatient  Admission Date: 11/19/2023  Hospital Length of Stay: 3 days  Discharge Date and Time: 11/22/2023  6:30 PM  Attending Physician: Joslyn att. providers found   Discharging Provider: June Boykin MD  Primary Care Provider: Joslyn, Primary Doctor    Primary Care Team: Networked reference to record PCT     HPI:   83 y.o. female patient with a PMHx of CHF, HTN, CKD and Afib who presents to the Emergency Department for evaluation of pneumonia. Pt was dx with pneumonia of the left lower lobe on 11/14 at urgent care, prescribed Augmentin, Albuterol IH, Decongestant. Pt has experienced a loss of appetite, severe yellow diarrhea, and a productive cough. Son reports she has not eaten or drank anything and has been fatigued for the past few days.Symptoms are constant and moderate in severity. No mitigating or exacerbating factors reported. No other sxs reported. Patient denies any fever, chills, N/V, SOB, and all other sxs at this time. No other tx reported. In the ED, vitals: 152/78, 70, 18, 98.7, 92% RA. Labs: INR: 2.3, CO2: 22, Glu: 127, Bili: 1.3, BNP: 2693. CXR: No acute findings per report. + FLU A. Treated with Neb, initiated on tamiflu, diuretic. Patient is a full code. Admitted to hospital medicine for management of Acute on Chronic CHF, Influenza, Acute Respiratory Failure.     * No surgery found *      Hospital Course:   83-year-old female admitted with left lower lobe pneumonia with cough, fever, chills, nausea, vomiting.  The diarrhea began after taking amoxicillin for pneumonia.  Patient and son report no further diarrhea but significant skin breakdown secondary to same. Instructions for keeping pt dry and clean given. Diarrhea resolved.   Pt started on Doxy for pneumonia asssociated with influenza. The had CTA of chest negative for PE.but with large food  bolus seen. Pt seen by ST and able to tolerate diet. She was instructed to use aspiration precautions.   Her SOB improved and she was stable to dc home.  She will complete a course of tamiflu.  Pt seen and examined on day of discharge and stable for dc.     Goals of Care Treatment Preferences:  Code Status: Full Code      Consults:   Consults (From admission, onward)          Status Ordering Provider     Inpatient consult to Social Work  Once        Provider:  (Not yet assigned)    Completed TRACY VICKERS     Inpatient consult to Gastroenterology  Once        Provider:  Nirav Molina PA-C    Completed CHRISTINA LOVE.     Inpatient consult to Registered Dietitian/Nutritionist  Once        Provider:  (Not yet assigned)    Completed CHRISTINA LOVE.     Inpatient consult to Social Work/Case Management  Once        Provider:  (Not yet assigned)    Completed CHRISTINA LOVE.            Cardiac/Vascular  Acute on chronic systolic heart failure  Patient is identified as having Systolic (HFrEF) heart failure that is Acute on chronic. CHF is currently uncontrolled due to Rales/crackles on pulmonary exam.   . Continue Beta Blocker and Furosemide and monitor clinical status closely. Monitor on telemetry.  Place on fluid restriction of 1.5 L. Continue to stress to patient importance of self efficacy and  on diet for CHF. Last BNP reviewed- and noted below      --ECHO-as above  --Lasix 60mg BID x2 doses      Other  Anticoagulant long-term use  Chronic mgmt with Coumadin, INR: 2.3 on admission    --Pharmacy consutled to manage in hospital    Abnormal chest CT  Per review of CXR in ED, enlarged heart border when compared to CXR 7/2023. + viral infection.     --CTA negative for pulmonary embolus but large food bolus and midesophagus  --ECHO    Left Ventricle: The left ventricle is normal in size. Normal wall thickness. There is mild concentric hypertrophy. Normal wall motion. There is normal systolic function  with a visually estimated ejection fraction of 55 - 60%. Grade I diastolic dysfunction.    Right Ventricle: Normal right ventricular cavity size. Wall thickness is normal. Right ventricle wall motion  is normal. Systolic function is normal.    Aortic Valve: There is mild aortic valve sclerosis. There is moderate stenosis. Aortic valve area by VTI is 1.45 cm². Aortic valve peak velocity is 3.20 m/s. Mean gradient is 23 mmHg. The dimensionless index is 0.44. There is mild aortic regurgitation.    Mitral Valve: There is mild regurgitation.    Tricuspid Valve: There is mild regurgitation.    IVC/SVC: Intermediate venous pressure at 8 mmHg.        Acute hypoxemic respiratory failure  Patient with Hypoxic Respiratory failure which is Acute.  she is not on home oxygen. Supplemental oxygen was provided and noted- Oxygen Concentration (%):  [32] 32     .   Signs/symptoms of respiratory failure include- tachypnea, increased work of breathing, and wheezing. Contributing diagnoses includes - CHF and Pneumonia Labs and images were reviewed. Patient Has not had a recent ABG. Will treat underlying causes and adjust management of respiratory failure as follows-      --Diuresis  --Eval for Lung Pathology- CTA negative for pulmonary embolus     Acute on chronic systolic heart failure  Patient is identified as having Systolic (HFrEF) heart failure that is Acute on chronic. CHF is currently uncontrolled due to Rales/crackles on pulmonary exam. Latest ECHO performed and demonstrates- No results found for this or any previous visit.  . Continue Beta Blocker and Furosemide and monitor clinical status closely. Monitor on telemetry. Patient is on CHF pathway.  Monitor strict Is&Os and daily weights.  Place on fluid restriction of 1.5 L. Continue to stress to patient importance of self efficacy and  on diet for CHF. Last BNP reviewed- and noted below       Recent Labs   Lab 11/19/23  1659   BNP 2,693*      .      --ECHO-as  "above  --Lasix 60mg BID x2 doses, reeval in AM        Influenza A  + Flu A, recent Dx PNA at Urgent care, denies significant improvement with Abx, IH     --Xopenex Q12H Neb  --Tamiflu 75mg PO BID x5 days     Final Active Diagnoses:    Diagnosis Date Noted POA    Acute on chronic systolic heart failure [I50.23] 11/19/2023 Yes    Anticoagulant long-term use [Z79.01] 09/26/2019 Not Applicable      Problems Resolved During this Admission:    Diagnosis Date Noted Date Resolved POA    PRINCIPAL PROBLEM:  Pneumonia due to influenza A virus [J10.00] 11/19/2023 11/27/2023 Yes    Hypokalemia [E87.6] 11/20/2023 11/27/2023 Yes    Influenza A [J10.1] 11/19/2023 11/27/2023 Yes    Acute hypoxemic respiratory failure [J96.01] 11/19/2023 11/27/2023 Yes    Abnormal chest CT [R93.89] 11/19/2023 11/27/2023 Yes       Discharged Condition: fair    Disposition: Home or Self Care    Follow Up:   Contact information for follow-up providers       No, Primary Doctor Follow up in 3 day(s).    Why: ESTABLISH CARE                     Contact information for after-discharge care       Durable Medical Equipment       [employer] .    Service: Durable Medical Equipment  Contact information:  [address]  [facility] [address] [phone]                     Home Medical Care       [facility] .    Service: Home Health Services  Contact information:  [address]  [place] [address] [phone]                                 Patient Instructions:      HOSPITAL BED FOR HOME USE     Order Specific Question Answer Comments   Type: Semi-electric    Length of need (1-99 months): 99    Does patient have medical equipment at home? wheelchair    Height: 5' 3" (1.6 m)    Weight: 96.1 kg (211 lb 13.8 oz)    Please check all that apply: Patient requires frequent changes in body position and/or has an immediate need for a change in body position.    Please check all that " "apply: Patient requires the head of bed to be elevated more than 30 degrees most of the time due to congestive heart failure, chronic pulmonary disease, or aspiration.  Pillows and wedges have been considered and ruled out.      PATIENT (NANCY) LIFT FOR HOME USE   Order Comments: acute hypoxemic respiratory failure     Order Specific Question Answer Comments   Height: 5' 3" (1.6 m)    Weight: 96.1 kg (211 lb 13.8 oz)    Does patient have medical equipment at home? wheelchair    Length of need (1-99 months): 99      OXYGEN FOR HOME USE     Order Specific Question Answer Comments   Liter Flow 2    Duration Continuous    Qualifying Test Performed at: Activity    Oxygen saturation at rest 91    Oxygen saturation with activity 88    Oxygen saturation with activity on oxygen 95    Portable mode: pulse dose acceptable    Mode: Conserving device    Route nasal cannula    Device: home concentrator with portable tanks    Length of need (in months): 3 mos    Patient condition with qualifying saturation CHF    Additional information:  acute hypoxic respiratory failure   Height: 5' 3" (1.6 m)    Weight: 96.1 kg (211 lb 13.8 oz)    Alternative treatment measures have been tried or considered and deemed clinically ineffective. Yes      Ambulatory referral/consult to Home Health   Standing Status: Future   Referral Priority: Routine Referral Type: Home Health   Referral Reason: Specialty Services Required   Requested Specialty: Home Health Services   Number of Visits Requested: 1     Ambulatory referral/consult to [facility]   Standing Status: Future   Referral Priority: Routine Referral Type: Consultation   Referral Reason: Specialty Services Required   Number of Visits Requested: 1     Diet Cardiac   Order Comments: MINCED MOIST DIET     Diet Cardiac   Order Comments: MINCED MOIST EATEN SITTING UP WITH ASPIRATION PRECAUTIONS     Call MD for:  temperature >100.4     Call MD for:  persistent nausea and vomiting or " diarrhea     Call MD for:  severe uncontrolled pain     Call MD for:  redness, tenderness, or signs of infection (pain, swelling, redness, odor or green/yellow discharge around incision site)     Call MD for:  difficulty breathing or increased cough     Call MD for:  severe persistent headache     Call MD for:  worsening rash     Call MD for:  persistent dizziness, light-headedness, or visual disturbances     Call MD for:  increased confusion or weakness     Notify your health care provider if you experience any of the following:  temperature >100.4     Notify your health care provider if you experience any of the following:  persistent nausea and vomiting or diarrhea     Notify your health care provider if you experience any of the following:  severe uncontrolled pain     Notify your health care provider if you experience any of the following:  difficulty breathing or increased cough     Notify your health care provider if you experience any of the following:  increased confusion or weakness     Notify your health care provider if you experience any of the following:  persistent dizziness, light-headedness, or visual disturbances     Activity as tolerated     Activity as tolerated       Significant Diagnostic Studies: Labs: All labs within the past 24 hours have been reviewed    Pending Diagnostic Studies:       None           Medications:  Reconciled Home Medications:      Medication List        START taking these medications      potassium bicarbonate disintegrating tablet  Commonly known as: K-LYTE  Take 1 tablet (25 mEq total) by mouth 2 (two) times a day.     torsemide 20 MG Tab  Commonly known as: DEMADEX  Take 1 tablet (20 mg total) by mouth once daily.            CONTINUE taking these medications      * albuterol 2.5 mg /3 mL (0.083 %) nebulizer solution  Commonly known as: PROVENTIL  Inhale 2.5 mg into the lungs.     * albuterol 90 mcg/actuation inhaler  Commonly known as: PROVENTIL/VENTOLIN HFA  Inhale 2  puffs into the lungs.     lisinopriL 5 MG tablet  Commonly known as: PRINIVIL,ZESTRIL  Take 5 mg by mouth 2 (two) times daily.     metoprolol tartrate 25 MG tablet  Commonly known as: LOPRESSOR  Take 1 tablet by mouth 2 (two) times daily.     miconazole 2 % cream  Commonly known as: MICOTIN  Apply topically 2 (two) times daily. Apply to bilateral buttocks, thighs, perineum, groins     POLYTUSSIN DM(PYRILAMINE) 12.5-5-7.5 mg/5 mL Liqd  Generic drug: pyrilamine-phenylephrine-DM  Take 10 mLs by mouth 2 (two) times daily.     warfarin 2.5 MG tablet  Commonly known as: COUMADIN  Take 2.5 mg by mouth nightly. Take 2.5mg on Monday, Wednesday, Friday  take 1.25 mg on Tuesday, Thursday, Saturday and Sunday           * This list has 2 medication(s) that are the same as other medications prescribed for you. Read the directions carefully, and ask your doctor or other care provider to review them with you.                STOP taking these medications      amoxicillin-clavulanate 875-125mg 875-125 mg per tablet  Commonly known as: AUGMENTIN     furosemide 20 MG tablet  Commonly known as: LASIX            ASK your doctor about these medications      doxycycline 100 MG tablet  Commonly known as: VIBRA-TABS  Take 1 tablet (100 mg total) by mouth every 12 (twelve) hours. for 5 doses  Ask about: Should I take this medication?     oseltamivir 30 MG capsule  Commonly known as: TAMIFLU  Take 1 capsule (30 mg total) by mouth 2 (two) times daily. for 3 doses  Ask about: Should I take this medication?              Indwelling Lines/Drains at time of discharge:   Lines/Drains/Airways       None                   Time spent on the discharge of patient: 45 minutes         June Boykin MD  Department of Hospital Medicine  [facility]

## 2023-12-11 NOTE — ASSESSMENT & PLAN NOTE
Patient is identified as having Systolic (HFrEF) heart failure that is Acute on chronic. CHF is currently uncontrolled due to Rales/crackles on pulmonary exam. Latest ECHO performed and demonstrates- No results found for this or any previous visit.  . Continue Beta Blocker and Furosemide and monitor clinical status closely. Monitor on telemetry. Patient is on CHF pathway.  Monitor strict Is&Os and daily weights.  Place on fluid restriction of 1.5 L. Continue to stress to patient importance of self efficacy and  on diet for CHF. Last BNP reviewed- and noted below     .     --ECHO-as above  --Lasix 60mg BID x2 doses

## 2023-12-14 ENCOUNTER — TELEPHONE (OUTPATIENT)
Dept: FAMILY MEDICINE | Facility: CLINIC | Age: 83
End: 2023-12-14
Payer: MEDICARE

## 2023-12-14 NOTE — TELEPHONE ENCOUNTER
----- Message from Izzy Hill sent at 12/14/2023  4:09 PM CST -----  Fly [facility] is requesting labs for a work up.Please call back at [phone]

## 2023-12-14 NOTE — TELEPHONE ENCOUNTER
Spoke to Fly with [facility] , she states that pt went to urgent care yesterday because the son called her this morning and they did an x-ray which was clear but urgent care provider recommended work up of labs? She was calling to see what Dr. Ascencio recommended. Please advise?

## 2023-12-15 NOTE — TELEPHONE ENCOUNTER
Needs to be seen today . Did medical provider have a differential diagnosis based on CXR findings ?

## 2023-12-23 ENCOUNTER — EXTERNAL HOME HEALTH (OUTPATIENT)
Dept: HOME HEALTH SERVICES | Facility: HOSPITAL | Age: 83
End: 2023-12-23
Payer: MEDICARE

## 2024-02-01 ENCOUNTER — HOSPITAL ENCOUNTER (INPATIENT)
Facility: HOSPITAL | Age: 84
LOS: 3 days | Discharge: HOME HEALTH | DRG: 291 | End: 2024-02-05
Attending: EMERGENCY MEDICINE | Admitting: INTERNAL MEDICINE
Payer: MEDICARE

## 2024-02-01 DIAGNOSIS — I50.43 ACUTE ON CHRONIC COMBINED SYSTOLIC AND DIASTOLIC CONGESTIVE HEART FAILURE: ICD-10-CM

## 2024-02-01 DIAGNOSIS — I50.9 HEART FAILURE, UNSPECIFIED HF CHRONICITY, UNSPECIFIED HEART FAILURE TYPE: ICD-10-CM

## 2024-02-01 DIAGNOSIS — I50.23 ACUTE ON CHRONIC SYSTOLIC HEART FAILURE: ICD-10-CM

## 2024-02-01 DIAGNOSIS — Z79.01 ANTICOAGULANT LONG-TERM USE: ICD-10-CM

## 2024-02-01 DIAGNOSIS — N39.0 URINARY TRACT INFECTION WITHOUT HEMATURIA, SITE UNSPECIFIED: Primary | ICD-10-CM

## 2024-02-01 DIAGNOSIS — N30.01 ACUTE CYSTITIS WITH HEMATURIA: ICD-10-CM

## 2024-02-01 LAB
ALBUMIN SERPL BCP-MCNC: 3.5 G/DL (ref 3.5–5.2)
ALP SERPL-CCNC: 103 U/L (ref 55–135)
ALT SERPL W/O P-5'-P-CCNC: 8 U/L (ref 10–44)
ANION GAP SERPL CALC-SCNC: 12 MMOL/L (ref 8–16)
AST SERPL-CCNC: 18 U/L (ref 10–40)
BACTERIA #/AREA URNS HPF: ABNORMAL /HPF
BASOPHILS # BLD AUTO: 0.07 K/UL (ref 0–0.2)
BASOPHILS NFR BLD: 0.8 % (ref 0–1.9)
BILIRUB SERPL-MCNC: 1.8 MG/DL (ref 0.1–1)
BILIRUB UR QL STRIP: NEGATIVE
BNP SERPL-MCNC: 2464 PG/ML (ref 0–99)
BUN SERPL-MCNC: 13 MG/DL (ref 8–23)
CALCIUM SERPL-MCNC: 8.7 MG/DL (ref 8.7–10.5)
CHLORIDE SERPL-SCNC: 104 MMOL/L (ref 95–110)
CLARITY UR: ABNORMAL
CO2 SERPL-SCNC: 25 MMOL/L (ref 23–29)
COLOR UR: YELLOW
CREAT SERPL-MCNC: 1.2 MG/DL (ref 0.5–1.4)
DIFFERENTIAL METHOD BLD: NORMAL
EOSINOPHIL # BLD AUTO: 0.2 K/UL (ref 0–0.5)
EOSINOPHIL NFR BLD: 2.8 % (ref 0–8)
ERYTHROCYTE [DISTWIDTH] IN BLOOD BY AUTOMATED COUNT: 14.4 % (ref 11.5–14.5)
EST. GFR  (NO RACE VARIABLE): 45 ML/MIN/1.73 M^2
GLUCOSE SERPL-MCNC: 117 MG/DL (ref 70–110)
GLUCOSE UR QL STRIP: NEGATIVE
HCT VFR BLD AUTO: 45.6 % (ref 37–48.5)
HGB BLD-MCNC: 15 G/DL (ref 12–16)
HGB UR QL STRIP: ABNORMAL
HYALINE CASTS #/AREA URNS LPF: 0 /LPF
IMM GRANULOCYTES # BLD AUTO: 0.02 K/UL (ref 0–0.04)
IMM GRANULOCYTES NFR BLD AUTO: 0.2 % (ref 0–0.5)
INFLUENZA A, MOLECULAR: NEGATIVE
INFLUENZA B, MOLECULAR: NEGATIVE
KETONES UR QL STRIP: NEGATIVE
LACTATE SERPL-SCNC: 1.5 MMOL/L (ref 0.5–2.2)
LACTATE SERPL-SCNC: 1.8 MMOL/L (ref 0.5–2.2)
LEUKOCYTE ESTERASE UR QL STRIP: ABNORMAL
LYMPHOCYTES # BLD AUTO: 2 K/UL (ref 1–4.8)
LYMPHOCYTES NFR BLD: 22.9 % (ref 18–48)
MAGNESIUM SERPL-MCNC: 1.8 MG/DL (ref 1.6–2.6)
MCH RBC QN AUTO: 30.6 PG (ref 27–31)
MCHC RBC AUTO-ENTMCNC: 32.9 G/DL (ref 32–36)
MCV RBC AUTO: 93 FL (ref 82–98)
MICROSCOPIC COMMENT: ABNORMAL
MONOCYTES # BLD AUTO: 0.9 K/UL (ref 0.3–1)
MONOCYTES NFR BLD: 10.6 % (ref 4–15)
NEUTROPHILS # BLD AUTO: 5.3 K/UL (ref 1.8–7.7)
NEUTROPHILS NFR BLD: 62.7 % (ref 38–73)
NITRITE UR QL STRIP: POSITIVE
NRBC BLD-RTO: 0 /100 WBC
PH UR STRIP: 6 [PH] (ref 5–8)
PLATELET # BLD AUTO: 223 K/UL (ref 150–450)
PMV BLD AUTO: 11 FL (ref 9.2–12.9)
POTASSIUM SERPL-SCNC: 3.6 MMOL/L (ref 3.5–5.1)
PROCALCITONIN SERPL IA-MCNC: 0.06 NG/ML
PROT SERPL-MCNC: 7.8 G/DL (ref 6–8.4)
PROT UR QL STRIP: ABNORMAL
RBC # BLD AUTO: 4.9 M/UL (ref 4–5.4)
RBC #/AREA URNS HPF: 24 /HPF (ref 0–4)
SARS-COV-2 RDRP RESP QL NAA+PROBE: NEGATIVE
SODIUM SERPL-SCNC: 141 MMOL/L (ref 136–145)
SP GR UR STRIP: 1.02 (ref 1–1.03)
SPECIMEN SOURCE: NORMAL
TROPONIN I SERPL DL<=0.01 NG/ML-MCNC: 0.04 NG/ML (ref 0–0.03)
TROPONIN I SERPL DL<=0.01 NG/ML-MCNC: 0.04 NG/ML (ref 0–0.03)
URN SPEC COLLECT METH UR: ABNORMAL
UROBILINOGEN UR STRIP-ACNC: NEGATIVE EU/DL
WBC # BLD AUTO: 8.51 K/UL (ref 3.9–12.7)
WBC #/AREA URNS HPF: >100 /HPF (ref 0–5)
WBC CLUMPS URNS QL MICRO: ABNORMAL

## 2024-02-01 PROCEDURE — G0378 HOSPITAL OBSERVATION PER HR: HCPCS

## 2024-02-01 PROCEDURE — 96365 THER/PROPH/DIAG IV INF INIT: CPT

## 2024-02-01 PROCEDURE — 87088 URINE BACTERIA CULTURE: CPT | Performed by: EMERGENCY MEDICINE

## 2024-02-01 PROCEDURE — 36415 COLL VENOUS BLD VENIPUNCTURE: CPT | Performed by: EMERGENCY MEDICINE

## 2024-02-01 PROCEDURE — 80053 COMPREHEN METABOLIC PANEL: CPT | Performed by: PHYSICIAN ASSISTANT

## 2024-02-01 PROCEDURE — 84484 ASSAY OF TROPONIN QUANT: CPT | Mod: 91 | Performed by: PHYSICIAN ASSISTANT

## 2024-02-01 PROCEDURE — 87040 BLOOD CULTURE FOR BACTERIA: CPT | Mod: 59 | Performed by: EMERGENCY MEDICINE

## 2024-02-01 PROCEDURE — 83735 ASSAY OF MAGNESIUM: CPT | Performed by: NURSE PRACTITIONER

## 2024-02-01 PROCEDURE — 87502 INFLUENZA DNA AMP PROBE: CPT | Performed by: EMERGENCY MEDICINE

## 2024-02-01 PROCEDURE — 83036 HEMOGLOBIN GLYCOSYLATED A1C: CPT | Performed by: NURSE PRACTITIONER

## 2024-02-01 PROCEDURE — 84484 ASSAY OF TROPONIN QUANT: CPT | Performed by: NURSE PRACTITIONER

## 2024-02-01 PROCEDURE — 87077 CULTURE AEROBIC IDENTIFY: CPT | Performed by: EMERGENCY MEDICINE

## 2024-02-01 PROCEDURE — 63600175 PHARM REV CODE 636 W HCPCS: Performed by: EMERGENCY MEDICINE

## 2024-02-01 PROCEDURE — 81000 URINALYSIS NONAUTO W/SCOPE: CPT | Performed by: EMERGENCY MEDICINE

## 2024-02-01 PROCEDURE — 93010 ELECTROCARDIOGRAM REPORT: CPT | Mod: ,,, | Performed by: STUDENT IN AN ORGANIZED HEALTH CARE EDUCATION/TRAINING PROGRAM

## 2024-02-01 PROCEDURE — 87186 SC STD MICRODIL/AGAR DIL: CPT | Performed by: EMERGENCY MEDICINE

## 2024-02-01 PROCEDURE — 25000003 PHARM REV CODE 250: Performed by: EMERGENCY MEDICINE

## 2024-02-01 PROCEDURE — U0002 COVID-19 LAB TEST NON-CDC: HCPCS | Performed by: EMERGENCY MEDICINE

## 2024-02-01 PROCEDURE — 83605 ASSAY OF LACTIC ACID: CPT | Mod: 91 | Performed by: EMERGENCY MEDICINE

## 2024-02-01 PROCEDURE — 87086 URINE CULTURE/COLONY COUNT: CPT | Performed by: EMERGENCY MEDICINE

## 2024-02-01 PROCEDURE — 93005 ELECTROCARDIOGRAM TRACING: CPT

## 2024-02-01 PROCEDURE — 85025 COMPLETE CBC W/AUTO DIFF WBC: CPT | Performed by: PHYSICIAN ASSISTANT

## 2024-02-01 PROCEDURE — 96375 TX/PRO/DX INJ NEW DRUG ADDON: CPT

## 2024-02-01 PROCEDURE — 99285 EMERGENCY DEPT VISIT HI MDM: CPT | Mod: 25

## 2024-02-01 PROCEDURE — 83880 ASSAY OF NATRIURETIC PEPTIDE: CPT | Performed by: PHYSICIAN ASSISTANT

## 2024-02-01 PROCEDURE — 84145 PROCALCITONIN (PCT): CPT | Performed by: EMERGENCY MEDICINE

## 2024-02-01 RX ORDER — ONDANSETRON HYDROCHLORIDE 2 MG/ML
4 INJECTION, SOLUTION INTRAVENOUS EVERY 6 HOURS PRN
Status: DISCONTINUED | OUTPATIENT
Start: 2024-02-01 | End: 2024-02-05 | Stop reason: HOSPADM

## 2024-02-01 RX ORDER — FUROSEMIDE 10 MG/ML
40 INJECTION INTRAMUSCULAR; INTRAVENOUS
Status: COMPLETED | OUTPATIENT
Start: 2024-02-01 | End: 2024-02-01

## 2024-02-01 RX ORDER — FUROSEMIDE 10 MG/ML
20 INJECTION INTRAMUSCULAR; INTRAVENOUS DAILY
Status: DISCONTINUED | OUTPATIENT
Start: 2024-02-02 | End: 2024-02-02

## 2024-02-01 RX ORDER — ACETAMINOPHEN 325 MG/1
650 TABLET ORAL EVERY 6 HOURS PRN
Status: DISCONTINUED | OUTPATIENT
Start: 2024-02-01 | End: 2024-02-05 | Stop reason: HOSPADM

## 2024-02-01 RX ORDER — FUROSEMIDE 20 MG/1
20 TABLET ORAL EVERY MORNING
COMMUNITY

## 2024-02-01 RX ORDER — SODIUM CHLORIDE 0.9 % (FLUSH) 0.9 %
10 SYRINGE (ML) INJECTION
Status: DISCONTINUED | OUTPATIENT
Start: 2024-02-01 | End: 2024-02-05 | Stop reason: HOSPADM

## 2024-02-01 RX ADMIN — FUROSEMIDE 40 MG: 10 INJECTION, SOLUTION INTRAMUSCULAR; INTRAVENOUS at 06:02

## 2024-02-01 RX ADMIN — CEFTRIAXONE 1 G: 1 INJECTION, POWDER, FOR SOLUTION INTRAMUSCULAR; INTRAVENOUS at 06:02

## 2024-02-01 NOTE — ED PROVIDER NOTES
SCRIBE #1 NOTE: I, Genesis Pearce, am scribing for, and in the presence of, Zelalem Landon MD. I have scribed the entire note.       History     Chief Complaint   Patient presents with    Hypotension     Per family cp and low bp while at home starting yesterday. +pacemaker and heart valve replacement.      Review of patient's allergies indicates:   Allergen Reactions    Amlodipine Other (See Comments)     Not sure    Chlorthalidone Other (See Comments)     Not sure    Clonidine Other (See Comments)     Not sure    Codeine Other (See Comments)     Not sure    Escitalopram Other (See Comments)     Not sure    Lisinopril Other (See Comments)     Not sure    Losartan Other (See Comments)     Not sure    Meperidine Other (See Comments)     Not sure    Methadone Other (See Comments)     Not sure      Morphine Other (See Comments)     Not sure    Nebivolol Other (See Comments)     Not sure        Nitrofurantoin Other (See Comments)     Not sure        Omeprazole Other (See Comments)     Not sure      Pravastatin Other (See Comments)     Not sure      Propoxyphene Other (See Comments)     Not sure      Sulfamethoxazole-trimethoprim Other (See Comments)     Not sure             History of Present Illness     HPI    2/1/2024, 5:08 PM  History obtained from the spouse and patient      History of Present Illness: Serena Post is a 83 y.o. female patient with a PMHx of CVA, Afib, HTN, HLD, and CHF who presents to the Emergency Department for evaluation of fatigue which onset 2-3 days ago. Pt's home health nurse has been recording low blood pressures for the past 2 days including 86/49 and 93/44. Pt's  reports her being more short of breath than usual. Pt has oxygen at home which she uses PRN. Pt also has a pacemaker in place. Symptoms are constant and moderate in severity. No mitigating or exacerbating factors reported. Associated sxs include . Patient denies any fever, chills, nausea, vomiting, and all other  sxs at this time. No Prior Tx reported. No further complaints or concerns at this time.       Arrival mode: Personal vehicle  PCP: No, Primary Doctor        Past Medical History:  Past Medical History:   Diagnosis Date    A-fib     Anticoagulant long-term use     Aortic valve disease     Cancer     brain tumor, 10 years ago    Cardiomyopathy     CHF (congestive heart failure)     COVID-19 7/23/2022    Hx of heart valve replacement with mechanical valve     Hyperlipidemia     Hypertension     Pacemaker     Pacemaker     Sepsis 7/23/2022    Stroke     2007, residual weakness       Past Surgical History:  Past Surgical History:   Procedure Laterality Date    AORTIC VALVE REPLACEMENT      CHOLECYSTECTOMY      CORONARY ARTERY BYPASS GRAFT      HYSTERECTOMY      INSERTION OF PACEMAKER      TONSILLECTOMY           Family History:  Family History   Problem Relation Age of Onset    No Known Problems Mother     No Known Problems Father        Social History:  Social History     Tobacco Use    Smoking status: Never    Smokeless tobacco: Never   Substance and Sexual Activity    Alcohol use: Not Currently    Drug use: Never    Sexual activity: Not on file        Review of Systems     Review of Systems   Constitutional:  Positive for fatigue. Negative for chills and fever.   HENT:  Negative for congestion and sore throat.    Respiratory:  Positive for shortness of breath.    Cardiovascular:  Negative for chest pain.   Gastrointestinal:  Negative for nausea and vomiting.   Genitourinary:  Negative for dysuria.   Musculoskeletal:  Negative for back pain.   Skin:  Negative for rash.   Neurological:  Negative for weakness and headaches.   Hematological:  Does not bruise/bleed easily.   All other systems reviewed and are negative.     Physical Exam     Initial Vitals [02/01/24 1633]   BP Pulse Resp Temp SpO2   117/66 83 18 97.9 °F (36.6 °C) (!) 94 %      MAP       --          Physical Exam  Nursing Notes and Vital Signs  Reviewed.  Constitutional: Patient is in no apparent distress. Well-developed and well-nourished.  Head: Atraumatic. Normocephalic.  Eyes: PERRL. EOM intact. Conjunctivae are not pale. No scleral icterus.  ENT: Mucous membranes are moist. Oropharynx is clear and symmetric.    Neck: Supple. Full ROM. No lymphadenopathy.  Cardiovascular: Regular rate. Irregularly irregular rhythm. Pacemaker to left upper chest wall. No murmurs, rubs, or gallops. Distal pulses are 2+ and symmetric.  Pulmonary/Chest: No respiratory distress. Clear to auscultation bilaterally. No wheezing or rales.  Abdominal: Soft and non-distended.  There is no tenderness.  No rebound, guarding, or rigidity. Good bowel sounds.  Genitourinary: No CVA tenderness  Musculoskeletal: Moves all extremities. No obvious deformities. No edema. No calf tenderness.  Skin: Warm and dry.  Neurological:  Alert, awake, and appropriate.  Normal speech.  No acute focal neurological deficits are appreciated.  Psychiatric: Normal affect. Good eye contact. Appropriate in content.     ED Course   Procedures  ED Vital Signs:  Vitals:    02/03/24 0326 02/03/24 0425 02/03/24 0429 02/03/24 0712   BP:  (!) 105/58     Pulse: 79 75 73 71   Resp:  18  18   Temp:  98.4 °F (36.9 °C)     TempSrc:  Oral     SpO2:  96%  96%   Weight:       Height:        02/03/24 0748 02/03/24 0800 02/03/24 1109 02/03/24 1500   BP: (!) 88/45  (!) 99/55    Pulse: 71 74 76 83   Resp: 18      Temp: 97.7 °F (36.5 °C)  97.8 °F (36.6 °C)    TempSrc: Oral  Oral    SpO2: 96%  (!) 93%    Weight:       Height:        02/03/24 1546 02/03/24 1946 02/03/24 2000 02/03/24 2319   BP: (!) 115/55 (!) 89/50  (!) 86/46   Pulse: 83 85 81 80   Resp:  16  18   Temp: 98 °F (36.7 °C) 98.5 °F (36.9 °C)  97.5 °F (36.4 °C)   TempSrc: Oral Oral  Oral   SpO2: 97% 96%  (!) 94%   Weight:       Height:        02/04/24 0325 02/04/24 0400 02/04/24 0800   BP: (!) 88/52  (!) 99/53   Pulse: 85 80 79   Resp: 18  (!) 21   Temp: 98.1 °F (36.7  °C)  97.7 °F (36.5 °C)   TempSrc: Oral  Oral   SpO2: 96%  97%   Weight:      Height:          Abnormal Lab Results:  Labs Reviewed   COMPREHENSIVE METABOLIC PANEL - Abnormal; Notable for the following components:       Result Value    Glucose 117 (*)     Total Bilirubin 1.8 (*)     ALT 8 (*)     eGFR 45 (*)     All other components within normal limits   TROPONIN I - Abnormal; Notable for the following components:    Troponin I 0.038 (*)     All other components within normal limits   B-TYPE NATRIURETIC PEPTIDE - Abnormal; Notable for the following components:    BNP 2,464 (*)     All other components within normal limits   URINALYSIS, REFLEX TO URINE CULTURE - Abnormal; Notable for the following components:    Appearance, UA Cloudy (*)     Protein, UA 1+ (*)     Occult Blood UA 1+ (*)     Nitrite, UA Positive (*)     Leukocytes, UA 3+ (*)     All other components within normal limits    Narrative:     Specimen Source->Urine   URINALYSIS MICROSCOPIC - Abnormal; Notable for the following components:    RBC, UA 24 (*)     WBC, UA >100 (*)     WBC Clumps, UA Many (*)     Bacteria Many (*)     All other components within normal limits    Narrative:     Specimen Source->Urine   INFLUENZA A & B BY MOLECULAR   CBC W/ AUTO DIFFERENTIAL   LACTIC ACID, PLASMA   PROCALCITONIN   SARS-COV-2 RNA AMPLIFICATION, QUAL        All Lab Results:  Results for orders placed or performed during the hospital encounter of 02/01/24   Blood culture x two cultures. Draw prior to antibiotics.    Specimen: Peripheral, Antecubital, Right; Blood   Result Value Ref Range    Blood Culture, Routine No Growth to date     Blood Culture, Routine No Growth to date     Blood Culture, Routine No Growth to date    Blood culture x two cultures. Draw prior to antibiotics.    Specimen: Peripheral, Antecubital, Right; Blood   Result Value Ref Range    Blood Culture, Routine No Growth to date     Blood Culture, Routine No Growth to date     Blood Culture, Routine  No Growth to date    Influenza A & B by Molecular    Specimen: Nasopharyngeal Swab   Result Value Ref Range    Influenza A, Molecular Negative Negative    Influenza B, Molecular Negative Negative    Flu A & B Source Nasal swab    Urine culture    Specimen: Urine   Result Value Ref Range    Urine Culture, Routine KLEBSIELLA PNEUMONIAE  > 100,000 cfu/ml   (A)        Susceptibility    Klebsiella pneumoniae - CULTURE, URINE     Amp/Sulbactam <=8/4 Sensitive mcg/mL     Amox/K Clav'ate <=8/4 Sensitive mcg/mL     Ceftriaxone <=1 Sensitive mcg/mL     Cefazolin <=2 Sensitive mcg/mL     Ciprofloxacin <=1 Sensitive mcg/mL     Cefepime <=2 Sensitive mcg/mL     Ertapenem <=0.5 Sensitive mcg/mL     Nitrofurantoin <=32 Sensitive mcg/mL     Gentamicin <=4 Sensitive mcg/mL     Levofloxacin <=2 Sensitive mcg/mL     Meropenem <=1 Sensitive mcg/mL     Piperacillin/Tazo <=16 Sensitive mcg/mL     Trimeth/Sulfa <=2/38 Sensitive mcg/mL     Tobramycin <=4 Sensitive mcg/mL   CBC auto differential   Result Value Ref Range    WBC 8.51 3.90 - 12.70 K/uL    RBC 4.90 4.00 - 5.40 M/uL    Hemoglobin 15.0 12.0 - 16.0 g/dL    Hematocrit 45.6 37.0 - 48.5 %    MCV 93 82 - 98 fL    MCH 30.6 27.0 - 31.0 pg    MCHC 32.9 32.0 - 36.0 g/dL    RDW 14.4 11.5 - 14.5 %    Platelets 223 150 - 450 K/uL    MPV 11.0 9.2 - 12.9 fL    Immature Granulocytes 0.2 0.0 - 0.5 %    Gran # (ANC) 5.3 1.8 - 7.7 K/uL    Immature Grans (Abs) 0.02 0.00 - 0.04 K/uL    Lymph # 2.0 1.0 - 4.8 K/uL    Mono # 0.9 0.3 - 1.0 K/uL    Eos # 0.2 0.0 - 0.5 K/uL    Baso # 0.07 0.00 - 0.20 K/uL    nRBC 0 0 /100 WBC    Gran % 62.7 38.0 - 73.0 %    Lymph % 22.9 18.0 - 48.0 %    Mono % 10.6 4.0 - 15.0 %    Eosinophil % 2.8 0.0 - 8.0 %    Basophil % 0.8 0.0 - 1.9 %    Differential Method Automated    Comprehensive metabolic panel   Result Value Ref Range    Sodium 141 136 - 145 mmol/L    Potassium 3.6 3.5 - 5.1 mmol/L    Chloride 104 95 - 110 mmol/L    CO2 25 23 - 29 mmol/L    Glucose 117 (H) 70 -  110 mg/dL    BUN 13 8 - 23 mg/dL    Creatinine 1.2 0.5 - 1.4 mg/dL    Calcium 8.7 8.7 - 10.5 mg/dL    Total Protein 7.8 6.0 - 8.4 g/dL    Albumin 3.5 3.5 - 5.2 g/dL    Total Bilirubin 1.8 (H) 0.1 - 1.0 mg/dL    Alkaline Phosphatase 103 55 - 135 U/L    AST 18 10 - 40 U/L    ALT 8 (L) 10 - 44 U/L    eGFR 45 (A) >60 mL/min/1.73 m^2    Anion Gap 12 8 - 16 mmol/L   Troponin I #1   Result Value Ref Range    Troponin I 0.038 (H) 0.000 - 0.026 ng/mL   BNP   Result Value Ref Range    BNP 2,464 (H) 0 - 99 pg/mL   Lactic acid, plasma #1   Result Value Ref Range    Lactate (Lactic Acid) 1.5 0.5 - 2.2 mmol/L   Lactic acid, plasma #2   Result Value Ref Range    Lactate (Lactic Acid) 1.8 0.5 - 2.2 mmol/L   Urinalysis, Reflex to Urine Culture Urine, Catheterized    Specimen: Urine   Result Value Ref Range    Specimen UA Urine, Catheterized     Color, UA Yellow Yellow, Straw, Kamla    Appearance, UA Cloudy (A) Clear    pH, UA 6.0 5.0 - 8.0    Specific Gravity, UA 1.020 1.005 - 1.030    Protein, UA 1+ (A) Negative    Glucose, UA Negative Negative    Ketones, UA Negative Negative    Bilirubin (UA) Negative Negative    Occult Blood UA 1+ (A) Negative    Nitrite, UA Positive (A) Negative    Urobilinogen, UA Negative <2.0 EU/dL    Leukocytes, UA 3+ (A) Negative   Procalcitonin   Result Value Ref Range    Procalcitonin 0.06 <0.25 ng/mL   COVID-19 Rapid Screening   Result Value Ref Range    SARS-CoV-2 RNA, Amplification, Qual Negative Negative   Urinalysis Microscopic   Result Value Ref Range    RBC, UA 24 (H) 0 - 4 /hpf    WBC, UA >100 (H) 0 - 5 /hpf    WBC Clumps, UA Many (A) None-Rare    Bacteria Many (A) None-Occ /hpf    Hyaline Casts, UA 0 0-1/lpf /lpf    Microscopic Comment SEE COMMENT    Hemoglobin A1c   Result Value Ref Range    Hemoglobin A1C 5.1 4.0 - 5.6 %    Estimated Avg Glucose 100 68 - 131 mg/dL   Magnesium   Result Value Ref Range    Magnesium 1.8 1.6 - 2.6 mg/dL   Troponin I   Result Value Ref Range    Troponin I 0.040 (H)  0.000 - 0.026 ng/mL   Troponin I   Result Value Ref Range    Troponin I 0.043 (H) 0.000 - 0.026 ng/mL   Basic metabolic panel   Result Value Ref Range    Sodium 140 136 - 145 mmol/L    Potassium 2.9 (L) 3.5 - 5.1 mmol/L    Chloride 105 95 - 110 mmol/L    CO2 27 23 - 29 mmol/L    Glucose 92 70 - 110 mg/dL    BUN 11 8 - 23 mg/dL    Creatinine 0.9 0.5 - 1.4 mg/dL    Calcium 8.7 8.7 - 10.5 mg/dL    Anion Gap 8 8 - 16 mmol/L    eGFR >60 >60 mL/min/1.73 m^2   Protime-INR   Result Value Ref Range    Prothrombin Time 18.6 (H) 9.0 - 12.5 sec    INR 1.8 (H) 0.8 - 1.2   Magnesium   Result Value Ref Range    Magnesium 1.7 1.6 - 2.6 mg/dL   Basic metabolic panel   Result Value Ref Range    Sodium 139 136 - 145 mmol/L    Potassium 4.4 3.5 - 5.1 mmol/L    Chloride 105 95 - 110 mmol/L    CO2 22 (L) 23 - 29 mmol/L    Glucose 91 70 - 110 mg/dL    BUN 14 8 - 23 mg/dL    Creatinine 1.4 0.5 - 1.4 mg/dL    Calcium 8.9 8.7 - 10.5 mg/dL    Anion Gap 12 8 - 16 mmol/L    eGFR 38 (A) >60 mL/min/1.73 m^2   CBC Auto Differential   Result Value Ref Range    WBC 6.55 3.90 - 12.70 K/uL    RBC 4.74 4.00 - 5.40 M/uL    Hemoglobin 14.2 12.0 - 16.0 g/dL    Hematocrit 43.9 37.0 - 48.5 %    MCV 93 82 - 98 fL    MCH 30.0 27.0 - 31.0 pg    MCHC 32.3 32.0 - 36.0 g/dL    RDW 14.4 11.5 - 14.5 %    Platelets 224 150 - 450 K/uL    MPV 11.5 9.2 - 12.9 fL    Immature Granulocytes 0.2 0.0 - 0.5 %    Gran # (ANC) 3.3 1.8 - 7.7 K/uL    Immature Grans (Abs) 0.01 0.00 - 0.04 K/uL    Lymph # 2.2 1.0 - 4.8 K/uL    Mono # 0.8 0.3 - 1.0 K/uL    Eos # 0.3 0.0 - 0.5 K/uL    Baso # 0.04 0.00 - 0.20 K/uL    nRBC 0 0 /100 WBC    Gran % 49.9 38.0 - 73.0 %    Lymph % 33.0 18.0 - 48.0 %    Mono % 12.2 4.0 - 15.0 %    Eosinophil % 4.1 0.0 - 8.0 %    Basophil % 0.6 0.0 - 1.9 %    Differential Method Automated    Magnesium   Result Value Ref Range    Magnesium 2.6 1.6 - 2.6 mg/dL   Protime-INR   Result Value Ref Range    Prothrombin Time 17.3 (H) 9.0 - 12.5 sec    INR 1.7 (H) 0.8 -  1.2   Basic metabolic panel   Result Value Ref Range    Sodium 138 136 - 145 mmol/L    Potassium 4.1 3.5 - 5.1 mmol/L    Chloride 106 95 - 110 mmol/L    CO2 25 23 - 29 mmol/L    Glucose 99 70 - 110 mg/dL    BUN 20 8 - 23 mg/dL    Creatinine 1.5 (H) 0.5 - 1.4 mg/dL    Calcium 8.4 (L) 8.7 - 10.5 mg/dL    Anion Gap 7 (L) 8 - 16 mmol/L    eGFR 35 (A) >60 mL/min/1.73 m^2   Magnesium   Result Value Ref Range    Magnesium 2.5 1.6 - 2.6 mg/dL   Protime-INR   Result Value Ref Range    Prothrombin Time 18.8 (H) 9.0 - 12.5 sec    INR 1.9 (H) 0.8 - 1.2         Imaging Results:  Imaging Results              X-Ray Chest AP Portable (Final result)  Result time 02/01/24 17:16:18      Final result by Anthony Brenner MD (02/01/24 17:16:18)                   Impression:      As above      Electronically signed by: Anthony Brenner  Date:    02/01/2024  Time:    17:16               Narrative:    EXAMINATION:  XR CHEST AP PORTABLE    CLINICAL HISTORY:  Chest Pain;    TECHNIQUE:  Single frontal view of the chest was performed.    COMPARISON:  None    FINDINGS:  Cardiomegaly.  Median sternotomy.  Mild perihilar edema.  Right-sided pacemaker.  Correlate clinically for CHF.  Valvular replacement noted.    Bones are intact.                                       The EKG was ordered, reviewed, and independently interpreted by the ED provider.  Interpretation time: 16:38  Rate: 84 BPM  Rhythm: Paced rhythm  Interpretation: Left ventricular hypertrophy with QRS widening an repolarization abnormality. Inferior infarct, age undetermined. No STEMI.               The Emergency Provider reviewed the vital signs and test results, which are outlined above.     ED Discussion       6:23 PM: Discussed case with Fer Ochoa MD ([facility]). Dr. Ochoa agrees with current care and management of pt and accepts admission.   Admitting Service: Hospital Medicine   Admitting Physician: Dr. Ochoa  Admit to: obs tele    6:24 PM: Re-evaluated pt. I  have discussed test results, shared treatment plan, and the need for admission with patient and family at bedside. Pt and family express understanding at this time and agree with all information. All questions answered. Pt and family have no further questions or concerns at this time. Pt is ready for admit.           Medical Decision Making  DDx includes HF, UTI, Sepsis, ACS    Amount and/or Complexity of Data Reviewed  Labs: ordered. Decision-making details documented in ED Course.  Radiology: ordered and independent interpretation performed. Decision-making details documented in ED Course.  ECG/medicine tests: ordered and independent interpretation performed. Decision-making details documented in ED Course.    Risk  Prescription drug management.                ED Medication(s):  Medications   cefTRIAXone (Rocephin) 1 g in dextrose 5 % in water (D5W) 100 mL IVPB (MB+) (0 g Intravenous Stopped 2/3/24 1834)   sodium chloride 0.9% flush 10 mL (has no administration in time range)   ondansetron injection 4 mg (4 mg Intravenous Given 2/2/24 1223)   acetaminophen tablet 650 mg (has no administration in time range)   warfarin (COUMADIN) tablet 2.5 mg (has no administration in time range)   enoxaparin injection 90 mg (90 mg Subcutaneous Given 2/4/24 0910)   warfarin (COUMADIN) split tablet 1.25 mg (has no administration in time range)   furosemide injection 40 mg (40 mg Intravenous Given 2/1/24 1807)   cefTRIAXone (Rocephin) 1 g in dextrose 5 % in water (D5W) 100 mL IVPB (MB+) (0 g Intravenous Stopped 2/1/24 1912)   magnesium oxide tablet 400 mg (400 mg Oral Given 2/2/24 1112)   potassium chloride SA CR tablet 40 mEq (40 mEq Oral Given 2/2/24 1112)   warfarin (COUMADIN) tablet 4 mg (4 mg Oral Given 2/2/24 1733)   magnesium sulfate 2g in water 50mL IVPB (premix) (0 g Intravenous Stopped 2/2/24 1932)   potassium chloride SA CR tablet 40 mEq (40 mEq Oral Given 2/2/24 1732)   warfarin (COUMADIN) tablet 2.5 mg (2.5 mg Oral  Given 2/3/24 0510)       Current Discharge Medication List                  Scribe Attestation:   Scribe #1: I performed the above scribed service and the documentation accurately describes the services I performed. I attest to the accuracy of the note.     Attending:   Physician Attestation Statement for Scribe #1: I, Zelalem Landon MD, personally performed the services described in this documentation, as scribed by Genesis Pearce, in my presence, and it is both accurate and complete.           Clinical Impression       ICD-10-CM ICD-9-CM   1. Urinary tract infection without hematuria, site unspecified  N39.0 599.0   2. Heart failure, unspecified HF chronicity, unspecified heart failure type  I50.9 428.9       Disposition:   Disposition: Placed in Observation  Condition: Fair         Zelalem Landon MD  02/04/24 1042

## 2024-02-01 NOTE — Clinical Note
Diagnosis: Chest pain [531594]   Future Attending Provider: LASHELL WETZEL [[id number]]   Special Needs:: No Special Needs [1]

## 2024-02-02 PROBLEM — I50.9 ACUTE EXACERBATION OF CHF (CONGESTIVE HEART FAILURE): Status: ACTIVE | Noted: 2024-02-02

## 2024-02-02 PROBLEM — N30.01 ACUTE CYSTITIS WITH HEMATURIA: Status: ACTIVE | Noted: 2024-02-02

## 2024-02-02 PROBLEM — R79.89 ELEVATED TROPONIN: Status: ACTIVE | Noted: 2024-02-02

## 2024-02-02 LAB
ANION GAP SERPL CALC-SCNC: 8 MMOL/L (ref 8–16)
BUN SERPL-MCNC: 11 MG/DL (ref 8–23)
CALCIUM SERPL-MCNC: 8.7 MG/DL (ref 8.7–10.5)
CHLORIDE SERPL-SCNC: 105 MMOL/L (ref 95–110)
CO2 SERPL-SCNC: 27 MMOL/L (ref 23–29)
CREAT SERPL-MCNC: 0.9 MG/DL (ref 0.5–1.4)
EST. GFR  (NO RACE VARIABLE): >60 ML/MIN/1.73 M^2
ESTIMATED AVG GLUCOSE: 100 MG/DL (ref 68–131)
GLUCOSE SERPL-MCNC: 92 MG/DL (ref 70–110)
HBA1C MFR BLD: 5.1 % (ref 4–5.6)
INR PPP: 1.8 (ref 0.8–1.2)
MAGNESIUM SERPL-MCNC: 1.7 MG/DL (ref 1.6–2.6)
POTASSIUM SERPL-SCNC: 2.9 MMOL/L (ref 3.5–5.1)
PROTHROMBIN TIME: 18.6 SEC (ref 9–12.5)
SODIUM SERPL-SCNC: 140 MMOL/L (ref 136–145)
TROPONIN I SERPL DL<=0.01 NG/ML-MCNC: 0.04 NG/ML (ref 0–0.03)

## 2024-02-02 PROCEDURE — 25000003 PHARM REV CODE 250: Performed by: NURSE PRACTITIONER

## 2024-02-02 PROCEDURE — 84484 ASSAY OF TROPONIN QUANT: CPT | Performed by: NURSE PRACTITIONER

## 2024-02-02 PROCEDURE — 36415 COLL VENOUS BLD VENIPUNCTURE: CPT | Performed by: NURSE PRACTITIONER

## 2024-02-02 PROCEDURE — 21400001 HC TELEMETRY ROOM

## 2024-02-02 PROCEDURE — 85610 PROTHROMBIN TIME: CPT | Performed by: NURSE PRACTITIONER

## 2024-02-02 PROCEDURE — 63600175 PHARM REV CODE 636 W HCPCS: Performed by: INTERNAL MEDICINE

## 2024-02-02 PROCEDURE — 25000003 PHARM REV CODE 250: Performed by: INTERNAL MEDICINE

## 2024-02-02 PROCEDURE — 80048 BASIC METABOLIC PNL TOTAL CA: CPT | Performed by: NURSE PRACTITIONER

## 2024-02-02 PROCEDURE — 63600175 PHARM REV CODE 636 W HCPCS: Performed by: NURSE PRACTITIONER

## 2024-02-02 PROCEDURE — 83735 ASSAY OF MAGNESIUM: CPT | Performed by: NURSE PRACTITIONER

## 2024-02-02 PROCEDURE — 11000001 HC ACUTE MED/SURG PRIVATE ROOM

## 2024-02-02 RX ORDER — WARFARIN 1 MG/1
4 TABLET ORAL ONCE
Status: COMPLETED | OUTPATIENT
Start: 2024-02-02 | End: 2024-02-02

## 2024-02-02 RX ORDER — LANOLIN ALCOHOL/MO/W.PET/CERES
400 CREAM (GRAM) TOPICAL ONCE
Status: COMPLETED | OUTPATIENT
Start: 2024-02-02 | End: 2024-02-02

## 2024-02-02 RX ORDER — POTASSIUM CHLORIDE 7.45 MG/ML
10 INJECTION INTRAVENOUS
Status: DISCONTINUED | OUTPATIENT
Start: 2024-02-02 | End: 2024-02-02

## 2024-02-02 RX ORDER — ENOXAPARIN SODIUM 100 MG/ML
1 INJECTION SUBCUTANEOUS EVERY 12 HOURS
Status: DISCONTINUED | OUTPATIENT
Start: 2024-02-02 | End: 2024-02-05 | Stop reason: HOSPADM

## 2024-02-02 RX ORDER — POTASSIUM CHLORIDE 20 MEQ/1
40 TABLET, EXTENDED RELEASE ORAL ONCE
Status: DISCONTINUED | OUTPATIENT
Start: 2024-02-02 | End: 2024-02-02

## 2024-02-02 RX ORDER — POTASSIUM CHLORIDE 20 MEQ/1
40 TABLET, EXTENDED RELEASE ORAL ONCE
Status: COMPLETED | OUTPATIENT
Start: 2024-02-02 | End: 2024-02-02

## 2024-02-02 RX ORDER — METOPROLOL TARTRATE 25 MG/1
25 TABLET, FILM COATED ORAL 2 TIMES DAILY
Status: DISCONTINUED | OUTPATIENT
Start: 2024-02-02 | End: 2024-02-04

## 2024-02-02 RX ORDER — FUROSEMIDE 10 MG/ML
40 INJECTION INTRAMUSCULAR; INTRAVENOUS EVERY 12 HOURS
Status: DISCONTINUED | OUTPATIENT
Start: 2024-02-02 | End: 2024-02-04

## 2024-02-02 RX ORDER — LISINOPRIL 5 MG/1
5 TABLET ORAL 2 TIMES DAILY
Status: DISCONTINUED | OUTPATIENT
Start: 2024-02-02 | End: 2024-02-03

## 2024-02-02 RX ORDER — WARFARIN 2.5 MG/1
2.5 TABLET ORAL
Status: DISCONTINUED | OUTPATIENT
Start: 2024-02-05 | End: 2024-02-05 | Stop reason: HOSPADM

## 2024-02-02 RX ORDER — MAGNESIUM SULFATE HEPTAHYDRATE 40 MG/ML
2 INJECTION, SOLUTION INTRAVENOUS ONCE
Status: COMPLETED | OUTPATIENT
Start: 2024-02-02 | End: 2024-02-02

## 2024-02-02 RX ADMIN — ENOXAPARIN SODIUM 90 MG: 100 INJECTION SUBCUTANEOUS at 03:02

## 2024-02-02 RX ADMIN — MAGNESIUM OXIDE TAB 400 MG (241.3 MG ELEMENTAL MG) 400 MG: 400 (241.3 MG) TAB at 11:02

## 2024-02-02 RX ADMIN — ENOXAPARIN SODIUM 90 MG: 100 INJECTION SUBCUTANEOUS at 08:02

## 2024-02-02 RX ADMIN — POTASSIUM CHLORIDE 10 MEQ: 7.46 INJECTION, SOLUTION INTRAVENOUS at 11:02

## 2024-02-02 RX ADMIN — CEFTRIAXONE 1 G: 1 INJECTION, POWDER, FOR SOLUTION INTRAMUSCULAR; INTRAVENOUS at 08:02

## 2024-02-02 RX ADMIN — ONDANSETRON 4 MG: 2 INJECTION INTRAMUSCULAR; INTRAVENOUS at 12:02

## 2024-02-02 RX ADMIN — METOPROLOL TARTRATE 25 MG: 25 TABLET, FILM COATED ORAL at 11:02

## 2024-02-02 RX ADMIN — FUROSEMIDE 40 MG: 10 INJECTION, SOLUTION INTRAMUSCULAR; INTRAVENOUS at 11:02

## 2024-02-02 RX ADMIN — LISINOPRIL 5 MG: 5 TABLET ORAL at 11:02

## 2024-02-02 RX ADMIN — POTASSIUM CHLORIDE 40 MEQ: 1500 TABLET, EXTENDED RELEASE ORAL at 11:02

## 2024-02-02 RX ADMIN — WARFARIN SODIUM 4 MG: 1 TABLET ORAL at 05:02

## 2024-02-02 RX ADMIN — MAGNESIUM SULFATE HEPTAHYDRATE 2 G: 2 INJECTION, SOLUTION INTRAVENOUS at 05:02

## 2024-02-02 RX ADMIN — POTASSIUM CHLORIDE 40 MEQ: 1500 TABLET, EXTENDED RELEASE ORAL at 05:02

## 2024-02-02 NOTE — CONSULTS
"                    Food & Nutrition Education    Diet Education: Heart Failure  Time Spent: 15 minutes.    Learners: Pt    Nutrition Education provided with handouts:  Healthy-Heart Nutrition Therapy, Fluid-Restricted Diet, Low-Sodium Nutrition Therapy ([email or web address])    Past Medical History:   Diagnosis Date    A-fib     Anticoagulant long-term use     Aortic valve disease     Cancer     brain tumor, 10 years ago    Cardiomyopathy     CHF (congestive heart failure)     COVID-19 7/23/2022    Hx of heart valve replacement with mechanical valve     Hyperlipidemia     Hypertension     Pacemaker     Pacemaker     Sepsis 7/23/2022    Stroke     2007, residual weakness     Patient Active Problem List   Diagnosis    Anticoagulant long-term use    Age-related cataract of both eyes    Dyslipidemia    Essential hypertension    H/O mechanical aortic valve replacement    History of basal cell carcinoma    History of DVT (deep vein thrombosis)    Pacemaker    Paroxysmal atrial fibrillation    SVT (supraventricular tachycardia)    Systolic congestive heart failure    Rheumatoid arthritis    Arteriosclerotic cardiovascular disease    Cognitive impairment    History of rheumatic fever as a child    History of stroke    Intracranial tumor    Mixed hyperlipidemia    Stage 3a chronic kidney disease    Aortic aneurysm    Acute on chronic systolic heart failure    Hypokalemia    Severe obesity (BMI 35.0-39.9) with comorbidity    Acute cystitis with hematuria    Elevated troponin    Acute exacerbation of CHF (congestive heart failure)       Comments:  Dietitian educated patient on low sodium diet and fluid restriction related to hospital diagnosis. Discussed the importance of limiting sodium to 2,000 mg per day and reading food labels to avoid further complications of CHF. Discussed using salt free seasonings (Mrs. Mehdi and [employer] "No Salt") and other herbs and spices in meals to enhance flavor without additional " sodium. Discussed 1500 ml fluid restriction per MD and dietary sources of fluid. Dietitian recommended using a cup with measurements for fluids and to try to consume small sips spread throughout the day rather than a lot at one time.    The pt remained engaged throughout entire nutrition education. The pt states she has not followed fluid restrictions prior to current admission however, she states she understands the information presented and will begin accounting for all sources of fluids/sodium throughout the day. She states her son and home health nurse prepares all meals. She will discuss information provided to son and home health nurse so they understand how to prepare meals moving forward. The states she has no further questions or concerns. Pt states her appetite is slightly decreased however, she is not sure why and how long appetite has been decreased. Spoke with pt nurse whom states he believes pt appetite is normal. However, recommended to order pt ONS. Dietitian will continue to follow pt during current admission and monitor PO intake.      NFPE not performed, pt appears well nourished.    All questions and concerns answered.    Provided handout with dietitian's contact information.    *Please re-consult as needed.    Thank You!   Sen Garcia, Registration Eligible, Provisional LDN

## 2024-02-02 NOTE — SUBJECTIVE & OBJECTIVE
Interval History: Pt admitted overnight for management of CHF exacerbation and UTI. She is Aox 3, reports cough productive of clear/white sputum, reports shortness of breath is improved. Denies CP, dizziness, abd pain.     Review of Systems  Objective:     Vital Signs (Most Recent):  Temp: 98.6 °F (37 °C) (02/02/24 1212)  Pulse: 75 (02/02/24 1434)  Resp: 18 (02/02/24 1212)  BP: (!) 113/56 (02/02/24 1212)  SpO2: (!) 92 % (02/02/24 1212) Vital Signs (24h Range):  Temp:  [97.5 °F (36.4 °C)-98.9 °F (37.2 °C)] 98.6 °F (37 °C)  Pulse:  [71-84] 75  Resp:  [15-20] 18  SpO2:  [92 %-95 %] 92 %  BP: (109-144)/(56-77) 113/56     Weight:  (completed)  Body mass index is 33.68 kg/m².    Intake/Output Summary (Last 24 hours) at 2/2/2024 1512  Last data filed at 2/2/2024 0400  Gross per 24 hour   Intake 100 ml   Output 950 ml   Net -850 ml         Physical Exam  Vitals and nursing note reviewed.   Constitutional:       General: She is not in acute distress.     Appearance: She is ill-appearing.   Cardiovascular:      Rate and Rhythm: Normal rate and regular rhythm.      Heart sounds: Murmur heard.   Pulmonary:      Effort: Pulmonary effort is normal.      Comments: Diminished breath sounds bilaterally, no WRR, on room air   Abdominal:      General: Bowel sounds are normal. There is no distension.      Palpations: Abdomen is soft.      Tenderness: There is no abdominal tenderness.   Musculoskeletal:      Right lower leg: No edema.      Left lower leg: No edema.   Neurological:      Mental Status: She is alert and oriented to person, place, and time. Mental status is at baseline.             Significant Labs: All pertinent labs within the past 24 hours have been reviewed.    Significant Imaging: I have reviewed all pertinent imaging results/findings within the past 24 hours.

## 2024-02-02 NOTE — ASSESSMENT & PLAN NOTE
Likely secondary to CHF exacerbation.  Denies any chest pain.  Plan:  -tele monitoring   -trend troponins   -repeat EKG p.r.n.  -cardiology consult

## 2024-02-02 NOTE — ASSESSMENT & PLAN NOTE
Urinalysis revealed:   Lab Results   Component Value Date    COLORU Yellow 02/01/2024    APPEARANCEUA Cloudy (A) 02/01/2024    SPECGRAV 1.020 02/01/2024    PHUR 6.0 02/01/2024    PROTEINUA 1+ (A) 02/01/2024    GLUCUA Negative 02/01/2024    KETONESU Negative 02/01/2024    NITRITE Positive (A) 02/01/2024    UROBILINOGEN Negative 02/01/2024    BILIRUBINUA Negative 02/01/2024    LEUKOCYTESUR 3+ (A) 02/01/2024    RBCUA 24 (H) 02/01/2024    WBCUA >100 (H) 02/01/2024    BACTERIA Many (A) 02/01/2024    HYALINECASTS 0 02/01/2024   Received 1 g Rocephin in ED.  Plan:  -UA culture pending  -Continue Abx

## 2024-02-02 NOTE — HOSPITAL COURSE
Pt continued on IV diuretics. Continued on IV Rocephin pending urine culture. Anticipate dc home in AM on PO abx as bp improves.     2/5- looks and feels better, stronger, no CP or SOB, no dysuria. She is eating drinking well and feels ready to go home. She was seen and examined and deemed stable for discharge home today with HH.

## 2024-02-02 NOTE — PROGRESS NOTES
Pharmacy Brief Progress Note:    Patient educated on warfarin indication, side effects, and drug interactions. Discussed importance of medication compliance and INR monitoring and reviewed signs of abnormal bleeding. Patient given warfarin educational handout. Patient expressed understanding and had no further questions.    Thank you for allowing us to participate in this patient's care.     Herminia Alcantara 2/2/2024 2:18 PM

## 2024-02-02 NOTE — HPI
Serena Post is a 83 y.o. female with a PMH  has a past medical history of A-fib, Anticoagulant long-term use, Aortic valve disease, Cancer, Cardiomyopathy, CHF (congestive heart failure), COVID-19 (7/23/2022), heart valve replacement with mechanical valve, Hyperlipidemia, Hypertension, Pacemaker, Pacemaker, Sepsis (7/23/2022), and Stroke.  Presented to the ER for evaluation generalized fatigued and low blood pressure readings over the last 2 days.  Patient also has reported worsening shortness of breath from baseline and states she has been having to use her home oxygen more frequently.  Denies any excessive fluid or salt consumption.  Denies any recent illnesses or sick contacts.  Denies any other symptoms such as dysuria, hematuria, malodorous urine, fever, aches, chills, sweats, headache, lightheadedness, dizziness, chest pain, palpitations, abdominal pain, or any other symptoms at this time.    ER workup mostly unremarkable with the exception to elevated BNP level of 2, 464 and elevated troponin level of 0.038.  Chest x-ray revealed: [Cardiomegaly.  Median sternotomy.  Mild perihilar edema.  Right-sided pacemaker.  Correlate clinically for CHF.  Valvular replacement noted ].  UA consistent with acute cystitis.  Patient received 1 g of Rocephin in ED. patient also received 40 mg of Lasix IV in ED. BP has been normotensive during hospital visit.  Hospital Medicine consulted to admit patient for UTI and elevated troponin.  Patient will be admitted under observation status.    PCP: No, Primary Doctor

## 2024-02-02 NOTE — ASSESSMENT & PLAN NOTE
Creatine stable for now. BMP reviewed- noted Estimated Creatinine Clearance: 39.7 mL/min (based on SCr of 1.2 mg/dL). according to latest data. Based on current GFR, CKD stage is stage 3 - GFR 30-59.  Monitor UOP and serial BMP and adjust therapy as needed. Renally dose meds. Avoid nephrotoxic medications and procedures.

## 2024-02-02 NOTE — SUBJECTIVE & OBJECTIVE
Past Medical History:   Diagnosis Date    A-fib     Anticoagulant long-term use     Aortic valve disease     Cancer     brain tumor, 10 years ago    Cardiomyopathy     CHF (congestive heart failure)     COVID-19 7/23/2022    Hx of heart valve replacement with mechanical valve     Hyperlipidemia     Hypertension     Pacemaker     Pacemaker     Sepsis 7/23/2022    Stroke     2007, residual weakness       Past Surgical History:   Procedure Laterality Date    AORTIC VALVE REPLACEMENT      CHOLECYSTECTOMY      CORONARY ARTERY BYPASS GRAFT      HYSTERECTOMY      INSERTION OF PACEMAKER      TONSILLECTOMY         Review of patient's allergies indicates:   Allergen Reactions    Amlodipine Other (See Comments)     Not sure    Chlorthalidone Other (See Comments)     Not sure    Clonidine Other (See Comments)     Not sure    Codeine Other (See Comments)     Not sure    Escitalopram Other (See Comments)     Not sure    Lisinopril Other (See Comments)     Not sure    Losartan Other (See Comments)     Not sure    Meperidine Other (See Comments)     Not sure    Methadone Other (See Comments)     Not sure      Morphine Other (See Comments)     Not sure    Nebivolol Other (See Comments)     Not sure        Nitrofurantoin Other (See Comments)     Not sure        Omeprazole Other (See Comments)     Not sure      Pravastatin Other (See Comments)     Not sure      Propoxyphene Other (See Comments)     Not sure      Sulfamethoxazole-trimethoprim Other (See Comments)     Not sure           No current facility-administered medications on file prior to encounter.     Current Outpatient Medications on File Prior to Encounter   Medication Sig    furosemide (LASIX) 20 MG tablet Take 20 mg by mouth every morning.    lisinopriL (PRINIVIL,ZESTRIL) 5 MG tablet Take 5 mg by mouth 2 (two) times daily.    metoprolol tartrate (LOPRESSOR) 25 MG tablet Take 1 tablet by mouth 2 (two) times daily.    warfarin (COUMADIN) 2.5 MG tablet Take 2.5 mg by mouth  every Monday and Friday.  Take 1.25 mg by mouth all other days.    albuterol (PROVENTIL) 2.5 mg /3 mL (0.083 %) nebulizer solution Take 2.5 mg by nebulization every 4 (four) hours as needed.    albuterol (PROVENTIL/VENTOLIN HFA) 90 mcg/actuation inhaler Inhale 2 puffs into the lungs every 4 (four) hours as needed.     Family History       Problem Relation (Age of Onset)    No Known Problems Mother, Father          Tobacco Use    Smoking status: Never    Smokeless tobacco: Never   Substance and Sexual Activity    Alcohol use: Not Currently    Drug use: Never    Sexual activity: Not on file     Review of Systems   Respiratory:  Positive for shortness of breath. Negative for cough and wheezing.    Cardiovascular:  Negative for chest pain, palpitations and leg swelling.   Genitourinary:  Negative for difficulty urinating, dysuria, flank pain, frequency and hematuria.   All other systems reviewed and are negative.    Objective:     Vital Signs (Most Recent):  Temp: 97.8 °F (36.6 °C) (02/02/24 0017)  Pulse: 76 (02/02/24 0017)  Resp: 18 (02/02/24 0017)  BP: 129/66 (02/02/24 0017)  SpO2: (!) 93 % (02/02/24 0017) Vital Signs (24h Range):  Temp:  [97.8 °F (36.6 °C)-98 °F (36.7 °C)] 97.8 °F (36.6 °C)  Pulse:  [71-84] 76  Resp:  [15-20] 18  SpO2:  [93 %-95 %] 93 %  BP: (109-144)/(57-77) 129/66     Weight:  (completed)  Body mass index is 33.68 kg/m².     Physical Exam  Vitals and nursing note reviewed.   Constitutional:       General: She is awake. She is not in acute distress.     Appearance: Normal appearance. She is well-developed and well-groomed. She is not ill-appearing, toxic-appearing or diaphoretic.   HENT:      Head: Normocephalic and atraumatic.   Eyes:      Extraocular Movements: Extraocular movements intact.      Conjunctiva/sclera: Conjunctivae normal.   Cardiovascular:      Rate and Rhythm: Normal rate and regular rhythm.      Heart sounds: Murmur heard.   Pulmonary:      Effort: Pulmonary effort is normal.       Breath sounds: Decreased breath sounds present. No wheezing, rhonchi or rales.   Abdominal:      General: Bowel sounds are normal.      Palpations: Abdomen is soft.      Tenderness: There is no abdominal tenderness.   Musculoskeletal:      Cervical back: Normal range of motion and neck supple.      Comments: 5/5 strength throughout   Skin:     General: Skin is warm and dry.      Capillary Refill: Capillary refill takes less than 2 seconds.   Neurological:      General: No focal deficit present.      Mental Status: She is alert and oriented to person, place, and time. Mental status is at baseline.      GCS: GCS eye subscore is 4. GCS verbal subscore is 5. GCS motor subscore is 6.      Cranial Nerves: Cranial nerves 2-12 are intact.      Sensory: Sensation is intact.   Psychiatric:         Mood and Affect: Mood normal.         Speech: Speech normal.         Behavior: Behavior normal. Behavior is cooperative.        LABS:  Recent Results (from the past 24 hour(s))   CBC auto differential    Collection Time: 02/01/24  5:01 PM   Result Value Ref Range    WBC 8.51 3.90 - 12.70 K/uL    RBC 4.90 4.00 - 5.40 M/uL    Hemoglobin 15.0 12.0 - 16.0 g/dL    Hematocrit 45.6 37.0 - 48.5 %    MCV 93 82 - 98 fL    MCH 30.6 27.0 - 31.0 pg    MCHC 32.9 32.0 - 36.0 g/dL    RDW 14.4 11.5 - 14.5 %    Platelets 223 150 - 450 K/uL    MPV 11.0 9.2 - 12.9 fL    Immature Granulocytes 0.2 0.0 - 0.5 %    Gran # (ANC) 5.3 1.8 - 7.7 K/uL    Immature Grans (Abs) 0.02 0.00 - 0.04 K/uL    Lymph # 2.0 1.0 - 4.8 K/uL    Mono # 0.9 0.3 - 1.0 K/uL    Eos # 0.2 0.0 - 0.5 K/uL    Baso # 0.07 0.00 - 0.20 K/uL    nRBC 0 0 /100 WBC    Gran % 62.7 38.0 - 73.0 %    Lymph % 22.9 18.0 - 48.0 %    Mono % 10.6 4.0 - 15.0 %    Eosinophil % 2.8 0.0 - 8.0 %    Basophil % 0.8 0.0 - 1.9 %    Differential Method Automated    Comprehensive metabolic panel    Collection Time: 02/01/24  5:01 PM   Result Value Ref Range    Sodium 141 136 - 145 mmol/L    Potassium 3.6 3.5 -  5.1 mmol/L    Chloride 104 95 - 110 mmol/L    CO2 25 23 - 29 mmol/L    Glucose 117 (H) 70 - 110 mg/dL    BUN 13 8 - 23 mg/dL    Creatinine 1.2 0.5 - 1.4 mg/dL    Calcium 8.7 8.7 - 10.5 mg/dL    Total Protein 7.8 6.0 - 8.4 g/dL    Albumin 3.5 3.5 - 5.2 g/dL    Total Bilirubin 1.8 (H) 0.1 - 1.0 mg/dL    Alkaline Phosphatase 103 55 - 135 U/L    AST 18 10 - 40 U/L    ALT 8 (L) 10 - 44 U/L    eGFR 45 (A) >60 mL/min/1.73 m^2    Anion Gap 12 8 - 16 mmol/L   Troponin I #1    Collection Time: 02/01/24  5:01 PM   Result Value Ref Range    Troponin I 0.038 (H) 0.000 - 0.026 ng/mL   BNP    Collection Time: 02/01/24  5:01 PM   Result Value Ref Range    BNP 2,464 (H) 0 - 99 pg/mL   COVID-19 Rapid Screening    Collection Time: 02/01/24  5:03 PM   Result Value Ref Range    SARS-CoV-2 RNA, Amplification, Qual Negative Negative   Influenza A & B by Molecular    Collection Time: 02/01/24  5:03 PM    Specimen: Nasopharyngeal Swab   Result Value Ref Range    Influenza A, Molecular Negative Negative    Influenza B, Molecular Negative Negative    Flu A & B Source Nasal swab    Lactic acid, plasma #1    Collection Time: 02/01/24  5:26 PM   Result Value Ref Range    Lactate (Lactic Acid) 1.5 0.5 - 2.2 mmol/L   Procalcitonin    Collection Time: 02/01/24  5:26 PM   Result Value Ref Range    Procalcitonin 0.06 <0.25 ng/mL   Urinalysis, Reflex to Urine Culture Urine, Catheterized    Collection Time: 02/01/24  5:37 PM    Specimen: Urine   Result Value Ref Range    Specimen UA Urine, Catheterized     Color, UA Yellow Yellow, Straw, Kamla    Appearance, UA Cloudy (A) Clear    pH, UA 6.0 5.0 - 8.0    Specific Gravity, UA 1.020 1.005 - 1.030    Protein, UA 1+ (A) Negative    Glucose, UA Negative Negative    Ketones, UA Negative Negative    Bilirubin (UA) Negative Negative    Occult Blood UA 1+ (A) Negative    Nitrite, UA Positive (A) Negative    Urobilinogen, UA Negative <2.0 EU/dL    Leukocytes, UA 3+ (A) Negative   Urinalysis Microscopic     Collection Time: 02/01/24  5:37 PM   Result Value Ref Range    RBC, UA 24 (H) 0 - 4 /hpf    WBC, UA >100 (H) 0 - 5 /hpf    WBC Clumps, UA Many (A) None-Rare    Bacteria Many (A) None-Occ /hpf    Hyaline Casts, UA 0 0-1/lpf /lpf    Microscopic Comment SEE COMMENT    Magnesium    Collection Time: 02/01/24  8:58 PM   Result Value Ref Range    Magnesium 1.8 1.6 - 2.6 mg/dL   Troponin I    Collection Time: 02/01/24  8:58 PM   Result Value Ref Range    Troponin I 0.040 (H) 0.000 - 0.026 ng/mL   Lactic acid, plasma #2    Collection Time: 02/01/24  9:23 PM   Result Value Ref Range    Lactate (Lactic Acid) 1.8 0.5 - 2.2 mmol/L   Troponin I    Collection Time: 02/02/24 12:33 AM   Result Value Ref Range    Troponin I 0.043 (H) 0.000 - 0.026 ng/mL       RADIOLOGY  X-Ray Chest AP Portable    Result Date: 2/1/2024  EXAMINATION: XR CHEST AP PORTABLE CLINICAL HISTORY: Chest Pain; TECHNIQUE: Single frontal view of the chest was performed. COMPARISON: None FINDINGS: Cardiomegaly.  Median sternotomy.  Mild perihilar edema.  Right-sided pacemaker.  Correlate clinically for CHF.  Valvular replacement noted. Bones are intact.     As above Electronically signed by: Anthony Brenner Date:    02/01/2024 Time:    17:16      EKG    MICROBIOLOGY    MDM     Amount and/or Complexity of Data Reviewed  Clinical lab tests: reviewed  Tests in the radiology section of CPT®: reviewed  Tests in the medicine section of CPT®: reviewed  Discussion of test results with the performing providers: yes  Decide to obtain previous medical records or to obtain history from someone other than the patient: yes  Obtain history from someone other than the patient: yes  Review and summarize past medical records: yes  Discuss the patient with other providers: yes  Independent visualization of images, tracings, or specimens: yes

## 2024-02-02 NOTE — ASSESSMENT & PLAN NOTE
Patient with Paroxysmal (<7 days) atrial fibrillation which is controlled currently with Beta Blocker. Patient is currently in paced rhythm. [id number] Score: 4. HASBLED Score: . Anticoagulation indicated. Anticoagulation done with coumadin .

## 2024-02-02 NOTE — PHARMACY MED REC
"Admission Medication History     The home medication history was taken by Naseem Chandra.    You may go to "Admission" then "Reconcile Home Medications" tabs to review and/or act upon these items.     The home medication list has been updated by the Pharmacy department.   Please read ALL comments highlighted in yellow.   Please address this information as you see fit.    Feel free to contact us if you have any questions or require assistance.      Medications listed below were obtained from: Patient/family and Analytic software- [employer]  (Not in a hospital admission)        Naseem Chandra  [id number]    Current Outpatient Medications on File Prior to Encounter   Medication Sig Dispense Refill Last Dose    furosemide (LASIX) 20 MG tablet Take 20 mg by mouth every morning.   2/1/2024    lisinopriL (PRINIVIL,ZESTRIL) 5 MG tablet Take 5 mg by mouth 2 (two) times daily.   2/1/2024    metoprolol tartrate (LOPRESSOR) 25 MG tablet Take 1 tablet by mouth 2 (two) times daily.   2/1/2024    warfarin (COUMADIN) 2.5 MG tablet Take 2.5 mg by mouth every Monday and Friday.  Take 1.25 mg by mouth all other days.   1/31/2024    albuterol (PROVENTIL) 2.5 mg /3 mL (0.083 %) nebulizer solution Take 2.5 mg by nebulization every 4 (four) hours as needed.       albuterol (PROVENTIL/VENTOLIN HFA) 90 mcg/actuation inhaler Inhale 2 puffs into the lungs every 4 (four) hours as needed.      .          "

## 2024-02-02 NOTE — ASSESSMENT & PLAN NOTE
This patient has long term use on an anticoagulant with Select Anticoagulant(s): Warfarin/Coumadin. Their long term anticoagulation will be Held or Continued: continued. They are on long term anticoagulation due to Reason for Anticoagulation: Atrial fibrillation, Mechanical heart valve, and DVT/PE.

## 2024-02-02 NOTE — ASSESSMENT & PLAN NOTE
Chronic, controlled. Latest blood pressure and vitals reviewed-     Temp:  [97.8 °F (36.6 °C)-98 °F (36.7 °C)]   Pulse:  [71-84]   Resp:  [15-20]   BP: (109-144)/(57-77)   SpO2:  [93 %-95 %] .   Home meds for hypertension were reviewed and noted below.   Hypertension Medications               furosemide (LASIX) 20 MG tablet Take 20 mg by mouth every morning.    lisinopriL (PRINIVIL,ZESTRIL) 5 MG tablet Take 5 mg by mouth 2 (two) times daily.    metoprolol tartrate (LOPRESSOR) 25 MG tablet Take 1 tablet by mouth 2 (two) times daily.            While in the hospital, will manage blood pressure as follows; Continue home antihypertensive regimen    Will utilize p.r.n. blood pressure medication only if patient's blood pressure greater than 160/100 and she develops symptoms such as worsening chest pain or shortness of breath.

## 2024-02-02 NOTE — H&P
[facility] Medicine  History & Physical    Patient Name: Serena Post  MRN: [id number]  Patient Class: OP- Observation  Admission Date: 2/1/2024  Attending Physician: Fer Ochoa MD   Primary Care Provider: Joslyn Primary Doctor         Patient information was obtained from patient, past medical records, and ER records.     Subjective:     Principal Problem:Acute exacerbation of CHF (congestive heart failure)    Chief Complaint:   Chief Complaint   Patient presents with    Hypotension     Per family cp and low bp while at home starting yesterday. +pacemaker and heart valve replacement.         HPI: Serena Post is a 83 y.o. female with a PMH  has a past medical history of A-fib, Anticoagulant long-term use, Aortic valve disease, Cancer, Cardiomyopathy, CHF (congestive heart failure), COVID-19 (7/23/2022), heart valve replacement with mechanical valve, Hyperlipidemia, Hypertension, Pacemaker, Pacemaker, Sepsis (7/23/2022), and Stroke.  Presented to the ER for evaluation generalized fatigued and low blood pressure readings over the last 2 days.  Patient also has reported worsening shortness of breath from baseline and states she has been having to use her home oxygen more frequently.  Denies any excessive fluid or salt consumption.  Denies any recent illnesses or sick contacts.  Denies any other symptoms such as dysuria, hematuria, malodorous urine, fever, aches, chills, sweats, headache, lightheadedness, dizziness, chest pain, palpitations, abdominal pain, or any other symptoms at this time.    ER workup mostly unremarkable with the exception to elevated BNP level of 2, 464 and elevated troponin level of 0.038.  Chest x-ray revealed: [Cardiomegaly.  Median sternotomy.  Mild perihilar edema.  Right-sided pacemaker.  Correlate clinically for CHF.  Valvular replacement noted ].  UA consistent with acute cystitis.  Patient received 1 g of Rocephin in ED. patient also received 40  mg of Lasix IV in ED. BP has been normotensive during hospital visit.  Hospital Medicine consulted to admit patient for UTI and elevated troponin.  Patient will be admitted under observation status.    PCP: No, Primary Doctor    Past Medical History:   Diagnosis Date    A-fib     Anticoagulant long-term use     Aortic valve disease     Cancer     brain tumor, 10 years ago    Cardiomyopathy     CHF (congestive heart failure)     COVID-19 7/23/2022    Hx of heart valve replacement with mechanical valve     Hyperlipidemia     Hypertension     Pacemaker     Pacemaker     Sepsis 7/23/2022    Stroke     2007, residual weakness       Past Surgical History:   Procedure Laterality Date    AORTIC VALVE REPLACEMENT      CHOLECYSTECTOMY      CORONARY ARTERY BYPASS GRAFT      HYSTERECTOMY      INSERTION OF PACEMAKER      TONSILLECTOMY         Review of patient's allergies indicates:   Allergen Reactions    Amlodipine Other (See Comments)     Not sure    Chlorthalidone Other (See Comments)     Not sure    Clonidine Other (See Comments)     Not sure    Codeine Other (See Comments)     Not sure    Escitalopram Other (See Comments)     Not sure    Lisinopril Other (See Comments)     Not sure    Losartan Other (See Comments)     Not sure    Meperidine Other (See Comments)     Not sure    Methadone Other (See Comments)     Not sure      Morphine Other (See Comments)     Not sure    Nebivolol Other (See Comments)     Not sure        Nitrofurantoin Other (See Comments)     Not sure        Omeprazole Other (See Comments)     Not sure      Pravastatin Other (See Comments)     Not sure      Propoxyphene Other (See Comments)     Not sure      Sulfamethoxazole-trimethoprim Other (See Comments)     Not sure           No current facility-administered medications on file prior to encounter.     Current Outpatient Medications on File Prior to Encounter   Medication Sig    furosemide (LASIX) 20 MG tablet Take 20 mg by mouth every morning.     lisinopriL (PRINIVIL,ZESTRIL) 5 MG tablet Take 5 mg by mouth 2 (two) times daily.    metoprolol tartrate (LOPRESSOR) 25 MG tablet Take 1 tablet by mouth 2 (two) times daily.    warfarin (COUMADIN) 2.5 MG tablet Take 2.5 mg by mouth every Monday and Friday.  Take 1.25 mg by mouth all other days.    albuterol (PROVENTIL) 2.5 mg /3 mL (0.083 %) nebulizer solution Take 2.5 mg by nebulization every 4 (four) hours as needed.    albuterol (PROVENTIL/VENTOLIN HFA) 90 mcg/actuation inhaler Inhale 2 puffs into the lungs every 4 (four) hours as needed.     Family History       Problem Relation (Age of Onset)    No Known Problems Mother, Father          Tobacco Use    Smoking status: Never    Smokeless tobacco: Never   Substance and Sexual Activity    Alcohol use: Not Currently    Drug use: Never    Sexual activity: Not on file     Review of Systems   Respiratory:  Positive for shortness of breath. Negative for cough and wheezing.    Cardiovascular:  Negative for chest pain, palpitations and leg swelling.   Genitourinary:  Negative for difficulty urinating, dysuria, flank pain, frequency and hematuria.   All other systems reviewed and are negative.    Objective:     Vital Signs (Most Recent):  Temp: 97.8 °F (36.6 °C) (02/02/24 0017)  Pulse: 76 (02/02/24 0017)  Resp: 18 (02/02/24 0017)  BP: 129/66 (02/02/24 0017)  SpO2: (!) 93 % (02/02/24 0017) Vital Signs (24h Range):  Temp:  [97.8 °F (36.6 °C)-98 °F (36.7 °C)] 97.8 °F (36.6 °C)  Pulse:  [71-84] 76  Resp:  [15-20] 18  SpO2:  [93 %-95 %] 93 %  BP: (109-144)/(57-77) 129/66     Weight:  (completed)  Body mass index is 33.68 kg/m².     Physical Exam  Vitals and nursing note reviewed.   Constitutional:       General: She is awake. She is not in acute distress.     Appearance: Normal appearance. She is well-developed and well-groomed. She is not ill-appearing, toxic-appearing or diaphoretic.   HENT:      Head: Normocephalic and atraumatic.   Eyes:      Extraocular Movements:  Extraocular movements intact.      Conjunctiva/sclera: Conjunctivae normal.   Cardiovascular:      Rate and Rhythm: Normal rate and regular rhythm.      Heart sounds: Murmur heard.   Pulmonary:      Effort: Pulmonary effort is normal.      Breath sounds: Decreased breath sounds present. No wheezing, rhonchi or rales.   Abdominal:      General: Bowel sounds are normal.      Palpations: Abdomen is soft.      Tenderness: There is no abdominal tenderness.   Musculoskeletal:      Cervical back: Normal range of motion and neck supple.      Comments: 5/5 strength throughout   Skin:     General: Skin is warm and dry.      Capillary Refill: Capillary refill takes less than 2 seconds.   Neurological:      General: No focal deficit present.      Mental Status: She is alert and oriented to person, place, and time. Mental status is at baseline.      GCS: GCS eye subscore is 4. GCS verbal subscore is 5. GCS motor subscore is 6.      Cranial Nerves: Cranial nerves 2-12 are intact.      Sensory: Sensation is intact.   Psychiatric:         Mood and Affect: Mood normal.         Speech: Speech normal.         Behavior: Behavior normal. Behavior is cooperative.        LABS:  Recent Results (from the past 24 hour(s))   CBC auto differential    Collection Time: 02/01/24  5:01 PM   Result Value Ref Range    WBC 8.51 3.90 - 12.70 K/uL    RBC 4.90 4.00 - 5.40 M/uL    Hemoglobin 15.0 12.0 - 16.0 g/dL    Hematocrit 45.6 37.0 - 48.5 %    MCV 93 82 - 98 fL    MCH 30.6 27.0 - 31.0 pg    MCHC 32.9 32.0 - 36.0 g/dL    RDW 14.4 11.5 - 14.5 %    Platelets 223 150 - 450 K/uL    MPV 11.0 9.2 - 12.9 fL    Immature Granulocytes 0.2 0.0 - 0.5 %    Gran # (ANC) 5.3 1.8 - 7.7 K/uL    Immature Grans (Abs) 0.02 0.00 - 0.04 K/uL    Lymph # 2.0 1.0 - 4.8 K/uL    Mono # 0.9 0.3 - 1.0 K/uL    Eos # 0.2 0.0 - 0.5 K/uL    Baso # 0.07 0.00 - 0.20 K/uL    nRBC 0 0 /100 WBC    Gran % 62.7 38.0 - 73.0 %    Lymph % 22.9 18.0 - 48.0 %    Mono % 10.6 4.0 - 15.0 %     Eosinophil % 2.8 0.0 - 8.0 %    Basophil % 0.8 0.0 - 1.9 %    Differential Method Automated    Comprehensive metabolic panel    Collection Time: 02/01/24  5:01 PM   Result Value Ref Range    Sodium 141 136 - 145 mmol/L    Potassium 3.6 3.5 - 5.1 mmol/L    Chloride 104 95 - 110 mmol/L    CO2 25 23 - 29 mmol/L    Glucose 117 (H) 70 - 110 mg/dL    BUN 13 8 - 23 mg/dL    Creatinine 1.2 0.5 - 1.4 mg/dL    Calcium 8.7 8.7 - 10.5 mg/dL    Total Protein 7.8 6.0 - 8.4 g/dL    Albumin 3.5 3.5 - 5.2 g/dL    Total Bilirubin 1.8 (H) 0.1 - 1.0 mg/dL    Alkaline Phosphatase 103 55 - 135 U/L    AST 18 10 - 40 U/L    ALT 8 (L) 10 - 44 U/L    eGFR 45 (A) >60 mL/min/1.73 m^2    Anion Gap 12 8 - 16 mmol/L   Troponin I #1    Collection Time: 02/01/24  5:01 PM   Result Value Ref Range    Troponin I 0.038 (H) 0.000 - 0.026 ng/mL   BNP    Collection Time: 02/01/24  5:01 PM   Result Value Ref Range    BNP 2,464 (H) 0 - 99 pg/mL   COVID-19 Rapid Screening    Collection Time: 02/01/24  5:03 PM   Result Value Ref Range    SARS-CoV-2 RNA, Amplification, Qual Negative Negative   Influenza A & B by Molecular    Collection Time: 02/01/24  5:03 PM    Specimen: Nasopharyngeal Swab   Result Value Ref Range    Influenza A, Molecular Negative Negative    Influenza B, Molecular Negative Negative    Flu A & B Source Nasal swab    Lactic acid, plasma #1    Collection Time: 02/01/24  5:26 PM   Result Value Ref Range    Lactate (Lactic Acid) 1.5 0.5 - 2.2 mmol/L   Procalcitonin    Collection Time: 02/01/24  5:26 PM   Result Value Ref Range    Procalcitonin 0.06 <0.25 ng/mL   Urinalysis, Reflex to Urine Culture Urine, Catheterized    Collection Time: 02/01/24  5:37 PM    Specimen: Urine   Result Value Ref Range    Specimen UA Urine, Catheterized     Color, UA Yellow Yellow, Straw, Kamla    Appearance, UA Cloudy (A) Clear    pH, UA 6.0 5.0 - 8.0    Specific Gravity, UA 1.020 1.005 - 1.030    Protein, UA 1+ (A) Negative    Glucose, UA Negative Negative     Ketones, UA Negative Negative    Bilirubin (UA) Negative Negative    Occult Blood UA 1+ (A) Negative    Nitrite, UA Positive (A) Negative    Urobilinogen, UA Negative <2.0 EU/dL    Leukocytes, UA 3+ (A) Negative   Urinalysis Microscopic    Collection Time: 02/01/24  5:37 PM   Result Value Ref Range    RBC, UA 24 (H) 0 - 4 /hpf    WBC, UA >100 (H) 0 - 5 /hpf    WBC Clumps, UA Many (A) None-Rare    Bacteria Many (A) None-Occ /hpf    Hyaline Casts, UA 0 0-1/lpf /lpf    Microscopic Comment SEE COMMENT    Magnesium    Collection Time: 02/01/24  8:58 PM   Result Value Ref Range    Magnesium 1.8 1.6 - 2.6 mg/dL   Troponin I    Collection Time: 02/01/24  8:58 PM   Result Value Ref Range    Troponin I 0.040 (H) 0.000 - 0.026 ng/mL   Lactic acid, plasma #2    Collection Time: 02/01/24  9:23 PM   Result Value Ref Range    Lactate (Lactic Acid) 1.8 0.5 - 2.2 mmol/L   Troponin I    Collection Time: 02/02/24 12:33 AM   Result Value Ref Range    Troponin I 0.043 (H) 0.000 - 0.026 ng/mL       RADIOLOGY  X-Ray Chest AP Portable    Result Date: 2/1/2024  EXAMINATION: XR CHEST AP PORTABLE CLINICAL HISTORY: Chest Pain; TECHNIQUE: Single frontal view of the chest was performed. COMPARISON: None FINDINGS: Cardiomegaly.  Median sternotomy.  Mild perihilar edema.  Right-sided pacemaker.  Correlate clinically for CHF.  Valvular replacement noted. Bones are intact.     As above Electronically signed by: Anthony Brenner Date:    02/01/2024 Time:    17:16      EKG    MICROBIOLOGY    MDM     Amount and/or Complexity of Data Reviewed  Clinical lab tests: reviewed  Tests in the radiology section of CPT®: reviewed  Tests in the medicine section of CPT®: reviewed  Discussion of test results with the performing providers: yes  Decide to obtain previous medical records or to obtain history from someone other than the patient: yes  Obtain history from someone other than the patient: yes  Review and summarize past medical records: yes  Discuss the  patient with other providers: yes  Independent visualization of images, tracings, or specimens: yes        Assessment/Plan:     * Acute exacerbation of CHF (congestive heart failure)  Patient is identified as having Systolic (HFrEF) heart failure that is Acute on chronic. CHF is currently uncontrolled due to Dyspnea not returned to baseline after 40mg lasix doses of IV diuretic and Pulmonary edema/pleural effusion on CXR. Latest ECHO performed and demonstrates- Results for orders placed during the hospital encounter of 11/19/23    Echo Saline Bubble? No    Interpretation Summary    Left Ventricle: The left ventricle is normal in size. Normal wall thickness. There is mild concentric hypertrophy. Normal wall motion. There is normal systolic function with a visually estimated ejection fraction of 55 - 60%. Grade I diastolic dysfunction.    Right Ventricle: Normal right ventricular cavity size. Wall thickness is normal. Right ventricle wall motion  is normal. Systolic function is normal.    Aortic Valve: There is mild aortic valve sclerosis. There is moderate stenosis. Aortic valve area by VTI is 1.45 cm². Aortic valve peak velocity is 3.20 m/s. Mean gradient is 23 mmHg. The dimensionless index is 0.44. There is mild aortic regurgitation.    Mitral Valve: There is mild regurgitation.    Tricuspid Valve: There is mild regurgitation.    IVC/SVC: Intermediate venous pressure at 8 mmHg.  . Continue Beta Blocker, ACE/ARB, and Furosemide and monitor clinical status closely. Monitor on telemetry. Patient is on CHF pathway.  Monitor strict Is&Os and daily weights.  Place on fluid restriction of 1.5 L. Cardiology has been consulted. Continue to stress to patient importance of self efficacy and  on diet for CHF. Last BNP reviewed- and noted below   Recent Labs   Lab 02/01/24  1701   BNP 2,464*       Elevated troponin  Likely secondary to CHF exacerbation.  Denies any chest pain.  Plan:  -tele monitoring   -trend troponins    -repeat EKG p.r.n.  -cardiology consult      Acute cystitis with hematuria   Urinalysis revealed:   Lab Results   Component Value Date    COLORU Yellow 02/01/2024    APPEARANCEUA Cloudy (A) 02/01/2024    SPECGRAV 1.020 02/01/2024    PHUR 6.0 02/01/2024    PROTEINUA 1+ (A) 02/01/2024    GLUCUA Negative 02/01/2024    KETONESU Negative 02/01/2024    NITRITE Positive (A) 02/01/2024    UROBILINOGEN Negative 02/01/2024    BILIRUBINUA Negative 02/01/2024    LEUKOCYTESUR 3+ (A) 02/01/2024    RBCUA 24 (H) 02/01/2024    WBCUA >100 (H) 02/01/2024    BACTERIA Many (A) 02/01/2024    HYALINECASTS 0 02/01/2024   Received 1 g Rocephin in ED.  Plan:  -UA culture pending  -Continue Abx                Anticoagulant long-term use  This patient has long term use on an anticoagulant with Select Anticoagulant(s): Warfarin/Coumadin. Their long term anticoagulation will be Held or Continued: continued. They are on long term anticoagulation due to Reason for Anticoagulation: Atrial fibrillation, Mechanical heart valve, and DVT/PE.     Dyslipidemia  Patient is chronically on statin.will continue for now. Last Lipid Panel:   Lab Results   Component Value Date    CHOL 225 (H) 11/01/2022    HDL 48 11/01/2022    LDLCALC 141 (H) 11/01/2022    TRIG 180 11/01/2022    CHOLHDL 4.7 (H) 11/01/2022     Plan:  -Continue home medication  -low fat/low calorie diet        Essential hypertension  Chronic, controlled. Latest blood pressure and vitals reviewed-     Temp:  [97.8 °F (36.6 °C)-98 °F (36.7 °C)]   Pulse:  [71-84]   Resp:  [15-20]   BP: (109-144)/(57-77)   SpO2:  [93 %-95 %] .   Home meds for hypertension were reviewed and noted below.   Hypertension Medications               furosemide (LASIX) 20 MG tablet Take 20 mg by mouth every morning.    lisinopriL (PRINIVIL,ZESTRIL) 5 MG tablet Take 5 mg by mouth 2 (two) times daily.    metoprolol tartrate (LOPRESSOR) 25 MG tablet Take 1 tablet by mouth 2 (two) times daily.            While in the hospital,  will manage blood pressure as follows; Continue home antihypertensive regimen    Will utilize p.r.n. blood pressure medication only if patient's blood pressure greater than 160/100 and she develops symptoms such as worsening chest pain or shortness of breath.    Paroxysmal atrial fibrillation  Patient with Paroxysmal (<7 days) atrial fibrillation which is controlled currently with Beta Blocker. Patient is currently in paced rhythm. [id number] Score: 4. HASBLED Score: . Anticoagulation indicated. Anticoagulation done with coumadin .    Stage 3a chronic kidney disease  Creatine stable for now. BMP reviewed- noted Estimated Creatinine Clearance: 39.7 mL/min (based on SCr of 1.2 mg/dL). according to latest data. Based on current GFR, CKD stage is stage 3 - GFR 30-59.  Monitor UOP and serial BMP and adjust therapy as needed. Renally dose meds. Avoid nephrotoxic medications and procedures.      VTE Risk Mitigation (From admission, onward)           Ordered     warfarin (COUMADIN) split tablet 1.25 mg  Once per day on Sun Tue Wed Thu Sat         02/02/24 0221     warfarin (COUMADIN) tablet 2.5 mg  Once per day on Mon Fri 02/02/24 0221     IP VTE HIGH RISK PATIENT  Once         02/01/24 1917     Place sequential compression device  Until discontinued         02/01/24 1917                  //Core Measures   -DVT proph: SCDs, coumadin  -Code status Full    -Surrogate:son      Components of this note were documented using a voice recognition system and are subject to errors not corrected at the time the document was proof read. Please contact the author for any clarifications.       Omi Ochoa NP  Department of Hospital Medicine  [facility]

## 2024-02-02 NOTE — PROGRESS NOTES
Pharmacy Warfarin Consult Note    Patient goes to [facility] for INR anticoag visits    Home dose per med history: 2.5mg on Monday and Friday, 1.25mg all other days, will double check with patient while counseling  Dx. Afib, hx of DVT, hx of aortic valve replacement; mechanical (+ prior thrombus)   Goal INR=2.5-3.5    INR=1.8 today   Will give patient a booster 4mg dose today since usual dose is 2.5 mg (1.6x daily dose)   Asking about possible bridge since patient has multiple risk factors  Okay per Dr. Reynoso to add lovenox bridge, 1mg/kg q12    Daily INR ordered  Patient needs to be educated     Thank you for your consult,   Herminia Alcantara, Pharm D 2/2/2024 11:46 AM

## 2024-02-02 NOTE — PLAN OF CARE
Discussed poc with pt, pt verbalized understanding    VS wnl  Cardiac monitoring in use  Fall precautions in place, remains injury free  nausea under control with PRN meds    Accurate I&Os. Purewick in place  Abx given as prescribed  Bed locked at lowest position  Call light within reach    Chart check complete  Will cont with POC

## 2024-02-02 NOTE — PROGRESS NOTES
[facility] Medicine  Progress Note    Patient Name: Serena Post  MRN: [id number]  Patient Class: IP- Inpatient   Admission Date: 2/1/2024  Length of Stay: 0 days  Attending Physician: Deandra Reynoso MD  Primary Care Provider: Joslyn Primary Doctor        Subjective:     Principal Problem:Acute exacerbation of CHF (congestive heart failure)        HPI:  Serena Post is a 83 y.o. female with a PMH  has a past medical history of A-fib, Anticoagulant long-term use, Aortic valve disease, Cancer, Cardiomyopathy, CHF (congestive heart failure), COVID-19 (7/23/2022), heart valve replacement with mechanical valve, Hyperlipidemia, Hypertension, Pacemaker, Pacemaker, Sepsis (7/23/2022), and Stroke.  Presented to the ER for evaluation generalized fatigued and low blood pressure readings over the last 2 days.  Patient also has reported worsening shortness of breath from baseline and states she has been having to use her home oxygen more frequently.  Denies any excessive fluid or salt consumption.  Denies any recent illnesses or sick contacts.  Denies any other symptoms such as dysuria, hematuria, malodorous urine, fever, aches, chills, sweats, headache, lightheadedness, dizziness, chest pain, palpitations, abdominal pain, or any other symptoms at this time.    ER workup mostly unremarkable with the exception to elevated BNP level of 2, 464 and elevated troponin level of 0.038.  Chest x-ray revealed: [Cardiomegaly.  Median sternotomy.  Mild perihilar edema.  Right-sided pacemaker.  Correlate clinically for CHF.  Valvular replacement noted ].  UA consistent with acute cystitis.  Patient received 1 g of Rocephin in ED. patient also received 40 mg of Lasix IV in ED. BP has been normotensive during hospital visit.  Hospital Medicine consulted to admit patient for UTI and elevated troponin.  Patient will be admitted under observation status.    PCP: Joslyn Primary Doctor    Overview/Hospital Course:  Pt  continued on IV diuretics. Continued on IV Rocephin pending urine culture.     Interval History: Pt admitted overnight for management of CHF exacerbation and UTI. She is Aox 3, reports cough productive of clear/white sputum, reports shortness of breath is improved. Denies CP, dizziness, abd pain.     Review of Systems  Objective:     Vital Signs (Most Recent):  Temp: 98.6 °F (37 °C) (02/02/24 1212)  Pulse: 75 (02/02/24 1434)  Resp: 18 (02/02/24 1212)  BP: (!) 113/56 (02/02/24 1212)  SpO2: (!) 92 % (02/02/24 1212) Vital Signs (24h Range):  Temp:  [97.5 °F (36.4 °C)-98.9 °F (37.2 °C)] 98.6 °F (37 °C)  Pulse:  [71-84] 75  Resp:  [15-20] 18  SpO2:  [92 %-95 %] 92 %  BP: (109-144)/(56-77) 113/56     Weight:  (completed)  Body mass index is 33.68 kg/m².    Intake/Output Summary (Last 24 hours) at 2/2/2024 1512  Last data filed at 2/2/2024 0400  Gross per 24 hour   Intake 100 ml   Output 950 ml   Net -850 ml         Physical Exam  Vitals and nursing note reviewed.   Constitutional:       General: She is not in acute distress.     Appearance: She is ill-appearing.   Cardiovascular:      Rate and Rhythm: Normal rate and regular rhythm.      Heart sounds: Murmur heard.   Pulmonary:      Effort: Pulmonary effort is normal.      Comments: Diminished breath sounds bilaterally, no WRR, on room air   Abdominal:      General: Bowel sounds are normal. There is no distension.      Palpations: Abdomen is soft.      Tenderness: There is no abdominal tenderness.   Musculoskeletal:      Right lower leg: No edema.      Left lower leg: No edema.   Neurological:      Mental Status: She is alert and oriented to person, place, and time. Mental status is at baseline.             Significant Labs: All pertinent labs within the past 24 hours have been reviewed.    Significant Imaging: I have reviewed all pertinent imaging results/findings within the past 24 hours.    Assessment/Plan:      * Acute exacerbation of CHF (congestive heart  failure)  Patient is identified as having Diastolic (HFpEF) heart failure that is Acute on chronic. CHF is currently uncontrolled due to Dyspnea not returned to baseline after 40mg lasix doses of IV diuretic and Pulmonary edema/pleural effusion on CXR. Latest ECHO performed and demonstrates- Results for orders placed during the hospital encounter of 11/19/23    Echo Saline Bubble? No    Interpretation Summary    Left Ventricle: The left ventricle is normal in size. Normal wall thickness. There is mild concentric hypertrophy. Normal wall motion. There is normal systolic function with a visually estimated ejection fraction of 55 - 60%. Grade I diastolic dysfunction.    Right Ventricle: Normal right ventricular cavity size. Wall thickness is normal. Right ventricle wall motion  is normal. Systolic function is normal.    Aortic Valve: There is mild aortic valve sclerosis. There is moderate stenosis. Aortic valve area by VTI is 1.45 cm². Aortic valve peak velocity is 3.20 m/s. Mean gradient is 23 mmHg. The dimensionless index is 0.44. There is mild aortic regurgitation.    Mitral Valve: There is mild regurgitation.    Tricuspid Valve: There is mild regurgitation.    IVC/SVC: Intermediate venous pressure at 8 mmHg.  . Continue Beta Blocker, ACE/ARB, and Furosemide and monitor clinical status closely. Monitor on telemetry. Patient is on CHF pathway.  Monitor strict Is&Os and daily weights.  Place on fluid restriction of 1.5 L. Cardiology has been consulted. Continue to stress to patient importance of self efficacy and  on diet for CHF. Last BNP reviewed- and noted below   Recent Labs   Lab 02/01/24  1701   BNP 2,464*     - Continue diuresis with IV lasix   - Continue metoprolol, lisinopril   - strict Is and Os     Acute cystitis with hematuria  - Continue IV Rocephin   - urine culture pending     Elevated troponin  Trop plateau at 0.04, pt without CP  Cont home Coumadin   Continue Metoprolol, Lisinopril   Per review  of O/P cardiology notes, pt not on ASA due to fall risk   Tele monitoring     Paroxysmal atrial fibrillation  Patient with Paroxysmal (<7 days) atrial fibrillation which is controlled currently with Beta Blocker. Patient is currently in paced rhythm. [id number] Score: 4. HASBLED Score: . Anticoagulation indicated. Anticoagulation done with coumadin .\  Hx pacemaker   Continue Metoprolol   Continue Coumadin   Monitor tele     H/O mechanical aortic valve replacement  - INR goal 2.5-3.5  - Continue Coumadin + lovenox bridge while INR subtherapeutic- discussed with pharmacy   - Monitor INR daily     Anticoagulant long-term use  This patient has long term use on an anticoagulant with Select Anticoagulant(s): Warfarin/Coumadin. Their long term anticoagulation will be Held or Continued: continued. They are on long term anticoagulation due to Reason for Anticoagulation: Atrial fibrillation, Mechanical heart valve, and DVT/PE.   Pharmacy following for coumadin dosing and monitoring   Monitor daily INR     Stage 3a chronic kidney disease  Creatine stable for now. BMP reviewed- noted Estimated Creatinine Clearance: 52.9 mL/min (based on SCr of 0.9 mg/dL). according to latest data. Based on current GFR, CKD stage is stage 3 - GFR 30-59.  Monitor UOP and serial BMP and adjust therapy as needed. Renally dose meds. Avoid nephrotoxic medications and procedures.  Monitor BMP with diuresis     Hypokalemia  Patient has hypokalemia which is Acute on Chronic and currently uncontrolled. Most recent potassium levels reviewed-   Lab Results   Component Value Date    K 2.9 (L) 02/02/2024   . Will continue potassium replacement per protocol and recheck repeat levels after replacement completed.  - obtain mg level            VTE Risk Mitigation (From admission, onward)           Ordered     warfarin (COUMADIN) tablet 2.5 mg  Once per day on Mon Fri 02/02/24 0221     warfarin (COUMADIN) split tablet 1.25 mg  Once per day on Sun Tue Wed  Thu Sat         02/02/24 0221     warfarin (COUMADIN) tablet 4 mg  Once         02/02/24 1143     enoxaparin injection 90 mg  Every 12 hours         02/02/24 1144     IP VTE HIGH RISK PATIENT  Once         02/01/24 1917     Place sequential compression device  Until discontinued         02/01/24 1917                    Discharge Planning   RENE:      Code Status: Full Code   Is the patient medically ready for discharge?: No    Reason for patient still in hospital (select all that apply): Patient trending condition and Treatment  Discharge Plan A: Home Health                  Deandra Reynoso MD  Department of Hospital Medicine   [facility] - [facility] S[facility]

## 2024-02-02 NOTE — ASSESSMENT & PLAN NOTE
Creatine stable for now. BMP reviewed- noted Estimated Creatinine Clearance: 52.9 mL/min (based on SCr of 0.9 mg/dL). according to latest data. Based on current GFR, CKD stage is stage 3 - GFR 30-59.  Monitor UOP and serial BMP and adjust therapy as needed. Renally dose meds. Avoid nephrotoxic medications and procedures.  Monitor BMP with diuresis

## 2024-02-02 NOTE — ASSESSMENT & PLAN NOTE
Trop plateau at 0.04, pt without CP  Cont home Coumadin   Continue Metoprolol, Lisinopril   Per review of O/P cardiology notes, pt not on ASA due to fall risk   Tele monitoring

## 2024-02-02 NOTE — ASSESSMENT & PLAN NOTE
Patient is chronically on statin.will continue for now. Last Lipid Panel:   Lab Results   Component Value Date    CHOL 225 (H) 11/01/2022    HDL 48 11/01/2022    LDLCALC 141 (H) 11/01/2022    TRIG 180 11/01/2022    CHOLHDL 4.7 (H) 11/01/2022     Plan:  -Continue home medication  -low fat/low calorie diet

## 2024-02-02 NOTE — PLAN OF CARE
[facility]  Initial Discharge Assessment       Primary Care Provider: No, Primary Doctor    Admission Diagnosis: Chest pain [R07.9]  Urinary tract infection without hematuria, site unspecified [N39.0]  Heart failure, unspecified HF chronicity, unspecified heart failure type [I50.9]    Admission Date: 2/1/2024  Expected Discharge Date: Per Attending     Transition of Care Barriers: None    Payor: MEDICARE / Plan: MEDICARE PART A & B / Product Type: Government /     Extended Emergency Contact Information  Primary Emergency Contact: Amish Post  Mobile Phone: [phone]  Relation: Son  Preferred language: English   needed? No    Discharge Plan A: [id number] - [place], [place] - [address]  [address] [facility] [address]  Phone: [phone] Fax: [phone]      Initial Assessment (most recent)       Adult Discharge Assessment - 02/02/24 1115          Discharge Assessment    Assessment Type Discharge Planning Assessment     Confirmed/corrected address, phone number and insurance Yes     Confirmed Demographics Correct on Facesheet     Source of Information patient     Communicated RENE with patient/caregiver Date not available/Unable to determine     People in Home child(solange), adult     Do you expect to return to your current living situation? Yes     Do you have help at home or someone to help you manage your care at home? Yes     Who are your caregiver(s) and their phone number(s)? caregiver 5-6 days/week     Prior to hospitilization cognitive status: Alert/Oriented     Current cognitive status: Alert/Oriented     Walking or Climbing Stairs Difficulty yes     Walking or Climbing Stairs ambulation difficulty, requires equipment     Home Accessibility wheelchair accessible     Home Layout Able to live on 1st floor     Equipment Currently Used at Home wheelchair     Readmission within 30 days? No     Patient currently  being followed by outpatient case management? No     Do you currently have service(s) that help you manage your care at home? Yes     Name and Contact number of agency [facility] and a caregiver     Is the pt/caregiver preference to resume services with current agency Yes     Do you take prescription medications? Yes     Do you have prescription coverage? Yes     Coverage Medicare     Do you have any problems affording any of your prescribed medications? No     Is the patient taking medications as prescribed? yes     Who is going to help you get home at discharge? family     How do you get to doctors appointments? family or friend will provide     Are you on dialysis? No     Do you take coumadin? No     Discharge Plan A Home Health     DME Needed Upon Discharge  none     Discharge Plan discussed with: Patient     Transition of Care Barriers None                   Sw met with patient to complete assessment and to discuss d/c planning needs. Pt is current with [facility]; plan to resume upon d/c.

## 2024-02-02 NOTE — ASSESSMENT & PLAN NOTE
Patient with Paroxysmal (<7 days) atrial fibrillation which is controlled currently with Beta Blocker. Patient is currently in paced rhythm. [id number] Score: 4. HASBLED Score: . Anticoagulation indicated. Anticoagulation done with coumadin .\  Hx pacemaker   Continue Metoprolol   Continue Coumadin   Monitor tele

## 2024-02-02 NOTE — ASSESSMENT & PLAN NOTE
Patient is identified as having Diastolic (HFpEF) heart failure that is Acute on chronic. CHF is currently uncontrolled due to Dyspnea not returned to baseline after 40mg lasix doses of IV diuretic and Pulmonary edema/pleural effusion on CXR. Latest ECHO performed and demonstrates- Results for orders placed during the hospital encounter of 11/19/23    Echo Saline Bubble? No    Interpretation Summary    Left Ventricle: The left ventricle is normal in size. Normal wall thickness. There is mild concentric hypertrophy. Normal wall motion. There is normal systolic function with a visually estimated ejection fraction of 55 - 60%. Grade I diastolic dysfunction.    Right Ventricle: Normal right ventricular cavity size. Wall thickness is normal. Right ventricle wall motion  is normal. Systolic function is normal.    Aortic Valve: There is mild aortic valve sclerosis. There is moderate stenosis. Aortic valve area by VTI is 1.45 cm². Aortic valve peak velocity is 3.20 m/s. Mean gradient is 23 mmHg. The dimensionless index is 0.44. There is mild aortic regurgitation.    Mitral Valve: There is mild regurgitation.    Tricuspid Valve: There is mild regurgitation.    IVC/SVC: Intermediate venous pressure at 8 mmHg.  . Continue Beta Blocker, ACE/ARB, and Furosemide and monitor clinical status closely. Monitor on telemetry. Patient is on CHF pathway.  Monitor strict Is&Os and daily weights.  Place on fluid restriction of 1.5 L. Cardiology has been consulted. Continue to stress to patient importance of self efficacy and  on diet for CHF. Last BNP reviewed- and noted below   Recent Labs   Lab 02/01/24  1701   BNP 2,464*     - Continue diuresis with IV lasix   - Continue metoprolol, lisinopril   - strict Is and Os

## 2024-02-02 NOTE — PLAN OF CARE
Pt AAOX4, admitted for UTI, initiated on IV antibiotic therapy, instructed on use of call bell and to call for assist, verbalized understanding, verbalizes no complaints at present

## 2024-02-02 NOTE — ASSESSMENT & PLAN NOTE
- INR goal 2.5-3.5  - Continue Coumadin + lovenox bridge while INR subtherapeutic- discussed with pharmacy   - Monitor INR daily

## 2024-02-02 NOTE — ASSESSMENT & PLAN NOTE
Patient has hypokalemia which is Acute on Chronic and currently uncontrolled. Most recent potassium levels reviewed-   Lab Results   Component Value Date    K 2.9 (L) 02/02/2024   . Will continue potassium replacement per protocol and recheck repeat levels after replacement completed.  - obtain mg level

## 2024-02-02 NOTE — ASSESSMENT & PLAN NOTE
This patient has long term use on an anticoagulant with Select Anticoagulant(s): Warfarin/Coumadin. Their long term anticoagulation will be Held or Continued: continued. They are on long term anticoagulation due to Reason for Anticoagulation: Atrial fibrillation, Mechanical heart valve, and DVT/PE.   Pharmacy following for coumadin dosing and monitoring   Monitor daily INR

## 2024-02-02 NOTE — ASSESSMENT & PLAN NOTE
Patient is identified as having Systolic (HFrEF) heart failure that is Acute on chronic. CHF is currently uncontrolled due to Dyspnea not returned to baseline after 40mg lasix doses of IV diuretic and Pulmonary edema/pleural effusion on CXR. Latest ECHO performed and demonstrates- Results for orders placed during the hospital encounter of 11/19/23    Echo Saline Bubble? No    Interpretation Summary    Left Ventricle: The left ventricle is normal in size. Normal wall thickness. There is mild concentric hypertrophy. Normal wall motion. There is normal systolic function with a visually estimated ejection fraction of 55 - 60%. Grade I diastolic dysfunction.    Right Ventricle: Normal right ventricular cavity size. Wall thickness is normal. Right ventricle wall motion  is normal. Systolic function is normal.    Aortic Valve: There is mild aortic valve sclerosis. There is moderate stenosis. Aortic valve area by VTI is 1.45 cm². Aortic valve peak velocity is 3.20 m/s. Mean gradient is 23 mmHg. The dimensionless index is 0.44. There is mild aortic regurgitation.    Mitral Valve: There is mild regurgitation.    Tricuspid Valve: There is mild regurgitation.    IVC/SVC: Intermediate venous pressure at 8 mmHg.  . Continue Beta Blocker, ACE/ARB, and Furosemide and monitor clinical status closely. Monitor on telemetry. Patient is on CHF pathway.  Monitor strict Is&Os and daily weights.  Place on fluid restriction of 1.5 L. Cardiology has been consulted. Continue to stress to patient importance of self efficacy and  on diet for CHF. Last BNP reviewed- and noted below   Recent Labs   Lab 02/01/24  1701   BNP 2,464*

## 2024-02-03 LAB
ANION GAP SERPL CALC-SCNC: 12 MMOL/L (ref 8–16)
BASOPHILS # BLD AUTO: 0.04 K/UL (ref 0–0.2)
BASOPHILS NFR BLD: 0.6 % (ref 0–1.9)
BUN SERPL-MCNC: 14 MG/DL (ref 8–23)
CALCIUM SERPL-MCNC: 8.9 MG/DL (ref 8.7–10.5)
CHLORIDE SERPL-SCNC: 105 MMOL/L (ref 95–110)
CO2 SERPL-SCNC: 22 MMOL/L (ref 23–29)
CREAT SERPL-MCNC: 1.4 MG/DL (ref 0.5–1.4)
DIFFERENTIAL METHOD BLD: NORMAL
EOSINOPHIL # BLD AUTO: 0.3 K/UL (ref 0–0.5)
EOSINOPHIL NFR BLD: 4.1 % (ref 0–8)
ERYTHROCYTE [DISTWIDTH] IN BLOOD BY AUTOMATED COUNT: 14.4 % (ref 11.5–14.5)
EST. GFR  (NO RACE VARIABLE): 38 ML/MIN/1.73 M^2
GLUCOSE SERPL-MCNC: 91 MG/DL (ref 70–110)
HCT VFR BLD AUTO: 43.9 % (ref 37–48.5)
HGB BLD-MCNC: 14.2 G/DL (ref 12–16)
IMM GRANULOCYTES # BLD AUTO: 0.01 K/UL (ref 0–0.04)
IMM GRANULOCYTES NFR BLD AUTO: 0.2 % (ref 0–0.5)
INR PPP: 1.7 (ref 0.8–1.2)
LYMPHOCYTES # BLD AUTO: 2.2 K/UL (ref 1–4.8)
LYMPHOCYTES NFR BLD: 33 % (ref 18–48)
MAGNESIUM SERPL-MCNC: 2.6 MG/DL (ref 1.6–2.6)
MCH RBC QN AUTO: 30 PG (ref 27–31)
MCHC RBC AUTO-ENTMCNC: 32.3 G/DL (ref 32–36)
MCV RBC AUTO: 93 FL (ref 82–98)
MONOCYTES # BLD AUTO: 0.8 K/UL (ref 0.3–1)
MONOCYTES NFR BLD: 12.2 % (ref 4–15)
NEUTROPHILS # BLD AUTO: 3.3 K/UL (ref 1.8–7.7)
NEUTROPHILS NFR BLD: 49.9 % (ref 38–73)
NRBC BLD-RTO: 0 /100 WBC
PLATELET # BLD AUTO: 224 K/UL (ref 150–450)
PMV BLD AUTO: 11.5 FL (ref 9.2–12.9)
POTASSIUM SERPL-SCNC: 4.4 MMOL/L (ref 3.5–5.1)
PROTHROMBIN TIME: 17.3 SEC (ref 9–12.5)
RBC # BLD AUTO: 4.74 M/UL (ref 4–5.4)
SODIUM SERPL-SCNC: 139 MMOL/L (ref 136–145)
WBC # BLD AUTO: 6.55 K/UL (ref 3.9–12.7)

## 2024-02-03 PROCEDURE — 80048 BASIC METABOLIC PNL TOTAL CA: CPT | Performed by: NURSE PRACTITIONER

## 2024-02-03 PROCEDURE — 25000003 PHARM REV CODE 250: Performed by: NURSE PRACTITIONER

## 2024-02-03 PROCEDURE — 36415 COLL VENOUS BLD VENIPUNCTURE: CPT | Performed by: INTERNAL MEDICINE

## 2024-02-03 PROCEDURE — 85610 PROTHROMBIN TIME: CPT | Performed by: INTERNAL MEDICINE

## 2024-02-03 PROCEDURE — 63600175 PHARM REV CODE 636 W HCPCS: Performed by: INTERNAL MEDICINE

## 2024-02-03 PROCEDURE — 85025 COMPLETE CBC W/AUTO DIFF WBC: CPT | Performed by: INTERNAL MEDICINE

## 2024-02-03 PROCEDURE — 27000221 HC OXYGEN, UP TO 24 HOURS

## 2024-02-03 PROCEDURE — 11000001 HC ACUTE MED/SURG PRIVATE ROOM

## 2024-02-03 PROCEDURE — 63600175 PHARM REV CODE 636 W HCPCS: Performed by: NURSE PRACTITIONER

## 2024-02-03 PROCEDURE — 83735 ASSAY OF MAGNESIUM: CPT | Performed by: INTERNAL MEDICINE

## 2024-02-03 PROCEDURE — 25000003 PHARM REV CODE 250: Performed by: INTERNAL MEDICINE

## 2024-02-03 PROCEDURE — 21400001 HC TELEMETRY ROOM

## 2024-02-03 PROCEDURE — 94761 N-INVAS EAR/PLS OXIMETRY MLT: CPT

## 2024-02-03 RX ORDER — WARFARIN 2.5 MG/1
2.5 TABLET ORAL ONCE
Status: COMPLETED | OUTPATIENT
Start: 2024-02-03 | End: 2024-02-03

## 2024-02-03 RX ADMIN — CEFTRIAXONE 1 G: 1 INJECTION, POWDER, FOR SOLUTION INTRAMUSCULAR; INTRAVENOUS at 06:02

## 2024-02-03 RX ADMIN — WARFARIN SODIUM 2.5 MG: 2.5 TABLET ORAL at 05:02

## 2024-02-03 RX ADMIN — ENOXAPARIN SODIUM 90 MG: 100 INJECTION SUBCUTANEOUS at 09:02

## 2024-02-03 NOTE — ASSESSMENT & PLAN NOTE
Patient is identified as having Diastolic (HFpEF) heart failure that is Acute on chronic. CHF is currently uncontrolled due to Dyspnea not returned to baseline after 40mg lasix doses of IV diuretic and Pulmonary edema/pleural effusion on CXR. Latest ECHO performed and demonstrates- Results for orders placed during the hospital encounter of 11/19/23    Echo Saline Bubble? No    Interpretation Summary    Left Ventricle: The left ventricle is normal in size. Normal wall thickness. There is mild concentric hypertrophy. Normal wall motion. There is normal systolic function with a visually estimated ejection fraction of 55 - 60%. Grade I diastolic dysfunction.    Right Ventricle: Normal right ventricular cavity size. Wall thickness is normal. Right ventricle wall motion  is normal. Systolic function is normal.    Aortic Valve: There is mild aortic valve sclerosis. There is moderate stenosis. Aortic valve area by VTI is 1.45 cm². Aortic valve peak velocity is 3.20 m/s. Mean gradient is 23 mmHg. The dimensionless index is 0.44. There is mild aortic regurgitation.    Mitral Valve: There is mild regurgitation.    Tricuspid Valve: There is mild regurgitation.    IVC/SVC: Intermediate venous pressure at 8 mmHg.  . Continue Beta Blocker, ACE/ARB, and Furosemide and monitor clinical status closely. Monitor on telemetry. Patient is on CHF pathway.  Monitor strict Is&Os and daily weights.  Place on fluid restriction of 1.5 L. Cardiology has been consulted. Continue to stress to patient importance of self efficacy and  on diet for CHF. Last BNP reviewed- and noted below   Recent Labs   Lab 02/01/24  1701   BNP 2,464*     - Hold diuresis  - Hold metoprolol, lisinopril   - strict Is and Os

## 2024-02-03 NOTE — PROGRESS NOTES
[facility] Medicine  Progress Note    Patient Name: Serena Post  MRN: [id number]  Patient Class: IP- Inpatient   Admission Date: 2/1/2024  Length of Stay: 1 days  Attending Physician: Deandra Reynoso MD  Primary Care Provider: Joslyn Primary Doctor        Subjective:     Principal Problem:Acute exacerbation of CHF (congestive heart failure)        HPI:  Serena Post is a 83 y.o. female with a PMH  has a past medical history of A-fib, Anticoagulant long-term use, Aortic valve disease, Cancer, Cardiomyopathy, CHF (congestive heart failure), COVID-19 (7/23/2022), heart valve replacement with mechanical valve, Hyperlipidemia, Hypertension, Pacemaker, Pacemaker, Sepsis (7/23/2022), and Stroke.  Presented to the ER for evaluation generalized fatigued and low blood pressure readings over the last 2 days.  Patient also has reported worsening shortness of breath from baseline and states she has been having to use her home oxygen more frequently.  Denies any excessive fluid or salt consumption.  Denies any recent illnesses or sick contacts.  Denies any other symptoms such as dysuria, hematuria, malodorous urine, fever, aches, chills, sweats, headache, lightheadedness, dizziness, chest pain, palpitations, abdominal pain, or any other symptoms at this time.    ER workup mostly unremarkable with the exception to elevated BNP level of 2, 464 and elevated troponin level of 0.038.  Chest x-ray revealed: [Cardiomegaly.  Median sternotomy.  Mild perihilar edema.  Right-sided pacemaker.  Correlate clinically for CHF.  Valvular replacement noted ].  UA consistent with acute cystitis.  Patient received 1 g of Rocephin in ED. patient also received 40 mg of Lasix IV in ED. BP has been normotensive during hospital visit.  Hospital Medicine consulted to admit patient for UTI and elevated troponin.  Patient will be admitted under observation status.    PCP: Joslyn Primary Doctor    Overview/Hospital Course:  Pt  continued on IV diuretics. Continued on IV Rocephin pending urine culture.     Interval History: NAEON. BP low this AM, diuretics and anti-hypertensives held. Pt seen with son at bedside, reports some lightheadedness. Denies CP, SOB. Sats stable on 2L (pt uses 2-3 at home per son). Denies abd pain, n/v.     Review of Systems  Objective:     Vital Signs (Most Recent):  Temp: 97.8 °F (36.6 °C) (02/03/24 1109)  Pulse: 76 (02/03/24 1109)  Resp: 18 (02/03/24 0748)  BP: (!) 99/55 (02/03/24 1109)  SpO2: (!) 93 % (02/03/24 1109) Vital Signs (24h Range):  Temp:  [97.5 °F (36.4 °C)-98.4 °F (36.9 °C)] 97.8 °F (36.6 °C)  Pulse:  [70-80] 76  Resp:  [18-24] 18  SpO2:  [91 %-96 %] 93 %  BP: ()/(45-83) 99/55     Weight:  (completed)  Body mass index is 33.68 kg/m².    Intake/Output Summary (Last 24 hours) at 2/3/2024 1419  Last data filed at 2/2/2024 1929  Gross per 24 hour   Intake 480 ml   Output 300 ml   Net 180 ml         Physical Exam  Vitals and nursing note reviewed.   Constitutional:       General: She is not in acute distress.     Appearance: Ill appearance: chronically ill appearing.   Cardiovascular:      Rate and Rhythm: Normal rate and regular rhythm.      Heart sounds: Murmur heard.      No friction rub. No gallop.   Pulmonary:      Effort: Pulmonary effort is normal.      Breath sounds: Normal breath sounds. No wheezing, rhonchi or rales.      Comments: On 2L NC   Abdominal:      General: Bowel sounds are normal. There is no distension.      Palpations: Abdomen is soft.      Tenderness: There is no abdominal tenderness. There is no guarding or rebound.   Musculoskeletal:      Right lower leg: No edema.      Left lower leg: No edema.   Neurological:      Mental Status: She is alert and oriented to person, place, and time. Mental status is at baseline.             Significant Labs: All pertinent labs within the past 24 hours have been reviewed.    Significant Imaging: I have reviewed all pertinent imaging  results/findings within the past 24 hours.    Assessment/Plan:      * Acute exacerbation of CHF (congestive heart failure)  Patient is identified as having Diastolic (HFpEF) heart failure that is Acute on chronic. CHF is currently uncontrolled due to Dyspnea not returned to baseline after 40mg lasix doses of IV diuretic and Pulmonary edema/pleural effusion on CXR. Latest ECHO performed and demonstrates- Results for orders placed during the hospital encounter of 11/19/23    Echo Saline Bubble? No    Interpretation Summary    Left Ventricle: The left ventricle is normal in size. Normal wall thickness. There is mild concentric hypertrophy. Normal wall motion. There is normal systolic function with a visually estimated ejection fraction of 55 - 60%. Grade I diastolic dysfunction.    Right Ventricle: Normal right ventricular cavity size. Wall thickness is normal. Right ventricle wall motion  is normal. Systolic function is normal.    Aortic Valve: There is mild aortic valve sclerosis. There is moderate stenosis. Aortic valve area by VTI is 1.45 cm². Aortic valve peak velocity is 3.20 m/s. Mean gradient is 23 mmHg. The dimensionless index is 0.44. There is mild aortic regurgitation.    Mitral Valve: There is mild regurgitation.    Tricuspid Valve: There is mild regurgitation.    IVC/SVC: Intermediate venous pressure at 8 mmHg.  . Continue Beta Blocker, ACE/ARB, and Furosemide and monitor clinical status closely. Monitor on telemetry. Patient is on CHF pathway.  Monitor strict Is&Os and daily weights.  Place on fluid restriction of 1.5 L. Cardiology has been consulted. Continue to stress to patient importance of self efficacy and  on diet for CHF. Last BNP reviewed- and noted below   Recent Labs   Lab 02/01/24  1701   BNP 2,464*     - Hold diuresis  - Hold metoprolol, lisinopril   - strict Is and Os     Acute cystitis with hematuria  - Continue IV Rocephin   - urine culture with > 100K CFU GNR, speciation and  sensitivities pending                 Elevated troponin  Trop plateau at 0.04, pt without CP  Cont home Coumadin   Continue Metoprolol. Son reports pt was taken off Lisinopril by her cardiologist due to low BP   Per review of O/P cardiology notes, pt not on ASA due to fall risk   Tele monitoring     Paroxysmal atrial fibrillation  Patient with Paroxysmal (<7 days) atrial fibrillation which is controlled currently with Beta Blocker. Patient is currently in paced rhythm. [id number] Score: 4. HASBLED Score: . Anticoagulation indicated. Anticoagulation done with coumadin .  Hx pacemaker   Continue Metoprolol   Continue Coumadin   Monitor tele     H/O mechanical aortic valve replacement  - INR goal has been 2-3 per the son, followed by Dr. Lydia Perkins as outpatient   - Continue Coumadin + lovenox bridge while INR subtherapeutic   - Monitor INR daily     Anticoagulant long-term use  This patient has long term use on an anticoagulant with Select Anticoagulant(s): Warfarin/Coumadin. Their long term anticoagulation will be Held or Continued: continued. They are on long term anticoagulation due to Reason for Anticoagulation: Atrial fibrillation, Mechanical heart valve, and DVT/PE.   Pharmacy following for coumadin dosing and monitoring   Monitor daily INR     Stage 3a chronic kidney disease  Creatine stable for now. BMP reviewed- noted Estimated Creatinine Clearance: 34 mL/min (based on SCr of 1.4 mg/dL). according to latest data. Based on current GFR, CKD stage is stage 3 - GFR 30-59.  Monitor UOP and serial BMP and adjust therapy as needed. Renally dose meds. Avoid nephrotoxic medications and procedures.  Hold diuresis   Monitor BMP       Hypokalemia  Patient has hypokalemia which is Acute on Chronic and currently controlled. Most recent potassium levels reviewed-   Lab Results   Component Value Date    K 4.4 02/03/2024   . Will continue potassium replacement per protocol and recheck repeat levels after replacement  completed.      Essential hypertension  Chronic, controlled. Latest blood pressure and vitals reviewed-     Temp:  [97.8 °F (36.6 °C)-98 °F (36.7 °C)]   Pulse:  [71-84]   Resp:  [15-20]   BP: (109-144)/(57-77)   SpO2:  [93 %-95 %] .   Home meds for hypertension were reviewed and noted below.   Hypertension Medications               furosemide (LASIX) 20 MG tablet Take 20 mg by mouth every morning.    lisinopriL (PRINIVIL,ZESTRIL) 5 MG tablet Take 5 mg by mouth 2 (two) times daily.    metoprolol tartrate (LOPRESSOR) 25 MG tablet Take 1 tablet by mouth 2 (two) times daily.            While in the hospital, will manage blood pressure as follows; Continue home antihypertensive regimen    Will utilize p.r.n. blood pressure medication only if patient's blood pressure greater than 160/100 and she develops symptoms such as worsening chest pain or shortness of breath.    Dyslipidemia  Patient is chronically on statin.will continue for now. Last Lipid Panel:   Lab Results   Component Value Date    CHOL 225 (H) 11/01/2022    HDL 48 11/01/2022    LDLCALC 141 (H) 11/01/2022    TRIG 180 11/01/2022    CHOLHDL 4.7 (H) 11/01/2022     Plan:  -Continue home medication  -low fat/low calorie diet          VTE Risk Mitigation (From admission, onward)           Ordered     warfarin (COUMADIN) tablet 2.5 mg  Once per day on Mon Fri 02/02/24 0221     warfarin (COUMADIN) split tablet 1.25 mg  Once per day on Sun Tue Wed Thu Sat         02/02/24 0221     enoxaparin injection 90 mg  Every 12 hours         02/02/24 1144     IP VTE HIGH RISK PATIENT  Once         02/01/24 1917     Place sequential compression device  Until discontinued         02/01/24 1917                    Discharge Planning   RENE:      Code Status: Full Code   Is the patient medically ready for discharge?: No    Reason for patient still in hospital (select all that apply): Patient trending condition, Laboratory test, and Treatment  Discharge Plan A: Home Health                   Deandra Reynoso MD  Department of Hospital Medicine   [facility]

## 2024-02-03 NOTE — PLAN OF CARE
Discussed poc with pt, pt verbalized understanding     Cardiac monitoring in use  Fall precautions in place, remains injury free  nausea under control with PRN meds     Accurate I&Os. Purewick in place  Abx given as prescribed  Bed locked at lowest position  Call light within reach     Chart check complete  Will cont with POC

## 2024-02-03 NOTE — SUBJECTIVE & OBJECTIVE
Interval History: NAEON. BP low this AM, diuretics and anti-hypertensives held. Pt seen with son at bedside, reports some lightheadedness. Denies CP, SOB. Sats stable on 2L (pt uses 2-3 at home per son). Denies abd pain, n/v.     Review of Systems  Objective:     Vital Signs (Most Recent):  Temp: 97.8 °F (36.6 °C) (02/03/24 1109)  Pulse: 76 (02/03/24 1109)  Resp: 18 (02/03/24 0748)  BP: (!) 99/55 (02/03/24 1109)  SpO2: (!) 93 % (02/03/24 1109) Vital Signs (24h Range):  Temp:  [97.5 °F (36.4 °C)-98.4 °F (36.9 °C)] 97.8 °F (36.6 °C)  Pulse:  [70-80] 76  Resp:  [18-24] 18  SpO2:  [91 %-96 %] 93 %  BP: ()/(45-83) 99/55     Weight:  (completed)  Body mass index is 33.68 kg/m².    Intake/Output Summary (Last 24 hours) at 2/3/2024 1419  Last data filed at 2/2/2024 1929  Gross per 24 hour   Intake 480 ml   Output 300 ml   Net 180 ml         Physical Exam  Vitals and nursing note reviewed.   Constitutional:       General: She is not in acute distress.     Appearance: Ill appearance: chronically ill appearing.   Cardiovascular:      Rate and Rhythm: Normal rate and regular rhythm.      Heart sounds: Murmur heard.      No friction rub. No gallop.   Pulmonary:      Effort: Pulmonary effort is normal.      Breath sounds: Normal breath sounds. No wheezing, rhonchi or rales.      Comments: On 2L NC   Abdominal:      General: Bowel sounds are normal. There is no distension.      Palpations: Abdomen is soft.      Tenderness: There is no abdominal tenderness. There is no guarding or rebound.   Musculoskeletal:      Right lower leg: No edema.      Left lower leg: No edema.   Neurological:      Mental Status: She is alert and oriented to person, place, and time. Mental status is at baseline.             Significant Labs: All pertinent labs within the past 24 hours have been reviewed.    Significant Imaging: I have reviewed all pertinent imaging results/findings within the past 24 hours.

## 2024-02-03 NOTE — PROGRESS NOTES
"  Clinical Pharmacy: Coumadin Progress Note    Indication: mechanical AVR, hx of DVT, atrial fibrillation   According to cardiology note from 1/15/24, "She is not on aspirin due to fall risk. Her INR goal for now is 2-3 (although if she continues to have episodes of atrial fibrillation the ideal goal would be 2.5-3.5)"  Dr. Reynoso wants to target 2.0-3.0 for now -- has pacemaker (only a couple of episodes of Afib every now & then)  Today's INR = 1.7 (down from 1.8)  Home dose per med history: 2.5 mg on Mondays & Fridays, 1.25 mg all other days   2/2: pt received booster dose of 4 mg x 1  2/3: will give booster dose of 2.5 mg x 1 today (double her usual dose)  Also on Lovenox bridge  H&H and PLT are stable/WNL  Pt educated by a pharmacist on 2/2    We will monitor daily INR & make dosage adjustments as needed.  Thank you for allowing us to participate in this patient's care.   Katherine McArdle, Pharm.D. 2/3/2024 3:06 PM    "

## 2024-02-03 NOTE — ASSESSMENT & PLAN NOTE
Creatine stable for now. BMP reviewed- noted Estimated Creatinine Clearance: 34 mL/min (based on SCr of 1.4 mg/dL). according to latest data. Based on current GFR, CKD stage is stage 3 - GFR 30-59.  Monitor UOP and serial BMP and adjust therapy as needed. Renally dose meds. Avoid nephrotoxic medications and procedures.  Hold diuresis   Monitor BMP

## 2024-02-03 NOTE — PLAN OF CARE
Discussed poc with pt and family, verbalized understanding     Purposeful rounding every 2hours    VS wnl  Cardiac monitoring in use  Fall precautions in place, remains injury free  Pt denies c/o n/v and pain    Accurate I&Os  Abx given as prescribed  Bed locked at lowest position  Call light within reach    Chart check complete  Will cont with POC    Problem: Infection  Goal: Absence of Infection Signs and Symptoms  Outcome: Ongoing, Progressing     Problem: Adult Inpatient Plan of Care  Goal: Plan of Care Review  Outcome: Ongoing, Progressing  Goal: Patient-Specific Goal (Individualized)  Outcome: Ongoing, Progressing  Goal: Absence of Hospital-Acquired Illness or Injury  Outcome: Ongoing, Progressing  Goal: Optimal Comfort and Wellbeing  Outcome: Ongoing, Progressing  Goal: Readiness for Transition of Care  Outcome: Ongoing, Progressing     Problem: Impaired Wound Healing  Goal: Optimal Wound Healing  Outcome: Ongoing, Progressing     Problem: Skin Injury Risk Increased  Goal: Skin Health and Integrity  Outcome: Ongoing, Progressing     Problem: Fall Injury Risk  Goal: Absence of Fall and Fall-Related Injury  Outcome: Ongoing, Progressing

## 2024-02-03 NOTE — ASSESSMENT & PLAN NOTE
Patient with Paroxysmal (<7 days) atrial fibrillation which is controlled currently with Beta Blocker. Patient is currently in paced rhythm. [id number] Score: 4. HASBLED Score: . Anticoagulation indicated. Anticoagulation done with coumadin .  Hx pacemaker   Continue Metoprolol   Continue Coumadin   Monitor tele

## 2024-02-03 NOTE — ASSESSMENT & PLAN NOTE
Trop plateau at 0.04, pt without CP  Cont home Coumadin   Continue Metoprolol. Son reports pt was taken off Lisinopril by her cardiologist due to low BP   Per review of O/P cardiology notes, pt not on ASA due to fall risk   Tele monitoring

## 2024-02-03 NOTE — ASSESSMENT & PLAN NOTE
- INR goal has been 2-3 per the son, followed by Dr. Lydia Perkins as outpatient   - Continue Coumadin + lovenox bridge while INR subtherapeutic   - Monitor INR daily

## 2024-02-03 NOTE — ASSESSMENT & PLAN NOTE
- Continue IV Rocephin   - urine culture with > 100K CFU GNR, speciation and sensitivities pending

## 2024-02-03 NOTE — ASSESSMENT & PLAN NOTE
Patient has hypokalemia which is Acute on Chronic and currently controlled. Most recent potassium levels reviewed-   Lab Results   Component Value Date    K 4.4 02/03/2024   . Will continue potassium replacement per protocol and recheck repeat levels after replacement completed.

## 2024-02-04 LAB
ANION GAP SERPL CALC-SCNC: 7 MMOL/L (ref 8–16)
BACTERIA UR CULT: ABNORMAL
BUN SERPL-MCNC: 20 MG/DL (ref 8–23)
CALCIUM SERPL-MCNC: 8.4 MG/DL (ref 8.7–10.5)
CHLORIDE SERPL-SCNC: 106 MMOL/L (ref 95–110)
CO2 SERPL-SCNC: 25 MMOL/L (ref 23–29)
CREAT SERPL-MCNC: 1.5 MG/DL (ref 0.5–1.4)
EST. GFR  (NO RACE VARIABLE): 35 ML/MIN/1.73 M^2
GLUCOSE SERPL-MCNC: 99 MG/DL (ref 70–110)
INR PPP: 1.9 (ref 0.8–1.2)
MAGNESIUM SERPL-MCNC: 2.5 MG/DL (ref 1.6–2.6)
POTASSIUM SERPL-SCNC: 4.1 MMOL/L (ref 3.5–5.1)
PROTHROMBIN TIME: 18.8 SEC (ref 9–12.5)
SODIUM SERPL-SCNC: 138 MMOL/L (ref 136–145)

## 2024-02-04 PROCEDURE — 63600175 PHARM REV CODE 636 W HCPCS: Performed by: INTERNAL MEDICINE

## 2024-02-04 PROCEDURE — 80048 BASIC METABOLIC PNL TOTAL CA: CPT | Performed by: NURSE PRACTITIONER

## 2024-02-04 PROCEDURE — 25000003 PHARM REV CODE 250: Performed by: INTERNAL MEDICINE

## 2024-02-04 PROCEDURE — 36415 COLL VENOUS BLD VENIPUNCTURE: CPT | Performed by: INTERNAL MEDICINE

## 2024-02-04 PROCEDURE — 21400001 HC TELEMETRY ROOM

## 2024-02-04 PROCEDURE — 83735 ASSAY OF MAGNESIUM: CPT | Performed by: INTERNAL MEDICINE

## 2024-02-04 PROCEDURE — 11000001 HC ACUTE MED/SURG PRIVATE ROOM

## 2024-02-04 PROCEDURE — 85610 PROTHROMBIN TIME: CPT | Performed by: INTERNAL MEDICINE

## 2024-02-04 RX ORDER — CEFDINIR 300 MG/1
300 CAPSULE ORAL EVERY 12 HOURS
Status: DISCONTINUED | OUTPATIENT
Start: 2024-02-04 | End: 2024-02-05 | Stop reason: HOSPADM

## 2024-02-04 RX ORDER — CEFDINIR 300 MG/1
300 CAPSULE ORAL EVERY 12 HOURS
Status: DISCONTINUED | OUTPATIENT
Start: 2024-02-04 | End: 2024-02-04

## 2024-02-04 RX ADMIN — CEFDINIR 300 MG: 300 CAPSULE ORAL at 10:02

## 2024-02-04 RX ADMIN — WARFARIN SODIUM 1.25 MG: 2.5 TABLET ORAL at 06:02

## 2024-02-04 RX ADMIN — ENOXAPARIN SODIUM 90 MG: 100 INJECTION SUBCUTANEOUS at 08:02

## 2024-02-04 RX ADMIN — ENOXAPARIN SODIUM 90 MG: 100 INJECTION SUBCUTANEOUS at 09:02

## 2024-02-04 RX ADMIN — CEFDINIR 300 MG: 300 CAPSULE ORAL at 02:02

## 2024-02-04 NOTE — PLAN OF CARE
Discussed POC with patient, Pt verbalized understanding, purposeful rounding, Cardiac monitoring in use, tele monitor [id number], Fall precautions in place, patient remains injury free, call light within reach, will continue with plan of care.       Problem: Adult Inpatient Plan of Care  Goal: Plan of Care Review  Outcome: Ongoing, Progressing  Goal: Patient-Specific Goal (Individualized)  Outcome: Ongoing, Progressing  Goal: Absence of Hospital-Acquired Illness or Injury  Outcome: Ongoing, Progressing  Goal: Optimal Comfort and Wellbeing  Outcome: Ongoing, Progressing  Goal: Readiness for Transition of Care  Outcome: Ongoing, Progressing

## 2024-02-04 NOTE — PROGRESS NOTES
[facility] Medicine  Progress Note    Patient Name: Serena Post  MRN: [id number]  Patient Class: IP- Inpatient   Admission Date: 2/1/2024  Length of Stay: 2 days  Attending Physician: Deandra Reynoso MD  Primary Care Provider: Joslyn Primary Doctor        Subjective:     Principal Problem:Acute exacerbation of CHF (congestive heart failure)        HPI:  Serena Post is a 83 y.o. female with a PMH  has a past medical history of A-fib, Anticoagulant long-term use, Aortic valve disease, Cancer, Cardiomyopathy, CHF (congestive heart failure), COVID-19 (7/23/2022), heart valve replacement with mechanical valve, Hyperlipidemia, Hypertension, Pacemaker, Pacemaker, Sepsis (7/23/2022), and Stroke.  Presented to the ER for evaluation generalized fatigued and low blood pressure readings over the last 2 days.  Patient also has reported worsening shortness of breath from baseline and states she has been having to use her home oxygen more frequently.  Denies any excessive fluid or salt consumption.  Denies any recent illnesses or sick contacts.  Denies any other symptoms such as dysuria, hematuria, malodorous urine, fever, aches, chills, sweats, headache, lightheadedness, dizziness, chest pain, palpitations, abdominal pain, or any other symptoms at this time.    ER workup mostly unremarkable with the exception to elevated BNP level of 2, 464 and elevated troponin level of 0.038.  Chest x-ray revealed: [Cardiomegaly.  Median sternotomy.  Mild perihilar edema.  Right-sided pacemaker.  Correlate clinically for CHF.  Valvular replacement noted ].  UA consistent with acute cystitis.  Patient received 1 g of Rocephin in ED. patient also received 40 mg of Lasix IV in ED. BP has been normotensive during hospital visit.  Hospital Medicine consulted to admit patient for UTI and elevated troponin.  Patient will be admitted under observation status.    PCP: Joslyn Primary Doctor    Overview/Hospital Course:  Pt  continued on IV diuretics. Continued on IV Rocephin pending urine culture. Anticipate dc home in AM on PO abx as bp improves.     Interval History: NAEON. PT seen with son at bedside. Diuretics held yesterday and today due to hypotension, pt asymptomatic. Reports she feels ok. Denies CP, SOB, cough, abd pain.     Review of Systems  Objective:     Vital Signs (Most Recent):  Temp: 97.7 °F (36.5 °C) (02/04/24 1151)  Pulse: 82 (02/04/24 1151)  Resp: 20 (02/04/24 1151)  BP: (!) 97/52 (02/04/24 1151)  SpO2: 95 % (02/04/24 1151) Vital Signs (24h Range):  Temp:  [97.5 °F (36.4 °C)-98.5 °F (36.9 °C)] 97.7 °F (36.5 °C)  Pulse:  [79-85] 82  Resp:  [16-21] 20  SpO2:  [94 %-97 %] 95 %  BP: ()/(46-55) 97/52     Weight:  (completed)  Body mass index is 33.68 kg/m².    Intake/Output Summary (Last 24 hours) at 2/4/2024 1330  Last data filed at 2/4/2024 0820  Gross per 24 hour   Intake 633.82 ml   Output 150 ml   Net 483.82 ml         Physical Exam  Vitals and nursing note reviewed.   Constitutional:       General: She is not in acute distress.     Appearance: Ill appearance: chronic.   Cardiovascular:      Rate and Rhythm: Normal rate and regular rhythm.      Heart sounds: No murmur heard.     No friction rub. No gallop.      Comments: + click   Pulmonary:      Effort: Pulmonary effort is normal.      Breath sounds: Normal breath sounds. No wheezing or rales.      Comments: On 2L NC O2  Abdominal:      General: Bowel sounds are normal. There is no distension.      Palpations: Abdomen is soft.      Tenderness: There is no abdominal tenderness. There is no guarding or rebound.   Musculoskeletal:      Right lower leg: No edema.      Left lower leg: No edema.   Neurological:      Mental Status: She is alert and oriented to person, place, and time. Mental status is at baseline.             Significant Labs: All pertinent labs within the past 24 hours have been reviewed.    Significant Imaging: I have reviewed all pertinent imaging  results/findings within the past 24 hours.    Assessment/Plan:      * Acute exacerbation of CHF (congestive heart failure)  Patient is identified as having Diastolic (HFpEF) heart failure that is Acute on chronic. CHF is currently uncontrolled due to Dyspnea not returned to baseline after 40mg lasix doses of IV diuretic and Pulmonary edema/pleural effusion on CXR. Latest ECHO performed and demonstrates- Results for orders placed during the hospital encounter of 11/19/23    Echo Saline Bubble? No    Interpretation Summary    Left Ventricle: The left ventricle is normal in size. Normal wall thickness. There is mild concentric hypertrophy. Normal wall motion. There is normal systolic function with a visually estimated ejection fraction of 55 - 60%. Grade I diastolic dysfunction.    Right Ventricle: Normal right ventricular cavity size. Wall thickness is normal. Right ventricle wall motion  is normal. Systolic function is normal.    Aortic Valve: There is mild aortic valve sclerosis. There is moderate stenosis. Aortic valve area by VTI is 1.45 cm². Aortic valve peak velocity is 3.20 m/s. Mean gradient is 23 mmHg. The dimensionless index is 0.44. There is mild aortic regurgitation.    Mitral Valve: There is mild regurgitation.    Tricuspid Valve: There is mild regurgitation.    IVC/SVC: Intermediate venous pressure at 8 mmHg.  . Continue Beta Blocker, ACE/ARB, and Furosemide and monitor clinical status closely. Monitor on telemetry. Patient is on CHF pathway.  Monitor strict Is&Os and daily weights.  Place on fluid restriction of 1.5 L. Cardiology has been consulted. Continue to stress to patient importance of self efficacy and  on diet for CHF. Last BNP reviewed- and noted below   Recent Labs   Lab 02/01/24  1701   BNP 2,464*     - Hold diuresis  - Hold metoprolol due to hypotension  - Pt was taken off ACE as outpatient due to hypotension per son   - repeat BNP with AM labs   - strict Is and Os     Acute cystitis  with hematuria  - urine culture with > 100K CFU  pansensitive Klebsiella PNA   - transition from IV Rocephin to PO Cefdinir                  Elevated troponin  Trop plateau at 0.04, pt without CP  Cont home Coumadin   Continue Metoprolol. Son reports pt was taken off Lisinopril by her cardiologist due to low BP   Per review of O/P cardiology notes, pt not on ASA due to fall risk   Tele monitoring     Paroxysmal atrial fibrillation  Patient with Paroxysmal (<7 days) atrial fibrillation which is controlled currently with Beta Blocker. Patient is currently in paced rhythm. [id number] Score: 4. HASBLED Score: . Anticoagulation indicated. Anticoagulation done with coumadin .  Hx pacemaker   Metop held due to hypotension, resume as BP improves   Continue Coumadin   Monitor tele     H/O mechanical aortic valve replacement  - INR goal has been 2-3 per the son, followed by Dr. Lydia Perkins as outpatient   - Continue Coumadin + lovenox bridge while INR subtherapeutic   - Monitor INR daily     Anticoagulant long-term use  This patient has long term use on an anticoagulant with Select Anticoagulant(s): Warfarin/Coumadin. Their long term anticoagulation will be Held or Continued: continued. They are on long term anticoagulation due to Reason for Anticoagulation: Atrial fibrillation, Mechanical heart valve, and DVT/PE.   Pharmacy following for coumadin dosing and monitoring   Monitor daily INR     Stage 3a chronic kidney disease  Creatine stable for now. BMP reviewed- noted Estimated Creatinine Clearance: 34 mL/min (based on SCr of 1.4 mg/dL). according to latest data. Based on current GFR, CKD stage is stage 3 - GFR 30-59.  Monitor UOP and serial BMP and adjust therapy as needed. Renally dose meds. Avoid nephrotoxic medications and procedures.  Hold diuresis   Monitor BMP       Hypokalemia  Patient has hypokalemia which is Acute on Chronic and currently controlled. Most recent potassium levels reviewed-   Lab Results    Component Value Date    K 4.4 02/03/2024   . Will continue potassium replacement per protocol and recheck repeat levels after replacement completed.      Essential hypertension  Chronic, controlled. Latest blood pressure and vitals reviewed-     Temp:  [97.8 °F (36.6 °C)-98 °F (36.7 °C)]   Pulse:  [71-84]   Resp:  [15-20]   BP: (109-144)/(57-77)   SpO2:  [93 %-95 %] .   Home meds for hypertension were reviewed and noted below.   Hypertension Medications               furosemide (LASIX) 20 MG tablet Take 20 mg by mouth every morning.    lisinopriL (PRINIVIL,ZESTRIL) 5 MG tablet Take 5 mg by mouth 2 (two) times daily.    metoprolol tartrate (LOPRESSOR) 25 MG tablet Take 1 tablet by mouth 2 (two) times daily.            While in the hospital, will manage blood pressure as follows; Continue home antihypertensive regimen    Will utilize p.r.n. blood pressure medication only if patient's blood pressure greater than 160/100 and she develops symptoms such as worsening chest pain or shortness of breath.    Dyslipidemia  Patient is chronically on statin.will continue for now. Last Lipid Panel:   Lab Results   Component Value Date    CHOL 225 (H) 11/01/2022    HDL 48 11/01/2022    LDLCALC 141 (H) 11/01/2022    TRIG 180 11/01/2022    CHOLHDL 4.7 (H) 11/01/2022     Plan:  -Continue home medication  -low fat/low calorie diet          VTE Risk Mitigation (From admission, onward)           Ordered     warfarin (COUMADIN) tablet 2.5 mg  Once per day on Mon Fri 02/02/24 0221     warfarin (COUMADIN) split tablet 1.25 mg  Once per day on Sun Tue Wed Thu Sat         02/03/24 1500     enoxaparin injection 90 mg  Every 12 hours         02/02/24 1144     IP VTE HIGH RISK PATIENT  Once         02/01/24 1917     Place sequential compression device  Until discontinued         02/01/24 1917                    Discharge Planning   RENE:      Code Status: Full Code   Is the patient medically ready for discharge?: No    Reason for  patient still in hospital (select all that apply): Patient trending condition  Discharge Plan A: Home Health                  Deandra Reynoso MD  Department of Hospital Medicine   [facility] - Med [facility]

## 2024-02-04 NOTE — PROGRESS NOTES
Clinical Pharmacy: Coumadin Progress Note    Consult day # 3  Indication: mechanical AVR + atrial fibrillation + hx of DVT  Goal INR: should be 2.5-3.5 but current target is 2-3 per Dr. Reynoso due to frequent falls   Today's INR: 1.9 (up from 1.7, appropriate increase)  Home regimen: 2.5 mg on Mondays & Fridays, 1.25 mg all other days   2/2: pt received booster dose of 4 mg x 1  2/3: pt received booster dose of 2.5 mg x 1 (double her usual dose)  Also on Lovenox bridge --> appropriate to continue  Plan for today: continue home regimen with 1.25 mg dose today   Pt was educated on 2/2   Daily INR ordered    We will monitor daily INR & make dosage adjustments as needed.  Thank you for allowing us to participate in this patient's care.   Katherine McArdle, Pharm.D. 2/4/2024 4:13 PM

## 2024-02-04 NOTE — ASSESSMENT & PLAN NOTE
Patient with Paroxysmal (<7 days) atrial fibrillation which is controlled currently with Beta Blocker. Patient is currently in paced rhythm. [id number] Score: 4. HASBLED Score: . Anticoagulation indicated. Anticoagulation done with coumadin .  Hx pacemaker   Metop held due to hypotension, resume as BP improves   Continue Coumadin   Monitor tele

## 2024-02-04 NOTE — ASSESSMENT & PLAN NOTE
- urine culture with > 100K CFU  pansensitive Klebsiella PNA   - transition from IV Rocephin to PO Cefdinir

## 2024-02-04 NOTE — SUBJECTIVE & OBJECTIVE
Interval History: NAEON. PT seen with son at bedside. Diuretics held yesterday and today due to hypotension, pt asymptomatic. Reports she feels ok. Denies CP, SOB, cough, abd pain.     Review of Systems  Objective:     Vital Signs (Most Recent):  Temp: 97.7 °F (36.5 °C) (02/04/24 1151)  Pulse: 82 (02/04/24 1151)  Resp: 20 (02/04/24 1151)  BP: (!) 97/52 (02/04/24 1151)  SpO2: 95 % (02/04/24 1151) Vital Signs (24h Range):  Temp:  [97.5 °F (36.4 °C)-98.5 °F (36.9 °C)] 97.7 °F (36.5 °C)  Pulse:  [79-85] 82  Resp:  [16-21] 20  SpO2:  [94 %-97 %] 95 %  BP: ()/(46-55) 97/52     Weight:  (completed)  Body mass index is 33.68 kg/m².    Intake/Output Summary (Last 24 hours) at 2/4/2024 1330  Last data filed at 2/4/2024 0820  Gross per 24 hour   Intake 633.82 ml   Output 150 ml   Net 483.82 ml         Physical Exam  Vitals and nursing note reviewed.   Constitutional:       General: She is not in acute distress.     Appearance: Ill appearance: chronic.   Cardiovascular:      Rate and Rhythm: Normal rate and regular rhythm.      Heart sounds: No murmur heard.     No friction rub. No gallop.      Comments: + click   Pulmonary:      Effort: Pulmonary effort is normal.      Breath sounds: Normal breath sounds. No wheezing or rales.      Comments: On 2L NC O2  Abdominal:      General: Bowel sounds are normal. There is no distension.      Palpations: Abdomen is soft.      Tenderness: There is no abdominal tenderness. There is no guarding or rebound.   Musculoskeletal:      Right lower leg: No edema.      Left lower leg: No edema.   Neurological:      Mental Status: She is alert and oriented to person, place, and time. Mental status is at baseline.             Significant Labs: All pertinent labs within the past 24 hours have been reviewed.    Significant Imaging: I have reviewed all pertinent imaging results/findings within the past 24 hours.

## 2024-02-04 NOTE — PLAN OF CARE
Remains injury free. Denies c/o pain. Cardiac monitoring in progress. Call bell in reach. Purposeful rounding. No s/s acute distress.

## 2024-02-04 NOTE — ASSESSMENT & PLAN NOTE
Patient is identified as having Diastolic (HFpEF) heart failure that is Acute on chronic. CHF is currently uncontrolled due to Dyspnea not returned to baseline after 40mg lasix doses of IV diuretic and Pulmonary edema/pleural effusion on CXR. Latest ECHO performed and demonstrates- Results for orders placed during the hospital encounter of 11/19/23    Echo Saline Bubble? No    Interpretation Summary    Left Ventricle: The left ventricle is normal in size. Normal wall thickness. There is mild concentric hypertrophy. Normal wall motion. There is normal systolic function with a visually estimated ejection fraction of 55 - 60%. Grade I diastolic dysfunction.    Right Ventricle: Normal right ventricular cavity size. Wall thickness is normal. Right ventricle wall motion  is normal. Systolic function is normal.    Aortic Valve: There is mild aortic valve sclerosis. There is moderate stenosis. Aortic valve area by VTI is 1.45 cm². Aortic valve peak velocity is 3.20 m/s. Mean gradient is 23 mmHg. The dimensionless index is 0.44. There is mild aortic regurgitation.    Mitral Valve: There is mild regurgitation.    Tricuspid Valve: There is mild regurgitation.    IVC/SVC: Intermediate venous pressure at 8 mmHg.  . Continue Beta Blocker, ACE/ARB, and Furosemide and monitor clinical status closely. Monitor on telemetry. Patient is on CHF pathway.  Monitor strict Is&Os and daily weights.  Place on fluid restriction of 1.5 L. Cardiology has been consulted. Continue to stress to patient importance of self efficacy and  on diet for CHF. Last BNP reviewed- and noted below   Recent Labs   Lab 02/01/24  1701   BNP 2,464*     - Hold diuresis  - Hold metoprolol due to hypotension  - Pt was taken off ACE as outpatient due to hypotension per son   - repeat BNP with AM labs   - strict Is and Os

## 2024-02-05 VITALS
WEIGHT: 196.19 LBS | SYSTOLIC BLOOD PRESSURE: 103 MMHG | HEART RATE: 81 BPM | RESPIRATION RATE: 18 BRPM | OXYGEN SATURATION: 95 % | HEIGHT: 64 IN | BODY MASS INDEX: 33.49 KG/M2 | TEMPERATURE: 98 F | DIASTOLIC BLOOD PRESSURE: 58 MMHG

## 2024-02-05 PROBLEM — I50.9 ACUTE EXACERBATION OF CHF (CONGESTIVE HEART FAILURE): Status: RESOLVED | Noted: 2024-02-02 | Resolved: 2024-02-05

## 2024-02-05 PROBLEM — R79.89 ELEVATED TROPONIN: Status: RESOLVED | Noted: 2024-02-02 | Resolved: 2024-02-05

## 2024-02-05 PROBLEM — E87.6 HYPOKALEMIA: Status: RESOLVED | Noted: 2023-11-20 | Resolved: 2024-02-05

## 2024-02-05 PROBLEM — N30.01 ACUTE CYSTITIS WITH HEMATURIA: Status: RESOLVED | Noted: 2024-02-02 | Resolved: 2024-02-05

## 2024-02-05 LAB
ANION GAP SERPL CALC-SCNC: 11 MMOL/L (ref 8–16)
BASOPHILS # BLD AUTO: 0.05 K/UL (ref 0–0.2)
BASOPHILS NFR BLD: 0.9 % (ref 0–1.9)
BUN SERPL-MCNC: 19 MG/DL (ref 8–23)
CALCIUM SERPL-MCNC: 8.2 MG/DL (ref 8.7–10.5)
CHLORIDE SERPL-SCNC: 103 MMOL/L (ref 95–110)
CO2 SERPL-SCNC: 22 MMOL/L (ref 23–29)
CREAT SERPL-MCNC: 1.1 MG/DL (ref 0.5–1.4)
DIFFERENTIAL METHOD BLD: ABNORMAL
EOSINOPHIL # BLD AUTO: 0.4 K/UL (ref 0–0.5)
EOSINOPHIL NFR BLD: 6.2 % (ref 0–8)
ERYTHROCYTE [DISTWIDTH] IN BLOOD BY AUTOMATED COUNT: 14.2 % (ref 11.5–14.5)
EST. GFR  (NO RACE VARIABLE): 50 ML/MIN/1.73 M^2
GLUCOSE SERPL-MCNC: 84 MG/DL (ref 70–110)
HCT VFR BLD AUTO: 40.8 % (ref 37–48.5)
HGB BLD-MCNC: 13 G/DL (ref 12–16)
IMM GRANULOCYTES # BLD AUTO: 0.01 K/UL (ref 0–0.04)
IMM GRANULOCYTES NFR BLD AUTO: 0.2 % (ref 0–0.5)
INR PPP: 1.9 (ref 0.8–1.2)
LYMPHOCYTES # BLD AUTO: 1.7 K/UL (ref 1–4.8)
LYMPHOCYTES NFR BLD: 29.8 % (ref 18–48)
MAGNESIUM SERPL-MCNC: 2.4 MG/DL (ref 1.6–2.6)
MCH RBC QN AUTO: 30.4 PG (ref 27–31)
MCHC RBC AUTO-ENTMCNC: 31.9 G/DL (ref 32–36)
MCV RBC AUTO: 95 FL (ref 82–98)
MONOCYTES # BLD AUTO: 0.7 K/UL (ref 0.3–1)
MONOCYTES NFR BLD: 12.9 % (ref 4–15)
NEUTROPHILS # BLD AUTO: 2.9 K/UL (ref 1.8–7.7)
NEUTROPHILS NFR BLD: 50 % (ref 38–73)
NRBC BLD-RTO: 0 /100 WBC
PLATELET # BLD AUTO: 180 K/UL (ref 150–450)
PMV BLD AUTO: 11.3 FL (ref 9.2–12.9)
POTASSIUM SERPL-SCNC: 4 MMOL/L (ref 3.5–5.1)
PROTHROMBIN TIME: 19.6 SEC (ref 9–12.5)
RBC # BLD AUTO: 4.28 M/UL (ref 4–5.4)
SODIUM SERPL-SCNC: 136 MMOL/L (ref 136–145)
WBC # BLD AUTO: 5.68 K/UL (ref 3.9–12.7)

## 2024-02-05 PROCEDURE — 85025 COMPLETE CBC W/AUTO DIFF WBC: CPT | Performed by: INTERNAL MEDICINE

## 2024-02-05 PROCEDURE — 80048 BASIC METABOLIC PNL TOTAL CA: CPT | Performed by: NURSE PRACTITIONER

## 2024-02-05 PROCEDURE — 25000003 PHARM REV CODE 250: Performed by: INTERNAL MEDICINE

## 2024-02-05 PROCEDURE — 36415 COLL VENOUS BLD VENIPUNCTURE: CPT | Performed by: INTERNAL MEDICINE

## 2024-02-05 PROCEDURE — 63600175 PHARM REV CODE 636 W HCPCS: Performed by: INTERNAL MEDICINE

## 2024-02-05 PROCEDURE — 85610 PROTHROMBIN TIME: CPT | Performed by: INTERNAL MEDICINE

## 2024-02-05 PROCEDURE — 83735 ASSAY OF MAGNESIUM: CPT | Performed by: INTERNAL MEDICINE

## 2024-02-05 RX ORDER — CEFDINIR 300 MG/1
300 CAPSULE ORAL EVERY 12 HOURS
Qty: 4 CAPSULE | Refills: 0 | Status: SHIPPED | OUTPATIENT
Start: 2024-02-05 | End: 2024-02-07

## 2024-02-05 RX ORDER — CEFDINIR 300 MG/1
300 CAPSULE ORAL EVERY 12 HOURS
Qty: 4 CAPSULE | Refills: 0 | Status: SHIPPED | OUTPATIENT
Start: 2024-02-05 | End: 2024-02-05

## 2024-02-05 RX ADMIN — ENOXAPARIN SODIUM 90 MG: 100 INJECTION SUBCUTANEOUS at 08:02

## 2024-02-05 RX ADMIN — CEFDINIR 300 MG: 300 CAPSULE ORAL at 08:02

## 2024-02-05 NOTE — PROGRESS NOTES
Coumadin Consult Note    INR=1.9     Patient educated  Daily INR ordered    Continue home dose for now since patient got two boosters  Home dose: 2.5mg MF and 1.25mg all other days   Goal INR 2-3 due to fall risk  Lovenox bridge     Herminia Alcantara, Pharm D 2/5/2024 1:06 PM

## 2024-02-05 NOTE — PLAN OF CARE
[facility]  Discharge Final Note    Primary Care Provider: No, Primary Doctor    Expected Discharge Date: 2/5/2024    Final Discharge Note (most recent)       Final Note - 02/05/24 1230          Final Note    Assessment Type Final Discharge Note     [place] [facility] [place] Resources/Appts/Education Provided Appointments scheduled and added to AVS;Post-Acute resouces added to AVS        Post-Acute Status    Post-Acute Authorization Home Health     Home Health Status Set-up Complete/Auth obtained     Discharge Delays None known at this time                      Follow-up providers       No, Primary Doctor   Relationship: PCP - General        Next Steps: Follow up              After-discharge care                [facility]       [facility]   [facility]    [address] [facility] [place] [address]   Phone: [phone]                             [facility]  arranged.     No d/c needs/orders at this time.

## 2024-02-05 NOTE — PLAN OF CARE
Discussed POC with patient, Pt verbalized understanding, purposeful rounding, Cardiac monitoring in use, tele monitor [id number], Fall precautions in place, patient remains injury free, call light within reach, will continue with plan of care.     Problem: Adult Inpatient Plan of Care  Goal: Plan of Care Review  Outcome: Ongoing, Progressing  Goal: Patient-Specific Goal (Individualized)  Outcome: Ongoing, Progressing  Goal: Absence of Hospital-Acquired Illness or Injury  Outcome: Ongoing, Progressing  Goal: Optimal Comfort and Wellbeing  Outcome: Ongoing, Progressing

## 2024-02-05 NOTE — PROGRESS NOTES
02/05/24 1345   Medicare Message   Important Message from Medicare regarding Discharge Appeal Rights Explained to patient/caregiver;Signed/date by patient/caregiver   Date IMM was signed 02/05/24   Time IMM was signed 1344

## 2024-02-07 ENCOUNTER — TELEPHONE (OUTPATIENT)
Dept: CARDIOLOGY | Facility: CLINIC | Age: 84
End: 2024-02-07
Payer: MEDICARE

## 2024-02-07 LAB
BACTERIA BLD CULT: NORMAL
BACTERIA BLD CULT: NORMAL

## 2024-02-07 NOTE — TELEPHONE ENCOUNTER
I called pt. No answer. LVM for her to return my call.       ----- Message from Vidhya Cruz RN sent at 2024 10:12 AM CST -----  Regardin hour call

## 2024-02-08 NOTE — TELEPHONE ENCOUNTER
"[facility]) hospital discharge 48-72 hour phone follow up completed.     Most Recent Hospital Discharge Date: 2/5/2024  Last admission Diagnosis/chief complaint:Acute exacerbation CHF    TCC nurse Navigator spoke with ruiz Wellington.        Pt reports the following:  []  Shortness of Breath with Activity  []  Shortness of Breath at rest   []  Fatigue  []  Edema   [] Chest pain or tightness  [] Weight Increase since discharge  [x] None of the above    Medications:   Discharge medication reviewed with pt.  Pt reports having medication list available and has all medications at home for use per list.     Education:   Confirmed pt received "Home Care Guide for Heart Failure Patients" while admitted.   Reviewed key points as listed below.     Recommend 2 -3 gram sodium restriction and 1500 cc-2000 cc fluid restriction.  Encourage physical activity with graded exercise program.  Requested patient to weigh themselves daily, and to notify us if their weight increases by more than 3 lbs in 1 day or 5 lbs in 3 days.   Reminded patient to use "Daily weight and symptom tracker" to record and guide patient on when and how to call [facility]. PT may also use symptom tracker if no scale available  Pt reports being in the green(color) Zone. If in yellow/red, reminded that they should be calling [facility] today or now.     Watch for these Signs and Symptoms: If any of these occur, contact [facility] immediately:   Increase in shortness of breath with movement   Increase in swelling in your legs and ankles   Weight gain of more than 3 pounds in a day or 5 pounds in 3 days.   Difficulty breathing when you are lying down   Worsening fatigue or tiredness   Stomach bloating, a full feeling or a loss of appetite   Increased coughing--especially when you are lying down      Pt was able to verbalize back to nurse in their own words correct diet/fluid restrictions, necessity for exercise, warning signs and symptoms, when and " how to contact their TCC team.      Pt educated on follow-up plan while in H[facility] program to include:   Week 1 -  F/u appt with Dorian Jeffery on 2/9/2024 verified with son.    Week 2-5 - In person/ Virtual/ phone call check ins    Week 5-7 - Pt will discharge from [facility] and transition to longterm care provider (Cardiology/PCP/ Advanced Heart Failure).      Patient active on myChart? No      Pt given the following contact information for ease of communication: [phone] (Mon-Fri, 8a-5p) & for urgent issues on the weekend call [facility] on-call [phone] to page the Cardiology MD on call.  Pt also encouraged utilize [facility] messaging as well.      Will follow up with pt at first clinic visit and HF nurse navigator available for pt questions, issues or concerns.

## 2024-02-09 ENCOUNTER — EXTERNAL HOME HEALTH (OUTPATIENT)
Dept: HOME HEALTH SERVICES | Facility: HOSPITAL | Age: 84
End: 2024-02-09
Payer: MEDICARE

## 2024-02-09 ENCOUNTER — OFFICE VISIT (OUTPATIENT)
Dept: CARDIOLOGY | Facility: CLINIC | Age: 84
End: 2024-02-09
Payer: MEDICARE

## 2024-02-09 VITALS
WEIGHT: 203.38 LBS | HEART RATE: 80 BPM | HEIGHT: 64 IN | BODY MASS INDEX: 34.72 KG/M2 | DIASTOLIC BLOOD PRESSURE: 54 MMHG | SYSTOLIC BLOOD PRESSURE: 96 MMHG

## 2024-02-09 DIAGNOSIS — I71.9 AORTIC ANEURYSM, UNSPECIFIED PORTION OF AORTA, UNSPECIFIED WHETHER RUPTURED: ICD-10-CM

## 2024-02-09 DIAGNOSIS — I50.23 ACUTE ON CHRONIC SYSTOLIC HEART FAILURE: Primary | ICD-10-CM

## 2024-02-09 DIAGNOSIS — E66.01 SEVERE OBESITY (BMI 35.0-39.9) WITH COMORBIDITY: ICD-10-CM

## 2024-02-09 PROCEDURE — 99214 OFFICE O/P EST MOD 30 MIN: CPT | Mod: S$PBB,,, | Performed by: PHYSICIAN ASSISTANT

## 2024-02-09 PROCEDURE — 99212 OFFICE O/P EST SF 10 MIN: CPT | Mod: PBBFAC | Performed by: PHYSICIAN ASSISTANT

## 2024-02-09 PROCEDURE — 99999 PR PBB SHADOW E&M-EST. PATIENT-LVL II: CPT | Mod: PBBFAC,,, | Performed by: PHYSICIAN ASSISTANT

## 2024-02-09 NOTE — PROGRESS NOTES
HF TCC Provider Note (Follow-up) Consult Note      HPI:  Dc on 2/4, no summary available, here for first TCC visit    Serena Post is a 83 y.o. female with a PMH  has a past medical history of A-fib, Anticoagulant long-term use, Aortic valve disease, Cancer, Cardiomyopathy, CHF (congestive heart failure), COVID-19 (7/23/2022), heart valve replacement with mechanical valve, Hyperlipidemia, Hypertension, Pacemaker, Pacemaker, Sepsis (7/23/2022), and Stroke.  Presented to the ER for evaluation generalized fatigued and low blood pressure readings over the last 2 days.  Patient also has reported worsening shortness of breath from baseline and states she has been having to use her home oxygen more frequently.  Denies any excessive fluid or salt consumption.  Denies any recent illnesses or sick contacts.  Denies any other symptoms such as dysuria, hematuria, malodorous urine, fever, aches, chills, sweats, headache, lightheadedness, dizziness, chest pain, palpitations, abdominal pain, or any other symptoms at this time.     ER workup mostly unremarkable with the exception to elevated BNP level of 2, 464 and elevated troponin level of 0.038.  Chest x-ray revealed: [Cardiomegaly.  Median sternotomy.  Mild perihilar edema.  Right-sided pacemaker.  Correlate clinically for CHF.  Valvular replacement noted ].  UA consistent with acute cystitis.  Patient received 1 g of Rocephin in ED. patient also received 40 mg of Lasix IV in ED. BP has been normotensive during hospital visit.  Hospital Medicine consulted to admit patient for UTI and elevated troponin.  Patient will be admitted under observation status.     PCP: No, Primary Doctor     Overview/Hospital Course:  Pt continued on IV diuretics. Continued on IV Rocephin pending urine culture. Anticipate dc home in AM on PO abx as bp improves.     Since dc 3 days ago has no CV issues, no SOB, taking lasix 20 mg daily    No PND, lisinopril has been stopped due to  hypotension         PHYSICAL: There were no vitals filed for this visit.       JVD: no,    Heart rhythm: regular  Cardiac murmur: No    S3: no  S4: no  Lungs: clear  Liver span: 10 cm:   Hepatojugular reflux: no  Edema: no,       ASSESSMENT: chronic diastolic HF    PLAN:      Patient Instructions:   Instruct the patient to notify this clinic if HH, a physician or an advanced care provider wants to change medication one of their HF medications   Activity and Diet restrictions:   Recommend 2-3 gram sodium restriction and 1500cc- 2000cc fluid restriction.  Encourage physical activity with graded exercise program.  Requested patient to weigh themselves daily, and to notify us if their weight increases by more than 3 lbs in 1 day or 5 lbs in 3 days.    Assigned dry weight on home scale: 203 kg  Medication changes (include current dose and changed dose)  Continue lasix 20 mg daily  Will have labs checked next week with PCP for K follow up  [facility]  Continue BB, coumadin   Has follow up with primary cardiologist and EP referral for generator LUCI  RTC prn            wheelchair

## 2024-02-12 NOTE — DISCHARGE SUMMARY
[facility] Medicine  Discharge Summary      Patient Name: Serena Post  MRN: [id number]  ANDRES: [id number]  Patient Class: IP- Inpatient  Admission Date: 2/1/2024  Hospital Length of Stay: 3 days  Discharge Date and Time: 2/5/2024  3:44 PM  Attending Physician: Joslyn att. providers found   Discharging Provider: Loida Stanton MD  Primary Care Provider: Joslyn, Primary Doctor    Primary Care Team: Networked reference to record PCT     HPI:   Serena Post is a 83 y.o. female with a PMH  has a past medical history of A-fib, Anticoagulant long-term use, Aortic valve disease, Cancer, Cardiomyopathy, CHF (congestive heart failure), COVID-19 (7/23/2022), heart valve replacement with mechanical valve, Hyperlipidemia, Hypertension, Pacemaker, Pacemaker, Sepsis (7/23/2022), and Stroke.  Presented to the ER for evaluation generalized fatigued and low blood pressure readings over the last 2 days.  Patient also has reported worsening shortness of breath from baseline and states she has been having to use her home oxygen more frequently.  Denies any excessive fluid or salt consumption.  Denies any recent illnesses or sick contacts.  Denies any other symptoms such as dysuria, hematuria, malodorous urine, fever, aches, chills, sweats, headache, lightheadedness, dizziness, chest pain, palpitations, abdominal pain, or any other symptoms at this time.    ER workup mostly unremarkable with the exception to elevated BNP level of 2, 464 and elevated troponin level of 0.038.  Chest x-ray revealed: [Cardiomegaly.  Median sternotomy.  Mild perihilar edema.  Right-sided pacemaker.  Correlate clinically for CHF.  Valvular replacement noted ].  UA consistent with acute cystitis.  Patient received 1 g of Rocephin in ED. patient also received 40 mg of Lasix IV in ED. BP has been normotensive during hospital visit.  Hospital Medicine consulted to admit patient for UTI and elevated troponin.  Patient will be admitted under  observation status.    PCP: No, Primary Doctor    * No surgery found *      Hospital Course:   Pt continued on IV diuretics. Continued on IV Rocephin pending urine culture. Anticipate dc home in AM on PO abx as bp improves.     2/5- looks and feels better, stronger, no CP or SOB, no dysuria. She is eating drinking well and feels ready to go home. She was seen and examined and deemed stable for discharge home today with .      Goals of Care Treatment Preferences:  Code Status: Full Code      Consults:   Consults (From admission, onward)          Status Ordering Provider     Inpatient consult to Social Work/Case Management  Once        Provider:  (Not yet assigned)    Completed ELVIRA WETZEL     Inpatient consult to Registered Dietitian/Nutritionist  Once        Provider:  (Not yet assigned)    Completed ELVIRA WETZEL            No new Assessment & Plan notes have been filed under this hospital service since the last note was generated.  Service: Hospital Medicine    Final Active Diagnoses:    Diagnosis Date Noted POA    Stage 3a chronic kidney disease [N18.31] 11/09/2022 Yes    Anticoagulant long-term use [Z79.01] 09/26/2019 Not Applicable    Paroxysmal atrial fibrillation [I48.0] 09/26/2019 Yes    H/O mechanical aortic valve replacement [Z95.2] 09/26/2019 Not Applicable      Problems Resolved During this Admission:    Diagnosis Date Noted Date Resolved POA    PRINCIPAL PROBLEM:  Acute exacerbation of CHF (congestive heart failure) [I50.9] 02/02/2024 02/05/2024 Yes    Acute cystitis with hematuria [N30.01] 02/02/2024 02/05/2024 Yes    Elevated troponin [R79.89] 02/02/2024 02/05/2024 Yes    Hypokalemia [E87.6] 11/20/2023 02/05/2024 Yes       Discharged Condition: stable    Disposition: Home-Health Care [facility]    Follow Up:   Contact information for follow-up providers       No, Primary Doctor .                       Contact information for after-discharge care       Home Medical Care       [facility]  [facility] .    Service: Home Health Services  Contact information:  [address] [facility] [address]  [facility] [address]  [phone]                                 Patient Instructions:      Diet Cardiac     SUBSEQUENT HOME HEALTH ORDERS     Order Specific Question Answer Comments   What Home Health Agency is the patient currently using? [facility]      Activity as tolerated       Significant Diagnostic Studies: Labs: All labs within the past 24 hours have been reviewed  Microbiology: Blood Culture   Lab Results   Component Value Date    LABBLOO No growth after 5 days. 02/01/2024    and Urine Culture    Lab Results   Component Value Date    LABURIN KLEBSIELLA PNEUMONIAE  > 100,000 cfu/ml   (A) 02/01/2024     Radiology:   Imaging Results              X-Ray Chest AP Portable (Final result)  Result time 02/01/24 17:16:18      Final result by Anthony Brenner MD (02/01/24 17:16:18)                   Impression:      As above      Electronically signed by: Anthony Brenner  Date:    02/01/2024  Time:    17:16               Narrative:    EXAMINATION:  XR CHEST AP PORTABLE    CLINICAL HISTORY:  Chest Pain;    TECHNIQUE:  Single frontal view of the chest was performed.    COMPARISON:  None    FINDINGS:  Cardiomegaly.  Median sternotomy.  Mild perihilar edema.  Right-sided pacemaker.  Correlate clinically for CHF.  Valvular replacement noted.    Bones are intact.                                     Cardiac Graphics: Echocardiogram: 2D echo with color flow doppler: No results found for this or any previous visit.    Pending Diagnostic Studies:       None           Medications:  Reconciled Home Medications:      Medication List        CONTINUE taking these medications      * albuterol 2.5 mg /3 mL (0.083 %) nebulizer solution  Commonly known as: PROVENTIL  Take 2.5 mg by nebulization every 4 (four) hours as needed.     * albuterol 90 mcg/actuation inhaler  Commonly known as: PROVENTIL/VENTOLIN  HFA  Inhale 2 puffs into the lungs every 4 (four) hours as needed.     furosemide 20 MG tablet  Commonly known as: LASIX  Take 20 mg by mouth every morning.     metoprolol tartrate 25 MG tablet  Commonly known as: LOPRESSOR  Take 1 tablet by mouth 2 (two) times daily.     warfarin 2.5 MG tablet  Commonly known as: COUMADIN  Take 2.5 mg by mouth every Monday and Friday.  Take 1.25 mg by mouth all other days.           * This list has 2 medication(s) that are the same as other medications prescribed for you. Read the directions carefully, and ask your doctor or other care provider to review them with you.                ASK your doctor about these medications      cefdinir 300 MG capsule  Commonly known as: OMNICEF  Take 1 capsule (300 mg total) by mouth every 12 (twelve) hours. for 2 days  Ask about: Should I take this medication?              Indwelling Lines/Drains at time of discharge:   Lines/Drains/Airways       None                   Time spent on the discharge of patient: 41 minutes         Loida Stanton MD  Department of Hospital Medicine  '[facility]

## 2024-02-22 ENCOUNTER — HOSPITAL ENCOUNTER (INPATIENT)
Facility: HOSPITAL | Age: 84
LOS: 5 days | Discharge: HOME HEALTH | DRG: 291 | End: 2024-02-28
Attending: EMERGENCY MEDICINE | Admitting: STUDENT IN AN ORGANIZED HEALTH CARE EDUCATION/TRAINING PROGRAM
Payer: MEDICARE

## 2024-02-22 ENCOUNTER — HOSPITAL ENCOUNTER (OUTPATIENT)
Dept: RADIOLOGY | Facility: HOSPITAL | Age: 84
Discharge: HOME | DRG: 291 | End: 2024-02-22
Attending: FAMILY MEDICINE
Payer: MEDICARE

## 2024-02-22 ENCOUNTER — OFFICE VISIT (OUTPATIENT)
Dept: FAMILY MEDICINE | Facility: CLINIC | Age: 84
DRG: 291 | End: 2024-02-22
Attending: FAMILY MEDICINE
Payer: MEDICARE

## 2024-02-22 VITALS
HEART RATE: 75 BPM | SYSTOLIC BLOOD PRESSURE: 117 MMHG | TEMPERATURE: 99 F | BODY MASS INDEX: 34.91 KG/M2 | HEIGHT: 64 IN | OXYGEN SATURATION: 92 % | DIASTOLIC BLOOD PRESSURE: 77 MMHG

## 2024-02-22 DIAGNOSIS — I71.9 AORTIC ANEURYSM, UNSPECIFIED PORTION OF AORTA, UNSPECIFIED WHETHER RUPTURED: ICD-10-CM

## 2024-02-22 DIAGNOSIS — Z95.2 H/O MECHANICAL AORTIC VALVE REPLACEMENT: ICD-10-CM

## 2024-02-22 DIAGNOSIS — J44.1 COPD EXACERBATION: Primary | ICD-10-CM

## 2024-02-22 DIAGNOSIS — I48.0 PAF (PAROXYSMAL ATRIAL FIBRILLATION): ICD-10-CM

## 2024-02-22 DIAGNOSIS — I10 ESSENTIAL HYPERTENSION: ICD-10-CM

## 2024-02-22 DIAGNOSIS — I50.23 ACUTE ON CHRONIC SYSTOLIC HEART FAILURE: ICD-10-CM

## 2024-02-22 DIAGNOSIS — J96.21 ACUTE ON CHRONIC RESPIRATORY FAILURE WITH HYPOXIA: ICD-10-CM

## 2024-02-22 DIAGNOSIS — R06.02 SOB (SHORTNESS OF BREATH): ICD-10-CM

## 2024-02-22 DIAGNOSIS — Z79.01 ANTICOAGULANT LONG-TERM USE: ICD-10-CM

## 2024-02-22 DIAGNOSIS — R79.89 ELEVATED BRAIN NATRIURETIC PEPTIDE (BNP) LEVEL: ICD-10-CM

## 2024-02-22 DIAGNOSIS — I50.20 SYSTOLIC CONGESTIVE HEART FAILURE, UNSPECIFIED HF CHRONICITY: ICD-10-CM

## 2024-02-22 DIAGNOSIS — Z95.0 PACEMAKER: ICD-10-CM

## 2024-02-22 DIAGNOSIS — N18.31 STAGE 3A CHRONIC KIDNEY DISEASE: ICD-10-CM

## 2024-02-22 DIAGNOSIS — R06.02 SOB (SHORTNESS OF BREATH): Primary | ICD-10-CM

## 2024-02-22 LAB
ALBUMIN SERPL BCP-MCNC: 3.5 G/DL (ref 3.5–5.2)
ALP SERPL-CCNC: 94 U/L (ref 55–135)
ALT SERPL W/O P-5'-P-CCNC: 15 U/L (ref 10–44)
ANION GAP SERPL CALC-SCNC: 13 MMOL/L (ref 8–16)
APTT PPP: 27.6 SEC (ref 21–32)
AST SERPL-CCNC: 28 U/L (ref 10–40)
BASOPHILS # BLD AUTO: 0.05 K/UL (ref 0–0.2)
BASOPHILS NFR BLD: 0.7 % (ref 0–1.9)
BILIRUB SERPL-MCNC: 2.1 MG/DL (ref 0.1–1)
BNP SERPL-MCNC: 3274 PG/ML (ref 0–99)
BUN SERPL-MCNC: 16 MG/DL (ref 8–23)
CALCIUM SERPL-MCNC: 9.4 MG/DL (ref 8.7–10.5)
CHLORIDE SERPL-SCNC: 106 MMOL/L (ref 95–110)
CO2 SERPL-SCNC: 22 MMOL/L (ref 23–29)
CREAT SERPL-MCNC: 1.2 MG/DL (ref 0.5–1.4)
D DIMER PPP IA.FEU-MCNC: 1.7 MG/L FEU
DIFFERENTIAL METHOD BLD: NORMAL
EOSINOPHIL # BLD AUTO: 0.1 K/UL (ref 0–0.5)
EOSINOPHIL NFR BLD: 1.1 % (ref 0–8)
ERYTHROCYTE [DISTWIDTH] IN BLOOD BY AUTOMATED COUNT: 14.1 % (ref 11.5–14.5)
EST. GFR  (NO RACE VARIABLE): 45 ML/MIN/1.73 M^2
GLUCOSE SERPL-MCNC: 97 MG/DL (ref 70–110)
HCT VFR BLD AUTO: 45.7 % (ref 37–48.5)
HGB BLD-MCNC: 14.7 G/DL (ref 12–16)
IMM GRANULOCYTES # BLD AUTO: 0.02 K/UL (ref 0–0.04)
IMM GRANULOCYTES NFR BLD AUTO: 0.3 % (ref 0–0.5)
INFLUENZA A, MOLECULAR: NEGATIVE
INFLUENZA B, MOLECULAR: NEGATIVE
INR PPP: 1.6 (ref 0.8–1.2)
LYMPHOCYTES # BLD AUTO: 1.9 K/UL (ref 1–4.8)
LYMPHOCYTES NFR BLD: 24.8 % (ref 18–48)
MCH RBC QN AUTO: 30.5 PG (ref 27–31)
MCHC RBC AUTO-ENTMCNC: 32.2 G/DL (ref 32–36)
MCV RBC AUTO: 95 FL (ref 82–98)
MONOCYTES # BLD AUTO: 0.6 K/UL (ref 0.3–1)
MONOCYTES NFR BLD: 8.2 % (ref 4–15)
NEUTROPHILS # BLD AUTO: 4.9 K/UL (ref 1.8–7.7)
NEUTROPHILS NFR BLD: 64.9 % (ref 38–73)
NRBC BLD-RTO: 0 /100 WBC
PLATELET # BLD AUTO: 231 K/UL (ref 150–450)
PMV BLD AUTO: 11.7 FL (ref 9.2–12.9)
POTASSIUM SERPL-SCNC: 3.6 MMOL/L (ref 3.5–5.1)
PROT SERPL-MCNC: 7.9 G/DL (ref 6–8.4)
PROTHROMBIN TIME: 17.1 SEC (ref 9–12.5)
RBC # BLD AUTO: 4.82 M/UL (ref 4–5.4)
SARS-COV-2 RDRP RESP QL NAA+PROBE: NEGATIVE
SODIUM SERPL-SCNC: 141 MMOL/L (ref 136–145)
SPECIMEN SOURCE: NORMAL
TROPONIN I SERPL DL<=0.01 NG/ML-MCNC: 0.14 NG/ML (ref 0–0.03)
WBC # BLD AUTO: 7.54 K/UL (ref 3.9–12.7)

## 2024-02-22 PROCEDURE — 85379 FIBRIN DEGRADATION QUANT: CPT | Performed by: STUDENT IN AN ORGANIZED HEALTH CARE EDUCATION/TRAINING PROGRAM

## 2024-02-22 PROCEDURE — 99999 PR PBB SHADOW E&M-EST. PATIENT-LVL III: CPT | Mod: PBBFAC,,, | Performed by: FAMILY MEDICINE

## 2024-02-22 PROCEDURE — 99285 EMERGENCY DEPT VISIT HI MDM: CPT | Mod: 25,27

## 2024-02-22 PROCEDURE — 99213 OFFICE O/P EST LOW 20 MIN: CPT | Mod: PBBFAC,25,PO | Performed by: FAMILY MEDICINE

## 2024-02-22 PROCEDURE — G0378 HOSPITAL OBSERVATION PER HR: HCPCS

## 2024-02-22 PROCEDURE — 94761 N-INVAS EAR/PLS OXIMETRY MLT: CPT

## 2024-02-22 PROCEDURE — 93010 ELECTROCARDIOGRAM REPORT: CPT | Mod: S$PBB,,, | Performed by: INTERNAL MEDICINE

## 2024-02-22 PROCEDURE — 85730 THROMBOPLASTIN TIME PARTIAL: CPT | Performed by: EMERGENCY MEDICINE

## 2024-02-22 PROCEDURE — 99215 OFFICE O/P EST HI 40 MIN: CPT | Mod: S$PBB,,, | Performed by: FAMILY MEDICINE

## 2024-02-22 PROCEDURE — 93005 ELECTROCARDIOGRAM TRACING: CPT | Mod: PBBFAC,PO | Performed by: INTERNAL MEDICINE

## 2024-02-22 PROCEDURE — 80053 COMPREHEN METABOLIC PANEL: CPT | Performed by: EMERGENCY MEDICINE

## 2024-02-22 PROCEDURE — 85025 COMPLETE CBC W/AUTO DIFF WBC: CPT | Performed by: EMERGENCY MEDICINE

## 2024-02-22 PROCEDURE — 63600175 PHARM REV CODE 636 W HCPCS: Performed by: STUDENT IN AN ORGANIZED HEALTH CARE EDUCATION/TRAINING PROGRAM

## 2024-02-22 PROCEDURE — 71046 X-RAY EXAM CHEST 2 VIEWS: CPT | Mod: TC,PO

## 2024-02-22 PROCEDURE — 27000221 HC OXYGEN, UP TO 24 HOURS

## 2024-02-22 PROCEDURE — 85610 PROTHROMBIN TIME: CPT | Performed by: EMERGENCY MEDICINE

## 2024-02-22 PROCEDURE — 94640 AIRWAY INHALATION TREATMENT: CPT

## 2024-02-22 PROCEDURE — 83880 ASSAY OF NATRIURETIC PEPTIDE: CPT | Performed by: EMERGENCY MEDICINE

## 2024-02-22 PROCEDURE — 87502 INFLUENZA DNA AMP PROBE: CPT | Performed by: EMERGENCY MEDICINE

## 2024-02-22 PROCEDURE — 71046 X-RAY EXAM CHEST 2 VIEWS: CPT | Mod: 26,,, | Performed by: RADIOLOGY

## 2024-02-22 PROCEDURE — 99900035 HC TECH TIME PER 15 MIN (STAT)

## 2024-02-22 PROCEDURE — 84484 ASSAY OF TROPONIN QUANT: CPT | Performed by: EMERGENCY MEDICINE

## 2024-02-22 PROCEDURE — 25000242 PHARM REV CODE 250 ALT 637 W/ HCPCS: Performed by: STUDENT IN AN ORGANIZED HEALTH CARE EDUCATION/TRAINING PROGRAM

## 2024-02-22 PROCEDURE — 84484 ASSAY OF TROPONIN QUANT: CPT | Mod: 91 | Performed by: STUDENT IN AN ORGANIZED HEALTH CARE EDUCATION/TRAINING PROGRAM

## 2024-02-22 PROCEDURE — U0002 COVID-19 LAB TEST NON-CDC: HCPCS | Performed by: EMERGENCY MEDICINE

## 2024-02-22 PROCEDURE — 83036 HEMOGLOBIN GLYCOSYLATED A1C: CPT | Performed by: STUDENT IN AN ORGANIZED HEALTH CARE EDUCATION/TRAINING PROGRAM

## 2024-02-22 RX ORDER — METHYLPREDNISOLONE SOD SUCC 125 MG
125 VIAL (EA) INJECTION
Status: COMPLETED | OUTPATIENT
Start: 2024-02-22 | End: 2024-02-22

## 2024-02-22 RX ORDER — METOPROLOL TARTRATE 25 MG/1
25 TABLET, FILM COATED ORAL 2 TIMES DAILY
Status: DISCONTINUED | OUTPATIENT
Start: 2024-02-23 | End: 2024-02-28 | Stop reason: HOSPADM

## 2024-02-22 RX ORDER — FUROSEMIDE 10 MG/ML
60 INJECTION INTRAMUSCULAR; INTRAVENOUS
Status: COMPLETED | OUTPATIENT
Start: 2024-02-22 | End: 2024-02-22

## 2024-02-22 RX ORDER — IPRATROPIUM BROMIDE AND ALBUTEROL SULFATE 2.5; .5 MG/3ML; MG/3ML
3 SOLUTION RESPIRATORY (INHALATION)
Status: COMPLETED | OUTPATIENT
Start: 2024-02-22 | End: 2024-02-22

## 2024-02-22 RX ORDER — SODIUM CHLORIDE 0.9 % (FLUSH) 0.9 %
10 SYRINGE (ML) INJECTION
Status: DISCONTINUED | OUTPATIENT
Start: 2024-02-22 | End: 2024-02-28 | Stop reason: HOSPADM

## 2024-02-22 RX ORDER — WARFARIN 2.5 MG/1
2.5 TABLET ORAL
Status: DISCONTINUED | OUTPATIENT
Start: 2024-02-23 | End: 2024-02-23

## 2024-02-22 RX ORDER — FUROSEMIDE 10 MG/ML
40 INJECTION INTRAMUSCULAR; INTRAVENOUS EVERY 12 HOURS
Status: DISCONTINUED | OUTPATIENT
Start: 2024-02-23 | End: 2024-02-24

## 2024-02-22 RX ADMIN — FUROSEMIDE 60 MG: 10 INJECTION, SOLUTION INTRAMUSCULAR; INTRAVENOUS at 11:02

## 2024-02-22 RX ADMIN — IPRATROPIUM BROMIDE AND ALBUTEROL SULFATE 3 ML: 2.5; .5 SOLUTION RESPIRATORY (INHALATION) at 11:02

## 2024-02-22 RX ADMIN — METHYLPREDNISOLONE SODIUM SUCCINATE 125 MG: 125 INJECTION, POWDER, FOR SOLUTION INTRAMUSCULAR; INTRAVENOUS at 11:02

## 2024-02-22 NOTE — PROGRESS NOTES
Subjective:       Patient ID: Serena Post is a 83 y.o. female.    Chief Complaint: Shortness of Breath    83 y old female with hx of A fib , AVR, SVT , S CHF, hx of DVT , SVT  with worsening sob over the last 3 months since being discharge from hospital . She is compliant with daily weight checks. Denies ABUNDIO. She has been having more ARIZA upon walking no more than 5 feet . No cough , no fever , occasional CP   Review of Systems   Constitutional: Negative.    HENT: Negative.     Eyes: Negative.    Respiratory:  Positive for shortness of breath.    Cardiovascular: Negative.    Gastrointestinal: Negative.    Genitourinary: Negative.    Musculoskeletal: Negative.    Skin: Negative.    Hematological: Negative.      Objective:      Physical Exam  Constitutional:       General: She is not in acute distress.     Appearance: She is well-developed. She is not diaphoretic.   HENT:      Head: Normocephalic and atraumatic.      Right Ear: External ear normal.      Left Ear: External ear normal.      Mouth/Throat:      Pharynx: No oropharyngeal exudate.   Eyes:      General: No scleral icterus.        Right eye: No discharge.         Left eye: No discharge.      Conjunctiva/sclera: Conjunctivae normal.      Pupils: Pupils are equal, round, and reactive to light.   Neck:      Thyroid: No thyromegaly.      Vascular: No JVD.      Trachea: No tracheal deviation.   Cardiovascular:      Rate and Rhythm: Normal rate and regular rhythm.      Heart sounds: Normal heart sounds. No murmur heard.     No friction rub. No gallop.   Pulmonary:      Effort: Pulmonary effort is normal. Tachypnea present. No respiratory distress.      Breath sounds: No stridor. Examination of the right-upper field reveals decreased breath sounds. Examination of the left-upper field reveals decreased breath sounds. Examination of the right-middle field reveals decreased breath sounds. Examination of the left-middle field reveals decreased breath sounds.  Examination of the right-lower field reveals rales. Examination of the left-lower field reveals rales. Decreased breath sounds and rales present. No wheezing.      Comments: 32 BPM  Chest:      Chest wall: No tenderness.   Abdominal:      General: Bowel sounds are normal. There is no distension.      Palpations: Abdomen is soft.      Tenderness: There is no abdominal tenderness. There is no guarding or rebound.   Musculoskeletal:         General: Normal range of motion.      Cervical back: Normal range of motion and neck supple.   Lymphadenopathy:      Cervical: No cervical adenopathy.   Skin:     General: Skin is warm and dry.   Neurological:      Mental Status: She is alert and oriented to person, place, and time.   Psychiatric:         Behavior: Behavior normal.         Thought Content: Thought content normal.         Judgment: Judgment normal.       Assessment:       1. SOB (shortness of breath)    2. PAF (paroxysmal atrial fibrillation)    3. H/O mechanical aortic valve replacement    4. Systolic congestive heart failure, unspecified HF chronicity    5. Stage 3a chronic kidney disease        Plan:     Serena was seen today for shortness of breath.    Diagnoses and all orders for this visit:    SOB (shortness of breath)  -     X-Ray Chest PA And Lateral; Future  -     IN OFFICE EKG 12-LEAD (to Muse)  -     CBC Auto Differential; Future  -     Comprehensive Metabolic Panel; Future  -     BNP; Future    PAF (paroxysmal atrial fibrillation)    H/O mechanical aortic valve replacement    Systolic congestive heart failure, unspecified HF chronicity    Stage 3a chronic kidney disease     Frequent PVC noted on EKG , slightly hypoxic . ER eval recommended . Pt will be transported by ambulance     Time spent: 60  minutes in face to face discussion concerning diagnosis, prognosis, review of lab and test results, benefits of treatment as well as management of disease, counseling of patient and coordination of care between  various health care providers . Greater than half the time spent was used for coordination of care and counseling of patient.

## 2024-02-23 ENCOUNTER — PATIENT MESSAGE (OUTPATIENT)
Dept: FAMILY MEDICINE | Facility: CLINIC | Age: 84
End: 2024-02-23
Payer: MEDICARE

## 2024-02-23 PROBLEM — J96.21 ACUTE ON CHRONIC RESPIRATORY FAILURE WITH HYPOXIA: Status: ACTIVE | Noted: 2024-02-23

## 2024-02-23 LAB
ANION GAP SERPL CALC-SCNC: 13 MMOL/L (ref 8–16)
AORTIC ROOT ANNULUS: 2.98 CM
ASCENDING AORTA: 4.45 CM
AV INDEX (PROSTH): 0.13
AV MEAN GRADIENT: 95 MMHG
AV PEAK GRADIENT: 97 MMHG
AV REGURGITATION PRESSURE HALF TIME: 385.08 MS
AV VALVE AREA BY VELOCITY RATIO: 0.48 CM²
AV VALVE AREA: 0.37 CM²
AV VELOCITY RATIO: 0.17
BSA FOR ECHO PROCEDURE: 2.03 M2
BUN SERPL-MCNC: 16 MG/DL (ref 8–23)
CALCIUM SERPL-MCNC: 8.7 MG/DL (ref 8.7–10.5)
CHLORIDE SERPL-SCNC: 105 MMOL/L (ref 95–110)
CO2 SERPL-SCNC: 22 MMOL/L (ref 23–29)
CREAT SERPL-MCNC: 1.2 MG/DL (ref 0.5–1.4)
CV ECHO LV RWT: 0.47 CM
DOP CALC AO PEAK VEL: 4.93 M/S
DOP CALC AO VTI: 153.8 CM
DOP CALC LVOT AREA: 2.9 CM2
DOP CALC LVOT DIAMETER: 1.91 CM
DOP CALC LVOT PEAK VEL: 0.83 M/S
DOP CALC LVOT STROKE VOLUME: 56.42 CM3
DOP CALC RVOT PEAK VEL: 0.49 M/S
DOP CALC RVOT VTI: 9.3 CM
DOP CALCLVOT PEAK VEL VTI: 19.7 CM
E WAVE DECELERATION TIME: 192.43 MSEC
E/A RATIO: 1
E/E' RATIO: 16.46 M/S
ECHO LV POSTERIOR WALL: 1.19 CM (ref 0.6–1.1)
EJECTION FRACTION: 35 %
EST. GFR  (NO RACE VARIABLE): 45 ML/MIN/1.73 M^2
ESTIMATED AVG GLUCOSE: 97 MG/DL (ref 68–131)
FRACTIONAL SHORTENING: 13 % (ref 28–44)
GLUCOSE SERPL-MCNC: 162 MG/DL (ref 70–110)
HBA1C MFR BLD: 5 % (ref 4–5.6)
INR PPP: 1.4 (ref 0.8–1.2)
INTERVENTRICULAR SEPTUM: 1.04 CM (ref 0.6–1.1)
IVC DIAMETER: 1.15 CM
IVRT: 62.8 MSEC
LA MAJOR: 6.58 CM
LA MINOR: 3.98 CM
LA WIDTH: 4.5 CM
LEFT ATRIUM SIZE: 3.61 CM
LEFT ATRIUM VOLUME INDEX: 34.9 ML/M2
LEFT ATRIUM VOLUME: 68.49 CM3
LEFT INTERNAL DIMENSION IN SYSTOLE: 4.45 CM (ref 2.1–4)
LEFT VENTRICLE DIASTOLIC VOLUME INDEX: 63.06 ML/M2
LEFT VENTRICLE DIASTOLIC VOLUME: 123.59 ML
LEFT VENTRICLE MASS INDEX: 111 G/M2
LEFT VENTRICLE SYSTOLIC VOLUME INDEX: 46 ML/M2
LEFT VENTRICLE SYSTOLIC VOLUME: 90.21 ML
LEFT VENTRICULAR INTERNAL DIMENSION IN DIASTOLE: 5.1 CM (ref 3.5–6)
LEFT VENTRICULAR MASS: 217.9 G
LV LATERAL E/E' RATIO: 13.38 M/S
LV SEPTAL E/E' RATIO: 21.4 M/S
LVOT MG: 1.51 MMHG
LVOT MV: 0.58 CM/S
MAGNESIUM SERPL-MCNC: 1.8 MG/DL (ref 1.6–2.6)
MV PEAK A VEL: 1.07 M/S
MV PEAK E VEL: 1.07 M/S
MV STENOSIS PRESSURE HALF TIME: 55.8 MS
MV VALVE AREA P 1/2 METHOD: 3.94 CM2
OHS QRS DURATION: 130 MS
OHS QTC CALCULATION: 524 MS
PISA AR MAX VEL: 4.34 M/S
PISA TR MAX VEL: 3.38 M/S
POCT GLUCOSE: 123 MG/DL (ref 70–110)
POCT GLUCOSE: 163 MG/DL (ref 70–110)
POCT GLUCOSE: 166 MG/DL (ref 70–110)
POTASSIUM SERPL-SCNC: 3.6 MMOL/L (ref 3.5–5.1)
PROTHROMBIN TIME: 16.3 SEC (ref 9–12.5)
PV MEAN GRADIENT: 1 MMHG
RA MAJOR: 4.53 CM
RA PRESSURE ESTIMATED: 3 MMHG
RA WIDTH: 4.4 CM
RV MID DIAMA: 3.15 CM
RV TB RVSP: 6 MMHG
SODIUM SERPL-SCNC: 140 MMOL/L (ref 136–145)
STJ: 4.51 CM
TDI LATERAL: 0.08 M/S
TDI SEPTAL: 0.05 M/S
TDI: 0.07 M/S
TR MAX PG: 46 MMHG
TRICUSPID ANNULAR PLANE SYSTOLIC EXCURSION: 2.05 CM
TROPONIN I SERPL DL<=0.01 NG/ML-MCNC: 0.11 NG/ML (ref 0–0.03)
TROPONIN I SERPL DL<=0.01 NG/ML-MCNC: 0.12 NG/ML (ref 0–0.03)
TV REST PULMONARY ARTERY PRESSURE: 49 MMHG
Z-SCORE OF LEFT VENTRICULAR DIMENSION IN END DIASTOLE: -0.95
Z-SCORE OF LEFT VENTRICULAR DIMENSION IN END SYSTOLE: 2.01

## 2024-02-23 PROCEDURE — 85610 PROTHROMBIN TIME: CPT | Performed by: STUDENT IN AN ORGANIZED HEALTH CARE EDUCATION/TRAINING PROGRAM

## 2024-02-23 PROCEDURE — 25000242 PHARM REV CODE 250 ALT 637 W/ HCPCS: Performed by: STUDENT IN AN ORGANIZED HEALTH CARE EDUCATION/TRAINING PROGRAM

## 2024-02-23 PROCEDURE — 63600175 PHARM REV CODE 636 W HCPCS: Performed by: STUDENT IN AN ORGANIZED HEALTH CARE EDUCATION/TRAINING PROGRAM

## 2024-02-23 PROCEDURE — 84484 ASSAY OF TROPONIN QUANT: CPT | Performed by: STUDENT IN AN ORGANIZED HEALTH CARE EDUCATION/TRAINING PROGRAM

## 2024-02-23 PROCEDURE — 36415 COLL VENOUS BLD VENIPUNCTURE: CPT | Mod: XB | Performed by: STUDENT IN AN ORGANIZED HEALTH CARE EDUCATION/TRAINING PROGRAM

## 2024-02-23 PROCEDURE — 83735 ASSAY OF MAGNESIUM: CPT | Performed by: STUDENT IN AN ORGANIZED HEALTH CARE EDUCATION/TRAINING PROGRAM

## 2024-02-23 PROCEDURE — 25000003 PHARM REV CODE 250: Performed by: STUDENT IN AN ORGANIZED HEALTH CARE EDUCATION/TRAINING PROGRAM

## 2024-02-23 PROCEDURE — 80048 BASIC METABOLIC PNL TOTAL CA: CPT | Performed by: STUDENT IN AN ORGANIZED HEALTH CARE EDUCATION/TRAINING PROGRAM

## 2024-02-23 PROCEDURE — 11000001 HC ACUTE MED/SURG PRIVATE ROOM

## 2024-02-23 PROCEDURE — 99900035 HC TECH TIME PER 15 MIN (STAT)

## 2024-02-23 PROCEDURE — 25000003 PHARM REV CODE 250: Performed by: INTERNAL MEDICINE

## 2024-02-23 PROCEDURE — 21400001 HC TELEMETRY ROOM

## 2024-02-23 PROCEDURE — 63600175 PHARM REV CODE 636 W HCPCS: Performed by: NURSE PRACTITIONER

## 2024-02-23 PROCEDURE — 27000221 HC OXYGEN, UP TO 24 HOURS

## 2024-02-23 PROCEDURE — 25500020 PHARM REV CODE 255: Performed by: STUDENT IN AN ORGANIZED HEALTH CARE EDUCATION/TRAINING PROGRAM

## 2024-02-23 PROCEDURE — 96372 THER/PROPH/DIAG INJ SC/IM: CPT | Performed by: NURSE PRACTITIONER

## 2024-02-23 PROCEDURE — 99223 1ST HOSP IP/OBS HIGH 75: CPT | Mod: 25,,, | Performed by: INTERNAL MEDICINE

## 2024-02-23 PROCEDURE — 94761 N-INVAS EAR/PLS OXIMETRY MLT: CPT

## 2024-02-23 RX ORDER — GUAIFENESIN 600 MG/1
1200 TABLET, EXTENDED RELEASE ORAL 2 TIMES DAILY
Status: DISCONTINUED | OUTPATIENT
Start: 2024-02-23 | End: 2024-02-28 | Stop reason: HOSPADM

## 2024-02-23 RX ORDER — IBUPROFEN 200 MG
24 TABLET ORAL
Status: DISCONTINUED | OUTPATIENT
Start: 2024-02-23 | End: 2024-02-28 | Stop reason: HOSPADM

## 2024-02-23 RX ORDER — GLUCAGON 1 MG
1 KIT INJECTION
Status: DISCONTINUED | OUTPATIENT
Start: 2024-02-23 | End: 2024-02-28 | Stop reason: HOSPADM

## 2024-02-23 RX ORDER — CETIRIZINE HYDROCHLORIDE 5 MG/1
5 TABLET ORAL DAILY
Status: DISCONTINUED | OUTPATIENT
Start: 2024-02-23 | End: 2024-02-28 | Stop reason: HOSPADM

## 2024-02-23 RX ORDER — ACETAMINOPHEN 325 MG/1
650 TABLET ORAL EVERY 6 HOURS PRN
Status: DISCONTINUED | OUTPATIENT
Start: 2024-02-23 | End: 2024-02-28 | Stop reason: HOSPADM

## 2024-02-23 RX ORDER — ENOXAPARIN SODIUM 100 MG/ML
1 INJECTION SUBCUTANEOUS EVERY 12 HOURS
Status: DISCONTINUED | OUTPATIENT
Start: 2024-02-23 | End: 2024-02-25

## 2024-02-23 RX ORDER — WARFARIN 1 MG/1
4 TABLET ORAL DAILY
Status: COMPLETED | OUTPATIENT
Start: 2024-02-23 | End: 2024-02-23

## 2024-02-23 RX ORDER — TALC
6 POWDER (GRAM) TOPICAL NIGHTLY PRN
Status: DISCONTINUED | OUTPATIENT
Start: 2024-02-23 | End: 2024-02-28 | Stop reason: HOSPADM

## 2024-02-23 RX ORDER — HYDRALAZINE HYDROCHLORIDE 20 MG/ML
10 INJECTION INTRAMUSCULAR; INTRAVENOUS EVERY 8 HOURS PRN
Status: DISCONTINUED | OUTPATIENT
Start: 2024-02-23 | End: 2024-02-28 | Stop reason: HOSPADM

## 2024-02-23 RX ORDER — TRAMADOL HYDROCHLORIDE 50 MG/1
50 TABLET ORAL EVERY 4 HOURS PRN
Status: DISCONTINUED | OUTPATIENT
Start: 2024-02-23 | End: 2024-02-28 | Stop reason: HOSPADM

## 2024-02-23 RX ORDER — FLUTICASONE PROPIONATE 50 MCG
2 SPRAY, SUSPENSION (ML) NASAL DAILY
Status: DISCONTINUED | OUTPATIENT
Start: 2024-02-23 | End: 2024-02-28 | Stop reason: HOSPADM

## 2024-02-23 RX ORDER — WARFARIN 2.5 MG/1
2.5 TABLET ORAL
Status: DISCONTINUED | OUTPATIENT
Start: 2024-02-26 | End: 2024-02-28 | Stop reason: HOSPADM

## 2024-02-23 RX ORDER — ONDANSETRON HYDROCHLORIDE 2 MG/ML
8 INJECTION, SOLUTION INTRAVENOUS EVERY 6 HOURS PRN
Status: DISCONTINUED | OUTPATIENT
Start: 2024-02-23 | End: 2024-02-28 | Stop reason: HOSPADM

## 2024-02-23 RX ORDER — IPRATROPIUM BROMIDE AND ALBUTEROL SULFATE 2.5; .5 MG/3ML; MG/3ML
3 SOLUTION RESPIRATORY (INHALATION) EVERY 6 HOURS PRN
Status: DISCONTINUED | OUTPATIENT
Start: 2024-02-23 | End: 2024-02-28 | Stop reason: HOSPADM

## 2024-02-23 RX ORDER — WARFARIN 1 MG/1
1 TABLET ORAL ONCE
Status: COMPLETED | OUTPATIENT
Start: 2024-02-23 | End: 2024-02-23

## 2024-02-23 RX ORDER — HYDROXYZINE HYDROCHLORIDE 25 MG/1
25 TABLET, FILM COATED ORAL 3 TIMES DAILY PRN
Status: DISCONTINUED | OUTPATIENT
Start: 2024-02-23 | End: 2024-02-28 | Stop reason: HOSPADM

## 2024-02-23 RX ORDER — IBUPROFEN 200 MG
16 TABLET ORAL
Status: DISCONTINUED | OUTPATIENT
Start: 2024-02-23 | End: 2024-02-28 | Stop reason: HOSPADM

## 2024-02-23 RX ADMIN — ENOXAPARIN SODIUM 90 MG: 100 INJECTION SUBCUTANEOUS at 12:02

## 2024-02-23 RX ADMIN — GUAIFENESIN 1200 MG: 600 TABLET, EXTENDED RELEASE ORAL at 09:02

## 2024-02-23 RX ADMIN — GUAIFENESIN 1200 MG: 600 TABLET, EXTENDED RELEASE ORAL at 10:02

## 2024-02-23 RX ADMIN — CETIRIZINE HYDROCHLORIDE 5 MG: 5 TABLET ORAL at 09:02

## 2024-02-23 RX ADMIN — FLUTICASONE PROPIONATE 100 MCG: 50 SPRAY, METERED NASAL at 09:02

## 2024-02-23 RX ADMIN — IOHEXOL 100 ML: 350 INJECTION, SOLUTION INTRAVENOUS at 01:02

## 2024-02-23 RX ADMIN — WARFARIN SODIUM 1 MG: 1 TABLET ORAL at 12:02

## 2024-02-23 RX ADMIN — FUROSEMIDE 40 MG: 10 INJECTION, SOLUTION INTRAMUSCULAR; INTRAVENOUS at 10:02

## 2024-02-23 RX ADMIN — GUAIFENESIN 1200 MG: 600 TABLET, EXTENDED RELEASE ORAL at 01:02

## 2024-02-23 RX ADMIN — FUROSEMIDE 40 MG: 10 INJECTION, SOLUTION INTRAMUSCULAR; INTRAVENOUS at 09:02

## 2024-02-23 RX ADMIN — WARFARIN SODIUM 4 MG: 1 TABLET ORAL at 04:02

## 2024-02-23 RX ADMIN — METOPROLOL TARTRATE 25 MG: 25 TABLET, FILM COATED ORAL at 10:02

## 2024-02-23 NOTE — ED PROVIDER NOTES
SCRIBE #1 NOTE: I, Nisa Koehler, am scribing for, and in the presence of, Shannon Hand DO. I have scribed the entire note.       History     Chief Complaint   Patient presents with    Shortness of Breath     Pt was transferred from clinic because pt has been having sob x3. Hx of copd     Review of patient's allergies indicates:   Allergen Reactions    Amlodipine Other (See Comments)     Not sure    Chlorthalidone Other (See Comments)     Not sure    Clonidine Other (See Comments)     Not sure    Codeine Other (See Comments)     Not sure    Escitalopram Other (See Comments)     Not sure    Lisinopril Other (See Comments)     Not sure    Losartan Other (See Comments)     Not sure    Meperidine Other (See Comments)     Not sure    Methadone Other (See Comments)     Not sure      Morphine Other (See Comments)     Not sure    Nebivolol Other (See Comments)     Not sure        Nitrofurantoin Other (See Comments)     Not sure        Omeprazole Other (See Comments)     Not sure      Pravastatin Other (See Comments)     Not sure      Propoxyphene Other (See Comments)     Not sure      Sulfamethoxazole-trimethoprim Other (See Comments)     Not sure             History of Present Illness     HPI    2/22/2024, 9:33 PM  History obtained from the son and patient      History of Present Illness: Serena Post is a 83 y.o. female patient with a PMHx of HTN, CHF, Pacemaker who presents to the Emergency Department for evaluation of sob which onset over the past week. Pt states she is now requiring home oxygen over the past weeks due to being sob. She also reports being hypotensive over the same time period. Symptoms are constant and moderate in severity. No mitigating or exacerbating factors reported. Associated sxs include cough. Patient denies any cp, fever, n/v and all other sxs at this time. No prior Tx reported. No further complaints or concerns at this time.       Arrival mode: Ambulance Service    PCP:  Evangelina Olivares MD        Past Medical History:  Past Medical History:   Diagnosis Date    A-fib     Anticoagulant long-term use     Aortic valve disease     Cancer     brain tumor, 10 years ago    Cardiomyopathy     CHF (congestive heart failure)     COVID-19 7/23/2022    Hx of heart valve replacement with mechanical valve     Hyperlipidemia     Hypertension     Pacemaker     Pacemaker     Sepsis 7/23/2022    Stroke     2007, residual weakness       Past Surgical History:  Past Surgical History:   Procedure Laterality Date    AORTIC VALVE REPLACEMENT      CHOLECYSTECTOMY      CORONARY ARTERY BYPASS GRAFT      HYSTERECTOMY      INSERTION OF PACEMAKER      TONSILLECTOMY           Family History:  Family History   Problem Relation Age of Onset    No Known Problems Mother     No Known Problems Father        Social History:  Social History     Tobacco Use    Smoking status: Never    Smokeless tobacco: Never   Substance and Sexual Activity    Alcohol use: Not Currently    Drug use: Never    Sexual activity: Not on file        Review of Systems     Review of Systems   Constitutional:  Negative for fever.   Respiratory:  Positive for cough and shortness of breath.    Cardiovascular:  Negative for chest pain.   Gastrointestinal:  Negative for nausea and vomiting.        Physical Exam     Initial Vitals   BP Pulse Resp Temp SpO2   02/22/24 2111 02/22/24 2111 02/22/24 2111 02/22/24 2126 02/22/24 2111   118/87 78 (!) 28 97.6 °F (36.4 °C) (!) 90 %      MAP       --                 Physical Exam  Nursing Notes and Vital Signs Reviewed.  Constitutional: Patient is in no apparent distress. Well-developed and well-nourished.  Head: Atraumatic. Normocephalic.  Eyes: EOM intact. No scleral icterus.  ENT: Mucous membranes are moist.   Neck: Supple. Full ROM. No JVD.  Cardiovascular: Regular rate. Regular rhythm. No murmurs, rubs, or gallops. Pulmonary/Chest:Tachypnea with diminished lung sounds.  Abdominal: Soft and  non-distended.  There is no tenderness.  No rebound, guarding, or rigidity.   Musculoskeletal: Moves all extremities. No obvious deformities. No edema. No calf tenderness.  Skin: Warm and dry.  Neurological:  Alert, awake, and appropriate.  Normal speech.  No acute focal neurological deficits are appreciated.  Psychiatric: Normal affect. Good eye contact. Appropriate in content.     ED Course   Procedures  ED Vital Signs:  Vitals:    02/27/24 0753 02/27/24 0800 02/27/24 0910 02/27/24 1032   BP: (!) 116/56      Pulse: 72 81 90 95   Resp: 20      Temp: 97.4 °F (36.3 °C)      TempSrc: Oral      SpO2: (!) 94%      Weight:       Height:        02/27/24 1043 02/27/24 1115 02/27/24 1135 02/27/24 1305   BP:   (!) 114/58    Pulse: 97 78 81 90   Resp: (!) 22  20    Temp:   97.6 °F (36.4 °C)    TempSrc:   Oral    SpO2: 96%  (!) 92%    Weight:       Height:        02/27/24 1505 02/27/24 1600 02/27/24 1612 02/27/24 1711   BP:   (!) 118/56    Pulse: 91 92 66 95   Resp:   16    Temp:   98 °F (36.7 °C)    TempSrc:   Oral    SpO2:   (!) 94%    Weight:       Height:        02/27/24 2000 02/27/24 2011 02/27/24 2358   BP:  (!) 107/54 (!) 101/55   Pulse: 84 90 79   Resp:  18 18   Temp:  98.2 °F (36.8 °C) 97.5 °F (36.4 °C)   TempSrc:  Oral Oral   SpO2:  (!) 92% (!) 94%   Weight:      Height:          Abnormal Lab Results:  Labs Reviewed   COMPREHENSIVE METABOLIC PANEL - Abnormal; Notable for the following components:       Result Value    CO2 22 (*)     Total Bilirubin 2.1 (*)     eGFR 45 (*)     All other components within normal limits   TROPONIN I - Abnormal; Notable for the following components:    Troponin I 0.143 (*)     All other components within normal limits   B-TYPE NATRIURETIC PEPTIDE - Abnormal; Notable for the following components:    BNP 3,274 (*)     All other components within normal limits   PROTIME-INR - Abnormal; Notable for the following components:    Prothrombin Time 17.1 (*)     INR 1.6 (*)     All other  components within normal limits   D DIMER, QUANTITATIVE - Abnormal; Notable for the following components:    D-Dimer 1.70 (*)     All other components within normal limits   TROPONIN I - Abnormal; Notable for the following components:    Troponin I 0.125 (*)     All other components within normal limits   BASIC METABOLIC PANEL - Abnormal; Notable for the following components:    CO2 22 (*)     Glucose 162 (*)     eGFR 45 (*)     All other components within normal limits   TROPONIN I - Abnormal; Notable for the following components:    Troponin I 0.107 (*)     All other components within normal limits   PROTIME-INR - Abnormal; Notable for the following components:    Prothrombin Time 16.3 (*)     INR 1.4 (*)     All other components within normal limits   POCT GLUCOSE - Abnormal; Notable for the following components:    POCT Glucose 123 (*)     All other components within normal limits   INFLUENZA A & B BY MOLECULAR   CBC W/ AUTO DIFFERENTIAL   APTT   SARS-COV-2 RNA AMPLIFICATION, QUAL   HEMOGLOBIN A1C   MAGNESIUM   POCT GLUCOSE MONITORING CONTINUOUS        All Lab Results:  Results for orders placed or performed during the hospital encounter of 02/22/24   Influenza A & B by Molecular    Specimen: Nasopharyngeal Swab   Result Value Ref Range    Influenza A, Molecular Negative Negative    Influenza B, Molecular Negative Negative    Flu A & B Source Nasal swab    CBC auto differential   Result Value Ref Range    WBC 7.54 3.90 - 12.70 K/uL    RBC 4.82 4.00 - 5.40 M/uL    Hemoglobin 14.7 12.0 - 16.0 g/dL    Hematocrit 45.7 37.0 - 48.5 %    MCV 95 82 - 98 fL    MCH 30.5 27.0 - 31.0 pg    MCHC 32.2 32.0 - 36.0 g/dL    RDW 14.1 11.5 - 14.5 %    Platelets 231 150 - 450 K/uL    MPV 11.7 9.2 - 12.9 fL    Immature Granulocytes 0.3 0.0 - 0.5 %    Gran # (ANC) 4.9 1.8 - 7.7 K/uL    Immature Grans (Abs) 0.02 0.00 - 0.04 K/uL    Lymph # 1.9 1.0 - 4.8 K/uL    Mono # 0.6 0.3 - 1.0 K/uL    Eos # 0.1 0.0 - 0.5 K/uL    Baso # 0.05 0.00  - 0.20 K/uL    nRBC 0 0 /100 WBC    Gran % 64.9 38.0 - 73.0 %    Lymph % 24.8 18.0 - 48.0 %    Mono % 8.2 4.0 - 15.0 %    Eosinophil % 1.1 0.0 - 8.0 %    Basophil % 0.7 0.0 - 1.9 %    Differential Method Automated    Comprehensive metabolic panel   Result Value Ref Range    Sodium 141 136 - 145 mmol/L    Potassium 3.6 3.5 - 5.1 mmol/L    Chloride 106 95 - 110 mmol/L    CO2 22 (L) 23 - 29 mmol/L    Glucose 97 70 - 110 mg/dL    BUN 16 8 - 23 mg/dL    Creatinine 1.2 0.5 - 1.4 mg/dL    Calcium 9.4 8.7 - 10.5 mg/dL    Total Protein 7.9 6.0 - 8.4 g/dL    Albumin 3.5 3.5 - 5.2 g/dL    Total Bilirubin 2.1 (H) 0.1 - 1.0 mg/dL    Alkaline Phosphatase 94 55 - 135 U/L    AST 28 10 - 40 U/L    ALT 15 10 - 44 U/L    eGFR 45 (A) >60 mL/min/1.73 m^2    Anion Gap 13 8 - 16 mmol/L   Troponin I   Result Value Ref Range    Troponin I 0.143 (H) 0.000 - 0.026 ng/mL   Brain natriuretic peptide   Result Value Ref Range    BNP 3,274 (H) 0 - 99 pg/mL   Protime-INR   Result Value Ref Range    Prothrombin Time 17.1 (H) 9.0 - 12.5 sec    INR 1.6 (H) 0.8 - 1.2   APTT   Result Value Ref Range    aPTT 27.6 21.0 - 32.0 sec   COVID-19 Rapid Screening   Result Value Ref Range    SARS-CoV-2 RNA, Amplification, Qual Negative Negative   D dimer, quantitative   Result Value Ref Range    D-Dimer 1.70 (H) <0.50 mg/L FEU   Hemoglobin A1c   Result Value Ref Range    Hemoglobin A1C 5.0 4.0 - 5.6 %    Estimated Avg Glucose 97 68 - 131 mg/dL   Troponin I   Result Value Ref Range    Troponin I 0.125 (H) 0.000 - 0.026 ng/mL   Basic metabolic panel   Result Value Ref Range    Sodium 140 136 - 145 mmol/L    Potassium 3.6 3.5 - 5.1 mmol/L    Chloride 105 95 - 110 mmol/L    CO2 22 (L) 23 - 29 mmol/L    Glucose 162 (H) 70 - 110 mg/dL    BUN 16 8 - 23 mg/dL    Creatinine 1.2 0.5 - 1.4 mg/dL    Calcium 8.7 8.7 - 10.5 mg/dL    Anion Gap 13 8 - 16 mmol/L    eGFR 45 (A) >60 mL/min/1.73 m^2   Magnesium   Result Value Ref Range    Magnesium 1.8 1.6 - 2.6 mg/dL   Troponin I    Result Value Ref Range    Troponin I 0.107 (H) 0.000 - 0.026 ng/mL   Protime-INR   Result Value Ref Range    Prothrombin Time 16.3 (H) 9.0 - 12.5 sec    INR 1.4 (H) 0.8 - 1.2   Basic metabolic panel   Result Value Ref Range    Sodium 144 136 - 145 mmol/L    Potassium 3.3 (L) 3.5 - 5.1 mmol/L    Chloride 105 95 - 110 mmol/L    CO2 28 23 - 29 mmol/L    Glucose 108 70 - 110 mg/dL    BUN 21 8 - 23 mg/dL    Creatinine 1.2 0.5 - 1.4 mg/dL    Calcium 9.0 8.7 - 10.5 mg/dL    Anion Gap 11 8 - 16 mmol/L    eGFR 45 (A) >60 mL/min/1.73 m^2   Magnesium   Result Value Ref Range    Magnesium 1.8 1.6 - 2.6 mg/dL   CBC Auto Differential   Result Value Ref Range    WBC 9.34 3.90 - 12.70 K/uL    RBC 4.29 4.00 - 5.40 M/uL    Hemoglobin 13.1 12.0 - 16.0 g/dL    Hematocrit 40.7 37.0 - 48.5 %    MCV 95 82 - 98 fL    MCH 30.5 27.0 - 31.0 pg    MCHC 32.2 32.0 - 36.0 g/dL    RDW 13.7 11.5 - 14.5 %    Platelets 200 150 - 450 K/uL    MPV 11.9 9.2 - 12.9 fL    Immature Granulocytes 0.4 0.0 - 0.5 %    Gran # (ANC) 7.4 1.8 - 7.7 K/uL    Immature Grans (Abs) 0.04 0.00 - 0.04 K/uL    Lymph # 1.1 1.0 - 4.8 K/uL    Mono # 0.8 0.3 - 1.0 K/uL    Eos # 0.0 0.0 - 0.5 K/uL    Baso # 0.02 0.00 - 0.20 K/uL    nRBC 0 0 /100 WBC    Gran % 79.4 (H) 38.0 - 73.0 %    Lymph % 11.8 (L) 18.0 - 48.0 %    Mono % 8.2 4.0 - 15.0 %    Eosinophil % 0.0 0.0 - 8.0 %    Basophil % 0.2 0.0 - 1.9 %    Differential Method Automated    Protime-INR   Result Value Ref Range    Prothrombin Time 23.0 (H) 9.0 - 12.5 sec    INR 2.1 (H) 0.8 - 1.2   Basic metabolic panel   Result Value Ref Range    Sodium 143 136 - 145 mmol/L    Potassium 3.3 (L) 3.5 - 5.1 mmol/L    Chloride 105 95 - 110 mmol/L    CO2 27 23 - 29 mmol/L    Glucose 78 70 - 110 mg/dL    BUN 30 (H) 8 - 23 mg/dL    Creatinine 1.3 0.5 - 1.4 mg/dL    Calcium 8.8 8.7 - 10.5 mg/dL    Anion Gap 11 8 - 16 mmol/L    eGFR 41 (A) >60 mL/min/1.73 m^2   Magnesium   Result Value Ref Range    Magnesium 1.9 1.6 - 2.6 mg/dL   CBC Auto  Differential   Result Value Ref Range    WBC 7.97 3.90 - 12.70 K/uL    RBC 4.42 4.00 - 5.40 M/uL    Hemoglobin 13.5 12.0 - 16.0 g/dL    Hematocrit 42.2 37.0 - 48.5 %    MCV 96 82 - 98 fL    MCH 30.5 27.0 - 31.0 pg    MCHC 32.0 32.0 - 36.0 g/dL    RDW 14.0 11.5 - 14.5 %    Platelets 235 150 - 450 K/uL    MPV 12.7 9.2 - 12.9 fL    Immature Granulocytes 0.1 0.0 - 0.5 %    Gran # (ANC) 3.9 1.8 - 7.7 K/uL    Immature Grans (Abs) 0.01 0.00 - 0.04 K/uL    Lymph # 3.0 1.0 - 4.8 K/uL    Mono # 0.7 0.3 - 1.0 K/uL    Eos # 0.3 0.0 - 0.5 K/uL    Baso # 0.07 0.00 - 0.20 K/uL    nRBC 0 0 /100 WBC    Gran % 48.6 38.0 - 73.0 %    Lymph % 37.1 18.0 - 48.0 %    Mono % 9.3 4.0 - 15.0 %    Eosinophil % 4.0 0.0 - 8.0 %    Basophil % 0.9 0.0 - 1.9 %    Differential Method Automated    Protime-INR   Result Value Ref Range    Prothrombin Time 27.5 (H) 9.0 - 12.5 sec    INR 2.7 (H) 0.8 - 1.2   Basic metabolic panel   Result Value Ref Range    Sodium 142 136 - 145 mmol/L    Potassium 3.5 3.5 - 5.1 mmol/L    Chloride 105 95 - 110 mmol/L    CO2 26 23 - 29 mmol/L    Glucose 90 70 - 110 mg/dL    BUN 26 (H) 8 - 23 mg/dL    Creatinine 1.1 0.5 - 1.4 mg/dL    Calcium 8.3 (L) 8.7 - 10.5 mg/dL    Anion Gap 11 8 - 16 mmol/L    eGFR 50 (A) >60 mL/min/1.73 m^2   CBC Auto Differential   Result Value Ref Range    WBC 6.62 3.90 - 12.70 K/uL    RBC 4.26 4.00 - 5.40 M/uL    Hemoglobin 12.9 12.0 - 16.0 g/dL    Hematocrit 40.3 37.0 - 48.5 %    MCV 95 82 - 98 fL    MCH 30.3 27.0 - 31.0 pg    MCHC 32.0 32.0 - 36.0 g/dL    RDW 13.9 11.5 - 14.5 %    Platelets 191 150 - 450 K/uL    MPV 11.7 9.2 - 12.9 fL    Immature Granulocytes 0.2 0.0 - 0.5 %    Gran # (ANC) 3.1 1.8 - 7.7 K/uL    Immature Grans (Abs) 0.01 0.00 - 0.04 K/uL    Lymph # 2.4 1.0 - 4.8 K/uL    Mono # 0.7 0.3 - 1.0 K/uL    Eos # 0.4 0.0 - 0.5 K/uL    Baso # 0.06 0.00 - 0.20 K/uL    nRBC 0 0 /100 WBC    Gran % 46.0 38.0 - 73.0 %    Lymph % 36.3 18.0 - 48.0 %    Mono % 10.4 4.0 - 15.0 %    Eosinophil % 6.2  0.0 - 8.0 %    Basophil % 0.9 0.0 - 1.9 %    Differential Method Automated    Protime-INR   Result Value Ref Range    Prothrombin Time 20.9 (H) 9.0 - 12.5 sec    INR 2.0 (H) 0.8 - 1.2   Basic metabolic panel   Result Value Ref Range    Sodium 141 136 - 145 mmol/L    Potassium 3.6 3.5 - 5.1 mmol/L    Chloride 104 95 - 110 mmol/L    CO2 27 23 - 29 mmol/L    Glucose 91 70 - 110 mg/dL    BUN 24 (H) 8 - 23 mg/dL    Creatinine 1.0 0.5 - 1.4 mg/dL    Calcium 8.9 8.7 - 10.5 mg/dL    Anion Gap 10 8 - 16 mmol/L    eGFR 57 (A) >60 mL/min/1.73 m^2   Protime-INR   Result Value Ref Range    Prothrombin Time 17.6 (H) 9.0 - 12.5 sec    INR 1.7 (H) 0.8 - 1.2   Brain natriuretic peptide   Result Value Ref Range    BNP 1,482 (H) 0 - 99 pg/mL   Magnesium   Result Value Ref Range    Magnesium 1.9 1.6 - 2.6 mg/dL   Echo   Result Value Ref Range    BSA 2.03 m2    LVOT stroke volume 56.42 cm3    LVIDd 5.10 3.5 - 6.0 cm    LV Systolic Volume 90.21 mL    LV Systolic Volume Index 46.0 mL/m2    LVIDs 4.45 (A) 2.1 - 4.0 cm    LV Diastolic Volume 123.59 mL    LV Diastolic Volume Index 63.06 mL/m2    IVS 1.04 0.6 - 1.1 cm    LVOT diameter 1.91 cm    LVOT area 2.9 cm2    FS 13 (A) 28 - 44 %    Left Ventricle Relative Wall Thickness 0.47 cm    Posterior Wall 1.19 (A) 0.6 - 1.1 cm    LV mass 217.90 g    LV Mass Index 111 g/m2    MV Peak E Melo 1.07 m/s    TDI LATERAL 0.08 m/s    TDI SEPTAL 0.05 m/s    E/E' ratio 16.46 m/s    MV Peak A Melo 1.07 m/s    TR Max Melo 3.38 m/s    E/A ratio 1.00     IVRT 62.80 msec    E wave deceleration time 192.43 msec    LV SEPTAL E/E' RATIO 21.40 m/s    LV LATERAL E/E' RATIO 13.38 m/s    LVOT peak melo 0.83 m/s    Left Ventricular Outflow Tract Mean Velocity 0.58 cm/s    Left Ventricular Outflow Tract Mean Gradient 1.51 mmHg    RV mid diameter 3.15 cm    RVOT peak VTI 9.3 cm    TAPSE 2.05 cm    LA size 3.61 cm    Left Atrium Minor Axis 3.98 cm    Left Atrium Major Axis 6.58 cm    RA Major Axis 4.53 cm    AV regurgitation  pressure 1/2 time [id number] ms    AR Max Melo 4.34 m/s    AV mean gradient 95 mmHg    AV peak gradient 97 mmHg    Ao peak melo 4.93 m/s    Ao .80 cm    LVOT peak VTI 19.70 cm    AV valve area 0.37 cm²    AV Velocity Ratio 0.17     AV index (prosthetic) 0.13     AAMIR by Velocity Ratio 0.48 cm²    MV stenosis pressure 1/2 time 55.80 ms    MV valve area p 1/2 method 3.94 cm2    Triscuspid Valve Regurgitation Peak Gradient 46 mmHg    PV mean gradient 1 mmHg    RVOT peak melo 0.49 m/s    Ao root annulus 2.98 cm    STJ 4.51 cm    Ascending aorta 4.45 cm    IVC diameter 1.15 cm    Mean e' 0.07 m/s    ZLVIDS 2.01     ZLVIDD -0.95     LA Volume Index 34.9 mL/m2    LA volume 68.49 cm3    LA WIDTH 4.5 cm    RA Width 4.4 cm    TV resting pulmonary artery pressure 49 mmHg    RV TB RVSP 6 mmHg    Est. RA pres 3 mmHg    EF 35 %   POCT glucose   Result Value Ref Range    POCT Glucose 123 (H) 70 - 110 mg/dL   POCT glucose   Result Value Ref Range    POCT Glucose 166 (H) 70 - 110 mg/dL   POCT glucose   Result Value Ref Range    POCT Glucose 163 (H) 70 - 110 mg/dL   POCT glucose   Result Value Ref Range    POCT Glucose 101 70 - 110 mg/dL   POCT glucose   Result Value Ref Range    POCT Glucose 112 (H) 70 - 110 mg/dL   POCT glucose   Result Value Ref Range    POCT Glucose 92 70 - 110 mg/dL   POCT glucose   Result Value Ref Range    POCT Glucose 87 70 - 110 mg/dL   POCT glucose   Result Value Ref Range    POCT Glucose 86 70 - 110 mg/dL   POCT glucose   Result Value Ref Range    POCT Glucose 94 70 - 110 mg/dL   POCT glucose   Result Value Ref Range    POCT Glucose 90 70 - 110 mg/dL   POCT glucose   Result Value Ref Range    POCT Glucose 82 70 - 110 mg/dL   POCT glucose   Result Value Ref Range    POCT Glucose 112 (H) 70 - 110 mg/dL   POCT glucose   Result Value Ref Range    POCT Glucose 117 (H) 70 - 110 mg/dL   POCT glucose   Result Value Ref Range    POCT Glucose 110 70 - 110 mg/dL   POCT glucose   Result Value Ref Range     POCT Glucose 106 70 - 110 mg/dL   POCT glucose   Result Value Ref Range    POCT Glucose 111 (H) 70 - 110 mg/dL   POCT glucose   Result Value Ref Range    POCT Glucose 105 70 - 110 mg/dL   POCT glucose   Result Value Ref Range    POCT Glucose 93 70 - 110 mg/dL         Imaging Results:  Imaging Results              CTA Chest Non-Coronary (PE Studies) (Final result)  Result time 02/23/24 06:48:49      Final result by Сергей Martinez MD (02/23/24 06:48:49)                   Impression:      No evidence of pulmonary embolism.    Cardiomegaly.  Ascending aortic aneurysm measuring up to 4.5 cm.  No dissection.    See additional findings above    All CT scans at this facility use dose modulation, iterative reconstruction and/or weight based dosing when appropriate to reduce radiation dose to as low as reasonably achievable.      Electronically signed by: Сергей Martinez MD  Date:    02/23/2024  Time:    06:48               Narrative:    EXAMINATION:  CTA CHEST NON CORONARY (PE STUDIES)    CLINICAL HISTORY:  Chest pain and shortness of breath    TECHNIQUE:  After the intravenous administration of 100 cc of Omni 350 nonionic contrast using CT pulmonary angio technique, 1.25  Mm axial images were acquired using helical CT technique from the lung apices through costophrenic sulci.  Sagittal coronal and oblique MIPS were also submitted for interpretation.    COMPARISON:  Chest x-ray dated 02/22/2024    FINDINGS:  -Pulmonary arteries: Pulmonary arteries are well opacified.  No evidence of pulmonary embolism.  Prominence of pulmonary artery suggests pulmonary hypertension.  No right heart strain is identified with RV/LV ratio < 0.9.    -Lungs: No nodules or infiltrates.  Atelectasis seen at the lung bases bilaterally.    -Pleura: Trace effusions.    -Mediastinum/Dagmar:Enlarged lymph nodes within the mediastinum may be reactive in measure up to 11 mm in short axis.    -Axilla: No adenopathy.    -Thyroid: Normal lower  gland.    -Heart/Aorta: Cardiomegaly.  Moderate coronary artery disease.  No pericardial effusion.  Ascending aorta measures up to 4.5 cm which is aneurysmal.  No dissection.  Aorta demonstrates moderate atherosclerotic disease.  Cardiac pacing wires are seen.    -Bones/Chest Wall: Intact with advanced degenerative changes throughout the visualized thoracolumbar spine.    Sternotomy wires are intact.    -Upper Abdomen: Unremarkable.  Renal cortical thinning and left renal cyst noted.                                       X-Ray Chest AP (Final result)  Result time 02/22/24 21:39:04      Final result by Nidhi Epperson MD (02/22/24 21:39:04)                   Impression:      No active or adverse finding      Electronically signed by: Nidhi Epperson  Date:    02/22/2024  Time:    21:39               Narrative:    EXAMINATION:  XR CHEST AP PORTABLE    CLINICAL HISTORY:  sob;    TECHNIQUE:  Single frontal portable view of the chest was performed.    COMPARISON:  None    FINDINGS:  Comparison imaging earlier same date    No new pulmonary consolidation.  No sizable pleural effusion or convincing pneumothorax                                       RADIOLOGY REPORT (Final result)  Result time 02/27/24 17:37:34      Final result by Unknown User (02/27/24 17:37:34)                                         The EKG was ordered, reviewed, and independently interpreted by the ED provider.  Interpretation time: 15:23  Rate: 84 BPM  Rhythm: AV dual-paced rhythm with frequent premature ventricular complexes  Interpretation: Abnormal ECG. No STEMI.           The Emergency Provider reviewed the vital signs and test results, which are outlined above.     ED Discussion       10:49 PM: Discussed case with Dr Hurtado ([facility]). Dr. Hurtado agrees with current care and management of pt and accepts admission.   Admitting Service: Hospital Medicine  Admitting Physician: Dr. Hurtado  Admit to: OBS     10:49 PM: Re-evaluated pt. I  have discussed test results, shared treatment plan, and the need for admission with patient and family at bedside. Pt and family express understanding at this time and agree with all information. All questions answered. Pt and family have no further questions or concerns at this time. Pt is ready for admit.     ED Course as of 02/28/24 0024   Thu Feb 22, 2024 2142 TTE 11/2023    ·  Left Ventricle: The left ventricle is normal in size. Normal wall thickness. There is mild concentric hypertrophy. Normal wall motion. There is normal systolic function with a visually estimated ejection fraction of 55 - 60%. Grade I diastolic dysfunction.  ·  Right Ventricle: Normal right ventricular cavity size. Wall thickness is normal. Right ventricle wall motion  is normal. Systolic function is normal.  ·  Aortic Valve: There is mild aortic valve sclerosis. There is moderate stenosis. Aortic valve area by VTI is 1.45 cm². Aortic valve peak velocity is 3.20 m/s. Mean gradient is 23 mmHg. The dimensionless index is 0.44. There is mild aortic regurgitation.  ·  Mitral Valve: There is mild regurgitation.  ·  Tricuspid Valve: There is mild regurgitation.  ·  IVC/SVC: Intermediate venous pressure at 8 mmHg.   [NF]   2239 BNP(!): 3,274 [NF]   2240 83-year-old female presents with decompensated CHF and COPD exacerbation.  Elevated BNP and troponin.  Increasing oxygen requirement at home with tachypnea.  Sent in from PCP office.  Started bronchodilator, IV diuretics, and steroids in the emergency department. [NF]   Fri Feb 23, 2024   0010 Pacemaker interrogation showed no malfunction with PVCs and a 15 minute run of AFib this morning.  Otherwise normal. [NF]      ED Course User Index  [NF] Shannon Hand.,      Medical Decision Making  CBC negative for leukocytosis.  Patient afebrile with no significant anemia.  CMP with normal renal function, LFTs, and electrolytes.  BNP significantly elevated.  Chest x-ray shows mild cardiomegaly with  bibasilar interstitial opacities consistent with mild CHF.  CTA negative for pulmonary emboli.  EKG shows a paced rhythm and troponin is elevated when compared to baseline.  INR is 1.4 which is subtherapeutic.  Magnesium is normal.      Amount and/or Complexity of Data Reviewed  Labs: ordered. Decision-making details documented in ED Course.  Radiology: ordered and independent interpretation performed. Decision-making details documented in ED Course.  ECG/medicine tests: ordered and independent interpretation performed. Decision-making details documented in ED Course.    Risk  Prescription drug management.  Decision regarding hospitalization.       Additional MDM:   Differential Diagnosis:   Includes but is not limited to CHF, COPD, pulmonary embolism, pneumonia, pneumothorax, ACS             ED Medication(s):  Medications   sodium chloride 0.9% flush 10 mL (has no administration in time range)   metoprolol tartrate (LOPRESSOR) tablet 25 mg (25 mg Oral Not Given 2/27/24 2100)   hydrALAZINE injection 10 mg (has no administration in time range)   melatonin tablet 6 mg (has no administration in time range)   hydrOXYzine HCL tablet 25 mg (has no administration in time range)   ondansetron injection 8 mg (has no administration in time range)   acetaminophen tablet 650 mg (has no administration in time range)   albuterol-ipratropium 2.5 mg-0.5 mg/3 mL nebulizer solution 3 mL (has no administration in time range)   glucose chewable tablet 16 g (has no administration in time range)   glucose chewable tablet 24 g (has no administration in time range)   glucagon (human recombinant) injection 1 mg (has no administration in time range)   dextrose 10% bolus 125 mL 125 mL (has no administration in time range)   dextrose 10% bolus 250 mL 250 mL (has no administration in time range)   traMADoL tablet 50 mg (has no administration in time range)   fluticasone propionate 50 mcg/actuation nasal spray 100 mcg (100 mcg Each Nostril Given  2/27/24 0951)   cetirizine tablet 5 mg (5 mg Oral Given 2/27/24 0951)   guaiFENesin 12 hr tablet 1,200 mg (1,200 mg Oral Given 2/27/24 2016)   warfarin (COUMADIN) tablet 2.5 mg (2.5 mg Oral Given 2/26/24 1932)   miconazole nitrate 2% ointment ( Topical (Top) Given 2/27/24 2016)   furosemide injection 40 mg (40 mg Intravenous Given 2/27/24 0950)   warfarin (COUMADIN) split tablet 1.25 mg (1.25 mg Oral Given 2/27/24 1756)   potassium chloride SA CR tablet 20 mEq (20 mEq Oral Given 2/27/24 2016)   magnesium oxide tablet 400 mg (400 mg Oral Given 2/27/24 0951)   albuterol-ipratropium 2.5 mg-0.5 mg/3 mL nebulizer solution 3 mL (3 mLs Nebulization Given 2/22/24 2315)   furosemide injection 60 mg (60 mg Intravenous Given 2/22/24 2307)   methylPREDNISolone sodium succinate injection 125 mg (125 mg Intravenous Given 2/22/24 2305)   warfarin (COUMADIN) tablet 1 mg (1 mg Oral Given 2/23/24 0027)   iohexoL (OMNIPAQUE 350) injection 100 mL (100 mLs Intravenous Given 2/23/24 0141)   warfarin (COUMADIN) tablet 4 mg (4 mg Oral Given 2/23/24 1653)   warfarin (COUMADIN) split tablet 0.5 mg (0.5 mg Oral Given 2/24/24 1634)       Current Discharge Medication List                  Scribe Attestation:   Scribe #1: I performed the above scribed service and the documentation accurately describes the services I performed. I attest to the accuracy of the note.     Attending:   Physician Attestation Statement for Scribe #1: I, Shannon Hand DO, personally performed the services described in this documentation, as scribed by Nisa Koehler, in my presence, and it is both accurate and complete.           Clinical Impression       ICD-10-CM ICD-9-CM   1. COPD exacerbation  J44.1 491.21   2. SOB (shortness of breath)  R06.02 786.05   3. Elevated brain natriuretic peptide (BNP) level  R79.89 790.99   4. PAF (paroxysmal atrial fibrillation)  I48.0 427.31   5. Acute on chronic systolic heart failure  I50.23 428.23       Disposition:    Disposition: Placed in Observation  Condition: Stable        Shannon Hand,   02/28/24 0024

## 2024-02-23 NOTE — ASSESSMENT & PLAN NOTE
-BNP > 3,000  -TTE 11/23 with normal EF, repeat pending  -Continue IV diuresis as tolerated  -Continue home regimen/OMT  -Strict I's/O's

## 2024-02-23 NOTE — SUBJECTIVE & OBJECTIVE
Past Medical History:   Diagnosis Date    A-fib     Anticoagulant long-term use     Aortic valve disease     Cancer     brain tumor, 10 years ago    Cardiomyopathy     CHF (congestive heart failure)     COVID-19 7/23/2022    Hx of heart valve replacement with mechanical valve     Hyperlipidemia     Hypertension     Pacemaker     Pacemaker     Sepsis 7/23/2022    Stroke     2007, residual weakness       Past Surgical History:   Procedure Laterality Date    AORTIC VALVE REPLACEMENT      CHOLECYSTECTOMY      CORONARY ARTERY BYPASS GRAFT      HYSTERECTOMY      INSERTION OF PACEMAKER      TONSILLECTOMY         Review of patient's allergies indicates:   Allergen Reactions    Amlodipine Other (See Comments)     Not sure    Chlorthalidone Other (See Comments)     Not sure    Clonidine Other (See Comments)     Not sure    Codeine Other (See Comments)     Not sure    Escitalopram Other (See Comments)     Not sure    Lisinopril Other (See Comments)     Not sure    Losartan Other (See Comments)     Not sure    Meperidine Other (See Comments)     Not sure    Methadone Other (See Comments)     Not sure      Morphine Other (See Comments)     Not sure    Nebivolol Other (See Comments)     Not sure        Nitrofurantoin Other (See Comments)     Not sure        Omeprazole Other (See Comments)     Not sure      Pravastatin Other (See Comments)     Not sure      Propoxyphene Other (See Comments)     Not sure      Sulfamethoxazole-trimethoprim Other (See Comments)     Not sure           No current facility-administered medications on file prior to encounter.     Current Outpatient Medications on File Prior to Encounter   Medication Sig    albuterol (PROVENTIL) 2.5 mg /3 mL (0.083 %) nebulizer solution Take 2.5 mg by nebulization every 4 (four) hours as needed.    albuterol (PROVENTIL/VENTOLIN HFA) 90 mcg/actuation inhaler Inhale 2 puffs into the lungs every 4 (four) hours as needed.    furosemide (LASIX) 20 MG tablet Take 20 mg by mouth  every morning.    metoprolol tartrate (LOPRESSOR) 25 MG tablet Take 1 tablet by mouth 2 (two) times daily.    warfarin (COUMADIN) 2.5 MG tablet Take 2.5 mg by mouth every Monday and Friday.  Take 1.25 mg by mouth all other days.     Family History       Problem Relation (Age of Onset)    No Known Problems Mother, Father          Tobacco Use    Smoking status: Never    Smokeless tobacco: Never   Substance and Sexual Activity    Alcohol use: Not Currently    Drug use: Never    Sexual activity: Not on file     Review of Systems   Constitutional:  Negative for chills, fatigue and fever.   HENT:  Negative for congestion, postnasal drip, rhinorrhea and sore throat.    Eyes:  Negative for pain and visual disturbance.   Respiratory:  Positive for shortness of breath. Negative for cough, chest tightness and wheezing.    Cardiovascular:  Positive for palpitations and leg swelling. Negative for chest pain.   Gastrointestinal:  Negative for abdominal distention, abdominal pain, constipation, diarrhea, nausea and vomiting.   Genitourinary:  Negative for difficulty urinating, flank pain and hematuria.   Musculoskeletal:  Negative for arthralgias, back pain, gait problem and joint swelling.   Skin:  Negative for pallor and rash.   Neurological:  Negative for dizziness, syncope and weakness.   Psychiatric/Behavioral:  Negative for confusion, decreased concentration and suicidal ideas.      Objective:     Vital Signs (Most Recent):  Temp: 97.6 °F (36.4 °C) (02/22/24 2126)  Pulse: 68 (02/23/24 0000)  Resp: 18 (02/23/24 0000)  BP: (!) 109/53 (02/23/24 0000)  SpO2: 97 % (02/23/24 0000) Vital Signs (24h Range):  Temp:  [97.6 °F (36.4 °C)-98.8 °F (37.1 °C)] 97.6 °F (36.4 °C)  Pulse:  [68-78] 68  Resp:  [16-28] 18  SpO2:  [90 %-97 %] 97 %  BP: (107-124)/(50-87) 109/53     Weight: 91.2 kg (201 lb 1 oz)  Body mass index is 34.51 kg/m².     Physical Exam  Vitals reviewed.   Constitutional:       General: She is not in acute distress.      Appearance: Normal appearance. She is normal weight. She is ill-appearing. She is not toxic-appearing.   HENT:      Head: Normocephalic and atraumatic.      Nose: Nose normal. No congestion or rhinorrhea.   Eyes:      Extraocular Movements: Extraocular movements intact.      Conjunctiva/sclera: Conjunctivae normal.      Pupils: Pupils are equal, round, and reactive to light.   Cardiovascular:      Rate and Rhythm: Normal rate and regular rhythm.      Pulses: Normal pulses.      Heart sounds: No murmur heard.  Pulmonary:      Effort: Pulmonary effort is normal. No respiratory distress.      Breath sounds: Rales present. No wheezing.   Abdominal:      General: Abdomen is flat. Bowel sounds are normal. There is no distension.      Palpations: Abdomen is soft.      Tenderness: There is no abdominal tenderness. There is no guarding or rebound.   Musculoskeletal:         General: No swelling, tenderness or deformity. Normal range of motion.      Cervical back: Normal range of motion and neck supple. No rigidity.      Right lower le+ Pitting Edema present.      Left lower le+ Pitting Edema present.   Skin:     General: Skin is warm and dry.      Capillary Refill: Capillary refill takes less than 2 seconds.      Coloration: Skin is not pale.   Neurological:      General: No focal deficit present.      Mental Status: She is alert and oriented to person, place, and time. Mental status is at baseline.      Motor: No weakness.      Gait: Gait normal.   Psychiatric:         Mood and Affect: Mood normal.         Behavior: Behavior normal.         Thought Content: Thought content normal.              CRANIAL NERVES     CN III, IV, VI   Pupils are equal, round, and reactive to light.       Significant Labs: All pertinent labs within the past 24 hours have been reviewed.  BMP:   Recent Labs   Lab 244   GLU 97      K 3.6      CO2 22*   BUN 16   CREATININE 1.2   CALCIUM 9.4     CBC:   Recent Labs   Lab  "02/22/24 1540 02/22/24 2114   WBC 6.65 7.54   HGB 13.7 14.7   HCT 42.5 45.7    231     CMP:   Recent Labs   Lab 02/22/24 1540 02/22/24 2114    141   K 3.7 3.6    106   CO2 26 22*   * 97   BUN 17 16   CREATININE 1.1 1.2   CALCIUM 9.3 9.4   PROT 7.3 7.9   ALBUMIN 3.3* 3.5   BILITOT 1.6* 2.1*   ALKPHOS 93 94   AST 26 28   ALT 15 15   ANIONGAP 10 13     Cardiac Markers:   Recent Labs   Lab 02/22/24 2114   BNP 3,274*     Coagulation:   Recent Labs   Lab 02/22/24 2126   INR 1.6*   APTT 27.6     Lactic Acid: No results for input(s): "LACTATE" in the last 48 hours.  Magnesium: No results for input(s): "MG" in the last 48 hours.  Troponin:   Recent Labs   Lab 02/22/24 2114 02/22/24 2315   TROPONINI 0.143* 0.125*     TSH:   Recent Labs   Lab 11/19/23  1849   TSH 2.576       Significant Imaging: I have reviewed all pertinent imaging results/findings within the past 24 hours.  Imaging Results              CTA Chest Non-Coronary (PE Studies) (In process)                      X-Ray Chest AP (Final result)  Result time 02/22/24 21:39:04      Final result by Nidhi Epperson MD (02/22/24 21:39:04)                   Impression:      No active or adverse finding      Electronically signed by: Nidhi Epperson  Date:    02/22/2024  Time:    21:39               Narrative:    EXAMINATION:  XR CHEST AP PORTABLE    CLINICAL HISTORY:  sob;    TECHNIQUE:  Single frontal portable view of the chest was performed.    COMPARISON:  None    FINDINGS:  Comparison imaging earlier same date    No new pulmonary consolidation.  No sizable pleural effusion or convincing pneumothorax                                      "

## 2024-02-23 NOTE — H&P
[facility].  [facility] Medicine  History & Physical    Patient Name: Serena Post  MRN: [id number]  Patient Class: OP- Observation  Admission Date: 2/22/2024  Attending Physician: Junior Hurtado MD   Primary Care Provider: Evangelina Olivares MD         Patient information was obtained from patient, past medical records, and ER records.     Subjective:     Principal Problem:Acute on chronic systolic heart failure    Chief Complaint:   Chief Complaint   Patient presents with    Shortness of Breath     Pt was transferred from clinic because pt has been having sob x3. Hx of copd        HPI: Serena Post is an 83-year-old woman with a past medical history of hypertension, congestive heart failure, atrial fibrillation, cardiomyopathy, history of brain tumor, history of heart valve replacement with a mechanical valve, hyperlipidemia, history of pacemaker insertion, and a stroke in 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 The patient states she previously uses home oxygen intermittently but has needed it daily for the past weeks due to worsening shortness of breath. She also reports being hypotensive during the same time period. Symptoms are constant and moderate in severity, with associated symptoms including cough but no chest pain, fever, nausea, vomiting, or any other symptoms at this time. No sick contacts.    Upon arrival in the ER, initial vital signs showed tachypnea with hypoxia, which seemed to improve with oxygen supplementation; the rest of the vitals were stable. Abnormal laboratory results included an estimated glomerular filtration rate of 45, indicating reduced kidney function; an elevated Troponin I level at 0.143, a BNP level of 3,274, and a subtherapeutic INR of 1.6 (target range of 2.5 to 3.5 due to her mechanical heart valve). All other laboratory components  were within normal limits. Imaging results revealed small bilateral pleural effusions on chest x-ray, but no other significant cardiopulmonary disease was appreciated.  Hospital Medicine was called for admission to manage her complex conditions and provide further treatment and evaluation.    Past Medical History:   Diagnosis Date    A-fib     Anticoagulant long-term use     Aortic valve disease     Cancer     brain tumor, 10 years ago    Cardiomyopathy     CHF (congestive heart failure)     COVID-19 7/23/2022    Hx of heart valve replacement with mechanical valve     Hyperlipidemia     Hypertension     Pacemaker     Pacemaker     Sepsis 7/23/2022    Stroke     2007, residual weakness       Past Surgical History:   Procedure Laterality Date    AORTIC VALVE REPLACEMENT      CHOLECYSTECTOMY      CORONARY ARTERY BYPASS GRAFT      HYSTERECTOMY      INSERTION OF PACEMAKER      TONSILLECTOMY         Review of patient's allergies indicates:   Allergen Reactions    Amlodipine Other (See Comments)     Not sure    Chlorthalidone Other (See Comments)     Not sure    Clonidine Other (See Comments)     Not sure    Codeine Other (See Comments)     Not sure    Escitalopram Other (See Comments)     Not sure    Lisinopril Other (See Comments)     Not sure    Losartan Other (See Comments)     Not sure    Meperidine Other (See Comments)     Not sure    Methadone Other (See Comments)     Not sure      Morphine Other (See Comments)     Not sure    Nebivolol Other (See Comments)     Not sure        Nitrofurantoin Other (See Comments)     Not sure        Omeprazole Other (See Comments)     Not sure      Pravastatin Other (See Comments)     Not sure      Propoxyphene Other (See Comments)     Not sure      Sulfamethoxazole-trimethoprim Other (See Comments)     Not sure           No current facility-administered medications on file prior to encounter.     Current Outpatient Medications on File Prior to Encounter   Medication Sig    albuterol  (PROVENTIL) 2.5 mg /3 mL (0.083 %) nebulizer solution Take 2.5 mg by nebulization every 4 (four) hours as needed.    albuterol (PROVENTIL/VENTOLIN HFA) 90 mcg/actuation inhaler Inhale 2 puffs into the lungs every 4 (four) hours as needed.    furosemide (LASIX) 20 MG tablet Take 20 mg by mouth every morning.    metoprolol tartrate (LOPRESSOR) 25 MG tablet Take 1 tablet by mouth 2 (two) times daily.    warfarin (COUMADIN) 2.5 MG tablet Take 2.5 mg by mouth every Monday and Friday.  Take 1.25 mg by mouth all other days.     Family History       Problem Relation (Age of Onset)    No Known Problems Mother, Father          Tobacco Use    Smoking status: Never    Smokeless tobacco: Never   Substance and Sexual Activity    Alcohol use: Not Currently    Drug use: Never    Sexual activity: Not on file     Review of Systems   Constitutional:  Negative for chills, fatigue and fever.   HENT:  Negative for congestion, postnasal drip, rhinorrhea and sore throat.    Eyes:  Negative for pain and visual disturbance.   Respiratory:  Positive for shortness of breath. Negative for cough, chest tightness and wheezing.    Cardiovascular:  Positive for palpitations and leg swelling. Negative for chest pain.   Gastrointestinal:  Negative for abdominal distention, abdominal pain, constipation, diarrhea, nausea and vomiting.   Genitourinary:  Negative for difficulty urinating, flank pain and hematuria.   Musculoskeletal:  Negative for arthralgias, back pain, gait problem and joint swelling.   Skin:  Negative for pallor and rash.   Neurological:  Negative for dizziness, syncope and weakness.   Psychiatric/Behavioral:  Negative for confusion, decreased concentration and suicidal ideas.      Objective:     Vital Signs (Most Recent):  Temp: 97.6 °F (36.4 °C) (02/22/24 2126)  Pulse: 68 (02/23/24 0000)  Resp: 18 (02/23/24 0000)  BP: (!) 109/53 (02/23/24 0000)  SpO2: 97 % (02/23/24 0000) Vital Signs (24h Range):  Temp:  [97.6 °F (36.4 °C)-98.8 °F  (37.1 °C)] 97.6 °F (36.4 °C)  Pulse:  [68-78] 68  Resp:  [16-28] 18  SpO2:  [90 %-97 %] 97 %  BP: (107-124)/(50-87) 109/53     Weight: 91.2 kg (201 lb 1 oz)  Body mass index is 34.51 kg/m².     Physical Exam  Vitals reviewed.   Constitutional:       General: She is not in acute distress.     Appearance: Normal appearance. She is normal weight. She is ill-appearing. She is not toxic-appearing.   HENT:      Head: Normocephalic and atraumatic.      Nose: Nose normal. No congestion or rhinorrhea.   Eyes:      Extraocular Movements: Extraocular movements intact.      Conjunctiva/sclera: Conjunctivae normal.      Pupils: Pupils are equal, round, and reactive to light.   Cardiovascular:      Rate and Rhythm: Normal rate and regular rhythm.      Pulses: Normal pulses.      Heart sounds: No murmur heard.  Pulmonary:      Effort: Pulmonary effort is normal. No respiratory distress.      Breath sounds: Rales present. No wheezing.   Abdominal:      General: Abdomen is flat. Bowel sounds are normal. There is no distension.      Palpations: Abdomen is soft.      Tenderness: There is no abdominal tenderness. There is no guarding or rebound.   Musculoskeletal:         General: No swelling, tenderness or deformity. Normal range of motion.      Cervical back: Normal range of motion and neck supple. No rigidity.      Right lower le+ Pitting Edema present.      Left lower le+ Pitting Edema present.   Skin:     General: Skin is warm and dry.      Capillary Refill: Capillary refill takes less than 2 seconds.      Coloration: Skin is not pale.   Neurological:      General: No focal deficit present.      Mental Status: She is alert and oriented to person, place, and time. Mental status is at baseline.      Motor: No weakness.      Gait: Gait normal.   Psychiatric:         Mood and Affect: Mood normal.         Behavior: Behavior normal.         Thought Content: Thought content normal.              CRANIAL NERVES     CN III, IV, VI  "  Pupils are equal, round, and reactive to light.       Significant Labs: All pertinent labs within the past 24 hours have been reviewed.  BMP:   Recent Labs   Lab 02/22/24 2114   GLU 97      K 3.6      CO2 22*   BUN 16   CREATININE 1.2   CALCIUM 9.4     CBC:   Recent Labs   Lab 02/22/24 1540 02/22/24 2114   WBC 6.65 7.54   HGB 13.7 14.7   HCT 42.5 45.7    231     CMP:   Recent Labs   Lab 02/22/24 1540 02/22/24 2114    141   K 3.7 3.6    106   CO2 26 22*   * 97   BUN 17 16   CREATININE 1.1 1.2   CALCIUM 9.3 9.4   PROT 7.3 7.9   ALBUMIN 3.3* 3.5   BILITOT 1.6* 2.1*   ALKPHOS 93 94   AST 26 28   ALT 15 15   ANIONGAP 10 13     Cardiac Markers:   Recent Labs   Lab 02/22/24 2114   BNP 3,274*     Coagulation:   Recent Labs   Lab 02/22/24 2126   INR 1.6*   APTT 27.6     Lactic Acid: No results for input(s): "LACTATE" in the last 48 hours.  Magnesium: No results for input(s): "MG" in the last 48 hours.  Troponin:   Recent Labs   Lab 02/22/24 2114 02/22/24 2315   TROPONINI 0.143* 0.125*     TSH:   Recent Labs   Lab 11/19/23  1849   TSH 2.576       Significant Imaging: I have reviewed all pertinent imaging results/findings within the past 24 hours.  Imaging Results              CTA Chest Non-Coronary (PE Studies) (In process)                      X-Ray Chest AP (Final result)  Result time 02/22/24 21:39:04      Final result by Nidhi Epperson MD (02/22/24 21:39:04)                   Impression:      No active or adverse finding      Electronically signed by: Nidhi Epperson  Date:    02/22/2024  Time:    21:39               Narrative:    EXAMINATION:  XR CHEST AP PORTABLE    CLINICAL HISTORY:  sob;    TECHNIQUE:  Single frontal portable view of the chest was performed.    COMPARISON:  None    FINDINGS:  Comparison imaging earlier same date    No new pulmonary consolidation.  No sizable pleural effusion or convincing pneumothorax                                "       Assessment/Plan:     * Acute on chronic systolic heart failure  --Last ECHO from NOV 2023 confirm EF 60 %, Grade I diastolic dysfunction  --BNP and troponin elevated x1- will trend the latter  --trace/1+ edema on exam, small pleural effusions noted on CXR  --s/p IV 60 mg lasix dose in the ER  --will schedule IV Lasix 40 mg BID while hospitalized  --monitor electrolytes, UOP, repeat Echo  --strict I&O and daily weights ordered  --Cardiology consulted for AM evaluation    Acute on chronic respiratory failure with hypoxia  Patient with Hypoxic Respiratory failure which is Acute on chronic.  she is on home oxygen at 2-3 LPM. Signs/symptoms of respiratory failure include- tachypnea, increased work of breathing, rales heard on auscultation. Contributing diagnoses includes - CHF, COPD, Pneumonia, and Pulmonary Embolus.   --suspect CHF exacerbation as primary etiology, see plan above  --Dimer ordered; mildly elevated   --some trace swelling in b/l extremities, a chronically subtherapeutic INR  --CTA Chest ordered to rule out PE  --home Coumadin therapy continued- pharmacy to dose  --low suspicion of PNA as CXR unremarkable  --COPD exacerbation also possible- supportive therapy, PRN nebs  --reassess in the AM    Pacemaker  --orders for interrogation placed while in the ER  --awaiting results, no arrhythmias recorded while in the ER    H/O mechanical aortic valve replacement  Possible complication could be contributing to CHF exacerbation presentation, will further investigate via CTA and echo.  On chart review, patient seems to have had a chronically subtherapeutic INR.  Target range has been documented to be between 2.5 and 3.5, however patients INR has been sustained below 2.0 on the last several blood draws.  Pharmacy consulted to assist in Coumadin dosing.    Essential hypertension  --home medications include:  Metoprolol  --restart as tolerated   --PRN IV hydralazine 10mg Q6H for sBP > 175 mmHg  --Q4HVS      VTE  Risk Mitigation (From admission, onward)           Ordered     Warfarin - pharmacy to dose *PLACEHOLDER FILE ONLY*  Daily         02/23/24 0005     IP VTE HIGH RISK PATIENT  Once         02/22/24 2300     Place sequential compression device  Until discontinued         02/22/24 2300                       On 02/23/2024, patient should be placed in hospital observation services under my care.        Pharmacy Consult Note: Warfarin    Serena Post 's warfarin will be dosed and monitored by Pharmacy.  Indication: Aortic valve replacement  Home Dose: 2.5 mg on Mondays and Fridays, 1.25 mg on all other days.  Target INR is 2-3    INR   Date Value Ref Range Status   02/22/2024 1.6 (H) 0.8 - 1.2 Final     Comment:     Coumadin Therapy:  2.0 - 3.0 for INR for all indicators except mechanical heart valves  and antiphospholipid syndromes which should use 2.5 - 3.5.         Administer a booster dose of warfarin 1 mg once today.    PT/INR will be monitored daily. Dose adjustments will be made accordingly.      Thank you for allowing us to participate in this patient's care.     Juan Corrales 2/23/2024 12:06 AM      Junior Hurtado MD  Department of Hospital Medicine  [facility] - Emergency Dept.

## 2024-02-23 NOTE — CONSULTS
Consulted on Ms. Post due to altered skin integrity, MASD. Patient is awake and alert, seen in ER. Versette external catheter in use. Medial thighs and groins MASD with redness and satellite lesions is noted. Recommend apply antifungal moisture barrier ointment BID and prn, avoid diapers.  Bilateral lower breast intertrigo with redness and satellite lesions - cleansed with bath wipes and interdry applied to each breast. Recommend remove daily for hygiene care, then replace, change sheets every 5 days and prn.

## 2024-02-23 NOTE — ASSESSMENT & PLAN NOTE
--Last ECHO from NOV 2023 confirm EF 60 %, Grade I diastolic dysfunction  --BNP and troponin elevated x1- will trend the latter  --trace/1+ edema on exam, small pleural effusions noted on CXR  --s/p IV 60 mg lasix dose in the ER  --will schedule IV Lasix 40 mg BID while hospitalized  --monitor electrolytes, UOP, repeat Echo  --strict I&O and daily weights ordered  --Cardiology consulted for AM evaluation

## 2024-02-23 NOTE — HPI
HPI obtained largely from chart and patient's son at bedside as she has underlying dementia    Ms. 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 Patient complained of increased SOB over the past three weeks to the point where she needed to use her supplemental oxygen continuously. She denied any associated CP, LE edema, fever, chills, palpitations, near syncope, or syncope. Initial workup revealed troponin of 0.143, INR of 1.6, and BNP > 3,000. CXR showed small bilateral pleural effusions and patient was subsequently admitted for further evaluation and treatment. Cardiology consulted to assist with management. Patient seen and examined today in ED with her son present. Still visibly SOB. Pleasantly confused. No CP symptoms. Son reports she is compliant with her medications. Watches her salt intake. Followed by CHF clinic and outside cardiologist, Dr. Perkins. Chart reviewed. Troponin flat, 0.143>0.125>0.107. EKG with paced rhythm with PVC's, noted to possibly have some brief runs of PAF while in ED. PPM interrogation ordered. Prior TTE 11/23 with normal EF, repeat pending. CTA negative for PE, suggestive of pulmonary HTN, ascending aorta aneurysm.

## 2024-02-23 NOTE — PROGRESS NOTES
Pharmacy Consult Note: Warfarin    Serena Post 's warfarin will be dosed and monitored by Pharmacy.  Indication: Aortic valve replacement  Home Dose: 2.5 mg on Mondays and Fridays, 1.25 mg on all other days.  Target INR is 2-3    INR   Date Value Ref Range Status   02/22/2024 1.6 (H) 0.8 - 1.2 Final     Comment:     Coumadin Therapy:  2.0 - 3.0 for INR for all indicators except mechanical heart valves  and antiphospholipid syndromes which should use 2.5 - 3.5.         Administer a booster dose of warfarin 1 mg once today.    PT/INR will be monitored daily. Dose adjustments will be made accordingly.      Thank you for allowing us to participate in this patient's care.     Juan Corrales 2/23/2024 12:06 AM

## 2024-02-23 NOTE — ASSESSMENT & PLAN NOTE
--home medications include:  Metoprolol  --restart as tolerated   --PRN IV hydralazine 10mg Q6H for sBP > 175 mmHg  --[id number]

## 2024-02-23 NOTE — SUBJECTIVE & OBJECTIVE
Past Medical History:   Diagnosis Date    A-fib     Anticoagulant long-term use     Aortic valve disease     Cancer     brain tumor, 10 years ago    Cardiomyopathy     CHF (congestive heart failure)     COVID-19 7/23/2022    Hx of heart valve replacement with mechanical valve     Hyperlipidemia     Hypertension     Pacemaker     Pacemaker     Sepsis 7/23/2022    Stroke     2007, residual weakness       Past Surgical History:   Procedure Laterality Date    AORTIC VALVE REPLACEMENT      CHOLECYSTECTOMY      CORONARY ARTERY BYPASS GRAFT      HYSTERECTOMY      INSERTION OF PACEMAKER      TONSILLECTOMY         Review of patient's allergies indicates:   Allergen Reactions    Amlodipine Other (See Comments)     Not sure    Chlorthalidone Other (See Comments)     Not sure    Clonidine Other (See Comments)     Not sure    Codeine Other (See Comments)     Not sure    Escitalopram Other (See Comments)     Not sure    Lisinopril Other (See Comments)     Not sure    Losartan Other (See Comments)     Not sure    Meperidine Other (See Comments)     Not sure    Methadone Other (See Comments)     Not sure      Morphine Other (See Comments)     Not sure    Nebivolol Other (See Comments)     Not sure        Nitrofurantoin Other (See Comments)     Not sure        Omeprazole Other (See Comments)     Not sure      Pravastatin Other (See Comments)     Not sure      Propoxyphene Other (See Comments)     Not sure      Sulfamethoxazole-trimethoprim Other (See Comments)     Not sure           No current facility-administered medications on file prior to encounter.     Current Outpatient Medications on File Prior to Encounter   Medication Sig    furosemide (LASIX) 20 MG tablet Take 20 mg by mouth every morning.    metoprolol tartrate (LOPRESSOR) 25 MG tablet Take 1 tablet by mouth 2 (two) times daily.    warfarin (COUMADIN) 2.5 MG tablet Take 2.5 mg by mouth every Monday and Friday.  Take 1.25 mg by mouth all other days.    albuterol  (PROVENTIL) 2.5 mg /3 mL (0.083 %) nebulizer solution Take 2.5 mg by nebulization every 4 (four) hours as needed.    [DISCONTINUED] albuterol (PROVENTIL/VENTOLIN HFA) 90 mcg/actuation inhaler Inhale 2 puffs into the lungs every 4 (four) hours as needed.     Family History       Problem Relation (Age of Onset)    No Known Problems Mother, Father          Tobacco Use    Smoking status: Never    Smokeless tobacco: Never   Substance and Sexual Activity    Alcohol use: Not Currently    Drug use: Never    Sexual activity: Not on file     Review of Systems   Reason unable to perform ROS: limited due to dementia/confusion.   Cardiovascular:  Positive for dyspnea on exertion.   Respiratory:  Positive for shortness of breath.      Objective:     Vital Signs (Most Recent):  Temp: 97.6 °F (36.4 °C) (02/22/24 2126)  Pulse: 78 (02/23/24 1000)  Resp: (!) 22 (02/23/24 1000)  BP: (!) 96/56 (02/23/24 1000)  SpO2: (!) 94 % (02/23/24 1000) Vital Signs (24h Range):  Temp:  [97.6 °F (36.4 °C)-98.8 °F (37.1 °C)] 97.6 °F (36.4 °C)  Pulse:  [67-80] 78  Resp:  [11-28] 22  SpO2:  [90 %-97 %] 94 %  BP: ()/(50-87) 96/56     Weight: 91.2 kg (201 lb)  Body mass index is 34.5 kg/m².    SpO2: (!) 94 %         Intake/Output Summary (Last 24 hours) at 2/23/2024 1146  Last data filed at 2/23/2024 0100  Gross per 24 hour   Intake --   Output 400 ml   Net -400 ml       Lines/Drains/Airways       Peripheral Intravenous Line  Duration                  Peripheral IV - Single Lumen 02/22/24 2125 20 G Left Antecubital <1 day                     Physical Exam  Vitals and nursing note reviewed.   Constitutional:       Appearance: She is ill-appearing.      Comments: On supplemental O2   HENT:      Head: Normocephalic and atraumatic.   Eyes:      Pupils: Pupils are equal, round, and reactive to light.   Cardiovascular:      Rate and Rhythm: Normal rate and regular rhythm.      Heart sounds: S1 normal and S2 normal. No murmur heard.     Comments:  +mechanical click     Possible runs of PAF on monitor  Abdominal:      Palpations: Abdomen is soft.   Musculoskeletal:      Right lower leg: No edema.      Left lower leg: No edema.   Skin:     General: Skin is warm.   Neurological:      Comments: Oriented to person; pleasantly confused   Psychiatric:         Behavior: Behavior normal.          Significant Labs: CMP   Recent Labs   Lab 02/22/24  1540 02/22/24 2114 02/23/24  0820    141 140   K 3.7 3.6 3.6    106 105   CO2 26 22* 22*   * 97 162*   BUN 17 16 16   CREATININE 1.1 1.2 1.2   CALCIUM 9.3 9.4 8.7   PROT 7.3 7.9  --    ALBUMIN 3.3* 3.5  --    BILITOT 1.6* 2.1*  --    ALKPHOS 93 94  --    AST 26 28  --    ALT 15 15  --    ANIONGAP 10 13 13   , CBC   Recent Labs   Lab 02/22/24  1540 02/22/24  2114   WBC 6.65 7.54   HGB 13.7 14.7   HCT 42.5 45.7    231   , Troponin   Recent Labs   Lab 02/22/24 2114 02/22/24  2315 02/23/24  0309   TROPONINI 0.143* 0.125* 0.107*   , and All pertinent lab results from the last 24 hours have been reviewed.    Significant Imaging: Echocardiogram: Transthoracic echo (TTE) complete (Cupid Only):   Results for orders placed or performed during the hospital encounter of 02/22/24   Echo   Result Value Ref Range    BSA 2.03 m2    LVOT stroke volume 56.42 cm3    LVIDd 5.10 3.5 - 6.0 cm    LV Systolic Volume 90.21 mL    LV Systolic Volume Index 46.0 mL/m2    LVIDs 4.45 (A) 2.1 - 4.0 cm    LV Diastolic Volume 123.59 mL    LV Diastolic Volume Index 63.06 mL/m2    IVS 1.04 0.6 - 1.1 cm    LVOT diameter 1.91 cm    LVOT area 2.9 cm2    FS 13 (A) 28 - 44 %    Left Ventricle Relative Wall Thickness 0.47 cm    Posterior Wall 1.19 (A) 0.6 - 1.1 cm    LV mass 217.90 g    LV Mass Index 111 g/m2    MV Peak E Melo 1.07 m/s    TDI LATERAL 0.08 m/s    TDI SEPTAL 0.05 m/s    E/E' ratio 16.46 m/s    MV Peak A Melo 1.07 m/s    TR Max Melo 3.38 m/s    E/A ratio 1.00     IVRT 62.80 msec    E wave deceleration time 192.43 msec    LV  SEPTAL E/E' RATIO 21.40 m/s    LV LATERAL E/E' RATIO 13.38 m/s    LVOT peak melo 0.83 m/s    Left Ventricular Outflow Tract Mean Velocity 0.58 cm/s    Left Ventricular Outflow Tract Mean Gradient 1.51 mmHg    RV mid diameter 3.15 cm    RVOT peak VTI 9.3 cm    TAPSE 2.05 cm    LA size 3.61 cm    Left Atrium Minor Axis 3.98 cm    Left Atrium Major Axis 6.58 cm    RA Major Axis 4.53 cm    AV regurgitation pressure 1/2 time 385.486437176147331 ms    AR Max Melo 4.34 m/s    AV mean gradient 95 mmHg    AV peak gradient 97 mmHg    Ao peak melo 4.93 m/s    Ao .80 cm    LVOT peak VTI 19.70 cm    AV valve area 0.37 cm²    AV Velocity Ratio 0.17     AV index (prosthetic) 0.13     AAMIR by Velocity Ratio 0.48 cm²    MV stenosis pressure 1/2 time 55.80 ms    MV valve area p 1/2 method 3.94 cm2    Triscuspid Valve Regurgitation Peak Gradient 46 mmHg    PV mean gradient 1 mmHg    RVOT peak melo 0.49 m/s    Ao root annulus 2.98 cm    STJ 4.51 cm    Ascending aorta 4.45 cm    IVC diameter 1.15 cm    Mean e' 0.07 m/s    ZLVIDS 2.01     ZLVIDD -0.95     LA Volume Index 34.9 mL/m2    LA volume 68.49 cm3    LA WIDTH 4.5 cm    RA Width 4.4 cm   , EKG: reviewed, and X-Ray: CXR: X-Ray Chest 1 View (CXR): No results found for this visit on 02/22/24. and X-Ray Chest PA and Lateral (CXR): No results found for this visit on 02/22/24.

## 2024-02-23 NOTE — HPI
Serena Post is an 83-year-old woman with a past medical history of hypertension, congestive heart failure, atrial fibrillation, cardiomyopathy, history of brain tumor, history of heart valve replacement with a mechanical valve, hyperlipidemia, history of pacemaker insertion, and a stroke in 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 The patient states she previously uses home oxygen intermittently but has needed it daily for the past weeks due to worsening shortness of breath. She also reports being hypotensive during the same time period. Symptoms are constant and moderate in severity, with associated symptoms including cough but no chest pain, fever, nausea, vomiting, or any other symptoms at this time. No sick contacts.    Upon arrival in the ER, initial vital signs showed tachypnea with hypoxia, which seemed to improve with oxygen supplementation; the rest of the vitals were stable. Abnormal laboratory results included an estimated glomerular filtration rate of 45, indicating reduced kidney function; an elevated Troponin I level at 0.143, a BNP level of 3,274, and a subtherapeutic INR of 1.6 (target range of 2.5 to 3.5 due to her mechanical heart valve). All other laboratory components were within normal limits. Imaging results revealed small bilateral pleural effusions on chest x-ray, but no other significant cardiopulmonary disease was appreciated.  Hospital Medicine was called for admission to manage her complex conditions and provide further treatment and evaluation.

## 2024-02-23 NOTE — ASSESSMENT & PLAN NOTE
Patient with Hypoxic Respiratory failure which is Acute on chronic.  she is on home oxygen at 2-3 LPM. Signs/symptoms of respiratory failure include- tachypnea, increased work of breathing, and wheezing. Contributing diagnoses includes - CHF, COPD, Pneumonia, and Pulmonary Embolus.   --suspect CHF exacerbation as primary etiology, see plan above  --Dimer ordered; mildly elevated   --some trace swelling in b/l extremities, a chronically subtherapeutic INR  --CTA Chest ordered to rule out PE  --home Coumadin therapy continued- pharmacy to dose  --low suspicion of PNA as CXR unremarkable  --COPD exacerbation also possible- supportive therapy, PRN nebs  --reassess in the AM

## 2024-02-23 NOTE — CONSULTS
[facility] - Emergency Dept.  Cardiology  Consult Note    Patient Name: Serena Post  MRN: [id number]  Admission Date: 2/22/2024  Hospital Length of Stay: 0 days  Code Status: Full Code   Attending Provider: Yary Levi MD   Consulting Provider: Sarah Santiago PA-C  Primary Care Physician: Evangelina Olivares MD  Principal Problem:Acute on chronic systolic heart failure    Patient information was obtained from patient, relative(s), past medical records, and ER records.     Inpatient consult to Cardiology  Consult performed by: Sarah Santiago PA-C  Consult ordered by: Junior Hurtado MD  Reason for consult: SOB        Subjective:     Chief Complaint:  SOB    HPI:   HPI obtained largely from chart and patient's son at bedside as she has underlying dementia    Ms. 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 ED yesterday from her PCP's office due to increased SOB. Patient complained of increased SOB over the past three weeks to the point where she needed to use her supplemental oxygen continuously. She denied any associated CP, LE edema, fever, chills, palpitations, near syncope, or syncope. Initial workup revealed troponin of 0.143, INR of 1.6, and BNP > 3,000. CXR showed small bilateral pleural effusions and patient was subsequently admitted for further evaluation and treatment. Cardiology consulted to assist with management. Patient seen and examined today in ED with her son present. Still visibly SOB. Pleasantly confused. No CP symptoms. Son reports she is compliant with her medications. Watches her salt intake. Followed by CHF clinic and outside cardiologist, Dr. Perkins. Chart reviewed. Troponin flat, 0.143>0.125>0.107. EKG with paced rhythm with PVC's, noted to possibly have some brief runs of PAF  while in ED. PPM interrogation ordered. Prior TTE 11/23 with normal EF, repeat pending. CTA negative for PE, suggestive of pulmonary HTN, ascending aorta aneurysm.    Past Medical History:   Diagnosis Date    A-fib     Anticoagulant long-term use     Aortic valve disease     Cancer     brain tumor, 10 years ago    Cardiomyopathy     CHF (congestive heart failure)     COVID-19 7/23/2022    Hx of heart valve replacement with mechanical valve     Hyperlipidemia     Hypertension     Pacemaker     Pacemaker     Sepsis 7/23/2022    Stroke     2007, residual weakness       Past Surgical History:   Procedure Laterality Date    AORTIC VALVE REPLACEMENT      CHOLECYSTECTOMY      CORONARY ARTERY BYPASS GRAFT      HYSTERECTOMY      INSERTION OF PACEMAKER      TONSILLECTOMY         Review of patient's allergies indicates:   Allergen Reactions    Amlodipine Other (See Comments)     Not sure    Chlorthalidone Other (See Comments)     Not sure    Clonidine Other (See Comments)     Not sure    Codeine Other (See Comments)     Not sure    Escitalopram Other (See Comments)     Not sure    Lisinopril Other (See Comments)     Not sure    Losartan Other (See Comments)     Not sure    Meperidine Other (See Comments)     Not sure    Methadone Other (See Comments)     Not sure      Morphine Other (See Comments)     Not sure    Nebivolol Other (See Comments)     Not sure        Nitrofurantoin Other (See Comments)     Not sure        Omeprazole Other (See Comments)     Not sure      Pravastatin Other (See Comments)     Not sure      Propoxyphene Other (See Comments)     Not sure      Sulfamethoxazole-trimethoprim Other (See Comments)     Not sure           No current facility-administered medications on file prior to encounter.     Current Outpatient Medications on File Prior to Encounter   Medication Sig    furosemide (LASIX) 20 MG tablet Take 20 mg by mouth every morning.    metoprolol tartrate (LOPRESSOR) 25 MG tablet Take 1 tablet by  mouth 2 (two) times daily.    warfarin (COUMADIN) 2.5 MG tablet Take 2.5 mg by mouth every Monday and Friday.  Take 1.25 mg by mouth all other days.    albuterol (PROVENTIL) 2.5 mg /3 mL (0.083 %) nebulizer solution Take 2.5 mg by nebulization every 4 (four) hours as needed.    [DISCONTINUED] albuterol (PROVENTIL/VENTOLIN HFA) 90 mcg/actuation inhaler Inhale 2 puffs into the lungs every 4 (four) hours as needed.     Family History       Problem Relation (Age of Onset)    No Known Problems Mother, Father          Tobacco Use    Smoking status: Never    Smokeless tobacco: Never   Substance and Sexual Activity    Alcohol use: Not Currently    Drug use: Never    Sexual activity: Not on file     Review of Systems   Reason unable to perform ROS: limited due to dementia/confusion.   Cardiovascular:  Positive for dyspnea on exertion.   Respiratory:  Positive for shortness of breath.      Objective:     Vital Signs (Most Recent):  Temp: 97.6 °F (36.4 °C) (02/22/24 2126)  Pulse: 78 (02/23/24 1000)  Resp: (!) 22 (02/23/24 1000)  BP: (!) 96/56 (02/23/24 1000)  SpO2: (!) 94 % (02/23/24 1000) Vital Signs (24h Range):  Temp:  [97.6 °F (36.4 °C)-98.8 °F (37.1 °C)] 97.6 °F (36.4 °C)  Pulse:  [67-80] 78  Resp:  [11-28] 22  SpO2:  [90 %-97 %] 94 %  BP: ()/(50-87) 96/56     Weight: 91.2 kg (201 lb)  Body mass index is 34.5 kg/m².    SpO2: (!) 94 %         Intake/Output Summary (Last 24 hours) at 2/23/2024 1146  Last data filed at 2/23/2024 0100  Gross per 24 hour   Intake --   Output 400 ml   Net -400 ml       Lines/Drains/Airways       Peripheral Intravenous Line  Duration                  Peripheral IV - Single Lumen 02/22/24 2125 20 G Left Antecubital <1 day                     Physical Exam  Vitals and nursing note reviewed.   Constitutional:       Appearance: She is ill-appearing.      Comments: On supplemental O2   HENT:      Head: Normocephalic and atraumatic.   Eyes:      Pupils: Pupils are equal, round, and reactive to  light.   Cardiovascular:      Rate and Rhythm: Normal rate and regular rhythm.      Heart sounds: S1 normal and S2 normal. No murmur heard.     Comments: +mechanical click     Possible runs of PAF on monitor  Abdominal:      Palpations: Abdomen is soft.   Musculoskeletal:      Right lower leg: No edema.      Left lower leg: No edema.   Skin:     General: Skin is warm.   Neurological:      Comments: Oriented to person; pleasantly confused   Psychiatric:         Behavior: Behavior normal.          Significant Labs: CMP   Recent Labs   Lab 02/22/24  1540 02/22/24 2114 02/23/24  0820    141 140   K 3.7 3.6 3.6    106 105   CO2 26 22* 22*   * 97 162*   BUN 17 16 16   CREATININE 1.1 1.2 1.2   CALCIUM 9.3 9.4 8.7   PROT 7.3 7.9  --    ALBUMIN 3.3* 3.5  --    BILITOT 1.6* 2.1*  --    ALKPHOS 93 94  --    AST 26 28  --    ALT 15 15  --    ANIONGAP 10 13 13   , CBC   Recent Labs   Lab 02/22/24  1540 02/22/24 2114   WBC 6.65 7.54   HGB 13.7 14.7   HCT 42.5 45.7    231   , Troponin   Recent Labs   Lab 02/22/24  2114 02/22/24  2315 02/23/24  0309   TROPONINI 0.143* 0.125* 0.107*   , and All pertinent lab results from the last 24 hours have been reviewed.    Significant Imaging: Echocardiogram: Transthoracic echo (TTE) complete (Cupid Only):   Results for orders placed or performed during the hospital encounter of 02/22/24   Echo   Result Value Ref Range    BSA 2.03 m2    LVOT stroke volume 56.42 cm3    LVIDd 5.10 3.5 - 6.0 cm    LV Systolic Volume 90.21 mL    LV Systolic Volume Index 46.0 mL/m2    LVIDs 4.45 (A) 2.1 - 4.0 cm    LV Diastolic Volume 123.59 mL    LV Diastolic Volume Index 63.06 mL/m2    IVS 1.04 0.6 - 1.1 cm    LVOT diameter 1.91 cm    LVOT area 2.9 cm2    FS 13 (A) 28 - 44 %    Left Ventricle Relative Wall Thickness 0.47 cm    Posterior Wall 1.19 (A) 0.6 - 1.1 cm    LV mass 217.90 g    LV Mass Index 111 g/m2    MV Peak E Melo 1.07 m/s    TDI LATERAL 0.08 m/s    TDI SEPTAL 0.05 m/s     E/E' ratio 16.46 m/s    MV Peak A Melo 1.07 m/s    TR Max Melo 3.38 m/s    E/A ratio 1.00     IVRT 62.80 msec    E wave deceleration time 192.43 msec    LV SEPTAL E/E' RATIO 21.40 m/s    LV LATERAL E/E' RATIO 13.38 m/s    LVOT peak melo 0.83 m/s    Left Ventricular Outflow Tract Mean Velocity 0.58 cm/s    Left Ventricular Outflow Tract Mean Gradient 1.51 mmHg    RV mid diameter 3.15 cm    RVOT peak VTI 9.3 cm    TAPSE 2.05 cm    LA size 3.61 cm    Left Atrium Minor Axis 3.98 cm    Left Atrium Major Axis 6.58 cm    RA Major Axis 4.53 cm    AV regurgitation pressure 1/2 time [id number] ms    AR Max Melo 4.34 m/s    AV mean gradient 95 mmHg    AV peak gradient 97 mmHg    Ao peak melo 4.93 m/s    Ao .80 cm    LVOT peak VTI 19.70 cm    AV valve area 0.37 cm²    AV Velocity Ratio 0.17     AV index (prosthetic) 0.13     AAMIR by Velocity Ratio 0.48 cm²    MV stenosis pressure 1/2 time 55.80 ms    MV valve area p 1/2 method 3.94 cm2    Triscuspid Valve Regurgitation Peak Gradient 46 mmHg    PV mean gradient 1 mmHg    RVOT peak melo 0.49 m/s    Ao root annulus 2.98 cm    STJ 4.51 cm    Ascending aorta 4.45 cm    IVC diameter 1.15 cm    Mean e' 0.07 m/s    ZLVIDS 2.01     ZLVIDD -0.95     LA Volume Index 34.9 mL/m2    LA volume 68.49 cm3    LA WIDTH 4.5 cm    RA Width 4.4 cm   , EKG: reviewed, and X-Ray: CXR: X-Ray Chest 1 View (CXR): No results found for this visit on 02/22/24. and X-Ray Chest PA and Lateral (CXR): No results found for this visit on 02/22/24.  Assessment and Plan:   Patient who presents with SOB/hypoxia/decompensated CHF. Continue IV diuresis. Troponin flat, elevation due to demand ischemia from above. Continue OMT as tolerated. Repeat TTE pending. PPM interrogation ordered.     * Acute on chronic systolic heart failure  -BNP > 3,000  -TTE 11/23 with normal EF, repeat pending  -Continue IV diuresis as tolerated  -Continue home regimen/OMT  -Strict I's/O's    Acute on chronic respiratory failure with  hypoxia  -Assess response to IV diuresis  -CTA negative for PEP    Aortic aneurysm  -Will need surveillance as OP    Pacemaker  -Interrogation pending, possible PAF runs    H/O mechanical aortic valve replacement  -Continue Coumadin, dosing as per pharmacy    Essential hypertension  -Continue home regimen as tolerated        VTE Risk Mitigation (From admission, onward)           Ordered     warfarin (COUMADIN) tablet 2.5 mg  Once per day on Mon Fri 02/23/24 1005     warfarin (COUMADIN) split tablet 1.25 mg  Once per day on Sun Tue Wed Thu Sat         02/23/24 1005     warfarin (COUMADIN) tablet 4 mg  Daily         02/23/24 1005     enoxaparin injection 90 mg  Every 12 hours         02/23/24 1002     IP VTE HIGH RISK PATIENT  Once         02/22/24 2300     Place sequential compression device  Until discontinued         02/22/24 2300                    Thank you for your consult. I will follow-up with patient. Please contact us if you have any additional questions.    Sarah Santiago PA-C  Cardiology   [facility] - Emergency Dept.

## 2024-02-23 NOTE — ASSESSMENT & PLAN NOTE
Possible complication could be contributing to CHF exacerbation presentation, will further investigate via CTA and echo.  On chart review, patient seems to have had a chronically subtherapeutic INR.  Target range has been documented to be between 2.5 and 3.5, however patients INR has been sustained below 2.0 on the last several blood draws.  Pharmacy consulted to assist in Coumadin dosing.

## 2024-02-23 NOTE — PROGRESS NOTES
Pharmacy Consult Note: Warfarin     Serena Post 's Coumadin will be dosed and monitored by Pharmacy.      Target INR goal is 2-3.    INR   Date Value Ref Range Status   02/23/2024 1.4 (H) 0.8 - 1.2 Final     Comment:     Coumadin Therapy:  2.0 - 3.0 for INR for all indicators except mechanical heart valves  and antiphospholipid syndromes which should use 2.5 - 3.5.         Indication: mechanical AVR + atrial fibrillation + hx of DVT  Goal INR: should be 2.5-3.5 but current target is 2-3 per Dr. Reynoso due to frequent falls   Home dose: 2.5 mg Mondays and Fridays and 1.25 mg all other days  Patient will be given a booster dose of 4 mg today.    Dose for Today: 4 mg    Lovenox bridge initiated @ 1mg/kg q12 until INR therapeutic     PT/INR will be monitored daily. Dose adjustments will be made accordingly.      Thank you for allowing us to participate in this patient's care.     Sabiha Villa, PharmD 2/23/2024 10:09 AM

## 2024-02-23 NOTE — PLAN OF CARE
Patient is stable. No signs or symptoms of acute distress. Continuous cardiac monitoring in place. Meds given as ordered. Bed in lowest position, call light in reach. Chart orders reviewed.

## 2024-02-24 LAB
ANION GAP SERPL CALC-SCNC: 11 MMOL/L (ref 8–16)
BASOPHILS # BLD AUTO: 0.02 K/UL (ref 0–0.2)
BASOPHILS NFR BLD: 0.2 % (ref 0–1.9)
BUN SERPL-MCNC: 21 MG/DL (ref 8–23)
CALCIUM SERPL-MCNC: 9 MG/DL (ref 8.7–10.5)
CHLORIDE SERPL-SCNC: 105 MMOL/L (ref 95–110)
CO2 SERPL-SCNC: 28 MMOL/L (ref 23–29)
CREAT SERPL-MCNC: 1.2 MG/DL (ref 0.5–1.4)
DIFFERENTIAL METHOD BLD: ABNORMAL
EOSINOPHIL # BLD AUTO: 0 K/UL (ref 0–0.5)
EOSINOPHIL NFR BLD: 0 % (ref 0–8)
ERYTHROCYTE [DISTWIDTH] IN BLOOD BY AUTOMATED COUNT: 13.7 % (ref 11.5–14.5)
EST. GFR  (NO RACE VARIABLE): 45 ML/MIN/1.73 M^2
GLUCOSE SERPL-MCNC: 108 MG/DL (ref 70–110)
HCT VFR BLD AUTO: 40.7 % (ref 37–48.5)
HGB BLD-MCNC: 13.1 G/DL (ref 12–16)
IMM GRANULOCYTES # BLD AUTO: 0.04 K/UL (ref 0–0.04)
IMM GRANULOCYTES NFR BLD AUTO: 0.4 % (ref 0–0.5)
INR PPP: 2.1 (ref 0.8–1.2)
LYMPHOCYTES # BLD AUTO: 1.1 K/UL (ref 1–4.8)
LYMPHOCYTES NFR BLD: 11.8 % (ref 18–48)
MAGNESIUM SERPL-MCNC: 1.8 MG/DL (ref 1.6–2.6)
MCH RBC QN AUTO: 30.5 PG (ref 27–31)
MCHC RBC AUTO-ENTMCNC: 32.2 G/DL (ref 32–36)
MCV RBC AUTO: 95 FL (ref 82–98)
MONOCYTES # BLD AUTO: 0.8 K/UL (ref 0.3–1)
MONOCYTES NFR BLD: 8.2 % (ref 4–15)
NEUTROPHILS # BLD AUTO: 7.4 K/UL (ref 1.8–7.7)
NEUTROPHILS NFR BLD: 79.4 % (ref 38–73)
NRBC BLD-RTO: 0 /100 WBC
PLATELET # BLD AUTO: 200 K/UL (ref 150–450)
PMV BLD AUTO: 11.9 FL (ref 9.2–12.9)
POCT GLUCOSE: 101 MG/DL (ref 70–110)
POCT GLUCOSE: 112 MG/DL (ref 70–110)
POCT GLUCOSE: 92 MG/DL (ref 70–110)
POTASSIUM SERPL-SCNC: 3.3 MMOL/L (ref 3.5–5.1)
PROTHROMBIN TIME: 23 SEC (ref 9–12.5)
RBC # BLD AUTO: 4.29 M/UL (ref 4–5.4)
SODIUM SERPL-SCNC: 144 MMOL/L (ref 136–145)
WBC # BLD AUTO: 9.34 K/UL (ref 3.9–12.7)

## 2024-02-24 PROCEDURE — 25000242 PHARM REV CODE 250 ALT 637 W/ HCPCS: Performed by: STUDENT IN AN ORGANIZED HEALTH CARE EDUCATION/TRAINING PROGRAM

## 2024-02-24 PROCEDURE — 99233 SBSQ HOSP IP/OBS HIGH 50: CPT | Mod: ,,, | Performed by: INTERNAL MEDICINE

## 2024-02-24 PROCEDURE — 85610 PROTHROMBIN TIME: CPT | Performed by: STUDENT IN AN ORGANIZED HEALTH CARE EDUCATION/TRAINING PROGRAM

## 2024-02-24 PROCEDURE — 94761 N-INVAS EAR/PLS OXIMETRY MLT: CPT

## 2024-02-24 PROCEDURE — 80048 BASIC METABOLIC PNL TOTAL CA: CPT | Performed by: STUDENT IN AN ORGANIZED HEALTH CARE EDUCATION/TRAINING PROGRAM

## 2024-02-24 PROCEDURE — 25000003 PHARM REV CODE 250: Performed by: STUDENT IN AN ORGANIZED HEALTH CARE EDUCATION/TRAINING PROGRAM

## 2024-02-24 PROCEDURE — 63600175 PHARM REV CODE 636 W HCPCS: Performed by: STUDENT IN AN ORGANIZED HEALTH CARE EDUCATION/TRAINING PROGRAM

## 2024-02-24 PROCEDURE — 11000001 HC ACUTE MED/SURG PRIVATE ROOM

## 2024-02-24 PROCEDURE — 21400001 HC TELEMETRY ROOM

## 2024-02-24 PROCEDURE — 85025 COMPLETE CBC W/AUTO DIFF WBC: CPT | Performed by: STUDENT IN AN ORGANIZED HEALTH CARE EDUCATION/TRAINING PROGRAM

## 2024-02-24 PROCEDURE — 83735 ASSAY OF MAGNESIUM: CPT | Performed by: STUDENT IN AN ORGANIZED HEALTH CARE EDUCATION/TRAINING PROGRAM

## 2024-02-24 PROCEDURE — 36415 COLL VENOUS BLD VENIPUNCTURE: CPT | Performed by: STUDENT IN AN ORGANIZED HEALTH CARE EDUCATION/TRAINING PROGRAM

## 2024-02-24 PROCEDURE — 25000003 PHARM REV CODE 250: Performed by: INTERNAL MEDICINE

## 2024-02-24 PROCEDURE — 25000003 PHARM REV CODE 250: Performed by: NURSE PRACTITIONER

## 2024-02-24 PROCEDURE — 99900035 HC TECH TIME PER 15 MIN (STAT)

## 2024-02-24 PROCEDURE — 27000221 HC OXYGEN, UP TO 24 HOURS

## 2024-02-24 PROCEDURE — 63600175 PHARM REV CODE 636 W HCPCS: Performed by: NURSE PRACTITIONER

## 2024-02-24 RX ORDER — FUROSEMIDE 10 MG/ML
40 INJECTION INTRAMUSCULAR; INTRAVENOUS DAILY
Status: DISCONTINUED | OUTPATIENT
Start: 2024-02-25 | End: 2024-02-28 | Stop reason: HOSPADM

## 2024-02-24 RX ORDER — POTASSIUM CHLORIDE 20 MEQ/1
40 TABLET, EXTENDED RELEASE ORAL ONCE
Status: DISCONTINUED | OUTPATIENT
Start: 2024-02-24 | End: 2024-02-27

## 2024-02-24 RX ADMIN — METOPROLOL TARTRATE 25 MG: 25 TABLET, FILM COATED ORAL at 08:02

## 2024-02-24 RX ADMIN — FLUTICASONE PROPIONATE 100 MCG: 50 SPRAY, METERED NASAL at 10:02

## 2024-02-24 RX ADMIN — ENOXAPARIN SODIUM 90 MG: 100 INJECTION SUBCUTANEOUS at 10:02

## 2024-02-24 RX ADMIN — MICONAZOLE NITRATE: 20 OINTMENT TOPICAL at 09:02

## 2024-02-24 RX ADMIN — FUROSEMIDE 40 MG: 10 INJECTION, SOLUTION INTRAMUSCULAR; INTRAVENOUS at 10:02

## 2024-02-24 RX ADMIN — GUAIFENESIN 1200 MG: 600 TABLET, EXTENDED RELEASE ORAL at 08:02

## 2024-02-24 RX ADMIN — ENOXAPARIN SODIUM 90 MG: 100 INJECTION SUBCUTANEOUS at 12:02

## 2024-02-24 RX ADMIN — WARFARIN SODIUM 0.5 MG: 1 TABLET ORAL at 04:02

## 2024-02-24 RX ADMIN — ENOXAPARIN SODIUM 90 MG: 100 INJECTION SUBCUTANEOUS at 02:02

## 2024-02-24 NOTE — CARE UPDATE
Serena is a 83 year old female who presented to ED for further evaluation of progressive dyspnea. Upon arrival patient noted to have mildly labored breathing with hypoxia and was placed on supplemental oxygen. BNP >3000. CXR showed bilateral pleural effusions. CTA negative for pulmonary embolism, but showed known ascending aortic aneurysm. Initial troponin 0.143 but is now trending down and felt to be related to demand ischemia from decompensated CHF. Echocardiogram shows an EF of 35%, global hypokinesis, and reduce right ventricle systolic function. Kidney function is stable. Cardiology is following.    Plan  --Await Pacemaker interrogation  --Continue diuretics  --Soft blood pressure today. BB held.  --patient's son admits to fluid indiscretion. Counseled.  --strict intake and output   --daily weights

## 2024-02-24 NOTE — PLAN OF CARE
Patient oriented to self.Tq2hr, weight shifting. Cardiac monitored (nsr)  Lasix as ordered.  Pt encouraged to make frequent weight shift movements to prevent breakdown.  Personal items within reach, call light/ room phone in reach.   Patient instructed to use call light/ room phone for assistance with needs.   Communication board updated.   Bed alarm in place, bed to lowest level, safety precautions in place.  Pt remains free of injury, no s/s of acute distress.   Chart Check Complete

## 2024-02-24 NOTE — PROGRESS NOTES
Pharmacy Consult Note: Warfarin     Serena Post 's Coumadin will be dosed and monitored by Pharmacy.      Target INR goal is 2-3.    INR   Date Value Ref Range Status   02/24/2024 2.1 (H) 0.8 - 1.2 Final     Comment:     Coumadin Therapy:  2.0 - 3.0 for INR for all indicators except mechanical heart valves  and antiphospholipid syndromes which should use 2.5 - 3.5.         Indication: MAVR + afib+ history of DVT. Significant PMH of falls thus the reduced goal  Home dose: 2.5 mg Mondays and Fridays; 1.25 mg all other days  Patient will be given a reduced dose of 0.5 mg today.    Dose for Today: 0.5 mg    Drug interactions: Lovenox bridge; APAP, melatonin and tramadol all increase anticoagulant effect    PT/INR will be monitored daily. Dose adjustments will be made accordingly.      Thank you for allowing us to participate in this patient's care.   Sabiha Villa, PharmYONI 2/24/2024 10:13 AM

## 2024-02-24 NOTE — SUBJECTIVE & OBJECTIVE
Review of Systems   Constitutional: Positive for malaise/fatigue.   HENT: Negative.     Eyes: Negative.    Cardiovascular:  Positive for dyspnea on exertion.   Respiratory:  Positive for shortness of breath.    Endocrine: Negative.    Hematologic/Lymphatic: Negative.    Skin: Negative.    Musculoskeletal: Negative.    Gastrointestinal: Negative.    Genitourinary: Negative.    Neurological:  Positive for difficulty with concentration and weakness.   Psychiatric/Behavioral:  Positive for memory loss.    Allergic/Immunologic: Negative.      Objective:     Vital Signs (Most Recent):  Temp: 98 °F (36.7 °C) (02/24/24 1141)  Pulse: 73 (02/24/24 1141)  Resp: 14 (02/24/24 1141)  BP: (!) 90/45 (02/24/24 1141)  SpO2: 98 % (02/24/24 1141) Vital Signs (24h Range):  Temp:  [97.4 °F (36.3 °C)-98 °F (36.7 °C)] 98 °F (36.7 °C)  Pulse:  [65-89] 73  Resp:  [14-20] 14  SpO2:  [96 %-99 %] 98 %  BP: ()/(45-65) 90/45     Weight: 91.2 kg (201 lb)  Body mass index is 34.5 kg/m².     SpO2: 98 %       No intake or output data in the 24 hours ending 02/24/24 1204    Lines/Drains/Airways       Peripheral Intravenous Line  Duration                  Peripheral IV - Single Lumen 02/22/24 2125 20 G Left Antecubital 1 day                       Physical Exam  Vitals and nursing note reviewed.   Constitutional:       General: She is not in acute distress.     Appearance: Normal appearance. She is well-developed. She is not ill-appearing or diaphoretic.   HENT:      Head: Normocephalic.   Neck:      Vascular: No carotid bruit or JVD.   Cardiovascular:      Rate and Rhythm: Normal rate and regular rhythm.      Pulses: Normal pulses.           Radial pulses are 2+ on the right side and 2+ on the left side.      Heart sounds: S1 normal and S2 normal. Murmur heard.      Systolic murmur is present.      No friction rub. No gallop.      Comments: Crisp metallic S2   Pulmonary:      Effort: Pulmonary effort is normal.      Breath sounds: Examination  "of the right-lower field reveals decreased breath sounds. Examination of the left-lower field reveals decreased breath sounds. Decreased breath sounds present. No wheezing or rales.   Abdominal:      General: Bowel sounds are normal. There is no abdominal bruit.      Palpations: Abdomen is soft.      Tenderness: There is no abdominal tenderness.   Musculoskeletal:      Cervical back: Neck supple.      Right lower leg: Edema present.      Left lower leg: Edema present.   Skin:     General: Skin is warm.   Neurological:      Mental Status: She is alert.   Psychiatric:         Behavior: Behavior normal. Behavior is cooperative.            Significant Labs: ABG: No results for input(s): "PH", "PCO2", "HCO3", "POCSATURATED", "BE" in the last 48 hours., Blood Culture: No results for input(s): "LABBLOO" in the last 48 hours., BMP:   Recent Labs   Lab 02/22/24  2114 02/23/24  0820 02/24/24  0645   GLU 97 162* 108    140 144   K 3.6 3.6 3.3*    105 105   CO2 22* 22* 28   BUN 16 16 21   CREATININE 1.2 1.2 1.2   CALCIUM 9.4 8.7 9.0   MG  --  1.8 1.8   , CMP   Recent Labs   Lab 02/22/24  1540 02/22/24 2114 02/23/24  0820 02/24/24  0645    141 140 144   K 3.7 3.6 3.6 3.3*    106 105 105   CO2 26 22* 22* 28   * 97 162* 108   BUN 17 16 16 21   CREATININE 1.1 1.2 1.2 1.2   CALCIUM 9.3 9.4 8.7 9.0   PROT 7.3 7.9  --   --    ALBUMIN 3.3* 3.5  --   --    BILITOT 1.6* 2.1*  --   --    ALKPHOS 93 94  --   --    AST 26 28  --   --    ALT 15 15  --   --    ANIONGAP 10 13 13 11   , CBC   Recent Labs   Lab 02/22/24  1540 02/22/24  2114 02/24/24  0645   WBC 6.65 7.54 9.34   HGB 13.7 14.7 13.1   HCT 42.5 45.7 40.7    231 200   , INR   Recent Labs   Lab 02/22/24  2126 02/23/24  0820 02/24/24  0645   INR 1.6* 1.4* 2.1*   , Lipid Panel No results for input(s): "CHOL", "HDL", "LDLCALC", "TRIG", "CHOLHDL" in the last 48 hours., and Troponin   Recent Labs   Lab 02/22/24  2114 02/22/24  2315 02/23/24  0309 "   TROPONINI 0.143* 0.125* 0.107*       Significant Imaging: Echocardiogram: Transthoracic echo (TTE) complete (Cupid Only):   Results for orders placed or performed during the hospital encounter of 02/22/24   Echo   Result Value Ref Range    BSA 2.03 m2    LVOT stroke volume 56.42 cm3    LVIDd 5.10 3.5 - 6.0 cm    LV Systolic Volume 90.21 mL    LV Systolic Volume Index 46.0 mL/m2    LVIDs 4.45 (A) 2.1 - 4.0 cm    LV Diastolic Volume 123.59 mL    LV Diastolic Volume Index 63.06 mL/m2    IVS 1.04 0.6 - 1.1 cm    LVOT diameter 1.91 cm    LVOT area 2.9 cm2    FS 13 (A) 28 - 44 %    Left Ventricle Relative Wall Thickness 0.47 cm    Posterior Wall 1.19 (A) 0.6 - 1.1 cm    LV mass 217.90 g    LV Mass Index 111 g/m2    MV Peak E Melo 1.07 m/s    TDI LATERAL 0.08 m/s    TDI SEPTAL 0.05 m/s    E/E' ratio 16.46 m/s    MV Peak A Melo 1.07 m/s    TR Max Melo 3.38 m/s    E/A ratio 1.00     IVRT 62.80 msec    E wave deceleration time 192.43 msec    LV SEPTAL E/E' RATIO 21.40 m/s    LV LATERAL E/E' RATIO 13.38 m/s    LVOT peak melo 0.83 m/s    Left Ventricular Outflow Tract Mean Velocity 0.58 cm/s    Left Ventricular Outflow Tract Mean Gradient 1.51 mmHg    RV mid diameter 3.15 cm    RVOT peak VTI 9.3 cm    TAPSE 2.05 cm    LA size 3.61 cm    Left Atrium Minor Axis 3.98 cm    Left Atrium Major Axis 6.58 cm    RA Major Axis 4.53 cm    AV regurgitation pressure 1/2 time 385.221944097790823 ms    AR Max Melo 4.34 m/s    AV mean gradient 95 mmHg    AV peak gradient 97 mmHg    Ao peak melo 4.93 m/s    Ao .80 cm    LVOT peak VTI 19.70 cm    AV valve area 0.37 cm²    AV Velocity Ratio 0.17     AV index (prosthetic) 0.13     AAMIR by Velocity Ratio 0.48 cm²    MV stenosis pressure 1/2 time 55.80 ms    MV valve area p 1/2 method 3.94 cm2    Triscuspid Valve Regurgitation Peak Gradient 46 mmHg    PV mean gradient 1 mmHg    RVOT peak melo 0.49 m/s    Ao root annulus 2.98 cm    STJ 4.51 cm    Ascending aorta 4.45 cm    IVC diameter 1.15 cm     Mean e' 0.07 m/s    ZLVIDS 2.01     ZLVIDD -0.95     LA Volume Index 34.9 mL/m2    LA volume 68.49 cm3    LA WIDTH 4.5 cm    RA Width 4.4 cm    TV resting pulmonary artery pressure 49 mmHg    RV TB RVSP 6 mmHg    Est. RA pres 3 mmHg    EF 35 %    Narrative      Left Ventricle: The left ventricle is normal in size. Normal wall   thickness. There is concentric hypertrophy. Global hypokinesis present.   There is moderately reduced systolic function. Ejection fraction by visual   approximation is 35%.    Right Ventricle: Normal right ventricular cavity size. Systolic   function is borderline low.    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 There is moderate aortic regurgitation.    Consider ORI if clinically needed to evaluate mechanical aortic valve   further.    Mitral Valve: There is mild bileaflet sclerosis. There is mild   regurgitation.    Pulmonary Artery: There is moderate pulmonary hypertension. The   estimated pulmonary artery systolic pressure is 49 mmHg.    IVC/SVC: Normal venous pressure at 3 mmHg.    Ascending aorta is moderately dilated measuring 4.45 cm.

## 2024-02-24 NOTE — HOSPITAL COURSE
2/24/24: Pt seen and examined this am. No acute sxs noted.  Dyspnea seems improved. No CP. Labs/chart reviewed.  INR now 2.1  echo EF 35%.  BP soft.    2/25/24: Pt seen and examined this am.  No acute CV issues noted.  Dyspnea seems improved but pt is not a good historian.  Chart/labs reviewed.    2/26/24-Patient seen and examined today, resting in bed. Seems to feel ok. SOB improving. Labs reviewed/stable. Will defer ORI to OP setting.    2/27/24-Patient seen and examined today, lying in bed. Feels ok. Off supplemental oxygen. Labs reviewed. BNP trending down. Will need 40 mg of Lasix po upon d/c with extra dose prn. INR 1.7.    2/28/24-Patient seen and examined today, sitting up in bed. More awake/alert. States SOB has improved. Labs reviewed. INR 1.5, needs Lovenox bridge.

## 2024-02-24 NOTE — PROGRESS NOTES
[facility]  Cardiology  Progress Note    Patient Name: Serena Post  MRN: [id number]  Admission Date: 2/22/2024  Hospital Length of Stay: 1 days  Code Status: Full Code   Attending Physician: Yary Levi MD   Primary Care Physician: Evangelina Olivares MD  Expected Discharge Date:   Principal Problem:Acute on chronic systolic heart failure    Subjective:     HPI:    HPI obtained largely from chart and patient's son at bedside as she has underlying dementia    Ms. 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 Patient complained of increased SOB over the past three weeks to the point where she needed to use her supplemental oxygen continuously. She denied any associated CP, LE edema, fever, chills, palpitations, near syncope, or syncope. Initial workup revealed troponin of 0.143, INR of 1.6, and BNP > 3,000. CXR showed small bilateral pleural effusions and patient was subsequently admitted for further evaluation and treatment. Cardiology consulted to assist with management. Patient seen and examined today in ED with her son present. Still visibly SOB. Pleasantly confused. No CP symptoms. Son reports she is compliant with her medications. Watches her salt intake. Followed by CHF clinic and outside cardiologist, Dr. Perkins. Chart reviewed. Troponin flat, 0.143>0.125>0.107. EKG with paced rhythm with PVC's, noted to possibly have some brief runs of PAF while in ED. PPM interrogation ordered. Prior TTE 11/23 with normal EF, repeat pending. CTA negative for PE, suggestive of pulmonary HTN, ascending aorta aneurysm.    Hospital Course:   2/24/24: Pt seen and examined this am. No acute sxs noted.  Dyspnea seems improved. No CP. Labs/chart reviewed.  INR now  2.1  echo EF 35%.  BP soft.      Review of Systems   Constitutional: Positive for malaise/fatigue.   HENT: Negative.     Eyes: Negative.    Cardiovascular:  Positive for dyspnea on exertion.   Respiratory:  Positive for shortness of breath.    Endocrine: Negative.    Hematologic/Lymphatic: Negative.    Skin: Negative.    Musculoskeletal: Negative.    Gastrointestinal: Negative.    Genitourinary: Negative.    Neurological:  Positive for difficulty with concentration and weakness.   Psychiatric/Behavioral:  Positive for memory loss.    Allergic/Immunologic: Negative.      Objective:     Vital Signs (Most Recent):  Temp: 98 °F (36.7 °C) (02/24/24 1141)  Pulse: 73 (02/24/24 1141)  Resp: 14 (02/24/24 1141)  BP: (!) 90/45 (02/24/24 1141)  SpO2: 98 % (02/24/24 1141) Vital Signs (24h Range):  Temp:  [97.4 °F (36.3 °C)-98 °F (36.7 °C)] 98 °F (36.7 °C)  Pulse:  [65-89] 73  Resp:  [14-20] 14  SpO2:  [96 %-99 %] 98 %  BP: ()/(45-65) 90/45     Weight: 91.2 kg (201 lb)  Body mass index is 34.5 kg/m².     SpO2: 98 %       No intake or output data in the 24 hours ending 02/24/24 1204    Lines/Drains/Airways       Peripheral Intravenous Line  Duration                  Peripheral IV - Single Lumen 02/22/24 2125 20 G Left Antecubital 1 day                       Physical Exam  Vitals and nursing note reviewed.   Constitutional:       General: She is not in acute distress.     Appearance: Normal appearance. She is well-developed. She is not ill-appearing or diaphoretic.   HENT:      Head: Normocephalic.   Neck:      Vascular: No carotid bruit or JVD.   Cardiovascular:      Rate and Rhythm: Normal rate and regular rhythm.      Pulses: Normal pulses.           Radial pulses are 2+ on the right side and 2+ on the left side.      Heart sounds: S1 normal and S2 normal. Murmur heard.      Systolic murmur is present.      No friction rub. No gallop.      Comments: Crisp metallic S2   Pulmonary:      Effort: Pulmonary effort is normal.     "  Breath sounds: Examination of the right-lower field reveals decreased breath sounds. Examination of the left-lower field reveals decreased breath sounds. Decreased breath sounds present. No wheezing or rales.   Abdominal:      General: Bowel sounds are normal. There is no abdominal bruit.      Palpations: Abdomen is soft.      Tenderness: There is no abdominal tenderness.   Musculoskeletal:      Cervical back: Neck supple.      Right lower leg: Edema present.      Left lower leg: Edema present.   Skin:     General: Skin is warm.   Neurological:      Mental Status: She is alert.   Psychiatric:         Behavior: Behavior normal. Behavior is cooperative.            Significant Labs: ABG: No results for input(s): "PH", "PCO2", "HCO3", "POCSATURATED", "BE" in the last 48 hours., Blood Culture: No results for input(s): "LABBLOO" in the last 48 hours., BMP:   Recent Labs   Lab 02/22/24  2114 02/23/24  0820 02/24/24  0645   GLU 97 162* 108    140 144   K 3.6 3.6 3.3*    105 105   CO2 22* 22* 28   BUN 16 16 21   CREATININE 1.2 1.2 1.2   CALCIUM 9.4 8.7 9.0   MG  --  1.8 1.8   , CMP   Recent Labs   Lab 02/22/24  1540 02/22/24 2114 02/23/24  0820 02/24/24  0645    141 140 144   K 3.7 3.6 3.6 3.3*    106 105 105   CO2 26 22* 22* 28   * 97 162* 108   BUN 17 16 16 21   CREATININE 1.1 1.2 1.2 1.2   CALCIUM 9.3 9.4 8.7 9.0   PROT 7.3 7.9  --   --    ALBUMIN 3.3* 3.5  --   --    BILITOT 1.6* 2.1*  --   --    ALKPHOS 93 94  --   --    AST 26 28  --   --    ALT 15 15  --   --    ANIONGAP 10 13 13 11   , CBC   Recent Labs   Lab 02/22/24  1540 02/22/24  2114 02/24/24  0645   WBC 6.65 7.54 9.34   HGB 13.7 14.7 13.1   HCT 42.5 45.7 40.7    231 200   , INR   Recent Labs   Lab 02/22/24  2126 02/23/24  0820 02/24/24  0645   INR 1.6* 1.4* 2.1*   , Lipid Panel No results for input(s): "CHOL", "HDL", "LDLCALC", "TRIG", "CHOLHDL" in the last 48 hours., and Troponin   Recent Labs   Lab 02/22/24 2114 " 02/22/24  2315 02/23/24  0309   TROPONINI 0.143* 0.125* 0.107*       Significant Imaging: Echocardiogram: Transthoracic echo (TTE) complete (Cupid Only):   Results for orders placed or performed during the hospital encounter of 02/22/24   Echo   Result Value Ref Range    BSA 2.03 m2    LVOT stroke volume 56.42 cm3    LVIDd 5.10 3.5 - 6.0 cm    LV Systolic Volume 90.21 mL    LV Systolic Volume Index 46.0 mL/m2    LVIDs 4.45 (A) 2.1 - 4.0 cm    LV Diastolic Volume 123.59 mL    LV Diastolic Volume Index 63.06 mL/m2    IVS 1.04 0.6 - 1.1 cm    LVOT diameter 1.91 cm    LVOT area 2.9 cm2    FS 13 (A) 28 - 44 %    Left Ventricle Relative Wall Thickness 0.47 cm    Posterior Wall 1.19 (A) 0.6 - 1.1 cm    LV mass 217.90 g    LV Mass Index 111 g/m2    MV Peak E Melo 1.07 m/s    TDI LATERAL 0.08 m/s    TDI SEPTAL 0.05 m/s    E/E' ratio 16.46 m/s    MV Peak A Melo 1.07 m/s    TR Max Melo 3.38 m/s    E/A ratio 1.00     IVRT 62.80 msec    E wave deceleration time 192.43 msec    LV SEPTAL E/E' RATIO 21.40 m/s    LV LATERAL E/E' RATIO 13.38 m/s    LVOT peak melo 0.83 m/s    Left Ventricular Outflow Tract Mean Velocity 0.58 cm/s    Left Ventricular Outflow Tract Mean Gradient 1.51 mmHg    RV mid diameter 3.15 cm    RVOT peak VTI 9.3 cm    TAPSE 2.05 cm    LA size 3.61 cm    Left Atrium Minor Axis 3.98 cm    Left Atrium Major Axis 6.58 cm    RA Major Axis 4.53 cm    AV regurgitation pressure 1/2 time 385.745860873812739 ms    AR Max Melo 4.34 m/s    AV mean gradient 95 mmHg    AV peak gradient 97 mmHg    Ao peak melo 4.93 m/s    Ao .80 cm    LVOT peak VTI 19.70 cm    AV valve area 0.37 cm²    AV Velocity Ratio 0.17     AV index (prosthetic) 0.13     AAMIR by Velocity Ratio 0.48 cm²    MV stenosis pressure 1/2 time 55.80 ms    MV valve area p 1/2 method 3.94 cm2    Triscuspid Valve Regurgitation Peak Gradient 46 mmHg    PV mean gradient 1 mmHg    RVOT peak melo 0.49 m/s    Ao root annulus 2.98 cm    STJ 4.51 cm    Ascending aorta 4.45 cm     IVC diameter 1.15 cm    Mean e' 0.07 m/s    ZLVIDS 2.01     ZLVIDD -0.95     LA Volume Index 34.9 mL/m2    LA volume 68.49 cm3    LA WIDTH 4.5 cm    RA Width 4.4 cm    TV resting pulmonary artery pressure 49 mmHg    RV TB RVSP 6 mmHg    Est. RA pres 3 mmHg    EF 35 %    Narrative      Left Ventricle: The left ventricle is normal in size. Normal wall   thickness. There is concentric hypertrophy. Global hypokinesis present.   There is moderately reduced systolic function. Ejection fraction by visual   approximation is 35%.    Right Ventricle: Normal right ventricular cavity size. Systolic   function is borderline low.    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 There is moderate aortic regurgitation.    Consider ORI if clinically needed to evaluate mechanical aortic valve   further.    Mitral Valve: There is mild bileaflet sclerosis. There is mild   regurgitation.    Pulmonary Artery: There is moderate pulmonary hypertension. The   estimated pulmonary artery systolic pressure is 49 mmHg.    IVC/SVC: Normal venous pressure at 3 mmHg.    Ascending aorta is moderately dilated measuring 4.45 cm.       Assessment and Plan:     ACUTE CHF EXACERBATION.  CUT LASIX IV BACK TO QD.  COUMADIN TX.  LOVENOX BRIDGE UNTIL INR CLEARLY IN THERAPEUTIC RANGE -- LIKELY STOP TOMORROW.  OMT ADVISED FOR CHF.  HAS HIGH GRADIENTS ACROSS MECHANICAL AVR, EF 35% ON ECHO.  CONSIDER ORI TO FURTHER EVALUATE MECHANICAL AVR THIS ADMISSION.  F/U LABS.  AS BELOW.    * Acute on chronic systolic heart failure  -BNP > 3,000  -TTE 11/23 with normal EF, repeat pending  -Continue IV diuresis as tolerated  -Continue home regimen/OMT  -Strict I's/O's    Acute on chronic respiratory failure with hypoxia  -Assess response to IV diuresis  -CTA negative for PEP    Aortic aneurysm  -Will need surveillance as OP    Pacemaker  -Interrogation pending, possible PAF runs    H/O mechanical aortic valve  replacement  -Continue Coumadin, dosing as per pharmacy    Essential hypertension  -Continue home regimen as tolerated        VTE Risk Mitigation (From admission, onward)           Ordered     warfarin (COUMADIN) tablet 2.5 mg  Once per day on Mon Fri 02/23/24 1005     warfarin (COUMADIN) split tablet 1.25 mg  Once per day on Sun Tue Wed Thu Sat         02/24/24 1006     warfarin (COUMADIN) split tablet 0.5 mg  Daily         02/24/24 1006     enoxaparin injection 90 mg  Every 12 hours         02/23/24 1002     IP VTE HIGH RISK PATIENT  Once         02/22/24 2300     Place sequential compression device  Until discontinued         02/22/24 2300                    Parish Blake MD  Cardiology  [facility]

## 2024-02-24 NOTE — CONSULTS
Food & Nutrition Education    Diet Education: Heart Failure  Time Spent: 15 minutes.    Learners: Pt    Nutrition Education provided with handouts:  Healthy-Heart Nutrition Therapy, Fluid-Restricted Diet, Low-Sodium Nutrition Therapy ([email or web address])    Past Medical History:   Diagnosis Date    A-fib     Anticoagulant long-term use     Aortic valve disease     Cancer     brain tumor, 10 years ago    Cardiomyopathy     CHF (congestive heart failure)     COVID-19 7/23/2022    Hx of heart valve replacement with mechanical valve     Hyperlipidemia     Hypertension     Pacemaker     Pacemaker     Sepsis 7/23/2022    Stroke     2007, residual weakness       Comments:  Dietitian has educated patient on low sodium diet and fluid restriction related to hospital diagnosis during prior hospital admission. The pt expressed confusion about how much fluid she is recommended to remain under daily and how to account for fluids consumed. Discussed she is currently recommended to follow 1500 mL restrictions. Explained that that while in the hospital she will be able to account for fluids that comes on meals trays by looking over tray ticket. Discussed using a cup with measurements for fluids and to try to consume small sips spread throughout the day rather than a lot at one time once she is home. The pt states her son and home health nurse still prepares meals for her at home. She states she does not know how they flavor meals. Discussed having her son and/or home health nurse contact Dietitian office to gain understanding of low sodium diet. Pt states she has no further questions and concerns. Pt states her appetite is normal.      NFPE not performed, pt appears well nourished.    All questions and concerns answered.    Provided handout with dietitian's contact information.    *Please re-consult as needed.    Thank You!   Sen Garcia MS, Registration Eligible, Provisional LDN

## 2024-02-24 NOTE — HOSPITAL COURSE
Serena is a 83 year old female who presented to ED for further evaluation of progressive dyspnea. Upon arrival patient noted to have mildly labored breathing with hypoxia and was placed on supplemental oxygen. BNP >3000. CXR showed bilateral pleural effusions. CTA negative for pulmonary embolism, but showed known ascending aortic aneurysm. Initial troponin 0.143 but is now trending down and felt to be related to demand ischemia from decompensated CHF. Echocardiogram shows an EF of 35%, global hypokinesis, and reduce right ventricle systolic function. Kidney function is stable. Cardiology is following.    2/24/24  Echocardiogram this admission shows a decline in EF 35% with global hypokinesis. Improving clinically. Cardiology recommends decreasing lasix to to QD. INR is not yet therapeutic. Cardiology will consider ORI this admission to assess mechanical AVR.     2/25/24  INR 2.7. stop Lovenox bridge discontinued. Patient is on 2L nasal cannula.    Pt weaned off O2 with continued diuresis, will check O2 sats with ambulation  Seen by cardiology for Mechanical valve dysfunction, will need ORI as outpatient for possible replacement.     Patient seen and examined on day of discharge.  Stable for discharge home.  She reports she would like to follow up with Dr. Perkins her usual cardiologist to evaluate her mechanical valve.

## 2024-02-24 NOTE — PLAN OF CARE
Discussed poc with pt, pt verbalized understanding    Purposeful rounding every 2hours    VS wnl  Cardiac monitoring in use, pt is NSR, tele monitor #[id number]  Blood glucose monitoring   Fall precautions in place, remains injury free  Pt denies c/o pain and nausea at this time  Pain and nausea under control with PRN meds    IVFs  Accurate I&Os  Abx given as prescribed  Bed locked at lowest position  Call light within reach    Chart check complete  Will cont with POC

## 2024-02-25 LAB
ANION GAP SERPL CALC-SCNC: 11 MMOL/L (ref 8–16)
BASOPHILS # BLD AUTO: 0.07 K/UL (ref 0–0.2)
BASOPHILS NFR BLD: 0.9 % (ref 0–1.9)
BUN SERPL-MCNC: 30 MG/DL (ref 8–23)
CALCIUM SERPL-MCNC: 8.8 MG/DL (ref 8.7–10.5)
CHLORIDE SERPL-SCNC: 105 MMOL/L (ref 95–110)
CO2 SERPL-SCNC: 27 MMOL/L (ref 23–29)
CREAT SERPL-MCNC: 1.3 MG/DL (ref 0.5–1.4)
DIFFERENTIAL METHOD BLD: NORMAL
EOSINOPHIL # BLD AUTO: 0.3 K/UL (ref 0–0.5)
EOSINOPHIL NFR BLD: 4 % (ref 0–8)
ERYTHROCYTE [DISTWIDTH] IN BLOOD BY AUTOMATED COUNT: 14 % (ref 11.5–14.5)
EST. GFR  (NO RACE VARIABLE): 41 ML/MIN/1.73 M^2
GLUCOSE SERPL-MCNC: 78 MG/DL (ref 70–110)
HCT VFR BLD AUTO: 42.2 % (ref 37–48.5)
HGB BLD-MCNC: 13.5 G/DL (ref 12–16)
IMM GRANULOCYTES # BLD AUTO: 0.01 K/UL (ref 0–0.04)
IMM GRANULOCYTES NFR BLD AUTO: 0.1 % (ref 0–0.5)
INR PPP: 2.7 (ref 0.8–1.2)
LYMPHOCYTES # BLD AUTO: 3 K/UL (ref 1–4.8)
LYMPHOCYTES NFR BLD: 37.1 % (ref 18–48)
MAGNESIUM SERPL-MCNC: 1.9 MG/DL (ref 1.6–2.6)
MCH RBC QN AUTO: 30.5 PG (ref 27–31)
MCHC RBC AUTO-ENTMCNC: 32 G/DL (ref 32–36)
MCV RBC AUTO: 96 FL (ref 82–98)
MONOCYTES # BLD AUTO: 0.7 K/UL (ref 0.3–1)
MONOCYTES NFR BLD: 9.3 % (ref 4–15)
NEUTROPHILS # BLD AUTO: 3.9 K/UL (ref 1.8–7.7)
NEUTROPHILS NFR BLD: 48.6 % (ref 38–73)
NRBC BLD-RTO: 0 /100 WBC
PLATELET # BLD AUTO: 235 K/UL (ref 150–450)
PMV BLD AUTO: 12.7 FL (ref 9.2–12.9)
POCT GLUCOSE: 86 MG/DL (ref 70–110)
POCT GLUCOSE: 87 MG/DL (ref 70–110)
POCT GLUCOSE: 90 MG/DL (ref 70–110)
POCT GLUCOSE: 94 MG/DL (ref 70–110)
POTASSIUM SERPL-SCNC: 3.3 MMOL/L (ref 3.5–5.1)
PROTHROMBIN TIME: 27.5 SEC (ref 9–12.5)
RBC # BLD AUTO: 4.42 M/UL (ref 4–5.4)
SODIUM SERPL-SCNC: 143 MMOL/L (ref 136–145)
WBC # BLD AUTO: 7.97 K/UL (ref 3.9–12.7)

## 2024-02-25 PROCEDURE — 80048 BASIC METABOLIC PNL TOTAL CA: CPT | Performed by: STUDENT IN AN ORGANIZED HEALTH CARE EDUCATION/TRAINING PROGRAM

## 2024-02-25 PROCEDURE — 36415 COLL VENOUS BLD VENIPUNCTURE: CPT | Performed by: STUDENT IN AN ORGANIZED HEALTH CARE EDUCATION/TRAINING PROGRAM

## 2024-02-25 PROCEDURE — 27000221 HC OXYGEN, UP TO 24 HOURS

## 2024-02-25 PROCEDURE — 94761 N-INVAS EAR/PLS OXIMETRY MLT: CPT

## 2024-02-25 PROCEDURE — 99233 SBSQ HOSP IP/OBS HIGH 50: CPT | Mod: ,,, | Performed by: INTERNAL MEDICINE

## 2024-02-25 PROCEDURE — 99900035 HC TECH TIME PER 15 MIN (STAT)

## 2024-02-25 PROCEDURE — 85610 PROTHROMBIN TIME: CPT | Performed by: STUDENT IN AN ORGANIZED HEALTH CARE EDUCATION/TRAINING PROGRAM

## 2024-02-25 PROCEDURE — 25000003 PHARM REV CODE 250: Performed by: INTERNAL MEDICINE

## 2024-02-25 PROCEDURE — 11000001 HC ACUTE MED/SURG PRIVATE ROOM

## 2024-02-25 PROCEDURE — 63600175 PHARM REV CODE 636 W HCPCS: Performed by: INTERNAL MEDICINE

## 2024-02-25 PROCEDURE — 83735 ASSAY OF MAGNESIUM: CPT | Performed by: STUDENT IN AN ORGANIZED HEALTH CARE EDUCATION/TRAINING PROGRAM

## 2024-02-25 PROCEDURE — 85025 COMPLETE CBC W/AUTO DIFF WBC: CPT | Performed by: STUDENT IN AN ORGANIZED HEALTH CARE EDUCATION/TRAINING PROGRAM

## 2024-02-25 PROCEDURE — 25000003 PHARM REV CODE 250: Performed by: STUDENT IN AN ORGANIZED HEALTH CARE EDUCATION/TRAINING PROGRAM

## 2024-02-25 PROCEDURE — 21400001 HC TELEMETRY ROOM

## 2024-02-25 RX ADMIN — MICONAZOLE NITRATE: 20 OINTMENT TOPICAL at 10:02

## 2024-02-25 RX ADMIN — METOPROLOL TARTRATE 25 MG: 25 TABLET, FILM COATED ORAL at 10:02

## 2024-02-25 RX ADMIN — FLUTICASONE PROPIONATE 100 MCG: 50 SPRAY, METERED NASAL at 10:02

## 2024-02-25 RX ADMIN — FUROSEMIDE 40 MG: 10 INJECTION, SOLUTION INTRAMUSCULAR; INTRAVENOUS at 09:02

## 2024-02-25 RX ADMIN — GUAIFENESIN 1200 MG: 600 TABLET, EXTENDED RELEASE ORAL at 08:02

## 2024-02-25 RX ADMIN — METOPROLOL TARTRATE 25 MG: 25 TABLET, FILM COATED ORAL at 08:02

## 2024-02-25 RX ADMIN — MICONAZOLE NITRATE: 20 OINTMENT TOPICAL at 08:02

## 2024-02-25 RX ADMIN — CETIRIZINE HYDROCHLORIDE 5 MG: 5 TABLET ORAL at 10:02

## 2024-02-25 RX ADMIN — GUAIFENESIN 1200 MG: 600 TABLET, EXTENDED RELEASE ORAL at 10:02

## 2024-02-25 NOTE — PLAN OF CARE
Discussed poc with pt, pt verbalized understanding    Purposeful rounding every 2hours    VS wnl  Cardiac monitoring in use, pt is NSR, tele monitor # [id number]   Fall precautions in place, remains injury free  Pt denies c/o pain and nausea at this time  Pain and nausea under control with PRN meds    IVFs  Accurate I&Os  Abx given as prescribed  Bed locked at lowest position  Call light within reach    Chart check complete  Will cont with POC

## 2024-02-25 NOTE — ASSESSMENT & PLAN NOTE
Patient with Hypoxic Respiratory failure which is Acute on chronic.  she is on home oxygen at 2-3 LPM. Signs/symptoms of respiratory failure include- tachypnea, increased work of breathing, and wheezing. Contributing diagnoses includes - CHF, COPD, Pneumonia, and Pulmonary Embolus.   --suspect CHF exacerbation as primary etiology, see plan above  --Dimer ordered; mildly elevated   --some trace swelling in b/l extremities, a chronically subtherapeutic INR  --CTA Chest ordered to rule out PE  --home Coumadin therapy continued- pharmacy to dose  --low suspicion of PNA as CXR unremarkable  --COPD exacerbation also possible- supportive therapy, PRN nebs  --reassess in the AM    2/24/24  Currently on 2L nasal cannula-home settings.

## 2024-02-25 NOTE — ASSESSMENT & PLAN NOTE
--Last ECHO from NOV 2023 confirm EF 60 %, Grade I diastolic dysfunction  --BNP and troponin elevated x1- will trend the latter  --trace/1+ edema on exam, small pleural effusions noted on CXR  --s/p IV 60 mg lasix dose in the ER  --will schedule IV Lasix 40 mg BID while hospitalized  --monitor electrolytes, UOP, repeat Echo  --strict I&O and daily weights ordered  --Cardiology consulted for AM evaluation    2/24/24  EF down to 35% with global hypokinesis.   She is improving clinical. Lasix decreased to daily

## 2024-02-25 NOTE — ASSESSMENT & PLAN NOTE
Possible complication could be contributing to CHF exacerbation presentation, will further investigate via CTA and echo.  On chart review, patient seems to have had a chronically subtherapeutic INR.  Target range has been documented to be between 2.5 and 3.5, however patients INR has been sustained below 2.0 on the last several blood draws.  Pharmacy consulted to assist in Coumadin dosing.    2/24/24  INR 2.1  Continue Lovenox bridge for additional day. Echocardiogram shows mechanical valve with high velocities. Cardiology will plan to perform ORI this admission.  Continue Lasix

## 2024-02-25 NOTE — ASSESSMENT & PLAN NOTE
Possible complication could be contributing to CHF exacerbation presentation, will further investigate via CTA and echo.  On chart review, patient seems to have had a chronically subtherapeutic INR.  Target range has been documented to be between 2.5 and 3.5, however patients INR has been sustained below 2.0 on the last several blood draws.  Pharmacy consulted to assist in Coumadin dosing.    2/24/24  INR 2.1  Continue Lovenox bridge for additional day. Echocardiogram shows mechanical valve with high velocities. Cardiology will plan to perform ORI this admission.    allergic reaction

## 2024-02-25 NOTE — ASSESSMENT & PLAN NOTE
--home medications include:  Metoprolol  --restart as tolerated   --PRN IV hydralazine 10mg Q6H for sBP > 175 mmHg  --Q4HVS    2/24/24  Soft BP but systolic >100 this evening. Monitor    2/25/24  Blood pressure soft 90-110s. Tolerating metoprolol and lasix.

## 2024-02-25 NOTE — PROGRESS NOTES
[facility] Medicine  Progress Note    Patient Name: Serena Post  MRN: [id number]  Patient Class: IP- Inpatient   Admission Date: 2/22/2024  Length of Stay: 2 days  Attending Physician: Yary Levi MD  Primary Care Provider: Evangelina Olivares MD    Subjective:     Principal Problem:Acute on chronic systolic heart failure        HPI:  Serena Post is an 83-year-old woman with a past medical history of hypertension, congestive heart failure, atrial fibrillation, cardiomyopathy, history of brain tumor, history of heart valve replacement with a mechanical valve, hyperlipidemia, history of pacemaker insertion, and a stroke in 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 The patient states she previously uses home oxygen intermittently but has needed it daily for the past weeks due to worsening shortness of breath. She also reports being hypotensive during the same time period. Symptoms are constant and moderate in severity, with associated symptoms including cough but no chest pain, fever, nausea, vomiting, or any other symptoms at this time. No sick contacts.    Upon arrival in the ER, initial vital signs showed tachypnea with hypoxia, which seemed to improve with oxygen supplementation; the rest of the vitals were stable. Abnormal laboratory results included an estimated glomerular filtration rate of 45, indicating reduced kidney function; an elevated Troponin I level at 0.143, a BNP level of 3,274, and a subtherapeutic INR of 1.6 (target range of 2.5 to 3.5 due to her mechanical heart valve). All other laboratory components were within normal limits. Imaging results revealed small bilateral pleural effusions on chest x-ray, but no other significant cardiopulmonary disease was appreciated.  Hospital Medicine was called for admission to manage her complex conditions and provide  further treatment and evaluation.    Overview/Hospital Course:  Serena is a 83 year old female who presented to ED for further evaluation of progressive dyspnea. Upon arrival patient noted to have mildly labored breathing with hypoxia and was placed on supplemental oxygen. BNP >3000. CXR showed bilateral pleural effusions. CTA negative for pulmonary embolism, but showed known ascending aortic aneurysm. Initial troponin 0.143 but is now trending down and felt to be related to demand ischemia from decompensated CHF. Echocardiogram shows an EF of 35%, global hypokinesis, and reduce right ventricle systolic function. Kidney function is stable. Cardiology is following.    2/24/24  Echocardiogram this admission shows a decline in EF 35% with global hypokinesis. Improving clinically. Cardiology recommends decreasing lasix to to QD. INR is not yet therapeutic. Cardiology will consider ORI this admission to assess mechanical AVR.     2/25/24  INR 2.7. stop Lovenox bridge discontinued. Patient is on 2L nasal cannula.    Interval History:  On 2L nasal cannula-home settings. On at bedside. Discussed plan of care and possible need for ORI. Output has not been recorded.    Review of Systems   Constitutional:  Negative for chills, fatigue and fever.   HENT:  Negative for congestion, postnasal drip, rhinorrhea and sore throat.    Eyes:  Negative for pain and visual disturbance.   Respiratory:  Negative for cough, chest tightness, shortness of breath and wheezing.    Cardiovascular:  Positive for leg swelling. Negative for chest pain and palpitations.   Gastrointestinal:  Negative for abdominal distention, abdominal pain, constipation, diarrhea, nausea and vomiting.   Genitourinary:  Negative for difficulty urinating, flank pain and hematuria.   Musculoskeletal:  Negative for arthralgias, back pain, gait problem and joint swelling.   Skin:  Negative for pallor and rash.   Neurological:  Negative for dizziness, syncope and  weakness.   Psychiatric/Behavioral:  Negative for confusion, decreased concentration and suicidal ideas.      Objective:     Vital Signs (Most Recent):  Temp: 97.5 °F (36.4 °C) (24 1116)  Pulse: 67 (24 1116)  Resp: 16 (24 1116)  BP: (!) 96/55 (24 1116)  SpO2: 97 % (24 1116) Vital Signs (24h Range):  Temp:  [97 °F (36.1 °C)-98.2 °F (36.8 °C)] 97.5 °F (36.4 °C)  Pulse:  [66-80] 67  Resp:  [16-18] 16  SpO2:  [96 %-98 %] 97 %  BP: ()/(52-57) 96/55     Weight: 92.2 kg (203 lb 4.2 oz)  Body mass index is 34.89 kg/m².    Intake/Output Summary (Last 24 hours) at 2024 1340  Last data filed at 2024 1430  Gross per 24 hour   Intake 120 ml   Output --   Net 120 ml         Physical Exam  Vitals reviewed.   Constitutional:       General: She is not in acute distress.     Appearance: Normal appearance. She is normal weight. She is not ill-appearing or toxic-appearing.   HENT:      Head: Normocephalic and atraumatic.      Nose: Nose normal. No congestion or rhinorrhea.   Eyes:      Extraocular Movements: Extraocular movements intact.      Conjunctiva/sclera: Conjunctivae normal.      Pupils: Pupils are equal, round, and reactive to light.   Cardiovascular:      Rate and Rhythm: Normal rate and regular rhythm.      Pulses: Normal pulses.      Heart sounds: No murmur heard.  Pulmonary:      Effort: Pulmonary effort is normal. No respiratory distress.      Breath sounds: No wheezing or rales.   Abdominal:      General: Abdomen is flat. Bowel sounds are normal. There is no distension.      Palpations: Abdomen is soft.      Tenderness: There is no abdominal tenderness. There is no guarding or rebound.   Musculoskeletal:         General: No swelling, tenderness or deformity. Normal range of motion.      Cervical back: Normal range of motion and neck supple. No rigidity.      Right lower le+ Pitting Edema (improving) present.      Left lower le+ Pitting Edema (improving) present.    Skin:     General: Skin is warm and dry.      Capillary Refill: Capillary refill takes less than 2 seconds.      Coloration: Skin is not pale.   Neurological:      General: No focal deficit present.      Mental Status: She is alert and oriented to person, place, and time. Mental status is at baseline.      Motor: No weakness.      Gait: Gait normal.   Psychiatric:         Mood and Affect: Mood normal.         Behavior: Behavior normal.         Thought Content: Thought content normal.           Significant Labs: I have reviewed all pertinent imaging results/findings within the past 24 hours.  I have reviewed and interpreted all pertinent imaging results/findings within the past 24 hours.    Significant Imaging: I have reviewed all pertinent imaging results/findings within the past 24 hours.    Assessment/Plan:      * Acute on chronic systolic heart failure  --Last ECHO from NOV 2023 confirm EF 60 %, Grade I diastolic dysfunction  --BNP and troponin elevated x1- will trend the latter  --trace/1+ edema on exam, small pleural effusions noted on CXR  --s/p IV 60 mg lasix dose in the ER  --will schedule IV Lasix 40 mg BID while hospitalized  --monitor electrolytes, UOP, repeat Echo  --strict I&O and daily weights ordered  --Cardiology consulted for AM evaluation    2/24/24  EF down to 35% with global hypokinesis.   She is improving clinicalyl. Lasix decreased to daily    2/25/24  Continue plan as above.   Continue Lasix. Cardiology will determine need for ORI this admission      Acute on chronic respiratory failure with hypoxia  Patient with Hypoxic Respiratory failure which is Acute on chronic.  she is on home oxygen at 2-3 LPM. Signs/symptoms of respiratory failure include- tachypnea, increased work of breathing, and wheezing. Contributing diagnoses includes - CHF, COPD, Pneumonia, and Pulmonary Embolus.   --suspect CHF exacerbation as primary etiology, see plan above  --Dimer ordered; mildly elevated   --some trace  swelling in b/l extremities, a chronically subtherapeutic INR  --CTA Chest ordered to rule out PE  --home Coumadin therapy continued- pharmacy to dose  --low suspicion of PNA as CXR unremarkable  --COPD exacerbation also possible- supportive therapy, PRN nebs  --reassess in the AM    2/24/24  Currently on 2L nasal cannula-home settings.    Aortic aneurysm  Ascending aortic aneurysm measuring up to 4.5 cm.  No dissection.     Pacemaker  --orders for interrogation placed while in the ER        H/O mechanical aortic valve replacement  Possible complication could be contributing to CHF exacerbation presentation, will further investigate via CTA and echo.  On chart review, patient seems to have had a chronically subtherapeutic INR.  Target range has been documented to be between 2.5 and 3.5, however patients INR has been sustained below 2.0 on the last several blood draws.  Pharmacy consulted to assist in Coumadin dosing.    2/24/24  INR 2.1  Continue Lovenox bridge for additional day. Echocardiogram shows mechanical valve with high velocities. Cardiology will plan to perform ORI this admission.  Continue Lasix    Essential hypertension  --home medications include:  Metoprolol  --restart as tolerated   --PRN IV hydralazine 10mg Q6H for sBP > 175 mmHg  --Q4HVS    2/24/24  Soft BP but systolic >100 this evening. Monitor    2/25/24  Blood pressure soft 90-110s. Tolerating metoprolol and lasix.      VTE Risk Mitigation (From admission, onward)           Ordered     warfarin (COUMADIN) split tablet 1.25 mg  Once per day on Sun Tue Wed Thu Sat         02/25/24 1026     warfarin (COUMADIN) tablet 2.5 mg  Once per day on Mon Fri 02/23/24 1005     IP VTE HIGH RISK PATIENT  Once         02/22/24 2300     Place sequential compression device  Until discontinued         02/22/24 2300                    Discharge Planning   RENE:      Code Status: Full Code   Is the patient medically ready for discharge?:     Reason for patient  still in hospital (select all that apply): Treatment  Discharge Plan A: Home Health          Vicky Chavez NP  Department of Hospital Medicine   [facility]

## 2024-02-25 NOTE — PROGRESS NOTES
[facility]  Cardiology  Progress Note    Patient Name: Serena Post  MRN: [id number]  Admission Date: 2/22/2024  Hospital Length of Stay: 2 days  Code Status: Full Code   Attending Physician: Yary Levi MD   Primary Care Physician: Evangelina Olivares MD  Expected Discharge Date:   Principal Problem:Acute on chronic systolic heart failure    Subjective:     HPI:  HPI obtained largely from chart and patient's son at bedside as she has underlying dementia    Ms. 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 Patient complained of increased SOB over the past three weeks to the point where she needed to use her supplemental oxygen continuously. She denied any associated CP, LE edema, fever, chills, palpitations, near syncope, or syncope. Initial workup revealed troponin of 0.143, INR of 1.6, and BNP > 3,000. CXR showed small bilateral pleural effusions and patient was subsequently admitted for further evaluation and treatment. Cardiology consulted to assist with management. Patient seen and examined today in ED with her son present. Still visibly SOB. Pleasantly confused. No CP symptoms. Son reports she is compliant with her medications. Watches her salt intake. Followed by CHF clinic and outside cardiologist, Dr. Perkins. Chart reviewed. Troponin flat, 0.143>0.125>0.107. EKG with paced rhythm with PVC's, noted to possibly have some brief runs of PAF while in ED. PPM interrogation ordered. Prior TTE 11/23 with normal EF, repeat pending. CTA negative for PE, suggestive of pulmonary HTN, ascending aorta aneurysm.      Hospital Course:   2/24/24: Pt seen and examined this am. No acute sxs noted.  Dyspnea seems improved. No CP. Labs/chart reviewed.  INR now  2.1  echo EF 35%.  BP soft.    2/25/24: Pt seen and examined this am.  No acute CV issues noted.  Dyspnea seems improved but pt is not a good historian.  Chart/labs reviewed.      Review of Systems   Constitutional: Positive for malaise/fatigue.   HENT: Negative.     Eyes: Negative.    Cardiovascular:  Positive for dyspnea on exertion.   Respiratory:  Positive for shortness of breath.    Endocrine: Negative.    Hematologic/Lymphatic: Negative.    Skin: Negative.    Musculoskeletal: Negative.    Gastrointestinal: Negative.    Genitourinary: Negative.    Neurological:  Positive for difficulty with concentration and weakness.   Psychiatric/Behavioral:  Positive for memory loss.    Allergic/Immunologic: Negative.      Objective:     Vital Signs (Most Recent):  Temp: 97.5 °F (36.4 °C) (02/25/24 1116)  Pulse: 67 (02/25/24 1116)  Resp: 16 (02/25/24 1116)  BP: (!) 96/55 (02/25/24 1116)  SpO2: 97 % (02/25/24 1116) Vital Signs (24h Range):  Temp:  [97 °F (36.1 °C)-98.2 °F (36.8 °C)] 97.5 °F (36.4 °C)  Pulse:  [66-82] 67  Resp:  [16-18] 16  SpO2:  [96 %-98 %] 97 %  BP: ()/(52-57) 96/55     Weight: 92.2 kg (203 lb 4.2 oz)  Body mass index is 34.89 kg/m².     SpO2: 97 %         Intake/Output Summary (Last 24 hours) at 2/25/2024 1252  Last data filed at 2/24/2024 1430  Gross per 24 hour   Intake 120 ml   Output --   Net 120 ml       Lines/Drains/Airways       Peripheral Intravenous Line  Duration                  Peripheral IV - Single Lumen 02/25/24 1005 22 G Anterior;Right Shoulder <1 day                       Physical Exam  Vitals and nursing note reviewed.   Constitutional:       General: She is not in acute distress.     Appearance: Normal appearance. She is well-developed. She is not ill-appearing or diaphoretic.   HENT:      Head: Normocephalic.   Neck:      Thyroid: No thyromegaly.      Vascular: No carotid bruit or JVD.   Cardiovascular:      Rate and Rhythm: Normal rate and regular rhythm.      Pulses: Normal  "pulses.           Radial pulses are 2+ on the right side and 2+ on the left side.      Heart sounds: S1 normal and S2 normal. Murmur heard.      Mid to late systolic murmur is present.      No friction rub. No gallop.      Comments: Metallic S2 noted  Pulmonary:      Effort: Pulmonary effort is normal.      Breath sounds: Examination of the right-lower field reveals decreased breath sounds. Examination of the left-lower field reveals decreased breath sounds. Decreased breath sounds present. No wheezing or rales.   Abdominal:      General: Bowel sounds are normal. There is no abdominal bruit.      Palpations: Abdomen is soft. There is no mass.      Tenderness: There is no abdominal tenderness.   Musculoskeletal:      Cervical back: Neck supple.      Right lower leg: Edema present.      Left lower leg: Edema present.   Skin:     General: Skin is warm.   Neurological:      Mental Status: She is alert.   Psychiatric:         Behavior: Behavior normal.            Significant Labs: ABG: No results for input(s): "PH", "PCO2", "HCO3", "POCSATURATED", "BE" in the last 48 hours., Blood Culture: No results for input(s): "LABBLOO" in the last 48 hours., BMP:   Recent Labs   Lab 02/24/24  0645 02/25/24  0515    78    143   K 3.3* 3.3*    105   CO2 28 27   BUN 21 30*   CREATININE 1.2 1.3   CALCIUM 9.0 8.8   MG 1.8 1.9   , CMP   Recent Labs   Lab 02/24/24  0645 02/25/24  0515    143   K 3.3* 3.3*    105   CO2 28 27    78   BUN 21 30*   CREATININE 1.2 1.3   CALCIUM 9.0 8.8   ANIONGAP 11 11   , CBC   Recent Labs   Lab 02/24/24  0645 02/25/24  0515   WBC 9.34 7.97   HGB 13.1 13.5   HCT 40.7 42.2    235   , INR   Recent Labs   Lab 02/24/24  0645 02/25/24  0515   INR 2.1* 2.7*   , Lipid Panel No results for input(s): "CHOL", "HDL", "LDLCALC", "TRIG", "CHOLHDL" in the last 48 hours., and Troponin No results for input(s): "TROPONINI" in the last 48 hours.    Significant Imaging: " Echocardiogram: Transthoracic echo (TTE) complete (Cupid Only):   Results for orders placed or performed during the hospital encounter of 02/22/24   Echo   Result Value Ref Range    BSA 2.03 m2    LVOT stroke volume 56.42 cm3    LVIDd 5.10 3.5 - 6.0 cm    LV Systolic Volume 90.21 mL    LV Systolic Volume Index 46.0 mL/m2    LVIDs 4.45 (A) 2.1 - 4.0 cm    LV Diastolic Volume 123.59 mL    LV Diastolic Volume Index 63.06 mL/m2    IVS 1.04 0.6 - 1.1 cm    LVOT diameter 1.91 cm    LVOT area 2.9 cm2    FS 13 (A) 28 - 44 %    Left Ventricle Relative Wall Thickness 0.47 cm    Posterior Wall 1.19 (A) 0.6 - 1.1 cm    LV mass 217.90 g    LV Mass Index 111 g/m2    MV Peak E Melo 1.07 m/s    TDI LATERAL 0.08 m/s    TDI SEPTAL 0.05 m/s    E/E' ratio 16.46 m/s    MV Peak A Melo 1.07 m/s    TR Max Melo 3.38 m/s    E/A ratio 1.00     IVRT 62.80 msec    E wave deceleration time 192.43 msec    LV SEPTAL E/E' RATIO 21.40 m/s    LV LATERAL E/E' RATIO 13.38 m/s    LVOT peak melo 0.83 m/s    Left Ventricular Outflow Tract Mean Velocity 0.58 cm/s    Left Ventricular Outflow Tract Mean Gradient 1.51 mmHg    RV mid diameter 3.15 cm    RVOT peak VTI 9.3 cm    TAPSE 2.05 cm    LA size 3.61 cm    Left Atrium Minor Axis 3.98 cm    Left Atrium Major Axis 6.58 cm    RA Major Axis 4.53 cm    AV regurgitation pressure 1/2 time [id number] ms    AR Max Melo 4.34 m/s    AV mean gradient 95 mmHg    AV peak gradient 97 mmHg    Ao peak melo 4.93 m/s    Ao .80 cm    LVOT peak VTI 19.70 cm    AV valve area 0.37 cm²    AV Velocity Ratio 0.17     AV index (prosthetic) 0.13     AAMIR by Velocity Ratio 0.48 cm²    MV stenosis pressure 1/2 time 55.80 ms    MV valve area p 1/2 method 3.94 cm2    Triscuspid Valve Regurgitation Peak Gradient 46 mmHg    PV mean gradient 1 mmHg    RVOT peak melo 0.49 m/s    Ao root annulus 2.98 cm    STJ 4.51 cm    Ascending aorta 4.45 cm    IVC diameter 1.15 cm    Mean e' 0.07 m/s    ZLVIDS 2.01     ZLVIDD -0.95     LA Volume  Index 34.9 mL/m2    LA volume 68.49 cm3    LA WIDTH 4.5 cm    RA Width 4.4 cm    TV resting pulmonary artery pressure 49 mmHg    RV TB RVSP 6 mmHg    Est. RA pres 3 mmHg    EF 35 %    Narrative      Left Ventricle: The left ventricle is normal in size. Normal wall   thickness. There is concentric hypertrophy. Global hypokinesis present.   There is moderately reduced systolic function. Ejection fraction by visual   approximation is 35%.    Right Ventricle: Normal right ventricular cavity size. Systolic   function is borderline low.    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 There is moderate aortic regurgitation.    Consider ORI if clinically needed to evaluate mechanical aortic valve   further.    Mitral Valve: There is mild bileaflet sclerosis. There is mild   regurgitation.    Pulmonary Artery: There is moderate pulmonary hypertension. The   estimated pulmonary artery systolic pressure is 49 mmHg.    IVC/SVC: Normal venous pressure at 3 mmHg.    Ascending aorta is moderately dilated measuring 4.45 cm.       Assessment and Plan:     ACUTE CHF EXACERBATION.  CONTINUE LASIX IV  QD.  COUMADIN TX.  CAN STOP LOVENOX BRIDGE NOW.  OMT ADVISED FOR CHF.  HAS HIGH GRADIENTS ACROSS MECHANICAL AVR, EF 35% ON ECHO.  CONSIDER ORI TO FURTHER EVALUATE MECHANICAL AVR THIS ADMISSION.  F/U LABS.  AS BELOW.    * Acute on chronic systolic heart failure  -BNP > 3,000  -TTE 11/23 with normal EF, repeat pending  -Continue IV diuresis as tolerated  -Continue home regimen/OMT  -Strict I's/O's    Acute on chronic respiratory failure with hypoxia  -Assess response to IV diuresis  -CTA negative for PEP    Aortic aneurysm  -Will need surveillance as OP    Pacemaker  -Interrogation pending, possible PAF runs    H/O mechanical aortic valve replacement  -Continue Coumadin, dosing as per pharmacy    Essential hypertension  -Continue home regimen as tolerated        VTE Risk Mitigation (From  admission, onward)           Ordered     warfarin (COUMADIN) split tablet 1.25 mg  Once per day on Sun Tue Wed Thu Sat         02/25/24 1026     warfarin (COUMADIN) tablet 2.5 mg  Once per day on Mon Fri 02/23/24 1005     IP VTE HIGH RISK PATIENT  Once         02/22/24 2300     Place sequential compression device  Until discontinued         02/22/24 2300                    Parish Blake MD  Cardiology  [facility]

## 2024-02-25 NOTE — PROGRESS NOTES
[facility] Medicine  Progress Note    Patient Name: Serena Post  MRN: [id number]  Patient Class: IP- Inpatient   Admission Date: 2/22/2024  Length of Stay: 2 days  Attending Physician: Yary Levi MD  Primary Care Provider: Evangelina Olivares MD    Subjective:     Principal Problem:Acute on chronic systolic heart failure    HPI:  Serena Post is an 83-year-old woman with a past medical history of hypertension, congestive heart failure, atrial fibrillation, cardiomyopathy, history of brain tumor, history of heart valve replacement with a mechanical valve, hyperlipidemia, history of pacemaker insertion, and a stroke in 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 The patient states she previously uses home oxygen intermittently but has needed it daily for the past weeks due to worsening shortness of breath. She also reports being hypotensive during the same time period. Symptoms are constant and moderate in severity, with associated symptoms including cough but no chest pain, fever, nausea, vomiting, or any other symptoms at this time. No sick contacts.    Upon arrival in the ER, initial vital signs showed tachypnea with hypoxia, which seemed to improve with oxygen supplementation; the rest of the vitals were stable. Abnormal laboratory results included an estimated glomerular filtration rate of 45, indicating reduced kidney function; an elevated Troponin I level at 0.143, a BNP level of 3,274, and a subtherapeutic INR of 1.6 (target range of 2.5 to 3.5 due to her mechanical heart valve). All other laboratory components were within normal limits. Imaging results revealed small bilateral pleural effusions on chest x-ray, but no other significant cardiopulmonary disease was appreciated.  Hospital Medicine was called for admission to manage her complex conditions and provide  further treatment and evaluation.    Overview/Hospital Course:  Serena is a 83 year old female who presented to ED for further evaluation of progressive dyspnea. Upon arrival patient noted to have mildly labored breathing with hypoxia and was placed on supplemental oxygen. BNP >3000. CXR showed bilateral pleural effusions. CTA negative for pulmonary embolism, but showed known ascending aortic aneurysm. Initial troponin 0.143 but is now trending down and felt to be related to demand ischemia from decompensated CHF. Echocardiogram shows an EF of 35%, global hypokinesis, and reduce right ventricle systolic function. Kidney function is stable. Cardiology is following.    2/24/24  Echocardiogram this admission shows a decline in EF 35% with global hypokinesis. Improving clinically. Cardiology recommends decreasing lasix to to QD. INR is not yet therapeutic. Cardiology will consider ORI this admission to assess mechanical AVR.     Interval History: feeling better. Sitting up eating lunch.     Review of Systems   Constitutional:  Negative for chills, fatigue and fever.   HENT:  Negative for congestion, postnasal drip, rhinorrhea and sore throat.    Eyes:  Negative for pain and visual disturbance.   Respiratory:  Positive for shortness of breath. Negative for cough, chest tightness and wheezing.    Cardiovascular:  Positive for palpitations and leg swelling. Negative for chest pain.   Gastrointestinal:  Negative for abdominal distention, abdominal pain, constipation, diarrhea, nausea and vomiting.   Genitourinary:  Negative for difficulty urinating, flank pain and hematuria.   Musculoskeletal:  Negative for arthralgias, back pain, gait problem and joint swelling.   Skin:  Negative for pallor and rash.   Neurological:  Negative for dizziness, syncope and weakness.   Psychiatric/Behavioral:  Negative for confusion, decreased concentration and suicidal ideas.        Objective:     Vital Signs (Most Recent):  Temp: 97.8 °F  (36.6 °C) (24 1555)  Pulse: 70 (24 1745)  Resp: 16 (24 1555)  BP: (!) 100/56 (24 1555)  SpO2: 96 % (24 1555) Vital Signs (24h Range):  Temp:  [97.4 °F (36.3 °C)-98 °F (36.7 °C)] 97.8 °F (36.6 °C)  Pulse:  [64-82] 70  Resp:  [14-20] 16  SpO2:  [96 %-99 %] 96 %  BP: ()/(45-56) 100/56     Weight: 90 kg (198 lb 6.6 oz)  Body mass index is 34.06 kg/m².    Intake/Output Summary (Last 24 hours) at 2024 1820  Last data filed at 2024 1430  Gross per 24 hour   Intake 240 ml   Output --   Net 240 ml         Physical Exam  Vitals reviewed.   Constitutional:       General: She is not in acute distress.     Appearance: Normal appearance. She is normal weight. She is not ill-appearing or toxic-appearing.   HENT:      Head: Normocephalic and atraumatic.      Nose: Nose normal. No congestion or rhinorrhea.   Eyes:      Extraocular Movements: Extraocular movements intact.      Conjunctiva/sclera: Conjunctivae normal.      Pupils: Pupils are equal, round, and reactive to light.   Cardiovascular:      Rate and Rhythm: Normal rate and regular rhythm.      Pulses: Normal pulses.      Heart sounds: No murmur heard.  Pulmonary:      Effort: Pulmonary effort is normal. No respiratory distress.      Breath sounds: No wheezing or rales.   Abdominal:      General: Abdomen is flat. Bowel sounds are normal. There is no distension.      Palpations: Abdomen is soft.      Tenderness: There is no abdominal tenderness. There is no guarding or rebound.   Musculoskeletal:         General: No swelling, tenderness or deformity. Normal range of motion.      Cervical back: Normal range of motion and neck supple. No rigidity.      Right lower le+ Pitting Edema (improving) present.      Left lower le+ Pitting Edema (improving) present.   Skin:     General: Skin is warm and dry.      Capillary Refill: Capillary refill takes less than 2 seconds.      Coloration: Skin is not pale.   Neurological:       General: No focal deficit present.      Mental Status: She is alert and oriented to person, place, and time. Mental status is at baseline.      Motor: No weakness.      Gait: Gait normal.   Psychiatric:         Mood and Affect: Mood normal.         Behavior: Behavior normal.         Thought Content: Thought content normal.           Significant Labs: All pertinent labs within the past 24 hours have been reviewed.  CBC:   Recent Labs   Lab 02/22/24 2114 02/24/24  0645   WBC 7.54 9.34   HGB 14.7 13.1   HCT 45.7 40.7    200     CMP:   Recent Labs   Lab 02/22/24 2114 02/23/24  0820 02/24/24  0645    140 144   K 3.6 3.6 3.3*    105 105   CO2 22* 22* 28   GLU 97 162* 108   BUN 16 16 21   CREATININE 1.2 1.2 1.2   CALCIUM 9.4 8.7 9.0   PROT 7.9  --   --    ALBUMIN 3.5  --   --    BILITOT 2.1*  --   --    ALKPHOS 94  --   --    AST 28  --   --    ALT 15  --   --    ANIONGAP 13 13 11       Significant Imaging: I have reviewed all pertinent imaging results/findings within the past 24 hours.    Assessment/Plan:      * Acute on chronic systolic heart failure  --Last ECHO from NOV 2023 confirm EF 60 %, Grade I diastolic dysfunction  --BNP and troponin elevated x1- will trend the latter  --trace/1+ edema on exam, small pleural effusions noted on CXR  --s/p IV 60 mg lasix dose in the ER  --will schedule IV Lasix 40 mg BID while hospitalized  --monitor electrolytes, UOP, repeat Echo  --strict I&O and daily weights ordered  --Cardiology consulted for AM evaluation    2/24/24  EF down to 35% with global hypokinesis.   She is improving clinical. Lasix decreased to daily        Acute on chronic respiratory failure with hypoxia  Patient with Hypoxic Respiratory failure which is Acute on chronic.  she is on home oxygen at 2-3 LPM. Signs/symptoms of respiratory failure include- tachypnea, increased work of breathing, and wheezing. Contributing diagnoses includes - CHF, COPD, Pneumonia, and Pulmonary Embolus.    --suspect CHF exacerbation as primary etiology, see plan above  --Dimer ordered; mildly elevated   --some trace swelling in b/l extremities, a chronically subtherapeutic INR  --CTA Chest ordered to rule out PE  --home Coumadin therapy continued- pharmacy to dose  --low suspicion of PNA as CXR unremarkable  --COPD exacerbation also possible- supportive therapy, PRN nebs  --reassess in the AM    2/24/24  Currently on 2L nasal cannula.     Aortic aneurysm  Ascending aortic aneurysm measuring up to 4.5 cm.  No dissection.     Pacemaker  --orders for interrogation placed while in the ER        H/O mechanical aortic valve replacement  Possible complication could be contributing to CHF exacerbation presentation, will further investigate via CTA and echo.  On chart review, patient seems to have had a chronically subtherapeutic INR.  Target range has been documented to be between 2.5 and 3.5, however patients INR has been sustained below 2.0 on the last several blood draws.  Pharmacy consulted to assist in Coumadin dosing.    2/24/24  INR 2.1  Continue Lovenox bridge for additional day. Echocardiogram shows mechanical valve with high velocities. Cardiology will plan to perform ORI this admission.     Essential hypertension  --home medications include:  Metoprolol  --restart as tolerated   --PRN IV hydralazine 10mg Q6H for sBP > 175 mmHg  --[id number]    2/24/24  Soft BP but systolic >100 this evening. monitor      VTE Risk Mitigation (From admission, onward)           Ordered     warfarin (COUMADIN) tablet 2.5 mg  Once per day on Mon Fri 02/23/24 1005     warfarin (COUMADIN) split tablet 1.25 mg  Once per day on Sun Tue Wed Thu Sat         02/24/24 1006     enoxaparin injection 90 mg  Every 12 hours         02/23/24 1002     IP VTE HIGH RISK PATIENT  Once         02/22/24 2300     Place sequential compression device  Until discontinued         02/22/24 2300                    Discharge Planning   RENE:      Code Status:  Full Code   Is the patient medically ready for discharge?:     Reason for patient still in hospital (select all that apply): Treatment             Vicky Chavez NP  Department of Hospital Medicine   '[facility]

## 2024-02-25 NOTE — SUBJECTIVE & OBJECTIVE
Review of Systems   Constitutional: Positive for malaise/fatigue.   HENT: Negative.     Eyes: Negative.    Cardiovascular:  Positive for dyspnea on exertion.   Respiratory:  Positive for shortness of breath.    Endocrine: Negative.    Hematologic/Lymphatic: Negative.    Skin: Negative.    Musculoskeletal: Negative.    Gastrointestinal: Negative.    Genitourinary: Negative.    Neurological:  Positive for difficulty with concentration and weakness.   Psychiatric/Behavioral:  Positive for memory loss.    Allergic/Immunologic: Negative.      Objective:     Vital Signs (Most Recent):  Temp: 97.5 °F (36.4 °C) (02/25/24 1116)  Pulse: 67 (02/25/24 1116)  Resp: 16 (02/25/24 1116)  BP: (!) 96/55 (02/25/24 1116)  SpO2: 97 % (02/25/24 1116) Vital Signs (24h Range):  Temp:  [97 °F (36.1 °C)-98.2 °F (36.8 °C)] 97.5 °F (36.4 °C)  Pulse:  [66-82] 67  Resp:  [16-18] 16  SpO2:  [96 %-98 %] 97 %  BP: ()/(52-57) 96/55     Weight: 92.2 kg (203 lb 4.2 oz)  Body mass index is 34.89 kg/m².     SpO2: 97 %         Intake/Output Summary (Last 24 hours) at 2/25/2024 1252  Last data filed at 2/24/2024 1430  Gross per 24 hour   Intake 120 ml   Output --   Net 120 ml       Lines/Drains/Airways       Peripheral Intravenous Line  Duration                  Peripheral IV - Single Lumen 02/25/24 1005 22 G Anterior;Right Shoulder <1 day                       Physical Exam  Vitals and nursing note reviewed.   Constitutional:       General: She is not in acute distress.     Appearance: Normal appearance. She is well-developed. She is not ill-appearing or diaphoretic.   HENT:      Head: Normocephalic.   Neck:      Thyroid: No thyromegaly.      Vascular: No carotid bruit or JVD.   Cardiovascular:      Rate and Rhythm: Normal rate and regular rhythm.      Pulses: Normal pulses.           Radial pulses are 2+ on the right side and 2+ on the left side.      Heart sounds: S1 normal and S2 normal. Murmur heard.      Mid to late systolic murmur is  "present.      No friction rub. No gallop.      Comments: Metallic S2 noted  Pulmonary:      Effort: Pulmonary effort is normal.      Breath sounds: Examination of the right-lower field reveals decreased breath sounds. Examination of the left-lower field reveals decreased breath sounds. Decreased breath sounds present. No wheezing or rales.   Abdominal:      General: Bowel sounds are normal. There is no abdominal bruit.      Palpations: Abdomen is soft. There is no mass.      Tenderness: There is no abdominal tenderness.   Musculoskeletal:      Cervical back: Neck supple.      Right lower leg: Edema present.      Left lower leg: Edema present.   Skin:     General: Skin is warm.   Neurological:      Mental Status: She is alert.   Psychiatric:         Behavior: Behavior normal.            Significant Labs: ABG: No results for input(s): "PH", "PCO2", "HCO3", "POCSATURATED", "BE" in the last 48 hours., Blood Culture: No results for input(s): "LABBLOO" in the last 48 hours., BMP:   Recent Labs   Lab 02/24/24  0645 02/25/24  0515    78    143   K 3.3* 3.3*    105   CO2 28 27   BUN 21 30*   CREATININE 1.2 1.3   CALCIUM 9.0 8.8   MG 1.8 1.9   , CMP   Recent Labs   Lab 02/24/24  0645 02/25/24  0515    143   K 3.3* 3.3*    105   CO2 28 27    78   BUN 21 30*   CREATININE 1.2 1.3   CALCIUM 9.0 8.8   ANIONGAP 11 11   , CBC   Recent Labs   Lab 02/24/24  0645 02/25/24  0515   WBC 9.34 7.97   HGB 13.1 13.5   HCT 40.7 42.2    235   , INR   Recent Labs   Lab 02/24/24  0645 02/25/24  0515   INR 2.1* 2.7*   , Lipid Panel No results for input(s): "CHOL", "HDL", "LDLCALC", "TRIG", "CHOLHDL" in the last 48 hours., and Troponin No results for input(s): "TROPONINI" in the last 48 hours.    Significant Imaging: Echocardiogram: Transthoracic echo (TTE) complete (Cupid Only):   Results for orders placed or performed during the hospital encounter of 02/22/24   Echo   Result Value Ref Range    BSA 2.03 " m2    LVOT stroke volume 56.42 cm3    LVIDd 5.10 3.5 - 6.0 cm    LV Systolic Volume 90.21 mL    LV Systolic Volume Index 46.0 mL/m2    LVIDs 4.45 (A) 2.1 - 4.0 cm    LV Diastolic Volume 123.59 mL    LV Diastolic Volume Index 63.06 mL/m2    IVS 1.04 0.6 - 1.1 cm    LVOT diameter 1.91 cm    LVOT area 2.9 cm2    FS 13 (A) 28 - 44 %    Left Ventricle Relative Wall Thickness 0.47 cm    Posterior Wall 1.19 (A) 0.6 - 1.1 cm    LV mass 217.90 g    LV Mass Index 111 g/m2    MV Peak E Melo 1.07 m/s    TDI LATERAL 0.08 m/s    TDI SEPTAL 0.05 m/s    E/E' ratio 16.46 m/s    MV Peak A Melo 1.07 m/s    TR Max Melo 3.38 m/s    E/A ratio 1.00     IVRT 62.80 msec    E wave deceleration time 192.43 msec    LV SEPTAL E/E' RATIO 21.40 m/s    LV LATERAL E/E' RATIO 13.38 m/s    LVOT peak melo 0.83 m/s    Left Ventricular Outflow Tract Mean Velocity 0.58 cm/s    Left Ventricular Outflow Tract Mean Gradient 1.51 mmHg    RV mid diameter 3.15 cm    RVOT peak VTI 9.3 cm    TAPSE 2.05 cm    LA size 3.61 cm    Left Atrium Minor Axis 3.98 cm    Left Atrium Major Axis 6.58 cm    RA Major Axis 4.53 cm    AV regurgitation pressure 1/2 time [id number] ms    AR Max Melo 4.34 m/s    AV mean gradient 95 mmHg    AV peak gradient 97 mmHg    Ao peak melo 4.93 m/s    Ao .80 cm    LVOT peak VTI 19.70 cm    AV valve area 0.37 cm²    AV Velocity Ratio 0.17     AV index (prosthetic) 0.13     AAMIR by Velocity Ratio 0.48 cm²    MV stenosis pressure 1/2 time 55.80 ms    MV valve area p 1/2 method 3.94 cm2    Triscuspid Valve Regurgitation Peak Gradient 46 mmHg    PV mean gradient 1 mmHg    RVOT peak melo 0.49 m/s    Ao root annulus 2.98 cm    STJ 4.51 cm    Ascending aorta 4.45 cm    IVC diameter 1.15 cm    Mean e' 0.07 m/s    ZLVIDS 2.01     ZLVIDD -0.95     LA Volume Index 34.9 mL/m2    LA volume 68.49 cm3    LA WIDTH 4.5 cm    RA Width 4.4 cm    TV resting pulmonary artery pressure 49 mmHg    RV TB RVSP 6 mmHg    Est. RA pres 3 mmHg    EF 35 %     Narrative      Left Ventricle: The left ventricle is normal in size. Normal wall   thickness. There is concentric hypertrophy. Global hypokinesis present.   There is moderately reduced systolic function. Ejection fraction by visual   approximation is 35%.    Right Ventricle: Normal right ventricular cavity size. Systolic   function is borderline low.    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 There is moderate aortic regurgitation.    Consider ORI if clinically needed to evaluate mechanical aortic valve   further.    Mitral Valve: There is mild bileaflet sclerosis. There is mild   regurgitation.    Pulmonary Artery: There is moderate pulmonary hypertension. The   estimated pulmonary artery systolic pressure is 49 mmHg.    IVC/SVC: Normal venous pressure at 3 mmHg.    Ascending aorta is moderately dilated measuring 4.45 cm.

## 2024-02-25 NOTE — SUBJECTIVE & OBJECTIVE
Interval History: feeling better. Sitting up eating lunch.     Review of Systems   Constitutional:  Negative for chills, fatigue and fever.   HENT:  Negative for congestion, postnasal drip, rhinorrhea and sore throat.    Eyes:  Negative for pain and visual disturbance.   Respiratory:  Positive for shortness of breath. Negative for cough, chest tightness and wheezing.    Cardiovascular:  Positive for palpitations and leg swelling. Negative for chest pain.   Gastrointestinal:  Negative for abdominal distention, abdominal pain, constipation, diarrhea, nausea and vomiting.   Genitourinary:  Negative for difficulty urinating, flank pain and hematuria.   Musculoskeletal:  Negative for arthralgias, back pain, gait problem and joint swelling.   Skin:  Negative for pallor and rash.   Neurological:  Negative for dizziness, syncope and weakness.   Psychiatric/Behavioral:  Negative for confusion, decreased concentration and suicidal ideas.        Objective:     Vital Signs (Most Recent):  Temp: 97.8 °F (36.6 °C) (02/24/24 1555)  Pulse: 70 (02/24/24 1745)  Resp: 16 (02/24/24 1555)  BP: (!) 100/56 (02/24/24 1555)  SpO2: 96 % (02/24/24 1555) Vital Signs (24h Range):  Temp:  [97.4 °F (36.3 °C)-98 °F (36.7 °C)] 97.8 °F (36.6 °C)  Pulse:  [64-82] 70  Resp:  [14-20] 16  SpO2:  [96 %-99 %] 96 %  BP: ()/(45-56) 100/56     Weight: 90 kg (198 lb 6.6 oz)  Body mass index is 34.06 kg/m².    Intake/Output Summary (Last 24 hours) at 2/24/2024 1820  Last data filed at 2/24/2024 1430  Gross per 24 hour   Intake 240 ml   Output --   Net 240 ml         Physical Exam  Vitals reviewed.   Constitutional:       General: She is not in acute distress.     Appearance: Normal appearance. She is normal weight. She is not ill-appearing or toxic-appearing.   HENT:      Head: Normocephalic and atraumatic.      Nose: Nose normal. No congestion or rhinorrhea.   Eyes:      Extraocular Movements: Extraocular movements intact.      Conjunctiva/sclera:  Conjunctivae normal.      Pupils: Pupils are equal, round, and reactive to light.   Cardiovascular:      Rate and Rhythm: Normal rate and regular rhythm.      Pulses: Normal pulses.      Heart sounds: No murmur heard.  Pulmonary:      Effort: Pulmonary effort is normal. No respiratory distress.      Breath sounds: No wheezing or rales.   Abdominal:      General: Abdomen is flat. Bowel sounds are normal. There is no distension.      Palpations: Abdomen is soft.      Tenderness: There is no abdominal tenderness. There is no guarding or rebound.   Musculoskeletal:         General: No swelling, tenderness or deformity. Normal range of motion.      Cervical back: Normal range of motion and neck supple. No rigidity.      Right lower le+ Pitting Edema (improving) present.      Left lower le+ Pitting Edema (improving) present.   Skin:     General: Skin is warm and dry.      Capillary Refill: Capillary refill takes less than 2 seconds.      Coloration: Skin is not pale.   Neurological:      General: No focal deficit present.      Mental Status: She is alert and oriented to person, place, and time. Mental status is at baseline.      Motor: No weakness.      Gait: Gait normal.   Psychiatric:         Mood and Affect: Mood normal.         Behavior: Behavior normal.         Thought Content: Thought content normal.           Significant Labs: All pertinent labs within the past 24 hours have been reviewed.  CBC:   Recent Labs   Lab 24  0645   WBC 7.54 9.34   HGB 14.7 13.1   HCT 45.7 40.7    200     CMP:   Recent Labs   Lab 24  0820 24  0645    140 144   K 3.6 3.6 3.3*    105 105   CO2 22* 22* 28   GLU 97 162* 108   BUN 16 16 21   CREATININE 1.2 1.2 1.2   CALCIUM 9.4 8.7 9.0   PROT 7.9  --   --    ALBUMIN 3.5  --   --    BILITOT 2.1*  --   --    ALKPHOS 94  --   --    AST 28  --   --    ALT 15  --   --    ANIONGAP 13 13 11       Significant Imaging: I have  reviewed all pertinent imaging results/findings within the past 24 hours.

## 2024-02-25 NOTE — ASSESSMENT & PLAN NOTE
Patient with Hypoxic Respiratory failure which is Acute on chronic.  she is on home oxygen at 2-3 LPM. Signs/symptoms of respiratory failure include- tachypnea, increased work of breathing, and wheezing. Contributing diagnoses includes - CHF, COPD, Pneumonia, and Pulmonary Embolus.   --suspect CHF exacerbation as primary etiology, see plan above  --Dimer ordered; mildly elevated   --some trace swelling in b/l extremities, a chronically subtherapeutic INR  --CTA Chest ordered to rule out PE  --home Coumadin therapy continued- pharmacy to dose  --low suspicion of PNA as CXR unremarkable  --COPD exacerbation also possible- supportive therapy, PRN nebs  --reassess in the AM    2/24/24  Currently on 2L nasal cannula.

## 2024-02-25 NOTE — ASSESSMENT & PLAN NOTE
--Last ECHO from NOV 2023 confirm EF 60 %, Grade I diastolic dysfunction  --BNP and troponin elevated x1- will trend the latter  --trace/1+ edema on exam, small pleural effusions noted on CXR  --s/p IV 60 mg lasix dose in the ER  --will schedule IV Lasix 40 mg BID while hospitalized  --monitor electrolytes, UOP, repeat Echo  --strict I&O and daily weights ordered  --Cardiology consulted for AM evaluation    2/24/24  EF down to 35% with global hypokinesis.   She is improving clinicalyl. Lasix decreased to daily    2/25/24  Continue plan as above.   Continue Lasix. Cardiology will determine need for ORI this admission

## 2024-02-25 NOTE — PLAN OF CARE
[facility] - Med Surg  Initial Discharge Assessment       Primary Care Provider: Evangelina Olivares MD    Admission Diagnosis: Acute on chronic systolic heart failure [I50.23]  PAF (paroxysmal atrial fibrillation) [I48.0]  SOB (shortness of breath) [R06.02]  Elevated brain natriuretic peptide (BNP) level [R79.89]    Admission Date: 2/22/2024  Expected Discharge Date: Per Attending     Transition of Care Barriers: None    Payor: MEDICARE / Plan: MEDICARE PART A & B / Product Type: Government /     Extended Emergency Contact Information  Primary Emergency Contact: Amish Post  Mobile Phone: [phone]  Relation: Son  Preferred language: English   needed? No    Discharge Plan A: [id number] - [facility] [place] - [address]  [address] [facility] [address]  Phone: [phone] Fax: [phone]      Initial Assessment (most recent)       Adult Discharge Assessment - 02/25/24 0918          Discharge Assessment    Assessment Type Discharge Planning Assessment     Confirmed/corrected address, phone number and insurance Yes     Confirmed Demographics Correct on Facesheet     Source of Information patient;family     Communicated RENE with patient/caregiver Date not available/Unable to determine     Reason For Admission acute on chronic systolic heart failure     People in Home child(solange), adult     Do you expect to return to your current living situation? Yes     Do you have help at home or someone to help you manage your care at home? Yes     Who are your caregiver(s) and their phone number(s)? caregiver 5 days/week for 12 hours/day     Prior to hospitilization cognitive status: Alert/Oriented     Current cognitive status: Alert/Oriented     Walking or Climbing Stairs Difficulty yes     Walking or Climbing Stairs ambulation difficulty, requires equipment     Home Accessibility wheelchair accessible     Home Layout Able to live  on 1st floor     Equipment Currently Used at Home wheelchair     Readmission within 30 days? No     Patient currently being followed by outpatient case management? No     Do you currently have service(s) that help you manage your care at home? Yes     Name and Contact number of agency [facility]     Is the pt/caregiver preference to resume services with current agency Yes     Do you take prescription medications? Yes     Do you have prescription coverage? Yes     Coverage Medicare     Do you have any problems affording any of your prescribed medications? No     Is the patient taking medications as prescribed? yes     Who is going to help you get home at discharge? family     How do you get to doctors appointments? family or friend will provide     Are you on dialysis? No     Do you take coumadin? No     Discharge Plan A Home Health     DME Needed Upon Discharge  none     Discharge Plan discussed with: Patient;Adult children     Transition of Care Barriers None                   Pt is current with [facility]. Lives with son and has caregivers 5x/week for 12 hours/day

## 2024-02-25 NOTE — PROGRESS NOTES
Pharmacy Consult Note: Warfarin     Serena Post 's Coumadin will be dosed and monitored by Pharmacy.      Target INR goal is 2-3.    INR   Date Value Ref Range Status   02/25/2024 2.7 (H) 0.8 - 1.2 Final     Comment:     Coumadin Therapy:  2.0 - 3.0 for INR for all indicators except mechanical heart valves  and antiphospholipid syndromes which should use 2.5 - 3.5.         Indication: mAVR + afib+ DVT history; PMH significant for frequent falls  Home dose: 2.5 mg Mondays and Fridays; 1.25 mg all other days    Dose for Today: HOLD for significant increase in INR x2 days     lovenox bridge discontinued 2/24  Other drug interactions= APAP, melatonin, tramadol    PT/INR will be monitored daily. Dose adjustments will be made accordingly.      Thank you for allowing us to participate in this patient's care.     Sabiha Villa, PharmD 2/25/2024 10:30 AM

## 2024-02-25 NOTE — ASSESSMENT & PLAN NOTE
--home medications include:  Metoprolol  --restart as tolerated   --PRN IV hydralazine 10mg Q6H for sBP > 175 mmHg  --Q4HVS    2/24/24  Soft BP but systolic >100 this evening. monitor

## 2024-02-25 NOTE — SUBJECTIVE & OBJECTIVE
Interval History:  On 2L nasal cannula-home settings. On at bedside. Discussed plan of care and possible need for ORI. Output has not been recorded.    Review of Systems   Constitutional:  Negative for chills, fatigue and fever.   HENT:  Negative for congestion, postnasal drip, rhinorrhea and sore throat.    Eyes:  Negative for pain and visual disturbance.   Respiratory:  Negative for cough, chest tightness, shortness of breath and wheezing.    Cardiovascular:  Positive for leg swelling. Negative for chest pain and palpitations.   Gastrointestinal:  Negative for abdominal distention, abdominal pain, constipation, diarrhea, nausea and vomiting.   Genitourinary:  Negative for difficulty urinating, flank pain and hematuria.   Musculoskeletal:  Negative for arthralgias, back pain, gait problem and joint swelling.   Skin:  Negative for pallor and rash.   Neurological:  Negative for dizziness, syncope and weakness.   Psychiatric/Behavioral:  Negative for confusion, decreased concentration and suicidal ideas.      Objective:     Vital Signs (Most Recent):  Temp: 97.5 °F (36.4 °C) (02/25/24 1116)  Pulse: 67 (02/25/24 1116)  Resp: 16 (02/25/24 1116)  BP: (!) 96/55 (02/25/24 1116)  SpO2: 97 % (02/25/24 1116) Vital Signs (24h Range):  Temp:  [97 °F (36.1 °C)-98.2 °F (36.8 °C)] 97.5 °F (36.4 °C)  Pulse:  [66-80] 67  Resp:  [16-18] 16  SpO2:  [96 %-98 %] 97 %  BP: ()/(52-57) 96/55     Weight: 92.2 kg (203 lb 4.2 oz)  Body mass index is 34.89 kg/m².    Intake/Output Summary (Last 24 hours) at 2/25/2024 1340  Last data filed at 2/24/2024 1430  Gross per 24 hour   Intake 120 ml   Output --   Net 120 ml         Physical Exam  Vitals reviewed.   Constitutional:       General: She is not in acute distress.     Appearance: Normal appearance. She is normal weight. She is not ill-appearing or toxic-appearing.   HENT:      Head: Normocephalic and atraumatic.      Nose: Nose normal. No congestion or rhinorrhea.   Eyes:       Extraocular Movements: Extraocular movements intact.      Conjunctiva/sclera: Conjunctivae normal.      Pupils: Pupils are equal, round, and reactive to light.   Cardiovascular:      Rate and Rhythm: Normal rate and regular rhythm.      Pulses: Normal pulses.      Heart sounds: No murmur heard.  Pulmonary:      Effort: Pulmonary effort is normal. No respiratory distress.      Breath sounds: No wheezing or rales.   Abdominal:      General: Abdomen is flat. Bowel sounds are normal. There is no distension.      Palpations: Abdomen is soft.      Tenderness: There is no abdominal tenderness. There is no guarding or rebound.   Musculoskeletal:         General: No swelling, tenderness or deformity. Normal range of motion.      Cervical back: Normal range of motion and neck supple. No rigidity.      Right lower le+ Pitting Edema (improving) present.      Left lower le+ Pitting Edema (improving) present.   Skin:     General: Skin is warm and dry.      Capillary Refill: Capillary refill takes less than 2 seconds.      Coloration: Skin is not pale.   Neurological:      General: No focal deficit present.      Mental Status: She is alert and oriented to person, place, and time. Mental status is at baseline.      Motor: No weakness.      Gait: Gait normal.   Psychiatric:         Mood and Affect: Mood normal.         Behavior: Behavior normal.         Thought Content: Thought content normal.           Significant Labs: I have reviewed all pertinent imaging results/findings within the past 24 hours.  I have reviewed and interpreted all pertinent imaging results/findings within the past 24 hours.    Significant Imaging: I have reviewed all pertinent imaging results/findings within the past 24 hours.

## 2024-02-26 ENCOUNTER — DOCUMENTATION ONLY (OUTPATIENT)
Dept: CARDIOLOGY | Facility: CLINIC | Age: 84
End: 2024-02-26
Payer: MEDICARE

## 2024-02-26 LAB
ANION GAP SERPL CALC-SCNC: 11 MMOL/L (ref 8–16)
BASOPHILS # BLD AUTO: 0.06 K/UL (ref 0–0.2)
BASOPHILS NFR BLD: 0.9 % (ref 0–1.9)
BUN SERPL-MCNC: 26 MG/DL (ref 8–23)
CALCIUM SERPL-MCNC: 8.3 MG/DL (ref 8.7–10.5)
CHLORIDE SERPL-SCNC: 105 MMOL/L (ref 95–110)
CO2 SERPL-SCNC: 26 MMOL/L (ref 23–29)
CREAT SERPL-MCNC: 1.1 MG/DL (ref 0.5–1.4)
DIFFERENTIAL METHOD BLD: NORMAL
EOSINOPHIL # BLD AUTO: 0.4 K/UL (ref 0–0.5)
EOSINOPHIL NFR BLD: 6.2 % (ref 0–8)
ERYTHROCYTE [DISTWIDTH] IN BLOOD BY AUTOMATED COUNT: 13.9 % (ref 11.5–14.5)
EST. GFR  (NO RACE VARIABLE): 50 ML/MIN/1.73 M^2
GLUCOSE SERPL-MCNC: 90 MG/DL (ref 70–110)
HCT VFR BLD AUTO: 40.3 % (ref 37–48.5)
HGB BLD-MCNC: 12.9 G/DL (ref 12–16)
IMM GRANULOCYTES # BLD AUTO: 0.01 K/UL (ref 0–0.04)
IMM GRANULOCYTES NFR BLD AUTO: 0.2 % (ref 0–0.5)
INR PPP: 2 (ref 0.8–1.2)
LYMPHOCYTES # BLD AUTO: 2.4 K/UL (ref 1–4.8)
LYMPHOCYTES NFR BLD: 36.3 % (ref 18–48)
MCH RBC QN AUTO: 30.3 PG (ref 27–31)
MCHC RBC AUTO-ENTMCNC: 32 G/DL (ref 32–36)
MCV RBC AUTO: 95 FL (ref 82–98)
MONOCYTES # BLD AUTO: 0.7 K/UL (ref 0.3–1)
MONOCYTES NFR BLD: 10.4 % (ref 4–15)
NEUTROPHILS # BLD AUTO: 3.1 K/UL (ref 1.8–7.7)
NEUTROPHILS NFR BLD: 46 % (ref 38–73)
NRBC BLD-RTO: 0 /100 WBC
PLATELET # BLD AUTO: 191 K/UL (ref 150–450)
PMV BLD AUTO: 11.7 FL (ref 9.2–12.9)
POCT GLUCOSE: 110 MG/DL (ref 70–110)
POCT GLUCOSE: 112 MG/DL (ref 70–110)
POCT GLUCOSE: 117 MG/DL (ref 70–110)
POCT GLUCOSE: 82 MG/DL (ref 70–110)
POTASSIUM SERPL-SCNC: 3.5 MMOL/L (ref 3.5–5.1)
PROTHROMBIN TIME: 20.9 SEC (ref 9–12.5)
RBC # BLD AUTO: 4.26 M/UL (ref 4–5.4)
SODIUM SERPL-SCNC: 142 MMOL/L (ref 136–145)
WBC # BLD AUTO: 6.62 K/UL (ref 3.9–12.7)

## 2024-02-26 PROCEDURE — 85025 COMPLETE CBC W/AUTO DIFF WBC: CPT | Performed by: STUDENT IN AN ORGANIZED HEALTH CARE EDUCATION/TRAINING PROGRAM

## 2024-02-26 PROCEDURE — 25000003 PHARM REV CODE 250: Performed by: INTERNAL MEDICINE

## 2024-02-26 PROCEDURE — 11000001 HC ACUTE MED/SURG PRIVATE ROOM

## 2024-02-26 PROCEDURE — 21400001 HC TELEMETRY ROOM

## 2024-02-26 PROCEDURE — 25000242 PHARM REV CODE 250 ALT 637 W/ HCPCS: Performed by: STUDENT IN AN ORGANIZED HEALTH CARE EDUCATION/TRAINING PROGRAM

## 2024-02-26 PROCEDURE — 63600175 PHARM REV CODE 636 W HCPCS: Performed by: INTERNAL MEDICINE

## 2024-02-26 PROCEDURE — 99232 SBSQ HOSP IP/OBS MODERATE 35: CPT | Mod: ,,, | Performed by: PHYSICIAN ASSISTANT

## 2024-02-26 PROCEDURE — 80048 BASIC METABOLIC PNL TOTAL CA: CPT | Performed by: STUDENT IN AN ORGANIZED HEALTH CARE EDUCATION/TRAINING PROGRAM

## 2024-02-26 PROCEDURE — 27000221 HC OXYGEN, UP TO 24 HOURS

## 2024-02-26 PROCEDURE — 94760 N-INVAS EAR/PLS OXIMETRY 1: CPT

## 2024-02-26 PROCEDURE — 85610 PROTHROMBIN TIME: CPT | Performed by: STUDENT IN AN ORGANIZED HEALTH CARE EDUCATION/TRAINING PROGRAM

## 2024-02-26 PROCEDURE — 25000003 PHARM REV CODE 250: Performed by: STUDENT IN AN ORGANIZED HEALTH CARE EDUCATION/TRAINING PROGRAM

## 2024-02-26 PROCEDURE — 36415 COLL VENOUS BLD VENIPUNCTURE: CPT | Performed by: STUDENT IN AN ORGANIZED HEALTH CARE EDUCATION/TRAINING PROGRAM

## 2024-02-26 RX ORDER — POTASSIUM CHLORIDE 20 MEQ/1
20 TABLET, EXTENDED RELEASE ORAL 2 TIMES DAILY
Status: DISCONTINUED | OUTPATIENT
Start: 2024-02-26 | End: 2024-02-28 | Stop reason: HOSPADM

## 2024-02-26 RX ADMIN — POTASSIUM CHLORIDE 20 MEQ: 1500 TABLET, EXTENDED RELEASE ORAL at 08:02

## 2024-02-26 RX ADMIN — MICONAZOLE NITRATE: 20 OINTMENT TOPICAL at 08:02

## 2024-02-26 RX ADMIN — GUAIFENESIN 1200 MG: 600 TABLET, EXTENDED RELEASE ORAL at 08:02

## 2024-02-26 RX ADMIN — WARFARIN SODIUM 2.5 MG: 2.5 TABLET ORAL at 07:02

## 2024-02-26 RX ADMIN — FUROSEMIDE 40 MG: 10 INJECTION, SOLUTION INTRAMUSCULAR; INTRAVENOUS at 10:02

## 2024-02-26 RX ADMIN — CETIRIZINE HYDROCHLORIDE 5 MG: 5 TABLET ORAL at 10:02

## 2024-02-26 RX ADMIN — GUAIFENESIN 1200 MG: 600 TABLET, EXTENDED RELEASE ORAL at 10:02

## 2024-02-26 RX ADMIN — FLUTICASONE PROPIONATE 100 MCG: 50 SPRAY, METERED NASAL at 10:02

## 2024-02-26 NOTE — PROGRESS NOTES
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 The patient states she previously uses home oxygen intermittently but has needed it daily for the past weeks due to worsening shortness of breath. She also reports being hypotensive during the same time period. Symptoms are constant and moderate in severity, with associated symptoms including cough but no chest pain, fever, nausea, vomiting, or any other symptoms at this time. No sick contacts.    Upon arrival in the ER, initial vital signs showed tachypnea with hypoxia, which seemed to improve with oxygen supplementation; the rest of the vitals were stable. Abnormal laboratory results included an estimated glomerular filtration rate of 45, indicating reduced kidney function; an elevated Troponin I level at 0.143, a BNP level of 3,274, and a subtherapeutic INR of 1.6 (target range of 2.5 to 3.5 due to her mechanical heart valve). All other laboratory components were within normal limits. Imaging results revealed small bilateral pleural effusions on chest x-ray, but no other significant cardiopulmonary disease was appreciated.  Hospital Medicine was called for admission to manage her complex conditions  and provide further treatment and evaluation.    Overview/Hospital Course:  Serena is a 83 year old female who presented to ED for further evaluation of progressive dyspnea. Upon arrival patient noted to have mildly labored breathing with hypoxia and was placed on supplemental oxygen. BNP >3000. CXR showed bilateral pleural effusions. CTA negative for pulmonary embolism, but showed known ascending aortic aneurysm. Initial troponin 0.143 but is now trending down and felt to be related to demand ischemia from decompensated CHF. Echocardiogram shows an EF of 35%, global hypokinesis, and reduce right ventricle systolic function. Kidney function is stable. Cardiology is following.    2/24/24  Echocardiogram this admission shows a decline in EF 35% with global hypokinesis. Improving clinically. Cardiology recommends decreasing lasix to to QD. INR is not yet therapeutic. Cardiology will consider ORI this admission to assess mechanical AVR.     2/25/24  INR 2.7. stop Lovenox bridge discontinued. Patient is on 2L nasal cannula.    Pt weaned off O2 with continued diuresis, will check O2 sats with ambulation  Seen by cardiology for Mechanical valve dysfunction, will need ORI as outpatient for possible replacement.     Interval History:  Seen and examined at bedside still with tachypnea.  He has been weaned off oxygen at rest.  Continues diuresis.    Review of Systems   Constitutional:  Positive for fatigue. Negative for fever.   Respiratory:  Positive for shortness of breath. Negative for cough.    Cardiovascular:  Positive for leg swelling. Negative for chest pain.   Gastrointestinal:  Negative for nausea and vomiting.   All other systems reviewed and are negative.    Objective:     Vital Signs (Most Recent):  Temp: 97.5 °F (36.4 °C) (02/26/24 1648)  Pulse: 68 (02/26/24 1648)  Resp: 20 (02/26/24 1648)  BP: (!) 105/55 (02/26/24 1648)  SpO2: 95 % (02/26/24 1648) Vital Signs (24h Range):  Temp:  [97.5 °F (36.4 °C)-97.9 °F  (36.6 °C)] 97.5 °F (36.4 °C)  Pulse:  [62-82] 68  Resp:  [16-20] 20  SpO2:  [95 %-97 %] 95 %  BP: ()/(51-57) 105/55     Weight: 92.2 kg (203 lb 4.2 oz)  Body mass index is 34.89 kg/m².    Intake/Output Summary (Last 24 hours) at 2/26/2024 1702  Last data filed at 2/25/2024 1821  Gross per 24 hour   Intake 360 ml   Output --   Net 360 ml         Physical Exam  Vitals reviewed.   Constitutional:       Appearance: Normal appearance. She is obese. She is ill-appearing.   HENT:      Head: Normocephalic and atraumatic.      Mouth/Throat:      Mouth: Mucous membranes are moist.      Pharynx: Oropharynx is clear.   Eyes:      Extraocular Movements: Extraocular movements intact.      Conjunctiva/sclera: Conjunctivae normal.   Cardiovascular:      Rate and Rhythm: Normal rate and regular rhythm.      Pulses: Normal pulses.      Heart sounds: Murmur (metallic click, large murmur) heard.   Pulmonary:      Effort: Respiratory distress present.      Breath sounds: Normal breath sounds.   Abdominal:      General: Bowel sounds are normal.      Palpations: Abdomen is soft.   Musculoskeletal:         General: Normal range of motion.      Cervical back: Normal range of motion and neck supple.      Right lower leg: Edema present.      Left lower leg: Edema present.   Skin:     General: Skin is warm and dry.   Neurological:      General: No focal deficit present.      Mental Status: She is alert and oriented to person, place, and time. Mental status is at baseline.   Psychiatric:         Mood and Affect: Mood normal.         Behavior: Behavior normal.         Thought Content: Thought content normal.             Significant Labs: All pertinent labs within the past 24 hours have been reviewed.    CBC:   Recent Labs   Lab 02/25/24  0515 02/26/24  0706   WBC 7.97 6.62   HGB 13.5 12.9   HCT 42.2 40.3    191     CMP:   Recent Labs   Lab 02/25/24  0515 02/26/24  0706    142   K 3.3* 3.5    105   CO2 27 26   GLU 78 90    BUN 30* 26*   CREATININE 1.3 1.1   CALCIUM 8.8 8.3*   ANIONGAP 11 11       Significant Imaging: I have reviewed all pertinent imaging results/findings within the past 24 hours.    Assessment/Plan:      * Acute on chronic systolic heart failure  --Last ECHO from NOV 2023 confirm EF 60 %, Grade I diastolic dysfunction  --BNP and troponin elevated x1- will trend the latter  --trace/1+ edema on exam, small pleural effusions noted on CXR  --s/p IV 60 mg lasix dose in the ER  --will schedule IV Lasix 40 mg BID while hospitalized  --monitor electrolytes, UOP, repeat Echo  --strict I&O and daily weights ordered  --Cardiology consulted for AM evaluation    2/24/24  EF down to 35% with global hypokinesis.   She is improving clinicalyl. Lasix decreased to daily    Continue Lasix. Cardiology determined  ORI will be scheduled as outpatient      Acute on chronic respiratory failure with hypoxia  Patient with Hypoxic Respiratory failure which is Acute on chronic.  she is on home oxygen at 2-3 LPM. Signs/symptoms of respiratory failure include- tachypnea, increased work of breathing, and wheezing. Contributing diagnoses includes - CHF, COPD, Pneumonia, and Pulmonary Embolus.   --suspect CHF exacerbation as primary etiology, see plan above  --Dimer ordered; mildly elevated   --some trace swelling in b/l extremities, a chronically subtherapeutic INR  --CTA Chest negative for  PE  --home Coumadin therapy continued- pharmacy to dose  --low suspicion of PNA as CXR unremarkable  --COPD exacerbation also possible- supportive therapy, PRN nebs  --reassess in the AM    2/24/24  Currently on 2L nasal cannula-home settings.    Aortic aneurysm  Ascending aortic aneurysm measuring up to 4.5 cm.  No dissection.     Pacemaker  --orders for interrogation placed while in the ER        H/O mechanical aortic valve replacement  Possible complication could be contributing to CHF exacerbation presentation, will further investigate via CTA and echo.   On chart review, patient seems to have had a chronically subtherapeutic INR.  Target range has been documented to be between 2.5 and 3.5, however patients INR has been sustained below 2.0 on the last several blood draws.  Pharmacy consulted to assist in Coumadin dosing.    2/24/24  INR 2.1  Lovenox bridge complete. Echocardiogram shows mechanical valve with high velocities. Cardiology will plan to perform ORI as outpatient  Continue Lasix    Essential hypertension  --home medications include:  Metoprolol  --restart as tolerated   --PRN IV hydralazine 10mg Q6H for sBP > 175 mmHg  --Q4HVS    2/24/24  Soft BP but systolic >100 this evening. Monitor    2/25/24  Blood pressure soft 90-110s. Tolerating metoprolol and lasix.      VTE Risk Mitigation (From admission, onward)           Ordered     warfarin (COUMADIN) split tablet 1.25 mg  Once per day on Sun Tue Wed Thu Sat         02/25/24 1026     warfarin (COUMADIN) tablet 2.5 mg  Once per day on Mon Fri 02/23/24 1005     IP VTE HIGH RISK PATIENT  Once         02/22/24 2300     Place sequential compression device  Until discontinued         02/22/24 2300                    Discharge Planning   RENE:      Code Status: Full Code   Is the patient medically ready for discharge?:     Reason for patient still in hospital (select all that apply): Patient trending condition  Discharge Plan A: Home Health   Discharge Delays: None known at this time              June Boykin MD  Department of Hospital Medicine   [facility] - Med Surg

## 2024-02-26 NOTE — PROGRESS NOTES
"[facility]) nurse navigator notified of HFTC candidate in need of education and introduction to 4-6 week program.   Pt has been admitted to HFT[facility] in the past.      PT aao x 3 while lying in bed. Introduced self to pt as HFT[facility] nurse navigator.     Patient given "Home Care Guide for Heart Failure Patients" , "[facility]" flyer and "Daily weight and symptom tracker".  Encouraged pt to review information.      Reviewed the following key points of HFT[facility] program with pt and family:   1.) Take your medications as directed.    2.) Weight yourself daily   3.) Follow low salt and limited fluid diet.    4.) Stop smoking and start exercising   5.) Go to your appointments and call your team.      Pt reminded to follow Symptom tracker and to call at the onset of symptoms according to tracker.     Reviewed plan for follow up once discharged to include phone calls, in person and virtual visits to assist pt optimizing their heart failure medication regimen and encouraging healthy lifestyle modifications.  Reminded pt that program will assist them over the next 4-6 weeks and then patient will be transferred to long term care provider .  Reminded pt how to contact HFT[facility] navigator via phone and or via [employer].     Pt instructed appointment with EVGENY Jeffery will be printed on hospital discharge paperwork.     Pt also reminded HF nurse will call 48-72 hours after discharge to check on them.     PT verbalize read back of information given.  Encouraged pt and family to read over information often and contact team with any questions or concerns.      "

## 2024-02-26 NOTE — SUBJECTIVE & OBJECTIVE
Review of Systems   Constitutional: Positive for malaise/fatigue.   HENT: Negative.     Eyes: Negative.    Cardiovascular:  Positive for dyspnea on exertion.   Respiratory:  Positive for shortness of breath.    Endocrine: Negative.    Hematologic/Lymphatic: Bruises/bleeds easily.   Skin: Negative.    Musculoskeletal:  Positive for arthritis.   Gastrointestinal: Negative.    Genitourinary: Negative.    Neurological:  Positive for weakness.   Psychiatric/Behavioral:  Positive for memory loss.    Allergic/Immunologic: Negative.      Objective:     Vital Signs (Most Recent):  Temp: 97.7 °F (36.5 °C) (02/26/24 1142)  Pulse: 75 (02/26/24 1142)  Resp: 16 (02/26/24 1142)  BP: (!) 115/57 (02/26/24 1142)  SpO2: 97 % (02/26/24 1142) Vital Signs (24h Range):  Temp:  [97.5 °F (36.4 °C)-97.9 °F (36.6 °C)] 97.7 °F (36.5 °C)  Pulse:  [62-77] 75  Resp:  [16-20] 16  SpO2:  [95 %-98 %] 97 %  BP: ()/(51-57) 115/57     Weight: 92.2 kg (203 lb 4.2 oz)  Body mass index is 34.89 kg/m².     SpO2: 97 %         Intake/Output Summary (Last 24 hours) at 2/26/2024 1341  Last data filed at 2/25/2024 1821  Gross per 24 hour   Intake 360 ml   Output --   Net 360 ml       Lines/Drains/Airways       Peripheral Intravenous Line  Duration                  Peripheral IV - Single Lumen 02/25/24 1005 22 G Anterior;Right Shoulder 1 day                       Physical Exam  Vitals and nursing note reviewed.   Constitutional:       General: She is not in acute distress.     Appearance: She is well-developed. She is ill-appearing. She is not diaphoretic.      Comments: On supplemental O2 via NC   HENT:      Head: Normocephalic and atraumatic.   Eyes:      General:         Right eye: No discharge.         Left eye: No discharge.      Pupils: Pupils are equal, round, and reactive to light.   Cardiovascular:      Rate and Rhythm: Normal rate and regular rhythm.      Heart sounds: S1 normal and S2 normal. Murmur heard.      No gallop.      Comments:  "+metallic click  Pulmonary:      Effort: Pulmonary effort is normal. No respiratory distress.      Comments: Diminished at bases  Abdominal:      General: There is no distension.   Musculoskeletal:      Right lower leg: Edema present.      Left lower leg: Edema present.   Skin:     General: Skin is warm and dry.      Findings: No erythema.   Neurological:      Mental Status: She is alert.      Comments: Oriented to person/place, some mild confusion   Psychiatric:         Behavior: Behavior normal.            Significant Labs: CMP   Recent Labs   Lab 02/25/24  0515 02/26/24  0706    142   K 3.3* 3.5    105   CO2 27 26   GLU 78 90   BUN 30* 26*   CREATININE 1.3 1.1   CALCIUM 8.8 8.3*   ANIONGAP 11 11   , CBC   Recent Labs   Lab 02/25/24  0515 02/26/24  0706   WBC 7.97 6.62   HGB 13.5 12.9   HCT 42.2 40.3    191   , Troponin No results for input(s): "TROPONINI" in the last 48 hours., and All pertinent lab results from the last 24 hours have been reviewed.    Significant Imaging: Echocardiogram: Transthoracic echo (TTE) complete ([facility] Only):   Results for orders placed or performed during the hospital encounter of 02/22/24   Echo   Result Value Ref Range    BSA 2.03 m2    LVOT stroke volume 56.42 cm3    LVIDd 5.10 3.5 - 6.0 cm    LV Systolic Volume 90.21 mL    LV Systolic Volume Index 46.0 mL/m2    LVIDs 4.45 (A) 2.1 - 4.0 cm    LV Diastolic Volume 123.59 mL    LV Diastolic Volume Index 63.06 mL/m2    IVS 1.04 0.6 - 1.1 cm    LVOT diameter 1.91 cm    LVOT area 2.9 cm2    FS 13 (A) 28 - 44 %    Left Ventricle Relative Wall Thickness 0.47 cm    Posterior Wall 1.19 (A) 0.6 - 1.1 cm    LV mass 217.90 g    LV Mass Index 111 g/m2    MV Peak E Melo 1.07 m/s    TDI LATERAL 0.08 m/s    TDI SEPTAL 0.05 m/s    E/E' ratio 16.46 m/s    MV Peak A Melo 1.07 m/s    TR Max Melo 3.38 m/s    E/A ratio 1.00     IVRT 62.80 msec    E wave deceleration time 192.43 msec    LV SEPTAL E/E' RATIO 21.40 m/s    LV LATERAL E/E' " RATIO 13.38 m/s    LVOT peak melo 0.83 m/s    Left Ventricular Outflow Tract Mean Velocity 0.58 cm/s    Left Ventricular Outflow Tract Mean Gradient 1.51 mmHg    RV mid diameter 3.15 cm    RVOT peak VTI 9.3 cm    TAPSE 2.05 cm    LA size 3.61 cm    Left Atrium Minor Axis 3.98 cm    Left Atrium Major Axis 6.58 cm    RA Major Axis 4.53 cm    AV regurgitation pressure 1/2 time [id number] ms    AR Max Melo 4.34 m/s    AV mean gradient 95 mmHg    AV peak gradient 97 mmHg    Ao peak melo 4.93 m/s    Ao .80 cm    LVOT peak VTI 19.70 cm    AV valve area 0.37 cm²    AV Velocity Ratio 0.17     AV index (prosthetic) 0.13     AAMIR by Velocity Ratio 0.48 cm²    MV stenosis pressure 1/2 time 55.80 ms    MV valve area p 1/2 method 3.94 cm2    Triscuspid Valve Regurgitation Peak Gradient 46 mmHg    PV mean gradient 1 mmHg    RVOT peak melo 0.49 m/s    Ao root annulus 2.98 cm    STJ 4.51 cm    Ascending aorta 4.45 cm    IVC diameter 1.15 cm    Mean e' 0.07 m/s    ZLVIDS 2.01     ZLVIDD -0.95     LA Volume Index 34.9 mL/m2    LA volume 68.49 cm3    LA WIDTH 4.5 cm    RA Width 4.4 cm    TV resting pulmonary artery pressure 49 mmHg    RV TB RVSP 6 mmHg    Est. RA pres 3 mmHg    EF 35 %    Narrative      Left Ventricle: The left ventricle is normal in size. Normal wall   thickness. There is concentric hypertrophy. Global hypokinesis present.   There is moderately reduced systolic function. Ejection fraction by visual   approximation is 35%.    Right Ventricle: Normal right ventricular cavity size. Systolic   function is borderline low.    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 There is moderate aortic regurgitation.    Consider ORI if clinically needed to evaluate mechanical aortic valve   further.    Mitral Valve: There is mild bileaflet sclerosis. There is mild   regurgitation.    Pulmonary Artery: There is moderate pulmonary hypertension. The   estimated pulmonary  artery systolic pressure is 49 mmHg.    IVC/SVC: Normal venous pressure at 3 mmHg.    Ascending aorta is moderately dilated measuring 4.45 cm.     , EKG: Reviewed, and X-Ray: CXR: X-Ray Chest 1 View (CXR): No results found for this visit on 02/22/24. and X-Ray Chest PA and Lateral (CXR): No results found for this visit on 02/22/24.

## 2024-02-26 NOTE — SUBJECTIVE & OBJECTIVE
Interval History:  Seen and examined at bedside still with tachypnea.  He has been weaned off oxygen at rest.  Continues diuresis.    Review of Systems   Constitutional:  Positive for fatigue. Negative for fever.   Respiratory:  Positive for shortness of breath. Negative for cough.    Cardiovascular:  Positive for leg swelling. Negative for chest pain.   Gastrointestinal:  Negative for nausea and vomiting.   All other systems reviewed and are negative.    Objective:     Vital Signs (Most Recent):  Temp: 97.5 °F (36.4 °C) (02/26/24 1648)  Pulse: 68 (02/26/24 1648)  Resp: 20 (02/26/24 1648)  BP: (!) 105/55 (02/26/24 1648)  SpO2: 95 % (02/26/24 1648) Vital Signs (24h Range):  Temp:  [97.5 °F (36.4 °C)-97.9 °F (36.6 °C)] 97.5 °F (36.4 °C)  Pulse:  [62-82] 68  Resp:  [16-20] 20  SpO2:  [95 %-97 %] 95 %  BP: ()/(51-57) 105/55     Weight: 92.2 kg (203 lb 4.2 oz)  Body mass index is 34.89 kg/m².    Intake/Output Summary (Last 24 hours) at 2/26/2024 1702  Last data filed at 2/25/2024 1821  Gross per 24 hour   Intake 360 ml   Output --   Net 360 ml         Physical Exam  Vitals reviewed.   Constitutional:       Appearance: Normal appearance. She is obese. She is ill-appearing.   HENT:      Head: Normocephalic and atraumatic.      Mouth/Throat:      Mouth: Mucous membranes are moist.      Pharynx: Oropharynx is clear.   Eyes:      Extraocular Movements: Extraocular movements intact.      Conjunctiva/sclera: Conjunctivae normal.   Cardiovascular:      Rate and Rhythm: Normal rate and regular rhythm.      Pulses: Normal pulses.      Heart sounds: Murmur (metallic click, large murmur) heard.   Pulmonary:      Effort: Respiratory distress present.      Breath sounds: Normal breath sounds.   Abdominal:      General: Bowel sounds are normal.      Palpations: Abdomen is soft.   Musculoskeletal:         General: Normal range of motion.      Cervical back: Normal range of motion and neck supple.      Right lower leg: Edema  present.      Left lower leg: Edema present.   Skin:     General: Skin is warm and dry.   Neurological:      General: No focal deficit present.      Mental Status: She is alert and oriented to person, place, and time. Mental status is at baseline.   Psychiatric:         Mood and Affect: Mood normal.         Behavior: Behavior normal.         Thought Content: Thought content normal.             Significant Labs: All pertinent labs within the past 24 hours have been reviewed.    CBC:   Recent Labs   Lab 02/25/24  0515 02/26/24  0706   WBC 7.97 6.62   HGB 13.5 12.9   HCT 42.2 40.3    191     CMP:   Recent Labs   Lab 02/25/24  0515 02/26/24  0706    142   K 3.3* 3.5    105   CO2 27 26   GLU 78 90   BUN 30* 26*   CREATININE 1.3 1.1   CALCIUM 8.8 8.3*   ANIONGAP 11 11       Significant Imaging: I have reviewed all pertinent imaging results/findings within the past 24 hours.

## 2024-02-26 NOTE — PROGRESS NOTES
[facility]  Cardiology  Progress Note    Patient Name: Serena Post  MRN: [id number]  Admission Date: 2/22/2024  Hospital Length of Stay: 3 days  Code Status: Full Code   Attending Physician: June Acosta MD   Primary Care Physician: Evangelina Olivares MD  Expected Discharge Date:   Principal Problem:Acute on chronic systolic heart failure    Subjective:   HPI:  HPI obtained largely from chart and patient's son at bedside as she has underlying dementia     Ms. 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 Patient complained of increased SOB over the past three weeks to the point where she needed to use her supplemental oxygen continuously. She denied any associated CP, LE edema, fever, chills, palpitations, near syncope, or syncope. Initial workup revealed troponin of 0.143, INR of 1.6, and BNP > 3,000. CXR showed small bilateral pleural effusions and patient was subsequently admitted for further evaluation and treatment. Cardiology consulted to assist with management. Patient seen and examined today in ED with her son present. Still visibly SOB. Pleasantly confused. No CP symptoms. Son reports she is compliant with her medications. Watches her salt intake. Followed by CHF clinic and outside cardiologist, Dr. Perkins. Chart reviewed. Troponin flat, 0.143>0.125>0.107. EKG with paced rhythm with PVC's, noted to possibly have some brief runs of PAF while in ED. PPM interrogation ordered. Prior TTE 11/23 with normal EF, repeat pending. CTA negative for PE, suggestive of pulmonary HTN, ascending aorta aneurysm    Hospital Course:   2/24/24: Pt seen and examined this am. No acute sxs noted.  Dyspnea seems improved. No CP. Labs/chart reviewed.  INR now  2.1  echo EF 35%.  BP soft.    2/25/24: Pt seen and examined this am.  No acute CV issues noted.  Dyspnea seems improved but pt is not a good historian.  Chart/labs reviewed.    2/26/24-Patient seen and examined today, resting in bed. Seems to feel ok. SOB improving. Labs reviewed/stable. Will defer ORI to OP setting.        Review of Systems   Constitutional: Positive for malaise/fatigue.   HENT: Negative.     Eyes: Negative.    Cardiovascular:  Positive for dyspnea on exertion.   Respiratory:  Positive for shortness of breath.    Endocrine: Negative.    Hematologic/Lymphatic: Bruises/bleeds easily.   Skin: Negative.    Musculoskeletal:  Positive for arthritis.   Gastrointestinal: Negative.    Genitourinary: Negative.    Neurological:  Positive for weakness.   Psychiatric/Behavioral:  Positive for memory loss.    Allergic/Immunologic: Negative.      Objective:     Vital Signs (Most Recent):  Temp: 97.7 °F (36.5 °C) (02/26/24 1142)  Pulse: 75 (02/26/24 1142)  Resp: 16 (02/26/24 1142)  BP: (!) 115/57 (02/26/24 1142)  SpO2: 97 % (02/26/24 1142) Vital Signs (24h Range):  Temp:  [97.5 °F (36.4 °C)-97.9 °F (36.6 °C)] 97.7 °F (36.5 °C)  Pulse:  [62-77] 75  Resp:  [16-20] 16  SpO2:  [95 %-98 %] 97 %  BP: ()/(51-57) 115/57     Weight: 92.2 kg (203 lb 4.2 oz)  Body mass index is 34.89 kg/m².     SpO2: 97 %         Intake/Output Summary (Last 24 hours) at 2/26/2024 1341  Last data filed at 2/25/2024 1821  Gross per 24 hour   Intake 360 ml   Output --   Net 360 ml       Lines/Drains/Airways       Peripheral Intravenous Line  Duration                  Peripheral IV - Single Lumen 02/25/24 1005 22 G Anterior;Right Shoulder 1 day                       Physical Exam  Vitals and nursing note reviewed.   Constitutional:       General: She is not in acute distress.     Appearance: She is well-developed. She is ill-appearing. She is not diaphoretic.      Comments: On supplemental O2 via NC   HENT:      Head: Normocephalic and  "atraumatic.   Eyes:      General:         Right eye: No discharge.         Left eye: No discharge.      Pupils: Pupils are equal, round, and reactive to light.   Cardiovascular:      Rate and Rhythm: Normal rate and regular rhythm.      Heart sounds: S1 normal and S2 normal. Murmur heard.      No gallop.      Comments: +metallic click  Pulmonary:      Effort: Pulmonary effort is normal. No respiratory distress.      Comments: Diminished at bases  Abdominal:      General: There is no distension.   Musculoskeletal:      Right lower leg: Edema present.      Left lower leg: Edema present.   Skin:     General: Skin is warm and dry.      Findings: No erythema.   Neurological:      Mental Status: She is alert.      Comments: Oriented to person/place, some mild confusion   Psychiatric:         Behavior: Behavior normal.            Significant Labs: CMP   Recent Labs   Lab 02/25/24  0515 02/26/24  0706    142   K 3.3* 3.5    105   CO2 27 26   GLU 78 90   BUN 30* 26*   CREATININE 1.3 1.1   CALCIUM 8.8 8.3*   ANIONGAP 11 11   , CBC   Recent Labs   Lab 02/25/24  0515 02/26/24  0706   WBC 7.97 6.62   HGB 13.5 12.9   HCT 42.2 40.3    191   , Troponin No results for input(s): "TROPONINI" in the last 48 hours., and All pertinent lab results from the last 24 hours have been reviewed.    Significant Imaging: Echocardiogram: Transthoracic echo (TTE) complete (Cupid Only):   Results for orders placed or performed during the hospital encounter of 02/22/24   Echo   Result Value Ref Range    BSA 2.03 m2    LVOT stroke volume 56.42 cm3    LVIDd 5.10 3.5 - 6.0 cm    LV Systolic Volume 90.21 mL    LV Systolic Volume Index 46.0 mL/m2    LVIDs 4.45 (A) 2.1 - 4.0 cm    LV Diastolic Volume 123.59 mL    LV Diastolic Volume Index 63.06 mL/m2    IVS 1.04 0.6 - 1.1 cm    LVOT diameter 1.91 cm    LVOT area 2.9 cm2    FS 13 (A) 28 - 44 %    Left Ventricle Relative Wall Thickness 0.47 cm    Posterior Wall 1.19 (A) 0.6 - 1.1 cm    LV " mass 217.90 g    LV Mass Index 111 g/m2    MV Peak E Melo 1.07 m/s    TDI LATERAL 0.08 m/s    TDI SEPTAL 0.05 m/s    E/E' ratio 16.46 m/s    MV Peak A Melo 1.07 m/s    TR Max Melo 3.38 m/s    E/A ratio 1.00     IVRT 62.80 msec    E wave deceleration time 192.43 msec    LV SEPTAL E/E' RATIO 21.40 m/s    LV LATERAL E/E' RATIO 13.38 m/s    LVOT peak melo 0.83 m/s    Left Ventricular Outflow Tract Mean Velocity 0.58 cm/s    Left Ventricular Outflow Tract Mean Gradient 1.51 mmHg    RV mid diameter 3.15 cm    RVOT peak VTI 9.3 cm    TAPSE 2.05 cm    LA size 3.61 cm    Left Atrium Minor Axis 3.98 cm    Left Atrium Major Axis 6.58 cm    RA Major Axis 4.53 cm    AV regurgitation pressure 1/2 time 385.341050752766676 ms    AR Max Melo 4.34 m/s    AV mean gradient 95 mmHg    AV peak gradient 97 mmHg    Ao peak melo 4.93 m/s    Ao .80 cm    LVOT peak VTI 19.70 cm    AV valve area 0.37 cm²    AV Velocity Ratio 0.17     AV index (prosthetic) 0.13     AAMIR by Velocity Ratio 0.48 cm²    MV stenosis pressure 1/2 time 55.80 ms    MV valve area p 1/2 method 3.94 cm2    Triscuspid Valve Regurgitation Peak Gradient 46 mmHg    PV mean gradient 1 mmHg    RVOT peak melo 0.49 m/s    Ao root annulus 2.98 cm    STJ 4.51 cm    Ascending aorta 4.45 cm    IVC diameter 1.15 cm    Mean e' 0.07 m/s    ZLVIDS 2.01     ZLVIDD -0.95     LA Volume Index 34.9 mL/m2    LA volume 68.49 cm3    LA WIDTH 4.5 cm    RA Width 4.4 cm    TV resting pulmonary artery pressure 49 mmHg    RV TB RVSP 6 mmHg    Est. RA pres 3 mmHg    EF 35 %    Narrative      Left Ventricle: The left ventricle is normal in size. Normal wall   thickness. There is concentric hypertrophy. Global hypokinesis present.   There is moderately reduced systolic function. Ejection fraction by visual   approximation is 35%.    Right Ventricle: Normal right ventricular cavity size. Systolic   function is borderline low.    Aortic Valve: There is a mechanical valve in the aortic position with    elevated velocities noted. There is moderate aortic regurgitation.    Consider ORI if clinically needed to evaluate mechanical aortic valve   further.    Mitral Valve: There is mild bileaflet sclerosis. There is mild   regurgitation.    Pulmonary Artery: There is moderate pulmonary hypertension. The   estimated pulmonary artery systolic pressure is 49 mmHg.    IVC/SVC: Normal venous pressure at 3 mmHg.    Ascending aorta is moderately dilated measuring 4.45 cm.     , EKG: Reviewed, and X-Ray: CXR: X-Ray Chest 1 View (CXR): No results found for this visit on 02/22/24. and X-Ray Chest PA and Lateral (CXR): No results found for this visit on 02/22/24.  Assessment and Plan:   Patient who presents with decompensated CHF. EF dropped, high gradient noted across aortic valve. Symptomatically improving, continue same mgmt. Repeat BNP in AM. Will defer ORI to OP setting with her primary cardiologist.     * Acute on chronic systolic heart failure  -BNP > 3,000  -TTE 11/23 with normal EF, repeat pending  -Continue IV diuresis as tolerated  -Continue home regimen/OMT  -Strict I's/O's    2/26/24  -EF dropped to 35%, high gradient noted across mechanical aortic valve  -Continue gentle IV diuresis  -Continue BB  -Repeat BNP in AM  -Will defer ORI to OP setting with primary cardiologist    Acute on chronic respiratory failure with hypoxia  -Assess response to IV diuresis  -CTA negative for PE    Aortic aneurysm  -Will need surveillance as OP    Pacemaker  -Interrogation pending, possible PAF runs    H/O mechanical aortic valve replacement  -Continue Coumadin, dosing as per pharmacy    Essential hypertension  -Continue home regimen as tolerated        VTE Risk Mitigation (From admission, onward)           Ordered     warfarin (COUMADIN) split tablet 1.25 mg  Once per day on Sun Tue Wed Thu Sat         02/25/24 1026     warfarin (COUMADIN) tablet 2.5 mg  Once per day on Mon Fri 02/23/24 1005     IP VTE HIGH RISK PATIENT   Once         02/22/24 2300     Place sequential compression device  Until discontinued         02/22/24 2300                    Sarah Santiago PA-C  Cardiology  [facility]

## 2024-02-26 NOTE — PLAN OF CARE
02/26/24 1536   Discharge Reassessment   Assessment Type Discharge Planning Reassessment   Did the patient's condition or plan change since previous assessment? No   Discharge Plan discussed with:   (Attending MD, Dr. Acosta)   Discharge Plan A Home Health   DME Needed Upon Discharge  none   Transition of Care Barriers None   Why the patient remains in the hospital Requires continued medical care   Post-Acute Status   Discharge Delays None known at this time

## 2024-02-26 NOTE — PROGRESS NOTES
Pharmacy Coumadin Consult Note     INR=2.0 (down from 2.7)   Hx of mechanical AVR, afib, DVT  Goal INR=2-3 (reduced per dr. Reynoso due to frequent falls)     Home dose: 2.5mg M, F and 1.25mg all other days   Dose held yesterday so resume home dosing today    Daily INR ordered  Pharmacy consulted to dose  Patient needs to be educated   Dylan Nelson D 2/26/2024 10:01 AM

## 2024-02-26 NOTE — ASSESSMENT & PLAN NOTE
Patient with Hypoxic Respiratory failure which is Acute on chronic.  she is on home oxygen at 2-3 LPM. Signs/symptoms of respiratory failure include- tachypnea, increased work of breathing, and wheezing. Contributing diagnoses includes - CHF, COPD, Pneumonia, and Pulmonary Embolus.   --suspect CHF exacerbation as primary etiology, see plan above  --Dimer ordered; mildly elevated   --some trace swelling in b/l extremities, a chronically subtherapeutic INR  --CTA Chest negative for  PE  --home Coumadin therapy continued- pharmacy to dose  --low suspicion of PNA as CXR unremarkable  --COPD exacerbation also possible- supportive therapy, PRN nebs  --reassess in the AM    2/24/24  Currently on 2L nasal cannula-home settings.

## 2024-02-26 NOTE — PLAN OF CARE
Problem: Adult Inpatient Plan of Care  Goal: Plan of Care Review  Outcome: Ongoing, Progressing  Goal: Patient-Specific Goal (Individualized)  Outcome: Ongoing, Progressing  Goal: Absence of Hospital-Acquired Illness or Injury  Outcome: Ongoing, Progressing  Goal: Optimal Comfort and Wellbeing  Outcome: Ongoing, Progressing  Goal: Readiness for Transition of Care  Outcome: Ongoing, Progressing     Problem: Impaired Wound Healing  Goal: Optimal Wound Healing  Outcome: Ongoing, Progressing     Problem: Pain Acute  Goal: Acceptable Pain Control and Functional Ability  Outcome: Ongoing, Progressing     Problem: Fall Injury Risk  Goal: Absence of Fall and Fall-Related Injury  Outcome: Ongoing, Progressing     Problem: Infection  Goal: Absence of Infection Signs and Symptoms  Outcome: Ongoing, Progressing     Problem: Fatigue  Goal: Improved Activity Tolerance  Outcome: Ongoing, Progressing

## 2024-02-26 NOTE — ASSESSMENT & PLAN NOTE
--Last ECHO from NOV 2023 confirm EF 60 %, Grade I diastolic dysfunction  --BNP and troponin elevated x1- will trend the latter  --trace/1+ edema on exam, small pleural effusions noted on CXR  --s/p IV 60 mg lasix dose in the ER  --will schedule IV Lasix 40 mg BID while hospitalized  --monitor electrolytes, UOP, repeat Echo  --strict I&O and daily weights ordered  --Cardiology consulted for AM evaluation    2/24/24  EF down to 35% with global hypokinesis.   She is improving clinicalyl. Lasix decreased to daily    Continue Lasix. Cardiology determined  ORI will be scheduled as outpatient

## 2024-02-26 NOTE — ASSESSMENT & PLAN NOTE
Possible complication could be contributing to CHF exacerbation presentation, will further investigate via CTA and echo.  On chart review, patient seems to have had a chronically subtherapeutic INR.  Target range has been documented to be between 2.5 and 3.5, however patients INR has been sustained below 2.0 on the last several blood draws.  Pharmacy consulted to assist in Coumadin dosing.    2/24/24  INR 2.1  Lovenox bridge complete. Echocardiogram shows mechanical valve with high velocities. Cardiology will plan to perform ORI as outpatient  Continue Lasix

## 2024-02-26 NOTE — ASSESSMENT & PLAN NOTE
-BNP > 3,000  -TTE 11/23 with normal EF, repeat pending  -Continue IV diuresis as tolerated  -Continue home regimen/OMT  -Strict I's/O's    2/26/24  -EF dropped to 35%, high gradient noted across mechanical aortic valve  -Continue gentle IV diuresis  -Continue BB  -Repeat BNP in AM  -Will defer ORI to OP setting with primary cardiologist

## 2024-02-27 LAB
ANION GAP SERPL CALC-SCNC: 10 MMOL/L (ref 8–16)
BNP SERPL-MCNC: 1482 PG/ML (ref 0–99)
BUN SERPL-MCNC: 24 MG/DL (ref 8–23)
CALCIUM SERPL-MCNC: 8.9 MG/DL (ref 8.7–10.5)
CHLORIDE SERPL-SCNC: 104 MMOL/L (ref 95–110)
CO2 SERPL-SCNC: 27 MMOL/L (ref 23–29)
CREAT SERPL-MCNC: 1 MG/DL (ref 0.5–1.4)
EST. GFR  (NO RACE VARIABLE): 57 ML/MIN/1.73 M^2
GLUCOSE SERPL-MCNC: 91 MG/DL (ref 70–110)
INR PPP: 1.7 (ref 0.8–1.2)
MAGNESIUM SERPL-MCNC: 1.9 MG/DL (ref 1.6–2.6)
POCT GLUCOSE: 105 MG/DL (ref 70–110)
POCT GLUCOSE: 106 MG/DL (ref 70–110)
POCT GLUCOSE: 111 MG/DL (ref 70–110)
POTASSIUM SERPL-SCNC: 3.6 MMOL/L (ref 3.5–5.1)
PROTHROMBIN TIME: 17.6 SEC (ref 9–12.5)
SODIUM SERPL-SCNC: 141 MMOL/L (ref 136–145)

## 2024-02-27 PROCEDURE — 36415 COLL VENOUS BLD VENIPUNCTURE: CPT | Mod: XB | Performed by: INTERNAL MEDICINE

## 2024-02-27 PROCEDURE — 85610 PROTHROMBIN TIME: CPT | Performed by: STUDENT IN AN ORGANIZED HEALTH CARE EDUCATION/TRAINING PROGRAM

## 2024-02-27 PROCEDURE — 94618 PULMONARY STRESS TESTING: CPT

## 2024-02-27 PROCEDURE — 83735 ASSAY OF MAGNESIUM: CPT | Performed by: INTERNAL MEDICINE

## 2024-02-27 PROCEDURE — 25000003 PHARM REV CODE 250: Performed by: INTERNAL MEDICINE

## 2024-02-27 PROCEDURE — 99900035 HC TECH TIME PER 15 MIN (STAT)

## 2024-02-27 PROCEDURE — 21400001 HC TELEMETRY ROOM

## 2024-02-27 PROCEDURE — 80048 BASIC METABOLIC PNL TOTAL CA: CPT | Performed by: STUDENT IN AN ORGANIZED HEALTH CARE EDUCATION/TRAINING PROGRAM

## 2024-02-27 PROCEDURE — 11000001 HC ACUTE MED/SURG PRIVATE ROOM

## 2024-02-27 PROCEDURE — 99232 SBSQ HOSP IP/OBS MODERATE 35: CPT | Mod: ,,, | Performed by: PHYSICIAN ASSISTANT

## 2024-02-27 PROCEDURE — 97163 PT EVAL HIGH COMPLEX 45 MIN: CPT

## 2024-02-27 PROCEDURE — 63600175 PHARM REV CODE 636 W HCPCS: Performed by: INTERNAL MEDICINE

## 2024-02-27 PROCEDURE — 83880 ASSAY OF NATRIURETIC PEPTIDE: CPT | Performed by: INTERNAL MEDICINE

## 2024-02-27 PROCEDURE — 36415 COLL VENOUS BLD VENIPUNCTURE: CPT | Performed by: STUDENT IN AN ORGANIZED HEALTH CARE EDUCATION/TRAINING PROGRAM

## 2024-02-27 PROCEDURE — 25000003 PHARM REV CODE 250: Performed by: STUDENT IN AN ORGANIZED HEALTH CARE EDUCATION/TRAINING PROGRAM

## 2024-02-27 PROCEDURE — 97166 OT EVAL MOD COMPLEX 45 MIN: CPT

## 2024-02-27 PROCEDURE — 97530 THERAPEUTIC ACTIVITIES: CPT

## 2024-02-27 RX ORDER — LANOLIN ALCOHOL/MO/W.PET/CERES
400 CREAM (GRAM) TOPICAL DAILY
Status: DISCONTINUED | OUTPATIENT
Start: 2024-02-27 | End: 2024-02-28 | Stop reason: HOSPADM

## 2024-02-27 RX ADMIN — POTASSIUM CHLORIDE 20 MEQ: 1500 TABLET, EXTENDED RELEASE ORAL at 08:02

## 2024-02-27 RX ADMIN — MICONAZOLE NITRATE: 20 OINTMENT TOPICAL at 08:02

## 2024-02-27 RX ADMIN — FLUTICASONE PROPIONATE 100 MCG: 50 SPRAY, METERED NASAL at 09:02

## 2024-02-27 RX ADMIN — FUROSEMIDE 40 MG: 10 INJECTION, SOLUTION INTRAMUSCULAR; INTRAVENOUS at 09:02

## 2024-02-27 RX ADMIN — Medication 400 MG: at 09:02

## 2024-02-27 RX ADMIN — CETIRIZINE HYDROCHLORIDE 5 MG: 5 TABLET ORAL at 09:02

## 2024-02-27 RX ADMIN — GUAIFENESIN 1200 MG: 600 TABLET, EXTENDED RELEASE ORAL at 08:02

## 2024-02-27 RX ADMIN — POTASSIUM CHLORIDE 20 MEQ: 1500 TABLET, EXTENDED RELEASE ORAL at 09:02

## 2024-02-27 RX ADMIN — GUAIFENESIN 1200 MG: 600 TABLET, EXTENDED RELEASE ORAL at 09:02

## 2024-02-27 RX ADMIN — WARFARIN SODIUM 1.25 MG: 2.5 TABLET ORAL at 05:02

## 2024-02-27 NOTE — PROGRESS NOTES
Clinical Pharmacy Progress Note: Coumadin Dosing and Monitoring     Indication: Hx of mechanical AVR, afib, DVT   Goal INR: 2-3    Lab Results   Component Value Date    INR 1.7 (H) 02/27/2024    INR 2.0 (H) 02/26/2024    INR 2.7 (H) 02/25/2024     Patient has not yet been educated by pharmacist this admission.     Recent dosing history:   2/22/24: Warfarin 1 mg given at 0027 on 2/23/24. INR = 1.6  2/23/24: Warfarin 4 mg booster dose given. INR = 1.4  2/24/24: Warfarin 0.5 mg reduced dose given. INR = 2.1  2/25/24: Warfarin HELD. INR = 2.7  2/26/24: Warfarin 2.5 mg given. INR = 2    Home dosing regimen: Warfarin 1.25 mg daily except for Warfarin 2.5 mg every Monday and Friday.     Drug interactions: melatonin, acetaminophen and tramadol each may increase the risk of bleeding while on warfarin    Lab order for daily PT/INR? Yes  Coumadin diet ordered? No - unable to modify current diet orders.     Bridge? Not yet recommended at this time. INR is expected to increase with continued dosing. Consider bridging if INR remains subtherapeutic tomorrow.     Plan:   - Give warfarin 1.25 mg today. INR is sub-therapeutic today, likely from dose held on 2/25/24.   - Pharmacy will continue to monitor daily PT/INR. Dose adjustments will be made accordingly.      Thank you for allowing us to participate in this patient's care.      Jyoti Nieves PharmD 02/27/2024 12:15 PM

## 2024-02-27 NOTE — PROGRESS NOTES
Clinical Pharmacy Progress Note: Medication Education     Pharmacist educated patient/caregiver on warfarin indication, side effects, and drug interactions. Pharmacist discussed importance of medication compliance and INR monitoring and reviewed signs of abnormal bleeding. Pharmacist gave patient/caregiver warfarin educational handout. Instructed patient/caregiver to contact the provider who manages her Warfarin dosing and monitors labs for follow-up after discharge. Patient/caregiver expressed understanding and had no further questions.    Thank you for allowing us to participate in this patient's care.     Jyoti Nieves 2/27/2024 3:26 PM

## 2024-02-27 NOTE — ASSESSMENT & PLAN NOTE
-BNP > 3,000  -TTE 11/23 with normal EF, repeat pending  -Continue IV diuresis as tolerated  -Continue home regimen/OMT  -Strict I's/O's    2/26/24  -EF dropped to 35%, high gradient noted across mechanical aortic valve  -Continue gentle IV diuresis  -Continue BB  -Repeat BNP in AM  -Will defer ORI to OP setting with primary cardiologist    2/27/24  -BNP improved  -Can transition to 40 mg po Lasix beginning tmw with extra dose prn  -Continue BB  -No ARB/ARBI given soft BP  -Needs close f/u with primary cardiologist

## 2024-02-27 NOTE — PLAN OF CARE
Discussed poc with pt, pt verbalized understanding    Purposeful rounding every 2hours    VS wnl  Cardiac monitoring in use, pt is NSR, tele monitor # [id number]  Blood glucose monitoring   Fall precautions in place, remains injury free  Pt denies c/o pain or discomfort at this time and will continue to be monitored.  Pain and nausea under control with PRN meds    IVFs  Accurate I&Os  Abx given as prescribed  Bed locked at lowest position  Call light within reach    Chart check complete  Will cont with POC

## 2024-02-27 NOTE — PT/OT/SLP EVAL
Occupational Therapy   Evaluation and Discharge Note    Name: Serena Post  MRN: [id number]  Admitting Diagnosis: Acute on chronic systolic heart failure  Recent Surgery: * No surgery found *      Recommendations:     Discharge Recommendations: No Therapy Indicated  Discharge Equipment Recommendations: lift device  Barriers to discharge:       Assessment:     Serena Post is a 83 y.o. female with a medical diagnosis of Acute on chronic systolic heart failure. At this time, patient is functioning at their prior level of function and does not require further acute OT services.     Plan:     During this hospitalization, patient does not require further acute OT services.  Please re-consult if situation changes.    Plan of Care Reviewed with: patient    Subjective     Chief Complaint: debility and weakness  Patient/Family Comments/goals:     Occupational Profile:  Living Environment:   Previous level of function: total a  with adl's and functional mobility  Roles and Routines: return to plof  Equipment Used at home:    Assistance upon Discharge:     Pain/Comfort:  Pain Rating 1: 10/10 (during movement)    Patients cultural, spiritual, Gnosticist conflicts given the current situation:      Objective:     Communicated with: nurse and epic chart review prior to session.  Patient found HOB elevated with peripheral IV upon OT entry to room.    General Precautions: Standard, fall  Orthopedic Precautions: N/A  Braces: N/A  Respiratory Status: Room air     Occupational Performance:    Bed Mobility:    Patient completed Rolling/Turning to Left with  maximal assistance  Patient completed Rolling/Turning to Right with maximal assistance  Patient completed Scooting/Bridging with total assistance  Patient completed Supine to Sit with maximal assistance  Patient completed Sit to Supine with maximal assistance    Activities of Daily Living:  Grooming: total assistance .  Upper Body Dressing: maximal assistance .  Lower  Body Dressing: total assistance .  Toileting: total assistance .    Cognitive/Visual Perceptual:  Cognitive/Psychosocial Skills:     -       Oriented to: Person   -       Follows Commands/attention:Inattentive  -       Communication: expressive aphasia and receptive aphasia  -       Memory: Impaired STM, Impaired LTM, and Poor immediate recall  -       Safety awareness/insight to disability: impaired     Physical Exam:  Upper Extremity Range of Motion:     -       Right Upper Extremity: aarom wfl  -       Left Upper Extremity: aarom wfl    AMPAC 6 Click ADL:  AMPAC Total Score: 8    Treatment & Education:  Pt currently reside with son with 24 hour care. No functional gains anticipated therefore pt discharged from [facility]    Patient left HOB elevated with all lines intact, call button in reach, bed alarm on, and nurse notified    GOALS:   Multidisciplinary Problems       Occupational Therapy Goals       Not on file                    History:     Past Medical History:   Diagnosis Date    A-fib     Anticoagulant long-term use     Aortic valve disease     Cancer     brain tumor, 10 years ago    Cardiomyopathy     CHF (congestive heart failure)     COVID-19 7/23/2022    Hx of heart valve replacement with mechanical valve     Hyperlipidemia     Hypertension     Pacemaker     Pacemaker     Sepsis 7/23/2022    Stroke     2007, residual weakness         Past Surgical History:   Procedure Laterality Date    AORTIC VALVE REPLACEMENT      CHOLECYSTECTOMY      CORONARY ARTERY BYPASS GRAFT      HYSTERECTOMY      INSERTION OF PACEMAKER      TONSILLECTOMY         Time Tracking:     OT Date of Treatment: 02/27/24  OT Start Time: 1055  OT Stop Time: 1120  OT Total Time (min): 25 min    Billable Minutes:Evaluation 15 minutes  Therapeutic Activity 10 minutes    2/27/2024

## 2024-02-27 NOTE — CARE UPDATE
Home Oxygen Evaluation - [facility] - Cardiopulmonary Department      Date Performed: 2/27/2024      1) Patient's Home O2 Sat on room air, while at rest: Room Air SpO2 At Rest: 95 %        If O2 sats on room air at rest are 88% or below, patient qualifies.  Document O2 liter flow needed in Step 2.  If O2 sats are 89% or above, complete Step 3.        2)  If patient is not ambulated and O2 sats are <88%, what is the O2 liter flow required to meet ordered saturation?      If O2 sats on room air while exercising remain 89% or above patient does not qualify, no further testing needed Document N/A in step 3. If O2 sats on room air while exercising are 88% or below, continue to Step 4.    3) Patient's O2 Sat on room air while exercising: Room Air SpO2 During Ambulation: 93 % (pt working at bedside with PT)        4) Patient's O2 Sat while exercising on O2:   at           (Must show improvement from #4 for patients to qualify)

## 2024-02-27 NOTE — PROGRESS NOTES
[facility]  Cardiology  Progress Note    Patient Name: Serena Post  MRN: [id number]  Admission Date: 2/22/2024  Hospital Length of Stay: 4 days  Code Status: Full Code   Attending Physician: June Acosta MD   Primary Care Physician: Evangelina Olivares MD  Expected Discharge Date:   Principal Problem:Acute on chronic systolic heart failure    Subjective:   HPI:  HPI obtained largely from chart and patient's son at bedside as she has underlying dementia     Ms. 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 Patient complained of increased SOB over the past three weeks to the point where she needed to use her supplemental oxygen continuously. She denied any associated CP, LE edema, fever, chills, palpitations, near syncope, or syncope. Initial workup revealed troponin of 0.143, INR of 1.6, and BNP > 3,000. CXR showed small bilateral pleural effusions and patient was subsequently admitted for further evaluation and treatment. Cardiology consulted to assist with management. Patient seen and examined today in ED with her son present. Still visibly SOB. Pleasantly confused. No CP symptoms. Son reports she is compliant with her medications. Watches her salt intake. Followed by CHF clinic and outside cardiologist, Dr. Perkins. Chart reviewed. Troponin flat, 0.143>0.125>0.107. EKG with paced rhythm with PVC's, noted to possibly have some brief runs of PAF while in ED. PPM interrogation ordered. Prior TTE 11/23 with normal EF, repeat pending. CTA negative for PE, suggestive of pulmonary HTN, ascending aorta aneurysm    Hospital Course:   2/24/24: Pt seen and examined this am. No acute sxs noted.  Dyspnea seems improved. No CP. Labs/chart reviewed.  INR now  2.1  echo EF 35%.  BP soft.    2/25/24: Pt seen and examined this am.  No acute CV issues noted.  Dyspnea seems improved but pt is not a good historian.  Chart/labs reviewed.    2/26/24-Patient seen and examined today, resting in bed. Seems to feel ok. SOB improving. Labs reviewed/stable. Will defer ORI to OP setting.    2/27/24-Patient seen and examined today, lying in bed. Feels ok. Off supplemental oxygen. Labs reviewed. BNP trending down. Will need 40 mg of Lasix po upon d/c with extra dose prn. INR 1.7.        Review of Systems   Constitutional: Positive for malaise/fatigue.   HENT: Negative.     Eyes: Negative.    Cardiovascular:  Positive for dyspnea on exertion.   Respiratory:  Positive for shortness of breath (improved).    Endocrine: Negative.    Hematologic/Lymphatic: Negative.    Skin: Negative.    Musculoskeletal:  Positive for arthritis and joint pain.   Gastrointestinal: Negative.    Genitourinary: Negative.    Neurological: Negative.    Psychiatric/Behavioral:  Positive for memory loss.    Allergic/Immunologic: Negative.      Objective:     Vital Signs (Most Recent):  Temp: 97.6 °F (36.4 °C) (02/27/24 1135)  Pulse: 90 (02/27/24 1305)  Resp: 20 (02/27/24 1135)  BP: (!) 114/58 (02/27/24 1135)  SpO2: (!) 92 % (02/27/24 1135) Vital Signs (24h Range):  Temp:  [97.4 °F (36.3 °C)-97.7 °F (36.5 °C)] 97.6 °F (36.4 °C)  Pulse:  [64-97] 90  Resp:  [17-22] 20  SpO2:  [92 %-97 %] 92 %  BP: ()/(52-58) 114/58     Weight: 92.2 kg (203 lb 4.2 oz)  Body mass index is 34.89 kg/m².     SpO2: (!) 92 %         Intake/Output Summary (Last 24 hours) at 2/27/2024 1406  Last data filed at 2/26/2024 2058  Gross per 24 hour   Intake 180 ml   Output --   Net 180 ml       Lines/Drains/Airways       Peripheral Intravenous Line  Duration                  Peripheral IV - Single Lumen 02/25/24 1005 22 G Anterior;Right Shoulder 2 days                       Physical Exam  Vitals and nursing note reviewed.   Constitutional:        "General: She is not in acute distress.     Appearance: She is well-developed. She is ill-appearing. She is not diaphoretic.   HENT:      Head: Normocephalic and atraumatic.   Eyes:      General:         Right eye: No discharge.         Left eye: No discharge.      Pupils: Pupils are equal, round, and reactive to light.   Neck:      Thyroid: No thyromegaly.      Vascular: No JVD.      Trachea: No tracheal deviation.   Cardiovascular:      Rate and Rhythm: Normal rate and regular rhythm.      Heart sounds: S1 normal and S2 normal. Murmur heard.      Comments: Metallic click  Pulmonary:      Effort: Pulmonary effort is normal. No respiratory distress.      Breath sounds: Normal breath sounds. No wheezing.   Abdominal:      General: There is no distension.      Tenderness: There is no rebound.   Musculoskeletal:      Cervical back: Neck supple.      Right lower leg: No edema.      Left lower leg: No edema.   Skin:     General: Skin is warm and dry.      Findings: No erythema.   Neurological:      Mental Status: She is alert.      Comments: Oriented to person, mild confusion   Psychiatric:         Behavior: Behavior normal.            Significant Labs: CMP   Recent Labs   Lab 02/26/24  0706 02/27/24  0718    141   K 3.5 3.6    104   CO2 26 27   GLU 90 91   BUN 26* 24*   CREATININE 1.1 1.0   CALCIUM 8.3* 8.9   ANIONGAP 11 10   , CBC   Recent Labs   Lab 02/26/24  0706   WBC 6.62   HGB 12.9   HCT 40.3      , Troponin No results for input(s): "TROPONINI" in the last 48 hours., and All pertinent lab results from the last 24 hours have been reviewed.    Significant Imaging: Echocardiogram: Transthoracic echo (TTE) complete (Cupid Only):   Results for orders placed or performed during the hospital encounter of 02/22/24   Echo   Result Value Ref Range    BSA 2.03 m2    LVOT stroke volume 56.42 cm3    LVIDd 5.10 3.5 - 6.0 cm    LV Systolic Volume 90.21 mL    LV Systolic Volume Index 46.0 mL/m2    LVIDs 4.45 (A) " 2.1 - 4.0 cm    LV Diastolic Volume 123.59 mL    LV Diastolic Volume Index 63.06 mL/m2    IVS 1.04 0.6 - 1.1 cm    LVOT diameter 1.91 cm    LVOT area 2.9 cm2    FS 13 (A) 28 - 44 %    Left Ventricle Relative Wall Thickness 0.47 cm    Posterior Wall 1.19 (A) 0.6 - 1.1 cm    LV mass 217.90 g    LV Mass Index 111 g/m2    MV Peak E Melo 1.07 m/s    TDI LATERAL 0.08 m/s    TDI SEPTAL 0.05 m/s    E/E' ratio 16.46 m/s    MV Peak A Melo 1.07 m/s    TR Max Melo 3.38 m/s    E/A ratio 1.00     IVRT 62.80 msec    E wave deceleration time 192.43 msec    LV SEPTAL E/E' RATIO 21.40 m/s    LV LATERAL E/E' RATIO 13.38 m/s    LVOT peak melo 0.83 m/s    Left Ventricular Outflow Tract Mean Velocity 0.58 cm/s    Left Ventricular Outflow Tract Mean Gradient 1.51 mmHg    RV mid diameter 3.15 cm    RVOT peak VTI 9.3 cm    TAPSE 2.05 cm    LA size 3.61 cm    Left Atrium Minor Axis 3.98 cm    Left Atrium Major Axis 6.58 cm    RA Major Axis 4.53 cm    AV regurgitation pressure 1/2 time [id number] ms    AR Max Melo 4.34 m/s    AV mean gradient 95 mmHg    AV peak gradient 97 mmHg    Ao peak melo 4.93 m/s    Ao .80 cm    LVOT peak VTI 19.70 cm    AV valve area 0.37 cm²    AV Velocity Ratio 0.17     AV index (prosthetic) 0.13     AAMIR by Velocity Ratio 0.48 cm²    MV stenosis pressure 1/2 time 55.80 ms    MV valve area p 1/2 method 3.94 cm2    Triscuspid Valve Regurgitation Peak Gradient 46 mmHg    PV mean gradient 1 mmHg    RVOT peak melo 0.49 m/s    Ao root annulus 2.98 cm    STJ 4.51 cm    Ascending aorta 4.45 cm    IVC diameter 1.15 cm    Mean e' 0.07 m/s    ZLVIDS 2.01     ZLVIDD -0.95     LA Volume Index 34.9 mL/m2    LA volume 68.49 cm3    LA WIDTH 4.5 cm    RA Width 4.4 cm    TV resting pulmonary artery pressure 49 mmHg    RV TB RVSP 6 mmHg    Est. RA pres 3 mmHg    EF 35 %    Narrative      Left Ventricle: The left ventricle is normal in size. Normal wall   thickness. There is concentric hypertrophy. Global hypokinesis present.    There is moderately reduced systolic function. Ejection fraction by visual   approximation is 35%.    Right Ventricle: Normal right ventricular cavity size. Systolic   function is borderline low.    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 There is moderate aortic regurgitation.    Consider ORI if clinically needed to evaluate mechanical aortic valve   further.    Mitral Valve: There is mild bileaflet sclerosis. There is mild   regurgitation.    Pulmonary Artery: There is moderate pulmonary hypertension. The   estimated pulmonary artery systolic pressure is 49 mmHg.    IVC/SVC: Normal venous pressure at 3 mmHg.    Ascending aorta is moderately dilated measuring 4.45 cm.     , EKG: Reviewed, and X-Ray: CXR: X-Ray Chest 1 View (CXR): No results found for this visit on 02/22/24. and X-Ray Chest PA and Lateral (CXR): No results found for this visit on 02/22/24.  Assessment and Plan:   Patient who presents with SOB/CHF/drop in EF, elevated velocities cross mechanical aortic valve. Volume status improved. Med rec's below. Will need close f/u with primary cardiologist. INR sub-therapeutic today, pharmacy following.    * Acute on chronic systolic heart failure  -BNP > 3,000  -TTE 11/23 with normal EF, repeat pending  -Continue IV diuresis as tolerated  -Continue home regimen/OMT  -Strict I's/O's    2/26/24  -EF dropped to 35%, high gradient noted across mechanical aortic valve  -Continue gentle IV diuresis  -Continue BB  -Repeat BNP in AM  -Will defer ORI to OP setting with primary cardiologist    2/27/24  -BNP improved  -Can transition to 40 mg po Lasix beginning tmw with extra dose prn  -Continue BB  -No ARB/ARBI given soft BP  -Needs close f/u with primary cardiologist    Acute on chronic respiratory failure with hypoxia  -Assess response to IV diuresis  -CTA negative for PE    Aortic aneurysm  -Will need surveillance as OP    Pacemaker  -Interrogation pending,  possible PAF runs    H/O mechanical aortic valve replacement  -Continue Coumadin, dosing as per pharmacy    Essential hypertension  -Continue home regimen as tolerated        VTE Risk Mitigation (From admission, onward)           Ordered     warfarin (COUMADIN) split tablet 1.25 mg  Once per day on Sun Tue Wed Thu Sat         02/25/24 1026     warfarin (COUMADIN) tablet 2.5 mg  Once per day on Mon Fri 02/23/24 1005     IP VTE HIGH RISK PATIENT  Once         02/22/24 2300     Place sequential compression device  Until discontinued         02/22/24 2300                    Sarah Santiago PA-C  Cardiology  [facility]

## 2024-02-27 NOTE — SUBJECTIVE & OBJECTIVE
Review of Systems   Constitutional: Positive for malaise/fatigue.   HENT: Negative.     Eyes: Negative.    Cardiovascular:  Positive for dyspnea on exertion.   Respiratory:  Positive for shortness of breath (improved).    Endocrine: Negative.    Hematologic/Lymphatic: Negative.    Skin: Negative.    Musculoskeletal:  Positive for arthritis and joint pain.   Gastrointestinal: Negative.    Genitourinary: Negative.    Neurological: Negative.    Psychiatric/Behavioral:  Positive for memory loss.    Allergic/Immunologic: Negative.      Objective:     Vital Signs (Most Recent):  Temp: 97.6 °F (36.4 °C) (02/27/24 1135)  Pulse: 90 (02/27/24 1305)  Resp: 20 (02/27/24 1135)  BP: (!) 114/58 (02/27/24 1135)  SpO2: (!) 92 % (02/27/24 1135) Vital Signs (24h Range):  Temp:  [97.4 °F (36.3 °C)-97.7 °F (36.5 °C)] 97.6 °F (36.4 °C)  Pulse:  [64-97] 90  Resp:  [17-22] 20  SpO2:  [92 %-97 %] 92 %  BP: ()/(52-58) 114/58     Weight: 92.2 kg (203 lb 4.2 oz)  Body mass index is 34.89 kg/m².     SpO2: (!) 92 %         Intake/Output Summary (Last 24 hours) at 2/27/2024 1406  Last data filed at 2/26/2024 2058  Gross per 24 hour   Intake 180 ml   Output --   Net 180 ml       Lines/Drains/Airways       Peripheral Intravenous Line  Duration                  Peripheral IV - Single Lumen 02/25/24 1005 22 G Anterior;Right Shoulder 2 days                       Physical Exam  Vitals and nursing note reviewed.   Constitutional:       General: She is not in acute distress.     Appearance: She is well-developed. She is ill-appearing. She is not diaphoretic.   HENT:      Head: Normocephalic and atraumatic.   Eyes:      General:         Right eye: No discharge.         Left eye: No discharge.      Pupils: Pupils are equal, round, and reactive to light.   Neck:      Thyroid: No thyromegaly.      Vascular: No JVD.      Trachea: No tracheal deviation.   Cardiovascular:      Rate and Rhythm: Normal rate and regular rhythm.      Heart sounds: S1  "normal and S2 normal. Murmur heard.      Comments: Metallic click  Pulmonary:      Effort: Pulmonary effort is normal. No respiratory distress.      Breath sounds: Normal breath sounds. No wheezing.   Abdominal:      General: There is no distension.      Tenderness: There is no rebound.   Musculoskeletal:      Cervical back: Neck supple.      Right lower leg: No edema.      Left lower leg: No edema.   Skin:     General: Skin is warm and dry.      Findings: No erythema.   Neurological:      Mental Status: She is alert.      Comments: Oriented to person, mild confusion   Psychiatric:         Behavior: Behavior normal.            Significant Labs: CMP   Recent Labs   Lab 02/26/24  0706 02/27/24  0718    141   K 3.5 3.6    104   CO2 26 27   GLU 90 91   BUN 26* 24*   CREATININE 1.1 1.0   CALCIUM 8.3* 8.9   ANIONGAP 11 10   , CBC   Recent Labs   Lab 02/26/24  0706   WBC 6.62   HGB 12.9   HCT 40.3      , Troponin No results for input(s): "TROPONINI" in the last 48 hours., and All pertinent lab results from the last 24 hours have been reviewed.    Significant Imaging: Echocardiogram: Transthoracic echo (TTE) complete (Cupid Only):   Results for orders placed or performed during the hospital encounter of 02/22/24   Echo   Result Value Ref Range    BSA 2.03 m2    LVOT stroke volume 56.42 cm3    LVIDd 5.10 3.5 - 6.0 cm    LV Systolic Volume 90.21 mL    LV Systolic Volume Index 46.0 mL/m2    LVIDs 4.45 (A) 2.1 - 4.0 cm    LV Diastolic Volume 123.59 mL    LV Diastolic Volume Index 63.06 mL/m2    IVS 1.04 0.6 - 1.1 cm    LVOT diameter 1.91 cm    LVOT area 2.9 cm2    FS 13 (A) 28 - 44 %    Left Ventricle Relative Wall Thickness 0.47 cm    Posterior Wall 1.19 (A) 0.6 - 1.1 cm    LV mass 217.90 g    LV Mass Index 111 g/m2    MV Peak E Melo 1.07 m/s    TDI LATERAL 0.08 m/s    TDI SEPTAL 0.05 m/s    E/E' ratio 16.46 m/s    MV Peak A Melo 1.07 m/s    TR Max Melo 3.38 m/s    E/A ratio 1.00     IVRT 62.80 msec    E wave " deceleration time 192.43 msec    LV SEPTAL E/E' RATIO 21.40 m/s    LV LATERAL E/E' RATIO 13.38 m/s    LVOT peak melo 0.83 m/s    Left Ventricular Outflow Tract Mean Velocity 0.58 cm/s    Left Ventricular Outflow Tract Mean Gradient 1.51 mmHg    RV mid diameter 3.15 cm    RVOT peak VTI 9.3 cm    TAPSE 2.05 cm    LA size 3.61 cm    Left Atrium Minor Axis 3.98 cm    Left Atrium Major Axis 6.58 cm    RA Major Axis 4.53 cm    AV regurgitation pressure 1/2 time [id number] ms    AR Max Melo 4.34 m/s    AV mean gradient 95 mmHg    AV peak gradient 97 mmHg    Ao peak melo 4.93 m/s    Ao .80 cm    LVOT peak VTI 19.70 cm    AV valve area 0.37 cm²    AV Velocity Ratio 0.17     AV index (prosthetic) 0.13     AAMIR by Velocity Ratio 0.48 cm²    MV stenosis pressure 1/2 time 55.80 ms    MV valve area p 1/2 method 3.94 cm2    Triscuspid Valve Regurgitation Peak Gradient 46 mmHg    PV mean gradient 1 mmHg    RVOT peak melo 0.49 m/s    Ao root annulus 2.98 cm    STJ 4.51 cm    Ascending aorta 4.45 cm    IVC diameter 1.15 cm    Mean e' 0.07 m/s    ZLVIDS 2.01     ZLVIDD -0.95     LA Volume Index 34.9 mL/m2    LA volume 68.49 cm3    LA WIDTH 4.5 cm    RA Width 4.4 cm    TV resting pulmonary artery pressure 49 mmHg    RV TB RVSP 6 mmHg    Est. RA pres 3 mmHg    EF 35 %    Narrative      Left Ventricle: The left ventricle is normal in size. Normal wall   thickness. There is concentric hypertrophy. Global hypokinesis present.   There is moderately reduced systolic function. Ejection fraction by visual   approximation is 35%.    Right Ventricle: Normal right ventricular cavity size. Systolic   function is borderline low.    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 There is moderate aortic regurgitation.    Consider ORI if clinically needed to evaluate mechanical aortic valve   further.    Mitral Valve: There is mild bileaflet sclerosis. There is mild   regurgitation.     Pulmonary Artery: There is moderate pulmonary hypertension. The   estimated pulmonary artery systolic pressure is 49 mmHg.    IVC/SVC: Normal venous pressure at 3 mmHg.    Ascending aorta is moderately dilated measuring 4.45 cm.     , EKG: Reviewed, and X-Ray: CXR: X-Ray Chest 1 View (CXR): No results found for this visit on 02/22/24. and X-Ray Chest PA and Lateral (CXR): No results found for this visit on 02/22/24.

## 2024-02-28 ENCOUNTER — NURSE TRIAGE (OUTPATIENT)
Dept: ADMINISTRATIVE | Facility: CLINIC | Age: 84
End: 2024-02-28
Payer: MEDICARE

## 2024-02-28 VITALS
SYSTOLIC BLOOD PRESSURE: 86 MMHG | BODY MASS INDEX: 34.18 KG/M2 | RESPIRATION RATE: 19 BRPM | HEIGHT: 64 IN | WEIGHT: 200.19 LBS | DIASTOLIC BLOOD PRESSURE: 54 MMHG | HEART RATE: 84 BPM | OXYGEN SATURATION: 90 % | TEMPERATURE: 98 F

## 2024-02-28 LAB
ANION GAP SERPL CALC-SCNC: 10 MMOL/L (ref 8–16)
BUN SERPL-MCNC: 22 MG/DL (ref 8–23)
CALCIUM SERPL-MCNC: 9.3 MG/DL (ref 8.7–10.5)
CHLORIDE SERPL-SCNC: 105 MMOL/L (ref 95–110)
CO2 SERPL-SCNC: 27 MMOL/L (ref 23–29)
CREAT SERPL-MCNC: 1.1 MG/DL (ref 0.5–1.4)
EST. GFR  (NO RACE VARIABLE): 50 ML/MIN/1.73 M^2
GLUCOSE SERPL-MCNC: 89 MG/DL (ref 70–110)
INR PPP: 1.5 (ref 0.8–1.2)
POCT GLUCOSE: 123 MG/DL (ref 70–110)
POCT GLUCOSE: 87 MG/DL (ref 70–110)
POCT GLUCOSE: 93 MG/DL (ref 70–110)
POTASSIUM SERPL-SCNC: 4 MMOL/L (ref 3.5–5.1)
PROTHROMBIN TIME: 16.1 SEC (ref 9–12.5)
SODIUM SERPL-SCNC: 142 MMOL/L (ref 136–145)

## 2024-02-28 PROCEDURE — 80048 BASIC METABOLIC PNL TOTAL CA: CPT | Performed by: STUDENT IN AN ORGANIZED HEALTH CARE EDUCATION/TRAINING PROGRAM

## 2024-02-28 PROCEDURE — 25000003 PHARM REV CODE 250: Performed by: INTERNAL MEDICINE

## 2024-02-28 PROCEDURE — 63600175 PHARM REV CODE 636 W HCPCS: Performed by: INTERNAL MEDICINE

## 2024-02-28 PROCEDURE — 99232 SBSQ HOSP IP/OBS MODERATE 35: CPT | Mod: ,,, | Performed by: PHYSICIAN ASSISTANT

## 2024-02-28 PROCEDURE — 25000242 PHARM REV CODE 250 ALT 637 W/ HCPCS: Performed by: STUDENT IN AN ORGANIZED HEALTH CARE EDUCATION/TRAINING PROGRAM

## 2024-02-28 PROCEDURE — 63600175 PHARM REV CODE 636 W HCPCS: Performed by: PHYSICIAN ASSISTANT

## 2024-02-28 PROCEDURE — 85610 PROTHROMBIN TIME: CPT | Performed by: STUDENT IN AN ORGANIZED HEALTH CARE EDUCATION/TRAINING PROGRAM

## 2024-02-28 PROCEDURE — 94761 N-INVAS EAR/PLS OXIMETRY MLT: CPT

## 2024-02-28 PROCEDURE — 25000003 PHARM REV CODE 250: Performed by: STUDENT IN AN ORGANIZED HEALTH CARE EDUCATION/TRAINING PROGRAM

## 2024-02-28 PROCEDURE — 36415 COLL VENOUS BLD VENIPUNCTURE: CPT | Performed by: STUDENT IN AN ORGANIZED HEALTH CARE EDUCATION/TRAINING PROGRAM

## 2024-02-28 RX ORDER — FLUTICASONE PROPIONATE 50 MCG
2 SPRAY, SUSPENSION (ML) NASAL DAILY
Qty: 16 G | Refills: 0 | Status: SHIPPED | OUTPATIENT
Start: 2024-02-29

## 2024-02-28 RX ORDER — LANOLIN ALCOHOL/MO/W.PET/CERES
400 CREAM (GRAM) TOPICAL DAILY
Qty: 30 TABLET | Refills: 0 | Status: SHIPPED | OUTPATIENT
Start: 2024-02-29 | End: 2024-03-30

## 2024-02-28 RX ORDER — ENOXAPARIN SODIUM 100 MG/ML
1 INJECTION SUBCUTANEOUS EVERY 12 HOURS
Qty: 20 ML | Refills: 0 | Status: SHIPPED | OUTPATIENT
Start: 2024-02-29 | End: 2024-03-10

## 2024-02-28 RX ORDER — ENOXAPARIN SODIUM 100 MG/ML
1 INJECTION SUBCUTANEOUS EVERY 12 HOURS
Status: DISCONTINUED | OUTPATIENT
Start: 2024-02-28 | End: 2024-02-28 | Stop reason: HOSPADM

## 2024-02-28 RX ADMIN — GUAIFENESIN 1200 MG: 600 TABLET, EXTENDED RELEASE ORAL at 08:02

## 2024-02-28 RX ADMIN — FUROSEMIDE 40 MG: 10 INJECTION, SOLUTION INTRAMUSCULAR; INTRAVENOUS at 08:02

## 2024-02-28 RX ADMIN — FLUTICASONE PROPIONATE 100 MCG: 50 SPRAY, METERED NASAL at 09:02

## 2024-02-28 RX ADMIN — Medication 400 MG: at 08:02

## 2024-02-28 RX ADMIN — ENOXAPARIN SODIUM 90 MG: 100 INJECTION SUBCUTANEOUS at 01:02

## 2024-02-28 RX ADMIN — MICONAZOLE NITRATE: 20 OINTMENT TOPICAL at 09:02

## 2024-02-28 RX ADMIN — POTASSIUM CHLORIDE 20 MEQ: 1500 TABLET, EXTENDED RELEASE ORAL at 08:02

## 2024-02-28 RX ADMIN — CETIRIZINE HYDROCHLORIDE 5 MG: 5 TABLET ORAL at 08:02

## 2024-02-28 NOTE — SUBJECTIVE & OBJECTIVE
Interval History:  Patient seen and examined at bedside.  Discussed with Cardiology and patient's son.    Review of Systems   Constitutional:  Positive for fatigue. Negative for fever.   Respiratory:  Positive for shortness of breath. Negative for cough.    Cardiovascular:  Positive for leg swelling. Negative for chest pain.   Gastrointestinal:  Negative for nausea and vomiting.   All other systems reviewed and are negative.    Objective:     Vital Signs (Most Recent):  Temp: 98 °F (36.7 °C) (02/27/24 1612)  Pulse: 95 (02/27/24 1711)  Resp: 16 (02/27/24 1612)  BP: (!) 118/56 (02/27/24 1612)  SpO2: (!) 94 % (02/27/24 1612) Vital Signs (24h Range):  Temp:  [97.4 °F (36.3 °C)-98 °F (36.7 °C)] 98 °F (36.7 °C)  Pulse:  [66-97] 95  Resp:  [16-22] 16  SpO2:  [92 %-97 %] 94 %  BP: ()/(52-58) 118/56     Weight: 92.2 kg (203 lb 4.2 oz)  Body mass index is 34.89 kg/m².    Intake/Output Summary (Last 24 hours) at 2/27/2024 1842  Last data filed at 2/26/2024 2058  Gross per 24 hour   Intake 180 ml   Output --   Net 180 ml         Physical Exam  Vitals reviewed.   Constitutional:       Appearance: Normal appearance. She is obese. She is ill-appearing.   HENT:      Head: Normocephalic and atraumatic.      Mouth/Throat:      Mouth: Mucous membranes are moist.      Pharynx: Oropharynx is clear.   Eyes:      Extraocular Movements: Extraocular movements intact.      Conjunctiva/sclera: Conjunctivae normal.   Cardiovascular:      Rate and Rhythm: Normal rate and regular rhythm.      Pulses: Normal pulses.      Heart sounds: Murmur (metallic click, large murmur) heard.   Pulmonary:      Effort: Respiratory distress present.      Breath sounds: Normal breath sounds.   Abdominal:      General: Bowel sounds are normal.      Palpations: Abdomen is soft.   Musculoskeletal:         General: Normal range of motion.      Cervical back: Normal range of motion and neck supple.      Right lower leg: Edema present.      Left lower leg: Edema  present.   Skin:     General: Skin is warm and dry.   Neurological:      General: No focal deficit present.      Mental Status: She is alert and oriented to person, place, and time. Mental status is at baseline.   Psychiatric:         Mood and Affect: Mood normal.         Behavior: Behavior normal.         Thought Content: Thought content normal.             Significant Labs: All pertinent labs within the past 24 hours have been reviewed.  CBC:   Recent Labs   Lab 02/26/24  0706   WBC 6.62   HGB 12.9   HCT 40.3        CMP:   Recent Labs   Lab 02/26/24  0706 02/27/24  0718    141   K 3.5 3.6    104   CO2 26 27   GLU 90 91   BUN 26* 24*   CREATININE 1.1 1.0   CALCIUM 8.3* 8.9   ANIONGAP 11 10       Significant Imaging: I have reviewed all pertinent imaging results/findings within the past 24 hours.

## 2024-02-28 NOTE — PROGRESS NOTES
[facility]  Cardiology  Progress Note    Patient Name: Serena Post  MRN: [id number]  Admission Date: 2/22/2024  Hospital Length of Stay: 5 days  Code Status: Full Code   Attending Physician: June Acosta MD   Primary Care Physician: Evangelina Olivares MD  Expected Discharge Date: 2/28/2024  Principal Problem:Acute on chronic systolic heart failure    Subjective:   HPI:  HPI obtained largely from chart and patient's son at bedside as she has underlying dementia     Ms. 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 ED yesterday from her PCP's office due to increased SOB. Patient complained of increased SOB over the past three weeks to the point where she needed to use her supplemental oxygen continuously. She denied any associated CP, LE edema, fever, chills, palpitations, near syncope, or syncope. Initial workup revealed troponin of 0.143, INR of 1.6, and BNP > 3,000. CXR showed small bilateral pleural effusions and patient was subsequently admitted for further evaluation and treatment. Cardiology consulted to assist with management. Patient seen and examined today in ED with her son present. Still visibly SOB. Pleasantly confused. No CP symptoms. Son reports she is compliant with her medications. Watches her salt intake. Followed by CHF clinic and outside cardiologist, Dr. ePrkins. Chart reviewed. Troponin flat, 0.143>0.125>0.107. EKG with paced rhythm with PVC's, noted to possibly have some brief runs of PAF while in ED. PPM interrogation ordered. Prior TTE 11/23 with normal EF, repeat pending. CTA negative for PE, suggestive of pulmonary HTN, ascending aorta aneurysm    Hospital Course:   2/24/24: Pt seen and examined this am. No acute sxs noted.  Dyspnea seems improved. No CP. Labs/chart  reviewed.  INR now 2.1  echo EF 35%.  BP soft.    2/25/24: Pt seen and examined this am.  No acute CV issues noted.  Dyspnea seems improved but pt is not a good historian.  Chart/labs reviewed.    2/26/24-Patient seen and examined today, resting in bed. Seems to feel ok. SOB improving. Labs reviewed/stable. Will defer ORI to OP setting.    2/27/24-Patient seen and examined today, lying in bed. Feels ok. Off supplemental oxygen. Labs reviewed. BNP trending down. Will need 40 mg of Lasix po upon d/c with extra dose prn. INR 1.7.    2/28/24-Patient seen and examined today, sitting up in bed. More awake/alert. States SOB has improved. Labs reviewed. INR 1.5, needs Lovenox bridge.      Review of Systems   Constitutional: Positive for malaise/fatigue.   HENT: Negative.     Eyes: Negative.    Cardiovascular:  Positive for dyspnea on exertion.   Respiratory: Negative.     Endocrine: Negative.    Hematologic/Lymphatic: Negative.    Skin: Negative.    Musculoskeletal:  Positive for arthritis and joint pain.   Gastrointestinal: Negative.    Genitourinary: Negative.    Neurological:  Positive for weakness.   Psychiatric/Behavioral:  Positive for memory loss.    Allergic/Immunologic: Negative.      Objective:     Vital Signs (Most Recent):  Temp: 98.1 °F (36.7 °C) (02/28/24 1233)  Pulse: 87 (02/28/24 1233)  Resp: 19 (02/28/24 1233)  BP: (!) 86/54 (02/28/24 1233)  SpO2: (!) 90 % (02/28/24 1233) Vital Signs (24h Range):  Temp:  [97.5 °F (36.4 °C)-98.2 °F (36.8 °C)] 98.1 °F (36.7 °C)  Pulse:  [66-95] 87  Resp:  [16-19] 19  SpO2:  [90 %-97 %] 90 %  BP: ()/(51-56) 86/54     Weight: 90.8 kg (200 lb 2.8 oz)  Body mass index is 34.36 kg/m².     SpO2: (!) 90 %       No intake or output data in the 24 hours ending 02/28/24 1604    Lines/Drains/Airways       Peripheral Intravenous Line  Duration                  Peripheral IV - Single Lumen 02/25/24 1005 22 G Anterior;Right Shoulder 3 days                       Physical Exam  Vitals  "and nursing note reviewed.   Constitutional:       General: She is not in acute distress.     Appearance: Normal appearance. She is well-developed. She is not diaphoretic.   HENT:      Head: Normocephalic and atraumatic.   Eyes:      General:         Right eye: No discharge.         Left eye: No discharge.      Pupils: Pupils are equal, round, and reactive to light.   Neck:      Thyroid: No thyromegaly.      Vascular: No JVD.      Trachea: No tracheal deviation.   Cardiovascular:      Rate and Rhythm: Normal rate and regular rhythm.      Heart sounds: S1 normal and S2 normal. Murmur heard.      Comments: +metallic click  Pulmonary:      Effort: Pulmonary effort is normal. No respiratory distress.      Breath sounds: Normal breath sounds. No wheezing or rales.   Abdominal:      General: There is no distension.   Musculoskeletal:      Cervical back: Neck supple.      Right lower leg: No edema.      Left lower leg: No edema.   Skin:     General: Skin is warm and dry.      Findings: No erythema.   Neurological:      Mental Status: She is alert.      Comments: Oriented to person   Psychiatric:         Behavior: Behavior normal.            Significant Labs: CMP   Recent Labs   Lab 02/27/24  0718 02/28/24  0659    142   K 3.6 4.0    105   CO2 27 27   GLU 91 89   BUN 24* 22   CREATININE 1.0 1.1   CALCIUM 8.9 9.3   ANIONGAP 10 10   , CBC No results for input(s): "WBC", "HGB", "HCT", "PLT" in the last 48 hours., Troponin No results for input(s): "TROPONINI" in the last 48 hours., and All pertinent lab results from the last 24 hours have been reviewed.    Significant Imaging: Echocardiogram: Transthoracic echo (TTE) complete (Cupid Only):   Results for orders placed or performed during the hospital encounter of 02/22/24   Echo   Result Value Ref Range    BSA 2.03 m2    LVOT stroke volume 56.42 cm3    LVIDd 5.10 3.5 - 6.0 cm    LV Systolic Volume 90.21 mL    LV Systolic Volume Index 46.0 mL/m2    LVIDs 4.45 (A) 2.1 " - 4.0 cm    LV Diastolic Volume 123.59 mL    LV Diastolic Volume Index 63.06 mL/m2    IVS 1.04 0.6 - 1.1 cm    LVOT diameter 1.91 cm    LVOT area 2.9 cm2    FS 13 (A) 28 - 44 %    Left Ventricle Relative Wall Thickness 0.47 cm    Posterior Wall 1.19 (A) 0.6 - 1.1 cm    LV mass 217.90 g    LV Mass Index 111 g/m2    MV Peak E Melo 1.07 m/s    TDI LATERAL 0.08 m/s    TDI SEPTAL 0.05 m/s    E/E' ratio 16.46 m/s    MV Peak A Melo 1.07 m/s    TR Max Melo 3.38 m/s    E/A ratio 1.00     IVRT 62.80 msec    E wave deceleration time 192.43 msec    LV SEPTAL E/E' RATIO 21.40 m/s    LV LATERAL E/E' RATIO 13.38 m/s    LVOT peak melo 0.83 m/s    Left Ventricular Outflow Tract Mean Velocity 0.58 cm/s    Left Ventricular Outflow Tract Mean Gradient 1.51 mmHg    RV mid diameter 3.15 cm    RVOT peak VTI 9.3 cm    TAPSE 2.05 cm    LA size 3.61 cm    Left Atrium Minor Axis 3.98 cm    Left Atrium Major Axis 6.58 cm    RA Major Axis 4.53 cm    AV regurgitation pressure 1/2 time 385.911702305800021 ms    AR Max Melo 4.34 m/s    AV mean gradient 95 mmHg    AV peak gradient 97 mmHg    Ao peak melo 4.93 m/s    Ao .80 cm    LVOT peak VTI 19.70 cm    AV valve area 0.37 cm²    AV Velocity Ratio 0.17     AV index (prosthetic) 0.13     AAMIR by Velocity Ratio 0.48 cm²    MV stenosis pressure 1/2 time 55.80 ms    MV valve area p 1/2 method 3.94 cm2    Triscuspid Valve Regurgitation Peak Gradient 46 mmHg    PV mean gradient 1 mmHg    RVOT peak melo 0.49 m/s    Ao root annulus 2.98 cm    STJ 4.51 cm    Ascending aorta 4.45 cm    IVC diameter 1.15 cm    Mean e' 0.07 m/s    ZLVIDS 2.01     ZLVIDD -0.95     LA Volume Index 34.9 mL/m2    LA volume 68.49 cm3    LA WIDTH 4.5 cm    RA Width 4.4 cm    TV resting pulmonary artery pressure 49 mmHg    RV TB RVSP 6 mmHg    Est. RA pres 3 mmHg    EF 35 %    Narrative      Left Ventricle: The left ventricle is normal in size. Normal wall   thickness. There is concentric hypertrophy. Global hypokinesis present.    There is moderately reduced systolic function. Ejection fraction by visual   approximation is 35%.    Right Ventricle: Normal right ventricular cavity size. Systolic   function is borderline low.    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 There is moderate aortic regurgitation.    Consider ORI if clinically needed to evaluate mechanical aortic valve   further.    Mitral Valve: There is mild bileaflet sclerosis. There is mild   regurgitation.    Pulmonary Artery: There is moderate pulmonary hypertension. The   estimated pulmonary artery systolic pressure is 49 mmHg.    IVC/SVC: Normal venous pressure at 3 mmHg.    Ascending aorta is moderately dilated measuring 4.45 cm.       Assessment and Plan:   Patient who presents with acute systolic CHF/elevated gradient across aortic valve. Volume status improved. Needs Lovenox bridge. Follow-up with primary cardiologist.     * Acute on chronic systolic heart failure  -BNP > 3,000  -TTE 11/23 with normal EF, repeat pending  -Continue IV diuresis as tolerated  -Continue home regimen/OMT  -Strict I's/O's    2/26/24  -EF dropped to 35%, high gradient noted across mechanical aortic valve  -Continue gentle IV diuresis  -Continue BB  -Repeat BNP in AM  -Will defer ORI to OP setting with primary cardiologist    2/27/24  -BNP improved  -Can transition to 40 mg po Lasix beginning tmw with extra dose prn  -Continue BB  -No ARB/ARBI given soft BP  -Needs close f/u with primary cardiologist    2/28/24  -BP improved  -Continue BB  -No ARB/ARNI given soft BP  -Close f/u with primary cardiologist     Acute on chronic respiratory failure with hypoxia  -Assess response to IV diuresis  -CTA negative for PE    Aortic aneurysm  -Will need surveillance as OP    Pacemaker  -Interrogation pending, possible PAF runs    H/O mechanical aortic valve replacement  -Continue Coumadin, dosing as per pharmacy    2/28/24  -INR 1.5, on Lovenox  bridge      Essential hypertension  -Continue home regimen as tolerated        VTE Risk Mitigation (From admission, onward)           Ordered     enoxaparin injection 90 mg  Every 12 hours         02/28/24 1316     warfarin (COUMADIN) split tablet 1.25 mg  Once per day on Sun Tue Wed Thu Sat         02/25/24 1026     warfarin (COUMADIN) tablet 2.5 mg  Once per day on Mon Fri 02/23/24 1005     IP VTE HIGH RISK PATIENT  Once         02/22/24 2300     Place sequential compression device  Until discontinued         02/22/24 2300                    Sarah Santiago PA-C  Cardiology  [facility]

## 2024-02-28 NOTE — PLAN OF CARE
CM spoke with son, Amish, over phone regarding d/c planning. Discussed resumption of [facility] at discharge along with referral to [facility]. Son is in agreement with plan, states he and hospital medicine discussed this earlier today. CM also discussed f/u with patient's cardiologist, Dr. Perkins. Son requests assistance with scheduling follow up appt for March 6th if possible.    CM sent referral to [facility] and will attempt to contact Dr. Perkins clinic.    1025am: CM called and notified ([facility]) [facility] referral. CM SSC  to assist with cardiology f/u appt.

## 2024-02-28 NOTE — ASSESSMENT & PLAN NOTE
-BNP > 3,000  -TTE 11/23 with normal EF, repeat pending  -Continue IV diuresis as tolerated  -Continue home regimen/OMT  -Strict I's/O's    2/26/24  -EF dropped to 35%, high gradient noted across mechanical aortic valve  -Continue gentle IV diuresis  -Continue BB  -Repeat BNP in AM  -Will defer ORI to OP setting with primary cardiologist    2/27/24  -BNP improved  -Can transition to 40 mg po Lasix beginning tmw with extra dose prn  -Continue BB  -No ARB/ARBI given soft BP  -Needs close f/u with primary cardiologist    2/28/24  -BP improved  -Continue BB  -No ARB/ARNI given soft BP  -Close f/u with primary cardiologist

## 2024-02-28 NOTE — SUBJECTIVE & OBJECTIVE
Review of Systems   Constitutional: Positive for malaise/fatigue.   HENT: Negative.     Eyes: Negative.    Cardiovascular:  Positive for dyspnea on exertion.   Respiratory: Negative.     Endocrine: Negative.    Hematologic/Lymphatic: Negative.    Skin: Negative.    Musculoskeletal:  Positive for arthritis and joint pain.   Gastrointestinal: Negative.    Genitourinary: Negative.    Neurological:  Positive for weakness.   Psychiatric/Behavioral:  Positive for memory loss.    Allergic/Immunologic: Negative.      Objective:     Vital Signs (Most Recent):  Temp: 98.1 °F (36.7 °C) (02/28/24 1233)  Pulse: 87 (02/28/24 1233)  Resp: 19 (02/28/24 1233)  BP: (!) 86/54 (02/28/24 1233)  SpO2: (!) 90 % (02/28/24 1233) Vital Signs (24h Range):  Temp:  [97.5 °F (36.4 °C)-98.2 °F (36.8 °C)] 98.1 °F (36.7 °C)  Pulse:  [66-95] 87  Resp:  [16-19] 19  SpO2:  [90 %-97 %] 90 %  BP: ()/(51-56) 86/54     Weight: 90.8 kg (200 lb 2.8 oz)  Body mass index is 34.36 kg/m².     SpO2: (!) 90 %       No intake or output data in the 24 hours ending 02/28/24 1604    Lines/Drains/Airways       Peripheral Intravenous Line  Duration                  Peripheral IV - Single Lumen 02/25/24 1005 22 G Anterior;Right Shoulder 3 days                       Physical Exam  Vitals and nursing note reviewed.   Constitutional:       General: She is not in acute distress.     Appearance: Normal appearance. She is well-developed. She is not diaphoretic.   HENT:      Head: Normocephalic and atraumatic.   Eyes:      General:         Right eye: No discharge.         Left eye: No discharge.      Pupils: Pupils are equal, round, and reactive to light.   Neck:      Thyroid: No thyromegaly.      Vascular: No JVD.      Trachea: No tracheal deviation.   Cardiovascular:      Rate and Rhythm: Normal rate and regular rhythm.      Heart sounds: S1 normal and S2 normal. Murmur heard.      Comments: +metallic click  Pulmonary:      Effort: Pulmonary effort is normal. No  "respiratory distress.      Breath sounds: Normal breath sounds. No wheezing or rales.   Abdominal:      General: There is no distension.   Musculoskeletal:      Cervical back: Neck supple.      Right lower leg: No edema.      Left lower leg: No edema.   Skin:     General: Skin is warm and dry.      Findings: No erythema.   Neurological:      Mental Status: She is alert.      Comments: Oriented to person   Psychiatric:         Behavior: Behavior normal.            Significant Labs: CMP   Recent Labs   Lab 02/27/24  0718 02/28/24  0659    142   K 3.6 4.0    105   CO2 27 27   GLU 91 89   BUN 24* 22   CREATININE 1.0 1.1   CALCIUM 8.9 9.3   ANIONGAP 10 10   , CBC No results for input(s): "WBC", "HGB", "HCT", "PLT" in the last 48 hours., Troponin No results for input(s): "TROPONINI" in the last 48 hours., and All pertinent lab results from the last 24 hours have been reviewed.    Significant Imaging: Echocardiogram: Transthoracic echo (TTE) complete (Cupid Only):   Results for orders placed or performed during the hospital encounter of 02/22/24   Echo   Result Value Ref Range    BSA 2.03 m2    LVOT stroke volume 56.42 cm3    LVIDd 5.10 3.5 - 6.0 cm    LV Systolic Volume 90.21 mL    LV Systolic Volume Index 46.0 mL/m2    LVIDs 4.45 (A) 2.1 - 4.0 cm    LV Diastolic Volume 123.59 mL    LV Diastolic Volume Index 63.06 mL/m2    IVS 1.04 0.6 - 1.1 cm    LVOT diameter 1.91 cm    LVOT area 2.9 cm2    FS 13 (A) 28 - 44 %    Left Ventricle Relative Wall Thickness 0.47 cm    Posterior Wall 1.19 (A) 0.6 - 1.1 cm    LV mass 217.90 g    LV Mass Index 111 g/m2    MV Peak E Melo 1.07 m/s    TDI LATERAL 0.08 m/s    TDI SEPTAL 0.05 m/s    E/E' ratio 16.46 m/s    MV Peak A Melo 1.07 m/s    TR Max Melo 3.38 m/s    E/A ratio 1.00     IVRT 62.80 msec    E wave deceleration time 192.43 msec    LV SEPTAL E/E' RATIO 21.40 m/s    LV LATERAL E/E' RATIO 13.38 m/s    LVOT peak melo 0.83 m/s    Left Ventricular Outflow Tract Mean Velocity 0.58 " cm/s    Left Ventricular Outflow Tract Mean Gradient 1.51 mmHg    RV mid diameter 3.15 cm    RVOT peak VTI 9.3 cm    TAPSE 2.05 cm    LA size 3.61 cm    Left Atrium Minor Axis 3.98 cm    Left Atrium Major Axis 6.58 cm    RA Major Axis 4.53 cm    AV regurgitation pressure 1/2 time 385.197178949270077 ms    AR Max Melo 4.34 m/s    AV mean gradient 95 mmHg    AV peak gradient 97 mmHg    Ao peak melo 4.93 m/s    Ao .80 cm    LVOT peak VTI 19.70 cm    AV valve area 0.37 cm²    AV Velocity Ratio 0.17     AV index (prosthetic) 0.13     AAMIR by Velocity Ratio 0.48 cm²    MV stenosis pressure 1/2 time 55.80 ms    MV valve area p 1/2 method 3.94 cm2    Triscuspid Valve Regurgitation Peak Gradient 46 mmHg    PV mean gradient 1 mmHg    RVOT peak melo 0.49 m/s    Ao root annulus 2.98 cm    STJ 4.51 cm    Ascending aorta 4.45 cm    IVC diameter 1.15 cm    Mean e' 0.07 m/s    ZLVIDS 2.01     ZLVIDD -0.95     LA Volume Index 34.9 mL/m2    LA volume 68.49 cm3    LA WIDTH 4.5 cm    RA Width 4.4 cm    TV resting pulmonary artery pressure 49 mmHg    RV TB RVSP 6 mmHg    Est. RA pres 3 mmHg    EF 35 %    Narrative      Left Ventricle: The left ventricle is normal in size. Normal wall   thickness. There is concentric hypertrophy. Global hypokinesis present.   There is moderately reduced systolic function. Ejection fraction by visual   approximation is 35%.    Right Ventricle: Normal right ventricular cavity size. Systolic   function is borderline low.    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 There is moderate aortic regurgitation.    Consider ORI if clinically needed to evaluate mechanical aortic valve   further.    Mitral Valve: There is mild bileaflet sclerosis. There is mild   regurgitation.    Pulmonary Artery: There is moderate pulmonary hypertension. The   estimated pulmonary artery systolic pressure is 49 mmHg.    IVC/SVC: Normal venous pressure at 3 mmHg.     Ascending aorta is moderately dilated measuring 4.45 cm.

## 2024-02-28 NOTE — PROGRESS NOTES
[facility] Medicine  Progress Note    Patient Name: Serena Post  MRN: [id number]  Patient Class: IP- Inpatient   Admission Date: 2/22/2024  Length of Stay: 4 days  Attending Physician: June Acosta MD  Primary Care Provider: Evangelina Olivares MD        Subjective:     Principal Problem:Acute on chronic systolic heart failure        HPI:  Serena Post is an 83-year-old woman with a past medical history of hypertension, congestive heart failure, atrial fibrillation, cardiomyopathy, history of brain tumor, history of heart valve replacement with a mechanical valve, hyperlipidemia, history of pacemaker insertion, and a stroke in 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 The patient states she previously uses home oxygen intermittently but has needed it daily for the past weeks due to worsening shortness of breath. She also reports being hypotensive during the same time period. Symptoms are constant and moderate in severity, with associated symptoms including cough but no chest pain, fever, nausea, vomiting, or any other symptoms at this time. No sick contacts.    Upon arrival in the ER, initial vital signs showed tachypnea with hypoxia, which seemed to improve with oxygen supplementation; the rest of the vitals were stable. Abnormal laboratory results included an estimated glomerular filtration rate of 45, indicating reduced kidney function; an elevated Troponin I level at 0.143, a BNP level of 3,274, and a subtherapeutic INR of 1.6 (target range of 2.5 to 3.5 due to her mechanical heart valve). All other laboratory components were within normal limits. Imaging results revealed small bilateral pleural effusions on chest x-ray, but no other significant cardiopulmonary disease was appreciated.  Hospital Medicine was called for admission to manage her complex conditions  and provide further treatment and evaluation.    Overview/Hospital Course:  Serena is a 83 year old female who presented to ED for further evaluation of progressive dyspnea. Upon arrival patient noted to have mildly labored breathing with hypoxia and was placed on supplemental oxygen. BNP >3000. CXR showed bilateral pleural effusions. CTA negative for pulmonary embolism, but showed known ascending aortic aneurysm. Initial troponin 0.143 but is now trending down and felt to be related to demand ischemia from decompensated CHF. Echocardiogram shows an EF of 35%, global hypokinesis, and reduce right ventricle systolic function. Kidney function is stable. Cardiology is following.    2/24/24  Echocardiogram this admission shows a decline in EF 35% with global hypokinesis. Improving clinically. Cardiology recommends decreasing lasix to to QD. INR is not yet therapeutic. Cardiology will consider ORI this admission to assess mechanical AVR.     2/25/24  INR 2.7. stop Lovenox bridge discontinued. Patient is on 2L nasal cannula.    Pt weaned off O2 with continued diuresis, will check O2 sats with ambulation  Seen by cardiology for Mechanical valve dysfunction, will need ORI as outpatient for possible replacement.     Interval History:  Patient seen and examined at bedside.  Discussed with Cardiology and patient's son.    Review of Systems   Constitutional:  Positive for fatigue. Negative for fever.   Respiratory:  Positive for shortness of breath. Negative for cough.    Cardiovascular:  Positive for leg swelling. Negative for chest pain.   Gastrointestinal:  Negative for nausea and vomiting.   All other systems reviewed and are negative.    Objective:     Vital Signs (Most Recent):  Temp: 98 °F (36.7 °C) (02/27/24 1612)  Pulse: 95 (02/27/24 1711)  Resp: 16 (02/27/24 1612)  BP: (!) 118/56 (02/27/24 1612)  SpO2: (!) 94 % (02/27/24 1612) Vital Signs (24h Range):  Temp:  [97.4 °F (36.3 °C)-98 °F (36.7 °C)] 98 °F (36.7  °C)  Pulse:  [66-97] 95  Resp:  [16-22] 16  SpO2:  [92 %-97 %] 94 %  BP: ()/(52-58) 118/56     Weight: 92.2 kg (203 lb 4.2 oz)  Body mass index is 34.89 kg/m².    Intake/Output Summary (Last 24 hours) at 2/27/2024 1842  Last data filed at 2/26/2024 2058  Gross per 24 hour   Intake 180 ml   Output --   Net 180 ml         Physical Exam  Vitals reviewed.   Constitutional:       Appearance: Normal appearance. She is obese. She is ill-appearing.   HENT:      Head: Normocephalic and atraumatic.      Mouth/Throat:      Mouth: Mucous membranes are moist.      Pharynx: Oropharynx is clear.   Eyes:      Extraocular Movements: Extraocular movements intact.      Conjunctiva/sclera: Conjunctivae normal.   Cardiovascular:      Rate and Rhythm: Normal rate and regular rhythm.      Pulses: Normal pulses.      Heart sounds: Murmur (metallic click, large murmur) heard.   Pulmonary:      Effort: Respiratory distress present.      Breath sounds: Normal breath sounds.   Abdominal:      General: Bowel sounds are normal.      Palpations: Abdomen is soft.   Musculoskeletal:         General: Normal range of motion.      Cervical back: Normal range of motion and neck supple.      Right lower leg: Edema present.      Left lower leg: Edema present.   Skin:     General: Skin is warm and dry.   Neurological:      General: No focal deficit present.      Mental Status: She is alert and oriented to person, place, and time. Mental status is at baseline.   Psychiatric:         Mood and Affect: Mood normal.         Behavior: Behavior normal.         Thought Content: Thought content normal.             Significant Labs: All pertinent labs within the past 24 hours have been reviewed.  CBC:   Recent Labs   Lab 02/26/24  0706   WBC 6.62   HGB 12.9   HCT 40.3        CMP:   Recent Labs   Lab 02/26/24  0706 02/27/24  0718    141   K 3.5 3.6    104   CO2 26 27   GLU 90 91   BUN 26* 24*   CREATININE 1.1 1.0   CALCIUM 8.3* 8.9    ANIONGAP 11 10       Significant Imaging: I have reviewed all pertinent imaging results/findings within the past 24 hours.    Assessment/Plan:      * Acute on chronic systolic heart failure  --Last ECHO from NOV 2023 confirm EF 60 %, Grade I diastolic dysfunction  --BNP and troponin elevated x1- will trend the latter  --trace/1+ edema on exam, small pleural effusions noted on CXR  --s/p IV 60 mg lasix dose in the ER  --will schedule IV Lasix 40 mg BID while hospitalized  --monitor electrolytes, UOP, repeat Echo  --strict I&O and daily weights ordered  --Cardiology consulted for AM evaluation    2/24/24  EF down to 35% with global hypokinesis.   She is improving clinicalyl. Lasix decreased to daily    Continue Lasix. Cardiology determined  ORI will be scheduled as outpatient      Acute on chronic respiratory failure with hypoxia  Patient with Hypoxic Respiratory failure which is Acute on chronic.  she is on home oxygen at 2-3 LPM. Signs/symptoms of respiratory failure include- tachypnea, increased work of breathing, and wheezing. Contributing diagnoses includes - CHF, COPD, Pneumonia, and Pulmonary Embolus.   --suspect CHF exacerbation as primary etiology, see plan above  --Dimer ordered; mildly elevated   --some trace swelling in b/l extremities, a chronically subtherapeutic INR  --CTA Chest negative for  PE  --home Coumadin therapy continued- pharmacy to dose  --low suspicion of PNA as CXR unremarkable  --COPD exacerbation also possible- supportive therapy, PRN nebs  --reassess in the AM    2/24/24  Currently on 2L nasal cannula-home settings.    Aortic aneurysm  Ascending aortic aneurysm measuring up to 4.5 cm.  No dissection.     Pacemaker  --orders for interrogation placed while in the ER        H/O mechanical aortic valve replacement  Possible complication could be contributing to CHF exacerbation presentation, will further investigate via CTA and echo.  On chart review, patient seems to have had a  chronically subtherapeutic INR.  Target range has been documented to be between 2.5 and 3.5, however patients INR has been sustained below 2.0 on the last several blood draws.  Pharmacy consulted to assist in Coumadin dosing.    2/24/24  INR 2.1  Lovenox bridge complete. Echocardiogram shows mechanical valve with high velocities. Cardiology will plan to perform ORI as outpatient  Continue Lasix    Essential hypertension  --home medications include:  Metoprolol  --restart as tolerated   --PRN IV hydralazine 10mg Q6H for sBP > 175 mmHg  --Q4HVS    2/24/24  Soft BP but systolic >100 this evening. Monitor    2/25/24  Blood pressure soft 90-110s. Tolerating metoprolol and lasix.    Long discussion with patient and son, Amish.  Discussed worsening CHF and worsening aortic stenosis of mechanical valve.  He also reports that her pacemaker battery needs to be changed this summer.  Discussed that was very unlikely some on be willing to replace her valve.  He wishes to discuss that with her primary cardiologist Dr. Perkins.  We discussed palliative care and hospice.  He has not ready for hospice this time but is interested in learning more about palliative care.  Consult placed to care management to consult hospice at [place] for per palliative care evaluation.  He wishes to go home with [facility] home health.  VTE Risk Mitigation (From admission, onward)           Ordered     warfarin (COUMADIN) split tablet 1.25 mg  Once per day on Sun Tue Wed Thu Sat         02/25/24 1026     warfarin (COUMADIN) tablet 2.5 mg  Once per day on Mon Fri 02/23/24 1005     IP VTE HIGH RISK PATIENT  Once         02/22/24 2300     Place sequential compression device  Until discontinued         02/22/24 2300                    Discharge Planning   RENE:      Code Status: Full Code   Is the patient medically ready for discharge?:     Reason for patient still in hospital (select all that apply): Patient trending condition, Treatment, and  Pending disposition  Discharge Plan A: Home Health   Discharge Delays: None known at this time              June Boykin MD  Department of Hospital Medicine   [facility] Surg

## 2024-02-28 NOTE — PLAN OF CARE
Free from falls. Bed alarm set. Cardiac and blood glucose monitored. Wound care completed. No s/s of acute distress. 12hr chart check complete.

## 2024-02-28 NOTE — PROGRESS NOTES
Clinical Pharmacy Progress Note: Coumadin Dosing and Monitoring     Indication: Hx of mechanical AVR, afib, DVT   Goal INR: 2-3    Lab Results   Component Value Date    INR 1.5 (H) 02/28/2024    INR 1.7 (H) 02/27/2024    INR 2.0 (H) 02/26/2024     Patient has been educated by pharmacist this admission.     Recent dosing history:   2/22/24: Warfarin 1 mg given at 0027 on 2/23/24. INR = 1.6  2/23/24: Warfarin 4 mg booster dose given. INR = 1.4  2/24/24: Warfarin 0.5 mg reduced dose given. INR = 2.1  2/25/24: Warfarin HELD. INR = 2.7  2/26/24: Warfarin 2.5 mg given. INR = 2  2/27/24: Warfarin 1.25 mg given. INR = 1.7    Home dosing regimen: Warfarin 1.25 mg daily except for Warfarin 2.5 mg every Monday and Friday.     Drug interactions: melatonin, acetaminophen and tramadol each may increase the risk of bleeding while on warfarin    Lab order for daily PT/INR? Yes  Coumadin diet ordered? Yes    Plan:   - Give warfarin 1.25 mg today. INR is sub-therapeutic  - Awaiting response from Cardiology regarding whether to add a Lovenox bridge since INR is sub-therapeutic.   - Pharmacy will continue to monitor daily PT/INR. Dose adjustments will be made accordingly.    Thank you for allowing us to participate in this patient's care.      Jyoti Nieves, [place] 02/28/2024 11:45 AM

## 2024-02-28 NOTE — PLAN OF CARE
[facility] - Med Surg  Discharge Final Note    Primary Care Provider: Evangelina Olivares MD    Expected Discharge Date:     Final Discharge Note (most recent)       Final Note - 02/28/24 1146          Final Note    Assessment Type Final Discharge Note     Anticipated [place] Disposition [place] [facility]     Hospital Resources/Appts/Education Provided Appointments scheduled and added to AVS        Post-Acute Status    Post-Acute Authorization Home Health     Home Health Status Set-up Complete/Auth obtained                     Important Message from Medicare             Contact Info       Cardiology follow up    Lydia Perkins MD   [address]   [address]   [place] [address]   [phone]       Next Steps: Follow up          DC Dispo: home with resumption of [facility] [place]  Cards f/u: [facility] March 4th, patient's cardiology clinic (Dr. Perkins) aware of patient d/c and will contact son for appt set up.  PCP f/u: March 6th    2:23p: palliative care info visit scheduled for Friday, March 1st.

## 2024-02-29 ENCOUNTER — ANTI-COAG VISIT (OUTPATIENT)
Dept: CARDIOLOGY | Facility: CLINIC | Age: 84
End: 2024-02-29
Payer: MEDICARE

## 2024-02-29 ENCOUNTER — TELEPHONE (OUTPATIENT)
Dept: CARDIOLOGY | Facility: CLINIC | Age: 84
End: 2024-02-29
Payer: MEDICARE

## 2024-02-29 DIAGNOSIS — Z79.01 ANTICOAGULANT LONG-TERM USE: Primary | ICD-10-CM

## 2024-02-29 NOTE — PLAN OF CARE
Patient being discharged home in stable condition per MD order. Patient remains free from injury/falls during stay. IV removed per MD order, catheter intact.

## 2024-02-29 NOTE — PROGRESS NOTES
Mr Post, son contacted.  Pt has [facility] draw INRs and send to [facility] coumadin clinic where she is currently enrolled.  Lynn RN at [facility] was contacted.  She has orders to send INR result to [facility] coumadin clinic on Friday 3/1/24 and reports that the patient will be continued to be followed by the [facility] and  Mrs Post will remain with Dr Perkins-breanna LOVE and [facility] coumadin clinic.  Per D/C note - per NITHIN Santiago - Will defer ORI to OP setting with primary cardiologist. She does not currently have follow up with [facility].  Resolving [facility] episode.

## 2024-02-29 NOTE — TELEPHONE ENCOUNTER
Patient taking lovenox injections at home. Has questions about administering lovenox. All questions and concerns were addressed. Advised the patient to call back with any further questions or concerns.        Reason for Disposition   Caller has medicine question only, adult not sick, AND triager answers question    Protocols used: Medication Question Call-A-AH

## 2024-03-01 PROBLEM — J44.1 COPD EXACERBATION: Status: ACTIVE | Noted: 2024-03-01

## 2024-03-01 PROBLEM — R06.02 SOB (SHORTNESS OF BREATH): Status: ACTIVE | Noted: 2024-03-01

## 2024-03-04 ENCOUNTER — TELEPHONE (OUTPATIENT)
Dept: CARDIOLOGY | Facility: CLINIC | Age: 84
End: 2024-03-04
Payer: MEDICARE

## 2024-03-04 ENCOUNTER — OFFICE VISIT (OUTPATIENT)
Dept: CARDIOLOGY | Facility: CLINIC | Age: 84
End: 2024-03-04
Payer: MEDICARE

## 2024-03-04 VITALS
DIASTOLIC BLOOD PRESSURE: 76 MMHG | HEIGHT: 64 IN | SYSTOLIC BLOOD PRESSURE: 102 MMHG | HEART RATE: 62 BPM | BODY MASS INDEX: 33.8 KG/M2 | WEIGHT: 198 LBS

## 2024-03-04 DIAGNOSIS — I50.23 ACUTE ON CHRONIC SYSTOLIC HEART FAILURE: Primary | ICD-10-CM

## 2024-03-04 DIAGNOSIS — I10 ESSENTIAL HYPERTENSION: ICD-10-CM

## 2024-03-04 PROCEDURE — 99999 PR PBB SHADOW E&M-EST. PATIENT-LVL II: CPT | Mod: PBBFAC,,, | Performed by: PHYSICIAN ASSISTANT

## 2024-03-04 PROCEDURE — 99212 OFFICE O/P EST SF 10 MIN: CPT | Mod: PBBFAC | Performed by: PHYSICIAN ASSISTANT

## 2024-03-04 PROCEDURE — 99496 TRANSJ CARE MGMT HIGH F2F 7D: CPT | Mod: S$PBB,,, | Performed by: PHYSICIAN ASSISTANT

## 2024-03-04 NOTE — ASSESSMENT & PLAN NOTE
Possible complication could be contributing to CHF exacerbation presentation, will further investigate via CTA and echo.  On chart review, patient seems to have had a chronically subtherapeutic INR.  Target range has been documented to be between 2.5 and 3.5, however patients INR has been sustained below 2.0 on the last several blood draws.  Pharmacy consulted to assist in Coumadin dosing.    2/24/24  INR 2.1  Lovenox bridge complete. Echocardiogram shows mechanical valve with high velocities. Cardiology will plan to perform ORI as outpatient  Continue Lasix    Discharge home to follow up with Dr. Perkins

## 2024-03-04 NOTE — ASSESSMENT & PLAN NOTE
--home medications include:  Metoprolol  --restart as tolerated   --PRN IV hydralazine 10mg Q6H for sBP > 175 mmHg  --Q4HVS    2/24/24  Soft BP but systolic >100 this evening. Monitor    2/25/24  Blood pressure soft 90-110s. Tolerating metoprolol and lasix.    BP stable

## 2024-03-04 NOTE — PROGRESS NOTES
[facility] Provider Note (Follow-up) Consult Note      HPI:  Serena Post is an 83-year-old woman with a past medical history of hypertension, congestive heart failure, atrial fibrillation, cardiomyopathy, history of brain tumor, history of heart valve replacement with a mechanical valve, hyperlipidemia, history of pacemaker insertion, and a stroke in 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 The patient states she previously uses home oxygen intermittently but has needed it daily for the past weeks due to worsening shortness of breath. She also reports being hypotensive during the same time period. Symptoms are constant and moderate in severity, with associated symptoms including cough but no chest pain, fever, nausea, vomiting, or any other symptoms at this time. No sick contacts.     Upon arrival in the ER, initial vital signs showed tachypnea with hypoxia, which seemed to improve with oxygen supplementation; the rest of the vitals were stable. Abnormal laboratory results included an estimated glomerular filtration rate of 45, indicating reduced kidney function; an elevated Troponin I level at 0.143, a BNP level of 3,274, and a subtherapeutic INR of 1.6 (target range of 2.5 to 3.5 due to her mechanical heart valve). All other laboratory components were within normal limits. Imaging results revealed small bilateral pleural effusions on chest x-ray, but no other significant cardiopulmonary disease was appreciated.  Hospital Medicine was called for admission to manage her complex conditions and provide further treatment and evaluation.      Serena is a 83 year old female who presented to ED for further evaluation of progressive dyspnea. Upon arrival patient noted to have mildly labored breathing with hypoxia and was placed on supplemental oxygen. BNP >3000. CXR showed bilateral pleural effusions. CTA negative  for pulmonary embolism, but showed known ascending aortic aneurysm. Initial troponin 0.143 but is now trending down and felt to be related to demand ischemia from decompensated CHF. Echocardiogram shows an EF of 35%, global hypokinesis, and reduce right ventricle systolic function. Kidney function is stable. Cardiology is following.     2/24/24  Echocardiogram this admission shows a decline in EF 35% with global hypokinesis. Improving clinically. Cardiology recommends decreasing lasix to to QD. INR is not yet therapeutic. Cardiology will consider ORI this admission to assess mechanical AVR.      2/25/24  INR 2.7. stop Lovenox bridge discontinued. Patient is on 2L nasal cannula.     Pt weaned off O2 with continued diuresis, will check O2 sats with ambulation  Seen by cardiology for Mechanical valve dysfunction, will need ORI as outpatient for possible replacement.      Patient seen and examined on day of discharge.  Stable for discharge home.  She reports she would like to follow up with Dr. Perkins her usual cardiologist to evaluate her mechanical valve.     Dc on 12/28, has not seen Dr Perkins yet for TAVR eval    On lasix 20 mg daily    No LE edema    Has gained about 3 pounds from dc         PHYSICAL:   Vitals:    03/04/24 1248   BP: 102/76   Pulse: 62          JVD: no,    Heart rhythm: regular  Cardiac murmur: No    S3: yes  S4: no  Lungs: clear  Liver span: 10 cm:   Hepatojugular reflux: no  Edema: no,       ASSESSMENT: chronic systolic HF    PLAN:      Patient Instructions:   Instruct the patient to notify this clinic if HH, a physician or an advanced care provider wants to change medication one of their HF medications   Activity and Diet restrictions:   Recommend 2-3 gram sodium restriction and 1500cc- 2000cc fluid restriction.  Encourage physical activity with graded exercise program.  Requested patient to weigh themselves daily, and to notify us if their weight increases by more than 3 lbs in 1 day or 5 lbs  in 3 days.    Assigned dry weight on home scale: 197 lb  Medication changes (include current dose and changed dose)  No addition of GSMT with AS and BP  Lasix 20 mg daily, add prn for 3 pound weight gain overnight  CHF education done  Sees Dr Perkins next week call us prn

## 2024-03-04 NOTE — TELEPHONE ENCOUNTER
No 48hr call made. Pt had appt today 3/4        ----- Message from Vidhya Cruz RN sent at 2024  9:58 AM CST -----  Regardin hour call

## 2024-03-04 NOTE — DISCHARGE SUMMARY
[facility]  Discharge Summary      Patient Name: Serena Post  MRN: [id number]  [facility]: [id number]  Patient Class: IP- Inpatient  Admission Date: 2/22/2024  Hospital Length of Stay: 5 days  Discharge Date and Time: 2/28/2024  6:22 PM  Attending Physician: No att. 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 The patient states she previously uses home oxygen intermittently but has needed it daily for the past weeks due to worsening shortness of breath. She also reports being hypotensive during the same time period. Symptoms are constant and moderate in severity, with associated symptoms including cough but no chest pain, fever, nausea, vomiting, or any other symptoms at this time. No sick contacts.    Upon arrival in the ER, initial vital signs showed tachypnea with hypoxia, which seemed to improve with oxygen supplementation; the rest of the vitals were stable. Abnormal laboratory results included an estimated glomerular filtration rate of 45, indicating reduced kidney function; an elevated Troponin I level at 0.143, a BNP level of 3,274, and a subtherapeutic INR of 1.6 (target range of 2.5 to 3.5 due to her mechanical heart valve). All other laboratory components were within normal limits. Imaging results revealed small bilateral pleural effusions on chest x-ray, but no other significant cardiopulmonary disease  was appreciated.  Hospital Medicine was called for admission to manage her complex conditions and provide further treatment and evaluation.    * No surgery found *      Hospital Course:   Serena is a 83 year old female who presented to ED for further evaluation of progressive dyspnea. Upon arrival patient noted to have mildly labored breathing with hypoxia and was placed on supplemental oxygen. BNP >3000. CXR showed bilateral pleural effusions. CTA negative for pulmonary embolism, but showed known ascending aortic aneurysm. Initial troponin 0.143 but is now trending down and felt to be related to demand ischemia from decompensated CHF. Echocardiogram shows an EF of 35%, global hypokinesis, and reduce right ventricle systolic function. Kidney function is stable. Cardiology is following.    2/24/24  Echocardiogram this admission shows a decline in EF 35% with global hypokinesis. Improving clinically. Cardiology recommends decreasing lasix to to QD. INR is not yet therapeutic. Cardiology will consider ORI this admission to assess mechanical AVR.     2/25/24  INR 2.7. stop Lovenox bridge discontinued. Patient is on 2L nasal cannula.    Pt weaned off O2 with continued diuresis, will check O2 sats with ambulation  Seen by cardiology for Mechanical valve dysfunction, will need ORI as outpatient for possible replacement.     Patient seen and examined on day of discharge.  Stable for discharge home.  She reports she would like to follow up with Dr. Perkins her usual cardiologist to evaluate her mechanical valve.     Goals of Care Treatment Preferences:  Code Status: Full Code      Consults:   Consults (From admission, onward)          Status Ordering Provider     Inpatient consult to Social Work  Once        Provider:  (Not yet assigned)    Completed TRACY VICKERS     Inpatient consult to Social Work  Once        Provider:  (Not yet assigned)    Completed TRACY VICKERS     Inpatient consult to Cardiology   Once        Provider:  Parish Blake MD    Completed NICK EDWARDS     Inpatient consult to Social Work/Case Management  Once        Provider:  (Not yet assigned)    Completed NICK EDWARDS     Inpatient consult to Registered Dietitian/Nutritionist  Once        Provider:  (Not yet assigned)    Completed NICK EDWARDS            Pulmonary  SOB (shortness of breath)        COPD exacerbation  Patient's COPD is controlled    Acute on chronic respiratory failure with hypoxia  Patient with Hypoxic Respiratory failure which is Acute on chronic.  she is on home oxygen at 2-3 LPM. Signs/symptoms of respiratory failure include- tachypnea, increased work of breathing, and wheezing. Contributing diagnoses includes - CHF, COPD, Pneumonia, and Pulmonary Embolus.   --suspect CHF exacerbation as primary etiology, see plan above  --Dimer ordered; mildly elevated   --some trace swelling in b/l extremities, a chronically subtherapeutic INR  --CTA Chest negative for  PE  --home Coumadin therapy continued- pharmacy to dose  --low suspicion of PNA as CXR unremarkable  --COPD exacerbation also possible- supportive therapy, PRN nebs  --reassess in the AM    2/24/24  Currently on 2L nasal cannula-home settings.    Cardiac/Vascular  * Acute on chronic systolic heart failure  --Last ECHO from NOV 2023 confirm EF 60 %, Grade I diastolic dysfunction  --BNP and troponin elevated x1- will trend the latter  --trace/1+ edema on exam, small pleural effusions noted on CXR  --s/p IV 60 mg lasix dose in the ER  --will schedule IV Lasix 40 mg BID while hospitalized  --monitor electrolytes, UOP, repeat Echo  --strict I&O and daily weights ordered  --Cardiology consulted for AM evaluation    2/24/24  EF down to 35% with global hypokinesis.   She is improving clinicalyl. Lasix decreased to daily    Continue Lasix. Cardiology determined  ORI will be scheduled as outpatient      Aortic aneurysm  Ascending aortic aneurysm measuring up to 4.5  cm.  No dissection.     Pacemaker  --orders for interrogation placed while in the ER        H/O mechanical aortic valve replacement  Possible complication could be contributing to CHF exacerbation presentation, will further investigate via CTA and echo.  On chart review, patient seems to have had a chronically subtherapeutic INR.  Target range has been documented to be between 2.5 and 3.5, however patients INR has been sustained below 2.0 on the last several blood draws.  Pharmacy consulted to assist in Coumadin dosing.    2/24/24  INR 2.1  Lovenox bridge complete. Echocardiogram shows mechanical valve with high velocities. Cardiology will plan to perform ORI as outpatient  Continue Lasix    Discharge home to follow up with Dr. Perkins    Essential hypertension  --home medications include:  Metoprolol  --restart as tolerated   --PRN IV hydralazine 10mg Q6H for sBP > 175 mmHg  --Q4HVS    2/24/24  Soft BP but systolic >100 this evening. Monitor    2/25/24  Blood pressure soft 90-110s. Tolerating metoprolol and lasix.    BP stable      Final Active Diagnoses:    Diagnosis Date Noted POA    PRINCIPAL PROBLEM:  Acute on chronic systolic heart failure [I50.23] 11/19/2023 Yes    COPD exacerbation [J44.1] 03/01/2024 Yes    SOB (shortness of breath) [R06.02] 03/01/2024 Yes    Acute on chronic respiratory failure with hypoxia [J96.21] 02/23/2024 Yes    H/O mechanical aortic valve replacement [Z95.2] 09/26/2019 Not Applicable    Essential hypertension [I10] 09/26/2019 Yes    Pacemaker [Z95.0] 09/26/2019 Yes    Aortic aneurysm [I71.9] 09/26/2019 Yes      Problems Resolved During this Admission:       Discharged Condition: fair    Disposition: Home-Health Care c    Follow Up:   Follow-up Information       Cardiology follow up Follow up.    Contact information:  Lydia Perkins MD   [facility]   [address]   [place] [address] [phone]             [place], [facility] Follow up.    Specialty: Home  Health Services  Contact information:  [address]  [place] [address]  [phone]               [facility] [place] Follow up.    Contact information:  ADDRESS  [address]  [place] [address]    CONTACT  Phone: [phone]                         Patient Instructions:      Ambulatory referral/consult to HOME Palliative Care   Standing Status: Future   Referral Priority: Routine Referral Type: Consultation   Requested Specialty: Hospice and Palliative Medicine   Number of Visits Requested: 1     Ambulatory referral/consult to Anticoagulation Monitoring (PHARMACIST MANAGED)   Standing Status: Future   Referral Priority: Routine Referral Type: Consultation   Referral Reason: Specialty Services Required   Requested Specialty: Cardiology   Number of Visits Requested: 1     Diet Cardiac   Order Comments: coumadin     Call MD for:  temperature >100.4     Call MD for:  persistent nausea and vomiting or diarrhea     Call MD for:  severe uncontrolled pain     Call MD for:  redness, tenderness, or signs of infection (pain, swelling, redness, odor or green/yellow discharge around incision site)     Call MD for:  difficulty breathing or increased cough     Call MD for:  severe persistent headache     Call MD for:  worsening rash     Call MD for:  persistent dizziness, light-headedness, or visual disturbances     Call MD for:  increased confusion or weakness     Notify your health care provider if you experience any of the following:  temperature >100.4     Notify your health care provider if you experience any of the following:  persistent nausea and vomiting or diarrhea     Notify your health care provider if you experience any of the following:  severe uncontrolled pain     Notify your health care provider if you experience any of the following:  difficulty breathing or increased cough     SUBSEQUENT HOME HEALTH ORDERS   Order Comments: Inr to be drawn on 3/1 am with results  to coumadin clinic     Order Specific Question Answer Comments   What Home Health Agency is the patient currently using? [facility]      Activity as tolerated     Activity as tolerated       Significant Diagnostic Studies: Labs: All labs within the past 24 hours have been reviewed  Imaging Results              CTA Chest Non-Coronary (PE Studies) (Final result)  Result time 02/23/24 06:48:49      Final result by Сергей Martinez MD (02/23/24 06:48:49)                   Impression:      No evidence of pulmonary embolism.    Cardiomegaly.  Ascending aortic aneurysm measuring up to 4.5 cm.  No dissection.    See additional findings above    All CT scans at this facility use dose modulation, iterative reconstruction and/or weight based dosing when appropriate to reduce radiation dose to as low as reasonably achievable.      Electronically signed by: Сергей Martinez MD  Date:    02/23/2024  Time:    06:48               Narrative:    EXAMINATION:  CTA CHEST NON CORONARY (PE STUDIES)    CLINICAL HISTORY:  Chest pain and shortness of breath    TECHNIQUE:  After the intravenous administration of 100 cc of Omni 350 nonionic contrast using CT pulmonary angio technique, 1.25  Mm axial images were acquired using helical CT technique from the lung apices through costophrenic sulci.  Sagittal coronal and oblique MIPS were also submitted for interpretation.    COMPARISON:  Chest x-ray dated 02/22/2024    FINDINGS:  -Pulmonary arteries: Pulmonary arteries are well opacified.  No evidence of pulmonary embolism.  Prominence of pulmonary artery suggests pulmonary hypertension.  No right heart strain is identified with RV/LV ratio < 0.9.    -Lungs: No nodules or infiltrates.  Atelectasis seen at the lung bases bilaterally.    -Pleura: Trace effusions.    -Mediastinum/Dagmar:Enlarged lymph nodes within the mediastinum may be reactive in measure up to 11 mm in short axis.    -Axilla: No adenopathy.    -Thyroid: Normal lower  gland.    -Heart/Aorta: Cardiomegaly.  Moderate coronary artery disease.  No pericardial effusion.  Ascending aorta measures up to 4.5 cm which is aneurysmal.  No dissection.  Aorta demonstrates moderate atherosclerotic disease.  Cardiac pacing wires are seen.    -Bones/Chest Wall: Intact with advanced degenerative changes throughout the visualized thoracolumbar spine.    Sternotomy wires are intact.    -Upper Abdomen: Unremarkable.  Renal cortical thinning and left renal cyst noted.                                       X-Ray Chest AP (Final result)  Result time 02/22/24 21:39:04      Final result by Nidhi Epperson MD (02/22/24 21:39:04)                   Impression:      No active or adverse finding      Electronically signed by: Nidhi Epperson  Date:    02/22/2024  Time:    21:39               Narrative:    EXAMINATION:  XR CHEST AP PORTABLE    CLINICAL HISTORY:  sob;    TECHNIQUE:  Single frontal portable view of the chest was performed.    COMPARISON:  None    FINDINGS:  Comparison imaging earlier same date    No new pulmonary consolidation.  No sizable pleural effusion or convincing pneumothorax                                       RADIOLOGY REPORT (Final result)  Result time 02/27/24 17:37:34      Final result by Unknown User (02/27/24 17:37:34)                                        Pending Diagnostic Studies:       None           Medications:  Reconciled Home Medications:      Medication List        START taking these medications      enoxaparin 100 mg/mL Syrg  Commonly known as: LOVENOX  Inject 0.9 mLs (90 mg total) into the skin every 12 (twelve) hours. for 10 days. Discard the remainings from each syringe after use     fluticasone propionate 50 mcg/actuation nasal spray  Commonly known as: FLONASE  Shake well and use 2 sprays (100 mcg total) by Each Nostril route once daily.     magnesium oxide 400 mg (241.3 mg magnesium) tablet  Commonly known as: MAG-OX  Take 1 tablet (400 mg total) by mouth  once daily.     miconazole nitrate 2% 2 % Oint  Commonly known as: MICOTIN  Apply topically 2 (two) times daily. for 7 days            CONTINUE taking these medications      albuterol 2.5 mg /3 mL (0.083 %) nebulizer solution  Commonly known as: PROVENTIL  Take 2.5 mg by nebulization every 4 (four) hours as needed.     furosemide 20 MG tablet  Commonly known as: LASIX  Take 20 mg by mouth every morning.     metoprolol tartrate 25 MG tablet  Commonly known as: LOPRESSOR  Take 1 tablet by mouth 2 (two) times daily.     warfarin 2.5 MG tablet  Commonly known as: COUMADIN  Take 2.5 mg by mouth every Monday and Friday.  Take 1.25 mg by mouth all other days.              Indwelling Lines/Drains at time of discharge:   Lines/Drains/Airways       None                   Time spent on the discharge of patient: 42 minutes         June Boykin MD  Department of Hospital Medicine  [facility] - Med S[facility]

## 2024-03-06 ENCOUNTER — OFFICE VISIT (OUTPATIENT)
Dept: FAMILY MEDICINE | Facility: CLINIC | Age: 84
End: 2024-03-06
Payer: MEDICARE

## 2024-03-06 VITALS
BODY MASS INDEX: 34.06 KG/M2 | DIASTOLIC BLOOD PRESSURE: 70 MMHG | HEIGHT: 64 IN | TEMPERATURE: 97 F | WEIGHT: 199.5 LBS | SYSTOLIC BLOOD PRESSURE: 108 MMHG | OXYGEN SATURATION: 98 % | HEART RATE: 69 BPM

## 2024-03-06 DIAGNOSIS — I71.9 AORTIC ANEURYSM, UNSPECIFIED PORTION OF AORTA, UNSPECIFIED WHETHER RUPTURED: ICD-10-CM

## 2024-03-06 DIAGNOSIS — I48.0 PAROXYSMAL ATRIAL FIBRILLATION: ICD-10-CM

## 2024-03-06 DIAGNOSIS — I10 ESSENTIAL HYPERTENSION: ICD-10-CM

## 2024-03-06 DIAGNOSIS — J96.21 ACUTE ON CHRONIC RESPIRATORY FAILURE WITH HYPOXIA: Primary | ICD-10-CM

## 2024-03-06 DIAGNOSIS — J44.1 COPD EXACERBATION: ICD-10-CM

## 2024-03-06 PROCEDURE — 99214 OFFICE O/P EST MOD 30 MIN: CPT | Mod: S$PBB,,, | Performed by: NURSE PRACTITIONER

## 2024-03-06 PROCEDURE — 99999 PR PBB SHADOW E&M-EST. PATIENT-LVL V: CPT | Mod: PBBFAC,,, | Performed by: NURSE PRACTITIONER

## 2024-03-06 PROCEDURE — 99215 OFFICE O/P EST HI 40 MIN: CPT | Mod: PBBFAC,PO | Performed by: NURSE PRACTITIONER

## 2024-03-11 NOTE — PROGRESS NOTES
Subjective:       Patient ID: Serena Post is a 83 y.o. female.    Chief Complaint: Hospital Follow Up  Transitional Care Note    Family and/or Caretaker present at visit?  Yes.  Diagnostic tests reviewed/disposition: I have reviewed all completed as well as pending diagnostic tests at the time of discharge.  Disease/illness education: COPD, afib  Home health/community services discussion/referrals: Patient has home health established at [facility] .   Establishment or re-establishment of referral orders for community resources: No other necessary community resources.   Discussion with other health care providers: No discussion with other health care providers necessary.        Serena Post is an 83-year-old woman with a past medical history of hypertension, congestive heart failure, atrial fibrillation, cardiomyopathy, history of brain tumor, history of heart valve replacement with a mechanical valve, hyperlipidemia, history of pacemaker insertion, and a stroke in 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 The patient states she previously uses home oxygen intermittently but has needed it daily for the past weeks due to worsening shortness of breath. She also reports being hypotensive during the same time period. Symptoms are constant and moderate in severity, with associated symptoms including cough but no chest pain, fever, nausea, vomiting, or any other symptoms at this time. No sick contacts.     Upon arrival in the ER, initial vital signs showed tachypnea with hypoxia, which seemed to improve with oxygen supplementation; the rest of the vitals were stable. Abnormal laboratory results included an estimated glomerular filtration rate of 45, indicating reduced kidney function; an elevated Troponin I level at 0.143, a BNP level of 3,274, and a subtherapeutic INR of 1.6 (target range of 2.5 to 3.5 due to  her mechanical heart valve). All other laboratory components were within normal limits. Imaging results revealed small bilateral pleural effusions on chest x-ray, but no other significant cardiopulmonary disease was appreciated.  Hospital Medicine was called for admission to manage her complex conditions and provide further treatment and evaluation.     * No surgery found *       Hospital Course:   Serena is a 83 year old female who presented to ED for further evaluation of progressive dyspnea. Upon arrival patient noted to have mildly labored breathing with hypoxia and was placed on supplemental oxygen. BNP >3000. CXR showed bilateral pleural effusions. CTA negative for pulmonary embolism, but showed known ascending aortic aneurysm. Initial troponin 0.143 but is now trending down and felt to be related to demand ischemia from decompensated CHF. Echocardiogram shows an EF of 35%, global hypokinesis, and reduce right ventricle systolic function. Kidney function is stable. Cardiology is following.     2/24/24  Echocardiogram this admission shows a decline in EF 35% with global hypokinesis. Improving clinically. Cardiology recommends decreasing lasix to to QD. INR is not yet therapeutic. Cardiology will consider ORI this admission to assess mechanical AVR.      2/25/24  INR 2.7. stop Lovenox bridge discontinued. Patient is on 2L nasal cannula.     Pt weaned off O2 with continued diuresis, will check O2 sats with ambulation  Seen by cardiology for Mechanical valve dysfunction, will need ORI as outpatient for possible replacement.      Patient seen and examined on day of discharge.  Stable for discharge home.  She reports she would like to follow up with Dr. Perkins her usual cardiologist to evaluate her mechanical valve.      Pt's cardiologist is Dr. Perkins.  Was evaluated by [facility] card today.  Initiated on lasix 20mg, with instructions to follow up with Dr. Perkins to discuss ORI.  Denies SOB, palpitations or  SOB.  Reports low sodium diet    Past Medical History:  No date: A-fib  No date: Anticoagulant long-term use  No date: Aortic valve disease  No date: Cancer      Comment:  brain tumor, 10 years ago  No date: Cardiomyopathy  No date: CHF (congestive heart failure)  2022: COVID-19  No date: Hx of heart valve replacement with mechanical valve  No date: Hyperlipidemia  No date: Hypertension  No date: Pacemaker  No date: Pacemaker  2022: Sepsis  No date: Stroke      Comment:  , residual weakness   Current Outpatient Medications on File Prior to Visit:  albuterol (PROVENTIL) 2.5 mg /3 mL (0.083 %) nebulizer solution, Take 2.5 mg by nebulization every 4 (four) hours as needed., Disp: , Rfl:   [] enoxaparin (LOVENOX) 100 mg/mL Syrg, Inject 0.9 mLs (90 mg total) into the skin every 12 (twelve) hours. for 10 days. Discard the remainings from each syringe after use, Disp: 20 mL, Rfl: 0  fluticasone propionate (FLONASE) 50 mcg/actuation nasal spray, Shake well and use 2 sprays (100 mcg total) by Each Nostril route once daily., Disp: 16 g, Rfl: 0  furosemide (LASIX) 20 MG tablet, Take 20 mg by mouth every morning., Disp: , Rfl:   magnesium oxide (MAG-OX) 400 mg (241.3 mg magnesium) tablet, Take 1 tablet (400 mg total) by mouth once daily., Disp: 30 tablet, Rfl: 0  metoprolol tartrate (LOPRESSOR) 25 MG tablet, Take 1 tablet by mouth 2 (two) times daily., Disp: , Rfl:   miconazole nitrate 2% (MICOTIN) 2 % Oint, Apply topically 2 (two) times daily. for 7 days, Disp: 71 g, Rfl: 0  warfarin (COUMADIN) 2.5 MG tablet, Take 2.5 mg by mouth every Monday and Friday.  Take 1.25 mg by mouth all other days., Disp: , Rfl:     No current facility-administered medications on file prior to visit.         Review of Systems   Constitutional: Negative.    HENT: Negative.     Respiratory:  Negative for choking, shortness of breath and wheezing.    Cardiovascular:  Negative for chest pain, palpitations and leg swelling.    Genitourinary: Negative.    Musculoskeletal: Negative.    Neurological: Negative.    Psychiatric/Behavioral:  Positive for confusion.        Objective:      Physical Exam  Vitals reviewed.   Constitutional:       Appearance: She is obese.   HENT:      Head: Normocephalic.      Right Ear: External ear normal.      Left Ear: External ear normal.   Eyes:      Extraocular Movements: Extraocular movements intact.   Cardiovascular:      Rate and Rhythm: Normal rate.      Heart sounds: Normal heart sounds.   Pulmonary:      Effort: Pulmonary effort is normal. No respiratory distress.   Musculoskeletal:      Right lower le+ Pitting Edema present.      Left lower le+ Pitting Edema present.   Neurological:      Mental Status: She is alert and oriented to person, place, and time.         Assessment:       1. Acute on chronic respiratory failure with hypoxia    2. Paroxysmal atrial fibrillation    3. COPD exacerbation    4. Aortic aneurysm, unspecified portion of aorta, unspecified whether ruptured    5. Essential hypertension        Plan:   Acute on chronic respiratory failure with hypoxia    Paroxysmal atrial fibrillation    COPD exacerbation    Aortic aneurysm, unspecified portion of aorta, unspecified whether ruptured    Essential hypertension    Continue current treatment plan.  Follow up with card  No follow-ups on file.

## 2024-03-13 DIAGNOSIS — Z78.0 MENOPAUSE: ICD-10-CM

## 2024-03-23 PROCEDURE — G0179 MD RECERTIFICATION HHA PT: HCPCS | Mod: ,,, | Performed by: FAMILY MEDICINE

## 2024-03-26 ENCOUNTER — DOCUMENT SCAN (OUTPATIENT)
Dept: HOME HEALTH SERVICES | Facility: HOSPITAL | Age: 84
End: 2024-03-26
Payer: MEDICARE

## 2024-04-04 ENCOUNTER — APPOINTMENT (OUTPATIENT)
Dept: LAB | Facility: HOSPITAL | Age: 84
End: 2024-04-04
Attending: NURSE PRACTITIONER
Payer: MEDICARE

## 2024-04-04 DIAGNOSIS — N39.0 URINARY TRACT INFECTION, SITE NOT SPECIFIED: Primary | ICD-10-CM

## 2024-04-04 LAB
BACTERIA #/AREA URNS AUTO: ABNORMAL /HPF
BILIRUB UR QL STRIP: NEGATIVE
CLARITY UR REFRACT.AUTO: ABNORMAL
COLOR UR AUTO: YELLOW
GLUCOSE UR QL STRIP: NEGATIVE
HGB UR QL STRIP: NEGATIVE
HYALINE CASTS UR QL AUTO: 5 /LPF
KETONES UR QL STRIP: NEGATIVE
LEUKOCYTE ESTERASE UR QL STRIP: ABNORMAL
MICROSCOPIC COMMENT: ABNORMAL
NITRITE UR QL STRIP: POSITIVE
PH UR STRIP: 6 [PH] (ref 5–8)
PROT UR QL STRIP: NEGATIVE
RBC #/AREA URNS AUTO: 3 /HPF (ref 0–4)
SP GR UR STRIP: 1.01 (ref 1–1.03)
SQUAMOUS #/AREA URNS AUTO: 1 /HPF
URN SPEC COLLECT METH UR: ABNORMAL
WBC #/AREA URNS AUTO: >100 /HPF (ref 0–5)
WBC CLUMPS UR QL AUTO: ABNORMAL

## 2024-04-04 PROCEDURE — 87186 SC STD MICRODIL/AGAR DIL: CPT | Performed by: NURSE PRACTITIONER

## 2024-04-04 PROCEDURE — 87077 CULTURE AEROBIC IDENTIFY: CPT | Performed by: NURSE PRACTITIONER

## 2024-04-04 PROCEDURE — 81001 URINALYSIS AUTO W/SCOPE: CPT | Performed by: NURSE PRACTITIONER

## 2024-04-04 PROCEDURE — 87086 URINE CULTURE/COLONY COUNT: CPT | Performed by: NURSE PRACTITIONER

## 2024-04-04 PROCEDURE — 87088 URINE BACTERIA CULTURE: CPT | Performed by: NURSE PRACTITIONER

## 2024-04-07 LAB — BACTERIA UR CULT: ABNORMAL

## 2024-04-16 ENCOUNTER — EXTERNAL HOME HEALTH (OUTPATIENT)
Dept: HOME HEALTH SERVICES | Facility: HOSPITAL | Age: 84
End: 2024-04-16
Payer: MEDICARE

## 2024-04-23 ENCOUNTER — LAB VISIT (OUTPATIENT)
Dept: LAB | Facility: HOSPITAL | Age: 84
End: 2024-04-23
Attending: NURSE PRACTITIONER
Payer: MEDICARE

## 2024-04-23 DIAGNOSIS — N39.0 URINARY TRACT INFECTION, SITE NOT SPECIFIED: ICD-10-CM

## 2024-04-23 DIAGNOSIS — N39.0 URINARY TRACT INFECTION, SITE NOT SPECIFIED: Primary | ICD-10-CM

## 2024-04-23 LAB
BILIRUB UR QL STRIP: NEGATIVE
CLARITY UR REFRACT.AUTO: CLEAR
COLOR UR AUTO: YELLOW
GLUCOSE UR QL STRIP: NEGATIVE
HGB UR QL STRIP: NEGATIVE
KETONES UR QL STRIP: NEGATIVE
LEUKOCYTE ESTERASE UR QL STRIP: NEGATIVE
NITRITE UR QL STRIP: NEGATIVE
PH UR STRIP: 6 [PH] (ref 5–8)
PROT UR QL STRIP: ABNORMAL
SP GR UR STRIP: 1.02 (ref 1–1.03)
URN SPEC COLLECT METH UR: ABNORMAL

## 2024-04-23 PROCEDURE — 87086 URINE CULTURE/COLONY COUNT: CPT | Performed by: NURSE PRACTITIONER

## 2024-04-23 PROCEDURE — 81003 URINALYSIS AUTO W/O SCOPE: CPT | Performed by: NURSE PRACTITIONER

## 2024-04-25 LAB — BACTERIA UR CULT: NO GROWTH

## 2024-05-13 NOTE — PHARMACY MED REC
"Admission Medication History     The home medication history was taken by Goldie Mason.    You may go to "Admission" then "Reconcile Home Medications" tabs to review and/or act upon these items.     The home medication list has been updated by the Pharmacy department.   Please read ALL comments highlighted in yellow.   Please address this information as you see fit.    Feel free to contact us if you have any questions or require assistance.      The medications listed below were removed from the home medication list. Please reorder if appropriate:  Patient reports no longer taking the following medication(s):  Albuterol 90 mcg inhaler        Medications listed below were obtained from: Patient/family and Analytic software- ruiz Henson at bedside  (Not in a hospital admission)      LAST [facility] REC COMPLETED:         Goldie Mason  [phone]    Current Outpatient Medications on File Prior to Encounter   Medication Sig Dispense Refill Last Dose    furosemide (LASIX) 20 MG tablet Take 20 mg by mouth every morning.   2/22/2024    metoprolol tartrate (LOPRESSOR) 25 MG tablet Take 1 tablet by mouth 2 (two) times daily.   2/22/2024    warfarin (COUMADIN) 2.5 MG tablet Take 2.5 mg by mouth every Monday and Friday.  Take 1.25 mg by mouth all other days.   2/22/2024    albuterol (PROVENTIL) 2.5 mg /3 mL (0.083 %) nebulizer solution Take 2.5 mg by nebulization every 4 (four) hours as needed.   Unknown                           .          " Who prescribed these and for what? They're controlled meds which ci am not willing to prescribe.

## 2024-05-27 PROBLEM — J96.21 ACUTE ON CHRONIC RESPIRATORY FAILURE WITH HYPOXIA: Status: RESOLVED | Noted: 2024-02-23 | Resolved: 2024-05-27

## 2024-06-25 ENCOUNTER — DOCUMENT SCAN (OUTPATIENT)
Dept: HOME HEALTH SERVICES | Facility: HOSPITAL | Age: 84
End: 2024-06-25
Payer: MEDICARE

## 2024-08-01 ENCOUNTER — HOSPITAL ENCOUNTER (INPATIENT)
Facility: HOSPITAL | Age: 84
LOS: 5 days | Discharge: HOME HEALTH | DRG: 177 | End: 2024-08-06
Attending: EMERGENCY MEDICINE | Admitting: INTERNAL MEDICINE
Payer: MEDICARE

## 2024-08-01 DIAGNOSIS — U07.1 COVID-19: Primary | ICD-10-CM

## 2024-08-01 DIAGNOSIS — U07.1 COVID-19 VIRUS DETECTED: ICD-10-CM

## 2024-08-01 DIAGNOSIS — Z79.01 ANTICOAGULANT LONG-TERM USE: ICD-10-CM

## 2024-08-01 DIAGNOSIS — R06.02 SHORTNESS OF BREATH: ICD-10-CM

## 2024-08-01 DIAGNOSIS — I50.9 ACUTE ON CHRONIC CONGESTIVE HEART FAILURE, UNSPECIFIED HEART FAILURE TYPE: ICD-10-CM

## 2024-08-01 DIAGNOSIS — T17.900S PULMONARY ASPIRATION, SEQUELA: ICD-10-CM

## 2024-08-01 PROBLEM — I95.9 HYPOTENSION: Status: ACTIVE | Noted: 2024-08-01

## 2024-08-01 PROBLEM — I50.23 ACUTE ON CHRONIC SYSTOLIC CHF (CONGESTIVE HEART FAILURE): Status: ACTIVE | Noted: 2024-08-01

## 2024-08-01 PROBLEM — J44.9 COPD WITHOUT EXACERBATION: Status: ACTIVE | Noted: 2024-08-01

## 2024-08-01 PROBLEM — J96.11 CHRONIC HYPOXIC RESPIRATORY FAILURE: Status: ACTIVE | Noted: 2024-08-01

## 2024-08-01 PROBLEM — E87.6 HYPOKALEMIA: Status: ACTIVE | Noted: 2024-08-01

## 2024-08-01 LAB
ALBUMIN SERPL BCP-MCNC: 3.2 G/DL (ref 3.5–5.2)
ALLENS TEST: ABNORMAL
ALP SERPL-CCNC: 87 U/L (ref 55–135)
ALT SERPL W/O P-5'-P-CCNC: 9 U/L (ref 10–44)
ANION GAP SERPL CALC-SCNC: 16 MMOL/L (ref 8–16)
AST SERPL-CCNC: 19 U/L (ref 10–40)
BASOPHILS # BLD AUTO: 0.03 K/UL (ref 0–0.2)
BASOPHILS NFR BLD: 0.5 % (ref 0–1.9)
BILIRUB SERPL-MCNC: 2.4 MG/DL (ref 0.1–1)
BNP SERPL-MCNC: >4900 PG/ML (ref 0–99)
BUN SERPL-MCNC: 13 MG/DL (ref 8–23)
CALCIUM SERPL-MCNC: 8.5 MG/DL (ref 8.7–10.5)
CHLORIDE SERPL-SCNC: 101 MMOL/L (ref 95–110)
CO2 SERPL-SCNC: 21 MMOL/L (ref 23–29)
CREAT SERPL-MCNC: 1.3 MG/DL (ref 0.5–1.4)
DELSYS: ABNORMAL
DIFFERENTIAL METHOD BLD: ABNORMAL
EOSINOPHIL # BLD AUTO: 0 K/UL (ref 0–0.5)
EOSINOPHIL NFR BLD: 0 % (ref 0–8)
ERYTHROCYTE [DISTWIDTH] IN BLOOD BY AUTOMATED COUNT: 15.8 % (ref 11.5–14.5)
EST. GFR  (NO RACE VARIABLE): 41 ML/MIN/1.73 M^2
FIO2: 32
FLOW: 3
GLUCOSE SERPL-MCNC: 124 MG/DL (ref 70–110)
HCO3 UR-SCNC: 28 MMOL/L (ref 24–28)
HCT VFR BLD AUTO: 41.7 % (ref 37–48.5)
HGB BLD-MCNC: 13.6 G/DL (ref 12–16)
IMM GRANULOCYTES # BLD AUTO: 0.03 K/UL (ref 0–0.04)
IMM GRANULOCYTES NFR BLD AUTO: 0.5 % (ref 0–0.5)
LACTATE SERPL-SCNC: 1.6 MMOL/L (ref 0.5–2.2)
LYMPHOCYTES # BLD AUTO: 0.4 K/UL (ref 1–4.8)
LYMPHOCYTES NFR BLD: 5.6 % (ref 18–48)
MCH RBC QN AUTO: 28.5 PG (ref 27–31)
MCHC RBC AUTO-ENTMCNC: 32.6 G/DL (ref 32–36)
MCV RBC AUTO: 87 FL (ref 82–98)
MODE: ABNORMAL
MONOCYTES # BLD AUTO: 0.6 K/UL (ref 0.3–1)
MONOCYTES NFR BLD: 9.2 % (ref 4–15)
NEUTROPHILS # BLD AUTO: 5.3 K/UL (ref 1.8–7.7)
NEUTROPHILS NFR BLD: 84.2 % (ref 38–73)
NRBC BLD-RTO: 0 /100 WBC
PCO2 BLDA: 40.5 MMHG (ref 35–45)
PH SMN: 7.45 [PH] (ref 7.35–7.45)
PLATELET # BLD AUTO: 141 K/UL (ref 150–450)
PMV BLD AUTO: 12.3 FL (ref 9.2–12.9)
PO2 BLDA: 59 MMHG (ref 80–100)
POC BE: 4 MMOL/L
POC SATURATED O2: 91 % (ref 95–100)
POTASSIUM SERPL-SCNC: 3.3 MMOL/L (ref 3.5–5.1)
PROT SERPL-MCNC: 7 G/DL (ref 6–8.4)
RBC # BLD AUTO: 4.77 M/UL (ref 4–5.4)
SAMPLE: ABNORMAL
SARS-COV-2 RDRP RESP QL NAA+PROBE: POSITIVE
SITE: ABNORMAL
SODIUM SERPL-SCNC: 138 MMOL/L (ref 136–145)
TROPONIN I SERPL DL<=0.01 NG/ML-MCNC: 0.09 NG/ML (ref 0–0.03)
WBC # BLD AUTO: 6.28 K/UL (ref 3.9–12.7)

## 2024-08-01 PROCEDURE — 93010 ELECTROCARDIOGRAM REPORT: CPT | Mod: ,,, | Performed by: INTERNAL MEDICINE

## 2024-08-01 PROCEDURE — 36600 WITHDRAWAL OF ARTERIAL BLOOD: CPT

## 2024-08-01 PROCEDURE — 63600175 PHARM REV CODE 636 W HCPCS: Performed by: INTERNAL MEDICINE

## 2024-08-01 PROCEDURE — 83735 ASSAY OF MAGNESIUM: CPT | Performed by: INTERNAL MEDICINE

## 2024-08-01 PROCEDURE — 82803 BLOOD GASES ANY COMBINATION: CPT

## 2024-08-01 PROCEDURE — 25000003 PHARM REV CODE 250: Performed by: INTERNAL MEDICINE

## 2024-08-01 PROCEDURE — 83880 ASSAY OF NATRIURETIC PEPTIDE: CPT | Performed by: NURSE PRACTITIONER

## 2024-08-01 PROCEDURE — 84484 ASSAY OF TROPONIN QUANT: CPT | Mod: 91 | Performed by: INTERNAL MEDICINE

## 2024-08-01 PROCEDURE — 96374 THER/PROPH/DIAG INJ IV PUSH: CPT

## 2024-08-01 PROCEDURE — 27000207 HC ISOLATION

## 2024-08-01 PROCEDURE — 84484 ASSAY OF TROPONIN QUANT: CPT | Performed by: NURSE PRACTITIONER

## 2024-08-01 PROCEDURE — 83605 ASSAY OF LACTIC ACID: CPT | Performed by: EMERGENCY MEDICINE

## 2024-08-01 PROCEDURE — U0002 COVID-19 LAB TEST NON-CDC: HCPCS | Performed by: NURSE PRACTITIONER

## 2024-08-01 PROCEDURE — 80053 COMPREHEN METABOLIC PANEL: CPT | Performed by: NURSE PRACTITIONER

## 2024-08-01 PROCEDURE — 86140 C-REACTIVE PROTEIN: CPT | Performed by: INTERNAL MEDICINE

## 2024-08-01 PROCEDURE — 85025 COMPLETE CBC W/AUTO DIFF WBC: CPT | Performed by: NURSE PRACTITIONER

## 2024-08-01 PROCEDURE — 82728 ASSAY OF FERRITIN: CPT | Performed by: INTERNAL MEDICINE

## 2024-08-01 PROCEDURE — 11000001 HC ACUTE MED/SURG PRIVATE ROOM

## 2024-08-01 PROCEDURE — 99285 EMERGENCY DEPT VISIT HI MDM: CPT | Mod: 25

## 2024-08-01 PROCEDURE — 93005 ELECTROCARDIOGRAM TRACING: CPT

## 2024-08-01 PROCEDURE — 27000221 HC OXYGEN, UP TO 24 HOURS

## 2024-08-01 PROCEDURE — 99900035 HC TECH TIME PER 15 MIN (STAT)

## 2024-08-01 PROCEDURE — 85610 PROTHROMBIN TIME: CPT | Performed by: INTERNAL MEDICINE

## 2024-08-01 PROCEDURE — 84145 PROCALCITONIN (PCT): CPT | Performed by: INTERNAL MEDICINE

## 2024-08-01 RX ORDER — HYDRALAZINE HYDROCHLORIDE 20 MG/ML
10 INJECTION INTRAMUSCULAR; INTRAVENOUS EVERY 6 HOURS PRN
Status: DISCONTINUED | OUTPATIENT
Start: 2024-08-02 | End: 2024-08-06 | Stop reason: HOSPADM

## 2024-08-01 RX ORDER — FLUTICASONE PROPIONATE 50 MCG
2 SPRAY, SUSPENSION (ML) NASAL DAILY
Status: DISCONTINUED | OUTPATIENT
Start: 2024-08-02 | End: 2024-08-06 | Stop reason: HOSPADM

## 2024-08-01 RX ORDER — SODIUM CHLORIDE 0.9 % (FLUSH) 0.9 %
10 SYRINGE (ML) INJECTION
Status: DISCONTINUED | OUTPATIENT
Start: 2024-08-02 | End: 2024-08-06 | Stop reason: HOSPADM

## 2024-08-01 RX ORDER — POTASSIUM CHLORIDE 20 MEQ/1
20 TABLET, EXTENDED RELEASE ORAL 2 TIMES DAILY
Status: DISCONTINUED | OUTPATIENT
Start: 2024-08-02 | End: 2024-08-06 | Stop reason: HOSPADM

## 2024-08-01 RX ORDER — WARFARIN 2 MG/1
2 TABLET ORAL DAILY
Status: COMPLETED | OUTPATIENT
Start: 2024-08-02 | End: 2024-08-02

## 2024-08-01 RX ORDER — FUROSEMIDE 10 MG/ML
20 INJECTION INTRAMUSCULAR; INTRAVENOUS
Status: DISCONTINUED | OUTPATIENT
Start: 2024-08-01 | End: 2024-08-01

## 2024-08-01 RX ORDER — FUROSEMIDE 10 MG/ML
40 INJECTION INTRAMUSCULAR; INTRAVENOUS EVERY 12 HOURS
Status: DISCONTINUED | OUTPATIENT
Start: 2024-08-02 | End: 2024-08-02

## 2024-08-01 RX ORDER — IPRATROPIUM BROMIDE AND ALBUTEROL SULFATE 2.5; .5 MG/3ML; MG/3ML
3 SOLUTION RESPIRATORY (INHALATION) EVERY 6 HOURS
Status: DISCONTINUED | OUTPATIENT
Start: 2024-08-02 | End: 2024-08-06 | Stop reason: HOSPADM

## 2024-08-01 RX ORDER — FUROSEMIDE 10 MG/ML
40 INJECTION INTRAMUSCULAR; INTRAVENOUS ONCE
Status: COMPLETED | OUTPATIENT
Start: 2024-08-01 | End: 2024-08-01

## 2024-08-01 RX ORDER — AMOXICILLIN AND CLAVULANATE POTASSIUM 875; 125 MG/1; MG/1
1 TABLET, FILM COATED ORAL EVERY 12 HOURS
Status: ON HOLD | COMMUNITY
Start: 2024-07-26 | End: 2024-08-06 | Stop reason: HOSPADM

## 2024-08-01 RX ORDER — ACETAMINOPHEN 325 MG/1
650 TABLET ORAL EVERY 6 HOURS PRN
Status: DISCONTINUED | OUTPATIENT
Start: 2024-08-02 | End: 2024-08-06 | Stop reason: HOSPADM

## 2024-08-01 RX ORDER — ONDANSETRON HYDROCHLORIDE 2 MG/ML
4 INJECTION, SOLUTION INTRAVENOUS EVERY 6 HOURS PRN
Status: DISCONTINUED | OUTPATIENT
Start: 2024-08-02 | End: 2024-08-06 | Stop reason: HOSPADM

## 2024-08-01 RX ADMIN — DEXAMETHASONE 6 MG: 4 TABLET ORAL at 11:08

## 2024-08-01 RX ADMIN — POTASSIUM BICARBONATE 40 MEQ: 391 TABLET, EFFERVESCENT ORAL at 11:08

## 2024-08-01 RX ADMIN — FUROSEMIDE 40 MG: 10 INJECTION, SOLUTION INTRAMUSCULAR; INTRAVENOUS at 08:08

## 2024-08-01 NOTE — FIRST PROVIDER EVALUATION
Medical screening examination initiated.  I have conducted a focused provider triage encounter, findings are as follows:    Brief history of present illness:  Reports cough and shortness of breath.  COVID positive    Vitals:    08/01/24 1419   BP: 115/67   BP Location: Right arm   Pulse: 88   Resp: 18   Temp: 99.9 °F (37.7 °C)   TempSrc: Oral   SpO2: 97%   Weight: 86 kg (189 lb 8 oz)       Pertinent physical exam:  No acute distress    Brief workup plan:  Workup    Preliminary workup initiated; this workup will be continued and followed by the physician or advanced practice provider that is assigned to the patient when roomed.

## 2024-08-01 NOTE — ED PROVIDER NOTES
SCRIBE #1 NOTE: I, Charles Ibarra, am scribing for, and in the presence of, Andres Cordova DO. I have scribed the entire note.       History     Chief Complaint   Patient presents with    COVID-19 Concerns     Pt with history of CHF and COPD had a positive covid test today by home health.  She c/o cough.  She is on 2.5 L home oxygen.       Review of patient's allergies indicates:   Allergen Reactions    Amlodipine Other (See Comments)     Not sure    Chlorthalidone Other (See Comments)     Not sure    Clonidine Other (See Comments)     Not sure    Codeine Other (See Comments)     Not sure    Escitalopram Other (See Comments)     Not sure    Lisinopril Other (See Comments)     Not sure    Losartan Other (See Comments)     Not sure    Meperidine Other (See Comments)     Not sure    Methadone Other (See Comments)     Not sure      Morphine Other (See Comments)     Not sure    Nebivolol Other (See Comments)     Not sure        Nitrofurantoin Other (See Comments)     Not sure        Omeprazole Other (See Comments)     Not sure      Pravastatin Other (See Comments)     Not sure      Propoxyphene Other (See Comments)     Not sure      Sulfamethoxazole-trimethoprim Other (See Comments)     Not sure             History of Present Illness     HPI    8/1/2024, 5:03 PM  History obtained from the patient and son      History of Present Illness: Serena Post is a 84 y.o. female patient with a PMHx of a- fib, CHF, COPD, HLD, HTN, and brain cancer who presents to the Emergency Department for evaluation of Covid-19 concerns which onset gradually 3 days ago. Sxs include cough with mucus, decreased appetite, and nausea. Pt tested positive for Covid with home health earlier today. Home health also said pt may have fluid in her lungs. Pt is on 2.5 L of O2 at home. Symptoms are constant and moderate in severity. No mitigating or exacerbating factors reported. Patient denies any emesis, CP, and all other sxs at this time.  Pt recently stopped taking 5 days of antibiotic medication for a UTI. No prior Tx reported. No further complaints or concerns at this time.       Arrival mode: Personal vehicle      PCP: Evangelina Olivares MD        Past Medical History:  Past Medical History:   Diagnosis Date    A-fib     Anticoagulant long-term use     Aortic valve disease     Cancer     brain tumor, 10 years ago    Cardiomyopathy     CHF (congestive heart failure)     COVID-19 7/23/2022    Hx of heart valve replacement with mechanical valve     Hyperlipidemia     Hypertension     Pacemaker     Pacemaker     Sepsis 7/23/2022    Stroke     2007, residual weakness       Past Surgical History:  Past Surgical History:   Procedure Laterality Date    AORTIC VALVE REPLACEMENT      CHOLECYSTECTOMY      CORONARY ARTERY BYPASS GRAFT      HYSTERECTOMY      INSERTION OF PACEMAKER      TONSILLECTOMY           Family History:  Family History   Problem Relation Name Age of Onset    No Known Problems Mother      No Known Problems Father         Social History:  Social History     Tobacco Use    Smoking status: Never     Passive exposure: Never    Smokeless tobacco: Never   Substance and Sexual Activity    Alcohol use: Not Currently    Drug use: Never    Sexual activity: Not on file        Review of Systems     Review of Systems   Constitutional:  Positive for appetite change (decreased). Negative for fever.   HENT:  Negative for sore throat.    Respiratory:  Positive for cough (with mucus). Negative for shortness of breath.    Cardiovascular:  Negative for chest pain.   Gastrointestinal:  Positive for nausea. Negative for vomiting.   Genitourinary:  Negative for dysuria.   Musculoskeletal:  Negative for back pain.   Skin:  Negative for rash.   Neurological:  Negative for weakness.   Hematological:  Does not bruise/bleed easily.   All other systems reviewed and are negative.       Physical Exam     Initial Vitals [08/01/24 1419]   BP Pulse Resp Temp SpO2    115/67 88 18 99.9 °F (37.7 °C) 97 %      MAP       --          Physical Exam  Vitals reviewed.   Constitutional:       General: She is not in acute distress.  Cardiovascular:      Rate and Rhythm: Normal rate and regular rhythm.      Heart sounds: No murmur heard.  Pulmonary:      Comments: Tachypnea, decreased breath sounds bilaterally  Abdominal:      Palpations: Abdomen is soft.      Tenderness: There is no abdominal tenderness.   Skin:     General: Skin is warm and dry.      Findings: No rash.   Neurological:      General: No focal deficit present.      Mental Status: She is alert and oriented to person, place, and time.            ED Course   Critical Care    Date/Time: 8/1/2024 10:15 PM    Performed by: Andres Cordova DO  Authorized by: Andres Cordova DO  Direct patient critical care time: 20 minutes  Additional history critical care time: 5 minutes  Ordering / reviewing critical care time: 5 minutes  Documentation critical care time: 5 minutes  Consulting other physicians critical care time: 10 minutes  Total critical care time (exclusive of procedural time) : 45 minutes  Critical care time was exclusive of separately billable procedures and treating other patients.  Critical care was necessary to treat or prevent imminent or life-threatening deterioration of the following conditions: cardiac failure.  Critical care was time spent personally by me on the following activities: discussions with consultants, interpretation of cardiac output measurements, evaluation of patient's response to treatment, examination of patient, obtaining history from patient or surrogate, ordering and performing treatments and interventions, ordering and review of laboratory studies, ordering and review of radiographic studies, pulse oximetry, re-evaluation of patient's condition and review of old charts.        ED Vital Signs:  Vitals:    08/05/24 0405 08/05/24 0431 08/05/24 0500 08/05/24 0740   BP:  109/63    "  Pulse: 80 85 88 83   Resp:  18  18   Temp:  97.5 °F (36.4 °C)     TempSrc:  Oral     SpO2:  (!) 92%  (!) 93%   Weight:   90.3 kg (199 lb 1.2 oz)    Height:   5' 4" (1.626 m)     08/05/24 0759 08/05/24 0806 08/05/24 1124 08/05/24 1300   BP:  116/64 128/61    Pulse: 86 86 93 89   Resp:  18 18 20   Temp:  97.8 °F (36.6 °C) 97.6 °F (36.4 °C)    TempSrc:  Axillary Oral    SpO2:  (!) 94% (!) 91% (!) 92%   Weight:       Height:        08/05/24 1303 08/05/24 1447 08/05/24 1531 08/05/24 1920   BP:   112/65 113/66   Pulse: 93 94 91 88   Resp: 19  18 18   Temp:   97.8 °F (36.6 °C) 97.8 °F (36.6 °C)   TempSrc:   Oral Oral   SpO2: (!) 92%  (!) 92% (!) 93%   Weight:       Height:        08/05/24 1953 08/05/24 2000 08/05/24 2100   BP:      Pulse: 89 82 84   Resp: 18     Temp:      TempSrc:      SpO2: (!) 93%     Weight:      Height:          Abnormal Lab Results:  Labs Reviewed   CBC W/ AUTO DIFFERENTIAL - Abnormal       Result Value    WBC 6.28      RBC 4.77      Hemoglobin 13.6      Hematocrit 41.7      MCV 87      MCH 28.5      MCHC 32.6      RDW 15.8 (*)     Platelets 141 (*)     MPV 12.3      Immature Granulocytes 0.5      Gran # (ANC) 5.3      Immature Grans (Abs) 0.03      Lymph # 0.4 (*)     Mono # 0.6      Eos # 0.0      Baso # 0.03      nRBC 0      Gran % 84.2 (*)     Lymph % 5.6 (*)     Mono % 9.2      Eosinophil % 0.0      Basophil % 0.5      Differential Method Automated     COMPREHENSIVE METABOLIC PANEL - Abnormal    Sodium 138      Potassium 3.3 (*)     Chloride 101      CO2 21 (*)     Glucose 124 (*)     BUN 13      Creatinine 1.3      Calcium 8.5 (*)     Total Protein 7.0      Albumin 3.2 (*)     Total Bilirubin 2.4 (*)     Alkaline Phosphatase 87      AST 19      ALT 9 (*)     eGFR 41 (*)     Anion Gap 16     TROPONIN I - Abnormal    Troponin I 0.088 (*)    B-TYPE NATRIURETIC PEPTIDE - Abnormal    BNP >4,900 (*)    SARS-COV-2 RNA AMPLIFICATION, QUAL - Abnormal    SARS-CoV-2 RNA, Amplification, Qual Positive (*) "    PROTIME-INR - Abnormal    Prothrombin Time 32.4 (*)     INR 3.0 (*)    C-REACTIVE PROTEIN - Abnormal    CRP 51.4 (*)    TROPONIN I - Abnormal    Troponin I 0.109 (*)    ISTAT PROCEDURE - Abnormal    POC PH 7.448      POC PCO2 40.5      POC PO2 59 (*)     POC HCO3 28.0      POC BE 4 (*)     POC SATURATED O2 91      Sample ARTERIAL      Site LR      Allens Test Pass      DelSys Nasal Can      Mode SPONT      Flow 3      FiO2 32     LACTIC ACID, PLASMA    Lactate (Lactic Acid) 1.6     MAGNESIUM    Magnesium 1.6     PROCALCITONIN    Procalcitonin 0.24     FERRITIN    Ferritin 55     TROPONIN I        All Lab Results:  Results for orders placed or performed during the hospital encounter of 08/01/24   Culture, Respiratory with Gram Stain    Specimen: Sputum, Expectorated   Result Value Ref Range    Respiratory Culture Normal respiratory doc     Respiratory Culture No S aureus or Pseudomonas isolated.     Gram Stain (Respiratory) >10 epithelial cells per low power field     Gram Stain (Respiratory) Rare WBC's     Gram Stain (Respiratory) Rare Gram positive cocci     Gram Stain (Respiratory) Rare Gram negative rods    CBC Auto Differential   Result Value Ref Range    WBC 6.28 3.90 - 12.70 K/uL    RBC 4.77 4.00 - 5.40 M/uL    Hemoglobin 13.6 12.0 - 16.0 g/dL    Hematocrit 41.7 37.0 - 48.5 %    MCV 87 82 - 98 fL    MCH 28.5 27.0 - 31.0 pg    MCHC 32.6 32.0 - 36.0 g/dL    RDW 15.8 (H) 11.5 - 14.5 %    Platelets 141 (L) 150 - 450 K/uL    MPV 12.3 9.2 - 12.9 fL    Immature Granulocytes 0.5 0.0 - 0.5 %    Gran # (ANC) 5.3 1.8 - 7.7 K/uL    Immature Grans (Abs) 0.03 0.00 - 0.04 K/uL    Lymph # 0.4 (L) 1.0 - 4.8 K/uL    Mono # 0.6 0.3 - 1.0 K/uL    Eos # 0.0 0.0 - 0.5 K/uL    Baso # 0.03 0.00 - 0.20 K/uL    nRBC 0 0 /100 WBC    Gran % 84.2 (H) 38.0 - 73.0 %    Lymph % 5.6 (L) 18.0 - 48.0 %    Mono % 9.2 4.0 - 15.0 %    Eosinophil % 0.0 0.0 - 8.0 %    Basophil % 0.5 0.0 - 1.9 %    Differential Method Automated    Comprehensive  Metabolic Panel   Result Value Ref Range    Sodium 138 136 - 145 mmol/L    Potassium 3.3 (L) 3.5 - 5.1 mmol/L    Chloride 101 95 - 110 mmol/L    CO2 21 (L) 23 - 29 mmol/L    Glucose 124 (H) 70 - 110 mg/dL    BUN 13 8 - 23 mg/dL    Creatinine 1.3 0.5 - 1.4 mg/dL    Calcium 8.5 (L) 8.7 - 10.5 mg/dL    Total Protein 7.0 6.0 - 8.4 g/dL    Albumin 3.2 (L) 3.5 - 5.2 g/dL    Total Bilirubin 2.4 (H) 0.1 - 1.0 mg/dL    Alkaline Phosphatase 87 55 - 135 U/L    AST 19 10 - 40 U/L    ALT 9 (L) 10 - 44 U/L    eGFR 41 (A) >60 mL/min/1.73 m^2    Anion Gap 16 8 - 16 mmol/L   Troponin I   Result Value Ref Range    Troponin I 0.088 (H) 0.000 - 0.026 ng/mL   Brain natriuretic peptide   Result Value Ref Range    BNP >4,900 (H) 0 - 99 pg/mL   COVID-19 Rapid Screening   Result Value Ref Range    SARS-CoV-2 RNA, Amplification, Qual Positive (A) Negative   Lactic acid, plasma   Result Value Ref Range    Lactate (Lactic Acid) 1.6 0.5 - 2.2 mmol/L   Magnesium   Result Value Ref Range    Magnesium 1.6 1.6 - 2.6 mg/dL   Protime-INR   Result Value Ref Range    Prothrombin Time 32.4 (H) 9.0 - 12.5 sec    INR 3.0 (H) 0.8 - 1.2   Procalcitonin   Result Value Ref Range    Procalcitonin 0.24 <0.25 ng/mL   Ferritin   Result Value Ref Range    Ferritin 55 20.0 - 300.0 ng/mL   C-reactive protein   Result Value Ref Range    CRP 51.4 (H) 0.0 - 8.2 mg/L   Troponin I   Result Value Ref Range    Troponin I 0.109 (H) 0.000 - 0.026 ng/mL   Basic metabolic panel   Result Value Ref Range    Sodium 136 136 - 145 mmol/L    Potassium 3.2 (L) 3.5 - 5.1 mmol/L    Chloride 100 95 - 110 mmol/L    CO2 23 23 - 29 mmol/L    Glucose 111 (H) 70 - 110 mg/dL    BUN 15 8 - 23 mg/dL    Creatinine 1.2 0.5 - 1.4 mg/dL    Calcium 8.5 (L) 8.7 - 10.5 mg/dL    Anion Gap 13 8 - 16 mmol/L    eGFR 45 (A) >60 mL/min/1.73 m^2   CBC Auto Differential   Result Value Ref Range    WBC 5.39 3.90 - 12.70 K/uL    RBC 4.47 4.00 - 5.40 M/uL    Hemoglobin 12.7 12.0 - 16.0 g/dL    Hematocrit 39.6  37.0 - 48.5 %    MCV 89 82 - 98 fL    MCH 28.4 27.0 - 31.0 pg    MCHC 32.1 32.0 - 36.0 g/dL    RDW 15.9 (H) 11.5 - 14.5 %    Platelets 105 (L) 150 - 450 K/uL    MPV 13.3 (H) 9.2 - 12.9 fL    Immature Granulocytes 0.6 (H) 0.0 - 0.5 %    Gran # (ANC) 4.7 1.8 - 7.7 K/uL    Immature Grans (Abs) 0.03 0.00 - 0.04 K/uL    Lymph # 0.3 (L) 1.0 - 4.8 K/uL    Mono # 0.3 0.3 - 1.0 K/uL    Eos # 0.0 0.0 - 0.5 K/uL    Baso # 0.03 0.00 - 0.20 K/uL    nRBC 0 0 /100 WBC    Gran % 87.6 (H) 38.0 - 73.0 %    Lymph % 5.6 (L) 18.0 - 48.0 %    Mono % 5.6 4.0 - 15.0 %    Eosinophil % 0.0 0.0 - 8.0 %    Basophil % 0.6 0.0 - 1.9 %    Platelet Estimate Decreased (A)     Aniso Slight     Tear Drop Cells Occasional     Differential Method Automated    Protime-INR   Result Value Ref Range    Prothrombin Time 31.4 (H) 9.0 - 12.5 sec    INR 2.9 (H) 0.8 - 1.2   Magnesium   Result Value Ref Range    Magnesium 1.7 1.6 - 2.6 mg/dL   Basic metabolic panel   Result Value Ref Range    Sodium 138 136 - 145 mmol/L    Potassium 3.5 3.5 - 5.1 mmol/L    Chloride 101 95 - 110 mmol/L    CO2 25 23 - 29 mmol/L    Glucose 100 70 - 110 mg/dL    BUN 20 8 - 23 mg/dL    Creatinine 1.4 0.5 - 1.4 mg/dL    Calcium 8.7 8.7 - 10.5 mg/dL    Anion Gap 12 8 - 16 mmol/L    eGFR 38 (A) >60 mL/min/1.73 m^2   Protime-INR   Result Value Ref Range    Prothrombin Time 29.8 (H) 9.0 - 12.5 sec    INR 2.7 (H) 0.8 - 1.2   CBC Auto Differential   Result Value Ref Range    WBC 5.58 3.90 - 12.70 K/uL    RBC 4.25 4.00 - 5.40 M/uL    Hemoglobin 12.0 12.0 - 16.0 g/dL    Hematocrit 37.8 37.0 - 48.5 %    MCV 89 82 - 98 fL    MCH 28.2 27.0 - 31.0 pg    MCHC 31.7 (L) 32.0 - 36.0 g/dL    RDW 16.0 (H) 11.5 - 14.5 %    Platelets 119 (L) 150 - 450 K/uL    MPV 12.3 9.2 - 12.9 fL    Immature Granulocytes 0.4 0.0 - 0.5 %    Gran # (ANC) 4.6 1.8 - 7.7 K/uL    Immature Grans (Abs) 0.02 0.00 - 0.04 K/uL    Lymph # 0.4 (L) 1.0 - 4.8 K/uL    Mono # 0.7 0.3 - 1.0 K/uL    Eos # 0.0 0.0 - 0.5 K/uL    Baso #  0.00 0.00 - 0.20 K/uL    nRBC 0 0 /100 WBC    Gran % 81.5 (H) 38.0 - 73.0 %    Lymph % 6.3 (L) 18.0 - 48.0 %    Mono % 11.8 4.0 - 15.0 %    Eosinophil % 0.0 0.0 - 8.0 %    Basophil % 0.0 0.0 - 1.9 %    Differential Method Automated    Magnesium   Result Value Ref Range    Magnesium 1.8 1.6 - 2.6 mg/dL   Basic metabolic panel   Result Value Ref Range    Sodium 137 136 - 145 mmol/L    Potassium 3.7 3.5 - 5.1 mmol/L    Chloride 101 95 - 110 mmol/L    CO2 28 23 - 29 mmol/L    Glucose 103 70 - 110 mg/dL    BUN 23 8 - 23 mg/dL    Creatinine 1.3 0.5 - 1.4 mg/dL    Calcium 8.5 (L) 8.7 - 10.5 mg/dL    Anion Gap 8 8 - 16 mmol/L    eGFR 41 (A) >60 mL/min/1.73 m^2   Protime-INR   Result Value Ref Range    Prothrombin Time 25.3 (H) 9.0 - 12.5 sec    INR 2.3 (H) 0.8 - 1.2   BNP   Result Value Ref Range    BNP 3,796 (H) 0 - 99 pg/mL   Basic metabolic panel   Result Value Ref Range    Sodium 138 136 - 145 mmol/L    Potassium 4.0 3.5 - 5.1 mmol/L    Chloride 102 95 - 110 mmol/L    CO2 27 23 - 29 mmol/L    Glucose 104 70 - 110 mg/dL    BUN 23 8 - 23 mg/dL    Creatinine 1.2 0.5 - 1.4 mg/dL    Calcium 8.8 8.7 - 10.5 mg/dL    Anion Gap 9 8 - 16 mmol/L    eGFR 45 (A) >60 mL/min/1.73 m^2   Protime-INR   Result Value Ref Range    Prothrombin Time 22.3 (H) 9.0 - 12.5 sec    INR 2.0 (H) 0.8 - 1.2   CBC Auto Differential   Result Value Ref Range    WBC 5.98 3.90 - 12.70 K/uL    RBC 4.51 4.00 - 5.40 M/uL    Hemoglobin 12.8 12.0 - 16.0 g/dL    Hematocrit 40.3 37.0 - 48.5 %    MCV 89 82 - 98 fL    MCH 28.4 27.0 - 31.0 pg    MCHC 31.8 (L) 32.0 - 36.0 g/dL    RDW 16.2 (H) 11.5 - 14.5 %    Platelets 120 (L) 150 - 450 K/uL    MPV 12.3 9.2 - 12.9 fL    Immature Granulocytes 0.5 0.0 - 0.5 %    Gran # (ANC) 4.8 1.8 - 7.7 K/uL    Immature Grans (Abs) 0.03 0.00 - 0.04 K/uL    Lymph # 0.4 (L) 1.0 - 4.8 K/uL    Mono # 0.8 0.3 - 1.0 K/uL    Eos # 0.0 0.0 - 0.5 K/uL    Baso # 0.01 0.00 - 0.20 K/uL    nRBC 0 0 /100 WBC    Gran % 79.9 (H) 38.0 - 73.0 %     Lymph % 6.2 (L) 18.0 - 48.0 %    Mono % 13.2 4.0 - 15.0 %    Eosinophil % 0.0 0.0 - 8.0 %    Basophil % 0.2 0.0 - 1.9 %    Differential Method Automated    Magnesium   Result Value Ref Range    Magnesium 1.9 1.6 - 2.6 mg/dL   ISTAT PROCEDURE   Result Value Ref Range    POC PH 7.448 7.35 - 7.45    POC PCO2 40.5 35 - 45 mmHg    POC PO2 59 (LL) 80 - 100 mmHg    POC HCO3 28.0 24 - 28 mmol/L    POC BE 4 (H) -2 to 2 mmol/L    POC SATURATED O2 91 95 - 100 %    Sample ARTERIAL     Site LR     Allens Test Pass     DelSys Nasal Can     Mode SPONT     Flow 3     FiO2 32         Imaging Results:  Imaging Results              X-Ray Chest 1 View (Final result)  Result time 08/01/24 16:47:59      Final result by Emery Arrieta MD (08/01/24 16:47:59)                   Impression:      1.  Chronic or recurrent left effusion with adjacent atelectasis/consolidation.  Pneumonia with a parapneumonic effusion could have this appearance in the right clinical setting.  Less likely could these changes be related to CHF.    2.  Negative for acute process otherwise.    3.  Stable findings as noted above.      Electronically signed by: Emery Arrieta MD  Date:    08/01/2024  Time:    16:47               Narrative:    EXAMINATION:  XR CHEST 1 VIEW    CLINICAL HISTORY:  shortness of breath;    COMPARISON:  Studies dating back to March 11, 2020    FINDINGS:  Chronic or recurrent left effusion and adjacent atelectasis/consolidation when compared to the most recent study.  The lungs are otherwise free of new pulmonary opacities.  The cardiac silhouette size is enlarged.  The trachea is midline and the mediastinal width is normal. Negative for right effusion or pneumothorax.  Pulmonary vasculature is normal. Negative for osseous abnormalities. Stable dual lead right subclavian pacemaker.  Median sternotomy wires and prosthetic valve device.  Tortuous aorta with calcifications of the aortic knob.  There are degenerative changes of the spine and both  shoulder girdles.                                                The Emergency Provider reviewed the vital signs and test results, which are outlined above.     ED Discussion       8:40 PM: Discussed case with Dr. Levi ([facility]). Dr. Levi agrees with current care and management of pt and accepts admission.   Admitting Service: [facility]  Admitting Physician: Dr. Levi  Admit to: inpt/ tele     8:40 PM: Re-evaluated pt. I have discussed test results, shared treatment plan, and the need for admission with patient and family at bedside. Pt and family express understanding at this time and agree with all information. All questions answered. Pt and family have no further questions or concerns at this time. Pt is ready for admit.     ED Course as of 08/05/24 2247   Thu Aug 01, 2024   1653 X-Ray Chest 1 View  1.  Chronic or recurrent left effusion with adjacent atelectasis/consolidation.  Pneumonia with a parapneumonic effusion could have this appearance in the right clinical setting.  Less likely could these changes be related to CHF.     2.  Negative for acute process otherwise.     3.  Stable findings as noted above.   [CD]   1653 COVID-19 Rapid Screening(!)  Positive [CD]   1653 Troponin I(!)  Chronic elevation [CD]   1653 CBC Auto Differential(!)  Nonspecific findings [CD]   1654 Comprehensive Metabolic Panel(!)  Nonspecific findings [CD]   1654 Brain natriuretic peptide(!)  Elevated [CD]   2202 ISTAT PROCEDURE(!!)  No acidosis [CD]   2202 Lactic acid, plasma  Within normal limits [CD]      ED Course User Index  [CD] Andres Cordova, DO     Medical Decision Making  Discussed patient with Dr. Paul, cardiology, who states to admit the patient for diuresis.    Amount and/or Complexity of Data Reviewed  Labs: ordered. Decision-making details documented in ED Course.  Radiology: ordered. Decision-making details documented in ED Course.  ECG/medicine tests: ordered and independent interpretation  performed. Decision-making details documented in ED Course.    Risk  Decision regarding hospitalization.  Risk Details: Differential diagnosis includes but is not limited to: ACS, heart failure exacerbation, COVID-19, pneumonia, electrolyte abnormality, pneumothorax                ED Medication(s):  Medications   fluticasone propionate 50 mcg/actuation nasal spray 100 mcg (100 mcg Each Nostril Given 8/5/24 0906)   sodium chloride 0.9% flush 10 mL (has no administration in time range)   dexAMETHasone tablet 6 mg (6 mg Oral Given 8/5/24 0904)   albuterol-ipratropium 2.5 mg-0.5 mg/3 mL nebulizer solution 3 mL (3 mLs Nebulization Given 8/5/24 1953)   potassium chloride SA CR tablet 20 mEq (20 mEq Oral Given 8/5/24 2025)   acetaminophen tablet 650 mg (has no administration in time range)   ondansetron injection 4 mg (4 mg Intravenous Given 8/2/24 0247)   hydrALAZINE injection 10 mg (has no administration in time range)   budesonide nebulizer solution 0.5 mg (0.5 mg Nebulization Given 8/5/24 1953)   mupirocin 2 % ointment ( Nasal Given 8/5/24 2025)   warfarin tablet 3 mg (3 mg Oral Given 8/5/24 1704)   warfarin (COUMADIN) split tablet 1.25 mg (has no administration in time range)   furosemide injection 40 mg (40 mg Intravenous Given 8/1/24 2012)   potassium bicarbonate disintegrating tablet 40 mEq (40 mEq Oral Given 8/1/24 2333)   warfarin (COUMADIN) tablet 2 mg (2 mg Oral Given 8/2/24 1737)   sodium chloride 0.9% bolus 250 mL 250 mL (0 mLs Intravenous Stopped 8/3/24 1307)   furosemide injection 20 mg (20 mg Intravenous Given 8/4/24 1206)   warfarin (COUMADIN) tablet 2.5 mg (2.5 mg Oral Given 8/4/24 1723)   magnesium oxide tablet 400 mg (400 mg Oral Given 8/4/24 2041)       Current Discharge Medication List                  Scribe Attestation:   Scribe #1: I performed the above scribed service and the documentation accurately describes the services I performed. I attest to the accuracy of the note.     Attending:    Physician Attestation Statement for Scribe #1: I, Andres Cordova DO, personally performed the services described in this documentation, as scribed by Charles Ibarra, in my presence, and it is both accurate and complete.           Clinical Impression       ICD-10-CM ICD-9-CM   1. COVID-19  U07.1 079.89   2. Shortness of breath  R06.02 786.05   3. Acute on chronic congestive heart failure, unspecified heart failure type  I50.9 428.0   4. Pulmonary aspiration, sequela  T17.900S 908.5   5. Anticoagulant long-term use  Z79.01 V58.61       Disposition:   Disposition: Admitted  Condition: Serious        Andres Cordova DO  08/05/24 2379

## 2024-08-02 ENCOUNTER — DOCUMENTATION ONLY (OUTPATIENT)
Dept: CARDIOLOGY | Facility: CLINIC | Age: 84
End: 2024-08-02
Payer: MEDICARE

## 2024-08-02 LAB
ANION GAP SERPL CALC-SCNC: 13 MMOL/L (ref 8–16)
ANISOCYTOSIS BLD QL SMEAR: SLIGHT
BASOPHILS # BLD AUTO: 0.03 K/UL (ref 0–0.2)
BASOPHILS NFR BLD: 0.6 % (ref 0–1.9)
BUN SERPL-MCNC: 15 MG/DL (ref 8–23)
CALCIUM SERPL-MCNC: 8.5 MG/DL (ref 8.7–10.5)
CHLORIDE SERPL-SCNC: 100 MMOL/L (ref 95–110)
CO2 SERPL-SCNC: 23 MMOL/L (ref 23–29)
CREAT SERPL-MCNC: 1.2 MG/DL (ref 0.5–1.4)
CRP SERPL-MCNC: 51.4 MG/L (ref 0–8.2)
DACRYOCYTES BLD QL SMEAR: ABNORMAL
DIFFERENTIAL METHOD BLD: ABNORMAL
EOSINOPHIL # BLD AUTO: 0 K/UL (ref 0–0.5)
EOSINOPHIL NFR BLD: 0 % (ref 0–8)
ERYTHROCYTE [DISTWIDTH] IN BLOOD BY AUTOMATED COUNT: 15.9 % (ref 11.5–14.5)
EST. GFR  (NO RACE VARIABLE): 45 ML/MIN/1.73 M^2
FERRITIN SERPL-MCNC: 55 NG/ML (ref 20–300)
GLUCOSE SERPL-MCNC: 111 MG/DL (ref 70–110)
HCT VFR BLD AUTO: 39.6 % (ref 37–48.5)
HGB BLD-MCNC: 12.7 G/DL (ref 12–16)
IMM GRANULOCYTES # BLD AUTO: 0.03 K/UL (ref 0–0.04)
IMM GRANULOCYTES NFR BLD AUTO: 0.6 % (ref 0–0.5)
INR PPP: 2.9 (ref 0.8–1.2)
INR PPP: 3 (ref 0.8–1.2)
LYMPHOCYTES # BLD AUTO: 0.3 K/UL (ref 1–4.8)
LYMPHOCYTES NFR BLD: 5.6 % (ref 18–48)
MAGNESIUM SERPL-MCNC: 1.6 MG/DL (ref 1.6–2.6)
MAGNESIUM SERPL-MCNC: 1.7 MG/DL (ref 1.6–2.6)
MCH RBC QN AUTO: 28.4 PG (ref 27–31)
MCHC RBC AUTO-ENTMCNC: 32.1 G/DL (ref 32–36)
MCV RBC AUTO: 89 FL (ref 82–98)
MONOCYTES # BLD AUTO: 0.3 K/UL (ref 0.3–1)
MONOCYTES NFR BLD: 5.6 % (ref 4–15)
NEUTROPHILS # BLD AUTO: 4.7 K/UL (ref 1.8–7.7)
NEUTROPHILS NFR BLD: 87.6 % (ref 38–73)
NRBC BLD-RTO: 0 /100 WBC
PLATELET # BLD AUTO: 105 K/UL (ref 150–450)
PLATELET BLD QL SMEAR: ABNORMAL
PMV BLD AUTO: 13.3 FL (ref 9.2–12.9)
POTASSIUM SERPL-SCNC: 3.2 MMOL/L (ref 3.5–5.1)
PROCALCITONIN SERPL IA-MCNC: 0.24 NG/ML
PROTHROMBIN TIME: 31.4 SEC (ref 9–12.5)
PROTHROMBIN TIME: 32.4 SEC (ref 9–12.5)
RBC # BLD AUTO: 4.47 M/UL (ref 4–5.4)
SODIUM SERPL-SCNC: 136 MMOL/L (ref 136–145)
TROPONIN I SERPL DL<=0.01 NG/ML-MCNC: 0.11 NG/ML (ref 0–0.03)
WBC # BLD AUTO: 5.39 K/UL (ref 3.9–12.7)

## 2024-08-02 PROCEDURE — 36415 COLL VENOUS BLD VENIPUNCTURE: CPT | Performed by: INTERNAL MEDICINE

## 2024-08-02 PROCEDURE — 85610 PROTHROMBIN TIME: CPT | Performed by: INTERNAL MEDICINE

## 2024-08-02 PROCEDURE — 83735 ASSAY OF MAGNESIUM: CPT | Performed by: NURSE PRACTITIONER

## 2024-08-02 PROCEDURE — 87070 CULTURE OTHR SPECIMN AEROBIC: CPT | Performed by: INTERNAL MEDICINE

## 2024-08-02 PROCEDURE — 36415 COLL VENOUS BLD VENIPUNCTURE: CPT | Performed by: NURSE PRACTITIONER

## 2024-08-02 PROCEDURE — 99900035 HC TECH TIME PER 15 MIN (STAT)

## 2024-08-02 PROCEDURE — 94799 UNLISTED PULMONARY SVC/PX: CPT

## 2024-08-02 PROCEDURE — 27000207 HC ISOLATION

## 2024-08-02 PROCEDURE — 25000003 PHARM REV CODE 250: Performed by: INTERNAL MEDICINE

## 2024-08-02 PROCEDURE — 94640 AIRWAY INHALATION TREATMENT: CPT

## 2024-08-02 PROCEDURE — 87205 SMEAR GRAM STAIN: CPT | Performed by: INTERNAL MEDICINE

## 2024-08-02 PROCEDURE — 25000242 PHARM REV CODE 250 ALT 637 W/ HCPCS: Performed by: INTERNAL MEDICINE

## 2024-08-02 PROCEDURE — 80048 BASIC METABOLIC PNL TOTAL CA: CPT | Performed by: INTERNAL MEDICINE

## 2024-08-02 PROCEDURE — 63600175 PHARM REV CODE 636 W HCPCS: Performed by: INTERNAL MEDICINE

## 2024-08-02 PROCEDURE — 94761 N-INVAS EAR/PLS OXIMETRY MLT: CPT

## 2024-08-02 PROCEDURE — 27000221 HC OXYGEN, UP TO 24 HOURS

## 2024-08-02 PROCEDURE — 99223 1ST HOSP IP/OBS HIGH 75: CPT | Mod: ,,, | Performed by: INTERNAL MEDICINE

## 2024-08-02 PROCEDURE — 21400001 HC TELEMETRY ROOM

## 2024-08-02 PROCEDURE — 85025 COMPLETE CBC W/AUTO DIFF WBC: CPT | Performed by: INTERNAL MEDICINE

## 2024-08-02 RX ORDER — MUPIROCIN 20 MG/G
OINTMENT TOPICAL 2 TIMES DAILY
Status: DISCONTINUED | OUTPATIENT
Start: 2024-08-02 | End: 2024-08-06 | Stop reason: HOSPADM

## 2024-08-02 RX ORDER — BUDESONIDE 0.5 MG/2ML
0.5 INHALANT ORAL EVERY 12 HOURS
Status: DISCONTINUED | OUTPATIENT
Start: 2024-08-02 | End: 2024-08-06 | Stop reason: HOSPADM

## 2024-08-02 RX ORDER — WARFARIN 2.5 MG/1
2.5 TABLET ORAL
Status: DISCONTINUED | OUTPATIENT
Start: 2024-08-05 | End: 2024-08-03

## 2024-08-02 RX ADMIN — POTASSIUM CHLORIDE 20 MEQ: 1500 TABLET, EXTENDED RELEASE ORAL at 08:08

## 2024-08-02 RX ADMIN — FUROSEMIDE 40 MG: 10 INJECTION, SOLUTION INTRAMUSCULAR; INTRAVENOUS at 08:08

## 2024-08-02 RX ADMIN — IPRATROPIUM BROMIDE AND ALBUTEROL SULFATE 3 ML: 2.5; .5 SOLUTION RESPIRATORY (INHALATION) at 07:08

## 2024-08-02 RX ADMIN — DEXAMETHASONE 6 MG: 4 TABLET ORAL at 08:08

## 2024-08-02 RX ADMIN — ONDANSETRON 4 MG: 2 INJECTION INTRAMUSCULAR; INTRAVENOUS at 02:08

## 2024-08-02 RX ADMIN — MUPIROCIN: 20 OINTMENT TOPICAL at 08:08

## 2024-08-02 RX ADMIN — BUDESONIDE INHALATION 0.5 MG: 0.5 SUSPENSION RESPIRATORY (INHALATION) at 07:08

## 2024-08-02 RX ADMIN — FLUTICASONE PROPIONATE 100 MCG: 50 SPRAY, METERED NASAL at 08:08

## 2024-08-02 RX ADMIN — IPRATROPIUM BROMIDE AND ALBUTEROL SULFATE 3 ML: 2.5; .5 SOLUTION RESPIRATORY (INHALATION) at 01:08

## 2024-08-02 RX ADMIN — WARFARIN SODIUM 2 MG: 2 TABLET ORAL at 05:08

## 2024-08-02 NOTE — ASSESSMENT & PLAN NOTE
Patient with Paroxysmal (<7 days) atrial fibrillation which is controlled.  Lopressor temporarily held due to hypotension.  [id number] Score: 4. Anticoagulation indicated. Anticoagulation done with Coumadin .  Telemetry monitoring.

## 2024-08-02 NOTE — HOSPITAL COURSE
Patient is a 34 old lady with a dysfunctional aortic valve, PAF, pacemaker, chronic systolic heart failure, chronic hypoxic respiratory failure on 2.5 L nasal cannula at home, DVT, rheumatoid arthritis, CVA, COPD, CKD 3 who presented after being seen by home health  for shortness a breath and positive COVID test.  He was admitted started on diuresis and regular bronchodilator nebs as well as home O2.  Patient is feeling much better now.  She was continued on her COVID isolation as well as her Decadron.  She was seen in consultation by Pulmonary Medicine who agreed with diuresis as well as continuing her broncho dilators pulmonary hygiene  She was seen by consultation by Cardiology who recommended continue diuresis, pulmonary hygiene and follow up with Dr. Perkins as outpatient.  Patient with continued cough with food.  We will have ST evaluate patient  In the past she has been altered to pureed diet with aspiration precautions.  Likely DC in a.m.  8/5 speech reevaluated patient with upgrade of dietary dysphagia recommendations. No signs or symptoms of aspiration. Patient at baseline home oxygen requirements. Anticipate discharge home with sitters. Son updated    8/6  Stable overnight. No acute events overnight. Tolerating diet.     No acute distress. No respiratory distress on baseline supplemental oxygen requirements via nasal cannula. Rhonchi persists. Flat affect. Pale.     Will continue dexamethasone for covid. Homehealth resumed. Blood pressure medication(s) held on discharge. Defer to primary providers to resume.    Patient seen and evaluated by me. Patient was determined to be suitable for discharge. Patient deemed stable for discharge to home with homehealth physical/occupational therapy resumed and nurse practitioner to visit home program.

## 2024-08-02 NOTE — SUBJECTIVE & OBJECTIVE
Past Medical History:   Diagnosis Date    A-fib     Anticoagulant long-term use     Aortic valve disease     Cancer     brain tumor, 10 years ago    Cardiomyopathy     CHF (congestive heart failure)     COVID-19 7/23/2022    Hx of heart valve replacement with mechanical valve     Hyperlipidemia     Hypertension     Pacemaker     Pacemaker     Sepsis 7/23/2022    Stroke     2007, residual weakness       Past Surgical History:   Procedure Laterality Date    AORTIC VALVE REPLACEMENT      CHOLECYSTECTOMY      CORONARY ARTERY BYPASS GRAFT      HYSTERECTOMY      INSERTION OF PACEMAKER      TONSILLECTOMY         Review of patient's allergies indicates:   Allergen Reactions    Amlodipine Other (See Comments)     Not sure    Chlorthalidone Other (See Comments)     Not sure    Clonidine Other (See Comments)     Not sure    Codeine Other (See Comments)     Not sure    Escitalopram Other (See Comments)     Not sure    Lisinopril Other (See Comments)     Not sure    Losartan Other (See Comments)     Not sure    Meperidine Other (See Comments)     Not sure    Methadone Other (See Comments)     Not sure      Morphine Other (See Comments)     Not sure    Nebivolol Other (See Comments)     Not sure        Nitrofurantoin Other (See Comments)     Not sure        Omeprazole Other (See Comments)     Not sure      Pravastatin Other (See Comments)     Not sure      Propoxyphene Other (See Comments)     Not sure      Sulfamethoxazole-trimethoprim Other (See Comments)     Not sure           No current facility-administered medications on file prior to encounter.     Current Outpatient Medications on File Prior to Encounter   Medication Sig    amoxicillin-clavulanate 875-125mg (AUGMENTIN) 875-125 mg per tablet Take 1 tablet by mouth every 12 (twelve) hours.    albuterol (PROVENTIL) 2.5 mg /3 mL (0.083 %) nebulizer solution Take 2.5 mg by nebulization every 4 (four) hours as needed.    fluticasone propionate (FLONASE) 50 mcg/actuation nasal  spray Shake well and use 2 sprays (100 mcg total) by Each Nostril route once daily.    furosemide (LASIX) 20 MG tablet Take 20 mg by mouth every morning.    metoprolol tartrate (LOPRESSOR) 25 MG tablet Take 1 tablet by mouth 2 (two) times daily.    miconazole nitrate 2% (MICOTIN) 2 % Oint Apply topically 2 (two) times daily. for 7 days    warfarin (COUMADIN) 2.5 MG tablet Take 2.5 mg by mouth every Monday and Friday.  Take 1.25 mg by mouth all other days.     Family History       Problem Relation (Age of Onset)    No Known Problems Mother, Father          Tobacco Use    Smoking status: Never     Passive exposure: Never    Smokeless tobacco: Never   Substance and Sexual Activity    Alcohol use: Not Currently    Drug use: Never    Sexual activity: Not on file     Review of Systems   Reason unable to perform ROS: as per hpi.     Objective:     Vital Signs (Most Recent):  Temp: 98 °F (36.7 °C) (08/02/24 0711)  Pulse: 85 (08/02/24 0809)  Resp: 16 (08/02/24 0747)  BP: 109/64 (08/02/24 0711)  SpO2: 97 % (08/02/24 0747) Vital Signs (24h Range):  Temp:  [98 °F (36.7 °C)-99.9 °F (37.7 °C)] 98 °F (36.7 °C)  Pulse:  [69-94] 85  Resp:  [16-36] 16  SpO2:  [92 %-99 %] 97 %  BP: ()/(51-74) 109/64     Weight: 87.2 kg (192 lb 3.9 oz)  Body mass index is 33 kg/m².    SpO2: 97 %         Intake/Output Summary (Last 24 hours) at 8/2/2024 1058  Last data filed at 8/2/2024 0809  Gross per 24 hour   Intake 240 ml   Output --   Net 240 ml       Lines/Drains/Airways       Drain  Duration             Female External Urinary Catheter w/ Suction 08/02/24 0932 <1 day              Peripheral Intravenous Line  Duration                  Peripheral IV - Single Lumen 08/01/24 1720 20 G 1 in No Left;Posterior Hand <1 day                     Physical Exam  Vitals and nursing note reviewed.          Significant Labs: CMP   Recent Labs   Lab 08/01/24  1459 08/02/24  0548    136   K 3.3* 3.2*    100   CO2 21* 23   * 111*   BUN 13  15   CREATININE 1.3 1.2   CALCIUM 8.5* 8.5*   PROT 7.0  --    ALBUMIN 3.2*  --    BILITOT 2.4*  --    ALKPHOS 87  --    AST 19  --    ALT 9*  --    ANIONGAP 16 13   , CBC   Recent Labs   Lab 08/01/24  1459 08/02/24  0548   WBC 6.28 5.39   HGB 13.6 12.7   HCT 41.7 39.6   * 105*   , Troponin   Recent Labs   Lab 08/01/24  1459 08/01/24  2340   TROPONINI 0.088* 0.109*   , and All pertinent lab results from the last 24 hours have been reviewed.    Significant Imaging: Echocardiogram: Transthoracic echo (TTE) complete ([facility] Only):   Results for orders placed or performed during the hospital encounter of 02/22/24   Echo   Result Value Ref Range    BSA 2.03 m2    LVOT stroke volume 56.42 cm3    LVIDd 5.10 3.5 - 6.0 cm    LV Systolic Volume 90.21 mL    LV Systolic Volume Index 46.0 mL/m2    LVIDs 4.45 (A) 2.1 - 4.0 cm    LV Diastolic Volume 123.59 mL    LV Diastolic Volume Index 63.06 mL/m2    IVS 1.04 0.6 - 1.1 cm    LVOT diameter 1.91 cm    LVOT area 2.9 cm2    FS 13 (A) 28 - 44 %    Left Ventricle Relative Wall Thickness 0.47 cm    Posterior Wall 1.19 (A) 0.6 - 1.1 cm    LV mass 217.90 g    LV Mass Index 111 g/m2    MV Peak E Melo 1.07 m/s    TDI LATERAL 0.08 m/s    TDI SEPTAL 0.05 m/s    E/E' ratio 16.46 m/s    MV Peak A Melo 1.07 m/s    TR Max Melo 3.38 m/s    E/A ratio 1.00     IVRT 62.80 msec    E wave deceleration time 192.43 msec    LV SEPTAL E/E' RATIO 21.40 m/s    LV LATERAL E/E' RATIO 13.38 m/s    LVOT peak melo 0.83 m/s    Left Ventricular Outflow Tract Mean Velocity 0.58 cm/s    Left Ventricular Outflow Tract Mean Gradient 1.51 mmHg    RV mid diameter 3.15 cm    RVOT peak VTI 9.3 cm    TAPSE 2.05 cm    LA size 3.61 cm    Left Atrium Minor Axis 3.98 cm    Left Atrium Major Axis 6.58 cm    RA Major Axis 4.53 cm    AV regurgitation pressure 1/2 time [id number] ms    AR Max Melo 4.34 m/s    AV mean gradient 95 mmHg    AV peak gradient 97 mmHg    Ao peak melo 4.93 m/s    Ao .80 cm    LVOT peak VTI  19.70 cm    AV valve area 0.37 cm²    AV Velocity Ratio 0.17     AV index (prosthetic) 0.13     AAMIR by Velocity Ratio 0.48 cm²    MV stenosis pressure 1/2 time 55.80 ms    MV valve area p 1/2 method 3.94 cm2    Triscuspid Valve Regurgitation Peak Gradient 46 mmHg    PV mean gradient 1 mmHg    RVOT peak rafa 0.49 m/s    Ao root annulus 2.98 cm    STJ 4.51 cm    Ascending aorta 4.45 cm    IVC diameter 1.15 cm    Mean e' 0.07 m/s    ZLVIDS 2.01     ZLVIDD -0.95     LA Volume Index 34.9 mL/m2    LA volume 68.49 cm3    LA WIDTH 4.5 cm    RA Width 4.4 cm    TV resting pulmonary artery pressure 49 mmHg    RV TB RVSP 6 mmHg    Est. RA pres 3 mmHg    EF 35 %    Narrative      Left Ventricle: The left ventricle is normal in size. Normal wall   thickness. There is concentric hypertrophy. Global hypokinesis present.   There is moderately reduced systolic function. Ejection fraction by visual   approximation is 35%.    Right Ventricle: Normal right ventricular cavity size. Systolic   function is borderline low.    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 There is moderate aortic regurgitation.    Consider ORI if clinically needed to evaluate mechanical aortic valve   further.    Mitral Valve: There is mild bileaflet sclerosis. There is mild   regurgitation.    Pulmonary Artery: There is moderate pulmonary hypertension. The   estimated pulmonary artery systolic pressure is 49 mmHg.    IVC/SVC: Normal venous pressure at 3 mmHg.    Ascending aorta is moderately dilated measuring 4.45 cm.      and EKG: REviewed

## 2024-08-02 NOTE — ASSESSMENT & PLAN NOTE
Creatine stable for now. BMP reviewed- noted Estimated Creatinine Clearance: 38.6 mL/min (based on SCr of 1.2 mg/dL). according to latest data. Based on current GFR, CKD stage is stage 3 - GFR 30-59.  Monitor UOP and serial BMP and adjust therapy as needed. Renally dose meds. Avoid nephrotoxic medications and procedures.

## 2024-08-02 NOTE — ASSESSMENT & PLAN NOTE
Patient is identified as having Systolic (HFrEF) heart failure that is Acute on chronic. CHF is currently uncontrolled due to Pulmonary edema/pleural effusion on CXR. Latest ECHO performed and demonstrates- Results for orders placed during the hospital encounter of 02/22/24    Echo    Interpretation Summary    Left Ventricle: The left ventricle is normal in size. Normal wall thickness. There is concentric hypertrophy. Global hypokinesis present. There is moderately reduced systolic function. Ejection fraction by visual approximation is 35%.    Right Ventricle: Normal right ventricular cavity size. Systolic function is borderline low.    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 There is moderate aortic regurgitation.  Consider ORI if clinically needed to evaluate mechanical aortic valve further.    Mitral Valve: There is mild bileaflet sclerosis. There is mild regurgitation.    Pulmonary Artery: There is moderate pulmonary hypertension. The estimated pulmonary artery systolic pressure is 49 mmHg.    IVC/SVC: Normal venous pressure at 3 mmHg.    Ascending aorta is moderately dilated measuring 4.45 cm.  . Continue Furosemide and monitor clinical status closely. Monitor on telemetry. Patient is on CHF pathway.  Monitor strict Is&Os and daily weights.  Place on fluid restriction of 1.5 L. Cardiology has been consulted. Continue to stress to patient importance of self efficacy and  on diet for CHF. Last BNP reviewed- and noted below   Recent Labs   Lab 08/01/24  1459   BNP >4,900*

## 2024-08-02 NOTE — CONSULTS
[facility] - Tele[facility] ([facility])  Pulmonology  Consult Note    Patient Name: Serena Post  MRN: [id number]  Admission Date: 8/1/2024  Hospital Length of Stay: 1 days  Code Status: Full Code  Attending Physician: June Acosta MD  Primary Care Provider: Evangelina Olivares MD   Principal Problem: Acute on chronic systolic CHF (congestive heart failure)    Inpatient consult to Pulmonology  Consult performed by: Сергей Naidu MD  Consult ordered by: Yary Levi MD        Subjective:     HPI:  Ms Post is a 83 y/o WF with CHF (LVEF 35%), COPD, chronic hypoxic respiratory failure on 2.5L via NC, obesity, Afib on warfarin, AVR, h/o CVA, HLD, HTN, DVT, RA, AAA, CKD III, and CAD who is currently admitted to the Hospitalist service after presenting with worsening SOB over several days.  She was dx with COVID by home health.  She reports some associated cough, a bit worse than normal, productive of yellow/beige sputum.  Denies F/C, sick contacts, CP, wheezing.    Overnight she was diuresed and started on Decadron for the COVID.  She reports significant UOP, but not quantified in the chart.  Today, she remains on 2L via NC and says she is feeling better.    Past Medical History:   Diagnosis Date    A-fib     Anticoagulant long-term use     Aortic valve disease     Cancer     brain tumor, 10 years ago    Cardiomyopathy     CHF (congestive heart failure)     COVID-19 7/23/2022    Hx of heart valve replacement with mechanical valve     Hyperlipidemia     Hypertension     Pacemaker     Pacemaker     Sepsis 7/23/2022    Stroke     2007, residual weakness       Past Surgical History:   Procedure Laterality Date    AORTIC VALVE REPLACEMENT      CHOLECYSTECTOMY      CORONARY ARTERY BYPASS GRAFT      HYSTERECTOMY      INSERTION OF PACEMAKER      TONSILLECTOMY         Review of patient's allergies indicates:   Allergen Reactions    Amlodipine Other (See Comments)     Not sure    Chlorthalidone Other (See  Comments)     Not sure    Clonidine Other (See Comments)     Not sure    Codeine Other (See Comments)     Not sure    Escitalopram Other (See Comments)     Not sure    Lisinopril Other (See Comments)     Not sure    Losartan Other (See Comments)     Not sure    Meperidine Other (See Comments)     Not sure    Methadone Other (See Comments)     Not sure      Morphine Other (See Comments)     Not sure    Nebivolol Other (See Comments)     Not sure        Nitrofurantoin Other (See Comments)     Not sure        Omeprazole Other (See Comments)     Not sure      Pravastatin Other (See Comments)     Not sure      Propoxyphene Other (See Comments)     Not sure      Sulfamethoxazole-trimethoprim Other (See Comments)     Not sure           Family History       Problem Relation (Age of Onset)    No Known Problems Mother, Father          Tobacco Use    Smoking status: Never     Passive exposure: Never    Smokeless tobacco: Never   Substance and Sexual Activity    Alcohol use: Not Currently    Drug use: Never    Sexual activity: Not on file         Review of Systems   All other systems reviewed and are negative.    Objective:     Vital Signs (Most Recent):  Temp: 97.5 °F (36.4 °C) (08/02/24 1257)  Pulse: 97 (08/02/24 1311)  Resp: 16 (08/02/24 1311)  BP: (!) 101/53 (08/02/24 1257)  SpO2: (!) 92 % (08/02/24 1311) Vital Signs (24h Range):  Temp:  [97.5 °F (36.4 °C)-99.9 °F (37.7 °C)] 97.5 °F (36.4 °C)  Pulse:  [69-97] 97  Resp:  [16-36] 16  SpO2:  [90 %-99 %] 92 %  BP: ()/(51-74) 101/53     Weight: 87.2 kg (192 lb 3.9 oz)  Body mass index is 33 kg/m².      Intake/Output Summary (Last 24 hours) at 8/2/2024 1344  Last data filed at 8/2/2024 1257  Gross per 24 hour   Intake 240 ml   Output 250 ml   Net -10 ml        Physical Exam  Vitals and nursing note reviewed.   Constitutional:       General: She is not in acute distress.     Appearance: She is not ill-appearing.   HENT:      Head: Normocephalic and atraumatic.   Eyes:       General: No scleral icterus.     Extraocular Movements: Extraocular movements intact.      Conjunctiva/sclera: Conjunctivae normal.      Pupils: Pupils are equal, round, and reactive to light.   Cardiovascular:      Rate and Rhythm: Normal rate and regular rhythm.      Pulses: Normal pulses.      Heart sounds: No murmur heard.  Pulmonary:      Effort: Pulmonary effort is normal. No respiratory distress.      Breath sounds: Rales (bibasilar) present. No wheezing or rhonchi.   Abdominal:      General: Abdomen is flat. There is no distension.      Palpations: Abdomen is soft.      Tenderness: There is no abdominal tenderness.   Musculoskeletal:      Cervical back: Normal range of motion and neck supple. No rigidity or tenderness.      Right lower leg: Edema present.      Left lower leg: Edema present.   Skin:     General: Skin is warm and dry.      Coloration: Skin is not jaundiced or pale.   Neurological:      General: No focal deficit present.      Mental Status: She is alert and oriented to person, place, and time. Mental status is at baseline.          Vents:       Lines/Drains/Airways       Drain  Duration             Female External Urinary Catheter w/ Suction 08/02/24 0932 <1 day              Peripheral Intravenous Line  Duration                  Peripheral IV - Single Lumen 08/01/24 1720 20 G 1 in No Left;Posterior Hand <1 day                    Significant Labs:    CBC/Anemia Profile:  Recent Labs   Lab 08/01/24  1459 08/01/24  2348 08/02/24  0548   WBC 6.28  --  5.39   HGB 13.6  --  12.7   HCT 41.7  --  39.6   *  --  105*   MCV 87  --  89   RDW 15.8*  --  15.9*   FERRITIN  --  55  --         Chemistries:  Recent Labs   Lab 08/01/24  1459 08/01/24  2340 08/02/24  0548     --  136   K 3.3*  --  3.2*     --  100   CO2 21*  --  23   BUN 13  --  15   CREATININE 1.3  --  1.2   CALCIUM 8.5*  --  8.5*   ALBUMIN 3.2*  --   --    PROT 7.0  --   --    BILITOT 2.4*  --   --    ALKPHOS 87  --   --     ALT 9*  --   --    AST 19  --   --    MG  --  1.6 1.7       All pertinent labs within the past 24 hours have been reviewed.    Significant Imaging:   I have reviewed all pertinent imaging results/findings within the past 24 hours.    ABG  Recent Labs   Lab 08/01/24  2140   PH 7.448   PO2 59*   PCO2 40.5   HCO3 28.0   BE 4*     Assessment/Plan:     Pulmonary  COPD without exacerbation  Not in exacerbation  - agree wit scheduled bronchodilator nebs and ICS nebs  - wean supplemental oxygen for goal SpO2 > 88%  - can follow-up in pulm clinic as outpatient if she wishes    Chronic hypoxic respiratory failure  - stable on home O2  - wean for goal SpO2 > 88%    Cardiac/Vascular  * Acute on chronic systolic CHF (congestive heart failure)  - Cardiology evaluated  - agree with ongoing diuresis      ID  COVID-19  - COVID isolation  - agree with Decadron 6mg daily x 10 days or until discharge  - currently on essentially her home level of O2  - wean for goal SpO2 > 88%  - encouraged IS use  - PT/OT and up OOB as much as possible  - continue diuresis         Thank you for your consult. I will sign off. Please contact us if you have any additional questions.     Сергей Naidu MD  Pulmonology  [facility] - Tele[facility] ([facility])

## 2024-08-02 NOTE — ASSESSMENT & PLAN NOTE
Patient with Paroxysmal (<7 days) atrial fibrillation which is controlled.  Lopressor temporarily held due to hypotension.  [id number] Score: 4. Anticoagulation indicated. Anticoagulation done with Coumadin .  Telemetry monitoring.

## 2024-08-02 NOTE — H&P
[facility] - Emergency Dept.  [facility] Medicine  History & Physical    Patient Name: Serena Post  MRN: [id number]  Patient Class: IP- Inpatient  Admission Date: 8/1/2024  Attending Physician:  Yary Levi MD  Primary Care Provider: Evangelina Olivares MD         Patient information was obtained from patient, ER records, and ER physician .     Subjective:     Principal Problem:Acute on chronic systolic CHF (congestive heart failure)    Chief Complaint:   Chief Complaint   Patient presents with    COVID-19 Concerns     Pt with history of CHF and COPD had a positive covid test today by home health.  She c/o cough.  She is on 2.5 L home oxygen.          HPI: 84-year-old white woman with history of aortic valve replacement (mechanical valve in 2007), paroxysmal atrial fibrillation, long-term oral anticoagulation with Coumadin, [employer] permanent pacemaker 2009, chronic systolic CHF (echocardiogram February 2024 with EF 35%), chronic hypoxic respiratory failure (2-1/2 L nasal cannula at home), hyperlipidemia, hypertension, DVT, rheumatoid arthritis, CVA, intracranial tumor, ascending aortic aneurysm, obesity, COPD, arteriosclerotic cardiovascular disease, and chronic kidney disease stage IIIA who was sent by home health for shortness a breath and positive COVID test.  Patient reports shortness of breath over the last several days, moderate severity, constant, no associated chest pain.  She has associated cough with white to yellow sputum production.  She denies nausea or vomiting.  She denies fevers or chills however she does have low-grade temperature in our emergency department up to 99.9.    Last hospital admit 2/22-02/28/2024 for acute on chronic systolic CHF exacerbation and mechanical valve dysfunction with plans for ORI in the outpatient setting to further evaluate.  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    Past  Medical History:   Diagnosis Date    A-fib     Anticoagulant long-term use     Aortic valve disease     Cancer     brain tumor, 10 years ago    Cardiomyopathy     CHF (congestive heart failure)     COVID-19 7/23/2022    Hx of heart valve replacement with mechanical valve     Hyperlipidemia     Hypertension     Pacemaker     Pacemaker     Sepsis 7/23/2022    Stroke     2007, residual weakness       Past Surgical History:   Procedure Laterality Date    AORTIC VALVE REPLACEMENT      CHOLECYSTECTOMY      CORONARY ARTERY BYPASS GRAFT      HYSTERECTOMY      INSERTION OF PACEMAKER      TONSILLECTOMY         Review of patient's allergies indicates:   Allergen Reactions    Amlodipine Other (See Comments)     Not sure    Chlorthalidone Other (See Comments)     Not sure    Clonidine Other (See Comments)     Not sure    Codeine Other (See Comments)     Not sure    Escitalopram Other (See Comments)     Not sure    Lisinopril Other (See Comments)     Not sure    Losartan Other (See Comments)     Not sure    Meperidine Other (See Comments)     Not sure    Methadone Other (See Comments)     Not sure      Morphine Other (See Comments)     Not sure    Nebivolol Other (See Comments)     Not sure        Nitrofurantoin Other (See Comments)     Not sure        Omeprazole Other (See Comments)     Not sure      Pravastatin Other (See Comments)     Not sure      Propoxyphene Other (See Comments)     Not sure      Sulfamethoxazole-trimethoprim Other (See Comments)     Not sure           No current facility-administered medications on file prior to encounter.     Current Outpatient Medications on File Prior to Encounter   Medication Sig    amoxicillin-clavulanate 875-125mg (AUGMENTIN) 875-125 mg per tablet Take 1 tablet by mouth every 12 (twelve) hours.    albuterol (PROVENTIL) 2.5 mg /3 mL (0.083 %) nebulizer solution Take 2.5 mg by nebulization every 4 (four) hours as needed.    fluticasone propionate (FLONASE) 50 mcg/actuation nasal spray  Shake well and use 2 sprays (100 mcg total) by Each Nostril route once daily.    furosemide (LASIX) 20 MG tablet Take 20 mg by mouth every morning.    metoprolol tartrate (LOPRESSOR) 25 MG tablet Take 1 tablet by mouth 2 (two) times daily.    miconazole nitrate 2% (MICOTIN) 2 % Oint Apply topically 2 (two) times daily. for 7 days    warfarin (COUMADIN) 2.5 MG tablet Take 2.5 mg by mouth every Monday and Friday.  Take 1.25 mg by mouth all other days.     Family History       Problem Relation (Age of Onset)    No Known Problems Mother, Father          Tobacco Use    Smoking status: Never     Passive exposure: Never    Smokeless tobacco: Never   Substance and Sexual Activity    Alcohol use: Not Currently    Drug use: Never    Sexual activity: Not on file     Review of Systems   Constitutional:  Negative for chills and fever.   Respiratory:  Positive for cough and shortness of breath.    Cardiovascular:  Negative for chest pain and leg swelling.   Gastrointestinal:  Negative for nausea and vomiting.   All other systems reviewed and are negative.    Objective:     Vital Signs (Most Recent):  Temp: 99.8 °F (37.7 °C) (08/01/24 1824)  Pulse: 80 (08/01/24 2237)  Resp: 20 (08/01/24 2237)  BP: (!) 116/55 (08/01/24 2237)  SpO2: 98 % (08/01/24 2237) Vital Signs (24h Range):  Temp:  [99.8 °F (37.7 °C)-99.9 °F (37.7 °C)] 99.8 °F (37.7 °C)  Pulse:  [69-88] 80  Resp:  [18-36] 20  SpO2:  [92 %-99 %] 98 %  BP: ()/(51-69) 116/55     Weight: 86 kg (189 lb 8 oz)  Body mass index is 32.53 kg/m².     Physical Exam  Vitals reviewed.   Constitutional:       Appearance: She is ill-appearing. She is not diaphoretic.   HENT:      Nose: Nose normal.   Eyes:      Conjunctiva/sclera: Conjunctivae normal.   Cardiovascular:      Rate and Rhythm: Normal rate.   Pulmonary:      Effort: Respiratory distress (tachypnea) present.      Breath sounds: No wheezing.   Abdominal:      General: There is no distension.      Tenderness: There is no  abdominal tenderness.   Musculoskeletal:         General: Swelling present.   Skin:     General: Skin is warm and dry.   Neurological:      Mental Status: She is alert. Mental status is at baseline.   Psychiatric:         Mood and Affect: Mood normal.         Behavior: Behavior normal.                Significant Labs: All pertinent labs within the past 24 hours have been reviewed.  Recent Lab Results         08/01/24  2140   08/01/24 2011 08/01/24  1521   08/01/24  1459        Albumin       3.2       ALP       87       Allens Test Pass             ALT       9       Anion Gap       16       AST       19       Baso #       0.03       Basophil %       0.5       BILIRUBIN TOTAL       2.4  Comment: For infants and newborns, interpretation of results should be based  on gestational age, weight and in agreement with clinical  observations.    Premature Infant recommended reference ranges:  Up to 24 hours.............<8.0 mg/dL  Up to 48 hours............<12.0 mg/dL  3-5 days..................<15.0 mg/dL  6-29 days.................<15.0 mg/dL         BNP       >4,900  Comment: Values of less than 100 pg/ml are consistent with non-CHF populations.       Site LR             BUN       13       Calcium       8.5       Chloride       101       CO2       21       Creatinine       1.3       DelSys Nasal Can             Differential Method       Automated       eGFR       41       Eos #       0.0       Eos %       0.0       FiO2 32             Flow 3             Glucose       124       Gran # (ANC)       5.3       Gran %       84.2       Hematocrit       41.7       Hemoglobin       13.6       Immature Grans (Abs)       0.03  Comment: Mild elevation in immature granulocytes is non specific and   can be seen in a variety of conditions including stress response,   acute inflammation, trauma and pregnancy. Correlation with other   laboratory and clinical findings is essential.         Immature Granulocytes       0.5       Lactic  Acid Level   1.6  Comment: Falsely low lactic acid results can be found in samples   containing >=13.0 mg/dL total bilirubin and/or >=3.5 mg/dL   direct bilirubin.             Lymph #       0.4       Lymph %       5.6       MCH       28.5       MCHC       32.6       MCV       87       Mode SPONT             Mono #       0.6       Mono %       9.2       MPV       12.3       nRBC       0       Platelet Count       141       POC BE 4             POC HCO3 28.0             POC PCO2 40.5             POC PH 7.448             POC PO2 59             POC SATURATED O2 91             Potassium       3.3       PROTEIN TOTAL       7.0       RBC       4.77       RDW       15.8       Sample ARTERIAL             SARS-CoV-2 RNA, Amplification, Qual     Positive  Comment: This test utilizes isothermal nucleic acid amplification technology   to   detect the SARS-CoV-2 RdRp nucleic acid segment. The analytical   sensitivity   (limit of detection) is 500 copies/swab.    A POSITIVE result is indicative of the presence of SARS-CoV-2 RNA;   clinical   correlation with patient history and other diagnostic information is   necessary to determine patient infection status.    A NEGATIVE result means that SARS-CoV-2 nucleic acids are not present   above   the limit of detection. A NEGATIVE result should be treated as   presumptive.   It does not rule out the possibility of COVID-19 and should not be   the sole   basis for treatment decisions.    If COVID-19 is strongly suspected based on clinical and exposure   history,   re-testing using an alternate molecular assay should be considered.    This test is Food and Drug Administration (FDA) approved. Performance   characteristics of this has been independently verified by [facility] Department of Pathology and Laboratory Medicine.           Sodium       138       Troponin I       0.088  Comment: The reference interval for Troponin I represents the 99th percentile   cutoff   for our  facility and is consistent with 3rd generation assay   performance.         WBC       6.28               Significant Imaging: I have reviewed all pertinent imaging results/findings within the past 24 hours.  X-Ray Chest 1 View   Final Result      1.  Chronic or recurrent left effusion with adjacent atelectasis/consolidation.  Pneumonia with a parapneumonic effusion could have this appearance in the right clinical setting.  Less likely could these changes be related to CHF.      2.  Negative for acute process otherwise.      3.  Stable findings as noted above.         Electronically signed by: Emery Arrieta MD   Date:    08/01/2024   Time:    16:47         Assessment/Plan:     * Acute on chronic systolic CHF (congestive heart failure)  Patient is identified as having Systolic (HFrEF) heart failure that is Acute on chronic. CHF is currently uncontrolled due to Pulmonary edema/pleural effusion on CXR. Latest ECHO performed and demonstrates- Results for orders placed during the hospital encounter of 02/22/24    Echo    Interpretation Summary    Left Ventricle: The left ventricle is normal in size. Normal wall thickness. There is concentric hypertrophy. Global hypokinesis present. There is moderately reduced systolic function. Ejection fraction by visual approximation is 35%.    Right Ventricle: Normal right ventricular cavity size. Systolic function is borderline low.    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 There is moderate aortic regurgitation.  Consider ORI if clinically needed to evaluate mechanical aortic valve further.    Mitral Valve: There is mild bileaflet sclerosis. There is mild regurgitation.    Pulmonary Artery: There is moderate pulmonary hypertension. The estimated pulmonary artery systolic pressure is 49 mmHg.    IVC/SVC: Normal venous pressure at 3 mmHg.    Ascending aorta is moderately dilated measuring 4.45 cm.  . Continue Furosemide and  "monitor clinical status closely. Monitor on telemetry. Patient is on CHF pathway.  Monitor strict Is&Os and daily weights.  Place on fluid restriction of 1.5 L. Cardiology has been consulted. Continue to stress to patient importance of self efficacy and  on diet for CHF. Last BNP reviewed- and noted below   Recent Labs   Lab 08/01/24  1459   BNP >4,900*   -patient is noted to have troponin elevation most likely due to acute CHF exacerbation, hypoxia, and COVID-19 infection.  Troponin 0.088.  We will check serial cardiac enzymes.  -cardiology has been consulted for a.m.    COVID-19  Patient is identified as High risk for severe complications of COVID 19 based on COVID risk score of 6   Initiate standard COVID protocols; COVID-19 testing ,Infection Control notification  and isolation- respiratory, contact and droplet per protocol    Diagnostics: CBC, CMP, Ferritin, CRP, and Portable CXR    Management: Initiate targeted therapy with Dexamethasone PO/IV 6mg daily x10 days, Maintain oxygen saturations 92-96% via Nasal Cannula 3 LPM and monitor with continuous/intermittent pulse oximetry. , and Continuous cardiac monitoring. Pulmonary consult for am.    Advance Care Planning Current advance care plan has been discussed with patient/family/POA and patient currently wishes Full Code.       Chronic hypoxic respiratory failure  Patient with Hypoxic Respiratory failure which is Chronic.  she is on home oxygen at 2.5 LPM. Supplemental oxygen was provided and noted-  currently requiring O2 via 3 L nasal cannula  -CXR with "Chronic or recurrent left effusion with adjacent atelectasis/consolidation.  Pneumonia with a parapneumonic effusion could have this appearance in the right clinical setting.  Less likely could these changes be related to CHF. Negative for acute process otherwise."  -white blood cell count 6.28, lactic acid level 1.6, BNP greater than 4900, T-max 99.9°, COVID-19 positive  .   Signs/symptoms of " respiratory failure include- tachypnea and respiratory distress. Contributing diagnoses includes - CHF, COPD, and COVID-19  Labs and images were reviewed. Patient Has recent ABG, which has been reviewed. Will treat underlying causes and adjust management of respiratory failure as follows:  -continue IV Lasix diuresis  -continue oral dexamethasone for COVID-19 infection  -continuous pulse oximetry monitoring, O2 supplementation, incentive spirometry, DuoNebs q.6 hours  -check procalcitonin level and sputum culture  -pulmonary consult for a.m.    COPD without exacerbation  Patient's COPD is controlled currently.  Patient is currently off COPD Pathway.  Continue scheduled DuoNebs q.6 hours and O2 supplementation.  Continuous pulse oximetry monitor and monitor respiratory status closely.     Hypotension  -hold Lopressor (generally with hypertension)  -monitor blood pressure closely with patient receiving IV Lasix diuresis      H/O mechanical aortic valve replacement  -ORI 03/18/2024 with moderate prosthetic valve stenosis and moderate prosthetic valve regurgitation  -continue Coumadin (pharmacy to monitor and dose)  -check daily INR      Hypokalemia  Patient's most recent potassium results are listed below.   Recent Labs     08/01/24  1459   K 3.3*     Plan  - Replete potassium per protocol  - Monitor potassium Daily  - Patient's hypokalemia is stable  - give potassium bicarbonate 40 mEq p.o. followed by potassium chloride 20 mEq p.o. b.i.d. while receiving IV Lasix diuresis  -check magnesium level    Stage 3a chronic kidney disease  Creatine stable for now. BMP reviewed- noted Estimated Creatinine Clearance: 35.4 mL/min (based on SCr of 1.3 mg/dL). according to latest data. Based on current GFR, CKD stage is stage 3 - GFR 30-59.  Monitor UOP and serial BMP and adjust therapy as needed. Renally dose meds. Avoid nephrotoxic medications and procedures.    Paroxysmal atrial fibrillation  Patient with Paroxysmal (<7 days)  atrial fibrillation which is controlled.  Lopressor temporarily held due to hypotension.  [id number] Score: 4. Anticoagulation indicated. Anticoagulation done with Coumadin .  Telemetry monitoring.    Pacemaker  Telemetry monitoring        VTE Risk Mitigation (From admission, onward)           Ordered     warfarin (COUMADIN) tablet 2 mg  Daily         08/01/24 2312     IP VTE HIGH RISK PATIENT  Once         08/01/24 2312     Place sequential compression device  Until discontinued         08/01/24 2312                                    Yary Levi MD  Department of Hospital Medicine  '[place] - Emergency Dept.

## 2024-08-02 NOTE — ASSESSMENT & PLAN NOTE
Creatine stable for now. BMP reviewed- noted Estimated Creatinine Clearance: 35.4 mL/min (based on SCr of 1.3 mg/dL). according to latest data. Based on current GFR, CKD stage is stage 3 - GFR 30-59.  Monitor UOP and serial BMP and adjust therapy as needed. Renally dose meds. Avoid nephrotoxic medications and procedures.

## 2024-08-02 NOTE — ASSESSMENT & PLAN NOTE
-ORI 03/18/2024 with moderate prosthetic valve stenosis and moderate prosthetic valve regurgitation  -continue Coumadin (pharmacy to monitor and dose)  -check daily INR  Follow up outpatient with her usual cardiologist Dr. Perkins

## 2024-08-02 NOTE — ASSESSMENT & PLAN NOTE
Patient is identified as having Systolic (HFrEF) heart failure that is Acute on chronic. CHF is currently uncontrolled due to Pulmonary edema/pleural effusion on CXR. Latest ECHO performed and demonstrates- Results for orders placed during the hospital encounter of 02/22/24    Echo    Interpretation Summary    Left Ventricle: The left ventricle is normal in size. Normal wall thickness. There is concentric hypertrophy. Global hypokinesis present. There is moderately reduced systolic function. Ejection fraction by visual approximation is 35%.    Right Ventricle: Normal right ventricular cavity size. Systolic function is borderline low.    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 There is moderate aortic regurgitation.  Consider ORI if clinically needed to evaluate mechanical aortic valve further.    Mitral Valve: There is mild bileaflet sclerosis. There is mild regurgitation.    Pulmonary Artery: There is moderate pulmonary hypertension. The estimated pulmonary artery systolic pressure is 49 mmHg.    IVC/SVC: Normal venous pressure at 3 mmHg.    Ascending aorta is moderately dilated measuring 4.45 cm.  . Continue Furosemide and monitor clinical status closely. Monitor on telemetry. Patient is on CHF pathway.  Monitor strict Is&Os and daily weights.  Place on fluid restriction of 1.5 L. Cardiology has been consulted. Continue to stress to patient importance of self efficacy and  on diet for CHF. Last BNP reviewed- and noted below   Recent Labs   Lab 08/01/24  1459   BNP >4,900*     Continue IV diuresis, optimize medical therapy, add beta blockers as blood pressure tolerates

## 2024-08-02 NOTE — PROGRESS NOTES
[facility] Medicine  Progress Note    Patient Name: Serena Post  MRN: [id number]  Patient Class: IP- Inpatient   Admission Date: 8/1/2024  Length of Stay: 1 days  Attending Physician: June Acosta MD  Primary Care Provider: Evangelina Olivares MD        Subjective:     Principal Problem:Acute on chronic systolic CHF (congestive heart failure)        HPI:  84-year-old white woman with history of aortic valve replacement (mechanical valve in 2007), paroxysmal atrial fibrillation, long-term oral anticoagulation with Coumadin, [employer] permanent pacemaker 2009, chronic systolic CHF (echocardiogram February 2024 with EF 35%), chronic hypoxic respiratory failure (2-1/2 L nasal cannula at home), hyperlipidemia, hypertension, DVT, rheumatoid arthritis, CVA, intracranial tumor, ascending aortic aneurysm, obesity, COPD, arteriosclerotic cardiovascular disease, and chronic kidney disease stage IIIA who was sent by home health for shortness a breath and positive COVID test.  Patient reports shortness of breath over the last several days, moderate severity, constant, no associated chest pain.  She has associated cough with white to yellow sputum production.  She denies nausea or vomiting.  She denies fevers or chills however she does have low-grade temperature in our emergency department up to 99.9.    Last hospital admit 2/22-02/28/2024 for acute on chronic systolic CHF exacerbation and mechanical valve dysfunction with plans for ROI in the outpatient setting to further evaluate.  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    Overview/Hospital Course:  Patient is a 34 old lady with a dysfunctional aortic valve, PAF, pacemaker, chronic systolic heart failure, chronic hypoxic respiratory failure on 2.5 L nasal cannula at home, DVT, rheumatoid arthritis, CVA, COPD, CKD 3 who presented after being  seen by home health  for shortness a breath and positive COVID test.  He was admitted started on diuresis and regular bronchodilator nebs as well as home O2.  Patient is feeling much better now.  She was continued on her COVID isolation as well as her Decadron.  She was seen in consultation by Pulmonary Medicine who agreed with diuresis as well as continuing her broncho dilators pulmonary hygiene  She was seen by consultation by Cardiology who recommended continue diuresis, pulmonary hygiene and follow up with Dr. Perkins as outpatient.      Interval History:  Patient seen and examined at bedside.  She reports she is no longer short of breath.  She is on her usual 2 L nasal cannula oxygen.    Review of Systems   Constitutional:  Positive for appetite change and fatigue. Negative for chills and fever.   Respiratory:  Positive for cough and shortness of breath.    Cardiovascular:  Negative for chest pain and leg swelling.   Gastrointestinal:  Negative for nausea and vomiting.   Neurological:  Positive for weakness.   All other systems reviewed and are negative.    Objective:     Vital Signs (Most Recent):  Temp: 97.4 °F (36.3 °C) (08/02/24 1558)  Pulse: 82 (08/02/24 1558)  Resp: 18 (08/02/24 1558)  BP: (!) 93/50 (08/02/24 1558)  SpO2: 96 % (08/02/24 1558) Vital Signs (24h Range):  Temp:  [97.4 °F (36.3 °C)-99.8 °F (37.7 °C)] 97.4 °F (36.3 °C)  Pulse:  [69-97] 82  Resp:  [16-36] 18  SpO2:  [90 %-99 %] 96 %  BP: ()/(50-74) 93/50     Weight: 87.2 kg (192 lb 3.9 oz)  Body mass index is 33 kg/m².    Intake/Output Summary (Last 24 hours) at 8/2/2024 1634  Last data filed at 8/2/2024 1500  Gross per 24 hour   Intake 440 ml   Output 250 ml   Net 190 ml         Physical Exam  Vitals reviewed.   Constitutional:       General: She is not in acute distress.     Appearance: She is ill-appearing (chronically).   HENT:      Head: Normocephalic and atraumatic.      Mouth/Throat:      Mouth: Mucous membranes are dry.       Pharynx: Oropharynx is clear.   Eyes:      General: No scleral icterus.     Extraocular Movements: Extraocular movements intact.      Conjunctiva/sclera: Conjunctivae normal.   Cardiovascular:      Rate and Rhythm: Normal rate and regular rhythm.      Pulses: Normal pulses.      Heart sounds: Murmur heard.   Pulmonary:      Effort: Pulmonary effort is normal. No respiratory distress.      Breath sounds: Rales (bibasilar) present. No wheezing or rhonchi.   Abdominal:      General: Abdomen is flat. There is no distension.      Palpations: Abdomen is soft.      Tenderness: There is no abdominal tenderness.   Musculoskeletal:      Cervical back: Normal range of motion and neck supple. No rigidity or tenderness.      Right lower leg: Edema present.      Left lower leg: Edema present.   Skin:     General: Skin is warm and dry.      Coloration: Skin is not jaundiced or pale.   Neurological:      General: No focal deficit present.      Mental Status: She is alert and oriented to person, place, and time. Mental status is at baseline.      Motor: Weakness present.             Significant Labs: All pertinent labs within the past 24 hours have been reviewed.  A1C:   Recent Labs   Lab 02/22/24  2315   HGBA1C 5.0     ABGs:   Recent Labs   Lab 08/01/24  2140   PH 7.448   PCO2 40.5   HCO3 28.0   POCSATURATED 91   BE 4*   PO2 59*       CBC:   Recent Labs   Lab 08/01/24  1459 08/02/24  0548   WBC 6.28 5.39   HGB 13.6 12.7   HCT 41.7 39.6   * 105*     CMP:   Recent Labs   Lab 08/01/24  1459 08/02/24  0548    136   K 3.3* 3.2*    100   CO2 21* 23   * 111*   BUN 13 15   CREATININE 1.3 1.2   CALCIUM 8.5* 8.5*   PROT 7.0  --    ALBUMIN 3.2*  --    BILITOT 2.4*  --    ALKPHOS 87  --    AST 19  --    ALT 9*  --    ANIONGAP 16 13     Cardiac Markers:   Recent Labs   Lab 08/01/24  1459   BNP >4,900*     Coagulation:   Recent Labs   Lab 08/02/24  0548   INR 2.9*     Magnesium:   Recent Labs   Lab 08/01/24  2340  08/02/24  0548   MG 1.6 1.7     Troponin:   Recent Labs   Lab 08/01/24  1459 08/01/24  2340   TROPONINI 0.088* 0.109*         Significant Imaging: I have reviewed all pertinent imaging results/findings within the past 24 hours.    Assessment/Plan:      * Acute on chronic systolic CHF (congestive heart failure)  Patient is identified as having Systolic (HFrEF) heart failure that is Acute on chronic. CHF is currently uncontrolled due to Pulmonary edema/pleural effusion on CXR. Latest ECHO performed and demonstrates- Results for orders placed during the hospital encounter of 02/22/24    Echo    Interpretation Summary    Left Ventricle: The left ventricle is normal in size. Normal wall thickness. There is concentric hypertrophy. Global hypokinesis present. There is moderately reduced systolic function. Ejection fraction by visual approximation is 35%.    Right Ventricle: Normal right ventricular cavity size. Systolic function is borderline low.    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 There is moderate aortic regurgitation.  Consider ORI if clinically needed to evaluate mechanical aortic valve further.    Mitral Valve: There is mild bileaflet sclerosis. There is mild regurgitation.    Pulmonary Artery: There is moderate pulmonary hypertension. The estimated pulmonary artery systolic pressure is 49 mmHg.    IVC/SVC: Normal venous pressure at 3 mmHg.    Ascending aorta is moderately dilated measuring 4.45 cm.  . Continue Furosemide and monitor clinical status closely. Monitor on telemetry. Patient is on CHF pathway.  Monitor strict Is&Os and daily weights.  Place on fluid restriction of 1.5 L. Cardiology has been consulted. Continue to stress to patient importance of self efficacy and  on diet for CHF. Last BNP reviewed- and noted below   Recent Labs   Lab 08/01/24  1459   BNP >4,900*     Continue IV diuresis, optimize medical therapy, add beta blockers as  "blood pressure tolerates    Hypokalemia  Patient's most recent potassium results are listed below.   Recent Labs     08/01/24  1459 08/02/24  0548   K 3.3* 3.2*       Plan  - Replete potassium per protocol  - Monitor potassium Daily  - Patient's hypokalemia is stable  - give potassium bicarbonate 40 mEq p.o. followed by potassium chloride 20 mEq p.o. b.i.d. while receiving IV Lasix diuresis  -check magnesium level    Hypotension  -hold Lopressor (generally with hypertension)  -monitor blood pressure closely with patient receiving IV Lasix diuresis      COPD without exacerbation  Patient's COPD is controlled currently.  Patient is currently off COPD Pathway.  Continue scheduled DuoNebs q.6 hours and O2 supplementation.  Continuous pulse oximetry monitor and monitor respiratory status closely.     Chronic hypoxic respiratory failure  Patient with Hypoxic Respiratory failure which is Chronic.  she is on home oxygen at 2.5 LPM. Supplemental oxygen was provided and noted-  currently requiring O2 via 3 L nasal cannula  -CXR with "Chronic or recurrent left effusion with adjacent atelectasis/consolidation.  Pneumonia with a parapneumonic effusion could have this appearance in the right clinical setting.  Less likely could these changes be related to CHF. Negative for acute process otherwise."  -white blood cell count 6.28, lactic acid level 1.6, BNP greater than 4900, T-max 99.9°, COVID-19 positive  .   Signs/symptoms of respiratory failure include- tachypnea and respiratory distress. Contributing diagnoses includes - CHF, COPD, and COVID-19  Labs and images were reviewed. Patient Has recent ABG, which has been reviewed. Will treat underlying causes and adjust management of respiratory failure as follows:  -continue IV Lasix diuresis  -continue oral dexamethasone for COVID-19 infection  -continuous pulse oximetry monitoring, O2 supplementation, incentive spirometry, DuoNebs q.6 hours  -pulmonary consult appreciated    Stage " 3a chronic kidney disease  Creatine stable for now. BMP reviewed- noted Estimated Creatinine Clearance: 38.6 mL/min (based on SCr of 1.2 mg/dL). according to latest data. Based on current GFR, CKD stage is stage 3 - GFR 30-59.  Monitor UOP and serial BMP and adjust therapy as needed. Renally dose meds. Avoid nephrotoxic medications and procedures.    COVID-19  Patient is identified as High risk for severe complications of COVID 19 based on COVID risk score of 6   Initiate standard COVID protocols; COVID-19 testing ,Infection Control notification  and isolation- respiratory, contact and droplet per protocol    Diagnostics: CBC, CMP, Ferritin, CRP, and Portable CXR    Management: Initiate targeted therapy with Dexamethasone PO/IV 6mg daily x10 days, Maintain oxygen saturations 92-96% via Nasal Cannula 3 LPM and monitor with continuous/intermittent pulse oximetry. , and Continuous cardiac monitoring. Pulmonary consult for am.    Advance Care Planning Current advance care plan has been discussed with patient/family/POA and patient currently wishes Full Code.       Paroxysmal atrial fibrillation  Patient with Paroxysmal (<7 days) atrial fibrillation which is controlled.  Lopressor temporarily held due to hypotension.  [id number] Score: 4. Anticoagulation indicated. Anticoagulation done with Coumadin .  Telemetry monitoring.    Pacemaker  Telemetry monitoring      H/O mechanical aortic valve replacement  -ORI 03/18/2024 with moderate prosthetic valve stenosis and moderate prosthetic valve regurgitation  -continue Coumadin (pharmacy to monitor and dose)  -check daily INR  Follow up outpatient with her usual cardiologist Dr. Perkins        VTE Risk Mitigation (From admission, onward)           Ordered     warfarin (COUMADIN) tablet 2.5 mg  Once per day on Monday Friday 08/02/24 0128     warfarin (COUMADIN) split tablet 1.25 mg  Once per day on Sunday Tuesday Wednesday Thursday Saturday 08/02/24 0128      warfarin (COUMADIN) tablet 2 mg  Daily         08/01/24 2312     IP VTE HIGH RISK PATIENT  Once         08/01/24 2312     Place sequential compression device  Until discontinued         08/01/24 2312                    Discharge Planning   RENE:      Code Status: Full Code   Is the patient medically ready for discharge?:     Reason for patient still in hospital (select all that apply): Patient trending condition, Laboratory test, and Treatment                     June Boykin MD  Department of Hospital Medicine   [facility] Tele[facility] ([facility])

## 2024-08-02 NOTE — ASSESSMENT & PLAN NOTE
Not in exacerbation  - agree wit scheduled bronchodilator nebs and ICS nebs  - wean supplemental oxygen for goal SpO2 > 88%  - can follow-up in pulm clinic as outpatient if she wishes

## 2024-08-02 NOTE — PROGRESS NOTES
Pt is on the pathway. She was seen in the  heart in failure clinic back in March. Spoke with family member who will get back in touch if she will re-enroll in the [facility].

## 2024-08-02 NOTE — CONSULTS
[facility] ([facility])  Cardiology  Consult Note    Patient Name: Serena Post  MRN: [id number]  Admission Date: 8/1/2024  Hospital Length of Stay: 1 days  Code Status: Full Code   Attending Provider: June Acosta MD   Consulting Provider: Sarah Santiago PA-C  Primary Care Physician: Evangelina Olivares MD  Principal Problem:Acute on chronic systolic CHF (congestive heart failure)    Patient information was obtained from patient, past medical records, and ER records.     Inpatient consult to Cardiology  Consult performed by: Sarah Santiago PA-C  Consult ordered by: Yary Levi MD        Subjective:     Chief Complaint:  SOB    HPI:   HPI obtained from chart as patient is currently on isolation precautions due to COVID-19 infection    Ms. Post is an 84 year old woman whose current medical conditions include AS s/p mechanical AVR in 2007, PAF (on Coumadin), s/p PPM, systolic CHF with EF of 35%, chronic respiratory failure, dementia, prior CVA, CKD, dementia, ascending aortic aneurysm, hyperlipidemia, HTN, and history of DVT who presented to [facility] ED due to worsening SOB over the past several days. Associated symptoms included productive cough with yellow/white sputum. She denied any associated fever, chills, audrey CP, palpitations, near syncope, or syncope. Reported she tested positive for COVID at home. Initial workup in ED revealed BNP > 4,900 and troponin 0.088. CXR showed findings consistent with CHF as well as pneumonia and patient was subsequently admitted for further evaluation and treatment. Cardiology consulted to assist with management. Chart reviewed. Troponin flat and at baseline, 0.088>0.109. Patient previously seen by our service during prior hospitalization in February due to CHF exacerbation. Followed on OP basis by Dr. Perkins. Recent ORI from 3/18/24 reviewed and did reveal moderate prosthetic valve stenosis and moderate regurgitation, repeat planned for  September.          Past Medical History:   Diagnosis Date    A-fib     Anticoagulant long-term use     Aortic valve disease     Cancer     brain tumor, 10 years ago    Cardiomyopathy     CHF (congestive heart failure)     COVID-19 7/23/2022    Hx of heart valve replacement with mechanical valve     Hyperlipidemia     Hypertension     Pacemaker     Pacemaker     Sepsis 7/23/2022    Stroke     2007, residual weakness       Past Surgical History:   Procedure Laterality Date    AORTIC VALVE REPLACEMENT      CHOLECYSTECTOMY      CORONARY ARTERY BYPASS GRAFT      HYSTERECTOMY      INSERTION OF PACEMAKER      TONSILLECTOMY         Review of patient's allergies indicates:   Allergen Reactions    Amlodipine Other (See Comments)     Not sure    Chlorthalidone Other (See Comments)     Not sure    Clonidine Other (See Comments)     Not sure    Codeine Other (See Comments)     Not sure    Escitalopram Other (See Comments)     Not sure    Lisinopril Other (See Comments)     Not sure    Losartan Other (See Comments)     Not sure    Meperidine Other (See Comments)     Not sure    Methadone Other (See Comments)     Not sure      Morphine Other (See Comments)     Not sure    Nebivolol Other (See Comments)     Not sure        Nitrofurantoin Other (See Comments)     Not sure        Omeprazole Other (See Comments)     Not sure      Pravastatin Other (See Comments)     Not sure      Propoxyphene Other (See Comments)     Not sure      Sulfamethoxazole-trimethoprim Other (See Comments)     Not sure           No current facility-administered medications on file prior to encounter.     Current Outpatient Medications on File Prior to Encounter   Medication Sig    amoxicillin-clavulanate 875-125mg (AUGMENTIN) 875-125 mg per tablet Take 1 tablet by mouth every 12 (twelve) hours.    albuterol (PROVENTIL) 2.5 mg /3 mL (0.083 %) nebulizer solution Take 2.5 mg by nebulization every 4 (four) hours as needed.    fluticasone propionate (FLONASE) 50  mcg/actuation nasal spray Shake well and use 2 sprays (100 mcg total) by Each Nostril route once daily.    furosemide (LASIX) 20 MG tablet Take 20 mg by mouth every morning.    metoprolol tartrate (LOPRESSOR) 25 MG tablet Take 1 tablet by mouth 2 (two) times daily.    miconazole nitrate 2% (MICOTIN) 2 % Oint Apply topically 2 (two) times daily. for 7 days    warfarin (COUMADIN) 2.5 MG tablet Take 2.5 mg by mouth every Monday and Friday.  Take 1.25 mg by mouth all other days.     Family History       Problem Relation (Age of Onset)    No Known Problems Mother, Father          Tobacco Use    Smoking status: Never     Passive exposure: Never    Smokeless tobacco: Never   Substance and Sexual Activity    Alcohol use: Not Currently    Drug use: Never    Sexual activity: Not on file     Review of Systems   Reason unable to perform ROS: as per hpi.     Objective:     Vital Signs (Most Recent):  Temp: 98 °F (36.7 °C) (08/02/24 0711)  Pulse: 85 (08/02/24 0809)  Resp: 16 (08/02/24 0747)  BP: 109/64 (08/02/24 0711)  SpO2: 97 % (08/02/24 0747) Vital Signs (24h Range):  Temp:  [98 °F (36.7 °C)-99.9 °F (37.7 °C)] 98 °F (36.7 °C)  Pulse:  [69-94] 85  Resp:  [16-36] 16  SpO2:  [92 %-99 %] 97 %  BP: ()/(51-74) 109/64     Weight: 87.2 kg (192 lb 3.9 oz)  Body mass index is 33 kg/m².    SpO2: 97 %         Intake/Output Summary (Last 24 hours) at 8/2/2024 1058  Last data filed at 8/2/2024 0809  Gross per 24 hour   Intake 240 ml   Output --   Net 240 ml       Lines/Drains/Airways       Drain  Duration             Female External Urinary Catheter w/ Suction 08/02/24 0932 <1 day              Peripheral Intravenous Line  Duration                  Peripheral IV - Single Lumen 08/01/24 1720 20 G 1 in No Left;Posterior Hand <1 day                     Physical Exam  Vitals and nursing note reviewed.          Significant Labs: CMP   Recent Labs   Lab 08/01/24  1459 08/02/24  0548    136   K 3.3* 3.2*    100   CO2 21* 23   GLU  124* 111*   BUN 13 15   CREATININE 1.3 1.2   CALCIUM 8.5* 8.5*   PROT 7.0  --    ALBUMIN 3.2*  --    BILITOT 2.4*  --    ALKPHOS 87  --    AST 19  --    ALT 9*  --    ANIONGAP 16 13   , CBC   Recent Labs   Lab 08/01/24  1459 08/02/24  0548   WBC 6.28 5.39   HGB 13.6 12.7   HCT 41.7 39.6   * 105*   , Troponin   Recent Labs   Lab 08/01/24  1459 08/01/24  2340   TROPONINI 0.088* 0.109*   , and All pertinent lab results from the last 24 hours have been reviewed.    Significant Imaging: Echocardiogram: Transthoracic echo (TTE) complete ([facility] Only):   Results for orders placed or performed during the hospital encounter of 02/22/24   Echo   Result Value Ref Range    BSA 2.03 m2    LVOT stroke volume 56.42 cm3    LVIDd 5.10 3.5 - 6.0 cm    LV Systolic Volume 90.21 mL    LV Systolic Volume Index 46.0 mL/m2    LVIDs 4.45 (A) 2.1 - 4.0 cm    LV Diastolic Volume 123.59 mL    LV Diastolic Volume Index 63.06 mL/m2    IVS 1.04 0.6 - 1.1 cm    LVOT diameter 1.91 cm    LVOT area 2.9 cm2    FS 13 (A) 28 - 44 %    Left Ventricle Relative Wall Thickness 0.47 cm    Posterior Wall 1.19 (A) 0.6 - 1.1 cm    LV mass 217.90 g    LV Mass Index 111 g/m2    MV Peak E Melo 1.07 m/s    TDI LATERAL 0.08 m/s    TDI SEPTAL 0.05 m/s    E/E' ratio 16.46 m/s    MV Peak A Melo 1.07 m/s    TR Max Melo 3.38 m/s    E/A ratio 1.00     IVRT 62.80 msec    E wave deceleration time 192.43 msec    LV SEPTAL E/E' RATIO 21.40 m/s    LV LATERAL E/E' RATIO 13.38 m/s    LVOT peak melo 0.83 m/s    Left Ventricular Outflow Tract Mean Velocity 0.58 cm/s    Left Ventricular Outflow Tract Mean Gradient 1.51 mmHg    RV mid diameter 3.15 cm    RVOT peak VTI 9.3 cm    TAPSE 2.05 cm    LA size 3.61 cm    Left Atrium Minor Axis 3.98 cm    Left Atrium Major Axis 6.58 cm    RA Major Axis 4.53 cm    AV regurgitation pressure 1/2 time 385.726022910711107 ms    AR Max Melo 4.34 m/s    AV mean gradient 95 mmHg    AV peak gradient 97 mmHg    Ao peak melo 4.93 m/s    Ao .80  cm    LVOT peak VTI 19.70 cm    AV valve area 0.37 cm²    AV Velocity Ratio 0.17     AV index (prosthetic) 0.13     AAMIR by Velocity Ratio 0.48 cm²    MV stenosis pressure 1/2 time 55.80 ms    MV valve area p 1/2 method 3.94 cm2    Triscuspid Valve Regurgitation Peak Gradient 46 mmHg    PV mean gradient 1 mmHg    RVOT peak rafa 0.49 m/s    Ao root annulus 2.98 cm    STJ 4.51 cm    Ascending aorta 4.45 cm    IVC diameter 1.15 cm    Mean e' 0.07 m/s    ZLVIDS 2.01     ZLVIDD -0.95     LA Volume Index 34.9 mL/m2    LA volume 68.49 cm3    LA WIDTH 4.5 cm    RA Width 4.4 cm    TV resting pulmonary artery pressure 49 mmHg    RV TB RVSP 6 mmHg    Est. RA pres 3 mmHg    EF 35 %    Narrative      Left Ventricle: The left ventricle is normal in size. Normal wall   thickness. There is concentric hypertrophy. Global hypokinesis present.   There is moderately reduced systolic function. Ejection fraction by visual   approximation is 35%.    Right Ventricle: Normal right ventricular cavity size. Systolic   function is borderline low.    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 There is moderate aortic regurgitation.    Consider ORI if clinically needed to evaluate mechanical aortic valve   further.    Mitral Valve: There is mild bileaflet sclerosis. There is mild   regurgitation.    Pulmonary Artery: There is moderate pulmonary hypertension. The   estimated pulmonary artery systolic pressure is 49 mmHg.    IVC/SVC: Normal venous pressure at 3 mmHg.    Ascending aorta is moderately dilated measuring 4.45 cm.      and EKG: REviewed  Assessment and Plan:   Patient who presents with SOB in setting of decompensated CHF/COVID-19 infection/mechanical valve dysfunction. Continue supportive care and diuresis.     * Acute on chronic systolic CHF (congestive heart failure)  -Presents with decompensated CHF exacerbated by COVID-19 infection and underlying mechanical valve  dysfunction  -EF 35%  -Continue IV diuresis  -Resume BB as tolerated  -Optimization of med therapy has been limited as OP due to hypotension  -Strict I's/O's      Chronic hypoxic respiratory failure  -Secondary to COVID-19 infection and fluid overload  -Continue supplemental O2, supportive care, IV diuresis  -Mgmt as per primary team        Stage 3a chronic kidney disease  -Monitor with IV diuresis     COVID-19  -Mgmt as per primary team    Paroxysmal atrial fibrillation  -Resume BB as tolerated  -Continue Coumadin    H/O mechanical aortic valve replacement  -ORI from 3/18/24, mod prosthetic valve stenosis and regurgitation  -Followed by outside cardiologist, Dr. Perkins        VTE Risk Mitigation (From admission, onward)           Ordered     warfarin (COUMADIN) tablet 2.5 mg  Once per day on Monday Friday 08/02/24 0128     warfarin (COUMADIN) split tablet 1.25 mg  Once per day on Sunday Tuesday Wednesday Thursday Saturday 08/02/24 0128     warfarin (COUMADIN) tablet 2 mg  Daily         08/01/24 2312     IP VTE HIGH RISK PATIENT  Once         08/01/24 2312     Place sequential compression device  Until discontinued         08/01/24 2312                    Thank you for your consult. I will follow-up with patient. Please contact us if you have any additional questions.    Sarah Santiago PA-C  Cardiology   [facility] Tele[facility] ([facility])

## 2024-08-02 NOTE — ASSESSMENT & PLAN NOTE
-ORI 03/18/2024 with moderate prosthetic valve stenosis and moderate prosthetic valve regurgitation  -continue Coumadin (pharmacy to monitor and dose)  -check daily INR

## 2024-08-02 NOTE — ASSESSMENT & PLAN NOTE
-ORI from 3/18/24, mod prosthetic valve stenosis and regurgitation  -Followed by outside cardiologist, Dr. Perkins

## 2024-08-02 NOTE — ASSESSMENT & PLAN NOTE
Patient's most recent potassium results are listed below.   Recent Labs     08/01/24  1459 08/02/24  0548   K 3.3* 3.2*       Plan  - Replete potassium per protocol  - Monitor potassium Daily  - Patient's hypokalemia is stable  - give potassium bicarbonate 40 mEq p.o. followed by potassium chloride 20 mEq p.o. b.i.d. while receiving IV Lasix diuresis  -check magnesium level

## 2024-08-02 NOTE — ASSESSMENT & PLAN NOTE
-hold Lopressor (generally with hypertension)  -monitor blood pressure closely with patient receiving IV Lasix diuresis

## 2024-08-02 NOTE — ASSESSMENT & PLAN NOTE
Patient's most recent potassium results are listed below.   Recent Labs     08/01/24  1459   K 3.3*     Plan  - Replete potassium per protocol  - Monitor potassium Daily  - Patient's hypokalemia is stable  - give potassium bicarbonate 40 mEq p.o. followed by potassium chloride 20 mEq p.o. b.i.d. while receiving IV Lasix diuresis  -check magnesium level

## 2024-08-02 NOTE — PLAN OF CARE
A206/A206 NITHIN Post is a 84 y.o.female admitted on 8/1/2024 for Acute on chronic systolic CHF (congestive heart failure)   Code Status: Full Code MRN: [id number]   Review of patient's allergies indicates:   Allergen Reactions    Amlodipine Other (See Comments)     Not sure    Chlorthalidone Other (See Comments)     Not sure    Clonidine Other (See Comments)     Not sure    Codeine Other (See Comments)     Not sure    Escitalopram Other (See Comments)     Not sure    Lisinopril Other (See Comments)     Not sure    Losartan Other (See Comments)     Not sure    Meperidine Other (See Comments)     Not sure    Methadone Other (See Comments)     Not sure      Morphine Other (See Comments)     Not sure    Nebivolol Other (See Comments)     Not sure        Nitrofurantoin Other (See Comments)     Not sure        Omeprazole Other (See Comments)     Not sure      Pravastatin Other (See Comments)     Not sure      Propoxyphene Other (See Comments)     Not sure      Sulfamethoxazole-trimethoprim Other (See Comments)     Not sure         Past Medical History:   Diagnosis Date    A-fib     Anticoagulant long-term use     Aortic valve disease     Cancer     brain tumor, 10 years ago    Cardiomyopathy     CHF (congestive heart failure)     COVID-19 7/23/2022    Hx of heart valve replacement with mechanical valve     Hyperlipidemia     Hypertension     Pacemaker     Pacemaker     Sepsis 7/23/2022    Stroke     2007, residual weakness      PRN meds    acetaminophen, 650 mg, Q6H PRN  hydrALAZINE, 10 mg, Q6H PRN  ondansetron, 4 mg, Q6H PRN  sodium chloride 0.9%, 10 mL, PRN      Chart check completed. Will continue plan of care.         Chhaya Coma Scale Score: 15     Lead Monitored: Lead II Rhythm: paced rhythm Frequency/Ectopy: frequent, PVCs  Cardiac/Telemetry Box Number: [id number]    Last Bowel Movement: 08/02/24  Diet Low Sodium, 2gm Fluid - 1500mL  Voiding Characteristics: incontinence  Jacobo Score: 14  Fall Risk Score:  12  Accucheck []   Freq?      Lines/Drains/Airways       Peripheral Intravenous Line  Duration                  Peripheral IV - Single Lumen 08/01/24 1720 20 G 1 in No Left;Posterior Hand <1 day

## 2024-08-02 NOTE — HPI
Ms Post is a 83 y/o WF with CHF (LVEF 35%), COPD, chronic hypoxic respiratory failure on 2.5L via NC, obesity, Afib on warfarin, AVR, h/o CVA, HLD, HTN, DVT, RA, AAA, CKD III, and CAD who is currently admitted to the Hospitalist service after presenting with worsening SOB over several days.  She was dx with COVID by home health.  She reports some associated cough, a bit worse than normal, productive of yellow/beige sputum.  Denies F/C, sick contacts, CP, wheezing.    Overnight she was diuresed and started on Decadron for the COVID.  She reports significant UOP, but not quantified in the chart.  Today, she remains on 2L via NC and says she is feeling better.

## 2024-08-02 NOTE — ASSESSMENT & PLAN NOTE
-Presents with decompensated CHF exacerbated by COVID-19 infection and underlying mechanical valve dysfunction  -EF 35%  -Continue IV diuresis  -Resume BB as tolerated  -Optimization of med therapy has been limited as OP due to hypotension  -Strict I's/O's

## 2024-08-02 NOTE — ASSESSMENT & PLAN NOTE
"Patient with Hypoxic Respiratory failure which is Chronic.  she is on home oxygen at 2.5 LPM. Supplemental oxygen was provided and noted-  currently requiring O2 via 3 L nasal cannula  -CXR with "Chronic or recurrent left effusion with adjacent atelectasis/consolidation.  Pneumonia with a parapneumonic effusion could have this appearance in the right clinical setting.  Less likely could these changes be related to CHF. Negative for acute process otherwise."  -white blood cell count 6.28, lactic acid level 1.6, BNP greater than 4900, T-max 99.9°, COVID-19 positive  .   Signs/symptoms of respiratory failure include- tachypnea and respiratory distress. Contributing diagnoses includes - CHF, COPD, and COVID-19  Labs and images were reviewed. Patient Has recent ABG, which has been reviewed. Will treat underlying causes and adjust management of respiratory failure as follows:  -continue IV Lasix diuresis  -continue oral dexamethasone for COVID-19 infection  -continuous pulse oximetry monitoring, O2 supplementation, incentive spirometry, DuoNebs q.6 hours  -pulmonary consult appreciated  "

## 2024-08-02 NOTE — PLAN OF CARE
[facility] - Telemetry (Hospital)  Initial Discharge Assessment       Primary Care Provider: Evangelina Olivares MD    Admission Diagnosis: Shortness of breath [R06.02]  Acute on chronic congestive heart failure, unspecified heart failure type [I50.9]  COVID-19 [U07.1]    Admission Date: 8/1/2024  Expected Discharge Date:     Transition of Care Barriers: (P) None    Payor: MEDICARE / Plan: MEDICARE PART A & B / Product Type: Government /     Extended Emergency Contact Information  Primary Emergency Contact: Amish Post  Mobile Phone: [phone]  Relation: Son  Preferred language: English   needed? No    Discharge Plan A: (P) [id number] - [place], [place] - [address]  [address] [facility] [address]  Phone: [phone] Fax: [phone]      Initial Assessment (most recent)       Adult Discharge Assessment - 08/02/24 1649          Discharge Assessment    Assessment Type Discharge Planning Assessment (P)      Confirmed/corrected address, phone number and insurance Yes (P)      Confirmed Demographics Correct on Facesheet (P)      Source of Information family (P)      When was your last doctors appointment? 03/06/24 (P)      Communicated RENE with patient/caregiver Date not available/Unable to determine (P)      Reason For Admission covid (P)      People in Home child(solange), adult (P)      Facility Arrived From: home (P)      Do you expect to return to your current living situation? Yes (P)      Do you have help at home or someone to help you manage your care at home? Yes (P)      Who are your caregiver(s) and their phone number(s)? Amish melchor (P)      Prior to hospitilization cognitive status: Unable to Assess (P)      Current cognitive status: Unable to Assess (P)      Walking or Climbing Stairs Difficulty yes (P)      Walking or Climbing Stairs transferring difficulty, assistance 1 person;ambulation difficulty,  dependent (P)      Mobility Management Stands and pivots into w/c - non ambulatory (P)      Dressing/Bathing Difficulty yes (P)      Dressing/Bathing bathing difficulty, assistance 1 person;dressing difficulty, assistance 1 person (P)      Dressing/Bathing Management assisted by caregivers (P)      Equipment Currently Used at Home oxygen;wheelchair (P)      Readmission within 30 days? No (P)      Patient currently being followed by outpatient case management? No (P)      Do you currently have service(s) that help you manage your care at home? Yes (P)      Name and Contact number of agency [facility] (P)      Is the pt/caregiver preference to resume services with current agency Yes (P)      Do you take prescription medications? Yes (P)      Do you have prescription coverage? Yes (P)      Coverage [employer] (P)      Who is going to help you get home at discharge? son (P)      How do you get to doctors appointments? family or friend will provide (P)      Are you on dialysis? No (P)      Do you take coumadin? Yes (P)      Who monitors your labs? [facility] (P)      Discharge Plan A Home Health (P)      DME Needed Upon Discharge  none (P)      Discharge Plan discussed with: Adult children (P)      Transition of Care Barriers None (P)                    Patient lives with son who can be help at home.  Patient is dependent with ADL's.  She does transfer to a w/c with the assistance of one.  She has caregivers 5 x per week for 12 hours.  She has home 02 with [facility].  She is current with [facility] .

## 2024-08-02 NOTE — CONSULTS
Diet Education: Low Sodium, Fluid Restriction Education  Time Spent: RD remote  Learners: Patient       Nutrition Education provided with handouts:   + Clinical Reference attachments to d/c documents  DASH diet   Low Sodium diet   Fluid restriction diet    Nutrition Related Social Determinants of Health: SDOH: Unable to assess at this time.       Comments:  Patient is on a Low Na, 1500ml fluid restriction diet.  Meal consumption noted between 50-75%.  Labs reviewed.  Allergies: NKFA  I/O since admit: STEVEN.  Patient admitted with CHF diagnosis.  Education handouts attached to discharge paperwork.  RD to continue to monitor po intake and encourage diet education compliance.     Patient Active Problem List   Diagnosis    Anticoagulant long-term use    Age-related cataract of both eyes    Dyslipidemia    Essential hypertension    H/O mechanical aortic valve replacement    History of basal cell carcinoma    History of DVT (deep vein thrombosis)    Pacemaker    Paroxysmal atrial fibrillation    SVT (supraventricular tachycardia)    Systolic congestive heart failure    Rheumatoid arthritis    COVID-19    Arteriosclerotic cardiovascular disease    Cognitive impairment    History of rheumatic fever as a child    History of stroke    Intracranial tumor    Mixed hyperlipidemia    Stage 3a chronic kidney disease    Aortic aneurysm    Acute on chronic systolic heart failure    Severe obesity (BMI 35.0-39.9) with comorbidity    COPD exacerbation    SOB (shortness of breath)    Acute on chronic systolic CHF (congestive heart failure)    Chronic hypoxic respiratory failure    COPD without exacerbation    Hypotension    Hypokalemia     Past Medical History:   Diagnosis Date    A-fib     Anticoagulant long-term use     Aortic valve disease     Cancer     brain tumor, 10 years ago    Cardiomyopathy     CHF (congestive heart failure)     COVID-19 7/23/2022    Hx of heart valve replacement with mechanical valve     Hyperlipidemia      Hypertension     Pacemaker     Pacemaker     Sepsis 7/23/2022    Stroke     2007, residual weakness     BMP  Lab Results   Component Value Date     08/02/2024    K 3.2 (L) 08/02/2024     08/02/2024    CO2 23 08/02/2024    BUN 15 08/02/2024    CREATININE 1.2 08/02/2024    CALCIUM 8.5 (L) 08/02/2024    ANIONGAP 13 08/02/2024    EGFRNORACEVR 45 (A) 08/02/2024     Lab Results   Component Value Date    HGBA1C 5.0 02/22/2024     Lab Results   Component Value Date    CALCIUM 8.5 (L) 08/02/2024    PHOS 2.6 (L) 11/22/2023           All questions and concerns answered. Dietitian's contact information provided.       Follow-Up:Yes     Please Re-consult as needed        Thanks,  Chanell Nuñez, MS, RDN, LDN

## 2024-08-02 NOTE — SUBJECTIVE & OBJECTIVE
Past Medical History:   Diagnosis Date    A-fib     Anticoagulant long-term use     Aortic valve disease     Cancer     brain tumor, 10 years ago    Cardiomyopathy     CHF (congestive heart failure)     COVID-19 7/23/2022    Hx of heart valve replacement with mechanical valve     Hyperlipidemia     Hypertension     Pacemaker     Pacemaker     Sepsis 7/23/2022    Stroke     2007, residual weakness       Past Surgical History:   Procedure Laterality Date    AORTIC VALVE REPLACEMENT      CHOLECYSTECTOMY      CORONARY ARTERY BYPASS GRAFT      HYSTERECTOMY      INSERTION OF PACEMAKER      TONSILLECTOMY         Review of patient's allergies indicates:   Allergen Reactions    Amlodipine Other (See Comments)     Not sure    Chlorthalidone Other (See Comments)     Not sure    Clonidine Other (See Comments)     Not sure    Codeine Other (See Comments)     Not sure    Escitalopram Other (See Comments)     Not sure    Lisinopril Other (See Comments)     Not sure    Losartan Other (See Comments)     Not sure    Meperidine Other (See Comments)     Not sure    Methadone Other (See Comments)     Not sure      Morphine Other (See Comments)     Not sure    Nebivolol Other (See Comments)     Not sure        Nitrofurantoin Other (See Comments)     Not sure        Omeprazole Other (See Comments)     Not sure      Pravastatin Other (See Comments)     Not sure      Propoxyphene Other (See Comments)     Not sure      Sulfamethoxazole-trimethoprim Other (See Comments)     Not sure           Family History       Problem Relation (Age of Onset)    No Known Problems Mother, Father          Tobacco Use    Smoking status: Never     Passive exposure: Never    Smokeless tobacco: Never   Substance and Sexual Activity    Alcohol use: Not Currently    Drug use: Never    Sexual activity: Not on file         Review of Systems   All other systems reviewed and are negative.    Objective:     Vital Signs (Most Recent):  Temp: 97.5 °F (36.4 °C) (08/02/24  1257)  Pulse: 97 (08/02/24 1311)  Resp: 16 (08/02/24 1311)  BP: (!) 101/53 (08/02/24 1257)  SpO2: (!) 92 % (08/02/24 1311) Vital Signs (24h Range):  Temp:  [97.5 °F (36.4 °C)-99.9 °F (37.7 °C)] 97.5 °F (36.4 °C)  Pulse:  [69-97] 97  Resp:  [16-36] 16  SpO2:  [90 %-99 %] 92 %  BP: ()/(51-74) 101/53     Weight: 87.2 kg (192 lb 3.9 oz)  Body mass index is 33 kg/m².      Intake/Output Summary (Last 24 hours) at 8/2/2024 1344  Last data filed at 8/2/2024 1257  Gross per 24 hour   Intake 240 ml   Output 250 ml   Net -10 ml        Physical Exam  Vitals and nursing note reviewed.   Constitutional:       General: She is not in acute distress.     Appearance: She is not ill-appearing.   HENT:      Head: Normocephalic and atraumatic.   Eyes:      General: No scleral icterus.     Extraocular Movements: Extraocular movements intact.      Conjunctiva/sclera: Conjunctivae normal.      Pupils: Pupils are equal, round, and reactive to light.   Cardiovascular:      Rate and Rhythm: Normal rate and regular rhythm.      Pulses: Normal pulses.      Heart sounds: No murmur heard.  Pulmonary:      Effort: Pulmonary effort is normal. No respiratory distress.      Breath sounds: Rales (bibasilar) present. No wheezing or rhonchi.   Abdominal:      General: Abdomen is flat. There is no distension.      Palpations: Abdomen is soft.      Tenderness: There is no abdominal tenderness.   Musculoskeletal:      Cervical back: Normal range of motion and neck supple. No rigidity or tenderness.      Right lower leg: Edema present.      Left lower leg: Edema present.   Skin:     General: Skin is warm and dry.      Coloration: Skin is not jaundiced or pale.   Neurological:      General: No focal deficit present.      Mental Status: She is alert and oriented to person, place, and time. Mental status is at baseline.          Vents:       Lines/Drains/Airways       Drain  Duration             Female External Urinary Catheter w/ Suction 08/02/24 0995  <1 day              Peripheral Intravenous Line  Duration                  Peripheral IV - Single Lumen 08/01/24 1720 20 G 1 in No Left;Posterior Hand <1 day                    Significant Labs:    CBC/Anemia Profile:  Recent Labs   Lab 08/01/24  1459 08/01/24 2348 08/02/24  0548   WBC 6.28  --  5.39   HGB 13.6  --  12.7   HCT 41.7  --  39.6   *  --  105*   MCV 87  --  89   RDW 15.8*  --  15.9*   FERRITIN  --  55  --         Chemistries:  Recent Labs   Lab 08/01/24  1459 08/01/24 2340 08/02/24  0548     --  136   K 3.3*  --  3.2*     --  100   CO2 21*  --  23   BUN 13  --  15   CREATININE 1.3  --  1.2   CALCIUM 8.5*  --  8.5*   ALBUMIN 3.2*  --   --    PROT 7.0  --   --    BILITOT 2.4*  --   --    ALKPHOS 87  --   --    ALT 9*  --   --    AST 19  --   --    MG  --  1.6 1.7       All pertinent labs within the past 24 hours have been reviewed.    Significant Imaging:   I have reviewed all pertinent imaging results/findings within the past 24 hours.

## 2024-08-02 NOTE — HPI
HPI obtained from chart as patient is currently on isolation precautions due to COVID-19 infection    Ms. Post is an 84 year old woman whose current medical conditions include AS s/p mechanical AVR in 2007, PAF (on Coumadin), s/p PPM, systolic CHF with EF of 35%, chronic respiratory failure, dementia, prior CVA, CKD, dementia, ascending aortic aneurysm, hyperlipidemia, HTN, and history of DVT who presented to [facility] ED due to worsening SOB over the past several days. Associated symptoms included productive cough with yellow/white sputum. She denied any associated fever, chills, audrey CP, palpitations, near syncope, or syncope. Reported she tested positive for COVID at home. Initial workup in ED revealed BNP > 4,900 and troponin 0.088. CXR showed findings consistent with CHF as well as pneumonia and patient was subsequently admitted for further evaluation and treatment. Cardiology consulted to assist with management. Chart reviewed. Troponin flat and at baseline, 0.088>0.109. Patient previously seen by our service during prior hospitalization in February due to CHF exacerbation. Followed on OP basis by Dr. Perkins. Recent ORI from 3/18/24 reviewed and did reveal moderate prosthetic valve stenosis and moderate regurgitation, repeat planned for September.

## 2024-08-02 NOTE — SUBJECTIVE & OBJECTIVE
Past Medical History:   Diagnosis Date    A-fib     Anticoagulant long-term use     Aortic valve disease     Cancer     brain tumor, 10 years ago    Cardiomyopathy     CHF (congestive heart failure)     COVID-19 7/23/2022    Hx of heart valve replacement with mechanical valve     Hyperlipidemia     Hypertension     Pacemaker     Pacemaker     Sepsis 7/23/2022    Stroke     2007, residual weakness       Past Surgical History:   Procedure Laterality Date    AORTIC VALVE REPLACEMENT      CHOLECYSTECTOMY      CORONARY ARTERY BYPASS GRAFT      HYSTERECTOMY      INSERTION OF PACEMAKER      TONSILLECTOMY         Review of patient's allergies indicates:   Allergen Reactions    Amlodipine Other (See Comments)     Not sure    Chlorthalidone Other (See Comments)     Not sure    Clonidine Other (See Comments)     Not sure    Codeine Other (See Comments)     Not sure    Escitalopram Other (See Comments)     Not sure    Lisinopril Other (See Comments)     Not sure    Losartan Other (See Comments)     Not sure    Meperidine Other (See Comments)     Not sure    Methadone Other (See Comments)     Not sure      Morphine Other (See Comments)     Not sure    Nebivolol Other (See Comments)     Not sure        Nitrofurantoin Other (See Comments)     Not sure        Omeprazole Other (See Comments)     Not sure      Pravastatin Other (See Comments)     Not sure      Propoxyphene Other (See Comments)     Not sure      Sulfamethoxazole-trimethoprim Other (See Comments)     Not sure           No current facility-administered medications on file prior to encounter.     Current Outpatient Medications on File Prior to Encounter   Medication Sig    amoxicillin-clavulanate 875-125mg (AUGMENTIN) 875-125 mg per tablet Take 1 tablet by mouth every 12 (twelve) hours.    albuterol (PROVENTIL) 2.5 mg /3 mL (0.083 %) nebulizer solution Take 2.5 mg by nebulization every 4 (four) hours as needed.    fluticasone propionate (FLONASE) 50 mcg/actuation nasal  spray Shake well and use 2 sprays (100 mcg total) by Each Nostril route once daily.    furosemide (LASIX) 20 MG tablet Take 20 mg by mouth every morning.    metoprolol tartrate (LOPRESSOR) 25 MG tablet Take 1 tablet by mouth 2 (two) times daily.    miconazole nitrate 2% (MICOTIN) 2 % Oint Apply topically 2 (two) times daily. for 7 days    warfarin (COUMADIN) 2.5 MG tablet Take 2.5 mg by mouth every Monday and Friday.  Take 1.25 mg by mouth all other days.     Family History       Problem Relation (Age of Onset)    No Known Problems Mother, Father          Tobacco Use    Smoking status: Never     Passive exposure: Never    Smokeless tobacco: Never   Substance and Sexual Activity    Alcohol use: Not Currently    Drug use: Never    Sexual activity: Not on file     Review of Systems   Constitutional:  Negative for chills and fever.   Respiratory:  Positive for cough and shortness of breath.    Cardiovascular:  Negative for chest pain and leg swelling.   Gastrointestinal:  Negative for nausea and vomiting.   All other systems reviewed and are negative.    Objective:     Vital Signs (Most Recent):  Temp: 99.8 °F (37.7 °C) (08/01/24 1824)  Pulse: 80 (08/01/24 2237)  Resp: 20 (08/01/24 2237)  BP: (!) 116/55 (08/01/24 2237)  SpO2: 98 % (08/01/24 2237) Vital Signs (24h Range):  Temp:  [99.8 °F (37.7 °C)-99.9 °F (37.7 °C)] 99.8 °F (37.7 °C)  Pulse:  [69-88] 80  Resp:  [18-36] 20  SpO2:  [92 %-99 %] 98 %  BP: ()/(51-69) 116/55     Weight: 86 kg (189 lb 8 oz)  Body mass index is 32.53 kg/m².     Physical Exam  Vitals reviewed.   Constitutional:       Appearance: She is ill-appearing. She is not diaphoretic.   HENT:      Nose: Nose normal.   Eyes:      Conjunctiva/sclera: Conjunctivae normal.   Cardiovascular:      Rate and Rhythm: Normal rate.   Pulmonary:      Effort: Respiratory distress (tachypnea) present.      Breath sounds: No wheezing.   Abdominal:      General: There is no distension.      Tenderness: There is no  abdominal tenderness.   Musculoskeletal:         General: Swelling present.   Skin:     General: Skin is warm and dry.   Neurological:      Mental Status: She is alert. Mental status is at baseline.   Psychiatric:         Mood and Affect: Mood normal.         Behavior: Behavior normal.                Significant Labs: All pertinent labs within the past 24 hours have been reviewed.  Recent Lab Results         08/01/24  2140   08/01/24 2011 08/01/24  1521   08/01/24  1459        Albumin       3.2       ALP       87       Allens Test Pass             ALT       9       Anion Gap       16       AST       19       Baso #       0.03       Basophil %       0.5       BILIRUBIN TOTAL       2.4  Comment: For infants and newborns, interpretation of results should be based  on gestational age, weight and in agreement with clinical  observations.    Premature Infant recommended reference ranges:  Up to 24 hours.............<8.0 mg/dL  Up to 48 hours............<12.0 mg/dL  3-5 days..................<15.0 mg/dL  6-29 days.................<15.0 mg/dL         BNP       >4,900  Comment: Values of less than 100 pg/ml are consistent with non-CHF populations.       Site LR             BUN       13       Calcium       8.5       Chloride       101       CO2       21       Creatinine       1.3       DelSys Nasal Can             Differential Method       Automated       eGFR       41       Eos #       0.0       Eos %       0.0       FiO2 32             Flow 3             Glucose       124       Gran # (ANC)       5.3       Gran %       84.2       Hematocrit       41.7       Hemoglobin       13.6       Immature Grans (Abs)       0.03  Comment: Mild elevation in immature granulocytes is non specific and   can be seen in a variety of conditions including stress response,   acute inflammation, trauma and pregnancy. Correlation with other   laboratory and clinical findings is essential.         Immature Granulocytes       0.5       Lactic  Acid Level   1.6  Comment: Falsely low lactic acid results can be found in samples   containing >=13.0 mg/dL total bilirubin and/or >=3.5 mg/dL   direct bilirubin.             Lymph #       0.4       Lymph %       5.6       MCH       28.5       MCHC       32.6       MCV       87       Mode SPONT             Mono #       0.6       Mono %       9.2       MPV       12.3       nRBC       0       Platelet Count       141       POC BE 4             POC HCO3 28.0             POC PCO2 40.5             POC PH 7.448             POC PO2 59             POC SATURATED O2 91             Potassium       3.3       PROTEIN TOTAL       7.0       RBC       4.77       RDW       15.8       Sample ARTERIAL             SARS-CoV-2 RNA, Amplification, Qual     Positive  Comment: This test utilizes isothermal nucleic acid amplification technology   to   detect the SARS-CoV-2 RdRp nucleic acid segment. The analytical   sensitivity   (limit of detection) is 500 copies/swab.    A POSITIVE result is indicative of the presence of SARS-CoV-2 RNA;   clinical   correlation with patient history and other diagnostic information is   necessary to determine patient infection status.    A NEGATIVE result means that SARS-CoV-2 nucleic acids are not present   above   the limit of detection. A NEGATIVE result should be treated as   presumptive.   It does not rule out the possibility of COVID-19 and should not be   the sole   basis for treatment decisions.    If COVID-19 is strongly suspected based on clinical and exposure   history,   re-testing using an alternate molecular assay should be considered.    This test is Food and Drug Administration (FDA) approved. Performance   characteristics of this has been independently verified by [facility] Department of Pathology and Laboratory Medicine.           Sodium       138       Troponin I       0.088  Comment: The reference interval for Troponin I represents the 99th percentile   cutoff   for our  facility and is consistent with 3rd generation assay   performance.         WBC       6.28               Significant Imaging: I have reviewed all pertinent imaging results/findings within the past 24 hours.  X-Ray Chest 1 View   Final Result      1.  Chronic or recurrent left effusion with adjacent atelectasis/consolidation.  Pneumonia with a parapneumonic effusion could have this appearance in the right clinical setting.  Less likely could these changes be related to CHF.      2.  Negative for acute process otherwise.      3.  Stable findings as noted above.         Electronically signed by: Emery Arrieta MD   Date:    08/01/2024   Time:    16:47

## 2024-08-02 NOTE — SUBJECTIVE & OBJECTIVE
Interval History:  Patient seen and examined at bedside.  She reports she is no longer short of breath.  She is on her usual 2 L nasal cannula oxygen.    Review of Systems   Constitutional:  Positive for appetite change and fatigue. Negative for chills and fever.   Respiratory:  Positive for cough and shortness of breath.    Cardiovascular:  Negative for chest pain and leg swelling.   Gastrointestinal:  Negative for nausea and vomiting.   Neurological:  Positive for weakness.   All other systems reviewed and are negative.    Objective:     Vital Signs (Most Recent):  Temp: 97.4 °F (36.3 °C) (08/02/24 1558)  Pulse: 82 (08/02/24 1558)  Resp: 18 (08/02/24 1558)  BP: (!) 93/50 (08/02/24 1558)  SpO2: 96 % (08/02/24 1558) Vital Signs (24h Range):  Temp:  [97.4 °F (36.3 °C)-99.8 °F (37.7 °C)] 97.4 °F (36.3 °C)  Pulse:  [69-97] 82  Resp:  [16-36] 18  SpO2:  [90 %-99 %] 96 %  BP: ()/(50-74) 93/50     Weight: 87.2 kg (192 lb 3.9 oz)  Body mass index is 33 kg/m².    Intake/Output Summary (Last 24 hours) at 8/2/2024 1634  Last data filed at 8/2/2024 1500  Gross per 24 hour   Intake 440 ml   Output 250 ml   Net 190 ml         Physical Exam  Vitals reviewed.   Constitutional:       General: She is not in acute distress.     Appearance: She is ill-appearing (chronically).   HENT:      Head: Normocephalic and atraumatic.      Mouth/Throat:      Mouth: Mucous membranes are dry.      Pharynx: Oropharynx is clear.   Eyes:      General: No scleral icterus.     Extraocular Movements: Extraocular movements intact.      Conjunctiva/sclera: Conjunctivae normal.   Cardiovascular:      Rate and Rhythm: Normal rate and regular rhythm.      Pulses: Normal pulses.      Heart sounds: Murmur heard.   Pulmonary:      Effort: Pulmonary effort is normal. No respiratory distress.      Breath sounds: Rales (bibasilar) present. No wheezing or rhonchi.   Abdominal:      General: Abdomen is flat. There is no distension.      Palpations: Abdomen is  soft.      Tenderness: There is no abdominal tenderness.   Musculoskeletal:      Cervical back: Normal range of motion and neck supple. No rigidity or tenderness.      Right lower leg: Edema present.      Left lower leg: Edema present.   Skin:     General: Skin is warm and dry.      Coloration: Skin is not jaundiced or pale.   Neurological:      General: No focal deficit present.      Mental Status: She is alert and oriented to person, place, and time. Mental status is at baseline.      Motor: Weakness present.             Significant Labs: All pertinent labs within the past 24 hours have been reviewed.  A1C:   Recent Labs   Lab 02/22/24  2315   HGBA1C 5.0     ABGs:   Recent Labs   Lab 08/01/24  2140   PH 7.448   PCO2 40.5   HCO3 28.0   POCSATURATED 91   BE 4*   PO2 59*       CBC:   Recent Labs   Lab 08/01/24  1459 08/02/24  0548   WBC 6.28 5.39   HGB 13.6 12.7   HCT 41.7 39.6   * 105*     CMP:   Recent Labs   Lab 08/01/24  1459 08/02/24  0548    136   K 3.3* 3.2*    100   CO2 21* 23   * 111*   BUN 13 15   CREATININE 1.3 1.2   CALCIUM 8.5* 8.5*   PROT 7.0  --    ALBUMIN 3.2*  --    BILITOT 2.4*  --    ALKPHOS 87  --    AST 19  --    ALT 9*  --    ANIONGAP 16 13     Cardiac Markers:   Recent Labs   Lab 08/01/24  1459   BNP >4,900*     Coagulation:   Recent Labs   Lab 08/02/24  0548   INR 2.9*     Magnesium:   Recent Labs   Lab 08/01/24  2340 08/02/24  0548   MG 1.6 1.7     Troponin:   Recent Labs   Lab 08/01/24  1459 08/01/24  2340   TROPONINI 0.088* 0.109*         Significant Imaging: I have reviewed all pertinent imaging results/findings within the past 24 hours.

## 2024-08-02 NOTE — ASSESSMENT & PLAN NOTE
Patient's COPD is controlled currently.  Patient is currently off COPD Pathway.  Continue scheduled DuoNebs q.6 hours and O2 supplementation.  Continuous pulse oximetry monitor and monitor respiratory status closely.

## 2024-08-02 NOTE — ASSESSMENT & PLAN NOTE
-Secondary to COVID-19 infection and fluid overload  -Continue supplemental O2, supportive care, IV diuresis  -Mgmt as per primary team

## 2024-08-02 NOTE — HPI
84-year-old white woman with history of aortic valve replacement (mechanical valve in 2007), paroxysmal atrial fibrillation, long-term oral anticoagulation with Coumadin, [employer] permanent pacemaker 2009, chronic systolic CHF (echocardiogram February 2024 with EF 35%), chronic hypoxic respiratory failure (2-1/2 L nasal cannula at home), hyperlipidemia, hypertension, DVT, rheumatoid arthritis, CVA, intracranial tumor, ascending aortic aneurysm, obesity, COPD, arteriosclerotic cardiovascular disease, and chronic kidney disease stage IIIA who was sent by [facility] for shortness a breath and positive COVID test.  Patient reports shortness of breath over the last several days, moderate severity, constant, no associated chest pain.  She has associated cough with white to yellow sputum production.  She denies nausea or vomiting.  She denies fevers or chills however she does have low-grade temperature in our emergency department up to 99.9.    Last hospital admit 2/22-02/28/2024 for acute on chronic systolic CHF exacerbation and mechanical valve dysfunction with plans for ORI in the outpatient setting to further evaluate.  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

## 2024-08-02 NOTE — PLAN OF CARE
Pt remains in special droplet precautions for Covid-19    VS wnl  Cardiac monitoring in use  Fall precautions in place, remains injury free  Pt denies c/o pain  Assisted pt w/ turning and educated pt on repositioning frequently to prevent skin breakdown    Accurate I&Os. External catheter placed for incontinence   Bed locked at lowest position  Call light within reach    Chart check complete  Will cont with POC

## 2024-08-02 NOTE — ASSESSMENT & PLAN NOTE
Patient is identified as High risk for severe complications of COVID 19 based on COVID risk score of 6   Initiate standard COVID protocols; COVID-19 testing ,Infection Control notification  and isolation- respiratory, contact and droplet per protocol    Diagnostics: CBC, CMP, Ferritin, CRP, and Portable CXR    Management: Initiate targeted therapy with Dexamethasone PO/IV 6mg daily x10 days, Maintain oxygen saturations 92-96% via Nasal Cannula 3 LPM and monitor with continuous/intermittent pulse oximetry. , and Continuous cardiac monitoring. Pulmonary consult for am.    Advance Care Planning  Current advance care plan has been discussed with patient/family/POA and patient currently wishes Full Code.

## 2024-08-02 NOTE — ASSESSMENT & PLAN NOTE
"Patient with Hypoxic Respiratory failure which is Chronic.  she is on home oxygen at 2.5 LPM. Supplemental oxygen was provided and noted-  currently requiring O2 via 3 L nasal cannula  -CXR with "Chronic or recurrent left effusion with adjacent atelectasis/consolidation.  Pneumonia with a parapneumonic effusion could have this appearance in the right clinical setting.  Less likely could these changes be related to CHF. Negative for acute process otherwise."  -white blood cell count 6.28, lactic acid level 1.6, BNP greater than 4900, T-max 99.9°, COVID-19 positive  .   Signs/symptoms of respiratory failure include- tachypnea and respiratory distress. Contributing diagnoses includes - CHF, COPD, and COVID-19  Labs and images were reviewed. Patient Has recent ABG, which has been reviewed. Will treat underlying causes and adjust management of respiratory failure as follows:  -continue IV Lasix diuresis  -continue oral dexamethasone for COVID-19 infection  -continuous pulse oximetry monitoring, O2 supplementation, incentive spirometry, DuoNebs q.6 hours  -check procalcitonin level and sputum culture  -pulmonary consult for a.m.  "

## 2024-08-02 NOTE — PROGRESS NOTES
Pharmacy Consult Note: Warfarin    Serena Post 's warfarin will be dosed and monitored by Pharmacy.  Indication: mechanical aortic valve  Home Dose: warfarin 2.5 mg on Monday and Friday and warfarin 1.25 mg on all other days  Target INR is 2.5-3.5    INR   Date Value Ref Range Status   08/01/2024 3.0 (H) 0.8 - 1.2 Final     Comment:     Coumadin Therapy:  2.0 - 3.0 for INR for all indicators except mechanical heart valves  and antiphospholipid syndromes which should use 2.5 - 3.5.         Patient will be initiated on a dose of 2 mg for today on 08/02 as per pharmacy to dose consult parameters [pharmacy is unable to adjust first dose] and will resume home regimen of 2.5 mg on Monday and Friday and 1.25 mg on all other days. Today's dose of 2 mg should be given today.     PT/INR will be monitored daily. Dose adjustments will be made accordingly.      Thank you for allowing us to participate in this patient's care.     Alisa Davila 8/2/2024 1:30 AM

## 2024-08-02 NOTE — ASSESSMENT & PLAN NOTE
- COVID isolation  - agree with Decadron 6mg daily x 10 days or until discharge  - currently on essentially her home level of O2  - wean for goal SpO2 > 88%  - encouraged IS use  - PT/OT and up OOB as much as possible  - continue diuresis

## 2024-08-03 LAB
ANION GAP SERPL CALC-SCNC: 12 MMOL/L (ref 8–16)
BASOPHILS # BLD AUTO: 0 K/UL (ref 0–0.2)
BASOPHILS NFR BLD: 0 % (ref 0–1.9)
BUN SERPL-MCNC: 20 MG/DL (ref 8–23)
CALCIUM SERPL-MCNC: 8.7 MG/DL (ref 8.7–10.5)
CHLORIDE SERPL-SCNC: 101 MMOL/L (ref 95–110)
CO2 SERPL-SCNC: 25 MMOL/L (ref 23–29)
CREAT SERPL-MCNC: 1.4 MG/DL (ref 0.5–1.4)
DIFFERENTIAL METHOD BLD: ABNORMAL
EOSINOPHIL # BLD AUTO: 0 K/UL (ref 0–0.5)
EOSINOPHIL NFR BLD: 0 % (ref 0–8)
ERYTHROCYTE [DISTWIDTH] IN BLOOD BY AUTOMATED COUNT: 16 % (ref 11.5–14.5)
EST. GFR  (NO RACE VARIABLE): 38 ML/MIN/1.73 M^2
GLUCOSE SERPL-MCNC: 100 MG/DL (ref 70–110)
HCT VFR BLD AUTO: 37.8 % (ref 37–48.5)
HGB BLD-MCNC: 12 G/DL (ref 12–16)
IMM GRANULOCYTES # BLD AUTO: 0.02 K/UL (ref 0–0.04)
IMM GRANULOCYTES NFR BLD AUTO: 0.4 % (ref 0–0.5)
INR PPP: 2.7 (ref 0.8–1.2)
LYMPHOCYTES # BLD AUTO: 0.4 K/UL (ref 1–4.8)
LYMPHOCYTES NFR BLD: 6.3 % (ref 18–48)
MAGNESIUM SERPL-MCNC: 1.8 MG/DL (ref 1.6–2.6)
MCH RBC QN AUTO: 28.2 PG (ref 27–31)
MCHC RBC AUTO-ENTMCNC: 31.7 G/DL (ref 32–36)
MCV RBC AUTO: 89 FL (ref 82–98)
MONOCYTES # BLD AUTO: 0.7 K/UL (ref 0.3–1)
MONOCYTES NFR BLD: 11.8 % (ref 4–15)
NEUTROPHILS # BLD AUTO: 4.6 K/UL (ref 1.8–7.7)
NEUTROPHILS NFR BLD: 81.5 % (ref 38–73)
NRBC BLD-RTO: 0 /100 WBC
PLATELET # BLD AUTO: 119 K/UL (ref 150–450)
PMV BLD AUTO: 12.3 FL (ref 9.2–12.9)
POTASSIUM SERPL-SCNC: 3.5 MMOL/L (ref 3.5–5.1)
PROTHROMBIN TIME: 29.8 SEC (ref 9–12.5)
RBC # BLD AUTO: 4.25 M/UL (ref 4–5.4)
SODIUM SERPL-SCNC: 138 MMOL/L (ref 136–145)
WBC # BLD AUTO: 5.58 K/UL (ref 3.9–12.7)

## 2024-08-03 PROCEDURE — 25000003 PHARM REV CODE 250: Performed by: INTERNAL MEDICINE

## 2024-08-03 PROCEDURE — 25000242 PHARM REV CODE 250 ALT 637 W/ HCPCS: Performed by: INTERNAL MEDICINE

## 2024-08-03 PROCEDURE — 94640 AIRWAY INHALATION TREATMENT: CPT

## 2024-08-03 PROCEDURE — 27000207 HC ISOLATION

## 2024-08-03 PROCEDURE — 99232 SBSQ HOSP IP/OBS MODERATE 35: CPT | Mod: ,,, | Performed by: INTERNAL MEDICINE

## 2024-08-03 PROCEDURE — 21400001 HC TELEMETRY ROOM

## 2024-08-03 PROCEDURE — 63600175 PHARM REV CODE 636 W HCPCS: Performed by: INTERNAL MEDICINE

## 2024-08-03 PROCEDURE — 99900035 HC TECH TIME PER 15 MIN (STAT)

## 2024-08-03 PROCEDURE — 27000221 HC OXYGEN, UP TO 24 HOURS

## 2024-08-03 PROCEDURE — 94761 N-INVAS EAR/PLS OXIMETRY MLT: CPT

## 2024-08-03 PROCEDURE — 80048 BASIC METABOLIC PNL TOTAL CA: CPT | Performed by: INTERNAL MEDICINE

## 2024-08-03 PROCEDURE — 85025 COMPLETE CBC W/AUTO DIFF WBC: CPT | Performed by: INTERNAL MEDICINE

## 2024-08-03 PROCEDURE — 83735 ASSAY OF MAGNESIUM: CPT | Performed by: INTERNAL MEDICINE

## 2024-08-03 PROCEDURE — 85610 PROTHROMBIN TIME: CPT | Performed by: INTERNAL MEDICINE

## 2024-08-03 PROCEDURE — 36415 COLL VENOUS BLD VENIPUNCTURE: CPT | Performed by: INTERNAL MEDICINE

## 2024-08-03 PROCEDURE — 94799 UNLISTED PULMONARY SVC/PX: CPT | Mod: XB

## 2024-08-03 RX ADMIN — IPRATROPIUM BROMIDE AND ALBUTEROL SULFATE 3 ML: 2.5; .5 SOLUTION RESPIRATORY (INHALATION) at 12:08

## 2024-08-03 RX ADMIN — IPRATROPIUM BROMIDE AND ALBUTEROL SULFATE 3 ML: 2.5; .5 SOLUTION RESPIRATORY (INHALATION) at 07:08

## 2024-08-03 RX ADMIN — MUPIROCIN: 20 OINTMENT TOPICAL at 09:08

## 2024-08-03 RX ADMIN — FLUTICASONE PROPIONATE 100 MCG: 50 SPRAY, METERED NASAL at 09:08

## 2024-08-03 RX ADMIN — WARFARIN SODIUM 1.25 MG: 2.5 TABLET ORAL at 05:08

## 2024-08-03 RX ADMIN — BUDESONIDE INHALATION 0.5 MG: 0.5 SUSPENSION RESPIRATORY (INHALATION) at 07:08

## 2024-08-03 RX ADMIN — MUPIROCIN: 20 OINTMENT TOPICAL at 08:08

## 2024-08-03 RX ADMIN — DEXAMETHASONE 6 MG: 4 TABLET ORAL at 09:08

## 2024-08-03 RX ADMIN — SODIUM CHLORIDE 250 ML: 9 INJECTION, SOLUTION INTRAVENOUS at 12:08

## 2024-08-03 RX ADMIN — POTASSIUM CHLORIDE 20 MEQ: 1500 TABLET, EXTENDED RELEASE ORAL at 08:08

## 2024-08-03 NOTE — PROGRESS NOTES
Coumadin Progress Notes:  Day # 2 of consult  Indication: aortic valve replacement, Afib, DVT hx  INR Goal: 2.5-3.5  INR today = 2.7 trended down from 2.9 yesterday  Home Dosing recently changed to 3 mg po on Mondays and Fridays and 1.25 mg po all other days   INR today = 2.7 trended down from 2.9 yesterday  Will give a 1.25 mg dose tonight  Patient son counseled since he is caretaker and patient has NIDIA  /Erika Juárez [facility] 8/3/2024 5:17 PM

## 2024-08-03 NOTE — ASSESSMENT & PLAN NOTE
Patient with Paroxysmal (<7 days) atrial fibrillation which is controlled.  Lopressor temporarily held due to hypotension.  [id number] Score: 4. Anticoagulation indicated. Anticoagulation done with Coumadin .  Telemetry monitoring.

## 2024-08-03 NOTE — ASSESSMENT & PLAN NOTE
"Patient with Hypoxic Respiratory failure which is Chronic.  she is on home oxygen at 2.5 LPM. Supplemental oxygen was provided and noted-  currently requiring O2 via 3 L nasal cannula  -CXR with "Chronic or recurrent left effusion with adjacent atelectasis/consolidation.  Pneumonia with a parapneumonic effusion could have this appearance in the right clinical setting.  Less likely could these changes be related to CHF. Negative for acute process otherwise."  -white blood cell count 6.28, lactic acid level 1.6, BNP greater than 4900, T-max 99.9°, COVID-19 positive  .   Signs/symptoms of respiratory failure include- tachypnea and respiratory distress. Contributing diagnoses includes - CHF, COPD, and COVID-19  Labs and images were reviewed. Patient Has recent ABG, which has been reviewed. Will treat underlying causes and adjust management of respiratory failure as follows:  -continue oral dexamethasone for COVID-19 infection  -continuous pulse oximetry monitoring, O2 supplementation, incentive spirometry, DuoNebs q.6 hours  -pulmonary consult appreciated  "

## 2024-08-03 NOTE — ASSESSMENT & PLAN NOTE
Patient is identified as having Systolic (HFrEF) heart failure that is Acute on chronic. CHF is currently uncontrolled due to Pulmonary edema/pleural effusion on CXR. Latest ECHO performed and demonstrates- Results for orders placed during the hospital encounter of 02/22/24    Echo    Interpretation Summary    Left Ventricle: The left ventricle is normal in size. Normal wall thickness. There is concentric hypertrophy. Global hypokinesis present. There is moderately reduced systolic function. Ejection fraction by visual approximation is 35%.    Right Ventricle: Normal right ventricular cavity size. Systolic function is borderline low.    Aortic Valve: There is a mechanical valve in the aortic position with elevated velocities noted. There is moderate aortic regurgitation.  Consider ORI if clinically needed to evaluate mechanical aortic valve further.    Mitral Valve: There is mild bileaflet sclerosis. There is mild regurgitation.    Pulmonary Artery: There is moderate pulmonary hypertension. The estimated pulmonary artery systolic pressure is 49 mmHg.    IVC/SVC: Normal venous pressure at 3 mmHg.    Ascending aorta is moderately dilated measuring 4.45 cm.  . Continue Furosemide and monitor clinical status closely. Monitor on telemetry. Patient is on CHF pathway.  Monitor strict Is&Os and daily weights.  Place on fluid restriction of 1.5 L. Cardiology has been consulted. Continue to stress to patient importance of self efficacy and  on diet for CHF. Last BNP reviewed- and noted below   Recent Labs   Lab 08/01/24  1459   BNP >4,900*     Continue IV diuresis, optimize medical therapy, add beta blockers as blood pressure tolerates

## 2024-08-03 NOTE — TREATMENT PLAN
BP has dropped to 69/36 with recheck BP per RN 90/50. HR 86  Patient easily arouses to verbal stimulus per report from nursing staff.  Will discontinue IV Lasix for now and continue to monitor.

## 2024-08-03 NOTE — PROGRESS NOTES
[facility] Medicine  Progress Note    Patient Name: Serena Post  MRN: [id number]  Patient Class: IP- Inpatient   Admission Date: 8/1/2024  Length of Stay: 2 days  Attending Physician: June Acosta MD  Primary Care Provider: Evangelina Olivares MD        Subjective:     Principal Problem:Acute on chronic systolic CHF (congestive heart failure)        HPI:  84-year-old white woman with history of aortic valve replacement (mechanical valve in 2007), paroxysmal atrial fibrillation, long-term oral anticoagulation with Coumadin, [employer] permanent pacemaker 2009, chronic systolic CHF (echocardiogram February 2024 with EF 35%), chronic hypoxic respiratory failure (2-1/2 L nasal cannula at home), hyperlipidemia, hypertension, DVT, rheumatoid arthritis, CVA, intracranial tumor, ascending aortic aneurysm, obesity, COPD, arteriosclerotic cardiovascular disease, and chronic kidney disease stage IIIA who was sent by home health for shortness a breath and positive COVID test.  Patient reports shortness of breath over the last several days, moderate severity, constant, no associated chest pain.  She has associated cough with white to yellow sputum production.  She denies nausea or vomiting.  She denies fevers or chills however she does have low-grade temperature in our emergency department up to 99.9.    Last hospital admit 2/22-02/28/2024 for acute on chronic systolic CHF exacerbation and mechanical valve dysfunction with plans for ORI in the outpatient setting to further evaluate.  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    Overview/Hospital Course:  Patient is a 34 old lady with a dysfunctional aortic valve, PAF, pacemaker, chronic systolic heart failure, chronic hypoxic respiratory failure on 2.5 L nasal cannula at home, DVT, rheumatoid arthritis, CVA, COPD, CKD 3 who presented after being  seen by home health  for shortness a breath and positive COVID test.  He was admitted started on diuresis and regular bronchodilator nebs as well as home O2.  Patient is feeling much better now.  She was continued on her COVID isolation as well as her Decadron.  She was seen in consultation by Pulmonary Medicine who agreed with diuresis as well as continuing her broncho dilators pulmonary hygiene  She was seen by consultation by Cardiology who recommended continue diuresis, pulmonary hygiene and follow up with Dr. Perkins as outpatient.      Interval History:  Patient confused today.  She was able to respond to questions but does not know where she was.  Refusing to eat    Review of Systems   Constitutional:  Positive for appetite change and fatigue. Negative for chills and fever.   Respiratory:  Positive for shortness of breath.    Cardiovascular:  Negative for chest pain and leg swelling.   Gastrointestinal:  Negative for nausea and vomiting.   Neurological:  Positive for weakness.   Psychiatric/Behavioral:  Positive for confusion.    All other systems reviewed and are negative.    Objective:     Vital Signs (Most Recent):  Temp: 97.5 °F (36.4 °C) (08/03/24 1145)  Pulse: 92 (08/03/24 1313)  Resp: 16 (08/03/24 1239)  BP: (!) 95/55 (08/03/24 1313)  SpO2: (!) 93 % (08/03/24 1312) Vital Signs (24h Range):  Temp:  [97.4 °F (36.3 °C)-97.9 °F (36.6 °C)] 97.5 °F (36.4 °C)  Pulse:  [81-98] 92  Resp:  [16-19] 16  SpO2:  [92 %-96 %] 93 %  BP: (69-99)/(36-56) 95/55     Weight: 89.8 kg (197 lb 15.6 oz)  Body mass index is 33.98 kg/m².    Intake/Output Summary (Last 24 hours) at 8/3/2024 1432  Last data filed at 8/2/2024 1904  Gross per 24 hour   Intake 300 ml   Output 45 ml   Net 255 ml         Physical Exam  Vitals reviewed.   Constitutional:       General: She is not in acute distress.     Appearance: She is ill-appearing (chronically).   HENT:      Head: Normocephalic and atraumatic.      Mouth/Throat:      Mouth: Mucous  membranes are dry.      Pharynx: Oropharynx is clear.   Eyes:      General: No scleral icterus.     Extraocular Movements: Extraocular movements intact.      Conjunctiva/sclera: Conjunctivae normal.   Cardiovascular:      Rate and Rhythm: Normal rate and regular rhythm.      Pulses: Normal pulses.      Heart sounds: Murmur heard.   Pulmonary:      Effort: Pulmonary effort is normal. No respiratory distress.      Breath sounds: Rales (bibasilar) present. No wheezing or rhonchi.   Abdominal:      General: Abdomen is flat. There is no distension.      Palpations: Abdomen is soft.      Tenderness: There is no abdominal tenderness.   Musculoskeletal:      Cervical back: Normal range of motion and neck supple. No rigidity or tenderness.      Right lower leg: Edema present.      Left lower leg: Edema present.   Skin:     General: Skin is warm and dry.      Coloration: Skin is not jaundiced or pale.   Neurological:      General: No focal deficit present.      Mental Status: She is alert. Mental status is at baseline. She is disoriented.      Motor: Weakness present.             Significant Labs: All pertinent labs within the past 24 hours have been reviewed.    CBC:   Recent Labs   Lab 08/01/24  1459 08/02/24  0548 08/03/24  0428   WBC 6.28 5.39 5.58   HGB 13.6 12.7 12.0   HCT 41.7 39.6 37.8   * 105* 119*     CMP:   Recent Labs   Lab 08/01/24  1459 08/02/24  0548 08/03/24  0428    136 138   K 3.3* 3.2* 3.5    100 101   CO2 21* 23 25   * 111* 100   BUN 13 15 20   CREATININE 1.3 1.2 1.4   CALCIUM 8.5* 8.5* 8.7   PROT 7.0  --   --    ALBUMIN 3.2*  --   --    BILITOT 2.4*  --   --    ALKPHOS 87  --   --    AST 19  --   --    ALT 9*  --   --    ANIONGAP 16 13 12       Significant Imaging: I have reviewed all pertinent imaging results/findings within the past 24 hours.    Assessment/Plan:      * Acute on chronic systolic CHF (congestive heart failure)  Patient is identified as having Systolic (HFrEF)  heart failure that is Acute on chronic. CHF is currently uncontrolled due to Pulmonary edema/pleural effusion on CXR. Latest ECHO performed and demonstrates- Results for orders placed during the hospital encounter of 02/22/24    Echo    Interpretation Summary    Left Ventricle: The left ventricle is normal in size. Normal wall thickness. There is concentric hypertrophy. Global hypokinesis present. There is moderately reduced systolic function. Ejection fraction by visual approximation is 35%.    Right Ventricle: Normal right ventricular cavity size. Systolic function is borderline low.    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 There is moderate aortic regurgitation.  Consider ORI if clinically needed to evaluate mechanical aortic valve further.    Mitral Valve: There is mild bileaflet sclerosis. There is mild regurgitation.    Pulmonary Artery: There is moderate pulmonary hypertension. The estimated pulmonary artery systolic pressure is 49 mmHg.    IVC/SVC: Normal venous pressure at 3 mmHg.    Ascending aorta is moderately dilated measuring 4.45 cm.  . Continue Furosemide and monitor clinical status closely. Monitor on telemetry. Patient is on CHF pathway.  Monitor strict Is&Os and daily weights.  Place on fluid restriction of 1.5 L. Cardiology has been consulted. Continue to stress to patient importance of self efficacy and  on diet for CHF. Last BNP reviewed- and noted below   Recent Labs   Lab 08/01/24  1459   BNP >4,900*     Continue IV diuresis, optimize medical therapy, add beta blockers as blood pressure tolerates    Hypokalemia  Patient's most recent potassium results are listed below.   Recent Labs     08/01/24  1459 08/02/24  0548 08/03/24  0428   K 3.3* 3.2* 3.5       Plan  - Replete potassium per protocol  - Monitor potassium Daily  - Patient's hypokalemia is stable  - give potassium bicarbonate 40 mEq p.o. followed by potassium chloride 20  "mEq p.o. b.i.d. while receiving IV Lasix diuresis  -check magnesium level    Hypotension  -hold Lopressor (generally with hypertension)  -monitor blood pressure closely with patient receiving IV Lasix diuresis      COPD without exacerbation  Patient's COPD is controlled currently.  Patient is currently off COPD Pathway.  Continue scheduled DuoNebs q.6 hours and O2 supplementation.  Continuous pulse oximetry monitor and monitor respiratory status closely.     Chronic hypoxic respiratory failure  Patient with Hypoxic Respiratory failure which is Chronic.  she is on home oxygen at 2.5 LPM. Supplemental oxygen was provided and noted-  currently requiring O2 via 3 L nasal cannula  -CXR with "Chronic or recurrent left effusion with adjacent atelectasis/consolidation.  Pneumonia with a parapneumonic effusion could have this appearance in the right clinical setting.  Less likely could these changes be related to CHF. Negative for acute process otherwise."  -white blood cell count 6.28, lactic acid level 1.6, BNP greater than 4900, T-max 99.9°, COVID-19 positive  .   Signs/symptoms of respiratory failure include- tachypnea and respiratory distress. Contributing diagnoses includes - CHF, COPD, and COVID-19  Labs and images were reviewed. Patient Has recent ABG, which has been reviewed. Will treat underlying causes and adjust management of respiratory failure as follows:  -continue oral dexamethasone for COVID-19 infection  -continuous pulse oximetry monitoring, O2 supplementation, incentive spirometry, DuoNebs q.6 hours  -pulmonary consult appreciated    Stage 3a chronic kidney disease  Creatine stable for now. BMP reviewed- noted Estimated Creatinine Clearance: 33.6 mL/min (based on SCr of 1.4 mg/dL). according to latest data. Based on current GFR, CKD stage is stage 3 - GFR 30-59.  Monitor UOP and serial BMP and adjust therapy as needed. Renally dose meds. Avoid nephrotoxic medications and procedures.    COVID-19  Patient " is identified as High risk for severe complications of COVID 19 based on COVID risk score of 6   Initiate standard COVID protocols; COVID-19 testing ,Infection Control notification  and isolation- respiratory, contact and droplet per protocol    Diagnostics: CBC, CMP, Ferritin, CRP, and Portable CXR    Management: Initiate targeted therapy with Dexamethasone PO/IV 6mg daily x10 days, Maintain oxygen saturations 92-96% via Nasal Cannula 3 LPM and monitor with continuous/intermittent pulse oximetry. , and Continuous cardiac monitoring. Pulmonary consult for am.    Advance Care Planning Current advance care plan has been discussed with patient/family/POA and patient currently wishes Full Code.       Paroxysmal atrial fibrillation  Patient with Paroxysmal (<7 days) atrial fibrillation which is controlled.  Lopressor temporarily held due to hypotension.  [id number] Score: 4. Anticoagulation indicated. Anticoagulation done with Coumadin .  Telemetry monitoring.    Pacemaker  Telemetry monitoring      H/O mechanical aortic valve replacement  -ORI 03/18/2024 with moderate prosthetic valve stenosis and moderate prosthetic valve regurgitation  -continue Coumadin (pharmacy to monitor and dose)  -check daily INR  Follow up outpatient with her usual cardiologist Dr. Perkins        VTE Risk Mitigation (From admission, onward)           Ordered     warfarin (COUMADIN) tablet 2.5 mg  Once per day on Monday Friday 08/02/24 0128     warfarin (COUMADIN) split tablet 1.25 mg  Once per day on Sunday Tuesday Wednesday Thursday Saturday 08/02/24 0128     IP VTE HIGH RISK PATIENT  Once         08/01/24 2312     Place sequential compression device  Until discontinued         08/01/24 2312                    Discharge Planning   RENE:      Code Status: Full Code   Is the patient medically ready for discharge?:     Reason for patient still in hospital (select all that apply): Patient trending condition, Laboratory test, and  Treatment  Discharge Plan A: Home Health                  June Boykin MD  Department of Hospital Medicine   [facility] - Telemetry ([facility])

## 2024-08-03 NOTE — ASSESSMENT & PLAN NOTE
Creatine stable for now. BMP reviewed- noted Estimated Creatinine Clearance: 33.6 mL/min (based on SCr of 1.4 mg/dL). according to latest data. Based on current GFR, CKD stage is stage 3 - GFR 30-59.  Monitor UOP and serial BMP and adjust therapy as needed. Renally dose meds. Avoid nephrotoxic medications and procedures.

## 2024-08-03 NOTE — ASSESSMENT & PLAN NOTE
Patient's most recent potassium results are listed below.   Recent Labs     08/01/24  1459 08/02/24  0548 08/03/24  0428   K 3.3* 3.2* 3.5       Plan  - Replete potassium per protocol  - Monitor potassium Daily  - Patient's hypokalemia is stable  - give potassium bicarbonate 40 mEq p.o. followed by potassium chloride 20 mEq p.o. b.i.d. while receiving IV Lasix diuresis  -check magnesium level

## 2024-08-03 NOTE — SUBJECTIVE & OBJECTIVE
"    Review of Systems   Unable to perform ROS: Acuity of condition     Objective:     Vital Signs (Most Recent):  Temp: 97.5 °F (36.4 °C) (08/03/24 1145)  Pulse: 92 (08/03/24 1313)  Resp: 16 (08/03/24 1239)  BP: (!) 95/55 (08/03/24 1313)  SpO2: (!) 93 % (08/03/24 1312) Vital Signs (24h Range):  Temp:  [97.4 °F (36.3 °C)-97.9 °F (36.6 °C)] 97.5 °F (36.4 °C)  Pulse:  [81-98] 92  Resp:  [16-19] 16  SpO2:  [92 %-96 %] 93 %  BP: (69-99)/(36-56) 95/55     Weight: 89.8 kg (197 lb 15.6 oz)  Body mass index is 33.98 kg/m².     SpO2: (!) 93 %         Intake/Output Summary (Last 24 hours) at 8/3/2024 1336  Last data filed at 8/2/2024 1904  Gross per 24 hour   Intake 300 ml   Output 45 ml   Net 255 ml       Lines/Drains/Airways       Peripheral Intravenous Line  Duration                  Peripheral IV - Single Lumen 08/01/24 1720 20 G 1 in No Left;Posterior Hand 1 day                       Physical Exam       NOT EXAMINED DUE TO ACTIVE COVID ISOLATION PROTOCOL      Significant Labs: ABG:   Recent Labs   Lab 08/01/24  2140   PH 7.448   PCO2 40.5   HCO3 28.0   POCSATURATED 91   BE 4*   , Blood Culture: No results for input(s): "LABBLOO" in the last 48 hours., BMP:   Recent Labs   Lab 08/01/24  1459 08/01/24  2340 08/02/24  0548 08/03/24  0428   *  --  111* 100     --  136 138   K 3.3*  --  3.2* 3.5     --  100 101   CO2 21*  --  23 25   BUN 13  --  15 20   CREATININE 1.3  --  1.2 1.4   CALCIUM 8.5*  --  8.5* 8.7   MG  --  1.6 1.7 1.8   , CMP   Recent Labs   Lab 08/01/24  1459 08/02/24  0548 08/03/24  0428    136 138   K 3.3* 3.2* 3.5    100 101   CO2 21* 23 25   * 111* 100   BUN 13 15 20   CREATININE 1.3 1.2 1.4   CALCIUM 8.5* 8.5* 8.7   PROT 7.0  --   --    ALBUMIN 3.2*  --   --    BILITOT 2.4*  --   --    ALKPHOS 87  --   --    AST 19  --   --    ALT 9*  --   --    ANIONGAP 16 13 12   , CBC   Recent Labs   Lab 08/01/24  1459 08/02/24  0548 08/03/24  0428   WBC 6.28 5.39 5.58   HGB 13.6 12.7 " "12.0   HCT 41.7 39.6 37.8   * 105* 119*   , INR   Recent Labs   Lab 08/01/24  2340 08/02/24  0548 08/03/24  0428   INR 3.0* 2.9* 2.7*   , Lipid Panel No results for input(s): "CHOL", "HDL", "LDLCALC", "TRIG", "CHOLHDL" in the last 48 hours., and Troponin   Recent Labs   Lab 08/01/24  1459 08/01/24  2340   TROPONINI 0.088* 0.109*       Significant Imaging: Echocardiogram: Transthoracic echo (TTE) complete (Cupid Only):   Results for orders placed or performed during the hospital encounter of 02/22/24   Echo   Result Value Ref Range    BSA 2.03 m2    LVOT stroke volume 56.42 cm3    LVIDd 5.10 3.5 - 6.0 cm    LV Systolic Volume 90.21 mL    LV Systolic Volume Index 46.0 mL/m2    LVIDs 4.45 (A) 2.1 - 4.0 cm    LV Diastolic Volume 123.59 mL    LV Diastolic Volume Index 63.06 mL/m2    IVS 1.04 0.6 - 1.1 cm    LVOT diameter 1.91 cm    LVOT area 2.9 cm2    FS 13 (A) 28 - 44 %    Left Ventricle Relative Wall Thickness 0.47 cm    Posterior Wall 1.19 (A) 0.6 - 1.1 cm    LV mass 217.90 g    LV Mass Index 111 g/m2    MV Peak E Melo 1.07 m/s    TDI LATERAL 0.08 m/s    TDI SEPTAL 0.05 m/s    E/E' ratio 16.46 m/s    MV Peak A Melo 1.07 m/s    TR Max Melo 3.38 m/s    E/A ratio 1.00     IVRT 62.80 msec    E wave deceleration time 192.43 msec    LV SEPTAL E/E' RATIO 21.40 m/s    LV LATERAL E/E' RATIO 13.38 m/s    LVOT peak melo 0.83 m/s    Left Ventricular Outflow Tract Mean Velocity 0.58 cm/s    Left Ventricular Outflow Tract Mean Gradient 1.51 mmHg    RV mid diameter 3.15 cm    RVOT peak VTI 9.3 cm    TAPSE 2.05 cm    LA size 3.61 cm    Left Atrium Minor Axis 3.98 cm    Left Atrium Major Axis 6.58 cm    RA Major Axis 4.53 cm    AV regurgitation pressure 1/2 time [id number] ms    AR Max Melo 4.34 m/s    AV mean gradient 95 mmHg    AV peak gradient 97 mmHg    Ao peak melo 4.93 m/s    Ao .80 cm    LVOT peak VTI 19.70 cm    AV valve area 0.37 cm²    AV Velocity Ratio 0.17     AV index (prosthetic) 0.13     AAMIR by " Velocity Ratio 0.48 cm²    MV stenosis pressure 1/2 time 55.80 ms    MV valve area p 1/2 method 3.94 cm2    Triscuspid Valve Regurgitation Peak Gradient 46 mmHg    PV mean gradient 1 mmHg    RVOT peak rafa 0.49 m/s    Ao root annulus 2.98 cm    STJ 4.51 cm    Ascending aorta 4.45 cm    IVC diameter 1.15 cm    Mean e' 0.07 m/s    ZLVIDS 2.01     ZLVIDD -0.95     LA Volume Index 34.9 mL/m2    LA volume 68.49 cm3    LA WIDTH 4.5 cm    RA Width 4.4 cm    TV resting pulmonary artery pressure 49 mmHg    RV TB RVSP 6 mmHg    Est. RA pres 3 mmHg    EF 35 %    Narrative      Left Ventricle: The left ventricle is normal in size. Normal wall   thickness. There is concentric hypertrophy. Global hypokinesis present.   There is moderately reduced systolic function. Ejection fraction by visual   approximation is 35%.    Right Ventricle: Normal right ventricular cavity size. Systolic   function is borderline low.    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 There is moderate aortic regurgitation.    Consider ORI if clinically needed to evaluate mechanical aortic valve   further.    Mitral Valve: There is mild bileaflet sclerosis. There is mild   regurgitation.    Pulmonary Artery: There is moderate pulmonary hypertension. The   estimated pulmonary artery systolic pressure is 49 mmHg.    IVC/SVC: Normal venous pressure at 3 mmHg.    Ascending aorta is moderately dilated measuring 4.45 cm.

## 2024-08-03 NOTE — ASSESSMENT & PLAN NOTE
-Presents with decompensated CHF exacerbated by COVID-19 infection and underlying mechanical valve dysfunction  -EF 35%  -Resume BB as tolerated  -Optimization of med therapy has been limited as OP due to hypotension  -Strict I's/O's    Diurese as tolerated.

## 2024-08-03 NOTE — PROGRESS NOTES
Pharmacy Brief Progress Note:    Patient caregiver was counseled on importance of medication compliance and INR monitoring and reviewed signs of abnormal bleeding.      Caregiver expressed understanding and had no further questions.    Thank you for allowing us to participate in this patient's care.     Erika Juárez 8/3/2024 5:28 PM

## 2024-08-03 NOTE — PROGRESS NOTES
[place] - Telemetry ([facility]  Cardiology  Progress Note    Patient Name: Serena Post  MRN: [id number]  Admission Date: 8/1/2024  Hospital Length of Stay: 2 days  Code Status: Full Code   Attending Physician: June Acosta MD   Primary Care Physician: Evangelina Olivares MD  Expected Discharge Date:   Principal Problem:Acute on chronic systolic CHF (congestive heart failure)    Subjective:     HPI:    HPI obtained from chart as patient is currently on isolation precautions due to COVID-19 infection    Ms. Post is an 84 year old woman whose current medical conditions include AS s/p mechanical AVR in 2007, PAF (on Coumadin), s/p PPM, systolic CHF with EF of 35%, chronic respiratory failure, dementia, prior CVA, CKD, dementia, ascending aortic aneurysm, hyperlipidemia, HTN, and history of DVT who presented to [facility] ED due to worsening SOB over the past several days. Associated symptoms included productive cough with yellow/white sputum. She denied any associated fever, chills, audrey CP, palpitations, near syncope, or syncope. Reported she tested positive for COVID at home. Initial workup in ED revealed BNP > 4,900 and troponin 0.088. CXR showed findings consistent with CHF as well as pneumonia and patient was subsequently admitted for further evaluation and treatment. Cardiology consulted to assist with management. Chart reviewed. Troponin flat and at baseline, 0.088>0.109. Patient previously seen by our service during prior hospitalization in February due to CHF exacerbation. Followed on OP basis by Dr. Perkins. Recent ORI from 3/18/24 reviewed and did reveal moderate prosthetic valve stenosis and moderate regurgitation, repeat planned for September.   Hospital Course:   8/3/24: Chart reviewed. Labs reviewed. No acute CV issues noted.  BP soft. Diuretics held.        Review of Systems   Unable to perform ROS: Acuity of condition     Objective:     Vital Signs (Most Recent):  Temp: 97.5 °F  "(36.4 °C) (08/03/24 1145)  Pulse: 92 (08/03/24 1313)  Resp: 16 (08/03/24 1239)  BP: (!) 95/55 (08/03/24 1313)  SpO2: (!) 93 % (08/03/24 1312) Vital Signs (24h Range):  Temp:  [97.4 °F (36.3 °C)-97.9 °F (36.6 °C)] 97.5 °F (36.4 °C)  Pulse:  [81-98] 92  Resp:  [16-19] 16  SpO2:  [92 %-96 %] 93 %  BP: (69-99)/(36-56) 95/55     Weight: 89.8 kg (197 lb 15.6 oz)  Body mass index is 33.98 kg/m².     SpO2: (!) 93 %         Intake/Output Summary (Last 24 hours) at 8/3/2024 1336  Last data filed at 8/2/2024 1904  Gross per 24 hour   Intake 300 ml   Output 45 ml   Net 255 ml       Lines/Drains/Airways       Peripheral Intravenous Line  Duration                  Peripheral IV - Single Lumen 08/01/24 1720 20 G 1 in No Left;Posterior Hand 1 day                       Physical Exam       NOT EXAMINED DUE TO ACTIVE COVID ISOLATION PROTOCOL      Significant Labs: ABG:   Recent Labs   Lab 08/01/24  2140   PH 7.448   PCO2 40.5   HCO3 28.0   POCSATURATED 91   BE 4*   , Blood Culture: No results for input(s): "LABBLOO" in the last 48 hours., BMP:   Recent Labs   Lab 08/01/24  1459 08/01/24  2340 08/02/24  0548 08/03/24  0428   *  --  111* 100     --  136 138   K 3.3*  --  3.2* 3.5     --  100 101   CO2 21*  --  23 25   BUN 13  --  15 20   CREATININE 1.3  --  1.2 1.4   CALCIUM 8.5*  --  8.5* 8.7   MG  --  1.6 1.7 1.8   , CMP   Recent Labs   Lab 08/01/24  1459 08/02/24  0548 08/03/24  0428    136 138   K 3.3* 3.2* 3.5    100 101   CO2 21* 23 25   * 111* 100   BUN 13 15 20   CREATININE 1.3 1.2 1.4   CALCIUM 8.5* 8.5* 8.7   PROT 7.0  --   --    ALBUMIN 3.2*  --   --    BILITOT 2.4*  --   --    ALKPHOS 87  --   --    AST 19  --   --    ALT 9*  --   --    ANIONGAP 16 13 12   , CBC   Recent Labs   Lab 08/01/24  1459 08/02/24  0548 08/03/24  0428   WBC 6.28 5.39 5.58   HGB 13.6 12.7 12.0   HCT 41.7 39.6 37.8   * 105* 119*   , INR   Recent Labs   Lab 08/01/24  2340 08/02/24  0548 08/03/24  0428   INR " "3.0* 2.9* 2.7*   , Lipid Panel No results for input(s): "CHOL", "HDL", "LDLCALC", "TRIG", "CHOLHDL" in the last 48 hours., and Troponin   Recent Labs   Lab 08/01/24  1459 08/01/24  2340   TROPONINI 0.088* 0.109*       Significant Imaging: Echocardiogram: Transthoracic echo (TTE) complete (Cupid Only):   Results for orders placed or performed during the hospital encounter of 02/22/24   Echo   Result Value Ref Range    BSA 2.03 m2    LVOT stroke volume 56.42 cm3    LVIDd 5.10 3.5 - 6.0 cm    LV Systolic Volume 90.21 mL    LV Systolic Volume Index 46.0 mL/m2    LVIDs 4.45 (A) 2.1 - 4.0 cm    LV Diastolic Volume 123.59 mL    LV Diastolic Volume Index 63.06 mL/m2    IVS 1.04 0.6 - 1.1 cm    LVOT diameter 1.91 cm    LVOT area 2.9 cm2    FS 13 (A) 28 - 44 %    Left Ventricle Relative Wall Thickness 0.47 cm    Posterior Wall 1.19 (A) 0.6 - 1.1 cm    LV mass 217.90 g    LV Mass Index 111 g/m2    MV Peak E Melo 1.07 m/s    TDI LATERAL 0.08 m/s    TDI SEPTAL 0.05 m/s    E/E' ratio 16.46 m/s    MV Peak A Melo 1.07 m/s    TR Max Melo 3.38 m/s    E/A ratio 1.00     IVRT 62.80 msec    E wave deceleration time 192.43 msec    LV SEPTAL E/E' RATIO 21.40 m/s    LV LATERAL E/E' RATIO 13.38 m/s    LVOT peak melo 0.83 m/s    Left Ventricular Outflow Tract Mean Velocity 0.58 cm/s    Left Ventricular Outflow Tract Mean Gradient 1.51 mmHg    RV mid diameter 3.15 cm    RVOT peak VTI 9.3 cm    TAPSE 2.05 cm    LA size 3.61 cm    Left Atrium Minor Axis 3.98 cm    Left Atrium Major Axis 6.58 cm    RA Major Axis 4.53 cm    AV regurgitation pressure 1/2 time [id number] ms    AR Max Melo 4.34 m/s    AV mean gradient 95 mmHg    AV peak gradient 97 mmHg    Ao peak melo 4.93 m/s    Ao .80 cm    LVOT peak VTI 19.70 cm    AV valve area 0.37 cm²    AV Velocity Ratio 0.17     AV index (prosthetic) 0.13     AAMIR by Velocity Ratio 0.48 cm²    MV stenosis pressure 1/2 time 55.80 ms    MV valve area p 1/2 method 3.94 cm2    Triscuspid Valve " Regurgitation Peak Gradient 46 mmHg    PV mean gradient 1 mmHg    RVOT peak rafa 0.49 m/s    Ao root annulus 2.98 cm    STJ 4.51 cm    Ascending aorta 4.45 cm    IVC diameter 1.15 cm    Mean e' 0.07 m/s    ZLVIDS 2.01     ZLVIDD -0.95     LA Volume Index 34.9 mL/m2    LA volume 68.49 cm3    LA WIDTH 4.5 cm    RA Width 4.4 cm    TV resting pulmonary artery pressure 49 mmHg    RV TB RVSP 6 mmHg    Est. RA pres 3 mmHg    EF 35 %    Narrative      Left Ventricle: The left ventricle is normal in size. Normal wall   thickness. There is concentric hypertrophy. Global hypokinesis present.   There is moderately reduced systolic function. Ejection fraction by visual   approximation is 35%.    Right Ventricle: Normal right ventricular cavity size. Systolic   function is borderline low.    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 There is moderate aortic regurgitation.    Consider ORI if clinically needed to evaluate mechanical aortic valve   further.    Mitral Valve: There is mild bileaflet sclerosis. There is mild   regurgitation.    Pulmonary Artery: There is moderate pulmonary hypertension. The   estimated pulmonary artery systolic pressure is 49 mmHg.    IVC/SVC: Normal venous pressure at 3 mmHg.    Ascending aorta is moderately dilated measuring 4.45 cm.       Assessment and Plan:         * Acute on chronic systolic CHF (congestive heart failure)  -Presents with decompensated CHF exacerbated by COVID-19 infection and underlying mechanical valve dysfunction  -EF 35%  -Resume BB as tolerated  -Optimization of med therapy has been limited as OP due to hypotension  -Strict I's/O's    Diurese as tolerated.      Chronic hypoxic respiratory failure  -Secondary to COVID-19 infection and fluid overload  -Continue supplemental O2, supportive care, IV diuresis  -Mgmt as per primary team        Stage 3a chronic kidney disease  -Monitor with IV diuresis     COVID-19  -Mgmt as  per primary team    Paroxysmal atrial fibrillation  -Resume BB as tolerated  -Continue Coumadin    H/O mechanical aortic valve replacement  -ORI from 3/18/24, mod prosthetic valve stenosis and regurgitation  -Followed by outside cardiologist, Dr. Perkins        VTE Risk Mitigation (From admission, onward)           Ordered     warfarin (COUMADIN) tablet 2.5 mg  Once per day on Monday Friday 08/02/24 0128     warfarin (COUMADIN) split tablet 1.25 mg  Once per day on Sunday Tuesday Wednesday Thursday Saturday 08/02/24 0128     IP VTE HIGH RISK PATIENT  Once         08/01/24 2312     Place sequential compression device  Until discontinued         08/01/24 2312                    Parish Blake MD  Cardiology  [place] - Telemetry ([facility])

## 2024-08-03 NOTE — PLAN OF CARE
A206/A206 NITHIN Post is a 84 y.o.female admitted on 8/1/2024 for Acute on chronic systolic CHF (congestive heart failure)   Code Status: Full Code MRN: [id number]   Review of patient's allergies indicates:   Allergen Reactions    Amlodipine Other (See Comments)     Not sure    Chlorthalidone Other (See Comments)     Not sure    Clonidine Other (See Comments)     Not sure    Codeine Other (See Comments)     Not sure    Escitalopram Other (See Comments)     Not sure    Lisinopril Other (See Comments)     Not sure    Losartan Other (See Comments)     Not sure    Meperidine Other (See Comments)     Not sure    Methadone Other (See Comments)     Not sure      Morphine Other (See Comments)     Not sure    Nebivolol Other (See Comments)     Not sure        Nitrofurantoin Other (See Comments)     Not sure        Omeprazole Other (See Comments)     Not sure      Pravastatin Other (See Comments)     Not sure      Propoxyphene Other (See Comments)     Not sure      Sulfamethoxazole-trimethoprim Other (See Comments)     Not sure         Past Medical History:   Diagnosis Date    A-fib     Anticoagulant long-term use     Aortic valve disease     Cancer     brain tumor, 10 years ago    Cardiomyopathy     CHF (congestive heart failure)     COVID-19 7/23/2022    Hx of heart valve replacement with mechanical valve     Hyperlipidemia     Hypertension     Pacemaker     Pacemaker     Sepsis 7/23/2022    Stroke     2007, residual weakness      PRN meds    acetaminophen, 650 mg, Q6H PRN  hydrALAZINE, 10 mg, Q6H PRN  ondansetron, 4 mg, Q6H PRN  sodium chloride 0.9%, 10 mL, PRN      Chart check completed. Will continue plan of care.      Orientation: disoriented to, time, situation  Chhaya Coma Scale Score: 15     Lead Monitored: Lead II Rhythm: paced rhythm Frequency/Ectopy: PVCs  Cardiac/Telemetry Box Number: [id number]  VTE Required Core Measure: (SCDs) Sequential compression device initiated/maintained Last Bowel Movement:  08/02/24  Diet Low Sodium, 2gm Fluid - 1500mL  Voiding Characteristics: incontinence  Jacobo Score: 14  Fall Risk Score: 17  Accucheck []   Freq?      Lines/Drains/Airways       Peripheral Intravenous Line  Duration                  Peripheral IV - Single Lumen 08/01/24 1720 20 G 1 in No Left;Posterior Hand 1 day                       Problem: Adult Inpatient Plan of Care  Goal: Plan of Care Review  Outcome: Progressing  Goal: Patient-Specific Goal (Individualized)  Outcome: Progressing  Goal: Absence of Hospital-Acquired Illness or Injury  Outcome: Progressing  Goal: Optimal Comfort and Wellbeing  Outcome: Progressing  Goal: Readiness for Transition of Care  Outcome: Progressing     Problem: Wound  Goal: Optimal Coping  Outcome: Progressing  Goal: Optimal Functional Ability  Outcome: Progressing  Goal: Absence of Infection Signs and Symptoms  Outcome: Progressing  Goal: Improved Oral Intake  Outcome: Progressing  Goal: Optimal Pain Control and Function  Outcome: Progressing  Goal: Skin Health and Integrity  Outcome: Progressing  Goal: Optimal Wound Healing  Outcome: Progressing     Problem: Skin Injury Risk Increased  Goal: Skin Health and Integrity  Outcome: Progressing

## 2024-08-03 NOTE — PLAN OF CARE
POC reviewed with pt. Pt verbalizes understanding of POC. No questions at this time.  AAOx4 but quiet. NADN.  NSR on cardiac monitor.  Dc external catheter due to redness between the legs.  Pt is on a low Na diet.  Dc Lasix due to low BP  Pt remains free of falls.  Isolation precautions are still in place  Pt on 2.5LNC  No complaints at this time.  Safety measures in place. continue to monitor pt BP  Informed pt to call for assistance before getting up. Pt verbalizes understanding.  Hourly rounding and chart check complete.     A206/A206 NITHIN Post is a 84 y.o.female admitted on 8/1/2024 for Acute on chronic systolic CHF (congestive heart failure)   Code Status: Full Code MRN: [id number]   Review of patient's allergies indicates:   Allergen Reactions    Amlodipine Other (See Comments)     Not sure    Chlorthalidone Other (See Comments)     Not sure    Clonidine Other (See Comments)     Not sure    Codeine Other (See Comments)     Not sure    Escitalopram Other (See Comments)     Not sure    Lisinopril Other (See Comments)     Not sure    Losartan Other (See Comments)     Not sure    Meperidine Other (See Comments)     Not sure    Methadone Other (See Comments)     Not sure      Morphine Other (See Comments)     Not sure    Nebivolol Other (See Comments)     Not sure        Nitrofurantoin Other (See Comments)     Not sure        Omeprazole Other (See Comments)     Not sure      Pravastatin Other (See Comments)     Not sure      Propoxyphene Other (See Comments)     Not sure      Sulfamethoxazole-trimethoprim Other (See Comments)     Not sure         Past Medical History:   Diagnosis Date    A-fib     Anticoagulant long-term use     Aortic valve disease     Cancer     brain tumor, 10 years ago    Cardiomyopathy     CHF (congestive heart failure)     COVID-19 7/23/2022    Hx of heart valve replacement with mechanical valve     Hyperlipidemia     Hypertension     Pacemaker     Pacemaker     Sepsis 7/23/2022     Stroke     2007, residual weakness      PRN meds    acetaminophen, 650 mg, Q6H PRN  hydrALAZINE, 10 mg, Q6H PRN  ondansetron, 4 mg, Q6H PRN  sodium chloride 0.9%, 10 mL, PRN      AVS Discharge instructions received and reviewed with pt and family at bedside.  Pt voiced understanding and all questions answered to satisfaction.  Stressed importance to making and keeping all follow up appointments.  Medications at bedside and reviewed with pt.  Tele monitor removed and brought to monitor tech.  IV d/c'd with tip intact, pressure dressing applied.  Pt will call when ready to be transported to [facility] via w/c to be discharged home.         Steward Coma Scale Score: 15     Lead Monitored: Lead II Rhythm: normal sinus rhythm Frequency/Ectopy: PVCs  Cardiac/Telemetry Box Number: [id number]  VTE Required Core Measure: (SCDs) Sequential compression device initiated/maintained Last Bowel Movement: 08/02/24  Diet Low Sodium, 2gm Fluid - 1500mL  Voiding Characteristics: incontinence  Jacobo Score: 14  Fall Risk Score: 17  Accucheck []   Freq?      Lines/Drains/Airways       Peripheral Intravenous Line  Duration                  Peripheral IV - Single Lumen 08/01/24 1720 20 G 1 in No Left;Posterior Hand 1 day

## 2024-08-03 NOTE — SUBJECTIVE & OBJECTIVE
Interval History:  Patient confused today.  She was able to respond to questions but does not know where she was.  Refusing to eat    Review of Systems   Constitutional:  Positive for appetite change and fatigue. Negative for chills and fever.   Respiratory:  Positive for shortness of breath.    Cardiovascular:  Negative for chest pain and leg swelling.   Gastrointestinal:  Negative for nausea and vomiting.   Neurological:  Positive for weakness.   Psychiatric/Behavioral:  Positive for confusion.    All other systems reviewed and are negative.    Objective:     Vital Signs (Most Recent):  Temp: 97.5 °F (36.4 °C) (08/03/24 1145)  Pulse: 92 (08/03/24 1313)  Resp: 16 (08/03/24 1239)  BP: (!) 95/55 (08/03/24 1313)  SpO2: (!) 93 % (08/03/24 1312) Vital Signs (24h Range):  Temp:  [97.4 °F (36.3 °C)-97.9 °F (36.6 °C)] 97.5 °F (36.4 °C)  Pulse:  [81-98] 92  Resp:  [16-19] 16  SpO2:  [92 %-96 %] 93 %  BP: (69-99)/(36-56) 95/55     Weight: 89.8 kg (197 lb 15.6 oz)  Body mass index is 33.98 kg/m².    Intake/Output Summary (Last 24 hours) at 8/3/2024 1432  Last data filed at 8/2/2024 1904  Gross per 24 hour   Intake 300 ml   Output 45 ml   Net 255 ml         Physical Exam  Vitals reviewed.   Constitutional:       General: She is not in acute distress.     Appearance: She is ill-appearing (chronically).   HENT:      Head: Normocephalic and atraumatic.      Mouth/Throat:      Mouth: Mucous membranes are dry.      Pharynx: Oropharynx is clear.   Eyes:      General: No scleral icterus.     Extraocular Movements: Extraocular movements intact.      Conjunctiva/sclera: Conjunctivae normal.   Cardiovascular:      Rate and Rhythm: Normal rate and regular rhythm.      Pulses: Normal pulses.      Heart sounds: Murmur heard.   Pulmonary:      Effort: Pulmonary effort is normal. No respiratory distress.      Breath sounds: Rales (bibasilar) present. No wheezing or rhonchi.   Abdominal:      General: Abdomen is flat. There is no distension.       Palpations: Abdomen is soft.      Tenderness: There is no abdominal tenderness.   Musculoskeletal:      Cervical back: Normal range of motion and neck supple. No rigidity or tenderness.      Right lower leg: Edema present.      Left lower leg: Edema present.   Skin:     General: Skin is warm and dry.      Coloration: Skin is not jaundiced or pale.   Neurological:      General: No focal deficit present.      Mental Status: She is alert. Mental status is at baseline. She is disoriented.      Motor: Weakness present.             Significant Labs: All pertinent labs within the past 24 hours have been reviewed.    CBC:   Recent Labs   Lab 08/01/24  1459 08/02/24  0548 08/03/24  0428   WBC 6.28 5.39 5.58   HGB 13.6 12.7 12.0   HCT 41.7 39.6 37.8   * 105* 119*     CMP:   Recent Labs   Lab 08/01/24  1459 08/02/24  0548 08/03/24  0428    136 138   K 3.3* 3.2* 3.5    100 101   CO2 21* 23 25   * 111* 100   BUN 13 15 20   CREATININE 1.3 1.2 1.4   CALCIUM 8.5* 8.5* 8.7   PROT 7.0  --   --    ALBUMIN 3.2*  --   --    BILITOT 2.4*  --   --    ALKPHOS 87  --   --    AST 19  --   --    ALT 9*  --   --    ANIONGAP 16 13 12       Significant Imaging: I have reviewed all pertinent imaging results/findings within the past 24 hours.

## 2024-08-04 PROBLEM — T17.900A ASPIRATION SYNDROME: Status: ACTIVE | Noted: 2024-08-04

## 2024-08-04 LAB
ANION GAP SERPL CALC-SCNC: 8 MMOL/L (ref 8–16)
BNP SERPL-MCNC: 3796 PG/ML (ref 0–99)
BUN SERPL-MCNC: 23 MG/DL (ref 8–23)
CALCIUM SERPL-MCNC: 8.5 MG/DL (ref 8.7–10.5)
CHLORIDE SERPL-SCNC: 101 MMOL/L (ref 95–110)
CO2 SERPL-SCNC: 28 MMOL/L (ref 23–29)
CREAT SERPL-MCNC: 1.3 MG/DL (ref 0.5–1.4)
EST. GFR  (NO RACE VARIABLE): 41 ML/MIN/1.73 M^2
GLUCOSE SERPL-MCNC: 103 MG/DL (ref 70–110)
INR PPP: 2.3 (ref 0.8–1.2)
POTASSIUM SERPL-SCNC: 3.7 MMOL/L (ref 3.5–5.1)
PROTHROMBIN TIME: 25.3 SEC (ref 9–12.5)
SODIUM SERPL-SCNC: 137 MMOL/L (ref 136–145)

## 2024-08-04 PROCEDURE — 92610 EVALUATE SWALLOWING FUNCTION: CPT

## 2024-08-04 PROCEDURE — 25000242 PHARM REV CODE 250 ALT 637 W/ HCPCS: Performed by: INTERNAL MEDICINE

## 2024-08-04 PROCEDURE — 94799 UNLISTED PULMONARY SVC/PX: CPT

## 2024-08-04 PROCEDURE — 25000003 PHARM REV CODE 250: Performed by: INTERNAL MEDICINE

## 2024-08-04 PROCEDURE — 21400001 HC TELEMETRY ROOM

## 2024-08-04 PROCEDURE — 25000003 PHARM REV CODE 250: Performed by: NURSE PRACTITIONER

## 2024-08-04 PROCEDURE — 99900035 HC TECH TIME PER 15 MIN (STAT)

## 2024-08-04 PROCEDURE — 27000221 HC OXYGEN, UP TO 24 HOURS

## 2024-08-04 PROCEDURE — 85610 PROTHROMBIN TIME: CPT | Performed by: INTERNAL MEDICINE

## 2024-08-04 PROCEDURE — 80048 BASIC METABOLIC PNL TOTAL CA: CPT | Performed by: INTERNAL MEDICINE

## 2024-08-04 PROCEDURE — 27000207 HC ISOLATION

## 2024-08-04 PROCEDURE — 94640 AIRWAY INHALATION TREATMENT: CPT

## 2024-08-04 PROCEDURE — 63600175 PHARM REV CODE 636 W HCPCS: Performed by: INTERNAL MEDICINE

## 2024-08-04 PROCEDURE — 83880 ASSAY OF NATRIURETIC PEPTIDE: CPT | Performed by: INTERNAL MEDICINE

## 2024-08-04 PROCEDURE — 94761 N-INVAS EAR/PLS OXIMETRY MLT: CPT

## 2024-08-04 PROCEDURE — 36415 COLL VENOUS BLD VENIPUNCTURE: CPT | Performed by: INTERNAL MEDICINE

## 2024-08-04 RX ORDER — LANOLIN ALCOHOL/MO/W.PET/CERES
400 CREAM (GRAM) TOPICAL ONCE
Status: COMPLETED | OUTPATIENT
Start: 2024-08-04 | End: 2024-08-04

## 2024-08-04 RX ORDER — FUROSEMIDE 10 MG/ML
20 INJECTION INTRAMUSCULAR; INTRAVENOUS ONCE
Status: COMPLETED | OUTPATIENT
Start: 2024-08-04 | End: 2024-08-04

## 2024-08-04 RX ORDER — WARFARIN 2.5 MG/1
2.5 TABLET ORAL ONCE
Status: COMPLETED | OUTPATIENT
Start: 2024-08-04 | End: 2024-08-04

## 2024-08-04 RX ADMIN — MUPIROCIN: 20 OINTMENT TOPICAL at 08:08

## 2024-08-04 RX ADMIN — WARFARIN SODIUM 2.5 MG: 2.5 TABLET ORAL at 05:08

## 2024-08-04 RX ADMIN — IPRATROPIUM BROMIDE AND ALBUTEROL SULFATE 3 ML: 2.5; .5 SOLUTION RESPIRATORY (INHALATION) at 12:08

## 2024-08-04 RX ADMIN — FUROSEMIDE 20 MG: 10 INJECTION, SOLUTION INTRAMUSCULAR; INTRAVENOUS at 12:08

## 2024-08-04 RX ADMIN — BUDESONIDE INHALATION 0.5 MG: 0.5 SUSPENSION RESPIRATORY (INHALATION) at 07:08

## 2024-08-04 RX ADMIN — DEXAMETHASONE 6 MG: 4 TABLET ORAL at 08:08

## 2024-08-04 RX ADMIN — FLUTICASONE PROPIONATE 100 MCG: 50 SPRAY, METERED NASAL at 09:08

## 2024-08-04 RX ADMIN — IPRATROPIUM BROMIDE AND ALBUTEROL SULFATE 3 ML: 2.5; .5 SOLUTION RESPIRATORY (INHALATION) at 07:08

## 2024-08-04 RX ADMIN — Medication 400 MG: at 08:08

## 2024-08-04 RX ADMIN — IPRATROPIUM BROMIDE AND ALBUTEROL SULFATE 3 ML: 2.5; .5 SOLUTION RESPIRATORY (INHALATION) at 02:08

## 2024-08-04 RX ADMIN — POTASSIUM CHLORIDE 20 MEQ: 1500 TABLET, EXTENDED RELEASE ORAL at 08:08

## 2024-08-04 RX ADMIN — IPRATROPIUM BROMIDE AND ALBUTEROL SULFATE 3 ML: 2.5; .5 SOLUTION RESPIRATORY (INHALATION) at 09:08

## 2024-08-04 RX ADMIN — BUDESONIDE INHALATION 0.5 MG: 0.5 SUSPENSION RESPIRATORY (INHALATION) at 09:08

## 2024-08-04 NOTE — PT/OT/SLP PROGRESS
Physical Therapy      Patient Name:  Serena Post   MRN:  [id number]    1020 P.T COMPLETED CHART REVIEW. EVAL AND TX ORDERS RECEIVED. PT HAS BEEN SEEN IN HOSPITAL FOR EVAL IN NOV 2023 AND FEB 2024. PT IS BED BOUND AND HAS SITTERS. PT IS AT PLOF. PT MD AWARE AND PLAN TO D/C P.T. ORDERS. THANK YOU Tiffany Soler, PT

## 2024-08-04 NOTE — ASSESSMENT & PLAN NOTE
Creatine stable for now. BMP reviewed- noted Estimated Creatinine Clearance: 36.2 mL/min (based on SCr of 1.3 mg/dL). according to latest data. Based on current GFR, CKD stage is stage 3 - GFR 30-59.  Monitor UOP and serial BMP and adjust therapy as needed. Renally dose meds. Avoid nephrotoxic medications and procedures.   Recommend he continue as directed by Dr. Villegas.     Continue with OTC vitamin D 4000 units daily

## 2024-08-04 NOTE — ASSESSMENT & PLAN NOTE
Patient is identified as having Systolic (HFrEF) heart failure that is Acute on chronic. CHF is currently uncontrolled due to Pulmonary edema/pleural effusion on CXR. Latest ECHO performed and demonstrates- Results for orders placed during the hospital encounter of 02/22/24    Echo    Interpretation Summary    Left Ventricle: The left ventricle is normal in size. Normal wall thickness. There is concentric hypertrophy. Global hypokinesis present. There is moderately reduced systolic function. Ejection fraction by visual approximation is 35%.    Right Ventricle: Normal right ventricular cavity size. Systolic function is borderline low.    Aortic Valve: There is a mechanical valve in the aortic position with elevated velocities noted. There is moderate aortic regurgitation.  Consider ORI if clinically needed to evaluate mechanical aortic valve further.    Mitral Valve: There is mild bileaflet sclerosis. There is mild regurgitation.    Pulmonary Artery: There is moderate pulmonary hypertension. The estimated pulmonary artery systolic pressure is 49 mmHg.    IVC/SVC: Normal venous pressure at 3 mmHg.    Ascending aorta is moderately dilated measuring 4.45 cm.  . Continue Furosemide and monitor clinical status closely. Monitor on telemetry. Patient is on CHF pathway.  Monitor strict Is&Os and daily weights.  Place on fluid restriction of 1.5 L. Cardiology has been consulted. Continue to stress to patient importance of self efficacy and  on diet for CHF. Last BNP reviewed- and noted below   Recent Labs   Lab 08/04/24  0546   BNP 3,796*     Continue IV diuresis, optimize medical therapy, add beta blockers as blood pressure tolerates

## 2024-08-04 NOTE — ASSESSMENT & PLAN NOTE
Patient with Paroxysmal (<7 days) atrial fibrillation which is controlled.  Lopressor temporarily held due to hypotension.  [id number] Score: 4. Anticoagulation indicated. Anticoagulation done with Coumadin .  Telemetry monitoring.

## 2024-08-04 NOTE — SUBJECTIVE & OBJECTIVE
Interval History:  Patient seen with son at bedside.  She is awake alert responding appropriately to questions.  She coughing episode when eating breakfast this morning.  Speech therapy was ordered.    Review of Systems   Constitutional:  Positive for appetite change and fatigue. Negative for chills and fever.   Respiratory:  Positive for cough and shortness of breath.    Cardiovascular:  Negative for chest pain and leg swelling.   Gastrointestinal:  Negative for nausea and vomiting.   Neurological:  Positive for weakness.   All other systems reviewed and are negative.    Objective:     Vital Signs (Most Recent):  Temp: 98.3 °F (36.8 °C) (08/04/24 1200)  Pulse: 97 (08/04/24 1309)  Resp: 16 (08/04/24 1200)  BP: 107/60 (08/04/24 1309)  SpO2: (!) 94 % (08/04/24 1200) Vital Signs (24h Range):  Temp:  [96.2 °F (35.7 °C)-98.3 °F (36.8 °C)] 98.3 °F (36.8 °C)  Pulse:  [84-97] 97  Resp:  [16-20] 16  SpO2:  [92 %-95 %] 94 %  BP: ()/(55-84) 107/60     Weight: 89.8 kg (197 lb 15.6 oz)  Body mass index is 33.98 kg/m².    Intake/Output Summary (Last 24 hours) at 8/4/2024 1440  Last data filed at 8/4/2024 0825  Gross per 24 hour   Intake 225 ml   Output --   Net 225 ml         Physical Exam  Vitals reviewed.   Constitutional:       General: She is not in acute distress.     Appearance: She is ill-appearing (chronically).   HENT:      Head: Normocephalic and atraumatic.      Mouth/Throat:      Mouth: Mucous membranes are dry.      Pharynx: Oropharynx is clear.   Eyes:      General: No scleral icterus.     Extraocular Movements: Extraocular movements intact.      Conjunctiva/sclera: Conjunctivae normal.   Cardiovascular:      Rate and Rhythm: Normal rate and regular rhythm.      Pulses: Normal pulses.      Heart sounds: Murmur heard.   Pulmonary:      Effort: Pulmonary effort is normal. No respiratory distress.      Breath sounds: Rales (bibasilar) present. No wheezing or rhonchi.   Abdominal:      General: Abdomen is flat.  There is no distension.      Palpations: Abdomen is soft.      Tenderness: There is no abdominal tenderness.   Musculoskeletal:      Cervical back: Normal range of motion and neck supple. No rigidity or tenderness.      Right lower leg: Edema present.      Left lower leg: Edema present.   Skin:     General: Skin is warm and dry.      Coloration: Skin is not jaundiced or pale.   Neurological:      General: No focal deficit present.      Mental Status: She is alert. Mental status is at baseline.      Motor: Weakness present.             Significant Labs: All pertinent labs within the past 24 hours have been reviewed.  CBC:   Recent Labs   Lab 08/03/24 0428   WBC 5.58   HGB 12.0   HCT 37.8   *     CMP:   Recent Labs   Lab 08/03/24  0428 08/04/24  0546    137   K 3.5 3.7    101   CO2 25 28    103   BUN 20 23   CREATININE 1.4 1.3   CALCIUM 8.7 8.5*   ANIONGAP 12 8       Significant Imaging: I have reviewed all pertinent imaging results/findings within the past 24 hours.

## 2024-08-04 NOTE — PROGRESS NOTES
Coumadin Progress Notes:  Day # 3 of consult  Indication: aortic valve replacement, Afib, DVT hx  INR Goal: 2.5-3.5  INR today = 2.3 trended down from 2.7 yesterday  Will give a 2.5 mg dose today instead of 1.25 mg dose  Estimated Creatinine Clearance: 36.2 mL/min (based on SCr of 1.3 mg/dL).   Body mass index is 33.98 kg/m².   Lab Results   Component Value Date    WBC 5.58 08/03/2024    HGB 12.0 08/03/2024    HCT 37.8 08/03/2024    MCV 89 08/03/2024     (L) 08/03/2024     /Erika Juárez [facility] 8/4/2024 12:25 PM

## 2024-08-04 NOTE — PROGRESS NOTES
[facility] Medicine  Progress Note    Patient Name: Serena Post  MRN: [id number]  Patient Class: IP- Inpatient   Admission Date: 8/1/2024  Length of Stay: 3 days  Attending Physician: June Acosta MD  Primary Care Provider: Evangelina Olivares MD        Subjective:     Principal Problem:Acute on chronic systolic CHF (congestive heart failure)        HPI:  84-year-old white woman with history of aortic valve replacement (mechanical valve in 2007), paroxysmal atrial fibrillation, long-term oral anticoagulation with Coumadin, [employer] permanent pacemaker 2009, chronic systolic CHF (echocardiogram February 2024 with EF 35%), chronic hypoxic respiratory failure (2-1/2 L nasal cannula at home), hyperlipidemia, hypertension, DVT, rheumatoid arthritis, CVA, intracranial tumor, ascending aortic aneurysm, obesity, COPD, arteriosclerotic cardiovascular disease, and chronic kidney disease stage IIIA who was sent by home health for shortness a breath and positive COVID test.  Patient reports shortness of breath over the last several days, moderate severity, constant, no associated chest pain.  She has associated cough with white to yellow sputum production.  She denies nausea or vomiting.  She denies fevers or chills however she does have low-grade temperature in our emergency department up to 99.9.    Last hospital admit 2/22-02/28/2024 for acute on chronic systolic CHF exacerbation and mechanical valve dysfunction with plans for ORI in the outpatient setting to further evaluate.  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    Overview/Hospital Course:  Patient is a 34 old lady with a dysfunctional aortic valve, PAF, pacemaker, chronic systolic heart failure, chronic hypoxic respiratory failure on 2.5 L nasal cannula at home, DVT, rheumatoid arthritis, CVA, COPD, CKD 3 who presented after being  seen by home health  for shortness a breath and positive COVID test.  He was admitted started on diuresis and regular bronchodilator nebs as well as home O2.  Patient is feeling much better now.  She was continued on her COVID isolation as well as her Decadron.  She was seen in consultation by Pulmonary Medicine who agreed with diuresis as well as continuing her broncho dilators pulmonary hygiene  She was seen by consultation by Cardiology who recommended continue diuresis, pulmonary hygiene and follow up with Dr. Perkins as outpatient.  Patient with continued cough with food.  We will have ST evaluate patient  In the past she has been altered to pureed diet with aspiration precautions.  Likely DC in a.m.      Interval History:  Patient seen with son at bedside.  She is awake alert responding appropriately to questions.  She coughing episode when eating breakfast this morning.  Speech therapy was ordered.    Review of Systems   Constitutional:  Positive for appetite change and fatigue. Negative for chills and fever.   Respiratory:  Positive for cough and shortness of breath.    Cardiovascular:  Negative for chest pain and leg swelling.   Gastrointestinal:  Negative for nausea and vomiting.   Neurological:  Positive for weakness.   All other systems reviewed and are negative.    Objective:     Vital Signs (Most Recent):  Temp: 98.3 °F (36.8 °C) (08/04/24 1200)  Pulse: 97 (08/04/24 1309)  Resp: 16 (08/04/24 1200)  BP: 107/60 (08/04/24 1309)  SpO2: (!) 94 % (08/04/24 1200) Vital Signs (24h Range):  Temp:  [96.2 °F (35.7 °C)-98.3 °F (36.8 °C)] 98.3 °F (36.8 °C)  Pulse:  [84-97] 97  Resp:  [16-20] 16  SpO2:  [92 %-95 %] 94 %  BP: ()/(55-84) 107/60     Weight: 89.8 kg (197 lb 15.6 oz)  Body mass index is 33.98 kg/m².    Intake/Output Summary (Last 24 hours) at 8/4/2024 1440  Last data filed at 8/4/2024 0825  Gross per 24 hour   Intake 225 ml   Output --   Net 225 ml         Physical Exam  Vitals reviewed.    Constitutional:       General: She is not in acute distress.     Appearance: She is ill-appearing (chronically).   HENT:      Head: Normocephalic and atraumatic.      Mouth/Throat:      Mouth: Mucous membranes are dry.      Pharynx: Oropharynx is clear.   Eyes:      General: No scleral icterus.     Extraocular Movements: Extraocular movements intact.      Conjunctiva/sclera: Conjunctivae normal.   Cardiovascular:      Rate and Rhythm: Normal rate and regular rhythm.      Pulses: Normal pulses.      Heart sounds: Murmur heard.   Pulmonary:      Effort: Pulmonary effort is normal. No respiratory distress.      Breath sounds: Rales (bibasilar) present. No wheezing or rhonchi.   Abdominal:      General: Abdomen is flat. There is no distension.      Palpations: Abdomen is soft.      Tenderness: There is no abdominal tenderness.   Musculoskeletal:      Cervical back: Normal range of motion and neck supple. No rigidity or tenderness.      Right lower leg: Edema present.      Left lower leg: Edema present.   Skin:     General: Skin is warm and dry.      Coloration: Skin is not jaundiced or pale.   Neurological:      General: No focal deficit present.      Mental Status: She is alert. Mental status is at baseline.      Motor: Weakness present.             Significant Labs: All pertinent labs within the past 24 hours have been reviewed.  CBC:   Recent Labs   Lab 08/03/24  0428   WBC 5.58   HGB 12.0   HCT 37.8   *     CMP:   Recent Labs   Lab 08/03/24  0428 08/04/24  0546    137   K 3.5 3.7    101   CO2 25 28    103   BUN 20 23   CREATININE 1.4 1.3   CALCIUM 8.7 8.5*   ANIONGAP 12 8       Significant Imaging: I have reviewed all pertinent imaging results/findings within the past 24 hours.    Assessment/Plan:      * Acute on chronic systolic CHF (congestive heart failure)  Patient is identified as having Systolic (HFrEF) heart failure that is Acute on chronic. CHF is currently uncontrolled due to  Pulmonary edema/pleural effusion on CXR. Latest ECHO performed and demonstrates- Results for orders placed during the hospital encounter of 02/22/24    Echo    Interpretation Summary    Left Ventricle: The left ventricle is normal in size. Normal wall thickness. There is concentric hypertrophy. Global hypokinesis present. There is moderately reduced systolic function. Ejection fraction by visual approximation is 35%.    Right Ventricle: Normal right ventricular cavity size. Systolic function is borderline low.    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 There is moderate aortic regurgitation.  Consider ORI if clinically needed to evaluate mechanical aortic valve further.    Mitral Valve: There is mild bileaflet sclerosis. There is mild regurgitation.    Pulmonary Artery: There is moderate pulmonary hypertension. The estimated pulmonary artery systolic pressure is 49 mmHg.    IVC/SVC: Normal venous pressure at 3 mmHg.    Ascending aorta is moderately dilated measuring 4.45 cm.  . Continue Furosemide and monitor clinical status closely. Monitor on telemetry. Patient is on CHF pathway.  Monitor strict Is&Os and daily weights.  Place on fluid restriction of 1.5 L. Cardiology has been consulted. Continue to stress to patient importance of self efficacy and  on diet for CHF. Last BNP reviewed- and noted below   Recent Labs   Lab 08/04/24  0546   BNP 3,796*     Continue IV diuresis, optimize medical therapy, add beta blockers as blood pressure tolerates    Aspiration syndrome  Patient continues to have trouble swallowing  Cough with any liquids today  ST evaluation  Place on pureed food  Patient and son have not want a feeding tube.      Hypokalemia  Patient's most recent potassium results are listed below.   Recent Labs     08/02/24  0548 08/03/24  0428 08/04/24  0546   K 3.2* 3.5 3.7       Plan  - Replete potassium per protocol  - Monitor potassium Daily  -  "Patient's hypokalemia is stable  - give potassium bicarbonate 40 mEq p.o. followed by potassium chloride 20 mEq p.o. b.i.d. while receiving IV Lasix diuresis  -check magnesium level    Hypotension  -hold Lopressor (generally with hypertension)  -monitor blood pressure closely with patient receiving IV Lasix diuresis      COPD without exacerbation  Patient's COPD is controlled currently.  Patient is currently off COPD Pathway.  Continue scheduled DuoNebs q.6 hours and O2 supplementation.  Continuous pulse oximetry monitor and monitor respiratory status closely.     Chronic hypoxic respiratory failure  Patient with Hypoxic Respiratory failure which is Chronic.  she is on home oxygen at 2.5 LPM. Supplemental oxygen was provided and noted-  currently requiring O2 via 3 L nasal cannula  -CXR with "Chronic or recurrent left effusion with adjacent atelectasis/consolidation.  Pneumonia with a parapneumonic effusion could have this appearance in the right clinical setting.  Less likely could these changes be related to CHF. Negative for acute process otherwise."  -white blood cell count 6.28, lactic acid level 1.6, BNP greater than 4900, T-max 99.9°, COVID-19 positive  .   Signs/symptoms of respiratory failure include- tachypnea and respiratory distress. Contributing diagnoses includes - CHF, COPD, and COVID-19  Labs and images were reviewed. Patient Has recent ABG, which has been reviewed. Will treat underlying causes and adjust management of respiratory failure as follows:  -continue oral dexamethasone for COVID-19 infection  -continuous pulse oximetry monitoring, O2 supplementation, incentive spirometry, DuoNebs q.6 hours  -pulmonary consult appreciated  -continue supplemental oxygen at 2.5 L her usual home dose    Stage 3a chronic kidney disease  Creatine stable for now. BMP reviewed- noted Estimated Creatinine Clearance: 36.2 mL/min (based on SCr of 1.3 mg/dL). according to latest data. Based on current GFR, CKD stage " is stage 3 - GFR 30-59.  Monitor UOP and serial BMP and adjust therapy as needed. Renally dose meds. Avoid nephrotoxic medications and procedures.    COVID-19  Patient is identified as High risk for severe complications of COVID 19 based on COVID risk score of 6   Initiate standard COVID protocols; COVID-19 testing ,Infection Control notification  and isolation- respiratory, contact and droplet per protocol    Diagnostics: CBC, CMP, Ferritin, CRP, and Portable CXR    Management: Initiate targeted therapy with Dexamethasone PO/IV 6mg daily x10 days, Maintain oxygen saturations 92-96% via Nasal Cannula 3 LPM and monitor with continuous/intermittent pulse oximetry. , and Continuous cardiac monitoring. Pulmonary consult for am.    Advance Care Planning Current advance care plan has been discussed with patient/family/POA and patient currently wishes Full Code.       Paroxysmal atrial fibrillation  Patient with Paroxysmal (<7 days) atrial fibrillation which is controlled.  Lopressor temporarily held due to hypotension.  [id number] Score: 4. Anticoagulation indicated. Anticoagulation done with Coumadin .  Telemetry monitoring.    Pacemaker  Telemetry monitoring      H/O mechanical aortic valve replacement  -ORI 03/18/2024 with moderate prosthetic valve stenosis and moderate prosthetic valve regurgitation  -continue Coumadin (pharmacy to monitor and dose)  -check daily INR  Follow up outpatient with her usual cardiologist Dr. Perkins        VTE Risk Mitigation (From admission, onward)           Ordered     warfarin (COUMADIN) split tablet 1.25 mg  Once per day on Sunday Tuesday Wednesday Thursday Saturday 08/04/24 1221     warfarin tablet 3 mg  Once per day on Monday Friday 08/03/24 1542     warfarin (COUMADIN) tablet 2.5 mg  Once         08/04/24 1221     IP VTE HIGH RISK PATIENT  Once         08/01/24 2312     Place sequential compression device  Until discontinued         08/01/24 2318                     Discharge Planning   RENE:      Code Status: Full Code   Is the patient medically ready for discharge?:     Reason for patient still in hospital (select all that apply): Patient trending condition  Discharge Plan A: Home Health                  June Boykin MD  Department of Hospital Medicine   [facility] ([facility])

## 2024-08-04 NOTE — ASSESSMENT & PLAN NOTE
Patient continues to have trouble swallowing  Cough with any liquids today  ST evaluation  Place on pureed food  Patient and son have not want a feeding tube.

## 2024-08-04 NOTE — PT/OT/SLP EVAL
"Speech Language Pathology Evaluation  Bedside Swallow    Patient Name:  Serena Post   MRN:  [id number]  Admitting Diagnosis: Acute on chronic systolic CHF (congestive heart failure)    Recommendations:                 General Recommendations:  Dysphagia therapy and Cognitive-linguistic therapy  Diet recommendations:  Pleasure Feeding, Thin liquids - IDDSI Level 0   Aspiration Precautions: Frequent oral care   General Precautions: Standard, aspiration  Communication strategies:  provide increased time to answer    Assessment:     Serena Post is a 84 y.o. female with an medical diagnosis of acute on chronic systolic CHF.  Patient with continuous nonproductive coughing and labored breathing before, during, and after po trials. Minimal po intake observed due to coughing. She is at an increase risk of aspiration due to poor airway protection and respiratory deficits. Pleasure feeds only recommended.     History:     Past Medical History:   Diagnosis Date    A-fib     Anticoagulant long-term use     Aortic valve disease     Cancer     brain tumor, 10 years ago    Cardiomyopathy     CHF (congestive heart failure)     COVID-19 7/23/2022    Hx of heart valve replacement with mechanical valve     Hyperlipidemia     Hypertension     Pacemaker     Pacemaker     Sepsis 7/23/2022    Stroke     2007, residual weakness       Past Surgical History:   Procedure Laterality Date    AORTIC VALVE REPLACEMENT      CHOLECYSTECTOMY      CORONARY ARTERY BYPASS GRAFT      HYSTERECTOMY      INSERTION OF PACEMAKER      TONSILLECTOMY       Subjective     Patient seen at bedside with son. Full breakfast tray at bedside. Patient reported food getting "stuck."  Patient goals: n/a     Pain/Comfort:  Pain Rating 1: 0/10  Pain Rating Post-Intervention 1: 0/10    Respiratory Status: Nasal cannula, flow 2 L/min    Objective:     Oral Musculature Evaluation  Oral Musculature: general weakness    Bedside Swallow Eval:   Consistencies " "Assessed:  Thin liquids via straw  Puree textured      Oral Phase:   WFL    Pharyngeal Phase:   coughing/choking    Treatment: Patient observed coughing prior, during and post po trials. Weak unproductive cough noted with some sputum production. Encourage use of yanker. Patient c/o inability to consume breakfast due to food getting "stuck." Observed minimal po intake due to continuous coughing. Patient with increase in labored breathing. She is at an increase risk of aspiration due to difficulty sequencing breathing with swallowing and poor airway productive.    Goals:   Multidisciplinary Problems       SLP Goals          Problem: SLP    Goal Priority Disciplines Outcome   SLP Goal     SLP    Description: Patient will tolerate po trials for appropriate diet recommendations                       Plan:     Patient to be seen:  2 x/week   Plan of Care expires:     Plan of Care reviewed with:  patient, son   SLP Follow-Up:  Yes       Discharge recommendations:  Moderate Intensity Therapy       Time Tracking:     SLP Treatment Date:   08/04/24  Speech Start Time:  1037  Speech Stop Time:  1058     Speech Total Time (min):  21 min    Billable Minutes: Eval Swallow and Oral Function 16    08/04/2024         "

## 2024-08-04 NOTE — PLAN OF CARE
A206/A206 NITHIN Post is a 84 y.o.female admitted on 8/1/2024 for Acute on chronic systolic CHF (congestive heart failure)   Code Status: Full Code MRN: [id number]   Review of patient's allergies indicates:   Allergen Reactions    Amlodipine Other (See Comments)     Not sure    Chlorthalidone Other (See Comments)     Not sure    Clonidine Other (See Comments)     Not sure    Codeine Other (See Comments)     Not sure    Escitalopram Other (See Comments)     Not sure    Lisinopril Other (See Comments)     Not sure    Losartan Other (See Comments)     Not sure    Meperidine Other (See Comments)     Not sure    Methadone Other (See Comments)     Not sure      Morphine Other (See Comments)     Not sure    Nebivolol Other (See Comments)     Not sure        Nitrofurantoin Other (See Comments)     Not sure        Omeprazole Other (See Comments)     Not sure      Pravastatin Other (See Comments)     Not sure      Propoxyphene Other (See Comments)     Not sure      Sulfamethoxazole-trimethoprim Other (See Comments)     Not sure         Past Medical History:   Diagnosis Date    A-fib     Anticoagulant long-term use     Aortic valve disease     Cancer     brain tumor, 10 years ago    Cardiomyopathy     CHF (congestive heart failure)     COVID-19 7/23/2022    Hx of heart valve replacement with mechanical valve     Hyperlipidemia     Hypertension     Pacemaker     Pacemaker     Sepsis 7/23/2022    Stroke     2007, residual weakness      PRN meds    acetaminophen, 650 mg, Q6H PRN  hydrALAZINE, 10 mg, Q6H PRN  ondansetron, 4 mg, Q6H PRN  sodium chloride 0.9%, 10 mL, PRN      Chart check completed. Will continue plan of care.      Orientation: disoriented to, time  McL[place] Coma Scale Score: 15     Lead Monitored: Lead II Rhythm: normal sinus rhythm Frequency/Ectopy: PVCs  Cardiac/Telemetry Box Number: [id number]  VTE Required Core Measure: (SCDs) Sequential compression device initiated/maintained Last Bowel Movement:  08/02/24  Diet Low Sodium, 2gm Fluid - 1500mL  Voiding Characteristics: incontinence  Jacobo Score: 14  Fall Risk Score: 17  Accucheck []   Freq?      Lines/Drains/Airways       Peripheral Intravenous Line  Duration                  Peripheral IV - Single Lumen 08/01/24 1720 20 G 1 in No Left;Posterior Hand 2 days                       Problem: Adult Inpatient Plan of Care  Goal: Plan of Care Review  Outcome: Progressing  Goal: Patient-Specific Goal (Individualized)  Outcome: Progressing  Goal: Absence of Hospital-Acquired Illness or Injury  Outcome: Progressing  Goal: Optimal Comfort and Wellbeing  Outcome: Progressing  Goal: Readiness for Transition of Care  Outcome: Progressing     Problem: Wound  Goal: Optimal Coping  Outcome: Progressing  Goal: Optimal Functional Ability  Outcome: Progressing  Goal: Absence of Infection Signs and Symptoms  Outcome: Progressing  Goal: Improved Oral Intake  Outcome: Progressing  Goal: Optimal Pain Control and Function  Outcome: Progressing  Goal: Skin Health and Integrity  Outcome: Progressing  Goal: Optimal Wound Healing  Outcome: Progressing     Problem: Skin Injury Risk Increased  Goal: Skin Health and Integrity  Outcome: Progressing

## 2024-08-04 NOTE — ASSESSMENT & PLAN NOTE
"Patient with Hypoxic Respiratory failure which is Chronic.  she is on home oxygen at 2.5 LPM. Supplemental oxygen was provided and noted-  currently requiring O2 via 3 L nasal cannula  -CXR with "Chronic or recurrent left effusion with adjacent atelectasis/consolidation.  Pneumonia with a parapneumonic effusion could have this appearance in the right clinical setting.  Less likely could these changes be related to CHF. Negative for acute process otherwise."  -white blood cell count 6.28, lactic acid level 1.6, BNP greater than 4900, T-max 99.9°, COVID-19 positive  .   Signs/symptoms of respiratory failure include- tachypnea and respiratory distress. Contributing diagnoses includes - CHF, COPD, and COVID-19  Labs and images were reviewed. Patient Has recent ABG, which has been reviewed. Will treat underlying causes and adjust management of respiratory failure as follows:  -continue oral dexamethasone for COVID-19 infection  -continuous pulse oximetry monitoring, O2 supplementation, incentive spirometry, DuoNebs q.6 hours  -pulmonary consult appreciated  -continue supplemental oxygen at 2.5 L her usual home dose  "

## 2024-08-04 NOTE — ASSESSMENT & PLAN NOTE
Patient's most recent potassium results are listed below.   Recent Labs     08/02/24  0548 08/03/24  0428 08/04/24  0546   K 3.2* 3.5 3.7       Plan  - Replete potassium per protocol  - Monitor potassium Daily  - Patient's hypokalemia is stable  - give potassium bicarbonate 40 mEq p.o. followed by potassium chloride 20 mEq p.o. b.i.d. while receiving IV Lasix diuresis  -check magnesium level

## 2024-08-04 NOTE — PLAN OF CARE
POC reviewed with pt. Pt verbalizes understanding of POC. No questions at this time.  AAOx3.disoriented to time. NADN.  NSR on cardiac monitor.  Pt on low Na diet  Pt remains free of falls.  Isolation precautions still in place  Pt on 3L NC  No complaints at this time.  Safety measures in place. Will continue to monitor.  Informed pt to call for assistance before getting up. Pt verbalizes understanding.  Hourly rounding and chart check and complete.    A206/A206 NITHIN Post is a 84 y.o.female admitted on 8/1/2024 for Acute on chronic systolic CHF (congestive heart failure)   Code Status: Full Code MRN: [id number]   Review of patient's allergies indicates:   Allergen Reactions    Amlodipine Other (See Comments)     Not sure    Chlorthalidone Other (See Comments)     Not sure    Clonidine Other (See Comments)     Not sure    Codeine Other (See Comments)     Not sure    Escitalopram Other (See Comments)     Not sure    Lisinopril Other (See Comments)     Not sure    Losartan Other (See Comments)     Not sure    Meperidine Other (See Comments)     Not sure    Methadone Other (See Comments)     Not sure      Morphine Other (See Comments)     Not sure    Nebivolol Other (See Comments)     Not sure        Nitrofurantoin Other (See Comments)     Not sure        Omeprazole Other (See Comments)     Not sure      Pravastatin Other (See Comments)     Not sure      Propoxyphene Other (See Comments)     Not sure      Sulfamethoxazole-trimethoprim Other (See Comments)     Not sure         Past Medical History:   Diagnosis Date    A-fib     Anticoagulant long-term use     Aortic valve disease     Cancer     brain tumor, 10 years ago    Cardiomyopathy     CHF (congestive heart failure)     COVID-19 7/23/2022    Hx of heart valve replacement with mechanical valve     Hyperlipidemia     Hypertension     Pacemaker     Pacemaker     Sepsis 7/23/2022    Stroke     2007, residual weakness      PRN meds    acetaminophen, 650  mg, Q6H PRN  hydrALAZINE, 10 mg, Q6H PRN  ondansetron, 4 mg, Q6H PRN  sodium chloride 0.9%, 10 mL, PRN      AVS Discharge instructions received and reviewed with pt and family at bedside.  Pt voiced understanding and all questions answered to satisfaction.  Stressed importance to making and keeping all follow up appointments.  Medications at bedside and reviewed with pt.  Tele monitor removed and brought to monitor tech.  IV d/c'd with tip intact, pressure dressing applied.  Pt will call when ready to be transported to front  hospital via w/c to be discharged home.      Orientation: disoriented to, time  Chhaya Coma Scale Score: 15     Lead Monitored: Lead II Rhythm: normal sinus rhythm Frequency/Ectopy: PVCs  Cardiac/Telemetry Box Number: [id number]  VTE Required Core Measure: (SCDs) Sequential compression device initiated/maintained Last Bowel Movement: 08/02/24  Diet Low Sodium, 2gm Fluid - 1500mL  Voiding Characteristics: incontinence  Jacobo Score: 14  Fall Risk Score: 17  Accucheck []   Freq?      Lines/Drains/Airways       Peripheral Intravenous Line  Duration                  Peripheral IV - Single Lumen 08/01/24 1720 20 G 1 in No Left;Posterior Hand 2 days

## 2024-08-05 LAB
ANION GAP SERPL CALC-SCNC: 9 MMOL/L (ref 8–16)
BACTERIA SPEC AEROBE CULT: NORMAL
BACTERIA SPEC AEROBE CULT: NORMAL
BASOPHILS # BLD AUTO: 0.01 K/UL (ref 0–0.2)
BASOPHILS NFR BLD: 0.2 % (ref 0–1.9)
BUN SERPL-MCNC: 23 MG/DL (ref 8–23)
CALCIUM SERPL-MCNC: 8.8 MG/DL (ref 8.7–10.5)
CHLORIDE SERPL-SCNC: 102 MMOL/L (ref 95–110)
CO2 SERPL-SCNC: 27 MMOL/L (ref 23–29)
CREAT SERPL-MCNC: 1.2 MG/DL (ref 0.5–1.4)
DIFFERENTIAL METHOD BLD: ABNORMAL
EOSINOPHIL # BLD AUTO: 0 K/UL (ref 0–0.5)
EOSINOPHIL NFR BLD: 0 % (ref 0–8)
ERYTHROCYTE [DISTWIDTH] IN BLOOD BY AUTOMATED COUNT: 16.2 % (ref 11.5–14.5)
EST. GFR  (NO RACE VARIABLE): 45 ML/MIN/1.73 M^2
GLUCOSE SERPL-MCNC: 104 MG/DL (ref 70–110)
GRAM STN SPEC: NORMAL
HCT VFR BLD AUTO: 40.3 % (ref 37–48.5)
HGB BLD-MCNC: 12.8 G/DL (ref 12–16)
IMM GRANULOCYTES # BLD AUTO: 0.03 K/UL (ref 0–0.04)
IMM GRANULOCYTES NFR BLD AUTO: 0.5 % (ref 0–0.5)
INR PPP: 2 (ref 0.8–1.2)
LYMPHOCYTES # BLD AUTO: 0.4 K/UL (ref 1–4.8)
LYMPHOCYTES NFR BLD: 6.2 % (ref 18–48)
MAGNESIUM SERPL-MCNC: 1.9 MG/DL (ref 1.6–2.6)
MCH RBC QN AUTO: 28.4 PG (ref 27–31)
MCHC RBC AUTO-ENTMCNC: 31.8 G/DL (ref 32–36)
MCV RBC AUTO: 89 FL (ref 82–98)
MONOCYTES # BLD AUTO: 0.8 K/UL (ref 0.3–1)
MONOCYTES NFR BLD: 13.2 % (ref 4–15)
NEUTROPHILS # BLD AUTO: 4.8 K/UL (ref 1.8–7.7)
NEUTROPHILS NFR BLD: 79.9 % (ref 38–73)
NRBC BLD-RTO: 0 /100 WBC
PLATELET # BLD AUTO: 120 K/UL (ref 150–450)
PMV BLD AUTO: 12.3 FL (ref 9.2–12.9)
POTASSIUM SERPL-SCNC: 4 MMOL/L (ref 3.5–5.1)
PROTHROMBIN TIME: 22.3 SEC (ref 9–12.5)
RBC # BLD AUTO: 4.51 M/UL (ref 4–5.4)
SODIUM SERPL-SCNC: 138 MMOL/L (ref 136–145)
WBC # BLD AUTO: 5.98 K/UL (ref 3.9–12.7)

## 2024-08-05 PROCEDURE — 25000242 PHARM REV CODE 250 ALT 637 W/ HCPCS: Performed by: INTERNAL MEDICINE

## 2024-08-05 PROCEDURE — 92526 ORAL FUNCTION THERAPY: CPT

## 2024-08-05 PROCEDURE — 94640 AIRWAY INHALATION TREATMENT: CPT

## 2024-08-05 PROCEDURE — 27000207 HC ISOLATION

## 2024-08-05 PROCEDURE — 94761 N-INVAS EAR/PLS OXIMETRY MLT: CPT

## 2024-08-05 PROCEDURE — 85610 PROTHROMBIN TIME: CPT | Performed by: INTERNAL MEDICINE

## 2024-08-05 PROCEDURE — 25000003 PHARM REV CODE 250: Performed by: INTERNAL MEDICINE

## 2024-08-05 PROCEDURE — 85025 COMPLETE CBC W/AUTO DIFF WBC: CPT | Performed by: INTERNAL MEDICINE

## 2024-08-05 PROCEDURE — 36415 COLL VENOUS BLD VENIPUNCTURE: CPT | Performed by: INTERNAL MEDICINE

## 2024-08-05 PROCEDURE — 27000221 HC OXYGEN, UP TO 24 HOURS

## 2024-08-05 PROCEDURE — 83735 ASSAY OF MAGNESIUM: CPT | Performed by: INTERNAL MEDICINE

## 2024-08-05 PROCEDURE — 21400001 HC TELEMETRY ROOM

## 2024-08-05 PROCEDURE — 99900035 HC TECH TIME PER 15 MIN (STAT)

## 2024-08-05 PROCEDURE — 80048 BASIC METABOLIC PNL TOTAL CA: CPT | Performed by: INTERNAL MEDICINE

## 2024-08-05 PROCEDURE — 63600175 PHARM REV CODE 636 W HCPCS: Performed by: INTERNAL MEDICINE

## 2024-08-05 RX ADMIN — POTASSIUM CHLORIDE 20 MEQ: 1500 TABLET, EXTENDED RELEASE ORAL at 09:08

## 2024-08-05 RX ADMIN — IPRATROPIUM BROMIDE AND ALBUTEROL SULFATE 3 ML: 2.5; .5 SOLUTION RESPIRATORY (INHALATION) at 07:08

## 2024-08-05 RX ADMIN — BUDESONIDE INHALATION 0.5 MG: 0.5 SUSPENSION RESPIRATORY (INHALATION) at 07:08

## 2024-08-05 RX ADMIN — IPRATROPIUM BROMIDE AND ALBUTEROL SULFATE 3 ML: 2.5; .5 SOLUTION RESPIRATORY (INHALATION) at 01:08

## 2024-08-05 RX ADMIN — POTASSIUM CHLORIDE 20 MEQ: 1500 TABLET, EXTENDED RELEASE ORAL at 08:08

## 2024-08-05 RX ADMIN — MUPIROCIN: 20 OINTMENT TOPICAL at 09:08

## 2024-08-05 RX ADMIN — MUPIROCIN: 20 OINTMENT TOPICAL at 08:08

## 2024-08-05 RX ADMIN — WARFARIN SODIUM 3 MG: 2 TABLET ORAL at 05:08

## 2024-08-05 RX ADMIN — FLUTICASONE PROPIONATE 100 MCG: 50 SPRAY, METERED NASAL at 09:08

## 2024-08-05 RX ADMIN — IPRATROPIUM BROMIDE AND ALBUTEROL SULFATE 3 ML: 2.5; .5 SOLUTION RESPIRATORY (INHALATION) at 12:08

## 2024-08-05 RX ADMIN — DEXAMETHASONE 6 MG: 4 TABLET ORAL at 09:08

## 2024-08-05 NOTE — ASSESSMENT & PLAN NOTE
Patient is identified as having Systolic (HFrEF) heart failure that is Acute on chronic. CHF is currently uncontrolled due to Pulmonary edema/pleural effusion on CXR. Latest ECHO performed and demonstrates- Results for orders placed during the hospital encounter of 02/22/24    Echo    Interpretation Summary    Left Ventricle: The left ventricle is normal in size. Normal wall thickness. There is concentric hypertrophy. Global hypokinesis present. There is moderately reduced systolic function. Ejection fraction by visual approximation is 35%.    Right Ventricle: Normal right ventricular cavity size. Systolic function is borderline low.    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 There is moderate aortic regurgitation.  Consider ORI if clinically needed to evaluate mechanical aortic valve further.    Mitral Valve: There is mild bileaflet sclerosis. There is mild regurgitation.    Pulmonary Artery: There is moderate pulmonary hypertension. The estimated pulmonary artery systolic pressure is 49 mmHg.    IVC/SVC: Normal venous pressure at 3 mmHg.    Ascending aorta is moderately dilated measuring 4.45 cm.  . Continue Furosemide and monitor clinical status closely. Monitor on telemetry. Patient is on CHF pathway.  Monitor strict Is&Os and daily weights.  Place on fluid restriction of 1.5 L. Cardiology has been consulted. Continue to stress to patient importance of self efficacy and  on diet for CHF. Last BNP reviewed- and noted below   Recent Labs   Lab 08/04/24  0546   BNP 3,796*     Continue IV diuresis as blood pressure tolerates, optimize medical therapy, add beta blockers as blood pressure tolerates

## 2024-08-05 NOTE — ASSESSMENT & PLAN NOTE
Patient is identified as High risk for severe complications of COVID 19 based on COVID risk score of 6   Initiate standard COVID protocols; COVID-19 testing ,Infection Control notification  and isolation- respiratory, contact and droplet per protocol    Diagnostics: CBC, CMP, Ferritin, CRP, and Portable CXR    Management: Initiate targeted therapy with Dexamethasone PO/IV 6mg daily x10 days, Maintain oxygen saturations 92-96% via Nasal Cannula 2 LPM and monitor with continuous/intermittent pulse oximetry. , and Continuous cardiac monitoring.   On baseline supplemental oxygen requirements  Continue steroids and ics    Advance Care Planning  Current advance care plan has been discussed with patient/family/POA and patient currently wishes Full Code.

## 2024-08-05 NOTE — ASSESSMENT & PLAN NOTE
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 Perkins

## 2024-08-05 NOTE — ASSESSMENT & PLAN NOTE
Patient continues to have trouble swallowing  Cough with any liquids today  ST evaluation upgraded dysphagia diet  Patient and son have not want a feeding tube  Sujitker at bedside  Aspiration precautions

## 2024-08-05 NOTE — PLAN OF CARE
POC reviewed with pt. Pt verbalizes understanding of POC. No questions at this time.  AAOx3. Disoriented to time.NADN.  Pt had 18 run of VTACH @2022, Charley, NP notified. po magnesium oxide given  Pt on low Na diet.with pleasure feeding.  Pt have non productive cough, yanker @BS  Pt remains free of falls.  Isolation precautions still in place  No complaints at this time.  Safety measures in place. Will continue to monitor.  Informed pt to call for assistance before getting up. Pt verbalizes understanding.  Hourly rounding and chart check complete.                                         A206/A206 NITHIN Post is a 84 y.o.female admitted on 8/1/2024 for Acute on chronic systolic CHF (congestive heart failure)   Code Status: Full Code MRN: [id number]   Review of patient's allergies indicates:   Allergen Reactions    Amlodipine Other (See Comments)     Not sure    Chlorthalidone Other (See Comments)     Not sure    Clonidine Other (See Comments)     Not sure    Codeine Other (See Comments)     Not sure    Escitalopram Other (See Comments)     Not sure    Lisinopril Other (See Comments)     Not sure    Losartan Other (See Comments)     Not sure    Meperidine Other (See Comments)     Not sure    Methadone Other (See Comments)     Not sure      Morphine Other (See Comments)     Not sure    Nebivolol Other (See Comments)     Not sure        Nitrofurantoin Other (See Comments)     Not sure        Omeprazole Other (See Comments)     Not sure      Pravastatin Other (See Comments)     Not sure      Propoxyphene Other (See Comments)     Not sure      Sulfamethoxazole-trimethoprim Other (See Comments)     Not sure         Past Medical History:   Diagnosis Date    A-fib     Anticoagulant long-term use     Aortic valve disease     Cancer     brain tumor, 10 years ago    Cardiomyopathy     CHF (congestive heart failure)     COVID-19 7/23/2022    Hx of heart valve replacement with mechanical valve     Hyperlipidemia      Hypertension     Pacemaker     Pacemaker     Sepsis 7/23/2022    Stroke     2007, residual weakness      PRN meds    acetaminophen, 650 mg, Q6H PRN  hydrALAZINE, 10 mg, Q6H PRN  ondansetron, 4 mg, Q6H PRN  sodium chloride 0.9%, 10 mL, PRN      Chart check completed. Will continue plan of care.      Orientation: disoriented to, time  [place] Coma Scale Score: 15     Lead Monitored: Lead II Rhythm: normal sinus rhythm Frequency/Ectopy: PVCs  Cardiac/Telemetry Box Number: [id number]  VTE Required Core Measure: (SCDs) Sequential compression device initiated/maintained Last Bowel Movement: 08/02/24  Diet Low Sodium, 2gm Fluid - 1500mL  Voiding Characteristics: incontinence  Jacobo Score: 14  Fall Risk Score: 17  Accucheck []   Freq?      Lines/Drains/Airways       Peripheral Intravenous Line  Duration                  Peripheral IV - Single Lumen 08/01/24 1720 20 G 1 in No Left;Posterior Hand 3 days

## 2024-08-05 NOTE — ASSESSMENT & PLAN NOTE
Patient's most recent potassium results are listed below.   Recent Labs     08/03/24  0428 08/04/24  0546 08/05/24  0435   K 3.5 3.7 4.0     Plan  - Replete potassium per protocol  - Monitor potassium Daily  - Patient's hypokalemia is stable  - give potassium bicarbonate 40 mEq p.o. followed by potassium chloride 20 mEq p.o. b.i.d. while receiving IV Lasix diuresis  -magnesium level 1.9

## 2024-08-05 NOTE — ASSESSMENT & PLAN NOTE
Creatine stable for now. BMP reviewed- noted Estimated Creatinine Clearance: 39.3 mL/min (based on SCr of 1.2 mg/dL). according to latest data. Based on current GFR, CKD stage is stage 3 - GFR 30-59.  Monitor UOP and serial BMP and adjust therapy as needed. Renally dose meds. Avoid nephrotoxic medications and procedures.    Stable

## 2024-08-05 NOTE — NURSING
@ 2022 Monitor room called and stated that pt. Had a 18 run of VTACH.   Charley, NP notified.  Charley states that she will make sure that the patient has electrolyte labs ordered.  See vitals.  Pt. Doesn't have any complaints, but does have a non-productive cough.  Ritchie at bedside.  Will continue to observe patient the remainder of the shift.

## 2024-08-05 NOTE — ASSESSMENT & PLAN NOTE
Patient with Paroxysmal (<7 days) atrial fibrillation which is controlled.  Lopressor temporarily held due to hypotension.  [id number] Score: 4. Anticoagulation indicated. Anticoagulation done with Coumadin .  Telemetry monitoring.

## 2024-08-05 NOTE — PROGRESS NOTES
Pharmacy Consult Note: Warfarin     Serena Post 's Coumadin will be dosed and monitored by Pharmacy.      Target INR goal is 2.5-3.5.    INR   Date Value Ref Range Status   08/05/2024 2.0 (H) 0.8 - 1.2 Final     Comment:     Coumadin Therapy:  2.0 - 3.0 for INR for all indicators except mechanical heart valves  and antiphospholipid syndromes which should use 2.5 - 3.5.         Indication: MAVR, afib, history of DVT  Home dose: 3mg M,F; 1.25 mg all other days    Dose for Today: 3mg    Drug Interactions: APAP and dexamethasone increase risk of bleeding    PT/INR will be monitored daily. Dose adjustments will be made accordingly.      Thank you for allowing us to participate in this patient's care.     Sabiha Villa, PharmD 8/5/2024 8:34 AM

## 2024-08-05 NOTE — PLAN OF CARE
A206/A206 NITHIN Post is a 84 y.o.female admitted on 8/1/2024 for Acute on chronic systolic CHF (congestive heart failure)   Code Status: Full Code MRN: [id number]   Review of patient's allergies indicates:   Allergen Reactions    Amlodipine Other (See Comments)     Not sure    Chlorthalidone Other (See Comments)     Not sure    Clonidine Other (See Comments)     Not sure    Codeine Other (See Comments)     Not sure    Escitalopram Other (See Comments)     Not sure    Lisinopril Other (See Comments)     Not sure    Losartan Other (See Comments)     Not sure    Meperidine Other (See Comments)     Not sure    Methadone Other (See Comments)     Not sure      Morphine Other (See Comments)     Not sure    Nebivolol Other (See Comments)     Not sure        Nitrofurantoin Other (See Comments)     Not sure        Omeprazole Other (See Comments)     Not sure      Pravastatin Other (See Comments)     Not sure      Propoxyphene Other (See Comments)     Not sure      Sulfamethoxazole-trimethoprim Other (See Comments)     Not sure         Past Medical History:   Diagnosis Date    A-fib     Anticoagulant long-term use     Aortic valve disease     Cancer     brain tumor, 10 years ago    Cardiomyopathy     CHF (congestive heart failure)     COVID-19 7/23/2022    Hx of heart valve replacement with mechanical valve     Hyperlipidemia     Hypertension     Pacemaker     Pacemaker     Sepsis 7/23/2022    Stroke     2007, residual weakness      PRN meds    acetaminophen, 650 mg, Q6H PRN  hydrALAZINE, 10 mg, Q6H PRN  ondansetron, 4 mg, Q6H PRN  sodium chloride 0.9%, 10 mL, PRN      Chart check completed. Will continue plan of care.      Orientation: disoriented to, time  [place] Coma Scale Score: 15     Lead Monitored: Lead II Rhythm: normal sinus rhythm Frequency/Ectopy: PACs, frequent, PVCs  Cardiac/Telemetry Box Number: [id number]  VTE Required Core Measure: (SCDs) Sequential compression device initiated/maintained Last Bowel  Movement: 08/02/24  Diet Minced & Moist (IDDSI Level 5) Low Sodium,2gm; Fluid - 1500mL; Standard Tray  Voiding Characteristics: incontinence  Jacobo Score: 13  Fall Risk Score: 17  Accucheck []   Freq?      Lines/Drains/Airways       Peripheral Intravenous Line  Duration                  Peripheral IV - Single Lumen 08/01/24 1720 20 G 1 in No Left;Posterior Hand 4 days                       Problem: Adult Inpatient Plan of Care  Goal: Plan of Care Review  Outcome: Progressing  Goal: Patient-Specific Goal (Individualized)  Outcome: Progressing  Goal: Absence of Hospital-Acquired Illness or Injury  Outcome: Progressing  Goal: Optimal Comfort and Wellbeing  Outcome: Progressing  Goal: Readiness for Transition of Care  Outcome: Progressing     Problem: Wound  Goal: Optimal Coping  Outcome: Progressing  Goal: Optimal Functional Ability  Outcome: Progressing  Goal: Absence of Infection Signs and Symptoms  Outcome: Progressing  Goal: Improved Oral Intake  Outcome: Progressing  Goal: Optimal Pain Control and Function  Outcome: Progressing  Goal: Skin Health and Integrity  Outcome: Progressing  Goal: Optimal Wound Healing  Outcome: Progressing     Problem: Skin Injury Risk Increased  Goal: Skin Health and Integrity  Outcome: Progressing

## 2024-08-05 NOTE — PT/OT/SLP PROGRESS
Speech Language Pathology Treatment    Patient Name:  Serena Post   MRN:  [id number]  Admitting Diagnosis: Acute on chronic systolic CHF (congestive heart failure)    Recommendations:                 General Recommendations:  Dysphagia therapy and Cognitive-linguistic therapy  Diet recommendations:  Minced & Moist Diet - IDDSI Level 5, Liquid Diet Level: Thin liquids - IDDSI Level 0   Aspiration Precautions: 1 bite/sip at a time, Avoid talking while eating, Eliminate distractions, Feed only when awake/alert, Frequent oral care, HOB to 90 degrees, Small bites/sips, and Standard aspiration precautions   General Precautions: Standard, aspiration  Communication strategies:  provide increased time to answer    Assessment:     Serena Post is a 84 y.o. female with a PMHx of aortic valve replacement (mechanical valve in 2007), paroxysmal atrial fibrillation, long-term oral anticoagulation with Coumadin, [employer] permanent pacemaker 2009, chronic systolic CHF, chronic hypoxic respiratory failure (2-1/2 L nasal cannula at home), hyperlipidemia, hypertension, DVT, rheumatoid arthritis, CVA, intracranial tumor, ascending aortic aneurysm, obesity, COPD, arteriosclerotic cardiovascular disease, and chronic kidney disease stage IIIA who was sent by home health for shortness a breath and positive COVID test. She presents w improvement in nonproductive coughing/labored breathing and was able to participate in bedside PO trials for swallow assessment and diet recommendations. Pt w no overt s/s of aspiration appreciated throughout, but does present w prolonged and inefficient mastication s/t generalized OM weakness. She is recommended for a diet of minced and moist solids and thin liquids (IDDSI 5/0) w standard aspiration precautions and one bite/sip at a time. ST will follow-up to assess tolerance and continue to assess cognition.     Subjective     Pt seen sitting up in bed eating some of her lunch tray during  ST  Patient goals: none stated     Pain/Comfort:  Pain Rating 1: 0/10  Pain Rating Post-Intervention 1: 0/10  Pain Rating 2: 0/10  Pain Rating Post-Intervention 2: 0/10    Objective:     Has the patient been evaluated by SLP for swallowing?   Yes  Keep patient NPO? No   Current Respiratory Status:        Clinical Swallow Examination:   Of note, patient self-fed/was fed by SLP throughout evaluation. Patient presented with:     CONSISTENCY  NOTES   THIN (IDDSI 0) Cup/straw sips   No overt s/s of aspiration/dysphagia appreciated   PUREE (IDDSI 4/Extremely Thick)   TSP/TBSP bites of applesauce  No overt s/s of aspiration/dysphagia appreciated   SOLID (IDDSI 7/Regular) Bite of Angélica Doone cookie   No overt s/s of aspiration appreciated. Marked prolonged mastication appreciated w complete recollection. Very small bite taken       Thickened liquids were not used in this assessment. Chelo (2018) reported that thickened liquids have no sound evidence at reducing the risk of pneumonia in patients with dysphagia and can cause harm by increasing their risk of dehydration. It also presents an increased risk of UTI, electrolyte imbalance, constipation, fecal impaction, cognitive impairment, functional decline and even death (Langmore, 2002; Nini, 2016).  Thickened liquids are associated with risks including dehydration, increased pharyngeal residue, potential interference with medication absorption, and decreased quality of life (Dalia, 2013). Thickened liquids are also more likely to be silently aspirated than thin liquids (Andrew et al., 2018). This supports the assertion that we should confirm a patient requires thickened liquids with an instrumental swallow study prior to recommending them.    References:   Dalia WELLER (2013). Thickening agents used for dysphagia management: Effect on bioavailability of water, medication and feelings of satiety. Nutrition Journal, 12, 54.  "https://doi.org/10.1186/1912-4261-82-54    NITHIN Morales, MARGY Rizzo, SANDRA Dickerson, & SYED Valle. (2018). Cough response to aspiration in thin and thick fluids during FEES in hospitalized inpatients. International journal of language & communication disorders, 53(5), 909-918. https://doi.org/10.1111/5089-0562.72229    INTERPRETATION AND RISK ASSESSMENT:  Clinical swallow evaluation (CSE) revealed oral phase characterized by lingual, labial, and buccal strength and range of motion decreased for lip closure, bolus preparation and propulsion. The patient had no anterior loss of the bolus with complete closure of the lips around the utensils. No residue remained in the oral cavity following the swallow. Patient without overt clinical signs/symptoms of aspiration on any PO trials given. Patient presents with a possibly inefficient swallow as indicated by marked prolonged mastication of small bite of cookie. Contributing risk factors for dysphagia include cognitive deficits and deficits in respiratory-swallow coordination. Patient with increased risk for silent aspiration given potential sensory deficits related to COPD.    Clinical signs of oral dysphagia, likely Chronic. Swallowing prognosis is Fair. Instrumental swallow study is not indicated to assess the swallowing physiology and rule out aspiration of various consistencies. Pt reported that she usually eats "soft foods" at home. Pt is recommended for a diet of minced and moist solids and thin liquids (IDDSI 5/0) w single/small bites/sips and standard aspiration precautions.     Goals:   Multidisciplinary Problems       SLP Goals          Problem: SLP    Goal Priority Disciplines Outcome   SLP Goal     SLP    Description: Patient will tolerate po trials for appropriate diet recommendations- MET  TPW safely consume least restrictive PO diet.                       Plan:     Patient to be seen:  2 x/week, 3 x/week   Plan of Care expires:  08/11/24  Plan of Care reviewed " with:  patient   SLP Follow-Up:  Yes       Discharge recommendations:  Moderate Intensity Therapy   Barriers to Discharge:  None    Time Tracking:     SLP Treatment Date:   08/05/24  Speech Start Time:  1220  Speech Stop Time:  1244     Speech Total Time (min):  24 min    Billable Minutes: Treatment Swallowing Dysfunction 24    Nilda West MA, PL-SLP, CF-SLP  Speech Language Pathologist  08/05/2024

## 2024-08-05 NOTE — PROGRESS NOTES
[facility] Medicine  Progress Note    Patient Name: Serena Post  MRN: [id number]  Patient Class: IP- Inpatient   Admission Date: 8/1/2024  Length of Stay: 4 days  Attending Physician: Becky Ceron MD  Primary Care Provider: Evangelina Olivares MD        Subjective:     Principal Problem:Acute on chronic systolic CHF (congestive heart failure)        HPI:  84-year-old white woman with history of aortic valve replacement (mechanical valve in 2007), paroxysmal atrial fibrillation, long-term oral anticoagulation with Coumadin, [employer] permanent pacemaker 2009, chronic systolic CHF (echocardiogram February 2024 with EF 35%), chronic hypoxic respiratory failure (2-1/2 L nasal cannula at home), hyperlipidemia, hypertension, DVT, rheumatoid arthritis, CVA, intracranial tumor, ascending aortic aneurysm, obesity, COPD, arteriosclerotic cardiovascular disease, and chronic kidney disease stage IIIA who was sent by home health for shortness a breath and positive COVID test.  Patient reports shortness of breath over the last several days, moderate severity, constant, no associated chest pain.  She has associated cough with white to yellow sputum production.  She denies nausea or vomiting.  She denies fevers or chills however she does have low-grade temperature in our emergency department up to 99.9.    Last hospital admit 2/22-02/28/2024 for acute on chronic systolic CHF exacerbation and mechanical valve dysfunction with plans for ORI in the outpatient setting to further evaluate.  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    Overview/Hospital Course:  Patient is a 34 old lady with a dysfunctional aortic valve, PAF, pacemaker, chronic systolic heart failure, chronic hypoxic respiratory failure on 2.5 L nasal cannula at home, DVT, rheumatoid arthritis, CVA, COPD, CKD 3 who presented after being seen by  home health  for shortness a breath and positive COVID test.  He was admitted started on diuresis and regular bronchodilator nebs as well as home O2.  Patient is feeling much better now.  She was continued on her COVID isolation as well as her Decadron.  She was seen in consultation by Pulmonary Medicine who agreed with diuresis as well as continuing her broncho dilators pulmonary hygiene  She was seen by consultation by Cardiology who recommended continue diuresis, pulmonary hygiene and follow up with Dr. Perkins as outpatient.  Patient with continued cough with food.  We will have ST evaluate patient  In the past she has been altered to pureed diet with aspiration precautions.  Likely DC in a.m.  8/5 speech reevaluated patient with upgrade of dietary dysphagia recommendations. No signs or symptoms of aspiration. Patient at baseline home oxygen requirements. Anticipate discharge home with sitters. Son updated    Interval History: See hospital course for today      Review of Systems   HENT:  Positive for trouble swallowing.    Respiratory:  Positive for cough. Negative for shortness of breath.    Gastrointestinal:  Negative for nausea and vomiting.   Neurological:  Positive for weakness.     Objective:     Vital Signs (Most Recent):  Temp: 97.6 °F (36.4 °C) (08/05/24 1124)  Pulse: 89 (08/05/24 1300)  Resp: 20 (08/05/24 1300)  BP: 128/61 (08/05/24 1124)  SpO2: (!) 92 % (08/05/24 1300) Vital Signs (24h Range):  Temp:  [97.5 °F (36.4 °C)-98 °F (36.7 °C)] 97.6 °F (36.4 °C)  Pulse:  [] 89  Resp:  [18-20] 20  SpO2:  [91 %-95 %] 92 %  BP: ()/(56-72) 128/61     Weight: 90.3 kg (199 lb 1.2 oz)  Body mass index is 34.17 kg/m².    Intake/Output Summary (Last 24 hours) at 8/5/2024 1440  Last data filed at 8/4/2024 1451  Gross per 24 hour   Intake --   Output 300 ml   Net -300 ml         Physical Exam  Vitals and nursing note reviewed.   Constitutional:       General: She is awake. She is not in acute distress.      Appearance: She is ill-appearing. She is not toxic-appearing.      Interventions: Nasal cannula in place.   HENT:      Head: Normocephalic and atraumatic.   Cardiovascular:      Rate and Rhythm: Normal rate.   Pulmonary:      Effort: No tachypnea or respiratory distress.      Breath sounds: Decreased air movement present. Wheezing and rhonchi present.   Abdominal:      Palpations: Abdomen is soft.      Tenderness: There is no abdominal tenderness.   Skin:     General: Skin is warm.      Coloration: Skin is pale.   Neurological:      Motor: Weakness present.   Psychiatric:         Behavior: Behavior is cooperative.             Significant Labs: All pertinent labs within the past 24 hours have been reviewed.  CBC:   Recent Labs   Lab 08/05/24 0435   WBC 5.98   HGB 12.8   HCT 40.3   *     CMP:   Recent Labs   Lab 08/04/24  0546 08/05/24 0435    138   K 3.7 4.0    102   CO2 28 27    104   BUN 23 23   CREATININE 1.3 1.2   CALCIUM 8.5* 8.8   ANIONGAP 8 9     Coagulation:   Recent Labs   Lab 08/05/24 0435   INR 2.0*     Respiratory Culture: normal respiratory doc; rare wbc, rare positive cocci, rare negative rods    Significant Imaging: I have reviewed all pertinent imaging results/findings within the past 24 hours.  CXR: I have reviewed all pertinent results/findings within the past 24 hours and my personal findings are:  new patchy infiltrate right upper lobe    Assessment/Plan:      * Acute on chronic systolic CHF (congestive heart failure)  Patient is identified as having Systolic (HFrEF) heart failure that is Acute on chronic. CHF is currently uncontrolled due to Pulmonary edema/pleural effusion on CXR. Latest ECHO performed and demonstrates- Results for orders placed during the hospital encounter of 02/22/24    Echo    Interpretation Summary    Left Ventricle: The left ventricle is normal in size. Normal wall thickness. There is concentric hypertrophy. Global hypokinesis present. There  is moderately reduced systolic function. Ejection fraction by visual approximation is 35%.    Right Ventricle: Normal right ventricular cavity size. Systolic function is borderline low.    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 There is moderate aortic regurgitation.  Consider ORI if clinically needed to evaluate mechanical aortic valve further.    Mitral Valve: There is mild bileaflet sclerosis. There is mild regurgitation.    Pulmonary Artery: There is moderate pulmonary hypertension. The estimated pulmonary artery systolic pressure is 49 mmHg.    IVC/SVC: Normal venous pressure at 3 mmHg.    Ascending aorta is moderately dilated measuring 4.45 cm.  . Continue Furosemide and monitor clinical status closely. Monitor on telemetry. Patient is on CHF pathway.  Monitor strict Is&Os and daily weights.  Place on fluid restriction of 1.5 L. Cardiology has been consulted. Continue to stress to patient importance of self efficacy and  on diet for CHF. Last BNP reviewed- and noted below   Recent Labs   Lab 08/04/24  0546   BNP 3,796*     Continue IV diuresis as blood pressure tolerates, optimize medical therapy, add beta blockers as blood pressure tolerates    Aspiration syndrome  Patient continues to have trouble swallowing  Cough with any liquids today  ST evaluation upgraded dysphagia diet  Patient and son have not want a feeding tube  Younker at bedside  Aspiration precautions       Hypokalemia  Patient's most recent potassium results are listed below.   Recent Labs     08/03/24  0428 08/04/24  0546 08/05/24  0435   K 3.5 3.7 4.0     Plan  - Replete potassium per protocol  - Monitor potassium Daily  - Patient's hypokalemia is stable  - give potassium bicarbonate 40 mEq p.o. followed by potassium chloride 20 mEq p.o. b.i.d. while receiving IV Lasix diuresis  -magnesium level 1.9    Hypotension  -hold Lopressor (generally with hypertension)  -monitor blood  "pressure closely with patient receiving IV Lasix diuresis      COPD without exacerbation  Patient's COPD is controlled currently.  Patient is currently off COPD Pathway.  Continue scheduled DuoNebs q.6 hours and O2 supplementation.  Continuous pulse oximetry monitor and monitor respiratory status closely.     Chronic hypoxic respiratory failure  Patient with Hypoxic Respiratory failure which is Chronic.  she is on home oxygen at 2.5 LPM. Supplemental oxygen was provided and noted-  currently requiring O2 via 3 L nasal cannula  -CXR with "Chronic or recurrent left effusion with adjacent atelectasis/consolidation.  Pneumonia with a parapneumonic effusion could have this appearance in the right clinical setting.  Less likely could these changes be related to CHF. Negative for acute process otherwise."  -white blood cell count 6.28, lactic acid level 1.6, BNP greater than 4900, T-max 99.9°, COVID-19 positive  .   Signs/symptoms of respiratory failure include- tachypnea and respiratory distress. Contributing diagnoses includes - CHF, COPD, and COVID-19  Labs and images were reviewed. Patient Has recent ABG, which has been reviewed. Will treat underlying causes and adjust management of respiratory failure as follows:  -continue oral dexamethasone for COVID-19 infection  -continuous pulse oximetry monitoring, O2 supplementation, incentive spirometry, DuoNebs q.6 hours  -pulmonary consult appreciated  -continue supplemental oxygen at 2.5 L her usual home dose    Stage 3a chronic kidney disease  Creatine stable for now. BMP reviewed- noted Estimated Creatinine Clearance: 39.3 mL/min (based on SCr of 1.2 mg/dL). according to latest data. Based on current GFR, CKD stage is stage 3 - GFR 30-59.  Monitor UOP and serial BMP and adjust therapy as needed. Renally dose meds. Avoid nephrotoxic medications and procedures.    Stable     COVID-19  Patient is identified as High risk for severe complications of COVID 19 based on COVID " risk score of 6   Initiate standard COVID protocols; COVID-19 testing ,Infection Control notification  and isolation- respiratory, contact and droplet per protocol    Diagnostics: CBC, CMP, Ferritin, CRP, and Portable CXR    Management: Initiate targeted therapy with Dexamethasone PO/IV 6mg daily x10 days, Maintain oxygen saturations 92-96% via Nasal Cannula 2 LPM and monitor with continuous/intermittent pulse oximetry. , and Continuous cardiac monitoring.   On baseline supplemental oxygen requirements  Continue steroids and ics    Advance Care Planning Current advance care plan has been discussed with patient/family/POA and patient currently wishes Full Code.       Paroxysmal atrial fibrillation  Patient with Paroxysmal (<7 days) atrial fibrillation which is controlled.  Lopressor temporarily held due to hypotension.  [id number] Score: 4. Anticoagulation indicated. Anticoagulation done with Coumadin .  Telemetry monitoring.    Pacemaker  Telemetry monitoring  Vtach run last night  Keep k>4 and mg>2      H/O mechanical aortic valve replacement  -ORI 03/18/2024 with moderate prosthetic valve stenosis and moderate prosthetic valve regurgitation  -continue Coumadin (pharmacy to monitor and dose)  Inr in goal range 2-3  Follow up outpatient with her usual cardiologist Dr. Perkins        VTE Risk Mitigation (From admission, onward)           Ordered     warfarin (COUMADIN) split tablet 1.25 mg  Once per day on Sunday Tuesday Wednesday Thursday Saturday 08/04/24 1221     warfarin tablet 3 mg  Once per day on Monday Friday 08/03/24 1542     IP VTE HIGH RISK PATIENT  Once         08/01/24 2312     Place sequential compression device  Until discontinued         08/01/24 2312                    Discharge Planning   RENE:      Code Status: Full Code   Is the patient medically ready for discharge?:     Reason for patient still in hospital (select all that apply): Patient trending condition, Laboratory test,  Treatment, Consult recommendations, PT / OT recommendations, and Pending disposition  Discharge Plan A: Home Health                  Becky Ceron MD  Department of Hospital Medicine   [facility] ([facility])

## 2024-08-05 NOTE — SUBJECTIVE & OBJECTIVE
Interval History: See hospital course for today      Review of Systems   HENT:  Positive for trouble swallowing.    Respiratory:  Positive for cough. Negative for shortness of breath.    Gastrointestinal:  Negative for nausea and vomiting.   Neurological:  Positive for weakness.     Objective:     Vital Signs (Most Recent):  Temp: 97.6 °F (36.4 °C) (08/05/24 1124)  Pulse: 89 (08/05/24 1300)  Resp: 20 (08/05/24 1300)  BP: 128/61 (08/05/24 1124)  SpO2: (!) 92 % (08/05/24 1300) Vital Signs (24h Range):  Temp:  [97.5 °F (36.4 °C)-98 °F (36.7 °C)] 97.6 °F (36.4 °C)  Pulse:  [] 89  Resp:  [18-20] 20  SpO2:  [91 %-95 %] 92 %  BP: ()/(56-72) 128/61     Weight: 90.3 kg (199 lb 1.2 oz)  Body mass index is 34.17 kg/m².    Intake/Output Summary (Last 24 hours) at 8/5/2024 1440  Last data filed at 8/4/2024 1451  Gross per 24 hour   Intake --   Output 300 ml   Net -300 ml         Physical Exam  Vitals and nursing note reviewed.   Constitutional:       General: She is awake. She is not in acute distress.     Appearance: She is ill-appearing. She is not toxic-appearing.      Interventions: Nasal cannula in place.   HENT:      Head: Normocephalic and atraumatic.   Cardiovascular:      Rate and Rhythm: Normal rate.   Pulmonary:      Effort: No tachypnea or respiratory distress.      Breath sounds: Decreased air movement present. Wheezing and rhonchi present.   Abdominal:      Palpations: Abdomen is soft.      Tenderness: There is no abdominal tenderness.   Skin:     General: Skin is warm.      Coloration: Skin is pale.   Neurological:      Motor: Weakness present.   Psychiatric:         Behavior: Behavior is cooperative.             Significant Labs: All pertinent labs within the past 24 hours have been reviewed.  CBC:   Recent Labs   Lab 08/05/24  0435   WBC 5.98   HGB 12.8   HCT 40.3   *     CMP:   Recent Labs   Lab 08/04/24  0546 08/05/24  0435    138   K 3.7 4.0    102   CO2 28 27    104    BUN 23 23   CREATININE 1.3 1.2   CALCIUM 8.5* 8.8   ANIONGAP 8 9     Coagulation:   Recent Labs   Lab 08/05/24  0435   INR 2.0*     Respiratory Culture: normal respiratory doc; rare wbc, rare positive cocci, rare negative rods    Significant Imaging: I have reviewed all pertinent imaging results/findings within the past 24 hours.  CXR: I have reviewed all pertinent results/findings within the past 24 hours and my personal findings are:  new patchy infiltrate right upper lobe

## 2024-08-06 VITALS
SYSTOLIC BLOOD PRESSURE: 111 MMHG | OXYGEN SATURATION: 91 % | BODY MASS INDEX: 33.95 KG/M2 | HEIGHT: 64 IN | DIASTOLIC BLOOD PRESSURE: 62 MMHG | HEART RATE: 89 BPM | WEIGHT: 198.88 LBS | RESPIRATION RATE: 22 BRPM | TEMPERATURE: 98 F

## 2024-08-06 PROBLEM — E87.6 HYPOKALEMIA: Status: RESOLVED | Noted: 2024-08-01 | Resolved: 2024-08-06

## 2024-08-06 LAB
ANION GAP SERPL CALC-SCNC: 11 MMOL/L (ref 8–16)
BUN SERPL-MCNC: 22 MG/DL (ref 8–23)
CALCIUM SERPL-MCNC: 9.2 MG/DL (ref 8.7–10.5)
CHLORIDE SERPL-SCNC: 102 MMOL/L (ref 95–110)
CO2 SERPL-SCNC: 24 MMOL/L (ref 23–29)
CREAT SERPL-MCNC: 1.1 MG/DL (ref 0.5–1.4)
EST. GFR  (NO RACE VARIABLE): 50 ML/MIN/1.73 M^2
GLUCOSE SERPL-MCNC: 109 MG/DL (ref 70–110)
INR PPP: 2.3 (ref 0.8–1.2)
POTASSIUM SERPL-SCNC: 4.7 MMOL/L (ref 3.5–5.1)
PROTHROMBIN TIME: 24.8 SEC (ref 9–12.5)
SODIUM SERPL-SCNC: 137 MMOL/L (ref 136–145)

## 2024-08-06 PROCEDURE — 94761 N-INVAS EAR/PLS OXIMETRY MLT: CPT

## 2024-08-06 PROCEDURE — 27000221 HC OXYGEN, UP TO 24 HOURS

## 2024-08-06 PROCEDURE — 63600175 PHARM REV CODE 636 W HCPCS: Performed by: INTERNAL MEDICINE

## 2024-08-06 PROCEDURE — 25000003 PHARM REV CODE 250: Performed by: INTERNAL MEDICINE

## 2024-08-06 PROCEDURE — 94640 AIRWAY INHALATION TREATMENT: CPT

## 2024-08-06 PROCEDURE — 25000242 PHARM REV CODE 250 ALT 637 W/ HCPCS: Performed by: INTERNAL MEDICINE

## 2024-08-06 PROCEDURE — 85610 PROTHROMBIN TIME: CPT | Performed by: INTERNAL MEDICINE

## 2024-08-06 PROCEDURE — 99900035 HC TECH TIME PER 15 MIN (STAT)

## 2024-08-06 PROCEDURE — 80048 BASIC METABOLIC PNL TOTAL CA: CPT | Performed by: INTERNAL MEDICINE

## 2024-08-06 PROCEDURE — 36415 COLL VENOUS BLD VENIPUNCTURE: CPT | Performed by: INTERNAL MEDICINE

## 2024-08-06 RX ORDER — DEXAMETHASONE 2 MG/1
6 TABLET ORAL DAILY
Qty: 12 TABLET | Refills: 0 | Status: ON HOLD | OUTPATIENT
Start: 2024-08-07 | End: 2024-08-16 | Stop reason: HOSPADM

## 2024-08-06 RX ORDER — METOPROLOL TARTRATE 25 MG/1
25 TABLET, FILM COATED ORAL 2 TIMES DAILY
Status: ON HOLD
Start: 2024-08-09 | End: 2024-08-16 | Stop reason: HOSPADM

## 2024-08-06 RX ORDER — FUROSEMIDE 20 MG/1
20 TABLET ORAL EVERY MORNING
Status: ON HOLD
Start: 2024-08-08 | End: 2024-08-16

## 2024-08-06 RX ORDER — FUROSEMIDE 20 MG/1
20 TABLET ORAL EVERY MORNING
Start: 2024-08-08 | End: 2024-08-06

## 2024-08-06 RX ORDER — METOPROLOL TARTRATE 25 MG/1
25 TABLET, FILM COATED ORAL 2 TIMES DAILY
Start: 2024-08-09 | End: 2024-08-06

## 2024-08-06 RX ADMIN — BUDESONIDE INHALATION 0.5 MG: 0.5 SUSPENSION RESPIRATORY (INHALATION) at 07:08

## 2024-08-06 RX ADMIN — FLUTICASONE PROPIONATE 100 MCG: 50 SPRAY, METERED NASAL at 08:08

## 2024-08-06 RX ADMIN — IPRATROPIUM BROMIDE AND ALBUTEROL SULFATE 3 ML: 2.5; .5 SOLUTION RESPIRATORY (INHALATION) at 01:08

## 2024-08-06 RX ADMIN — ACETAMINOPHEN 650 MG: 325 TABLET ORAL at 03:08

## 2024-08-06 RX ADMIN — IPRATROPIUM BROMIDE AND ALBUTEROL SULFATE 3 ML: 2.5; .5 SOLUTION RESPIRATORY (INHALATION) at 07:08

## 2024-08-06 RX ADMIN — MUPIROCIN: 20 OINTMENT TOPICAL at 08:08

## 2024-08-06 RX ADMIN — DEXAMETHASONE 6 MG: 4 TABLET ORAL at 08:08

## 2024-08-06 RX ADMIN — IPRATROPIUM BROMIDE AND ALBUTEROL SULFATE 3 ML: 2.5; .5 SOLUTION RESPIRATORY (INHALATION) at 12:08

## 2024-08-06 RX ADMIN — POTASSIUM CHLORIDE 20 MEQ: 1500 TABLET, EXTENDED RELEASE ORAL at 08:08

## 2024-08-06 NOTE — NURSING
Discharge instructions received and reviewed with pt at bedside.  Pt voiced understanding and all questions answered to satisfaction.  Stressed importance to making and keeping all follow up appointments.  Medications brought to bedside.  Tele monitor removed and brought to monitor tech.  IV d/c'd with tip intact, pressure dressing applied.  Pt is awaiting call from son.

## 2024-08-06 NOTE — PLAN OF CARE
POC reviewed with pt. Pt verbalizes understanding of POC. No questions at this time.  AAOx3.disoriented to time.NADN.  NSR on cardiac monitor.  Pt remains free of falls.  Pt is on low Na diet  Isolation precautions still in place.  Pt have a non productive cough. Ritchie @BS  No complaints at this time.  Safety measures in place. Will continue to monitor.  Informed pt to call for assistance before getting up. Pt verbalizes understanding.  Hourly rounding and chart check complete.     A206/A206 NITIHN Post is a 84 y.o.female admitted on 8/1/2024 for Acute on chronic systolic CHF (congestive heart failure)   Code Status: Full Code MRN: [id number]   Review of patient's allergies indicates:   Allergen Reactions    Amlodipine Other (See Comments)     Not sure    Chlorthalidone Other (See Comments)     Not sure    Clonidine Other (See Comments)     Not sure    Codeine Other (See Comments)     Not sure    Escitalopram Other (See Comments)     Not sure    Lisinopril Other (See Comments)     Not sure    Losartan Other (See Comments)     Not sure    Meperidine Other (See Comments)     Not sure    Methadone Other (See Comments)     Not sure      Morphine Other (See Comments)     Not sure    Nebivolol Other (See Comments)     Not sure        Nitrofurantoin Other (See Comments)     Not sure        Omeprazole Other (See Comments)     Not sure      Pravastatin Other (See Comments)     Not sure      Propoxyphene Other (See Comments)     Not sure      Sulfamethoxazole-trimethoprim Other (See Comments)     Not sure         Past Medical History:   Diagnosis Date    A-fib     Anticoagulant long-term use     Aortic valve disease     Cancer     brain tumor, 10 years ago    Cardiomyopathy     CHF (congestive heart failure)     COVID-19 7/23/2022    Hx of heart valve replacement with mechanical valve     Hyperlipidemia     Hypertension     Pacemaker     Pacemaker     Sepsis 7/23/2022    Stroke     2007, residual weakness       PRN meds    acetaminophen, 650 mg, Q6H PRN  hydrALAZINE, 10 mg, Q6H PRN  ondansetron, 4 mg, Q6H PRN  sodium chloride 0.9%, 10 mL, PRN      Chart check completed. Will continue plan of care.      Orientation: disoriented to, time  [place] Coma Scale Score: 15     Lead Monitored: Lead II Rhythm: normal sinus rhythm Frequency/Ectopy: PVCs  Cardiac/Telemetry Box Number: [id number]  VTE Required Core Measure: (SCDs) Sequential compression device initiated/maintained Last Bowel Movement: 08/02/24  Diet Minced & Moist (IDDSI Level 5) Low Sodium,2gm; Fluid - 1500mL; Standard Tray  Voiding Characteristics: incontinence  Jacobo Score: 13  Fall Risk Score: 17  Accucheck []   Freq?      Lines/Drains/Airways       Peripheral Intravenous Line  Duration                  Peripheral IV - Single Lumen 08/01/24 1720 20 G 1 in No Left;Posterior Hand 4 days

## 2024-08-06 NOTE — PROGRESS NOTES
Pharmacy Consult Note: Warfarin     Serena Post 's Coumadin will be dosed and monitored by Pharmacy.      Target INR goal is 2.5-3.5.    INR   Date Value Ref Range Status   08/06/2024 2.3 (H) 0.8 - 1.2 Final     Comment:     Coumadin Therapy:  2.0 - 3.0 for INR for all indicators except mechanical heart valves  and antiphospholipid syndromes which should use 2.5 - 3.5.         Indication: MAVR, afib, history of DVT  Home dose: 3 mg MF, 1.25 mg all other days    Dose for Today: 1.25 mg    Drug Interactions: APAP and dexamethasone increase risk of bleeding    PT/INR will be monitored daily. Dose adjustments will be made accordingly.      Thank you for allowing us to participate in this patient's care.   Sabiha Villa, PharmD 8/6/2024 8:07 AM

## 2024-08-06 NOTE — PLAN OF CARE
[facility] - Telemetry (Hospital)  Discharge Final Note    Primary Care Provider: Evangelina Olivares MD    Expected Discharge Date: 8/6/2024    Final Discharge Note (most recent)       Final Note - 08/06/24 1328          Final Note    Assessment Type Final Discharge Note (P)      Anticipated Discharge Disposition Home-Health Care Svc (P)         Post-Acute Status    Post-Acute Authorization Home Health (P)      Home Health Status Set-up Complete/Auth obtained (P)      Discharge Delays None known at this time (P)                      Important Message from Medicare  Important Message from Medicare regarding Discharge Appeal Rights: Explained to patient/caregiver, Given to patient/caregiver, Signed/date by patient/caregiver     Date IMM was signed: 08/06/24  Time IMM was signed: 0813    Contact Info       Evangelina Olivares MD   Specialty: Family Medicine   Relationship: PCP - General    [address] [place] [id number]   Phone: [phone]       Next Steps: Schedule an appointment as soon as possible for a visit in 3 day(s)    Instructions: hospital follow up    Lydia Perkins MD   Specialty: Cardiology, Cardiovascular Disease    [address] [place] [address]  [address]  [facility] [address]   Phone: [phone]       Next Steps: Schedule an appointment as soon as possible for a visit in 1 week(s)    Instructions: hospital follow up

## 2024-08-06 NOTE — PLAN OF CARE
08/06/24 1323   Post-Acute Status   Post-Acute Authorization Home Health   Home Health Status Set-up Complete/Auth obtained   Discharge Plan   Discharge Plan A Home Health     Patient current with [facility].  Subsequent orders with referrals for  sent to [facility].

## 2024-08-06 NOTE — DISCHARGE SUMMARY
[place] [facility] ([facility] Medicine  Discharge Summary      Patient Name: Serena Post  MRN: [id number]  [facility]: [id number]  Patient Class: IP- Inpatient  Admission Date: 8/1/2024  Hospital Length of Stay: 5 days  Discharge Date and Time:  08/06/2024 12:16 PM  Attending Physician: Becky Ceron MD   Discharging Provider: Becky Ceron MD  Primary Care Provider: Evangelina Olivares MD    Primary Care Team: Networked reference to record PCT     HPI:   84-year-old white woman with history of aortic valve replacement (mechanical valve in 2007), paroxysmal atrial fibrillation, long-term oral anticoagulation with Coumadin, [employer] permanent pacemaker 2009, chronic systolic CHF (echocardiogram February 2024 with EF 35%), chronic hypoxic respiratory failure (2-1/2 L nasal cannula at home), hyperlipidemia, hypertension, DVT, rheumatoid arthritis, CVA, intracranial tumor, ascending aortic aneurysm, obesity, COPD, arteriosclerotic cardiovascular disease, and chronic kidney disease stage IIIA who was sent by home health for shortness a breath and positive COVID test.  Patient reports shortness of breath over the last several days, moderate severity, constant, no associated chest pain.  She has associated cough with white to yellow sputum production.  She denies nausea or vomiting.  She denies fevers or chills however she does have low-grade temperature in our emergency department up to 99.9.    Last hospital admit 2/22-02/28/2024 for acute on chronic systolic CHF exacerbation and mechanical valve dysfunction with plans for ORI in the outpatient setting to further evaluate.  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    * No surgery found *      Hospital Course:   Patient is a 34 old lady with a dysfunctional aortic valve, PAF, pacemaker, chronic systolic heart failure, chronic hypoxic respiratory failure on 2.5 L nasal  cannula at home, DVT, rheumatoid arthritis, CVA, COPD, CKD 3 who presented after being seen by home health  for shortness a breath and positive COVID test.  He was admitted started on diuresis and regular bronchodilator nebs as well as home O2.  Patient is feeling much better now.  She was continued on her COVID isolation as well as her Decadron.  She was seen in consultation by Pulmonary Medicine who agreed with diuresis as well as continuing her broncho dilators pulmonary hygiene  She was seen by consultation by Cardiology who recommended continue diuresis, pulmonary hygiene and follow up with Dr. Perkins as outpatient.  Patient with continued cough with food.  We will have ST evaluate patient  In the past she has been altered to pureed diet with aspiration precautions.  Likely DC in a.m.  8/5 speech reevaluated patient with upgrade of dietary dysphagia recommendations. No signs or symptoms of aspiration. Patient at baseline home oxygen requirements. Anticipate discharge home with sitters. Son updated    8/6  Stable overnight. No acute events overnight. Tolerating diet.     No acute distress. No respiratory distress on baseline supplemental oxygen requirements via nasal cannula. Rhonchi persists. Flat affect. Pale.     Will continue dexamethasone for covid. Homehealth resumed. Blood pressure medication(s) held on discharge. Defer to primary providers to resume.    Patient seen and evaluated by me. Patient was determined to be suitable for discharge. Patient deemed stable for discharge to home with homehealth physical/occupational therapy resumed and nurse practitioner to visit home program.            Goals of Care Treatment Preferences:  Code Status: Full Code         Consults:   Consults (From admission, onward)          Status Ordering Provider     Pharmacy to dose Warfarin consult  Once        Provider:  (Not yet assigned)    Acknowledged KILO MEDEL A     Inpatient consult to Cardiology  Once         Provider:  Parish Blake MD    Completed KILO MEDEL A     Inpatient consult to Pulmonology  Once        Provider:  Сергей Naidu MD    Completed KILO MEDEL A     Inpatient consult to Social Work/Case Management  Once        Provider:  (Not yet assigned)    Completed KILO MEDEL     Inpatient consult to Registered Dietitian/Nutritionist  Once        Provider:  (Not yet assigned)    Completed KILO MEDEL            No new Assessment & Plan notes have been filed under this hospital service since the last note was generated.  Service: Hospital Medicine    Final Active Diagnoses:    Diagnosis Date Noted POA    PRINCIPAL PROBLEM:  Acute on chronic systolic CHF (congestive heart failure) [I50.23] 08/01/2024 Yes    Aspiration syndrome [T17.900A] 08/04/2024 Yes    Chronic hypoxic respiratory failure [J96.11] 08/01/2024 Yes    COPD without exacerbation [J44.9] 08/01/2024 Yes    Hypotension [I95.9] 08/01/2024 Yes    Stage 3a chronic kidney disease [N18.31] 11/09/2022 Yes    COVID-19 [U07.1] 07/23/2022 Yes    Paroxysmal atrial fibrillation [I48.0] 09/26/2019 Yes    Pacemaker [Z95.0] 09/26/2019 Yes    H/O mechanical aortic valve replacement [Z95.2] 09/26/2019 Not Applicable      Problems Resolved During this Admission:    Diagnosis Date Noted Date Resolved POA    Hypokalemia [E87.6] 08/01/2024 08/06/2024 Yes       Discharged Condition: stable    Disposition:     Follow Up:   Follow-up Information       Evangelina Olivares MD. Schedule an appointment as soon as possible for a visit in 3 day(s).    Specialty: Family Medicine  Why: hospital follow up  Contact information:  [address] [facility] [address]  [phone]               Lydia Perkins MD. Schedule an appointment as soon as possible for a visit in 1 week(s).    Specialties: Cardiology, Cardiovascular Disease  Why: hospital follow up  Contact information:  [address] [facility]  [address]  [facility]  [address]  [phone]                           Patient Instructions:      Ambulatory referral/consult to [facility] Medical     Diet Dysphagia Soft     Diet Cardiac     SUBSEQUENT HOME HEALTH ORDERS   Order Comments: Resume home health     Order Specific Question Answer Comments   What Home Health Agency is the patient currently using? Other/External      SUBSEQUENT HOME HEALTH ORDERS     Order Specific Question Answer Comments   What Home Health Agency is the patient currently using? Other/External      Activity as tolerated       Significant Diagnostic Studies: Labs: CMP   Recent Labs   Lab 08/05/24  0435 08/06/24  0436    137   K 4.0 4.7    102   CO2 27 24    109   BUN 23 22   CREATININE 1.2 1.1   CALCIUM 8.8 9.2   ANIONGAP 9 11   , CBC   Recent Labs   Lab 08/05/24  0435   WBC 5.98   HGB 12.8   HCT 40.3   *   , Troponin   Recent Labs   Lab 08/01/24  1459 08/01/24  2340   TROPONINI 0.088* 0.109*   , and All labs within the past 24 hours have been reviewed  Microbiology: Sputum Culture   Lab Results   Component Value Date    GSRESP >10 epithelial cells per low power field 08/02/2024    GSRESP Rare WBC's 08/02/2024    GSRESP Rare Gram positive cocci 08/02/2024    GSRESP Rare Gram negative rods 08/02/2024    RESPIRATORYC Normal respiratory doc 08/02/2024    RESPIRATORYC No S aureus or Pseudomonas isolated. 08/02/2024     Radiology: X-Ray: CXR: X-Ray Chest 1 View (CXR):   Results for orders placed or performed during the hospital encounter of 08/01/24   X-Ray Chest 1 View    Narrative    EXAMINATION:  XR CHEST 1 VIEW    CLINICAL HISTORY:  shortness of breath;    COMPARISON:  Studies dating back to March 11, 2020    FINDINGS:  Chronic or recurrent left effusion and adjacent atelectasis/consolidation when compared to the most recent study.  The lungs are otherwise free of new pulmonary opacities.  The cardiac silhouette size is enlarged.  The trachea is midline and the mediastinal  width is normal. Negative for right effusion or pneumothorax.  Pulmonary vasculature is normal. Negative for osseous abnormalities. Stable dual lead right subclavian pacemaker.  Median sternotomy wires and prosthetic valve device.  Tortuous aorta with calcifications of the aortic knob.  There are degenerative changes of the spine and both shoulder girdles.      Impression    1.  Chronic or recurrent left effusion with adjacent atelectasis/consolidation.  Pneumonia with a parapneumonic effusion could have this appearance in the right clinical setting.  Less likely could these changes be related to CHF.    2.  Negative for acute process otherwise.    3.  Stable findings as noted above.      Electronically signed by: Emery Arrieta MD  Date:    08/01/2024  Time:    16:47    and portable chest x-ray   Cardiac Graphics: Echocardiogram: Transthoracic echo (TTE) complete (Cupid Only):   Results for orders placed or performed during the hospital encounter of 02/22/24   Echo   Result Value Ref Range    BSA 2.03 m2    LVOT stroke volume 56.42 cm3    LVIDd 5.10 3.5 - 6.0 cm    LV Systolic Volume 90.21 mL    LV Systolic Volume Index 46.0 mL/m2    LVIDs 4.45 (A) 2.1 - 4.0 cm    LV Diastolic Volume 123.59 mL    LV Diastolic Volume Index 63.06 mL/m2    IVS 1.04 0.6 - 1.1 cm    LVOT diameter 1.91 cm    LVOT area 2.9 cm2    FS 13 (A) 28 - 44 %    Left Ventricle Relative Wall Thickness 0.47 cm    Posterior Wall 1.19 (A) 0.6 - 1.1 cm    LV mass 217.90 g    LV Mass Index 111 g/m2    MV Peak E Melo 1.07 m/s    TDI LATERAL 0.08 m/s    TDI SEPTAL 0.05 m/s    E/E' ratio 16.46 m/s    MV Peak A Melo 1.07 m/s    TR Max Melo 3.38 m/s    E/A ratio 1.00     IVRT 62.80 msec    E wave deceleration time 192.43 msec    LV SEPTAL E/E' RATIO 21.40 m/s    LV LATERAL E/E' RATIO 13.38 m/s    LVOT peak melo 0.83 m/s    Left Ventricular Outflow Tract Mean Velocity 0.58 cm/s    Left Ventricular Outflow Tract Mean Gradient 1.51 mmHg    RV mid diameter 3.15 cm     RVOT peak VTI 9.3 cm    TAPSE 2.05 cm    LA size 3.61 cm    Left Atrium Minor Axis 3.98 cm    Left Atrium Major Axis 6.58 cm    RA Major Axis 4.53 cm    AV regurgitation pressure 1/2 time [id number] ms    AR Max Melo 4.34 m/s    AV mean gradient 95 mmHg    AV peak gradient 97 mmHg    Ao peak melo 4.93 m/s    Ao .80 cm    LVOT peak VTI 19.70 cm    AV valve area 0.37 cm²    AV Velocity Ratio 0.17     AV index (prosthetic) 0.13     AAMIR by Velocity Ratio 0.48 cm²    MV stenosis pressure 1/2 time 55.80 ms    MV valve area p 1/2 method 3.94 cm2    Triscuspid Valve Regurgitation Peak Gradient 46 mmHg    PV mean gradient 1 mmHg    RVOT peak melo 0.49 m/s    Ao root annulus 2.98 cm    STJ 4.51 cm    Ascending aorta 4.45 cm    IVC diameter 1.15 cm    Mean e' 0.07 m/s    ZLVIDS 2.01     ZLVIDD -0.95     LA Volume Index 34.9 mL/m2    LA volume 68.49 cm3    LA WIDTH 4.5 cm    RA Width 4.4 cm    TV resting pulmonary artery pressure 49 mmHg    RV TB RVSP 6 mmHg    Est. RA pres 3 mmHg    EF 35 %    Narrative      Left Ventricle: The left ventricle is normal in size. Normal wall   thickness. There is concentric hypertrophy. Global hypokinesis present.   There is moderately reduced systolic function. Ejection fraction by visual   approximation is 35%.    Right Ventricle: Normal right ventricular cavity size. Systolic   function is borderline low.    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 There is moderate aortic regurgitation.    Consider ORI if clinically needed to evaluate mechanical aortic valve   further.    Mitral Valve: There is mild bileaflet sclerosis. There is mild   regurgitation.    Pulmonary Artery: There is moderate pulmonary hypertension. The   estimated pulmonary artery systolic pressure is 49 mmHg.    IVC/SVC: Normal venous pressure at 3 mmHg.    Ascending aorta is moderately dilated measuring 4.45 cm.         Pending Diagnostic Studies:        None           Medications:  Reconciled Home Medications:      Medication List        START taking these medications      dexAMETHasone 2 MG tablet  Commonly known as: DECADRON  Take 3 tablets (6 mg total) by mouth once daily. for 4 days  Start taking on: August 7, 2024     pulse oximeter device  Commonly known as: pulse oximeter  by Apply Externally route 2 (two) times a day. Use twice daily at 8 AM and 3 PM and record the value in [facility] as directed.            CHANGE how you take these medications      furosemide 20 MG tablet  Commonly known as: LASIX  Take 1 tablet (20 mg total) by mouth every morning. Hold for low blood pressure. Do not give if systolic blood pressure is below 110 and/or diastolic is below 60  Start taking on: August 8, 2024  What changed:   additional instructions  These instructions start on August 8, 2024. If you are unsure what to do until then, ask your doctor or other care provider.     metoprolol tartrate 25 MG tablet  Commonly known as: LOPRESSOR  Take 1 tablet (25 mg total) by mouth 2 (two) times daily. Do not give if systolic blood pressure is below 110 and/or diastolic below 60  Start taking on: August 9, 2024  What changed:   additional instructions  These instructions start on August 9, 2024. If you are unsure what to do until then, ask your doctor or other care provider.            CONTINUE taking these medications      albuterol 2.5 mg /3 mL (0.083 %) nebulizer solution  Commonly known as: PROVENTIL  Take 2.5 mg by nebulization every 4 (four) hours as needed.     fluticasone propionate 50 mcg/actuation nasal spray  Commonly known as: FLONASE  Shake well and use 2 sprays (100 mcg total) by Each Nostril route once daily.     miconazole nitrate 2% 2 % Oint  Commonly known as: MICOTIN  Apply topically 2 (two) times daily. for 7 days     warfarin 2.5 MG tablet  Commonly known as: COUMADIN  Take 2.5 mg by mouth every Monday and Friday.  Take 1.25 mg by mouth all other days.             STOP taking these medications      amoxicillin-clavulanate 875-125mg 875-125 mg per tablet  Commonly known as: AUGMENTIN              Indwelling Lines/Drains at time of discharge:   Lines/Drains/Airways       None                   Time spent on the discharge of patient: 39 minutes         Becky Ceron MD  Department of Hospital Medicine  [facility] - Telemetry ([facility])

## 2024-08-07 LAB
OHS QRS DURATION: 152 MS
OHS QTC CALCULATION: 513 MS

## 2024-08-08 ENCOUNTER — PATIENT OUTREACH (OUTPATIENT)
Dept: ADMINISTRATIVE | Facility: CLINIC | Age: 84
End: 2024-08-08
Payer: MEDICARE

## 2024-08-09 ENCOUNTER — HOSPITAL ENCOUNTER (INPATIENT)
Facility: HOSPITAL | Age: 84
LOS: 6 days | Discharge: HOME HEALTH | DRG: 291 | End: 2024-08-16
Attending: EMERGENCY MEDICINE | Admitting: HOSPITALIST
Payer: MEDICARE

## 2024-08-09 DIAGNOSIS — Z79.01 ANTICOAGULANT LONG-TERM USE: ICD-10-CM

## 2024-08-09 DIAGNOSIS — A41.9 SEPSIS: ICD-10-CM

## 2024-08-09 DIAGNOSIS — I82.412 ACUTE DEEP VEIN THROMBOSIS (DVT) OF FEMORAL VEIN OF LEFT LOWER EXTREMITY: ICD-10-CM

## 2024-08-09 DIAGNOSIS — E87.20 LACTIC ACIDOSIS: ICD-10-CM

## 2024-08-09 DIAGNOSIS — I50.43 ACUTE ON CHRONIC COMBINED SYSTOLIC AND DIASTOLIC CONGESTIVE HEART FAILURE: ICD-10-CM

## 2024-08-09 DIAGNOSIS — U07.1 COVID-19: Primary | ICD-10-CM

## 2024-08-09 DIAGNOSIS — I50.9 CHF (CONGESTIVE HEART FAILURE): ICD-10-CM

## 2024-08-09 DIAGNOSIS — R09.02 HYPOXIA: ICD-10-CM

## 2024-08-09 LAB
ALBUMIN SERPL BCP-MCNC: 2.8 G/DL (ref 3.5–5.2)
ALLENS TEST: ABNORMAL
ALP SERPL-CCNC: 168 U/L (ref 55–135)
ALT SERPL W/O P-5'-P-CCNC: 45 U/L (ref 10–44)
ANION GAP SERPL CALC-SCNC: 15 MMOL/L (ref 8–16)
APTT PPP: 31.8 SEC (ref 21–32)
AST SERPL-CCNC: 46 U/L (ref 10–40)
BACTERIA #/AREA URNS HPF: ABNORMAL /HPF
BASOPHILS # BLD AUTO: 0.01 K/UL (ref 0–0.2)
BASOPHILS NFR BLD: 0.1 % (ref 0–1.9)
BILIRUB SERPL-MCNC: 2.1 MG/DL (ref 0.1–1)
BILIRUB UR QL STRIP: NEGATIVE
BUN SERPL-MCNC: 44 MG/DL (ref 8–23)
CALCIUM SERPL-MCNC: 8.7 MG/DL (ref 8.7–10.5)
CHLORIDE SERPL-SCNC: 102 MMOL/L (ref 95–110)
CLARITY UR: CLEAR
CO2 SERPL-SCNC: 17 MMOL/L (ref 23–29)
COLOR UR: YELLOW
CREAT SERPL-MCNC: 1.7 MG/DL (ref 0.5–1.4)
DELSYS: ABNORMAL
DIFFERENTIAL METHOD BLD: ABNORMAL
EOSINOPHIL # BLD AUTO: 0 K/UL (ref 0–0.5)
EOSINOPHIL NFR BLD: 0 % (ref 0–8)
ERYTHROCYTE [DISTWIDTH] IN BLOOD BY AUTOMATED COUNT: 15.5 % (ref 11.5–14.5)
EST. GFR  (NO RACE VARIABLE): 30 ML/MIN/1.73 M^2
FIO2: 36
FLOW: 4
GLUCOSE SERPL-MCNC: 188 MG/DL (ref 70–110)
GLUCOSE UR QL STRIP: NEGATIVE
HCO3 UR-SCNC: 23.4 MMOL/L (ref 24–28)
HCT VFR BLD AUTO: 43 % (ref 37–48.5)
HGB BLD-MCNC: 14.2 G/DL (ref 12–16)
HGB UR QL STRIP: NEGATIVE
HYALINE CASTS #/AREA URNS LPF: 23 /LPF
IMM GRANULOCYTES # BLD AUTO: 0.1 K/UL (ref 0–0.04)
IMM GRANULOCYTES NFR BLD AUTO: 1.2 % (ref 0–0.5)
INFLUENZA A, MOLECULAR: NEGATIVE
INFLUENZA B, MOLECULAR: NEGATIVE
INR PPP: 1.8 (ref 0.8–1.2)
KETONES UR QL STRIP: NEGATIVE
LEUKOCYTE ESTERASE UR QL STRIP: NEGATIVE
LIPASE SERPL-CCNC: 36 U/L (ref 4–60)
LYMPHOCYTES # BLD AUTO: 0.6 K/UL (ref 1–4.8)
LYMPHOCYTES NFR BLD: 6.7 % (ref 18–48)
MAGNESIUM SERPL-MCNC: 2 MG/DL (ref 1.6–2.6)
MCH RBC QN AUTO: 28.2 PG (ref 27–31)
MCHC RBC AUTO-ENTMCNC: 33 G/DL (ref 32–36)
MCV RBC AUTO: 86 FL (ref 82–98)
MICROSCOPIC COMMENT: ABNORMAL
MODE: ABNORMAL
MONOCYTES # BLD AUTO: 0.4 K/UL (ref 0.3–1)
MONOCYTES NFR BLD: 4.6 % (ref 4–15)
NEUTROPHILS # BLD AUTO: 7.4 K/UL (ref 1.8–7.7)
NEUTROPHILS NFR BLD: 87.4 % (ref 38–73)
NITRITE UR QL STRIP: NEGATIVE
NRBC BLD-RTO: 0 /100 WBC
PCO2 BLDA: 32.9 MMHG (ref 35–45)
PH SMN: 7.46 [PH] (ref 7.35–7.45)
PH UR STRIP: 6 [PH] (ref 5–8)
PHOSPHATE SERPL-MCNC: 4.4 MG/DL (ref 2.7–4.5)
PLATELET # BLD AUTO: 206 K/UL (ref 150–450)
PMV BLD AUTO: 10.6 FL (ref 9.2–12.9)
PO2 BLDA: 96 MMHG (ref 80–100)
POC BE: 0 MMOL/L
POC SATURATED O2: 98 % (ref 95–100)
POTASSIUM SERPL-SCNC: 4.7 MMOL/L (ref 3.5–5.1)
PROT SERPL-MCNC: 6.7 G/DL (ref 6–8.4)
PROT UR QL STRIP: ABNORMAL
PROTHROMBIN TIME: 20.4 SEC (ref 9–12.5)
RBC # BLD AUTO: 5.03 M/UL (ref 4–5.4)
RBC #/AREA URNS HPF: 0 /HPF (ref 0–4)
SAMPLE: ABNORMAL
SARS-COV-2 RDRP RESP QL NAA+PROBE: POSITIVE
SITE: ABNORMAL
SODIUM SERPL-SCNC: 134 MMOL/L (ref 136–145)
SP GR UR STRIP: 1.02 (ref 1–1.03)
SPECIMEN SOURCE: NORMAL
SQUAMOUS #/AREA URNS HPF: 7 /HPF
UNIDENT CRYS URNS QL MICRO: ABNORMAL
URN SPEC COLLECT METH UR: ABNORMAL
UROBILINOGEN UR STRIP-ACNC: NEGATIVE EU/DL
WBC # BLD AUTO: 8.51 K/UL (ref 3.9–12.7)
WBC #/AREA URNS HPF: 2 /HPF (ref 0–5)

## 2024-08-09 PROCEDURE — 94761 N-INVAS EAR/PLS OXIMETRY MLT: CPT | Mod: XB

## 2024-08-09 PROCEDURE — 99900035 HC TECH TIME PER 15 MIN (STAT)

## 2024-08-09 PROCEDURE — 94640 AIRWAY INHALATION TREATMENT: CPT

## 2024-08-09 PROCEDURE — 83735 ASSAY OF MAGNESIUM: CPT | Performed by: EMERGENCY MEDICINE

## 2024-08-09 PROCEDURE — 27000221 HC OXYGEN, UP TO 24 HOURS

## 2024-08-09 PROCEDURE — 63600175 PHARM REV CODE 636 W HCPCS: Performed by: EMERGENCY MEDICINE

## 2024-08-09 PROCEDURE — 25000003 PHARM REV CODE 250: Performed by: EMERGENCY MEDICINE

## 2024-08-09 PROCEDURE — 84145 PROCALCITONIN (PCT): CPT | Performed by: EMERGENCY MEDICINE

## 2024-08-09 PROCEDURE — 83690 ASSAY OF LIPASE: CPT | Performed by: EMERGENCY MEDICINE

## 2024-08-09 PROCEDURE — 84484 ASSAY OF TROPONIN QUANT: CPT | Performed by: EMERGENCY MEDICINE

## 2024-08-09 PROCEDURE — 85730 THROMBOPLASTIN TIME PARTIAL: CPT | Performed by: EMERGENCY MEDICINE

## 2024-08-09 PROCEDURE — 85025 COMPLETE CBC W/AUTO DIFF WBC: CPT | Performed by: EMERGENCY MEDICINE

## 2024-08-09 PROCEDURE — 25000242 PHARM REV CODE 250 ALT 637 W/ HCPCS: Performed by: EMERGENCY MEDICINE

## 2024-08-09 PROCEDURE — 93005 ELECTROCARDIOGRAM TRACING: CPT

## 2024-08-09 PROCEDURE — 87040 BLOOD CULTURE FOR BACTERIA: CPT | Mod: 59 | Performed by: EMERGENCY MEDICINE

## 2024-08-09 PROCEDURE — U0002 COVID-19 LAB TEST NON-CDC: HCPCS | Performed by: EMERGENCY MEDICINE

## 2024-08-09 PROCEDURE — 81000 URINALYSIS NONAUTO W/SCOPE: CPT | Performed by: EMERGENCY MEDICINE

## 2024-08-09 PROCEDURE — 87502 INFLUENZA DNA AMP PROBE: CPT | Performed by: EMERGENCY MEDICINE

## 2024-08-09 PROCEDURE — 85610 PROTHROMBIN TIME: CPT | Performed by: EMERGENCY MEDICINE

## 2024-08-09 PROCEDURE — 83605 ASSAY OF LACTIC ACID: CPT | Performed by: EMERGENCY MEDICINE

## 2024-08-09 PROCEDURE — 80053 COMPREHEN METABOLIC PANEL: CPT | Performed by: EMERGENCY MEDICINE

## 2024-08-09 PROCEDURE — 82803 BLOOD GASES ANY COMBINATION: CPT

## 2024-08-09 PROCEDURE — 83880 ASSAY OF NATRIURETIC PEPTIDE: CPT | Performed by: EMERGENCY MEDICINE

## 2024-08-09 PROCEDURE — 96361 HYDRATE IV INFUSION ADD-ON: CPT

## 2024-08-09 PROCEDURE — 99291 CRITICAL CARE FIRST HOUR: CPT

## 2024-08-09 PROCEDURE — 36600 WITHDRAWAL OF ARTERIAL BLOOD: CPT

## 2024-08-09 PROCEDURE — 93010 ELECTROCARDIOGRAM REPORT: CPT | Mod: ,,, | Performed by: INTERNAL MEDICINE

## 2024-08-09 PROCEDURE — 84100 ASSAY OF PHOSPHORUS: CPT | Performed by: EMERGENCY MEDICINE

## 2024-08-09 PROCEDURE — 96365 THER/PROPH/DIAG IV INF INIT: CPT

## 2024-08-09 RX ORDER — IPRATROPIUM BROMIDE AND ALBUTEROL SULFATE 2.5; .5 MG/3ML; MG/3ML
3 SOLUTION RESPIRATORY (INHALATION)
Status: COMPLETED | OUTPATIENT
Start: 2024-08-09 | End: 2024-08-09

## 2024-08-09 RX ADMIN — IPRATROPIUM BROMIDE AND ALBUTEROL SULFATE 3 ML: 2.5; .5 SOLUTION RESPIRATORY (INHALATION) at 11:08

## 2024-08-09 RX ADMIN — PIPERACILLIN SODIUM AND TAZOBACTAM SODIUM 4.5 G: 4; .5 INJECTION, POWDER, FOR SOLUTION INTRAVENOUS at 11:08

## 2024-08-09 RX ADMIN — SODIUM CHLORIDE 1641 ML: 9 INJECTION, SOLUTION INTRAVENOUS at 11:08

## 2024-08-10 PROBLEM — I50.9 ACUTE EXACERBATION OF CHF (CONGESTIVE HEART FAILURE): Status: ACTIVE | Noted: 2024-08-10

## 2024-08-10 PROBLEM — R79.89 ELEVATED LACTIC ACID LEVEL: Status: ACTIVE | Noted: 2024-08-10

## 2024-08-10 PROBLEM — R73.9 HYPERGLYCEMIA: Status: ACTIVE | Noted: 2024-08-10

## 2024-08-10 PROBLEM — N17.9 AKI (ACUTE KIDNEY INJURY): Status: ACTIVE | Noted: 2024-08-10

## 2024-08-10 LAB
BNP SERPL-MCNC: >4900 PG/ML (ref 0–99)
BNP SERPL-MCNC: >4900 PG/ML (ref 0–99)
ESTIMATED AVG GLUCOSE: 120 MG/DL (ref 68–131)
HBA1C MFR BLD: 5.8 % (ref 4–5.6)
LACTATE SERPL-SCNC: 2 MMOL/L (ref 0.5–2.2)
LACTATE SERPL-SCNC: 3 MMOL/L (ref 0.5–2.2)
PROCALCITONIN SERPL IA-MCNC: 0.19 NG/ML
TROPONIN I SERPL DL<=0.01 NG/ML-MCNC: 0.04 NG/ML (ref 0–0.03)

## 2024-08-10 PROCEDURE — 27000221 HC OXYGEN, UP TO 24 HOURS

## 2024-08-10 PROCEDURE — 25000003 PHARM REV CODE 250: Performed by: EMERGENCY MEDICINE

## 2024-08-10 PROCEDURE — 21400001 HC TELEMETRY ROOM

## 2024-08-10 PROCEDURE — 25000003 PHARM REV CODE 250: Performed by: HOSPITALIST

## 2024-08-10 PROCEDURE — 25000003 PHARM REV CODE 250: Performed by: NURSE PRACTITIONER

## 2024-08-10 PROCEDURE — 63600175 PHARM REV CODE 636 W HCPCS: Performed by: NURSE PRACTITIONER

## 2024-08-10 PROCEDURE — 96367 TX/PROPH/DG ADDL SEQ IV INF: CPT

## 2024-08-10 PROCEDURE — 83605 ASSAY OF LACTIC ACID: CPT | Performed by: EMERGENCY MEDICINE

## 2024-08-10 PROCEDURE — 99222 1ST HOSP IP/OBS MODERATE 55: CPT | Mod: ,,, | Performed by: INTERNAL MEDICINE

## 2024-08-10 PROCEDURE — 63600175 PHARM REV CODE 636 W HCPCS: Performed by: EMERGENCY MEDICINE

## 2024-08-10 PROCEDURE — 94761 N-INVAS EAR/PLS OXIMETRY MLT: CPT

## 2024-08-10 PROCEDURE — 83036 HEMOGLOBIN GLYCOSYLATED A1C: CPT | Performed by: NURSE PRACTITIONER

## 2024-08-10 PROCEDURE — 99900035 HC TECH TIME PER 15 MIN (STAT)

## 2024-08-10 PROCEDURE — 27000207 HC ISOLATION

## 2024-08-10 RX ORDER — METOPROLOL TARTRATE 25 MG/1
25 TABLET, FILM COATED ORAL 2 TIMES DAILY
Status: DISCONTINUED | OUTPATIENT
Start: 2024-08-10 | End: 2024-08-16 | Stop reason: HOSPADM

## 2024-08-10 RX ORDER — SODIUM CHLORIDE 0.9 % (FLUSH) 0.9 %
10 SYRINGE (ML) INJECTION
Status: DISCONTINUED | OUTPATIENT
Start: 2024-08-10 | End: 2024-08-16 | Stop reason: HOSPADM

## 2024-08-10 RX ORDER — METOPROLOL SUCCINATE 25 MG/1
1 TABLET, EXTENDED RELEASE ORAL NIGHTLY
Status: ON HOLD | COMMUNITY
Start: 2024-07-16 | End: 2025-07-16

## 2024-08-10 RX ORDER — ONDANSETRON HYDROCHLORIDE 2 MG/ML
4 INJECTION, SOLUTION INTRAVENOUS EVERY 8 HOURS PRN
Status: DISCONTINUED | OUTPATIENT
Start: 2024-08-10 | End: 2024-08-16 | Stop reason: HOSPADM

## 2024-08-10 RX ORDER — WARFARIN 2.5 MG/1
2.5 TABLET ORAL
Status: DISCONTINUED | OUTPATIENT
Start: 2024-08-12 | End: 2024-08-15

## 2024-08-10 RX ORDER — FUROSEMIDE 10 MG/ML
40 INJECTION INTRAMUSCULAR; INTRAVENOUS DAILY
Status: DISCONTINUED | OUTPATIENT
Start: 2024-08-10 | End: 2024-08-12

## 2024-08-10 RX ORDER — MUPIROCIN 20 MG/G
OINTMENT TOPICAL 2 TIMES DAILY
Status: DISPENSED | OUTPATIENT
Start: 2024-08-10 | End: 2024-08-15

## 2024-08-10 RX ORDER — ACETAMINOPHEN 325 MG/1
650 TABLET ORAL EVERY 6 HOURS PRN
Status: DISCONTINUED | OUTPATIENT
Start: 2024-08-10 | End: 2024-08-16 | Stop reason: HOSPADM

## 2024-08-10 RX ADMIN — FUROSEMIDE 40 MG: 10 INJECTION, SOLUTION INTRAMUSCULAR; INTRAVENOUS at 08:08

## 2024-08-10 RX ADMIN — VANCOMYCIN HYDROCHLORIDE 1750 MG: 500 INJECTION, POWDER, LYOPHILIZED, FOR SOLUTION INTRAVENOUS at 01:08

## 2024-08-10 RX ADMIN — METOPROLOL TARTRATE 25 MG: 25 TABLET, FILM COATED ORAL at 08:08

## 2024-08-10 RX ADMIN — METOPROLOL TARTRATE 25 MG: 25 TABLET, FILM COATED ORAL at 09:08

## 2024-08-10 RX ADMIN — WARFARIN SODIUM 1.25 MG: 2.5 TABLET ORAL at 08:08

## 2024-08-10 RX ADMIN — MUPIROCIN: 20 OINTMENT TOPICAL at 08:08

## 2024-08-10 NOTE — PROGRESS NOTES
Pharmacokinetic Initial Assessment: IV Vancomycin    Assessment/Plan:    Initiate intravenous vancomycin with loading dose of 1750 mg once with subsequent doses when random concentrations are less than 20 mcg/mL  Desired empiric serum trough concentration is 10 to 20 mcg/mL  Draw vancomycin random level on 8/10/24 at 2000.  Pharmacy will continue to follow and monitor vancomycin.      Please contact pharmacy at extension [phone] with any questions regarding this assessment.     Thank you for the consult,   Juan Corrales       Patient brief summary:  Serena Post is a 84 y.o. female initiated on antimicrobial therapy with IV Vancomycin for treatment of suspected sepsis    Drug Allergies:   Review of patient's allergies indicates:   Allergen Reactions    Amlodipine Other (See Comments)     Not sure    Chlorthalidone Other (See Comments)     Not sure    Clonidine Other (See Comments)     Not sure    Codeine Other (See Comments)     Not sure    Escitalopram Other (See Comments)     Not sure    Lisinopril Other (See Comments)     Not sure    Losartan Other (See Comments)     Not sure    Meperidine Other (See Comments)     Not sure    Methadone Other (See Comments)     Not sure      Morphine Other (See Comments)     Not sure    Nebivolol Other (See Comments)     Not sure        Nitrofurantoin Other (See Comments)     Not sure        Omeprazole Other (See Comments)     Not sure      Pravastatin Other (See Comments)     Not sure      Propoxyphene Other (See Comments)     Not sure      Sulfamethoxazole-trimethoprim Other (See Comments)     Not sure           Actual Body Weight:   89.8 kg    Renal Function:   Estimated Creatinine Clearance: 27.7 mL/min (A) (based on SCr of 1.7 mg/dL (H)).,     Dialysis Method (if applicable):  N/A    CBC (last 72 hours):  Recent Labs   Lab Result Units 08/09/24  2308   WBC K/uL 8.51   Hemoglobin g/dL 14.2   Hematocrit % 43.0   Platelets K/uL 206   Gran % % 87.4*   Lymph % % 6.7*    Mono % % 4.6   Eosinophil % % 0.0   Basophil % % 0.1   Differential Method  Automated       Metabolic Panel (last 72 hours):  Recent Labs   Lab Result Units 08/09/24 2308 08/09/24 2325   Sodium mmol/L 134*  --    Potassium mmol/L 4.7  --    Chloride mmol/L 102  --    CO2 mmol/L 17*  --    Glucose mg/dL 188*  --    Glucose, UA   --  Negative   BUN mg/dL 44*  --    Creatinine mg/dL 1.7*  --    Albumin g/dL 2.8*  --    Total Bilirubin mg/dL 2.1*  --    Alkaline Phosphatase U/L 168*  --    AST U/L 46*  --    ALT U/L 45*  --    Magnesium mg/dL 2.0  --    Phosphorus mg/dL 4.4  --        Microbiologic Results:  Microbiology Results (last 7 days)       Procedure Component Value Units Date/Time    Influenza A & B by Molecular [[id number]] Collected: 08/09/24 2305    Order Status: Completed Specimen: Nasopharyngeal Swab Updated: 08/09/24 2345     Influenza A, Molecular Negative     Influenza B, Molecular Negative     Flu A & B Source Nasal swab    Blood culture x two cultures. Draw prior to antibiotics. [[id number]] Collected: 08/09/24 2308    Order Status: Sent Specimen: Blood from Peripheral, Antecubital, Right     Blood culture x two cultures. Draw prior to antibiotics. [[id number]] Collected: 08/09/24 2308    Order Status: Sent Specimen: Blood from Peripheral, Antecubital, Left

## 2024-08-10 NOTE — CONSULTS
[facility] - Telemetry ([facility])  Cardiology  Consult Note    Patient Name: Serena Post  MRN: [id number]  Admission Date: 8/9/2024  Hospital Length of Stay: 0 days  Code Status: Full Code   Attending Provider: Becky Ceron MD   Consulting Provider: Bharath Romero MD   Primary Care Physician: Evangelina Olivares MD  Principal Problem:Acute exacerbation of CHF (congestive heart failure)    Patient information was obtained from patient and ER records.     Inpatient consult to Cardiology  Consult performed by: Bharath Romero MD  Consult ordered by: Omi Ochoa NP        Subjective:     Chief Complaint:      HPI:     is a 84 year old female with a past medical history for mechanical AVR in 2007, PAF, history of pacemaker, chronic systolic CHF, chronic respiratory failure, , dementia, history of CVA, HLP, HTN, and pneumonia vs. CHF. She was admitted for reoccurrence respiratory failure. Symptoms were similar to previous admission. Patient was recently discharged. Chest X-ray shows CHF vs. Pneumonia. EKG unchanged and shows NSR and RBBB. BNP is greater than 490. Echo on July 31 shows normal EF. Mechanical AVR noted with questionable gradient.    Recommend IV diuresis, supportive care, as well as anticoagulants.     Past Medical History:   Diagnosis Date    A-fib     Anticoagulant long-term use     Aortic valve disease     Cancer     brain tumor, 10 years ago    Cardiomyopathy     CHF (congestive heart failure)     COVID-19 7/23/2022    Hx of heart valve replacement with mechanical valve     Hyperlipidemia     Hypertension     Pacemaker     Pacemaker     Sepsis 7/23/2022    Stroke     2007, residual weakness       Past Surgical History:   Procedure Laterality Date    AORTIC VALVE REPLACEMENT      CHOLECYSTECTOMY      CORONARY ARTERY BYPASS GRAFT      HYSTERECTOMY      INSERTION OF PACEMAKER      TONSILLECTOMY         Review of patient's allergies indicates:   Allergen Reactions     Amlodipine Other (See Comments)     Not sure    Chlorthalidone Other (See Comments)     Not sure    Clonidine Other (See Comments)     Not sure    Codeine Other (See Comments)     Not sure    Escitalopram Other (See Comments)     Not sure    Lisinopril Other (See Comments)     Not sure    Losartan Other (See Comments)     Not sure    Meperidine Other (See Comments)     Not sure    Methadone Other (See Comments)     Not sure      Morphine Other (See Comments)     Not sure    Nebivolol Other (See Comments)     Not sure        Nitrofurantoin Other (See Comments)     Not sure        Omeprazole Other (See Comments)     Not sure      Pravastatin Other (See Comments)     Not sure      Propoxyphene Other (See Comments)     Not sure      Sulfamethoxazole-trimethoprim Other (See Comments)     Not sure           No current facility-administered medications on file prior to encounter.     Current Outpatient Medications on File Prior to Encounter   Medication Sig    furosemide (LASIX) 20 MG tablet Take 1 tablet (20 mg total) by mouth every morning. Hold for low blood pressure. Do not give if systolic blood pressure is below 110 and/or diastolic is below 60    metoprolol succinate (TOPROL-XL) 25 MG 24 hr tablet Take 1 tablet by mouth every evening.    warfarin (COUMADIN) 2.5 MG tablet Take 2.5 mg by mouth every Monday and Friday.  Take 1.25 mg by mouth all other days.    albuterol (PROVENTIL) 2.5 mg /3 mL (0.083 %) nebulizer solution Take 2.5 mg by nebulization every 4 (four) hours as needed.    dexAMETHasone (DECADRON) 2 MG tablet Take 3 tablets (6 mg total) by mouth once daily. for 4 days    fluticasone propionate (FLONASE) 50 mcg/actuation nasal spray Shake well and use 2 sprays (100 mcg total) by Each Nostril route once daily.    metoprolol tartrate (LOPRESSOR) 25 MG tablet Take 1 tablet (25 mg total) by mouth 2 (two) times daily. Do not give if systolic blood pressure is below 110 and/or diastolic below 60    miconazole  nitrate 2% (MICOTIN) 2 % Oint Apply topically 2 (two) times daily. for 7 days    pulse oximeter (PULSE OXIMETER) device by Apply Externally route 2 (two) times a day. Use twice daily at 8 AM and 3 PM and record the value in MyChart as directed.     Family History       Problem Relation (Age of Onset)    No Known Problems Mother, Father          Tobacco Use    Smoking status: Never     Passive exposure: Never    Smokeless tobacco: Never   Substance and Sexual Activity    Alcohol use: Not Currently    Drug use: Never    Sexual activity: Not on file     Review of Systems   Constitutional:  Positive for fatigue. Negative for chills, diaphoresis and fever.   Respiratory:  Positive for cough and shortness of breath.    Cardiovascular:  Positive for chest pain and leg swelling. Negative for palpitations.   Gastrointestinal:  Negative for abdominal pain, diarrhea, nausea and vomiting.   Neurological:  Positive for weakness. Negative for dizziness, light-headedness and headaches.   All other systems reviewed and are negative.     Objective:      Vital Signs (Most Recent):  Temp: 97.1 °F (36.2 °C) (08/10/24 0422)  Pulse: 82 (08/10/24 0422)  Resp: 18 (08/10/24 0422)  BP: 121/73 (08/10/24 0422)  SpO2: 96 % (08/10/24 0422) Vital Signs (24h Range):  Temp:  [97.1 °F (36.2 °C)-97.8 °F (36.6 °C)] 97.1 °F (36.2 °C)  Pulse:  [65-88] 82  Resp:  [15-45] 18  SpO2:  [93 %-99 %] 96 %  BP: (102-122)/(53-73) 121/73      Weight: 89.8 kg (198 lb)  Body mass index is 33.99 kg/m².     Physical Exam  Vitals and nursing note reviewed.   Constitutional:       General: She is awake. She is not in acute distress.     Appearance: She is obese. She is ill-appearing. She is not toxic-appearing or diaphoretic.   HENT:      Head: Normocephalic and atraumatic.   Eyes:      Extraocular Movements: Extraocular movements intact.      Conjunctiva/sclera: Conjunctivae normal.      Pupils: Pupils are equal, round, and reactive to light.   Cardiovascular:       Rate and Rhythm: Normal rate and regular rhythm.      Heart sounds: Murmur heard.   Pulmonary:      Effort: Tachypnea and respiratory distress present.      Breath sounds: Rales present.      Comments: 99% on 4L/min via NC.   Abdominal:      General: Bowel sounds are normal.      Palpations: Abdomen is soft.      Tenderness: There is no abdominal tenderness.   Musculoskeletal:      Cervical back: Normal range of motion and neck supple.      Right lower leg: Edema present.      Left lower leg: Edema present.      Comments: VICTOR   Skin:     General: Skin is warm and dry.      Capillary Refill: Capillary refill takes less than 2 seconds.           Neurological:      General: No focal deficit present.      Mental Status: She is alert and oriented to person, place, and time.      GCS: GCS eye subscore is 4. GCS verbal subscore is 5. GCS motor subscore is 6.      Cranial Nerves: Cranial nerves 2-12 are intact.      Sensory: Sensation is intact.      Motor: Motor function is intact.   Psychiatric:         Mood and Affect: Mood normal.         Speech: Speech normal.         Behavior: Behavior normal. Behavior is cooperative.                  CRANIAL NERVES      CN III, IV, VI   Pupils are equal, round, and reactive to light.     Significant Labs: BMP:   Recent Labs   Lab 08/09/24 2308   *   *   K 4.7      CO2 17*   BUN 44*   CREATININE 1.7*   CALCIUM 8.7   MG 2.0   , CMP   Recent Labs   Lab 08/09/24 2308   *   K 4.7      CO2 17*   *   BUN 44*   CREATININE 1.7*   CALCIUM 8.7   PROT 6.7   ALBUMIN 2.8*   BILITOT 2.1*   ALKPHOS 168*   AST 46*   ALT 45*   ANIONGAP 15   , CBC   Recent Labs   Lab 08/09/24 2308   WBC 8.51   HGB 14.2   HCT 43.0      , and Troponin   Recent Labs   Lab 08/09/24 2308   TROPONINI 0.035*       Significant Imaging: Echocardiogram: 2D echo with color flow doppler: No results found for this or any previous visit. and Transthoracic echo (TTE) complete ([facility]  Only):   Results for orders placed or performed during the hospital encounter of 02/22/24   Echo   Result Value Ref Range    BSA 2.03 m2    LVOT stroke volume 56.42 cm3    LVIDd 5.10 3.5 - 6.0 cm    LV Systolic Volume 90.21 mL    LV Systolic Volume Index 46.0 mL/m2    LVIDs 4.45 (A) 2.1 - 4.0 cm    LV Diastolic Volume 123.59 mL    LV Diastolic Volume Index 63.06 mL/m2    IVS 1.04 0.6 - 1.1 cm    LVOT diameter 1.91 cm    LVOT area 2.9 cm2    FS 13 (A) 28 - 44 %    Left Ventricle Relative Wall Thickness 0.47 cm    Posterior Wall 1.19 (A) 0.6 - 1.1 cm    LV mass 217.90 g    LV Mass Index 111 g/m2    MV Peak E Melo 1.07 m/s    TDI LATERAL 0.08 m/s    TDI SEPTAL 0.05 m/s    E/E' ratio 16.46 m/s    MV Peak A Melo 1.07 m/s    TR Max Melo 3.38 m/s    E/A ratio 1.00     IVRT 62.80 msec    E wave deceleration time 192.43 msec    LV SEPTAL E/E' RATIO 21.40 m/s    LV LATERAL E/E' RATIO 13.38 m/s    LVOT peak melo 0.83 m/s    Left Ventricular Outflow Tract Mean Velocity 0.58 cm/s    Left Ventricular Outflow Tract Mean Gradient 1.51 mmHg    RV mid diameter 3.15 cm    RVOT peak VTI 9.3 cm    TAPSE 2.05 cm    LA size 3.61 cm    Left Atrium Minor Axis 3.98 cm    Left Atrium Major Axis 6.58 cm    RA Major Axis 4.53 cm    AV regurgitation pressure 1/2 time 385.381138252867864 ms    AR Max Melo 4.34 m/s    AV mean gradient 95 mmHg    AV peak gradient 97 mmHg    Ao peak melo 4.93 m/s    Ao .80 cm    LVOT peak VTI 19.70 cm    AV valve area 0.37 cm²    AV Velocity Ratio 0.17     AV index (prosthetic) 0.13     AAMIR by Velocity Ratio 0.48 cm²    MV stenosis pressure 1/2 time 55.80 ms    MV valve area p 1/2 method 3.94 cm2    Triscuspid Valve Regurgitation Peak Gradient 46 mmHg    PV mean gradient 1 mmHg    RVOT peak melo 0.49 m/s    Ao root annulus 2.98 cm    STJ 4.51 cm    Ascending aorta 4.45 cm    IVC diameter 1.15 cm    Mean e' 0.07 m/s    ZLVIDS 2.01     ZLVIDD -0.95     LA Volume Index 34.9 mL/m2    LA volume 68.49 cm3    LA WIDTH 4.5  cm    RA Width 4.4 cm    TV resting pulmonary artery pressure 49 mmHg    RV TB RVSP 6 mmHg    Est. RA pres 3 mmHg    EF 35 %    Narrative      Left Ventricle: The left ventricle is normal in size. Normal wall   thickness. There is concentric hypertrophy. Global hypokinesis present.   There is moderately reduced systolic function. Ejection fraction by visual   approximation is 35%.    Right Ventricle: Normal right ventricular cavity size. Systolic   function is borderline low.    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 There is moderate aortic regurgitation.    Consider ORI if clinically needed to evaluate mechanical aortic valve   further.    Mitral Valve: There is mild bileaflet sclerosis. There is mild   regurgitation.    Pulmonary Artery: There is moderate pulmonary hypertension. The   estimated pulmonary artery systolic pressure is 49 mmHg.    IVC/SVC: Normal venous pressure at 3 mmHg.    Ascending aorta is moderately dilated measuring 4.45 cm.      and EKG:   Assessment and Plan:      is a 84 year old female with a past medical history for mechanical AVR in 2007, PAF, history of pacemaker, chronic systolic CHF, chronic respiratory failure, , dementia, history of CVA, HLP, HTN, and pneumonia vs. CHF. She was admitted for reoccurrence respiratory failure. Symptoms were similar to previous admission. Patient was recently discharged. Chest X-ray shows CHF vs. Pneumonia. EKG unchanged and shows NSR and RBBB. BNP is greater than 490. Echo on July 31 ([facility])shows normal EF. Mechanical AVR noted with questionable gradient.    Recommend IV diuresis, supportive care, as well as continue anticoagulants. Repeat echo    Acute exacerbation of CHF (congestive heart failure)  Patient is identified as having  unspecified  heart failure that is Acute on chronic. CHF is currently uncontrolled due to Continued edema of extremities, Dyspnea improved with supplemental  oxygen. Rales/crackles on pulmonary exam, and Pulmonary edema/pleural effusion on CXR. Latest ECHO performed and demonstrates- Results for orders placed during the hospital encounter of 02/22/24     Echo     Interpretation Summary    Left Ventricle: The left ventricle is normal in size. Normal wall thickness. There is concentric hypertrophy. Global hypokinesis present. There is moderately reduced systolic function. Ejection fraction by visual approximation is 35%.    Right Ventricle: Normal right ventricular cavity size. Systolic function is borderline low.    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 There is moderate aortic regurgitation.  Consider ORI if clinically needed to evaluate mechanical aortic valve further.    Mitral Valve: There is mild bileaflet sclerosis. There is mild regurgitation.    Pulmonary Artery: There is moderate pulmonary hypertension. The estimated pulmonary artery systolic pressure is 49 mmHg.    IVC/SVC: Normal venous pressure at 3 mmHg.    Ascending aorta is moderately dilated measuring 4.45 cm.  . Continue Beta Blocker and Furosemide and monitor clinical status closely. Monitor on telemetry. Patient is on CHF pathway.  Monitor strict Is&Os and daily weights.  Place on fluid restriction of 1.5 L. Cardiology has been consulted. Continue to stress to patient importance of self efficacy and  on diet for CHF. Last BNP reviewed- and noted below       Recent Labs   Lab 08/09/24  2308   BNP >4,900*  >4,900*         MARCUS (acute kidney injury)   Baseline creatinine is  1.2 . Most recent creatinine and eGFR are listed below.      Recent Labs     08/09/24  2308   CREATININE 1.7*   EGFRNORACEVR 30*       Plan  - MARCUS is  treated with sepsis bolus in ER.  - Avoid nephrotoxins and renally dose meds for GFR listed above  - Monitor urine output, serial BMP, and adjust therapy as needed        Elevated lactic acid level  Resolved after IVF  bolus in ER.  Plan:  -trend labs as needed        Hyperglycemia  Likely elevated secondary to Decadron use to treat recent COVID infection.  Plan:  -SSI as needed  -Accuchecks        Anticoagulant long-term use  This patient has long term use on an anticoagulant with Select Anticoagulant(s): Warfarin/Coumadin. Their long term anticoagulation will be Held or Continued: continued. They are on long term anticoagulation due to Reason for Anticoagulation: Atrial fibrillation and Mechanical heart valve.      Dyslipidemia  Patient is not chronically on statin.will not continue for now. Last Lipid Panel:         Lab Results   Component Value Date     CHOL 225 (H) 11/01/2022     HDL 48 11/01/2022     LDLCALC 141 (H) 11/01/2022     TRIG 180 11/01/2022     CHOLHDL 4.7 (H) 11/01/2022      Plan:  -low fat/low calorie diet           Essential hypertension  Chronic, controlled. Latest blood pressure and vitals reviewed-      Temp:  [97.1 °F (36.2 °C)-97.8 °F (36.6 °C)]   Pulse:  [65-88]   Resp:  [15-45]   BP: (102-122)/(53-73)   SpO2:  [93 %-99 %] .   Home meds for hypertension were reviewed and noted below.   Hypertension Medications                    furosemide (LASIX) 20 MG tablet Take 1 tablet (20 mg total) by mouth every morning. Hold for low blood pressure. Do not give if systolic blood pressure is below 110 and/or diastolic is below 60     metoprolol succinate (TOPROL-XL) 25 MG 24 hr tablet Take 1 tablet by mouth every evening.     metoprolol tartrate (LOPRESSOR) 25 MG tablet Take 1 tablet (25 mg total) by mouth 2 (two) times daily. Do not give if systolic blood pressure is below 110 and/or diastolic below 60                While in the hospital, will manage blood pressure as follows; Continue home antihypertensive regimen     Will utilize p.r.n. blood pressure medication only if patient's blood pressure greater than 180/110 and she develops symptoms such as worsening chest pain or shortness of breath.     Paroxysmal  atrial fibrillation  Patient with Paroxysmal (<7 days) atrial fibrillation which is controlled currently with Beta Blocker. Patient is currently in sinus rhythm.[id number] Score: 4. HASBLED Score: . Anticoagulation indicated. Anticoagulation done with Coumadin .     COVID  Recently diagnosed on 08/01/2024 and treated with Decadron during last admission.  Sent home with Decadron p.o.  Plan:  -supplemental oxygen as needed  -continue decadron po  -duonebs prn  -monitor pulse ox as need/per unit protocol       VTE Risk Mitigation (From admission, onward)           Ordered     warfarin (COUMADIN) tablet 2.5 mg  Once per day on Monday Friday         08/10/24 0703     warfarin (COUMADIN) split tablet 1.25 mg  Once per day on Jayson Tuesday Wednesday Thursday Saturday         08/10/24 0703     IP VTE HIGH RISK PATIENT  Once         08/10/24 0307     Place sequential compression device  Until discontinued         08/10/24 0307                    Cardiology   [facility] - Telemetry ([facility])

## 2024-08-10 NOTE — ED PROVIDER NOTES
SCRIBE #1 NOTE: I, Me-Reanna Cedeno, am scribing for, and in the presence of, Andres Cordova DO. I have scribed the HPI, ROS, and PEx.      History     Chief Complaint   Patient presents with    Altered Mental Status    COVID-19 Concerns     Pt Son reports that pt has been declining x2 days. Was recently dx with COVID and uti     Review of patient's allergies indicates:   Allergen Reactions    Amlodipine Other (See Comments)     Not sure    Chlorthalidone Other (See Comments)     Not sure    Clonidine Other (See Comments)     Not sure    Codeine Other (See Comments)     Not sure    Escitalopram Other (See Comments)     Not sure    Lisinopril Other (See Comments)     Not sure    Losartan Other (See Comments)     Not sure    Meperidine Other (See Comments)     Not sure    Methadone Other (See Comments)     Not sure      Morphine Other (See Comments)     Not sure    Nebivolol Other (See Comments)     Not sure        Nitrofurantoin Other (See Comments)     Not sure        Omeprazole Other (See Comments)     Not sure      Pravastatin Other (See Comments)     Not sure      Propoxyphene Other (See Comments)     Not sure      Sulfamethoxazole-trimethoprim Other (See Comments)     Not sure             History of Present Illness     The history is limited by the condition of the patient.     8/9/2024, 10:45 PM  History obtained from the  patient's son  HPI and ROS is limited due to AMS        History of Present Illness: Serena Post is a 84 y.o. female patient with a PMHx of A-fib, aortic valve disease, brain cancer, cardiomyopathy, CHF, heart valve replacement with mechanical valve, HLD, HTN, sepsis, and stroke who presents to the Emergency Department for evaluation of AMS which onset today. Patient tested positive for COVID on 8/1/2024 and was recently diagnosed with UTI. Son reports pt started becoming incoherent today at 5PM. Son reports symptoms of decreased appetite, generalized weakness, and diarrhea. No  mitigating or exacerbating factors reported. Son denies any vomiting. Son states that pt is taking her LASIX. No further complaints or concerns at this time.       Arrival mode: Personal vehicle    PCP: Evangelina Olivares MD        Past Medical History:  Past Medical History:   Diagnosis Date    A-fib     Anticoagulant long-term use     Aortic valve disease     Cancer     brain tumor, 10 years ago    Cardiomyopathy     CHF (congestive heart failure)     COVID-19 7/23/2022    Hx of heart valve replacement with mechanical valve     Hyperlipidemia     Hypertension     Pacemaker     Pacemaker     Sepsis 7/23/2022    Stroke     2007, residual weakness       Past Surgical History:  Past Surgical History:   Procedure Laterality Date    AORTIC VALVE REPLACEMENT      CHOLECYSTECTOMY      CORONARY ARTERY BYPASS GRAFT      HYSTERECTOMY      INSERTION OF PACEMAKER      TONSILLECTOMY           Family History:  Family History   Problem Relation Name Age of Onset    No Known Problems Mother      No Known Problems Father         Social History:  Social History     Tobacco Use    Smoking status: Never     Passive exposure: Never    Smokeless tobacco: Never   Substance and Sexual Activity    Alcohol use: Not Currently    Drug use: Never    Sexual activity: Not on file        Review of Systems     Review of Systems   Unable to perform ROS: Mental status change   Constitutional:  Positive for appetite change (decreased).   Gastrointestinal:  Positive for diarrhea. Negative for vomiting.   Neurological:  Positive for weakness (generalized).      Physical Exam     Initial Vitals [08/09/24 2300]   BP Pulse Resp Temp SpO2   122/68 88 (!) 45 97.8 °F (36.6 °C) (!) 93 %      MAP       --          Physical Exam  Constitutional:       General: She is in acute distress.      Appearance: She is ill-appearing.   Cardiovascular:      Rate and Rhythm: Normal rate and regular rhythm.      Heart sounds: No murmur heard.  Pulmonary:       "Comments: Decreased breath sounds bilaterally  Abdominal:      Palpations: Abdomen is soft.      Tenderness: There is no abdominal tenderness.   Musculoskeletal:         General: Normal range of motion.   Skin:     General: Skin is warm and dry.      Findings: No lesion.   Neurological:      Comments: Patient is lethargic, we will follow commands on Q          ED Course   Critical Care    Date/Time: 8/10/2024 2:45 AM    Performed by: Andres Cordova DO  Authorized by: Andres Cordova DO  Direct patient critical care time: 20 minutes  Additional history critical care time: 10 minutes  Ordering / reviewing critical care time: 5 minutes  Documentation critical care time: 5 minutes  Consulting other physicians critical care time: 5 minutes  Total critical care time (exclusive of procedural time) : 45 minutes  Critical care time was exclusive of separately billable procedures and treating other patients.  Critical care was necessary to treat or prevent imminent or life-threatening deterioration of the following conditions: sepsis and cardiac failure.  Critical care was time spent personally by me on the following activities: development of treatment plan with patient or surrogate, discussions with consultants, interpretation of cardiac output measurements, evaluation of patient's response to treatment, examination of patient, obtaining history from patient or surrogate, ordering and performing treatments and interventions, ordering and review of laboratory studies, ordering and review of radiographic studies, pulse oximetry, re-evaluation of patient's condition and review of old charts.        ED Vital Signs:  Vitals:    08/10/24 1618 08/10/24 1838 08/10/24 1859 08/10/24 1935   BP: 115/67   114/80   Pulse: 80   87   Resp: 16   15   Temp: 97.6 °F (36.4 °C)   98 °F (36.7 °C)   TempSrc: Oral   Oral   SpO2: 98%   (!) 94%   Weight:  73.9 kg (162 lb 14.7 oz)     Height:  5' 4" (1.626 m) 5' 4" (1.626 m)     " 08/10/24 2000 08/10/24 2142 08/11/24 0014 08/11/24 0400   BP:  114/80 108/69    Pulse: 84  67 78   Resp:   15    Temp:   97.7 °F (36.5 °C)    TempSrc:   Oral    SpO2:   95%    Weight:       Height:        08/11/24 0416 08/11/24 0741 08/11/24 0809 08/11/24 0905   BP: 118/62 109/66     Pulse: 65 86 84 82   Resp: 15 18     Temp: 97.6 °F (36.4 °C) 97.4 °F (36.3 °C)     TempSrc: Oral Oral     SpO2: 98% 96%     Weight:       Height:        08/11/24 0928 08/11/24 1105 08/11/24 1247   BP:   (!) 102/55   Pulse:  80 65   Resp:   15   Temp:   97.5 °F (36.4 °C)   TempSrc:   Oral   SpO2: 97%  95%   Weight:      Height:          Abnormal Lab Results:  Labs Reviewed   CBC W/ AUTO DIFFERENTIAL - Abnormal       Result Value    WBC 8.51      RBC 5.03      Hemoglobin 14.2      Hematocrit 43.0      MCV 86      MCH 28.2      MCHC 33.0      RDW 15.5 (*)     Platelets 206      MPV 10.6      Immature Granulocytes 1.2 (*)     Gran # (ANC) 7.4      Immature Grans (Abs) 0.10 (*)     Lymph # 0.6 (*)     Mono # 0.4      Eos # 0.0      Baso # 0.01      nRBC 0      Gran % 87.4 (*)     Lymph % 6.7 (*)     Mono % 4.6      Eosinophil % 0.0      Basophil % 0.1      Differential Method Automated     COMPREHENSIVE METABOLIC PANEL - Abnormal    Sodium 134 (*)     Potassium 4.7      Chloride 102      CO2 17 (*)     Glucose 188 (*)     BUN 44 (*)     Creatinine 1.7 (*)     Calcium 8.7      Total Protein 6.7      Albumin 2.8 (*)     Total Bilirubin 2.1 (*)     Alkaline Phosphatase 168 (*)     AST 46 (*)     ALT 45 (*)     eGFR 30 (*)     Anion Gap 15     LACTIC ACID, PLASMA - Abnormal    Lactate (Lactic Acid) 3.0 (*)    URINALYSIS, REFLEX TO URINE CULTURE - Abnormal    Specimen UA Urine, Catheterized      Color, UA Yellow      Appearance, UA Clear      pH, UA 6.0      Specific Gravity, UA 1.020      Protein, UA 1+ (*)     Glucose, UA Negative      Ketones, UA Negative      Bilirubin (UA) Negative      Occult Blood UA Negative      Nitrite, UA Negative       Urobilinogen, UA Negative      Leukocytes, UA Negative      Narrative:     Specimen Source->Urine   B-TYPE NATRIURETIC PEPTIDE - Abnormal    BNP >4,900 (*)    TROPONIN I - Abnormal    Troponin I 0.035 (*)    PROTIME-INR - Abnormal    Prothrombin Time 20.4 (*)     INR 1.8 (*)    B-TYPE NATRIURETIC PEPTIDE - Abnormal    BNP >4,900 (*)    SARS-COV-2 RNA AMPLIFICATION, QUAL - Abnormal    SARS-CoV-2 RNA, Amplification, Qual Positive (*)    URINALYSIS MICROSCOPIC - Abnormal    RBC, UA 0      WBC, UA 2      Bacteria Rare      Squam Epithel, UA 7      Hyaline Casts, UA 23 (*)     Unclass Chinyere UA Rare      Microscopic Comment SEE COMMENT      Narrative:     Specimen Source->Urine   ISTAT PROCEDURE - Abnormal    POC PH 7.460 (*)     POC PCO2 32.9 (*)     POC PO2 96      POC HCO3 23.4 (*)     POC BE 0      POC SATURATED O2 98      Sample ARTERIAL      Site LR      Allens Test Pass      DelSys Nasal Can      Mode SPONT      Flow 4      FiO2 36     INFLUENZA A & B BY MOLECULAR    Influenza A, Molecular Negative      Influenza B, Molecular Negative      Flu A & B Source Nasal swab     MAGNESIUM    Magnesium 2.0     PHOSPHORUS    Phosphorus 4.4     LIPASE    Lipase 36     PROCALCITONIN    Procalcitonin 0.19     APTT    aPTT 31.8     LACTIC ACID, PLASMA    Lactate (Lactic Acid) 2.0          All Lab Results:  Results for orders placed or performed during the hospital encounter of 08/09/24   Blood culture x two cultures. Draw prior to antibiotics.    Specimen: Peripheral, Antecubital, Right; Blood   Result Value Ref Range    Blood Culture, Routine No Growth to date     Blood Culture, Routine No Growth to date    Blood culture x two cultures. Draw prior to antibiotics.    Specimen: Peripheral, Antecubital, Left; Blood   Result Value Ref Range    Blood Culture, Routine No Growth to date     Blood Culture, Routine No Growth to date    Influenza A & B by Molecular    Specimen: Nasopharyngeal Swab   Result Value Ref Range    Influenza A,  Molecular Negative Negative    Influenza B, Molecular Negative Negative    Flu A & B Source Nasal swab    CBC auto differential   Result Value Ref Range    WBC 8.51 3.90 - 12.70 K/uL    RBC 5.03 4.00 - 5.40 M/uL    Hemoglobin 14.2 12.0 - 16.0 g/dL    Hematocrit 43.0 37.0 - 48.5 %    MCV 86 82 - 98 fL    MCH 28.2 27.0 - 31.0 pg    MCHC 33.0 32.0 - 36.0 g/dL    RDW 15.5 (H) 11.5 - 14.5 %    Platelets 206 150 - 450 K/uL    MPV 10.6 9.2 - 12.9 fL    Immature Granulocytes 1.2 (H) 0.0 - 0.5 %    Gran # (ANC) 7.4 1.8 - 7.7 K/uL    Immature Grans (Abs) 0.10 (H) 0.00 - 0.04 K/uL    Lymph # 0.6 (L) 1.0 - 4.8 K/uL    Mono # 0.4 0.3 - 1.0 K/uL    Eos # 0.0 0.0 - 0.5 K/uL    Baso # 0.01 0.00 - 0.20 K/uL    nRBC 0 0 /100 WBC    Gran % 87.4 (H) 38.0 - 73.0 %    Lymph % 6.7 (L) 18.0 - 48.0 %    Mono % 4.6 4.0 - 15.0 %    Eosinophil % 0.0 0.0 - 8.0 %    Basophil % 0.1 0.0 - 1.9 %    Differential Method Automated    Comprehensive metabolic panel   Result Value Ref Range    Sodium 134 (L) 136 - 145 mmol/L    Potassium 4.7 3.5 - 5.1 mmol/L    Chloride 102 95 - 110 mmol/L    CO2 17 (L) 23 - 29 mmol/L    Glucose 188 (H) 70 - 110 mg/dL    BUN 44 (H) 8 - 23 mg/dL    Creatinine 1.7 (H) 0.5 - 1.4 mg/dL    Calcium 8.7 8.7 - 10.5 mg/dL    Total Protein 6.7 6.0 - 8.4 g/dL    Albumin 2.8 (L) 3.5 - 5.2 g/dL    Total Bilirubin 2.1 (H) 0.1 - 1.0 mg/dL    Alkaline Phosphatase 168 (H) 55 - 135 U/L    AST 46 (H) 10 - 40 U/L    ALT 45 (H) 10 - 44 U/L    eGFR 30 (A) >60 mL/min/1.73 m^2    Anion Gap 15 8 - 16 mmol/L   Lactic acid, plasma #1   Result Value Ref Range    Lactate (Lactic Acid) 3.0 (H) 0.5 - 2.2 mmol/L   Urinalysis, Reflex to Urine Culture Urine, Catheterized    Specimen: Urine   Result Value Ref Range    Specimen UA Urine, Catheterized     Color, UA Yellow Yellow, Straw, Kamla    Appearance, UA Clear Clear    pH, UA 6.0 5.0 - 8.0    Specific Gravity, UA 1.020 1.005 - 1.030    Protein, UA 1+ (A) Negative    Glucose, UA Negative Negative     Ketones, UA Negative Negative    Bilirubin (UA) Negative Negative    Occult Blood UA Negative Negative    Nitrite, UA Negative Negative    Urobilinogen, UA Negative <2.0 EU/dL    Leukocytes, UA Negative Negative   Magnesium   Result Value Ref Range    Magnesium 2.0 1.6 - 2.6 mg/dL   Phosphorus   Result Value Ref Range    Phosphorus 4.4 2.7 - 4.5 mg/dL   Lipase   Result Value Ref Range    Lipase 36 4 - 60 U/L   Brain natriuretic peptide   Result Value Ref Range    BNP >4,900 (H) 0 - 99 pg/mL   Troponin I   Result Value Ref Range    Troponin I 0.035 (H) 0.000 - 0.026 ng/mL   Procalcitonin   Result Value Ref Range    Procalcitonin 0.19 <0.25 ng/mL   Protime-INR   Result Value Ref Range    Prothrombin Time 20.4 (H) 9.0 - 12.5 sec    INR 1.8 (H) 0.8 - 1.2   APTT   Result Value Ref Range    aPTT 31.8 21.0 - 32.0 sec   Brain natriuretic peptide   Result Value Ref Range    BNP >4,900 (H) 0 - 99 pg/mL   COVID-19 Rapid Screening   Result Value Ref Range    SARS-CoV-2 RNA, Amplification, Qual Positive (A) Negative   Urinalysis Microscopic   Result Value Ref Range    RBC, UA 0 0 - 4 /hpf    WBC, UA 2 0 - 5 /hpf    Bacteria Rare None-Occ /hpf    Squam Epithel, UA 7 /hpf    Hyaline Casts, UA 23 (A) 0-1/lpf /lpf    Unclass Chinyere UA Rare None-Moderate    Microscopic Comment SEE COMMENT    Lactic acid, plasma #2   Result Value Ref Range    Lactate (Lactic Acid) 2.0 0.5 - 2.2 mmol/L   Hemoglobin A1c   Result Value Ref Range    Hemoglobin A1C 5.8 (H) 4.0 - 5.6 %    Estimated Avg Glucose 120 68 - 131 mg/dL   Basic metabolic panel   Result Value Ref Range    Sodium 141 136 - 145 mmol/L    Potassium 3.7 3.5 - 5.1 mmol/L    Chloride 103 95 - 110 mmol/L    CO2 25 23 - 29 mmol/L    Glucose 84 70 - 110 mg/dL    BUN 35 (H) 8 - 23 mg/dL    Creatinine 1.4 0.5 - 1.4 mg/dL    Calcium 8.6 (L) 8.7 - 10.5 mg/dL    Anion Gap 13 8 - 16 mmol/L    eGFR 38 (A) >60 mL/min/1.73 m^2   Protime-INR   Result Value Ref Range    Prothrombin Time 23.1 (H) 9.0 -  12.5 sec    INR 2.2 (H) 0.8 - 1.2   EKG 12-lead   Result Value Ref Range    QRS Duration 130 ms    [facility] QTC Calculation 489 ms   Echo   Result Value Ref Range    BSA 1.83 m2    LVOT stroke volume 75.63 cm3    LVIDd 5.48 3.5 - 6.0 cm    LV Systolic Volume 138.71 mL    LV Systolic Volume Index 77.5 mL/m2    LVIDs 5.36 (A) 2.1 - 4.0 cm    LV Diastolic Volume 146.22 mL    LV Diastolic Volume Index 81.69 mL/m2    Left Ventricular End Systolic Volume by Teichholz Method 138.71 mL    Left Ventricular End Diastolic Volume by Teichholz Method 146.22 mL    IVS 1.12 (A) 0.6 - 1.1 cm    LVOT diameter 2.24 cm    LVOT area 3.9 cm2    FS 2 (A) 28 - 44 %    Left Ventricle Relative Wall Thickness 0.42 cm    Posterior Wall 1.15 (A) 0.6 - 1.1 cm    LV mass 250.96 g    LV Mass Index 140 g/m2    MV Peak E Melo 0.95 m/s    TDI LATERAL 0.03 m/s    TDI SEPTAL 0.04 m/s    E/E' ratio 27.14 m/s    MV Peak A Melo 0.48 m/s    TR Max Melo 3.49 m/s    E/A ratio 1.98     E wave deceleration time 94.49 msec    LV SEPTAL E/E' RATIO 23.75 m/s    LV LATERAL E/E' RATIO 31.67 m/s    LVOT peak melo 1.05 m/s    Left Ventricular Outflow Tract Mean Velocity 0.76 cm/s    Left Ventricular Outflow Tract Mean Gradient 2.53 mmHg    RV-matson mid d 3.3 cm    RV mid diameter 3.32 cm    RVOT peak VTI 6.7 cm    TAPSE 1.05 cm    LA size 2.49 cm    Left Atrium Minor Axis 3.21 cm    Left Atrium Major Axis 4.08 cm    RA Major Axis 3.91 cm    AV regurgitation pressure 1/2 time 384.297114093280245 ms    AR Max Melo 3.74 m/s    AV mean gradient 43 mmHg    AV peak gradient 54 mmHg    Ao peak melo 3.67 m/s    Ao VTI 96.30 cm    LVOT peak VTI 19.20 cm    AV valve area 0.79 cm²    AV Velocity Ratio 0.29     AV index (prosthetic) 0.20     AAMIR by Velocity Ratio 1.13 cm²    Mr max melo 4.11 m/s    Triscuspid Valve Regurgitation Peak Gradient 49 mmHg    PV mean gradient 0 mmHg    RVOT peak melo 0.39 m/s    Ao root annulus 3.21 cm    STJ 2.63 cm    Ascending aorta 2.69 cm    IVC diameter 1.77  cm    Mean e' 0.04 m/s    ZLVIDS 4.44     ZLVIDD 1.02     LA Volume Index 15.3 mL/m2    LA volume 27.38 cm3    LA WIDTH 3.6 cm    RA Width 3.1 cm    EF 25 %    TV resting pulmonary artery pressure 52 mmHg    RV TB RVSP 6 mmHg    Est. RA pres 3 mmHg   ISTAT PROCEDURE   Result Value Ref Range    POC PH 7.460 (H) 7.35 - 7.45    POC PCO2 32.9 (L) 35 - 45 mmHg    POC PO2 96 80 - 100 mmHg    POC HCO3 23.4 (L) 24 - 28 mmol/L    POC BE 0 -2 to 2 mmol/L    POC SATURATED O2 98 95 - 100 %    Sample ARTERIAL     Site LR     Allens Test Pass     DelSys Nasal Can     Mode SPONT     Flow 4     FiO2 36          Imaging Results:  Imaging Results              CT Abdomen Pelvis  Without Contrast (Final result)  Result time 08/10/24 01:31:18      Final result by Osvaldo Raphael MD (08/10/24 01:31:18)                   Impression:      Small bilateral pleural effusions, left greater than right, with patchy bibasilar airspace opacities.  Findings may reflect edema, infection, aspiration, and/or atelectasis.    Cardiomegaly.    Nonspecific bilateral perinephric edema and mild urinary bladder wall thickening.  Correlation to urinalysis to exclude infection advised.    Diffuse body wall anasarca.    Additional incidental findings as above.      Electronically signed by: Osvaldo Raphael MD  Date:    08/10/2024  Time:    01:31               Narrative:    EXAMINATION:  CT ABDOMEN PELVIS WITHOUT CONTRAST    CLINICAL HISTORY:  Nausea/vomiting;not eating;    TECHNIQUE:  Low dose axial images, sagittal and coronal reformations were obtained from the lung bases to the pubic symphysis without IV contrast.  Oral contrast was not administered.    COMPARISON:  07/31/2022    FINDINGS:  Please note image quality is degraded by patient motion artifact as well as streak artifact from the patient's upper extremities.    There is a small volume of pleural fluid at the visualized lung bases, left greater than right.  There are mild patchy bibasilar  airspace opacities.  Heart is enlarged.  There are partially visualized transvenous pacing leads.  No significant pericardial effusion.    The liver is unremarkable in appearance on this limited non-contrast examination.  The gallbladder is surgically absent.  There is no intra-or extrahepatic biliary ductal dilatation.    The stomach, spleen, pancreas, and adrenal glands appear within normal limits for non-contrast technique.    The kidneys are normal in size and location. There is no evidence of hydronephrosis or nephrolithiasis.  There is a 2.8 cm left renal cyst.  There is nonspecific bilateral perinephric edema.  Urinary bladder demonstrates mild nonspecific wall thickening.  Uterus is absent.    The abdominal aorta is normal in course and caliber with atherosclerotic calcification along its course.    The visualized loops of small and large bowel show no evidence of obstruction or inflammation. The appendix is not definitively visualized.  There is no free intraperitoneal air or portal venous gas.  There is a small fat containing umbilical hernia.  There are bilateral inguinal hernias.    There is a levoscoliotic curvature of the lumbar spine.  There are moderately advanced multilevel degenerative changes.  There is degenerative change of bilateral hips, right greater than left.  There is body wall edema.                                       X-Ray Chest AP Portable (Final result)  Result time 08/09/24 23:50:10      Final result by Osvaldo Raphael MD (08/09/24 23:50:10)                   Impression:      Abnormal chest radiograph, similar to prior study of 08/04/2024.      Electronically signed by: Osvaldo Raphael MD  Date:    08/09/2024  Time:    23:50               Narrative:    EXAMINATION:  XR CHEST AP PORTABLE    CLINICAL HISTORY:  Sepsis;    TECHNIQUE:  Single frontal view of the chest was performed.    COMPARISON:  08/04/2024    FINDINGS:  There is a right chest wall cardiac pacing device present.   Cardiac monitoring leads overlie the chest.  There is postoperative change of prior median sternotomy.  Cardiac silhouette is enlarged, similar to prior examination.  There has been prior cardiac valve repair.  There is bilateral central vascular congestion.  Redemonstration of ill-defined asymmetric right upper lung zone opacity may reflect infection/pneumonia in the appropriate clinical setting.  There is continued opacification of the left lung base with obscuration of the left costophrenic angle.  No evidence of pneumothorax.  Osseous structures demonstrate degenerative changes.                                       The EKG was ordered, reviewed, and independently interpreted by the ED provider.  Interpretation time: 22:53  Rate: 90 BPM  Rhythm: normal sinus rhythm  Interpretation: Possible Left atrial enlargement. Right bundle branch block. Left anterior fascicular block. ** Bifascicular block **. Inferior infarct, age undetermined. No STEMI.           The Emergency Provider reviewed the vital signs and test results, which are outlined above.     ED Discussion     2:32 AM: Discussed case with Fer Ochoa MD ([facility]). Dr. Ochoa agrees with current care and management of pt and accepts admission.   Admitting Service: [facility]  Admitting Physician: Dr. Ochoa  Admit to: Inpatient Med Tele    2:32 AM: Re-evaluated pt. I have discussed test results, shared treatment plan, and the need for admission with patient and family at bedside. Pt and family express understanding at this time and agree with all information. All questions answered. Pt and family have no further questions or concerns at this time. Pt is ready for admit.      ED Course as of 08/11/24 1324   Fri Aug 09, 2024   2333 ISTAT PROCEDURE(!)  Resp alkalosis [CD]   2355 COVID-19 Rapid Screening(!)  pos [CD]   2355 X-Ray Chest AP Portable  There is a right chest wall cardiac pacing device present.  Cardiac monitoring leads  overlie the chest.  There is postoperative change of prior median sternotomy.  Cardiac silhouette is enlarged, similar to prior examination.  There has been prior cardiac valve repair.  There is bilateral central vascular congestion.  Redemonstration of ill-defined asymmetric right upper lung zone opacity may reflect infection/pneumonia in the appropriate clinical setting.  There is continued opacification of the left lung base with obscuration of the left costophrenic angle.  No evidence of pneumothorax.  Osseous structures demonstrate degenerative changes. [CD]   Sat Aug 10, 2024   0018 Influenza A & B by Molecular  Negative [CD]   0018 Protime-INR(!)  Elevated [CD]   0018 APTT  Within normal [CD]   0018 CBC auto differential(!)  Nonspecific finding [CD]   0019 Comprehensive metabolic panel(!)  Chronic kidney disease with elevated transaminases [CD]   0019 Lactic acid, plasma #1(!)  Elevated [CD]   0019 Urinalysis, Reflex to Urine Culture Urine, Catheterized(!)  No UTI [CD]   0019 Procalcitonin  Within normal limit [CD]   0019 Troponin I(!)  Chronic elevation [CD]   0019 Phosphorus  Within normal limits [CD]   0053 Cause the patient's history of heart failure and lactic acid less than 4 we will decrease IV fluid and monitor blood pressure [CD]   0144 CT Abdomen Pelvis  Without Contrast  Small bilateral pleural effusions, left greater than right, with patchy bibasilar airspace opacities.  Findings may reflect edema, infection, aspiration, and/or atelectasis.     Cardiomegaly.     Nonspecific bilateral perinephric edema and mild urinary bladder wall thickening.  Correlation to urinalysis to exclude infection advised.     Diffuse body wall anasarca.      [CD]      ED Course User Index  [CD] Andres Cordova, DO     Medical Decision Making  Amount and/or Complexity of Data Reviewed  Independent Historian:      Details: Son at bedside  Labs: ordered. Decision-making details documented in ED Course.  Radiology:  ordered and independent interpretation performed. Decision-making details documented in ED Course.  ECG/medicine tests: ordered and independent interpretation performed. Decision-making details documented in ED Course.    Risk  Prescription drug management.  Decision regarding hospitalization.  Risk Details: Differential diagnosis includes but is not limited to:  ACS, heart failure, sepsis, pneumonia, COVID-19, electrolyte abnormality, UTI                ED Medication(s):  Medications   sodium chloride 0.9% flush 10 mL (has no administration in time range)   ondansetron injection 4 mg (has no administration in time range)   acetaminophen tablet 650 mg (has no administration in time range)   mupirocin 2 % ointment ( Nasal Given 8/11/24 0818)   warfarin (COUMADIN) tablet 2.5 mg (has no administration in time range)   warfarin (COUMADIN) split tablet 1.25 mg (1.25 mg Oral Given 8/11/24 0812)   metoprolol tartrate (LOPRESSOR) tablet 25 mg (25 mg Oral Given 8/11/24 0809)   furosemide injection 40 mg (40 mg Intravenous Given 8/11/24 0814)   sodium chloride 0.9% bolus 1,641 mL 1,641 mL (0 mLs Intravenous Stopped 8/10/24 0058)   piperacillin-tazobactam (ZOSYN) 4.5 g in D5W 100 mL IVPB (MB+) (0 g Intravenous Stopped 8/10/24 0010)   albuterol-ipratropium 2.5 mg-0.5 mg/3 mL nebulizer solution 3 mL (3 mLs Nebulization Given 8/9/24 2307)   vancomycin 1.75 g in 5 % dextrose 500 mL IVPB (0 mg Intravenous Stopped 8/10/24 0332)       Current Discharge Medication List                  Scribe Attestation:   Scribe #1: I performed the above scribed service and the documentation accurately describes the services I performed. I attest to the accuracy of the note.     Attending:   Physician Attestation Statement for Scribe #1: I, Andres Cordova DO, personally performed the services described in this documentation, as scribed by Ryan Cedeno, in my presence, and it is both accurate and complete.       Scribe Attestation:   Isabellibmaxine  #2: I performed the above scribed service and the documentation accurately describes the services I performed. I attest to the accuracy of the note.    Attending Attestation:           Physician Attestation for Scribe:    Physician Attestation Statement for Scribe #2: I, Wilfred Gonzales MD, reviewed documentation, as scribed by Jeannine Cedeno in my presence, and it is both accurate and complete. I also acknowledge and confirm the content of the note done by Mckennae #1.           Clinical Impression       ICD-10-CM ICD-9-CM   1. COVID-19  U07.1 079.89   2. Sepsis  A41.9 038.9     995.91   3. Hypoxia  R09.02 799.02   4. Lactic acidosis  E87.20 276.2   5. CHF (congestive heart failure)  I50.9 428.0       Disposition:   Disposition: Admitted  Condition: Andres Licea, DO  08/11/24 1324

## 2024-08-10 NOTE — PROGRESS NOTES
Pharmacy Consult Note: Warfarin     Serena Post 's Coumadin will be dosed and monitored by Pharmacy.      Target INR goal is 2-3.    INR   Date Value Ref Range Status   08/09/2024 1.8 (H) 0.8 - 1.2 Final     Comment:     Coumadin Therapy:  2.0 - 3.0 for INR for all indicators except mechanical heart valves  and antiphospholipid syndromes which should use 2.5 - 3.5.         Indication: MAVR and afib  Home dose: 2.5 mg Mondays and Fridays, 1.25 mg all other days  Patient will be continued on home regimen    Dose for Today: 1.25 mg    PT/INR will be monitored daily. Dose adjustments will be made accordingly.      Thank you for allowing us to participate in this patient's care.     Sabiha Villa, PharmD 8/10/2024 12:06 PM

## 2024-08-10 NOTE — PROGRESS NOTES
VANCOMYCIN DOSING BY PHARMACY DISCONTINUATION NOTE    Serena Post is a 84 y.o. female who had been consulted for vancomycin dosing.    The pharmacy consult for vancomycin dosing has been discontinued.     Vancomycin Dosing by Pharmacy Consult will sign-off. Please reconsult if necessary. Thank you for allowing us to participate in this patient's care.     Juan Corrales, DylanD  Ext [phone]

## 2024-08-10 NOTE — NURSING
Unable to obtain oral temp. Was able to obtain a rectal temp of 97.1, when temp probe was removed it was covered in blood and a gumball sized blood clot (bright red) came out of pt's rectum. Dr. Ochoa notified immediately. Pt did not have any other signs of bleeding afterwards and no external hemorrhoids were noted. Pt is stable

## 2024-08-10 NOTE — ASSESSMENT & PLAN NOTE
Patient is identified as having  unspecified  heart failure that is Acute on chronic. CHF is currently uncontrolled due to Continued edema of extremities, Dyspnea improved with supplemental oxygen. Rales/crackles on pulmonary exam, and Pulmonary edema/pleural effusion on CXR. Latest ECHO performed and demonstrates- Results for orders placed during the hospital encounter of 02/22/24    Echo    Interpretation Summary    Left Ventricle: The left ventricle is normal in size. Normal wall thickness. There is concentric hypertrophy. Global hypokinesis present. There is moderately reduced systolic function. Ejection fraction by visual approximation is 35%.    Right Ventricle: Normal right ventricular cavity size. Systolic function is borderline low.    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 There is moderate aortic regurgitation.  Consider ORI if clinically needed to evaluate mechanical aortic valve further.    Mitral Valve: There is mild bileaflet sclerosis. There is mild regurgitation.    Pulmonary Artery: There is moderate pulmonary hypertension. The estimated pulmonary artery systolic pressure is 49 mmHg.    IVC/SVC: Normal venous pressure at 3 mmHg.    Ascending aorta is moderately dilated measuring 4.45 cm.  . Continue Beta Blocker and Furosemide and monitor clinical status closely. Monitor on telemetry. Patient is on CHF pathway.  Monitor strict Is&Os and daily weights.  Place on fluid restriction of 1.5 L. Cardiology has been consulted. Continue to stress to patient importance of self efficacy and  on diet for CHF. Last BNP reviewed- and noted below   Recent Labs   Lab 08/09/24  2308   BNP >4,900*  >4,900*

## 2024-08-10 NOTE — H&P
[facility]  History & Physical    Patient Name: Serena Post  MRN: [id number]  Patient Class: IP- Inpatient  Admission Date: 8/9/2024  Attending Physician: Becky Ceron MD   Primary Care Provider: Evangelina Olivares MD         Patient information was obtained from patient, past medical records, and ER records.     Subjective:     Principal Problem:Acute exacerbation of CHF (congestive heart failure)    Chief Complaint:   Chief Complaint   Patient presents with    Altered Mental Status    COVID-19 Concerns     Pt Son reports that pt has been declining x2 days. Was recently dx with COVID and uti        HPI: Serena Post is a 84 y.o. female with a PMH  has a past medical history of A-fib, Anticoagulant long-term use, Aortic valve disease, Cancer, Cardiomyopathy, CHF (congestive heart failure), COVID-19 (7/23/2022), heart valve replacement with mechanical valve, Hyperlipidemia, Hypertension, Pacemaker, Pacemaker, Sepsis (7/23/2022), and Stroke. presented to the Emergency Department for evaluation of AMS which onset today. Patient tested positive for COVID on 8/1/2024 and was recently discharged on 8/6/24 after being diuresed for CHF exacerbation and treated with decadron and neb treatments for her Covid. Patient deemed stable for discharge with homehealth physical/occupational therapy to be resumed and nurse practitioner to visit home program. However, patient's son reports patient started becoming incoherent today at 5PM. Son reports symptoms of decreased appetite, generalized weakness, and diarrhea. No mitigating or exacerbating factors reported. Son denies any vomiting. Son states that patient is taking her LASIX. No further complaints or concerns at this time.     ER workup revealed a CBC to be unremarkable, coags of the within therapeutic range, CMP revealed BUN/creatinine of 44/1.7 with EGFR of 30 (22/1.1 with EGFR 50 on 08/06/2024), BNP>4,900,  initial lactic acid of 3.0 with repeat lactic acid of 2.0, procalcitonin negative, flu negative, COVID positive, UA unremarkable, chest x-ray unchanged from previous, CT abdomen and pelvis without IV contrast revealed:[Small bilateral pleural effusions, left greater than right, with patchy bibasilar airspace opacities.  Findings may reflect edema, infection, aspiration, and/or atelectasis; Cardiomegaly.; Nonspecific bilateral perinephric edema and mild urinary bladder wall thickening.  Correlation to urinalysis to exclude infection advised; Diffuse body wall anasarca].  EKG revealed normal sinus rhythm with right bundle-branch block with a ventricular rate of 90 beats per minute with a QT/QTC of 400/489.  O2 saturation originally 93% on room air with a respiratory rate of 40 5 times per minute which resolved after being placed on 4 liters/minute via nasal cannula.  Patient currently satting 99% with a respiratory rate of 18.  No acute respiratory distress noted.  Patient received DuoNeb, 4.5 g of Zosyn, 1, 641 cc bolus of sodium chloride, and 1.7 g of vancomycin in ER.  Hospital Medicine consulted to admit patient for sepsis vs CHF exacerbation.  Patient admitted under inpatient status.      PCP: Evangelina Olivares      Past Medical History:   Diagnosis Date    A-fib     Anticoagulant long-term use     Aortic valve disease     Cancer     brain tumor, 10 years ago    Cardiomyopathy     CHF (congestive heart failure)     COVID-19 7/23/2022    Hx of heart valve replacement with mechanical valve     Hyperlipidemia     Hypertension     Pacemaker     Pacemaker     Sepsis 7/23/2022    Stroke     2007, residual weakness       Past Surgical History:   Procedure Laterality Date    AORTIC VALVE REPLACEMENT      CHOLECYSTECTOMY      CORONARY ARTERY BYPASS GRAFT      HYSTERECTOMY      INSERTION OF PACEMAKER      TONSILLECTOMY         Review of patient's allergies indicates:   Allergen Reactions    Amlodipine Other (See  Comments)     Not sure    Chlorthalidone Other (See Comments)     Not sure    Clonidine Other (See Comments)     Not sure    Codeine Other (See Comments)     Not sure    Escitalopram Other (See Comments)     Not sure    Lisinopril Other (See Comments)     Not sure    Losartan Other (See Comments)     Not sure    Meperidine Other (See Comments)     Not sure    Methadone Other (See Comments)     Not sure      Morphine Other (See Comments)     Not sure    Nebivolol Other (See Comments)     Not sure        Nitrofurantoin Other (See Comments)     Not sure        Omeprazole Other (See Comments)     Not sure      Pravastatin Other (See Comments)     Not sure      Propoxyphene Other (See Comments)     Not sure      Sulfamethoxazole-trimethoprim Other (See Comments)     Not sure           No current facility-administered medications on file prior to encounter.     Current Outpatient Medications on File Prior to Encounter   Medication Sig    furosemide (LASIX) 20 MG tablet Take 1 tablet (20 mg total) by mouth every morning. Hold for low blood pressure. Do not give if systolic blood pressure is below 110 and/or diastolic is below 60    metoprolol succinate (TOPROL-XL) 25 MG 24 hr tablet Take 1 tablet by mouth every evening.    warfarin (COUMADIN) 2.5 MG tablet Take 2.5 mg by mouth every Monday and Friday.  Take 1.25 mg by mouth all other days.    albuterol (PROVENTIL) 2.5 mg /3 mL (0.083 %) nebulizer solution Take 2.5 mg by nebulization every 4 (four) hours as needed.    dexAMETHasone (DECADRON) 2 MG tablet Take 3 tablets (6 mg total) by mouth once daily. for 4 days    fluticasone propionate (FLONASE) 50 mcg/actuation nasal spray Shake well and use 2 sprays (100 mcg total) by Each Nostril route once daily.    metoprolol tartrate (LOPRESSOR) 25 MG tablet Take 1 tablet (25 mg total) by mouth 2 (two) times daily. Do not give if systolic blood pressure is below 110 and/or diastolic below 60    miconazole nitrate 2% (MICOTIN) 2 %  Oint Apply topically 2 (two) times daily. for 7 days    pulse oximeter (PULSE OXIMETER) device by Apply Externally route 2 (two) times a day. Use twice daily at 8 AM and 3 PM and record the value in MyChart as directed.     Family History       Problem Relation (Age of Onset)    No Known Problems Mother, Father          Tobacco Use    Smoking status: Never     Passive exposure: Never    Smokeless tobacco: Never   Substance and Sexual Activity    Alcohol use: Not Currently    Drug use: Never    Sexual activity: Not on file     Review of Systems   Constitutional:  Positive for fatigue. Negative for chills, diaphoresis and fever.   Respiratory:  Positive for cough and shortness of breath.    Cardiovascular:  Positive for chest pain and leg swelling. Negative for palpitations.   Gastrointestinal:  Negative for abdominal pain, diarrhea, nausea and vomiting.   Neurological:  Positive for weakness. Negative for dizziness, light-headedness and headaches.   All other systems reviewed and are negative.    Objective:     Vital Signs (Most Recent):  Temp: 97.1 °F (36.2 °C) (08/10/24 0422)  Pulse: 82 (08/10/24 0422)  Resp: 18 (08/10/24 0422)  BP: 121/73 (08/10/24 0422)  SpO2: 96 % (08/10/24 0422) Vital Signs (24h Range):  Temp:  [97.1 °F (36.2 °C)-97.8 °F (36.6 °C)] 97.1 °F (36.2 °C)  Pulse:  [65-88] 82  Resp:  [15-45] 18  SpO2:  [93 %-99 %] 96 %  BP: (102-122)/(53-73) 121/73     Weight: 89.8 kg (198 lb)  Body mass index is 33.99 kg/m².     Physical Exam  Vitals and nursing note reviewed.   Constitutional:       General: She is awake. She is not in acute distress.     Appearance: She is obese. She is ill-appearing. She is not toxic-appearing or diaphoretic.   HENT:      Head: Normocephalic and atraumatic.   Eyes:      Extraocular Movements: Extraocular movements intact.      Conjunctiva/sclera: Conjunctivae normal.      Pupils: Pupils are equal, round, and reactive to light.   Cardiovascular:      Rate and Rhythm: Normal rate  and regular rhythm.      Heart sounds: Murmur heard.   Pulmonary:      Effort: Tachypnea and respiratory distress present.      Breath sounds: Rales present.      Comments: 99% on 4L/min via NC.   Abdominal:      General: Bowel sounds are normal.      Palpations: Abdomen is soft.      Tenderness: There is no abdominal tenderness.   Musculoskeletal:      Cervical back: Normal range of motion and neck supple.      Right lower leg: Edema present.      Left lower leg: Edema present.      Comments: VICTOR   Skin:     General: Skin is warm and dry.      Capillary Refill: Capillary refill takes less than 2 seconds.          Neurological:      General: No focal deficit present.      Mental Status: She is alert and oriented to person, place, and time.      GCS: GCS eye subscore is 4. GCS verbal subscore is 5. GCS motor subscore is 6.      Cranial Nerves: Cranial nerves 2-12 are intact.      Sensory: Sensation is intact.      Motor: Motor function is intact.   Psychiatric:         Mood and Affect: Mood normal.         Speech: Speech normal.         Behavior: Behavior normal. Behavior is cooperative.              CRANIAL NERVES     CN III, IV, VI   Pupils are equal, round, and reactive to light.     LABS:  Recent Results (from the past 24 hour(s))   COVID-19 Rapid Screening    Collection Time: 08/09/24 11:02 PM   Result Value Ref Range    SARS-CoV-2 RNA, Amplification, Qual Positive (A) Negative   Influenza A & B by Molecular    Collection Time: 08/09/24 11:05 PM    Specimen: Nasopharyngeal Swab   Result Value Ref Range    Influenza A, Molecular Negative Negative    Influenza B, Molecular Negative Negative    Flu A & B Source Nasal swab    ISTAT PROCEDURE    Collection Time: 08/09/24 11:07 PM   Result Value Ref Range    POC PH 7.460 (H) 7.35 - 7.45    POC PCO2 32.9 (L) 35 - 45 mmHg    POC PO2 96 80 - 100 mmHg    POC HCO3 23.4 (L) 24 - 28 mmol/L    POC BE 0 -2 to 2 mmol/L    POC SATURATED O2 98 95 - 100 %    Sample ARTERIAL      Site LR     Allens Test Pass     DelSys Nasal Can     Mode SPONT     Flow 4     FiO2 36    CBC auto differential    Collection Time: 08/09/24 11:08 PM   Result Value Ref Range    WBC 8.51 3.90 - 12.70 K/uL    RBC 5.03 4.00 - 5.40 M/uL    Hemoglobin 14.2 12.0 - 16.0 g/dL    Hematocrit 43.0 37.0 - 48.5 %    MCV 86 82 - 98 fL    MCH 28.2 27.0 - 31.0 pg    MCHC 33.0 32.0 - 36.0 g/dL    RDW 15.5 (H) 11.5 - 14.5 %    Platelets 206 150 - 450 K/uL    MPV 10.6 9.2 - 12.9 fL    Immature Granulocytes 1.2 (H) 0.0 - 0.5 %    Gran # (ANC) 7.4 1.8 - 7.7 K/uL    Immature Grans (Abs) 0.10 (H) 0.00 - 0.04 K/uL    Lymph # 0.6 (L) 1.0 - 4.8 K/uL    Mono # 0.4 0.3 - 1.0 K/uL    Eos # 0.0 0.0 - 0.5 K/uL    Baso # 0.01 0.00 - 0.20 K/uL    nRBC 0 0 /100 WBC    Gran % 87.4 (H) 38.0 - 73.0 %    Lymph % 6.7 (L) 18.0 - 48.0 %    Mono % 4.6 4.0 - 15.0 %    Eosinophil % 0.0 0.0 - 8.0 %    Basophil % 0.1 0.0 - 1.9 %    Differential Method Automated    Comprehensive metabolic panel    Collection Time: 08/09/24 11:08 PM   Result Value Ref Range    Sodium 134 (L) 136 - 145 mmol/L    Potassium 4.7 3.5 - 5.1 mmol/L    Chloride 102 95 - 110 mmol/L    CO2 17 (L) 23 - 29 mmol/L    Glucose 188 (H) 70 - 110 mg/dL    BUN 44 (H) 8 - 23 mg/dL    Creatinine 1.7 (H) 0.5 - 1.4 mg/dL    Calcium 8.7 8.7 - 10.5 mg/dL    Total Protein 6.7 6.0 - 8.4 g/dL    Albumin 2.8 (L) 3.5 - 5.2 g/dL    Total Bilirubin 2.1 (H) 0.1 - 1.0 mg/dL    Alkaline Phosphatase 168 (H) 55 - 135 U/L    AST 46 (H) 10 - 40 U/L    ALT 45 (H) 10 - 44 U/L    eGFR 30 (A) >60 mL/min/1.73 m^2    Anion Gap 15 8 - 16 mmol/L   Lactic acid, plasma #1    Collection Time: 08/09/24 11:08 PM   Result Value Ref Range    Lactate (Lactic Acid) 3.0 (H) 0.5 - 2.2 mmol/L   Magnesium    Collection Time: 08/09/24 11:08 PM   Result Value Ref Range    Magnesium 2.0 1.6 - 2.6 mg/dL   Phosphorus    Collection Time: 08/09/24 11:08 PM   Result Value Ref Range    Phosphorus 4.4 2.7 - 4.5 mg/dL   Lipase    Collection Time:  08/09/24 11:08 PM   Result Value Ref Range    Lipase 36 4 - 60 U/L   Brain natriuretic peptide    Collection Time: 08/09/24 11:08 PM   Result Value Ref Range    BNP >4,900 (H) 0 - 99 pg/mL   Troponin I    Collection Time: 08/09/24 11:08 PM   Result Value Ref Range    Troponin I 0.035 (H) 0.000 - 0.026 ng/mL   Procalcitonin    Collection Time: 08/09/24 11:08 PM   Result Value Ref Range    Procalcitonin 0.19 <0.25 ng/mL   Protime-INR    Collection Time: 08/09/24 11:08 PM   Result Value Ref Range    Prothrombin Time 20.4 (H) 9.0 - 12.5 sec    INR 1.8 (H) 0.8 - 1.2   APTT    Collection Time: 08/09/24 11:08 PM   Result Value Ref Range    aPTT 31.8 21.0 - 32.0 sec   Brain natriuretic peptide    Collection Time: 08/09/24 11:08 PM   Result Value Ref Range    BNP >4,900 (H) 0 - 99 pg/mL   Urinalysis, Reflex to Urine Culture Urine, Catheterized    Collection Time: 08/09/24 11:25 PM    Specimen: Urine   Result Value Ref Range    Specimen UA Urine, Catheterized     Color, UA Yellow Yellow, Straw, Kamla    Appearance, UA Clear Clear    pH, UA 6.0 5.0 - 8.0    Specific Gravity, UA 1.020 1.005 - 1.030    Protein, UA 1+ (A) Negative    Glucose, UA Negative Negative    Ketones, UA Negative Negative    Bilirubin (UA) Negative Negative    Occult Blood UA Negative Negative    Nitrite, UA Negative Negative    Urobilinogen, UA Negative <2.0 EU/dL    Leukocytes, UA Negative Negative   Urinalysis Microscopic    Collection Time: 08/09/24 11:25 PM   Result Value Ref Range    RBC, UA 0 0 - 4 /hpf    WBC, UA 2 0 - 5 /hpf    Bacteria Rare None-Occ /hpf    Squam Epithel, UA 7 /hpf    Hyaline Casts, UA 23 (A) 0-1/lpf /lpf    Unclass Chinyere UA Rare None-Moderate    Microscopic Comment SEE COMMENT    Lactic acid, plasma #2    Collection Time: 08/10/24  2:47 AM   Result Value Ref Range    Lactate (Lactic Acid) 2.0 0.5 - 2.2 mmol/L       RADIOLOGY  CT Abdomen Pelvis  Without Contrast    Result Date: 8/10/2024  EXAMINATION: CT ABDOMEN PELVIS WITHOUT  CONTRAST CLINICAL HISTORY: Nausea/vomiting;not eating; TECHNIQUE: Low dose axial images, sagittal and coronal reformations were obtained from the lung bases to the pubic symphysis without IV contrast.  Oral contrast was not administered. COMPARISON: 07/31/2022 FINDINGS: Please note image quality is degraded by patient motion artifact as well as streak artifact from the patient's upper extremities. There is a small volume of pleural fluid at the visualized lung bases, left greater than right.  There are mild patchy bibasilar airspace opacities.  Heart is enlarged.  There are partially visualized transvenous pacing leads.  No significant pericardial effusion. The liver is unremarkable in appearance on this limited non-contrast examination.  The gallbladder is surgically absent.  There is no intra-or extrahepatic biliary ductal dilatation. The stomach, spleen, pancreas, and adrenal glands appear within normal limits for non-contrast technique. The kidneys are normal in size and location. There is no evidence of hydronephrosis or nephrolithiasis.  There is a 2.8 cm left renal cyst.  There is nonspecific bilateral perinephric edema.  Urinary bladder demonstrates mild nonspecific wall thickening.  Uterus is absent. The abdominal aorta is normal in course and caliber with atherosclerotic calcification along its course. The visualized loops of small and large bowel show no evidence of obstruction or inflammation. The appendix is not definitively visualized.  There is no free intraperitoneal air or portal venous gas.  There is a small fat containing umbilical hernia.  There are bilateral inguinal hernias. There is a levoscoliotic curvature of the lumbar spine.  There are moderately advanced multilevel degenerative changes.  There is degenerative change of bilateral hips, right greater than left.  There is body wall edema.     Small bilateral pleural effusions, left greater than right, with patchy bibasilar airspace  opacities.  Findings may reflect edema, infection, aspiration, and/or atelectasis. Cardiomegaly. Nonspecific bilateral perinephric edema and mild urinary bladder wall thickening.  Correlation to urinalysis to exclude infection advised. Diffuse body wall anasarca. Additional incidental findings as above. Electronically signed by: Osvaldo Raphael MD Date:    08/10/2024 Time:    01:31    X-Ray Chest AP Portable    Result Date: 8/9/2024  EXAMINATION: XR CHEST AP PORTABLE CLINICAL HISTORY: Sepsis; TECHNIQUE: Single frontal view of the chest was performed. COMPARISON: 08/04/2024 FINDINGS: There is a right chest wall cardiac pacing device present.  Cardiac monitoring leads overlie the chest.  There is postoperative change of prior median sternotomy.  Cardiac silhouette is enlarged, similar to prior examination.  There has been prior cardiac valve repair.  There is bilateral central vascular congestion.  Redemonstration of ill-defined asymmetric right upper lung zone opacity may reflect infection/pneumonia in the appropriate clinical setting.  There is continued opacification of the left lung base with obscuration of the left costophrenic angle.  No evidence of pneumothorax.  Osseous structures demonstrate degenerative changes.     Abnormal chest radiograph, similar to prior study of 08/04/2024. Electronically signed by: Osvaldo Raphael MD Date:    08/09/2024 Time:    23:50    X-Ray Chest AP Portable    Result Date: 8/4/2024  EXAMINATION: XR CHEST AP PORTABLE CLINICAL HISTORY: Shortness of breath, sob; COMPARISON: Chest, 08/01/2024 FINDINGS: Prior median sternotomy.  Cardiomegaly.  Pacemaker leads are present.  Prosthetic cardiac valve.  Mild vascular congestion bilaterally.  Possible developing area of focal infiltrate in the right upper lobe.  Increased density at the left base could be related to technique however focal effusion or consolidation may be present.     Findings are mostly similar to previous exam as  described above.  However there may be a new area of patchy increased density right upper lobe representing a new infiltrate.  Follow-up recommended. Electronically signed by: Sara Knight MD Date:    2024 Time:    14:16    Transthoracic echo (TTE) complete    Result Date: 2024  Transthoracic Echocardiographic Report Patient Name: AMY SELF ARL   Patient ID: [id number]  Account #: : [id number] (84y )  Gender: F   Study Date: 2024 09:54:10 AM Ht(Cm): 158  Wt(Kg): 86.23  BSA: 1.95   Accession #: [id number] Sonographer:   Location: [facility] Provider: DARLING TOVAR Heart Rate: 85   Quality: Technically difficult study due to limited patient mobility. Ref.Provider: DARLING TOVAR -----------------------    CONCLUSIONS: 1. Normal left ventricular cavity size. Normal left ventricular systolic function. LVEF 55 - 65%. Indeterminate diastolic dysfunction. Normal wall thickness. 2. A bileaflet tilting disk mechanical prosthetic valve is present in the aortic position. The aortic prosthesis demonstrates a moderate to severely increased transvalvular gradient for valve type and size. 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 Mild to moderate prosthetic valvular regurgitation is present. AV peak PG 59 mmHg. AV Mean PG 36 mmHg. 3. The estimated right ventricular systolic pressure/PASP is 40-50 mmHg. Mild pulmonary hypertension. -----------------------    PROCEDURES: Echocardiographic Report: Transthoracic complete echo, 2D, spectral and tissue Doppler, color flow Doppler, M-mode. Sonographer: Ayesha Leon [facility], RV[facility] -----------------------    INDICATIONS: Aortic valve replacement. -----------------------    FINDINGS: Left Ventricle: Normal left ventricular cavity size. Normal left ventricular systolic function. LVEF 55 - 65%. Indeterminate diastolic dysfunction. Normal wall thickness. Right Ventricle: The right ventricle is not well visualized. Mild to moderately dilated  right ventricle. Normal right ventricular systolic function. Left Atrium: The left atrium is not well visualized. The left atrium is normal in size. Right Atrium: The right atrium is normal in size. Atrial Septum: The interatrial septum is normal in appearance. Mitral Valve: Mild mitral valve regurgitation. Severe mitral annular calcification. Aortic Valve: A bileaflet tilting disk mechanical prosthetic valve is present in the aortic position. The aortic prosthesis demonstrates a moderate to severely increased transvalvular gradient for valve type and size. 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 Mild to moderate prosthetic valvular regurgitation is present. AV peak PG 59 mmHg AV Mean PG 36 mmHg Tricuspid Valve: Normal tricuspid valve structure. Mild tricuspid valve regurgitation. No tricuspid valve stenosis. The estimated right ventricular systolic pressure/PASP is 40-50 mmHg. Mild pulmonary hypertension. Pulmonic Valve: The pulmonic valve is not well visualized. No or trivial pulmonic valve regurgitation. No pulmonic valve stenosis. Pericardium: Normal pericardium without evidence of pericardial effusion. Aorta: Normal aortic root size. IVC: Normal IVC size with >50% collapse with inspiration. Normal estimated RAP around 0-5 mmHg. Inferior vena cava not visualized. Rhythm: The rhythm during the study was normal sinus rhythm. -----------------------    MEASUREMENTS: 2D/MM               Value         Doppler        Value LVIDd 2D            4.56 cm       AV Peak Melo    384.00 cm/sec LVIDs 2D            3.03 cm       AV Mean Melo    288.00 cm/sec LV FS Teich 2D      33.60 %       AV Peak PG     59.00 mmHg IVSd 2D             0.82 cm       AV Mean PG     36.00 mmHg LVPWd 2D            1.12 cm       AV VTI         77.70 cm IVS/LVPW 2D         0.73 ratio    AI PHT         313.00 msec LV Mass 2D          153.23 g      TR Peak Melo    301.00 cm/sec LV Mass Index 2D    78.58 g/m2    TR Peak PG      36.00 mmHg RWT   0.49 EDV 2D                         95.36 mL ESV 2D                         35.86 mL EF Teich 2D        62 % LA Dimension 2D                3.70 cm RVDd 2d                        2.78 cm AoR Diam MM                    3.90 cm ACS MM                         1.00 cm Electronically Signed By: Gregorio Lydia N 2024-08-01 17:42:05 CDT CC: CC:    X-Ray Chest 1 View    Result Date: 8/1/2024  EXAMINATION: XR CHEST 1 VIEW CLINICAL HISTORY: shortness of breath; COMPARISON: Studies dating back to March 11, 2020 FINDINGS: Chronic or recurrent left effusion and adjacent atelectasis/consolidation when compared to the most recent study.  The lungs are otherwise free of new pulmonary opacities.  The cardiac silhouette size is enlarged.  The trachea is midline and the mediastinal width is normal. Negative for right effusion or pneumothorax.  Pulmonary vasculature is normal. Negative for osseous abnormalities. Stable dual lead right subclavian pacemaker.  Median sternotomy wires and prosthetic valve device.  Tortuous aorta with calcifications of the aortic knob.  There are degenerative changes of the spine and both shoulder girdles.     1.  Chronic or recurrent left effusion with adjacent atelectasis/consolidation.  Pneumonia with a parapneumonic effusion could have this appearance in the right clinical setting.  Less likely could these changes be related to CHF. 2.  Negative for acute process otherwise. 3.  Stable findings as noted above. Electronically signed by: Emery Arrieta MD Date:    08/01/2024 Time:    16:47      EKG    MICROBIOLOGY    MDM     Amount and/or Complexity of Data Reviewed  Clinical lab tests: reviewed  Tests in the radiology section of CPT®: reviewed  Tests in the medicine section of CPT®: reviewed  Discussion of test results with the performing providers: yes  Decide to obtain previous medical records or to obtain history from someone other than the patient: yes  Obtain history from someone  other than the patient: yes  Review and summarize past medical records: yes  Discuss the patient with other providers: yes  Independent visualization of images, tracings, or specimens: yes        Assessment/Plan:     * Acute exacerbation of CHF (congestive heart failure)  Patient is identified as having  unspecified  heart failure that is Acute on chronic. CHF is currently uncontrolled due to Continued edema of extremities, Dyspnea improved with supplemental oxygen. Rales/crackles on pulmonary exam, and Pulmonary edema/pleural effusion on CXR. Latest ECHO performed and demonstrates- Results for orders placed during the hospital encounter of 02/22/24    Echo    Interpretation Summary    Left Ventricle: The left ventricle is normal in size. Normal wall thickness. There is concentric hypertrophy. Global hypokinesis present. There is moderately reduced systolic function. Ejection fraction by visual approximation is 35%.    Right Ventricle: Normal right ventricular cavity size. Systolic function is borderline low.    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 There is moderate aortic regurgitation.  Consider ORI if clinically needed to evaluate mechanical aortic valve further.    Mitral Valve: There is mild bileaflet sclerosis. There is mild regurgitation.    Pulmonary Artery: There is moderate pulmonary hypertension. The estimated pulmonary artery systolic pressure is 49 mmHg.    IVC/SVC: Normal venous pressure at 3 mmHg.    Ascending aorta is moderately dilated measuring 4.45 cm.  . Continue Beta Blocker and Furosemide and monitor clinical status closely. Monitor on telemetry. Patient is on CHF pathway.  Monitor strict Is&Os and daily weights.  Place on fluid restriction of 1.5 L. Cardiology has been consulted. Continue to stress to patient importance of self efficacy and  on diet for CHF. Last BNP reviewed- and noted below   Recent Labs   Lab 08/09/24  9935    BNP >4,900*  >4,900*       MARCUS (acute kidney injury)   Baseline creatinine is  1.2 . Most recent creatinine and eGFR are listed below.  Recent Labs     08/09/24  2308   CREATININE 1.7*   EGFRNORACEVR 30*      Plan  - MARCUS is  treated with sepsis bolus in ER.  - Avoid nephrotoxins and renally dose meds for GFR listed above  - Monitor urine output, serial BMP, and adjust therapy as needed      Elevated lactic acid level  Resolved after IVF bolus in ER.  Plan:  -trend labs as needed      Hyperglycemia  Likely elevated secondary to Decadron use to treat recent COVID infection.  Plan:  -SSI as needed  -Accuchecks      Anticoagulant long-term use  This patient has long term use on an anticoagulant with Select Anticoagulant(s): Warfarin/Coumadin. Their long term anticoagulation will be Held or Continued: continued. They are on long term anticoagulation due to Reason for Anticoagulation: Atrial fibrillation and Mechanical heart valve.     Dyslipidemia  Patient is not chronically on statin.will not continue for now. Last Lipid Panel:   Lab Results   Component Value Date    CHOL 225 (H) 11/01/2022    HDL 48 11/01/2022    LDLCALC 141 (H) 11/01/2022    TRIG 180 11/01/2022    CHOLHDL 4.7 (H) 11/01/2022     Plan:  -low fat/low calorie diet        Essential hypertension  Chronic, controlled. Latest blood pressure and vitals reviewed-     Temp:  [97.1 °F (36.2 °C)-97.8 °F (36.6 °C)]   Pulse:  [65-88]   Resp:  [15-45]   BP: (102-122)/(53-73)   SpO2:  [93 %-99 %] .   Home meds for hypertension were reviewed and noted below.   Hypertension Medications               furosemide (LASIX) 20 MG tablet Take 1 tablet (20 mg total) by mouth every morning. Hold for low blood pressure. Do not give if systolic blood pressure is below 110 and/or diastolic is below 60    metoprolol succinate (TOPROL-XL) 25 MG 24 hr tablet Take 1 tablet by mouth every evening.    metoprolol tartrate (LOPRESSOR) 25 MG tablet Take 1 tablet (25 mg total) by mouth 2  (two) times daily. Do not give if systolic blood pressure is below 110 and/or diastolic below 60            While in the hospital, will manage blood pressure as follows; Continue home antihypertensive regimen    Will utilize p.r.n. blood pressure medication only if patient's blood pressure greater than 180/110 and she develops symptoms such as worsening chest pain or shortness of breath.    Paroxysmal atrial fibrillation  Patient with Paroxysmal (<7 days) atrial fibrillation which is controlled currently with Beta Blocker. Patient is currently in sinus rhythm.[id number] Score: 4. HASBLED Score: . Anticoagulation indicated. Anticoagulation done with Coumadin .    COVID  Recently diagnosed on 08/01/2024 and treated with Decadron during last admission.  Sent home with Decadron p.o.  Plan:  -supplemental oxygen as needed  -continue decadron po  -duonebs prn  -monitor pulse ox as need/per unit protocol      VTE Risk Mitigation (From admission, onward)           Ordered     warfarin (COUMADIN) tablet 2.5 mg  Once per day on Monday Friday         08/10/24 0703     warfarin (COUMADIN) split tablet 1.25 mg  Once per day on Jayson Tuesday Wednesday Thursday Saturday         08/10/24 0703     IP VTE HIGH RISK PATIENT  Once         08/10/24 0307     Place sequential compression device  Until discontinued         08/10/24 0307                       VANCOMYCIN DOSING BY PHARMACY DISCONTINUATION NOTE    Serena Post is a 84 y.o. female who had been consulted for vancomycin dosing.    The pharmacy consult for vancomycin dosing has been discontinued.     Vancomycin Dosing by Pharmacy Consult will sign-off. Please reconsult if necessary. Thank you for allowing us to participate in this patient's care.     Juan Corrales, PharmD  Ext [phone]         //Core Measures   -DVT proph: SCDs, continue coumadin  -Code status Full    -Surrogate:none present    Components of this note were documented using a voice recognition system and  are subject to errors not corrected at the time the document was proof read. Please contact the author for any clarifications.     Omi Ochoa NP  Department of Hospital Medicine  [facility] - Telemetry ([facility])

## 2024-08-10 NOTE — ASSESSMENT & PLAN NOTE
Patient is not chronically on statin.will not continue for now. Last Lipid Panel:   Lab Results   Component Value Date    CHOL 225 (H) 11/01/2022    HDL 48 11/01/2022    LDLCALC 141 (H) 11/01/2022    TRIG 180 11/01/2022    CHOLHDL 4.7 (H) 11/01/2022     Plan:  -low fat/low calorie diet

## 2024-08-10 NOTE — CARE UPDATE
Evaluated patient at bedside    Too lethargic to participate with exam    Mental status change  No acute distress  No respiratory distress on baseline oxygen nasal cannula  Lungs clearer on auscultation, less rhonchi than before  Weakness  Pale  Sleeping but easily arouses    Cardiology consulted  Continue intravenous diuresis  Consider karlene to evaluate mechanical aortic valve    Speech consulted for possible aspiration/encephalopathy   Resume soft diet    Discharged on oral systemic steroids for covid and should have completed  No indication to continue systemic steroids  Antibiotic(s) +/-  Procalcitonin negative

## 2024-08-10 NOTE — ASSESSMENT & PLAN NOTE
This patient has long term use on an anticoagulant with Select Anticoagulant(s): Warfarin/Coumadin. Their long term anticoagulation will be Held or Continued: continued. They are on long term anticoagulation due to Reason for Anticoagulation: Atrial fibrillation and Mechanical heart valve.

## 2024-08-10 NOTE — SUBJECTIVE & OBJECTIVE
Past Medical History:   Diagnosis Date    A-fib     Anticoagulant long-term use     Aortic valve disease     Cancer     brain tumor, 10 years ago    Cardiomyopathy     CHF (congestive heart failure)     COVID-19 7/23/2022    Hx of heart valve replacement with mechanical valve     Hyperlipidemia     Hypertension     Pacemaker     Pacemaker     Sepsis 7/23/2022    Stroke     2007, residual weakness       Past Surgical History:   Procedure Laterality Date    AORTIC VALVE REPLACEMENT      CHOLECYSTECTOMY      CORONARY ARTERY BYPASS GRAFT      HYSTERECTOMY      INSERTION OF PACEMAKER      TONSILLECTOMY         Review of patient's allergies indicates:   Allergen Reactions    Amlodipine Other (See Comments)     Not sure    Chlorthalidone Other (See Comments)     Not sure    Clonidine Other (See Comments)     Not sure    Codeine Other (See Comments)     Not sure    Escitalopram Other (See Comments)     Not sure    Lisinopril Other (See Comments)     Not sure    Losartan Other (See Comments)     Not sure    Meperidine Other (See Comments)     Not sure    Methadone Other (See Comments)     Not sure      Morphine Other (See Comments)     Not sure    Nebivolol Other (See Comments)     Not sure        Nitrofurantoin Other (See Comments)     Not sure        Omeprazole Other (See Comments)     Not sure      Pravastatin Other (See Comments)     Not sure      Propoxyphene Other (See Comments)     Not sure      Sulfamethoxazole-trimethoprim Other (See Comments)     Not sure           No current facility-administered medications on file prior to encounter.     Current Outpatient Medications on File Prior to Encounter   Medication Sig    furosemide (LASIX) 20 MG tablet Take 1 tablet (20 mg total) by mouth every morning. Hold for low blood pressure. Do not give if systolic blood pressure is below 110 and/or diastolic is below 60    metoprolol succinate (TOPROL-XL) 25 MG 24 hr tablet Take 1 tablet by mouth every evening.    warfarin  (COUMADIN) 2.5 MG tablet Take 2.5 mg by mouth every Monday and Friday.  Take 1.25 mg by mouth all other days.    albuterol (PROVENTIL) 2.5 mg /3 mL (0.083 %) nebulizer solution Take 2.5 mg by nebulization every 4 (four) hours as needed.    dexAMETHasone (DECADRON) 2 MG tablet Take 3 tablets (6 mg total) by mouth once daily. for 4 days    fluticasone propionate (FLONASE) 50 mcg/actuation nasal spray Shake well and use 2 sprays (100 mcg total) by Each Nostril route once daily.    metoprolol tartrate (LOPRESSOR) 25 MG tablet Take 1 tablet (25 mg total) by mouth 2 (two) times daily. Do not give if systolic blood pressure is below 110 and/or diastolic below 60    miconazole nitrate 2% (MICOTIN) 2 % Oint Apply topically 2 (two) times daily. for 7 days    pulse oximeter (PULSE OXIMETER) device by Apply Externally route 2 (two) times a day. Use twice daily at 8 AM and 3 PM and [facility] as directed.     Family History       Problem Relation (Age of Onset)    No Known Problems Mother, Father          Tobacco Use    Smoking status: Never     Passive exposure: Never    Smokeless tobacco: Never   Substance and Sexual Activity    Alcohol use: Not Currently    Drug use: Never    Sexual activity: Not on file     Review of Systems   Constitutional:  Positive for fatigue. Negative for chills, diaphoresis and fever.   Respiratory:  Positive for cough and shortness of breath.    Cardiovascular:  Positive for chest pain and leg swelling. Negative for palpitations.   Gastrointestinal:  Negative for abdominal pain, diarrhea, nausea and vomiting.   Neurological:  Positive for weakness. Negative for dizziness, light-headedness and headaches.   All other systems reviewed and are negative.    Objective:     Vital Signs (Most Recent):  Temp: 97.1 °F (36.2 °C) (08/10/24 0422)  Pulse: 82 (08/10/24 0422)  Resp: 18 (08/10/24 0422)  BP: 121/73 (08/10/24 0422)  SpO2: 96 % (08/10/24 0422) Vital Signs (24h Range):  Temp:  [97.1 °F  (36.2 °C)-97.8 °F (36.6 °C)] 97.1 °F (36.2 °C)  Pulse:  [65-88] 82  Resp:  [15-45] 18  SpO2:  [93 %-99 %] 96 %  BP: (102-122)/(53-73) 121/73     Weight: 89.8 kg (198 lb)  Body mass index is 33.99 kg/m².     Physical Exam  Vitals and nursing note reviewed.   Constitutional:       General: She is awake. She is not in acute distress.     Appearance: She is obese. She is ill-appearing. She is not toxic-appearing or diaphoretic.   HENT:      Head: Normocephalic and atraumatic.   Eyes:      Extraocular Movements: Extraocular movements intact.      Conjunctiva/sclera: Conjunctivae normal.      Pupils: Pupils are equal, round, and reactive to light.   Cardiovascular:      Rate and Rhythm: Normal rate and regular rhythm.      Heart sounds: Murmur heard.   Pulmonary:      Effort: Tachypnea and respiratory distress present.      Breath sounds: Rales present.      Comments: 99% on 4L/min via NC.   Abdominal:      General: Bowel sounds are normal.      Palpations: Abdomen is soft.      Tenderness: There is no abdominal tenderness.   Musculoskeletal:      Cervical back: Normal range of motion and neck supple.      Right lower leg: Edema present.      Left lower leg: Edema present.      Comments: VICTOR   Skin:     General: Skin is warm and dry.      Capillary Refill: Capillary refill takes less than 2 seconds.          Neurological:      General: No focal deficit present.      Mental Status: She is alert and oriented to person, place, and time.      GCS: GCS eye subscore is 4. GCS verbal subscore is 5. GCS motor subscore is 6.      Cranial Nerves: Cranial nerves 2-12 are intact.      Sensory: Sensation is intact.      Motor: Motor function is intact.   Psychiatric:         Mood and Affect: Mood normal.         Speech: Speech normal.         Behavior: Behavior normal. Behavior is cooperative.              CRANIAL NERVES     CN III, IV, VI   Pupils are equal, round, and reactive to light.     LABS:  Recent Results (from the past 24  hour(s))   COVID-19 Rapid Screening    Collection Time: 08/09/24 11:02 PM   Result Value Ref Range    SARS-CoV-2 RNA, Amplification, Qual Positive (A) Negative   Influenza A & B by Molecular    Collection Time: 08/09/24 11:05 PM    Specimen: Nasopharyngeal Swab   Result Value Ref Range    Influenza A, Molecular Negative Negative    Influenza B, Molecular Negative Negative    Flu A & B Source Nasal swab    ISTAT PROCEDURE    Collection Time: 08/09/24 11:07 PM   Result Value Ref Range    POC PH 7.460 (H) 7.35 - 7.45    POC PCO2 32.9 (L) 35 - 45 mmHg    POC PO2 96 80 - 100 mmHg    POC HCO3 23.4 (L) 24 - 28 mmol/L    POC BE 0 -2 to 2 mmol/L    POC SATURATED O2 98 95 - 100 %    Sample ARTERIAL     Site LR     Allens Test Pass     DelSys Nasal Can     Mode SPONT     Flow 4     FiO2 36    CBC auto differential    Collection Time: 08/09/24 11:08 PM   Result Value Ref Range    WBC 8.51 3.90 - 12.70 K/uL    RBC 5.03 4.00 - 5.40 M/uL    Hemoglobin 14.2 12.0 - 16.0 g/dL    Hematocrit 43.0 37.0 - 48.5 %    MCV 86 82 - 98 fL    MCH 28.2 27.0 - 31.0 pg    MCHC 33.0 32.0 - 36.0 g/dL    RDW 15.5 (H) 11.5 - 14.5 %    Platelets 206 150 - 450 K/uL    MPV 10.6 9.2 - 12.9 fL    Immature Granulocytes 1.2 (H) 0.0 - 0.5 %    Gran # (ANC) 7.4 1.8 - 7.7 K/uL    Immature Grans (Abs) 0.10 (H) 0.00 - 0.04 K/uL    Lymph # 0.6 (L) 1.0 - 4.8 K/uL    Mono # 0.4 0.3 - 1.0 K/uL    Eos # 0.0 0.0 - 0.5 K/uL    Baso # 0.01 0.00 - 0.20 K/uL    nRBC 0 0 /100 WBC    Gran % 87.4 (H) 38.0 - 73.0 %    Lymph % 6.7 (L) 18.0 - 48.0 %    Mono % 4.6 4.0 - 15.0 %    Eosinophil % 0.0 0.0 - 8.0 %    Basophil % 0.1 0.0 - 1.9 %    Differential Method Automated    Comprehensive metabolic panel    Collection Time: 08/09/24 11:08 PM   Result Value Ref Range    Sodium 134 (L) 136 - 145 mmol/L    Potassium 4.7 3.5 - 5.1 mmol/L    Chloride 102 95 - 110 mmol/L    CO2 17 (L) 23 - 29 mmol/L    Glucose 188 (H) 70 - 110 mg/dL    BUN 44 (H) 8 - 23 mg/dL    Creatinine 1.7 (H) 0.5 -  1.4 mg/dL    Calcium 8.7 8.7 - 10.5 mg/dL    Total Protein 6.7 6.0 - 8.4 g/dL    Albumin 2.8 (L) 3.5 - 5.2 g/dL    Total Bilirubin 2.1 (H) 0.1 - 1.0 mg/dL    Alkaline Phosphatase 168 (H) 55 - 135 U/L    AST 46 (H) 10 - 40 U/L    ALT 45 (H) 10 - 44 U/L    eGFR 30 (A) >60 mL/min/1.73 m^2    Anion Gap 15 8 - 16 mmol/L   Lactic acid, plasma #1    Collection Time: 08/09/24 11:08 PM   Result Value Ref Range    Lactate (Lactic Acid) 3.0 (H) 0.5 - 2.2 mmol/L   Magnesium    Collection Time: 08/09/24 11:08 PM   Result Value Ref Range    Magnesium 2.0 1.6 - 2.6 mg/dL   Phosphorus    Collection Time: 08/09/24 11:08 PM   Result Value Ref Range    Phosphorus 4.4 2.7 - 4.5 mg/dL   Lipase    Collection Time: 08/09/24 11:08 PM   Result Value Ref Range    Lipase 36 4 - 60 U/L   Brain natriuretic peptide    Collection Time: 08/09/24 11:08 PM   Result Value Ref Range    BNP >4,900 (H) 0 - 99 pg/mL   Troponin I    Collection Time: 08/09/24 11:08 PM   Result Value Ref Range    Troponin I 0.035 (H) 0.000 - 0.026 ng/mL   Procalcitonin    Collection Time: 08/09/24 11:08 PM   Result Value Ref Range    Procalcitonin 0.19 <0.25 ng/mL   Protime-INR    Collection Time: 08/09/24 11:08 PM   Result Value Ref Range    Prothrombin Time 20.4 (H) 9.0 - 12.5 sec    INR 1.8 (H) 0.8 - 1.2   APTT    Collection Time: 08/09/24 11:08 PM   Result Value Ref Range    aPTT 31.8 21.0 - 32.0 sec   Brain natriuretic peptide    Collection Time: 08/09/24 11:08 PM   Result Value Ref Range    BNP >4,900 (H) 0 - 99 pg/mL   Urinalysis, Reflex to Urine Culture Urine, Catheterized    Collection Time: 08/09/24 11:25 PM    Specimen: Urine   Result Value Ref Range    Specimen UA Urine, Catheterized     Color, UA Yellow Yellow, Straw, Kamla    Appearance, UA Clear Clear    pH, UA 6.0 5.0 - 8.0    Specific Gravity, UA 1.020 1.005 - 1.030    Protein, UA 1+ (A) Negative    Glucose, UA Negative Negative    Ketones, UA Negative Negative    Bilirubin (UA) Negative Negative    Occult  Blood UA Negative Negative    Nitrite, UA Negative Negative    Urobilinogen, UA Negative <2.0 EU/dL    Leukocytes, UA Negative Negative   Urinalysis Microscopic    Collection Time: 08/09/24 11:25 PM   Result Value Ref Range    RBC, UA 0 0 - 4 /hpf    WBC, UA 2 0 - 5 /hpf    Bacteria Rare None-Occ /hpf    Squam Epithel, UA 7 /hpf    Hyaline Casts, UA 23 (A) 0-1/lpf /lpf    Unclass Chinyere UA Rare None-Moderate    Microscopic Comment SEE COMMENT    Lactic acid, plasma #2    Collection Time: 08/10/24  2:47 AM   Result Value Ref Range    Lactate (Lactic Acid) 2.0 0.5 - 2.2 mmol/L       RADIOLOGY  CT Abdomen Pelvis  Without Contrast    Result Date: 8/10/2024  EXAMINATION: CT ABDOMEN PELVIS WITHOUT CONTRAST CLINICAL HISTORY: Nausea/vomiting;not eating; TECHNIQUE: Low dose axial images, sagittal and coronal reformations were obtained from the lung bases to the pubic symphysis without IV contrast.  Oral contrast was not administered. COMPARISON: 07/31/2022 FINDINGS: Please note image quality is degraded by patient motion artifact as well as streak artifact from the patient's upper extremities. There is a small volume of pleural fluid at the visualized lung bases, left greater than right.  There are mild patchy bibasilar airspace opacities.  Heart is enlarged.  There are partially visualized transvenous pacing leads.  No significant pericardial effusion. The liver is unremarkable in appearance on this limited non-contrast examination.  The gallbladder is surgically absent.  There is no intra-or extrahepatic biliary ductal dilatation. The stomach, spleen, pancreas, and adrenal glands appear within normal limits for non-contrast technique. The kidneys are normal in size and location. There is no evidence of hydronephrosis or nephrolithiasis.  There is a 2.8 cm left renal cyst.  There is nonspecific bilateral perinephric edema.  Urinary bladder demonstrates mild nonspecific wall thickening.  Uterus is absent. The abdominal aorta  is normal in course and caliber with atherosclerotic calcification along its course. The visualized loops of small and large bowel show no evidence of obstruction or inflammation. The appendix is not definitively visualized.  There is no free intraperitoneal air or portal venous gas.  There is a small fat containing umbilical hernia.  There are bilateral inguinal hernias. There is a levoscoliotic curvature of the lumbar spine.  There are moderately advanced multilevel degenerative changes.  There is degenerative change of bilateral hips, right greater than left.  There is body wall edema.     Small bilateral pleural effusions, left greater than right, with patchy bibasilar airspace opacities.  Findings may reflect edema, infection, aspiration, and/or atelectasis. Cardiomegaly. Nonspecific bilateral perinephric edema and mild urinary bladder wall thickening.  Correlation to urinalysis to exclude infection advised. Diffuse body wall anasarca. Additional incidental findings as above. Electronically signed by: Osvaldo Raphael MD Date:    08/10/2024 Time:    01:31    X-Ray Chest AP Portable    Result Date: 8/9/2024  EXAMINATION: XR CHEST AP PORTABLE CLINICAL HISTORY: Sepsis; TECHNIQUE: Single frontal view of the chest was performed. COMPARISON: 08/04/2024 FINDINGS: There is a right chest wall cardiac pacing device present.  Cardiac monitoring leads overlie the chest.  There is postoperative change of prior median sternotomy.  Cardiac silhouette is enlarged, similar to prior examination.  There has been prior cardiac valve repair.  There is bilateral central vascular congestion.  Redemonstration of ill-defined asymmetric right upper lung zone opacity may reflect infection/pneumonia in the appropriate clinical setting.  There is continued opacification of the left lung base with obscuration of the left costophrenic angle.  No evidence of pneumothorax.  Osseous structures demonstrate degenerative changes.     Abnormal chest  radiograph, similar to prior study of 2024. Electronically signed by: Osvaldo Raphael MD Date:    2024 Time:    23:50    X-Ray Chest AP Portable    Result Date: 2024  EXAMINATION: XR CHEST AP PORTABLE CLINICAL HISTORY: Shortness of breath, sob; COMPARISON: Chest, 2024 FINDINGS: Prior median sternotomy.  Cardiomegaly.  Pacemaker leads are present.  Prosthetic cardiac valve.  Mild vascular congestion bilaterally.  Possible developing area of focal infiltrate in the right upper lobe.  Increased density at the left base could be related to technique however focal effusion or consolidation may be present.     Findings are mostly similar to previous exam as described above.  However there may be a new area of patchy increased density right upper lobe representing a new infiltrate.  Follow-up recommended. Electronically signed by: Sara Knight MD Date:    2024 Time:    14:16    Transthoracic echo (TTE) complete    Result Date: 2024  Transthoracic Echocardiographic Report Patient Name: AMY SELF ARL   Patient ID: [id number]  Account #: : [id number] (84y )  Gender: F   Study Date: 2024 09:54:10 AM Ht(Cm): 158  Wt(Kg): 86.23  BSA: 1.95   Accession #: [id number] Sonographer:   Location: [facility] Provider: DARLING TOVAR Heart Rate: 85   Quality: Technically difficult study due to limited patient mobility. Ref.Provider: DARLING TOVAR -----------------------    CONCLUSIONS: 1. Normal left ventricular cavity size. Normal left ventricular systolic function. LVEF 55 - 65%. Indeterminate diastolic dysfunction. Normal wall thickness. 2. A bileaflet tilting disk mechanical prosthetic valve is present in the aortic position. The aortic prosthesis demonstrates a moderate to severely increased transvalvular gradient for valve type and size. 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 Mild to moderate prosthetic valvular regurgitation is present. AV peak PG 59  mmHg. AV Mean PG 36 mmHg. 3. The estimated right ventricular systolic pressure/PASP is 40-50 mmHg. Mild pulmonary hypertension. -----------------------    PROCEDURES: Echocardiographic Report: Transthoracic complete echo, 2D, spectral and tissue Doppler, color flow Doppler, M-mode. Sonographer: Ayesha Leon RCS, RVS -----------------------    INDICATIONS: Aortic valve replacement. -----------------------    FINDINGS: Left Ventricle: Normal left ventricular cavity size. Normal left ventricular systolic function. LVEF 55 - 65%. Indeterminate diastolic dysfunction. Normal wall thickness. Right Ventricle: The right ventricle is not well visualized. Mild to moderately dilated right ventricle. Normal right ventricular systolic function. Left Atrium: The left atrium is not well visualized. The left atrium is normal in size. Right Atrium: The right atrium is normal in size. Atrial Septum: The interatrial septum is normal in appearance. Mitral Valve: Mild mitral valve regurgitation. Severe mitral annular calcification. Aortic Valve: A bileaflet tilting disk mechanical prosthetic valve is present in the aortic position. The aortic prosthesis demonstrates a moderate to severely increased transvalvular gradient for valve type and size. 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 Mild to moderate prosthetic valvular regurgitation is present. AV peak PG 59 mmHg AV Mean PG 36 mmHg Tricuspid Valve: Normal tricuspid valve structure. Mild tricuspid valve regurgitation. No tricuspid valve stenosis. The estimated right ventricular systolic pressure/PASP is 40-50 mmHg. Mild pulmonary hypertension. Pulmonic Valve: The pulmonic valve is not well visualized. No or trivial pulmonic valve regurgitation. No pulmonic valve stenosis. Pericardium: Normal pericardium without evidence of pericardial effusion. Aorta: Normal aortic root size. IVC: Normal IVC size with >50% collapse with inspiration. Normal estimated RAP  around 0-5 mmHg. Inferior vena cava not visualized. Rhythm: The rhythm during the study was normal sinus rhythm. -----------------------    MEASUREMENTS: 2D/MM               Value         Doppler        Value LVIDd 2D            4.56 cm       AV Peak Melo    384.00 cm/sec LVIDs 2D            3.03 cm       AV Mean Melo    288.00 cm/sec LV FS Teich 2D      33.60 %       AV Peak PG     59.00 mmHg IVSd 2D             0.82 cm       AV Mean PG     36.00 mmHg LVPWd 2D            1.12 cm       AV VTI         77.70 cm IVS/LVPW 2D         0.73 ratio    AI PHT         313.00 msec LV Mass 2D          153.23 g      TR Peak Melo    301.00 cm/sec LV Mass Index 2D    78.58 g/m2    TR Peak PG     36.00 mmHg RWT   0.49 EDV 2D                         95.36 mL ESV 2D                         35.86 mL EF Teich 2D        62 % LA Dimension 2D                3.70 cm RVDd 2d                        2.78 cm AoR Diam MM                    3.90 cm ACS MM                         1.00 cm Electronically Signed By: Lydia Perkins 2024-08-01 17:42:05 CDT CC: CC:    X-Ray Chest 1 View    Result Date: 8/1/2024  EXAMINATION: XR CHEST 1 VIEW CLINICAL HISTORY: shortness of breath; COMPARISON: Studies dating back to March 11, 2020 FINDINGS: Chronic or recurrent left effusion and adjacent atelectasis/consolidation when compared to the most recent study.  The lungs are otherwise free of new pulmonary opacities.  The cardiac silhouette size is enlarged.  The trachea is midline and the mediastinal width is normal. Negative for right effusion or pneumothorax.  Pulmonary vasculature is normal. Negative for osseous abnormalities. Stable dual lead right subclavian pacemaker.  Median sternotomy wires and prosthetic valve device.  Tortuous aorta with calcifications of the aortic knob.  There are degenerative changes of the spine and both shoulder girdles.     1.  Chronic or recurrent left effusion with adjacent atelectasis/consolidation.  Pneumonia with a  parapneumonic effusion could have this appearance in the right clinical setting.  Less likely could these changes be related to CHF. 2.  Negative for acute process otherwise. 3.  Stable findings as noted above. Electronically signed by: Emery Arrieta MD Date:    08/01/2024 Time:    16:47      EKG    MICROBIOLOGY    MDM     Amount and/or Complexity of Data Reviewed  Clinical lab tests: reviewed  Tests in the radiology section of CPT®: reviewed  Tests in the medicine section of CPT®: reviewed  Discussion of test results with the performing providers: yes  Decide to obtain previous medical records or to obtain history from someone other than the patient: yes  Obtain history from someone other than the patient: yes  Review and summarize past medical records: yes  Discuss the patient with other providers: yes  Independent visualization of images, tracings, or specimens: yes

## 2024-08-10 NOTE — ASSESSMENT & PLAN NOTE
Likely elevated secondary to Decadron use to treat recent COVID infection.  Plan:  -SSI as needed  -Accuchecks

## 2024-08-10 NOTE — ASSESSMENT & PLAN NOTE
Baseline creatinine is  1.2 . Most recent creatinine and eGFR are listed below.  Recent Labs     08/09/24  2308   CREATININE 1.7*   EGFRNORACEVR 30*      Plan  - MARCUS is  treated with sepsis bolus in ER.  - Avoid nephrotoxins and renally dose meds for GFR listed above  - Monitor urine output, serial BMP, and adjust therapy as needed

## 2024-08-10 NOTE — ASSESSMENT & PLAN NOTE
Chronic, controlled. Latest blood pressure and vitals reviewed-     Temp:  [97.1 °F (36.2 °C)-97.8 °F (36.6 °C)]   Pulse:  [65-88]   Resp:  [15-45]   BP: (102-122)/(53-73)   SpO2:  [93 %-99 %] .   Home meds for hypertension were reviewed and noted below.   Hypertension Medications               furosemide (LASIX) 20 MG tablet Take 1 tablet (20 mg total) by mouth every morning. Hold for low blood pressure. Do not give if systolic blood pressure is below 110 and/or diastolic is below 60    metoprolol succinate (TOPROL-XL) 25 MG 24 hr tablet Take 1 tablet by mouth every evening.    metoprolol tartrate (LOPRESSOR) 25 MG tablet Take 1 tablet (25 mg total) by mouth 2 (two) times daily. Do not give if systolic blood pressure is below 110 and/or diastolic below 60            While in the hospital, will manage blood pressure as follows; Continue home antihypertensive regimen    Will utilize p.r.n. blood pressure medication only if patient's blood pressure greater than 180/110 and she develops symptoms such as worsening chest pain or shortness of breath.

## 2024-08-10 NOTE — PLAN OF CARE
[facility] - Telemetry (Hospital)  Initial Discharge Assessment       Primary Care Provider: Evangelina Olivares MD    Admission Diagnosis: Lactic acidosis [E87.20]  Hypoxia [R09.02]  Sepsis [A41.9]  COVID-19 [U07.1]    Admission Date: 8/9/2024  Expected Discharge Date:     Transition of Care Barriers: None    Payor: MEDICARE / Plan: MEDICARE PART A & B / Product Type: Government /     Extended Emergency Contact Information  Primary Emergency Contact: Amish Post  Mobile Phone: [phone]  Relation: Son  Preferred language: English   needed? No    Discharge Plan A: Home with family, Home Health  Discharge Plan B: Home with family      [id number] - [place], [place] - [address]  [address] [facility] [address]  Phone: [phone] Fax: [phone]      Initial Assessment (most recent)       Adult Discharge Assessment - 08/10/24 0845          Discharge Assessment    Assessment Type Discharge Planning Assessment     Confirmed/corrected address, phone number and insurance Yes     Confirmed Demographics Correct on Facesheet     Source of Information family     If unable to respond/provide information was family/caregiver contacted? Yes     Contact Name/Number Amish Post (son) [phone]     People in Home child(solange), adult     Facility Arrived From: Home     Do you expect to return to your current living situation? Yes     Do you have help at home or someone to help you manage your care at home? Yes     Who are your caregiver(s) and their phone number(s)? Amish Post (son) [phone]     Prior to hospitilization cognitive status: Alert/Oriented     Current cognitive status: Alert/Oriented     Walking or Climbing Stairs Difficulty yes     Walking or Climbing Stairs ambulation difficulty, requires equipment     Home Accessibility wheelchair accessible     Home Layout Able to live on 1st floor     Equipment Currently Used at Home walker,  rolling;wheelchair     Readmission within 30 days? No     Patient currently being followed by outpatient case management? No     Do you currently have service(s) that help you manage your care at home? Yes     Name and Contact number of agency [facility]     Is the pt/caregiver preference to resume services with current agency Yes     Do you take prescription medications? Yes     Do you have prescription coverage? Yes     Do you have any problems affording any of your prescribed medications? No     Is the patient taking medications as prescribed? yes     Who is going to help you get home at discharge? Amish Post (son) [phone]     How do you get to doctors appointments? family or friend will provide     Are you on dialysis? No     Do you take coumadin? No     Discharge Plan A Home with family;Home Health     Discharge Plan B Home with family     DME Needed Upon Discharge  oxygen     Discharge Plan discussed with: Adult children     Transition of Care Barriers None

## 2024-08-10 NOTE — HPI
Serena Post is a 84 y.o. female with a PMH  has a past medical history of A-fib, Anticoagulant long-term use, Aortic valve disease, Cancer, Cardiomyopathy, CHF (congestive heart failure), COVID-19 (7/23/2022), heart valve replacement with mechanical valve, Hyperlipidemia, Hypertension, Pacemaker, Pacemaker, Sepsis (7/23/2022), and Stroke. presented to the Emergency Department for evaluation of AMS which onset today. Patient tested positive for COVID on 8/1/2024 and was recently discharged on 8/6/24 after being diuresed for CHF exacerbation and treated with decadron and neb treatments for her Covid. Patient deemed stable for discharge with homehealth physical/occupational therapy to be resumed and nurse practitioner to visit home program. However, patient's son reports patient started becoming incoherent today at 5PM. Son reports symptoms of decreased appetite, generalized weakness, and diarrhea. No mitigating or exacerbating factors reported. Son denies any vomiting. Son states that patient is taking her LASIX. No further complaints or concerns at this time.     ER workup revealed a CBC to be unremarkable, coags of the within therapeutic range, CMP revealed BUN/creatinine of 44/1.7 with EGFR of 30 (22/1.1 with EGFR 50 on 08/06/2024), BNP>4,900, initial lactic acid of 3.0 with repeat lactic acid of 2.0, procalcitonin negative, flu negative, COVID positive, UA unremarkable, chest x-ray unchanged from previous, CT abdomen and pelvis without IV contrast revealed:[Small bilateral pleural effusions, left greater than right, with patchy bibasilar airspace opacities.  Findings may reflect edema, infection, aspiration, and/or atelectasis; Cardiomegaly.; Nonspecific bilateral perinephric edema and mild urinary bladder wall thickening.  Correlation to urinalysis to exclude infection advised; Diffuse body wall anasarca].  EKG revealed normal sinus rhythm with right bundle-branch block with a ventricular rate of 90  beats per minute with a QT/QTC of 400/489.  O2 saturation originally 93% on room air with a respiratory rate of 40 5 times per minute which resolved after being placed on 4 liters/minute via nasal cannula.  Patient currently satting 99% with a respiratory rate of 18.  No acute respiratory distress noted.  Patient received DuoNeb, 4.5 g of Zosyn, 1, 641 cc bolus of sodium chloride, and 1.7 g of vancomycin in ER.  Hospital Medicine consulted to admit patient for sepsis vs CHF exacerbation.  Patient admitted under inpatient status.      PCP: Evangelina Olivares

## 2024-08-10 NOTE — ASSESSMENT & PLAN NOTE
Recently diagnosed on 08/01/2024 and treated with Decadron during last admission.  Sent home with Decadron p.o.  Plan:  -supplemental oxygen as needed  -continue decadron po  -duonebs prn  -monitor pulse ox as need/per unit protocol

## 2024-08-11 PROBLEM — R79.89 POSITIVE D DIMER: Status: ACTIVE | Noted: 2024-08-11

## 2024-08-11 PROBLEM — B33.22 ACUTE VIRAL MYOCARDITIS: Status: ACTIVE | Noted: 2024-08-11

## 2024-08-11 PROBLEM — I40.0 ACUTE VIRAL MYOCARDITIS: Status: ACTIVE | Noted: 2024-08-11

## 2024-08-11 LAB
ANION GAP SERPL CALC-SCNC: 13 MMOL/L (ref 8–16)
AORTIC ROOT ANNULUS: 3.21 CM
APTT PPP: 37.3 SEC (ref 21–32)
ASCENDING AORTA: 2.69 CM
AV INDEX (PROSTH): 0.2
AV MEAN GRADIENT: 43 MMHG
AV PEAK GRADIENT: 54 MMHG
AV REGURGITATION PRESSURE HALF TIME: 384.44 MS
AV VALVE AREA BY VELOCITY RATIO: 1.13 CM²
AV VALVE AREA: 0.79 CM²
AV VELOCITY RATIO: 0.29
BSA FOR ECHO PROCEDURE: 1.83 M2
BUN SERPL-MCNC: 35 MG/DL (ref 8–23)
CALCIUM SERPL-MCNC: 8.6 MG/DL (ref 8.7–10.5)
CHLORIDE SERPL-SCNC: 103 MMOL/L (ref 95–110)
CK SERPL-CCNC: 12 U/L (ref 20–180)
CO2 SERPL-SCNC: 25 MMOL/L (ref 23–29)
CREAT SERPL-MCNC: 1.4 MG/DL (ref 0.5–1.4)
CV ECHO LV RWT: 0.42 CM
D DIMER PPP IA.FEU-MCNC: 6.66 MG/L FEU
DOP CALC AO PEAK VEL: 3.67 M/S
DOP CALC AO VTI: 96.3 CM
DOP CALC LVOT AREA: 3.9 CM2
DOP CALC LVOT DIAMETER: 2.24 CM
DOP CALC LVOT PEAK VEL: 1.05 M/S
DOP CALC LVOT STROKE VOLUME: 75.63 CM3
DOP CALC RVOT PEAK VEL: 0.39 M/S
DOP CALC RVOT VTI: 6.7 CM
DOP CALCLVOT PEAK VEL VTI: 19.2 CM
E WAVE DECELERATION TIME: 94.49 MSEC
E/A RATIO: 1.98
E/E' RATIO: 27.14 M/S
ECHO LV POSTERIOR WALL: 1.15 CM (ref 0.6–1.1)
EJECTION FRACTION: 25 %
EST. GFR  (NO RACE VARIABLE): 38 ML/MIN/1.73 M^2
FRACTIONAL SHORTENING: 2 % (ref 28–44)
GLUCOSE SERPL-MCNC: 84 MG/DL (ref 70–110)
INR PPP: 2.2 (ref 0.8–1.2)
INR PPP: 2.2 (ref 0.8–1.2)
INTERVENTRICULAR SEPTUM: 1.12 CM (ref 0.6–1.1)
IVC DIAMETER: 1.77 CM
LA MAJOR: 4.08 CM
LA MINOR: 3.21 CM
LA WIDTH: 3.6 CM
LDH SERPL L TO P-CCNC: 282 U/L (ref 110–260)
LEFT ATRIUM SIZE: 2.49 CM
LEFT ATRIUM VOLUME INDEX: 15.3 ML/M2
LEFT ATRIUM VOLUME: 27.38 CM3
LEFT INTERNAL DIMENSION IN SYSTOLE: 5.36 CM (ref 2.1–4)
LEFT VENTRICLE DIASTOLIC VOLUME INDEX: 81.69 ML/M2
LEFT VENTRICLE DIASTOLIC VOLUME: 146.22 ML
LEFT VENTRICLE MASS INDEX: 140 G/M2
LEFT VENTRICLE SYSTOLIC VOLUME INDEX: 77.5 ML/M2
LEFT VENTRICLE SYSTOLIC VOLUME: 138.71 ML
LEFT VENTRICULAR INTERNAL DIMENSION IN DIASTOLE: 5.48 CM (ref 3.5–6)
LEFT VENTRICULAR MASS: 250.96 G
LV LATERAL E/E' RATIO: 31.67 M/S
LV SEPTAL E/E' RATIO: 23.75 M/S
LVED V (TEICH): 146.22 ML
LVES V (TEICH): 138.71 ML
LVOT MG: 2.53 MMHG
LVOT MV: 0.76 CM/S
MV PEAK A VEL: 0.48 M/S
MV PEAK E VEL: 0.95 M/S
OHS QRS DURATION: 130 MS
OHS QTC CALCULATION: 489 MS
PISA AR MAX VEL: 3.74 M/S
PISA MRMAX VEL: 4.11 M/S
PISA TR MAX VEL: 3.49 M/S
POTASSIUM SERPL-SCNC: 3.7 MMOL/L (ref 3.5–5.1)
PROTHROMBIN TIME: 22.5 SEC (ref 9–12.5)
PROTHROMBIN TIME: 23.1 SEC (ref 9–12.5)
PV MEAN GRADIENT: 0 MMHG
RA MAJOR: 3.91 CM
RA PRESSURE ESTIMATED: 3 MMHG
RA WIDTH: 3.1 CM
RIGHT VENTRICLE DIASTOLIC MID DIMENSION: 3.3 CM
RV MID DIAMA: 3.32 CM
RV TB RVSP: 6 MMHG
SODIUM SERPL-SCNC: 141 MMOL/L (ref 136–145)
STJ: 2.63 CM
TDI LATERAL: 0.03 M/S
TDI SEPTAL: 0.04 M/S
TDI: 0.04 M/S
TR MAX PG: 49 MMHG
TRICUSPID ANNULAR PLANE SYSTOLIC EXCURSION: 1.05 CM
TROPONIN I SERPL DL<=0.01 NG/ML-MCNC: 0.04 NG/ML (ref 0–0.03)
TV REST PULMONARY ARTERY PRESSURE: 52 MMHG
Z-SCORE OF LEFT VENTRICULAR DIMENSION IN END DIASTOLE: 1.02
Z-SCORE OF LEFT VENTRICULAR DIMENSION IN END SYSTOLE: 4.44

## 2024-08-11 PROCEDURE — 85730 THROMBOPLASTIN TIME PARTIAL: CPT | Performed by: FAMILY MEDICINE

## 2024-08-11 PROCEDURE — XW033E5 INTRODUCTION OF REMDESIVIR ANTI-INFECTIVE INTO PERIPHERAL VEIN, PERCUTANEOUS APPROACH, NEW TECHNOLOGY GROUP 5: ICD-10-PCS | Performed by: FAMILY MEDICINE

## 2024-08-11 PROCEDURE — 63600175 PHARM REV CODE 636 W HCPCS: Performed by: FAMILY MEDICINE

## 2024-08-11 PROCEDURE — 85610 PROTHROMBIN TIME: CPT | Mod: 91 | Performed by: HOSPITALIST

## 2024-08-11 PROCEDURE — 27000207 HC ISOLATION

## 2024-08-11 PROCEDURE — 25000003 PHARM REV CODE 250: Performed by: NURSE PRACTITIONER

## 2024-08-11 PROCEDURE — 80048 BASIC METABOLIC PNL TOTAL CA: CPT | Performed by: NURSE PRACTITIONER

## 2024-08-11 PROCEDURE — 63600175 PHARM REV CODE 636 W HCPCS: Performed by: NURSE PRACTITIONER

## 2024-08-11 PROCEDURE — 36415 COLL VENOUS BLD VENIPUNCTURE: CPT | Performed by: HOSPITALIST

## 2024-08-11 PROCEDURE — 85610 PROTHROMBIN TIME: CPT | Performed by: FAMILY MEDICINE

## 2024-08-11 PROCEDURE — 36415 COLL VENOUS BLD VENIPUNCTURE: CPT | Mod: XB | Performed by: FAMILY MEDICINE

## 2024-08-11 PROCEDURE — 99900035 HC TECH TIME PER 15 MIN (STAT)

## 2024-08-11 PROCEDURE — 94761 N-INVAS EAR/PLS OXIMETRY MLT: CPT

## 2024-08-11 PROCEDURE — 92610 EVALUATE SWALLOWING FUNCTION: CPT

## 2024-08-11 PROCEDURE — 83615 LACTATE (LD) (LDH) ENZYME: CPT | Performed by: FAMILY MEDICINE

## 2024-08-11 PROCEDURE — 82550 ASSAY OF CK (CPK): CPT | Performed by: FAMILY MEDICINE

## 2024-08-11 PROCEDURE — 25000003 PHARM REV CODE 250: Performed by: FAMILY MEDICINE

## 2024-08-11 PROCEDURE — 84484 ASSAY OF TROPONIN QUANT: CPT | Performed by: FAMILY MEDICINE

## 2024-08-11 PROCEDURE — 85379 FIBRIN DEGRADATION QUANT: CPT | Performed by: FAMILY MEDICINE

## 2024-08-11 PROCEDURE — 21400001 HC TELEMETRY ROOM

## 2024-08-11 PROCEDURE — 99232 SBSQ HOSP IP/OBS MODERATE 35: CPT | Mod: ,,, | Performed by: INTERNAL MEDICINE

## 2024-08-11 PROCEDURE — 27000221 HC OXYGEN, UP TO 24 HOURS

## 2024-08-11 RX ORDER — IBUPROFEN 200 MG
16 TABLET ORAL
Status: DISCONTINUED | OUTPATIENT
Start: 2024-08-11 | End: 2024-08-16 | Stop reason: HOSPADM

## 2024-08-11 RX ORDER — ASCORBIC ACID 500 MG
500 TABLET ORAL 2 TIMES DAILY
Status: DISCONTINUED | OUTPATIENT
Start: 2024-08-11 | End: 2024-08-16 | Stop reason: HOSPADM

## 2024-08-11 RX ORDER — IBUPROFEN 200 MG
24 TABLET ORAL
Status: DISCONTINUED | OUTPATIENT
Start: 2024-08-11 | End: 2024-08-16 | Stop reason: HOSPADM

## 2024-08-11 RX ORDER — SODIUM CHLORIDE 0.9 % (FLUSH) 0.9 %
10 SYRINGE (ML) INJECTION
Status: DISCONTINUED | OUTPATIENT
Start: 2024-08-11 | End: 2024-08-16 | Stop reason: HOSPADM

## 2024-08-11 RX ORDER — GLUCAGON 1 MG
1 KIT INJECTION
Status: DISCONTINUED | OUTPATIENT
Start: 2024-08-11 | End: 2024-08-16 | Stop reason: HOSPADM

## 2024-08-11 RX ORDER — DEXAMETHASONE SODIUM PHOSPHATE 4 MG/ML
8 INJECTION, SOLUTION INTRA-ARTICULAR; INTRALESIONAL; INTRAMUSCULAR; INTRAVENOUS; SOFT TISSUE EVERY 12 HOURS
Status: DISCONTINUED | OUTPATIENT
Start: 2024-08-11 | End: 2024-08-12

## 2024-08-11 RX ADMIN — DEXAMETHASONE SODIUM PHOSPHATE 8 MG: 4 INJECTION INTRA-ARTICULAR; INTRALESIONAL; INTRAMUSCULAR; INTRAVENOUS; SOFT TISSUE at 09:08

## 2024-08-11 RX ADMIN — METOPROLOL TARTRATE 25 MG: 25 TABLET, FILM COATED ORAL at 08:08

## 2024-08-11 RX ADMIN — THERA TABS 1 TABLET: TAB at 05:08

## 2024-08-11 RX ADMIN — OXYCODONE HYDROCHLORIDE AND ACETAMINOPHEN 500 MG: 500 TABLET ORAL at 09:08

## 2024-08-11 RX ADMIN — FUROSEMIDE 40 MG: 10 INJECTION, SOLUTION INTRAMUSCULAR; INTRAVENOUS at 08:08

## 2024-08-11 RX ADMIN — MUPIROCIN: 20 OINTMENT TOPICAL at 09:08

## 2024-08-11 RX ADMIN — MUPIROCIN: 20 OINTMENT TOPICAL at 08:08

## 2024-08-11 RX ADMIN — WARFARIN SODIUM 1.25 MG: 2.5 TABLET ORAL at 08:08

## 2024-08-11 RX ADMIN — REMDESIVIR 200 MG: 100 INJECTION, POWDER, LYOPHILIZED, FOR SOLUTION INTRAVENOUS at 05:08

## 2024-08-11 RX ADMIN — METOPROLOL TARTRATE 25 MG: 25 TABLET, FILM COATED ORAL at 09:08

## 2024-08-11 NOTE — PROGRESS NOTES
Pharmacy Consult Note: Warfarin     Serena Post 's Coumadin will be dosed and monitored by Pharmacy.      Target INR goal is 2-3.    INR   Date Value Ref Range Status   08/11/2024 2.2 (H) 0.8 - 1.2 Final     Comment:     Coumadin Therapy:  2.0 - 3.0 for INR for all indicators except mechanical heart valves  and antiphospholipid syndromes which should use 2.5 - 3.5.         Indication: MAVR, afib   Home dose: 2.5 mg Mondays and Fridays; 1.25mg all other days  Patient will be continued on a home regimen.  Dose for Today: 1.25 mg    PT/INR will be monitored daily. Dose adjustments will be made accordingly.      Thank you for allowing us to participate in this patient's care.   Sabiha Villa, PharmD 8/11/2024 10:58 AM

## 2024-08-11 NOTE — PT/OT/SLP EVAL
Speech Language Pathology Evaluation  Bedside Swallow    Patient Name:  Serena Post   MRN:  [id number]  Admitting Diagnosis: Acute exacerbation of CHF (congestive heart failure)    Recommendations:                 General Recommendations:  Dysphagia therapy  Diet recommendations:  Soft & Bite Sized Diet - IDDSI Level 6, Thin liquids - IDDSI Level 0   Aspiration Precautions: Standard aspiration precautions   General Precautions: Standard, aspiration  Communication strategies:  provide increased time to answer and go to room if call light pushed    Assessment:     Serena Post is a 84 y.o. female who presented with no coughing or changes in vocal quality on liquids, pureed or solids. Pt did present with minimally decreased chewing with solids and recommended for an IDDSI 6(soft and bite sized diet) and continued thin liquids. Swallowing precautions reviewed and patient recommended to be reassessed for upgrade to IDDSI 7 diet.  Pt was oriented and answered basic orientation questions appropriately. Pt continues to report weakness with decreased appetite since diagnosis of COVID.  Continue S.T. POC.    History:     Past Medical History:   Diagnosis Date    A-fib     Anticoagulant long-term use     Aortic valve disease     Cancer     brain tumor, 10 years ago    Cardiomyopathy     CHF (congestive heart failure)     COVID-19 7/23/2022    Hx of heart valve replacement with mechanical valve     Hyperlipidemia     Hypertension     Pacemaker     Pacemaker     Sepsis 7/23/2022    Stroke     2007, residual weakness       Past Surgical History:   Procedure Laterality Date    AORTIC VALVE REPLACEMENT      CHOLECYSTECTOMY      CORONARY ARTERY BYPASS GRAFT      HYSTERECTOMY      INSERTION OF PACEMAKER      TONSILLECTOMY         Social History: see medical chart .    Prior Intubation HX:  none reported     Modified Barium Swallow: none reported     Chest X-Rays: revealed patchy bibasilar airspace opacities    Prior  diet: Regular consistency diet, thin liquids. Pt reported no hx of swallowing difficulties    Occupation/hobbies/homemaking: n/a.    Subjective     Pt reported being tired but agreeable to work with S.T.  Patient goals: to feel better per pt report     Pain/Comfort:  Pain Rating 1: 0/10    Respiratory Status:  see medical chart    Objective:     Oral Musculature Evaluation  Oral Musculature: WFL  Mucosal Quality: good  Mandibular Strength and Mobility: WFL  Velar Elevation: WFL    Bedside Swallow Eval:   Consistencies Assessed:  Thin liquids no delays, no coughing or changes in vocal quality  Puree no delays, no coughing or changes in vocal quality  Solids Min. Decreased chewing, no coughing or changes in vocal quality      Oral Phase:   Excess chewing    Pharyngeal Phase:   no overt clinical signs/symptoms of aspiration  no overt clinical signs/symptoms of pharyngeal dysphagia    Compensatory Strategies  Multiple swallows    Treatment: see note in assessment section    Goals:   Multidisciplinary Problems       SLP Goals          Problem: SLP    Goal Priority Disciplines Outcome   SLP Goal     SLP    Description: 1. BSA with full meal for possible upgrade to IDDSI 7 and continued thin liquids.                       Plan:     Patient to be seen:   (2-5 X Weekly)   Plan of Care expires:     Plan of Care reviewed with:  patient   SLP Follow-Up:  Yes       Discharge recommendations:  determine at discharge    Barriers to Discharge:  Level of Skilled Assistance Needed      Time Tracking:     SLP Treatment Date:   08/11/24  Speech Start Time:  1142  Speech Stop Time:  1158     Speech Total Time (min):  16 min    Billable Minutes: Eval Swallow and Oral Function 16 minutes     08/11/2024

## 2024-08-11 NOTE — PLAN OF CARE
POC reviewed with pt. Pt verbalizes understanding of POC. No questions at this time.  AAOx4. Responses are delayed. NADN.  NSR on cardiac monitor.  Pt remains free of falls.  No complaints at this time.  Safety measures in place. Will continue to monitor. Patient wearing non-skid footwear.  Informed pt to call for assistance before getting up. Pt verbalizes understanding.  Hourly rounding and chart check complete.   Bed in lowest position, wheels locked, side rails up x2.  Purewick to suction, draining yellow/dark yellow urine.

## 2024-08-11 NOTE — SUBJECTIVE & OBJECTIVE
Interval History: See hospital course for today      Review of Systems   Constitutional:  Positive for activity change, appetite change and fatigue.   Respiratory:  Positive for shortness of breath (improved).    Cardiovascular:  Positive for leg swelling.   Skin:  Positive for wound.   Neurological:  Positive for weakness.   Psychiatric/Behavioral:  Positive for dysphoric mood.      Objective:     Vital Signs (Most Recent):  Temp: 97.5 °F (36.4 °C) (08/11/24 1247)  Pulse: 82 (08/11/24 1422)  Resp: 15 (08/11/24 1247)  BP: (!) 102/55 (08/11/24 1247)  SpO2: 95 % (08/11/24 1247) Vital Signs (24h Range):  Temp:  [97.4 °F (36.3 °C)-98 °F (36.7 °C)] 97.5 °F (36.4 °C)  Pulse:  [65-87] 82  Resp:  [15-18] 15  SpO2:  [94 %-98 %] 95 %  BP: (102-118)/(55-80) 102/55     Weight: 73.9 kg (162 lb 14.7 oz)  Body mass index is 27.97 kg/m².    Intake/Output Summary (Last 24 hours) at 8/11/2024 1454  Last data filed at 8/11/2024 1247  Gross per 24 hour   Intake --   Output 710 ml   Net -710 ml         Physical Exam  Vitals and nursing note reviewed.   Constitutional:       General: She is sleeping. She is not in acute distress.     Appearance: She is ill-appearing. She is not toxic-appearing.      Interventions: Nasal cannula in place.   HENT:      Head: Normocephalic and atraumatic.   Cardiovascular:      Rate and Rhythm: Normal rate.   Pulmonary:      Effort: No tachypnea or respiratory distress.      Breath sounds: Decreased breath sounds and rhonchi present.   Abdominal:      Palpations: Abdomen is soft.      Tenderness: There is no abdominal tenderness.   Genitourinary:     Comments: sidhu  Musculoskeletal:      Right lower leg: Edema present.      Left lower leg: Edema present.   Skin:     General: Skin is warm.      Coloration: Skin is pale.   Neurological:      Mental Status: She is easily aroused. She is lethargic.      Motor: Weakness present.             Significant Labs: All pertinent labs within the past 24 hours have been  reviewed.  CBC:   Recent Labs   Lab 08/09/24 2308   WBC 8.51   HGB 14.2   HCT 43.0        CMP:   Recent Labs   Lab 08/09/24 2308 08/11/24  0525   * 141   K 4.7 3.7    103   CO2 17* 25   * 84   BUN 44* 35*   CREATININE 1.7* 1.4   CALCIUM 8.7 8.6*   PROT 6.7  --    ALBUMIN 2.8*  --    BILITOT 2.1*  --    ALKPHOS 168*  --    AST 46*  --    ALT 45*  --    ANIONGAP 15 13     Cardiac Markers:   Recent Labs   Lab 08/09/24 2308   BNP >4,900*  >4,900*     Coagulation:   Recent Labs   Lab 08/09/24 2308 08/11/24  0525   INR 1.8* 2.2*   APTT 31.8  --      Lactic Acid:   Recent Labs   Lab 08/09/24  2308 08/10/24  0247   LACTATE 3.0* 2.0       Significant Imaging: I have reviewed all pertinent imaging results/findings within the past 24 hours.  CT: I have reviewed all pertinent results/findings within the past 24 hours and my personal findings are:  lung opacities could reflect edema, pneumonia, atelectasis, aspiration  Echo: I have reviewed all pertinent results/findings within the past 24 hours and my personal findings are:  reduced ejection fraction 25, moderate global hypokinesis, eccentric hypertrophy

## 2024-08-11 NOTE — PLAN OF CARE
S.T. evaluation completed and pt recommended for an IDDSI 6(Soft and Bite Sized diet) and thin liquids with basic swallowing precautions.  S.T. to follow for possible upgrade to IDDSI 7 diet.   Continue S.T. POC.

## 2024-08-11 NOTE — ASSESSMENT & PLAN NOTE
Baseline creatinine is  1.2 . Most recent creatinine and eGFR are listed below.  Recent Labs     08/09/24  2308 08/11/24  0525   CREATININE 1.7* 1.4   EGFRNORACEVR 30* 38*        Plan  - MARCUS is  treated with sepsis bolus in ER.  - Avoid nephrotoxins and renally dose meds for GFR listed above  - Monitor urine output, serial BMP, and adjust therapy as needed  Improving  Monitor while intravenous diuresing

## 2024-08-11 NOTE — ASSESSMENT & PLAN NOTE
In setting of covid infection  Respiratory status stable, not requiring increased supplemental oxygen from baseline  Echocardiogram without mention of right heart strain  Renal function test improving  Consider vq scan to rule out pulmonary embolism but already on warfarin for mechanical valve

## 2024-08-11 NOTE — ASSESSMENT & PLAN NOTE
Recently diagnosed on 08/01/2024 and treated with Decadron during last admission.  Sent home with Decadron p.o.  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

## 2024-08-11 NOTE — ASSESSMENT & PLAN NOTE
Patient is identified as having  unspecified  heart failure that is Acute on chronic. CHF is currently uncontrolled due to Continued edema of extremities, Dyspnea improved with supplemental oxygen. Rales/crackles on pulmonary exam, and Pulmonary edema/pleural effusion on CXR. Latest ECHO performed and demonstrates- Results for orders placed during the hospital encounter of 02/22/24    Echo    Interpretation Summary    Left Ventricle: The left ventricle is normal in size. Normal wall thickness. There is concentric hypertrophy. Global hypokinesis present. There is moderately reduced systolic function. Ejection fraction by visual approximation is 35%.    Right Ventricle: Normal right ventricular cavity size. Systolic function is borderline low.    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 There is moderate aortic regurgitation.  Consider ORI if clinically needed to evaluate mechanical aortic valve further.    Mitral Valve: There is mild bileaflet sclerosis. There is mild regurgitation.    Pulmonary Artery: There is moderate pulmonary hypertension. The estimated pulmonary artery systolic pressure is 49 mmHg.    IVC/SVC: Normal venous pressure at 3 mmHg.    Ascending aorta is moderately dilated measuring 4.45 cm.  . Continue Beta Blocker and Furosemide and monitor clinical status closely. Monitor on telemetry. Patient is on CHF pathway.  Monitor strict Is&Os and daily weights.  Place on fluid restriction of 1.5 L. Cardiology has been consulted. Continue to stress to patient importance of self efficacy and  on diet for CHF. Last BNP reviewed- and noted below   Recent Labs   Lab 08/09/24  2308   BNP >4,900*  >4,900*     Continue intravenous diuresis as able  Hypotensive

## 2024-08-11 NOTE — ASSESSMENT & PLAN NOTE
Recent Covid infection  Echocardiogram with significant decline in ejection fraction from normal one month ago to 25%   Echocardiogram with moderate global hypokinesis and eccentric hypertrophy  Oral systemic steroids on last admission  Continue systemic steroids intravenous   Remdesivir initiated

## 2024-08-11 NOTE — ASSESSMENT & PLAN NOTE
Patient with Hypoxic Respiratory failure which is Acute on chronic.  she is on home oxygen at 2-3 LPM. Supplemental oxygen was provided and noted- Oxygen Concentration (%):  [32] 32    .   Signs/symptoms of respiratory failure include- lethargy. Contributing diagnoses includes - CHF, COPD, and covid  Labs and images were reviewed. Patient Has not had a recent ABG. Will treat underlying causes and adjust management of respiratory failure as follows- remdesivir, breathing treatments, intravenous systemic steroids, intravenous lasix, supplemental oxygen

## 2024-08-11 NOTE — PROGRESS NOTES
[facility] [place] [facility]  Progress Note    Patient Name: Serena Post  MRN: [id number]  Patient Class: IP- Inpatient   Admission Date: 8/9/2024  Length of Stay: 1 days  Attending Physician: Becky Ceron MD  Primary Care Provider: Evangelina Olivares MD        Subjective:     Principal Problem:Acute exacerbation of CHF (congestive heart failure)        HPI:  Serena Post is a 84 y.o. female with a PMH  has a past medical history of A-fib, Anticoagulant long-term use, Aortic valve disease, Cancer, Cardiomyopathy, CHF (congestive heart failure), COVID-19 (7/23/2022), heart valve replacement with mechanical valve, Hyperlipidemia, Hypertension, Pacemaker, Pacemaker, Sepsis (7/23/2022), and Stroke. presented to the Emergency Department for evaluation of AMS which onset today. Patient tested positive for COVID on 8/1/2024 and was recently discharged on 8/6/24 after being diuresed for CHF exacerbation and treated with decadron and neb treatments for her Covid. Patient deemed stable for discharge with homehealth physical/occupational therapy to be resumed and nurse practitioner to visit home program. However, patient's son reports patient started becoming incoherent today at 5PM. Son reports symptoms of decreased appetite, generalized weakness, and diarrhea. No mitigating or exacerbating factors reported. Son denies any vomiting. Son states that patient is taking her LASIX. No further complaints or concerns at this time.     ER workup revealed a CBC to be unremarkable, coags of the within therapeutic range, CMP revealed BUN/creatinine of 44/1.7 with EGFR of 30 (22/1.1 with EGFR 50 on 08/06/2024), BNP>4,900, initial lactic acid of 3.0 with repeat lactic acid of 2.0, procalcitonin negative, flu negative, COVID positive, UA unremarkable, chest x-ray unchanged from previous, CT abdomen and pelvis without IV contrast revealed:[Small bilateral pleural effusions, left greater than  right, with patchy bibasilar airspace opacities.  Findings may reflect edema, infection, aspiration, and/or atelectasis; Cardiomegaly.; Nonspecific bilateral perinephric edema and mild urinary bladder wall thickening.  Correlation to urinalysis to exclude infection advised; Diffuse body wall anasarca].  EKG revealed normal sinus rhythm with right bundle-branch block with a ventricular rate of 90 beats per minute with a QT/QTC of 400/489.  O2 saturation originally 93% on room air with a respiratory rate of 40 5 times per minute which resolved after being placed on 4 liters/minute via nasal cannula.  Patient currently satting 99% with a respiratory rate of 18.  No acute respiratory distress noted.  Patient received DuoNeb, 4.5 g of Zosyn, 1, 641 cc bolus of sodium chloride, and 1.7 g of vancomycin in ER.  Hospital Medicine consulted to admit patient for sepsis vs CHF exacerbation.  Patient admitted under inpatient status.      PCP: Evangelina Olivares      Overview/Hospital Course:  8/11 readmitted for worsening symptoms. Bnp significant elevated. Cardiology consulted. Echocardiogram with worsening ejection fraction, 25%. Continue intravenous diuresis. 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 Inflammatory markers pending    Interval History: See hospital course for today      Review of Systems   Constitutional:  Positive for activity change, appetite change and fatigue.   Respiratory:  Positive for shortness of breath (improved).    Cardiovascular:  Positive for leg swelling.   Skin:  Positive for wound.   Neurological:  Positive for weakness.   Psychiatric/Behavioral:  Positive for dysphoric mood.      Objective:     Vital Signs (Most Recent):  Temp: 97.5 °F (36.4 °C) (08/11/24 1247)  Pulse: 82 (08/11/24 1422)  Resp: 15 (08/11/24 1247)  BP: (!) 102/55 (08/11/24 1247)  SpO2: 95 % (08/11/24 1247) Vital Signs (24h Range):  Temp:  [97.4 °F (36.3 °C)-98  °F (36.7 °C)] 97.5 °F (36.4 °C)  Pulse:  [65-87] 82  Resp:  [15-18] 15  SpO2:  [94 %-98 %] 95 %  BP: (102-118)/(55-80) 102/55     Weight: 73.9 kg (162 lb 14.7 oz)  Body mass index is 27.97 kg/m².    Intake/Output Summary (Last 24 hours) at 8/11/2024 1454  Last data filed at 8/11/2024 1247  Gross per 24 hour   Intake --   Output 710 ml   Net -710 ml         Physical Exam  Vitals and nursing note reviewed.   Constitutional:       General: She is sleeping. She is not in acute distress.     Appearance: She is ill-appearing. She is not toxic-appearing.      Interventions: Nasal cannula in place.   HENT:      Head: Normocephalic and atraumatic.   Cardiovascular:      Rate and Rhythm: Normal rate.   Pulmonary:      Effort: No tachypnea or respiratory distress.      Breath sounds: Decreased breath sounds and rhonchi present.   Abdominal:      Palpations: Abdomen is soft.      Tenderness: There is no abdominal tenderness.   Genitourinary:     Comments: sidhu  Musculoskeletal:      Right lower leg: Edema present.      Left lower leg: Edema present.   Skin:     General: Skin is warm.      Coloration: Skin is pale.   Neurological:      Mental Status: She is easily aroused. She is lethargic.      Motor: Weakness present.             Significant Labs: All pertinent labs within the past 24 hours have been reviewed.  CBC:   Recent Labs   Lab 08/09/24 2308   WBC 8.51   HGB 14.2   HCT 43.0        CMP:   Recent Labs   Lab 08/09/24 2308 08/11/24  0525   * 141   K 4.7 3.7    103   CO2 17* 25   * 84   BUN 44* 35*   CREATININE 1.7* 1.4   CALCIUM 8.7 8.6*   PROT 6.7  --    ALBUMIN 2.8*  --    BILITOT 2.1*  --    ALKPHOS 168*  --    AST 46*  --    ALT 45*  --    ANIONGAP 15 13     Cardiac Markers:   Recent Labs   Lab 08/09/24 2308   BNP >4,900*  >4,900*     Coagulation:   Recent Labs   Lab 08/09/24 2308 08/11/24  0525   INR 1.8* 2.2*   APTT 31.8  --      Lactic Acid:   Recent Labs   Lab 08/09/24  3838  08/10/24  0247   LACTATE 3.0* 2.0       Significant Imaging: I have reviewed all pertinent imaging results/findings within the past 24 hours.  CT: I have reviewed all pertinent results/findings within the past 24 hours and my personal findings are:  lung opacities could reflect edema, pneumonia, atelectasis, aspiration  Echo: I have reviewed all pertinent results/findings within the past 24 hours and my personal findings are:  reduced ejection fraction 25, moderate global hypokinesis, eccentric hypertrophy    Assessment/Plan:      * Acute exacerbation of CHF (congestive heart failure)  Patient is identified as having  unspecified  heart failure that is Acute on chronic. CHF is currently uncontrolled due to Continued edema of extremities, Dyspnea improved with supplemental oxygen. Rales/crackles on pulmonary exam, and Pulmonary edema/pleural effusion on CXR. Latest ECHO performed and demonstrates- Results for orders placed during the hospital encounter of 02/22/24    Echo    Interpretation Summary    Left Ventricle: The left ventricle is normal in size. Normal wall thickness. There is concentric hypertrophy. Global hypokinesis present. There is moderately reduced systolic function. Ejection fraction by visual approximation is 35%.    Right Ventricle: Normal right ventricular cavity size. Systolic function is borderline low.    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 There is moderate aortic regurgitation.  Consider ORI if clinically needed to evaluate mechanical aortic valve further.    Mitral Valve: There is mild bileaflet sclerosis. There is mild regurgitation.    Pulmonary Artery: There is moderate pulmonary hypertension. The estimated pulmonary artery systolic pressure is 49 mmHg.    IVC/SVC: Normal venous pressure at 3 mmHg.    Ascending aorta is moderately dilated measuring 4.45 cm.  . Continue Beta Blocker and Furosemide and monitor clinical status  closely. Monitor on telemetry. Patient is on CHF pathway.  Monitor strict Is&Os and daily weights.  Place on fluid restriction of 1.5 L. Cardiology has been consulted. Continue to stress to patient importance of self efficacy and  on diet for CHF. Last BNP reviewed- and noted below   Recent Labs   Lab 08/09/24 2308   BNP >4,900*  >4,900*     Continue intravenous diuresis as able  Hypotensive     Positive D dimer  In setting of covid infection  Respiratory status stable, not requiring increased supplemental oxygen from baseline  Echocardiogram without mention of right heart strain  Renal function test improving  Consider vq scan to rule out pulmonary embolism but already on warfarin for mechanical valve        Acute viral myocarditis  Recent Covid infection  Echocardiogram with significant decline in ejection fraction from normal one month ago to 25%   Echocardiogram with moderate global hypokinesis and eccentric hypertrophy  Oral systemic steroids on last admission  Continue systemic steroids intravenous   Remdesivir initiated     Elevated lactic acid level  Resolved after IVF bolus in ER.        Hyperglycemia  Likely elevated secondary to Decadron use to treat recent COVID infection.  Plan:  -SSI as needed  -Accuchecks      MARCUS (acute kidney injury)   Baseline creatinine is  1.2 . Most recent creatinine and eGFR are listed below.  Recent Labs     08/09/24 2308 08/11/24  0525   CREATININE 1.7* 1.4   EGFRNORACEVR 30* 38*        Plan  - MARCUS is  treated with sepsis bolus in ER.  - Avoid nephrotoxins and renally dose meds for GFR listed above  - Monitor urine output, serial BMP, and adjust therapy as needed  Improving  Monitor while intravenous diuresing       Chronic hypoxic respiratory failure  Patient with Hypoxic Respiratory failure which is Acute on chronic.  she is on home oxygen at 2-3 LPM. Supplemental oxygen was provided and noted- Oxygen Concentration (%):  [32] 32    .   Signs/symptoms of  respiratory failure include- lethargy. Contributing diagnoses includes - CHF, COPD, and covid  Labs and images were reviewed. Patient Has not had a recent ABG. Will treat underlying causes and adjust management of respiratory failure as follows- remdesivir, breathing treatments, intravenous systemic steroids, intravenous lasix, supplemental oxygen     COVID  Recently diagnosed on 08/01/2024 and treated with Decadron during last admission.  Sent home with Decadron p.o.  Plan:  -supplemental oxygen as needed  -continue decadron po  -duonebs prn  -monitor pulse ox as need/per unit protocol    Initiate remdesivir and increased systemic steroids for covid related myocarditis  Echocardiogram with significantly reduced ejection fraction and moderate global hypokinesis  Inflammatory markers pending    Paroxysmal atrial fibrillation  Patient with Paroxysmal (<7 days) atrial fibrillation which is controlled currently with Beta Blocker. Patient is currently in sinus rhythm.[id number] Score: 4. HASBLED Score: . Anticoagulation indicated. Anticoagulation done with Coumadin .    H/O mechanical aortic valve replacement  Possible evaluation via karlene per cardiology   Pharmacy to dose warfarin      Essential hypertension  Chronic, controlled. Latest blood pressure and vitals reviewed-     Temp:  [97.1 °F (36.2 °C)-97.8 °F (36.6 °C)]   Pulse:  [65-88]   Resp:  [15-45]   BP: (102-122)/(53-73)   SpO2:  [93 %-99 %] .   Home meds for hypertension were reviewed and noted below.   Hypertension Medications               furosemide (LASIX) 20 MG tablet Take 1 tablet (20 mg total) by mouth every morning. Hold for low blood pressure. Do not give if systolic blood pressure is below 110 and/or diastolic is below 60    metoprolol succinate (TOPROL-XL) 25 MG 24 hr tablet Take 1 tablet by mouth every evening.    metoprolol tartrate (LOPRESSOR) 25 MG tablet Take 1 tablet (25 mg total) by mouth 2 (two) times daily. Do not give if systolic blood  pressure is below 110 and/or diastolic below 60            While in the hospital, will manage blood pressure as follows; Continue home antihypertensive regimen    Will utilize p.r.n. blood pressure medication only if patient's blood pressure greater than 180/110 and she develops symptoms such as worsening chest pain or shortness of breath.    Dyslipidemia  Patient is not chronically on statin.will not continue for now. Last Lipid Panel:   Lab Results   Component Value Date    CHOL 225 (H) 11/01/2022    HDL 48 11/01/2022    LDLCALC 141 (H) 11/01/2022    TRIG 180 11/01/2022    CHOLHDL 4.7 (H) 11/01/2022     Plan:  -low fat/low calorie diet        Anticoagulant long-term use  This patient has long term use on an anticoagulant with Select Anticoagulant(s): Warfarin/Coumadin. Their long term anticoagulation will be Held or Continued: continued. They are on long term anticoagulation due to Reason for Anticoagulation: Atrial fibrillation and Mechanical heart valve.   Pharmacy to dose warfarin  Coumadin restricted diet      VTE Risk Mitigation (From admission, onward)           Ordered     warfarin (COUMADIN) tablet 2.5 mg  Once per day on Monday Friday         08/10/24 0703     warfarin (COUMADIN) split tablet 1.25 mg  Once per day on Jayson Tuesday Wednesday Thursday Saturday         08/10/24 0703     IP VTE HIGH RISK PATIENT  Once         08/10/24 0307     Place sequential compression device  Until discontinued         08/10/24 0307                    Discharge Planning   RENE:      Code Status: Full Code   Is the patient medically ready for discharge?:     Reason for patient still in hospital (select all that apply): Patient new problem, Patient trending condition, Laboratory test, Treatment, Imaging, and Consult recommendations  Discharge Plan A: Home with family, Home Health                  Becky Ceron MD  Department of Hospital Medicine   '[facility] - Telemetry ([facility])

## 2024-08-11 NOTE — PROGRESS NOTES
[facility] - Telemetry ([facility])  Cardiology  Consult Note     Patient Name: Serena Post  MRN: [id number]  Admission Date: 8/9/2024  Hospital Length of Stay: 0 days  Code Status: Full Code   Attending Provider: Becky Ceron MD   Consulting Provider: Bharath Romero MD   Primary Care Physician: Evangelina Olivares MD  Principal Problem:Acute exacerbation of CHF (congestive heart failure)     Patient information was obtained from patient and ER records.      Inpatient consult to Cardiology  Consult performed by: Bharath Romero MD  Consult ordered by: Omi Ochoa NP           Subjective:      Chief Complaint:       HPI:     is a 84 year old female with a past medical history for mechanical AVR in 2007, PAF, history of pacemaker, chronic systolic CHF, chronic respiratory failure, , dementia, history of CVA, HLP, HTN, and pneumonia vs. CHF. She was admitted for reoccurrence respiratory failure. Symptoms were similar to previous admission. Patient was recently discharged. Chest X-ray shows CHF vs. Pneumonia. EKG unchanged and shows NSR and RBBB. BNP is greater than 490. Echo on July 31 shows normal EF. Mechanical AVR noted with questionable gradient.     Recommend IV diuresis, supportive care, as well as anticoagulants.     Hospital Course;     8/11/24- Echo shows EF 25%. Mild to moderate AI noted with mechanical AVR, significant gradient noted. Continue IV diuresis. Telemetry shows atrial pacing.           Past Medical History:   Diagnosis Date    A-fib      Anticoagulant long-term use      Aortic valve disease      Cancer       brain tumor, 10 years ago    Cardiomyopathy      CHF (congestive heart failure)      COVID-19 7/23/2022    Hx of heart valve replacement with mechanical valve      Hyperlipidemia      Hypertension      Pacemaker      Pacemaker      Sepsis 7/23/2022    Stroke       2007, residual weakness               Past Surgical History:   Procedure Laterality Date     AORTIC VALVE REPLACEMENT        CHOLECYSTECTOMY        CORONARY ARTERY BYPASS GRAFT        HYSTERECTOMY        INSERTION OF PACEMAKER        TONSILLECTOMY                   Review of patient's allergies indicates:   Allergen Reactions    Amlodipine Other (See Comments)       Not sure    Chlorthalidone Other (See Comments)       Not sure    Clonidine Other (See Comments)       Not sure    Codeine Other (See Comments)       Not sure    Escitalopram Other (See Comments)       Not sure    Lisinopril Other (See Comments)       Not sure    Losartan Other (See Comments)       Not sure    Meperidine Other (See Comments)       Not sure    Methadone Other (See Comments)       Not sure       Morphine Other (See Comments)       Not sure    Nebivolol Other (See Comments)       Not sure          Nitrofurantoin Other (See Comments)       Not sure          Omeprazole Other (See Comments)       Not sure       Pravastatin Other (See Comments)       Not sure       Propoxyphene Other (See Comments)       Not sure       Sulfamethoxazole-trimethoprim Other (See Comments)       Not sure               No current facility-administered medications on file prior to encounter.           Current Outpatient Medications on File Prior to Encounter   Medication Sig    furosemide (LASIX) 20 MG tablet Take 1 tablet (20 mg total) by mouth every morning. Hold for low blood pressure. Do not give if systolic blood pressure is below 110 and/or diastolic is below 60    metoprolol succinate (TOPROL-XL) 25 MG 24 hr tablet Take 1 tablet by mouth every evening.    warfarin (COUMADIN) 2.5 MG tablet Take 2.5 mg by mouth every Monday and Friday.  Take 1.25 mg by mouth all other days.    albuterol (PROVENTIL) 2.5 mg /3 mL (0.083 %) nebulizer solution Take 2.5 mg by nebulization every 4 (four) hours as needed.    dexAMETHasone (DECADRON) 2 MG tablet Take 3 tablets (6 mg total) by mouth once daily. for 4 days    fluticasone propionate (FLONASE) 50 mcg/actuation  nasal spray Shake well and use 2 sprays (100 mcg total) by Each Nostril route once daily.    metoprolol tartrate (LOPRESSOR) 25 MG tablet Take 1 tablet (25 mg total) by mouth 2 (two) times daily. Do not give if systolic blood pressure is below 110 and/or diastolic below 60    miconazole nitrate 2% (MICOTIN) 2 % Oint Apply topically 2 (two) times daily. for 7 days    pulse oximeter (PULSE OXIMETER) device by Apply Externally route 2 (two) times a day. Use twice daily at 8 AM and 3 PM and record the value in MyChart as directed.      Family History         Problem Relation (Age of Onset)     No Known Problems Mother, Father                   Tobacco Use    Smoking status: Never       Passive exposure: Never    Smokeless tobacco: Never   Substance and Sexual Activity    Alcohol use: Not Currently    Drug use: Never    Sexual activity: Not on file      Review of Systems   Constitutional:  Positive for fatigue. Negative for chills, diaphoresis and fever.   Respiratory:  Positive for cough and shortness of breath.    Cardiovascular:  Positive for chest pain and leg swelling. Negative for palpitations.   Gastrointestinal:  Negative for abdominal pain, diarrhea, nausea and vomiting.   Neurological:  Positive for weakness. Negative for dizziness, light-headedness and headaches.   All other systems reviewed and are negative.     Objective:      Vital Signs (Most Recent):  Temp: 97.1 °F (36.2 °C) (08/10/24 0422)  Pulse: 82 (08/10/24 0422)  Resp: 18 (08/10/24 0422)  BP: 121/73 (08/10/24 0422)  SpO2: 96 % (08/10/24 0422) Vital Signs (24h Range):  Temp:  [97.1 °F (36.2 °C)-97.8 °F (36.6 °C)] 97.1 °F (36.2 °C)  Pulse:  [65-88] 82  Resp:  [15-45] 18  SpO2:  [93 %-99 %] 96 %  BP: (102-122)/(53-73) 121/73      Weight: 89.8 kg (198 lb)  Body mass index is 33.99 kg/m².     Physical Exam  Vitals and nursing note reviewed.   Constitutional:       General: She is awake. She is not in acute distress.     Appearance: She is obese. She is  ill-appearing. She is not toxic-appearing or diaphoretic.   HENT:      Head: Normocephalic and atraumatic.   Eyes:      Extraocular Movements: Extraocular movements intact.      Conjunctiva/sclera: Conjunctivae normal.      Pupils: Pupils are equal, round, and reactive to light.   Cardiovascular:      Rate and Rhythm: Normal rate and regular rhythm.      Heart sounds: Murmur heard.   Pulmonary:      Effort: Tachypnea and respiratory distress present.      Breath sounds: Rales present.      Comments: 99% on 4L/min via NC.   Abdominal:      General: Bowel sounds are normal.      Palpations: Abdomen is soft.      Tenderness: There is no abdominal tenderness.   Musculoskeletal:      Cervical back: Normal range of motion and neck supple.      Right lower leg: Edema present.      Left lower leg: Edema present.      Comments: VICTOR   Skin:     General: Skin is warm and dry.      Capillary Refill: Capillary refill takes less than 2 seconds.           Neurological:      General: No focal deficit present.      Mental Status: She is alert and oriented to person, place, and time.      GCS: GCS eye subscore is 4. GCS verbal subscore is 5. GCS motor subscore is 6.      Cranial Nerves: Cranial nerves 2-12 are intact.      Sensory: Sensation is intact.      Motor: Motor function is intact.   Psychiatric:         Mood and Affect: Mood normal.         Speech: Speech normal.         Behavior: Behavior normal. Behavior is cooperative.                  CRANIAL NERVES      CN III, IV, VI   Pupils are equal, round, and reactive to light.     Significant Labs: BMP:       Recent Labs   Lab 08/09/24  2308   *   *   K 4.7      CO2 17*   BUN 44*   CREATININE 1.7*   CALCIUM 8.7   MG 2.0   , CMP       Recent Labs   Lab 08/09/24  2308   *   K 4.7      CO2 17*   *   BUN 44*   CREATININE 1.7*   CALCIUM 8.7   PROT 6.7   ALBUMIN 2.8*   BILITOT 2.1*   ALKPHOS 168*   AST 46*   ALT 45*   ANIONGAP 15   , CBC        Recent Labs   Lab 08/09/24  2308   WBC 8.51   HGB 14.2   HCT 43.0      , and Troponin       Recent Labs   Lab 08/09/24 2308   TROPONINI 0.035*         Significant Imaging: Echocardiogram: 2D echo with color flow doppler: No results found for this or any previous visit. and Transthoracic echo (TTE) complete (Cupid Only):         Results for orders placed or performed during the hospital encounter of 02/22/24   Echo   Result Value Ref Range     BSA 2.03 m2     LVOT stroke volume 56.42 cm3     LVIDd 5.10 3.5 - 6.0 cm     LV Systolic Volume 90.21 mL     LV Systolic Volume Index 46.0 mL/m2     LVIDs 4.45 (A) 2.1 - 4.0 cm     LV Diastolic Volume 123.59 mL     LV Diastolic Volume Index 63.06 mL/m2     IVS 1.04 0.6 - 1.1 cm     LVOT diameter 1.91 cm     LVOT area 2.9 cm2     FS 13 (A) 28 - 44 %     Left Ventricle Relative Wall Thickness 0.47 cm     Posterior Wall 1.19 (A) 0.6 - 1.1 cm     LV mass 217.90 g     LV Mass Index 111 g/m2     MV Peak E Melo 1.07 m/s     TDI LATERAL 0.08 m/s     TDI SEPTAL 0.05 m/s     E/E' ratio 16.46 m/s     MV Peak A Melo 1.07 m/s     TR Max Melo 3.38 m/s     E/A ratio 1.00       IVRT 62.80 msec     E wave deceleration time 192.43 msec     LV SEPTAL E/E' RATIO 21.40 m/s     LV LATERAL E/E' RATIO 13.38 m/s     LVOT peak melo 0.83 m/s     Left Ventricular Outflow Tract Mean Velocity 0.58 cm/s     Left Ventricular Outflow Tract Mean Gradient 1.51 mmHg     RV mid diameter 3.15 cm     RVOT peak VTI 9.3 cm     TAPSE 2.05 cm     LA size 3.61 cm     Left Atrium Minor Axis 3.98 cm     Left Atrium Major Axis 6.58 cm     RA Major Axis 4.53 cm     AV regurgitation pressure 1/2 time 385.247037341410079 ms     AR Max Melo 4.34 m/s     AV mean gradient 95 mmHg     AV peak gradient 97 mmHg     Ao peak melo 4.93 m/s     Ao .80 cm     LVOT peak VTI 19.70 cm     AV valve area 0.37 cm²     AV Velocity Ratio 0.17       AV index (prosthetic) 0.13       AAMIR by Velocity Ratio 0.48 cm²     MV stenosis  pressure 1/2 time 55.80 ms     MV valve area p 1/2 method 3.94 cm2     Triscuspid Valve Regurgitation Peak Gradient 46 mmHg     PV mean gradient 1 mmHg     RVOT peak rafa 0.49 m/s     Ao root annulus 2.98 cm     STJ 4.51 cm     Ascending aorta 4.45 cm     IVC diameter 1.15 cm     Mean e' 0.07 m/s     ZLVIDS 2.01       ZLVIDD -0.95       LA Volume Index 34.9 mL/m2     LA volume 68.49 cm3     LA WIDTH 4.5 cm     RA Width 4.4 cm     TV resting pulmonary artery pressure 49 mmHg     RV TB RVSP 6 mmHg     Est. RA pres 3 mmHg     EF 35 %     Narrative       Left Ventricle: The left ventricle is normal in size. Normal wall   thickness. There is concentric hypertrophy. Global hypokinesis present.   There is moderately reduced systolic function. Ejection fraction by visual   approximation is 35%.    Right Ventricle: Normal right ventricular cavity size. Systolic   function is borderline low.    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 There is moderate aortic regurgitation.    Consider ORI if clinically needed to evaluate mechanical aortic valve   further.    Mitral Valve: There is mild bileaflet sclerosis. There is mild   regurgitation.    Pulmonary Artery: There is moderate pulmonary hypertension. The   estimated pulmonary artery systolic pressure is 49 mmHg.    IVC/SVC: Normal venous pressure at 3 mmHg.    Ascending aorta is moderately dilated measuring 4.45 cm.       and EKG:   Assessment and Plan:       is a 84 year old female with a past medical history for mechanical AVR in 2007, PAF, history of pacemaker, chronic systolic CHF, chronic respiratory failure, , dementia, history of CVA, HLP, HTN, and pneumonia vs. CHF. She was admitted for reoccurrence respiratory failure. Symptoms were similar to previous admission. Patient was recently discharged. Chest X-ray shows CHF vs. Pneumonia. EKG unchanged and shows NSR and RBBB. BNP is greater than 490. Echo on  July 31 ([facility])shows normal EF. Mechanical AVR noted with questionable gradient.     Recommend IV diuresis, supportive care, as well as continue anticoagulants. Repeat echo     Acute exacerbation of CHF (congestive heart failure)  Patient is identified as having  unspecified  heart failure that is Acute on chronic. CHF is currently uncontrolled due to Continued edema of extremities, Dyspnea improved with supplemental oxygen. Rales/crackles on pulmonary exam, and Pulmonary edema/pleural effusion on CXR. Latest ECHO performed and demonstrates- Results for orders placed during the hospital encounter of 02/22/24     Echo     Interpretation Summary    Left Ventricle: The left ventricle is normal in size. Normal wall thickness. There is concentric hypertrophy. Global hypokinesis present. There is moderately reduced systolic function. Ejection fraction by visual approximation is 35%.    Right Ventricle: Normal right ventricular cavity size. Systolic function is borderline low.    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 There is moderate aortic regurgitation.  Consider ORI if clinically needed to evaluate mechanical aortic valve further.    Mitral Valve: There is mild bileaflet sclerosis. There is mild regurgitation.    Pulmonary Artery: There is moderate pulmonary hypertension. The estimated pulmonary artery systolic pressure is 49 mmHg.    IVC/SVC: Normal venous pressure at 3 mmHg.    Ascending aorta is moderately dilated measuring 4.45 cm.  . Continue Beta Blocker and Furosemide and monitor clinical status closely. Monitor on telemetry. Patient is on CHF pathway.  Monitor strict Is&Os and daily weights.  Place on fluid restriction of 1.5 L. Cardiology has been consulted. Continue to stress to patient importance of self efficacy and  on diet for CHF. Last BNP reviewed- and noted below         Recent Labs   Lab 08/09/24  2308   BNP >4,900*  >4,900*         MARCUS  (acute kidney injury)   Baseline creatinine is  1.2 . Most recent creatinine and eGFR are listed below.        Recent Labs     08/09/24  2308   CREATININE 1.7*   EGFRNORACEVR 30*       Plan  - MARCUS is  treated with sepsis bolus in ER.  - Avoid nephrotoxins and renally dose meds for GFR listed above  - Monitor urine output, serial BMP, and adjust therapy as needed        Elevated lactic acid level  Resolved after IVF bolus in ER.  Plan:  -trend labs as needed        Hyperglycemia  Likely elevated secondary to Decadron use to treat recent COVID infection.  Plan:  -SSI as needed  -Accuchecks        Anticoagulant long-term use  This patient has long term use on an anticoagulant with Select Anticoagulant(s): Warfarin/Coumadin. Their long term anticoagulation will be Held or Continued: continued. They are on long term anticoagulation due to Reason for Anticoagulation: Atrial fibrillation and Mechanical heart valve.      Dyslipidemia  Patient is not chronically on statin.will not continue for now. Last Lipid Panel:             Lab Results   Component Value Date     CHOL 225 (H) 11/01/2022     HDL 48 11/01/2022     LDLCALC 141 (H) 11/01/2022     TRIG 180 11/01/2022     CHOLHDL 4.7 (H) 11/01/2022      Plan:  -low fat/low calorie diet           Essential hypertension  Chronic, controlled. Latest blood pressure and vitals reviewed-      Temp:  [97.1 °F (36.2 °C)-97.8 °F (36.6 °C)]   Pulse:  [65-88]   Resp:  [15-45]   BP: (102-122)/(53-73)   SpO2:  [93 %-99 %] .   Home meds for hypertension were reviewed and noted below.   Hypertension Medications                    furosemide (LASIX) 20 MG tablet Take 1 tablet (20 mg total) by mouth every morning. Hold for low blood pressure. Do not give if systolic blood pressure is below 110 and/or diastolic is below 60     metoprolol succinate (TOPROL-XL) 25 MG 24 hr tablet Take 1 tablet by mouth every evening.     metoprolol tartrate (LOPRESSOR) 25 MG tablet Take 1 tablet (25 mg total) by  mouth 2 (two) times daily. Do not give if systolic blood pressure is below 110 and/or diastolic below 60                While in the hospital, will manage blood pressure as follows; Continue home antihypertensive regimen     Will utilize p.r.n. blood pressure medication only if patient's blood pressure greater than 180/110 and she develops symptoms such as worsening chest pain or shortness of breath.     Paroxysmal atrial fibrillation  Patient with Paroxysmal (<7 days) atrial fibrillation which is controlled currently with Beta Blocker. Patient is currently in sinus rhythm.[id number] Score: 4. HASBLED Score: . Anticoagulation indicated. Anticoagulation done with Coumadin .     COVID  Recently diagnosed on 08/01/2024 and treated with Decadron during last admission.  Sent home with Decadron p.o.  Plan:  -supplemental oxygen as needed  -continue decadron po  -duonebs prn  -monitor pulse ox as need/per unit protocol        VTE Risk Mitigation (From admission, onward)              Ordered       warfarin (COUMADIN) tablet 2.5 mg  Once per day on Monday Friday         08/10/24 0703       warfarin (COUMADIN) split tablet 1.25 mg  Once per day on [place] Tuesday Wednesday Thursday Saturday         08/10/24 0703       IP VTE HIGH RISK PATIENT  Once         08/10/24 0307       Place sequential compression device  Until discontinued         08/10/24 0307                          Cardiology   [facility] - Telemetry ([facility])

## 2024-08-11 NOTE — PLAN OF CARE
Pt oriented x2.  VSS.  Pt remained afebrile throughout this shift.   All meds administered per order.   Pt remained free of falls this shift.   Plan of care reviewed. Patient needs reinforcement  Pt moving/turning with assistance   Bed low, side rails up x 2, wheels locked, call light in reach.   Patient instructed to call for assistance.  Patient education provided  Will continue to monitor.     Problem: Adult Inpatient Plan of Care  Goal: Plan of Care Review  Outcome: Progressing  Goal: Patient-Specific Goal (Individualized)  Outcome: Progressing  Goal: Absence of Hospital-Acquired Illness or Injury  Outcome: Progressing  Goal: Optimal Comfort and Wellbeing  Outcome: Progressing  Goal: Readiness for Transition of Care  Outcome: Progressing     Problem: Wound  Goal: Optimal Coping  Outcome: Progressing  Goal: Optimal Functional Ability  Outcome: Progressing  Goal: Absence of Infection Signs and Symptoms  Outcome: Progressing  Goal: Improved Oral Intake  Outcome: Progressing  Goal: Optimal Pain Control and Function  Outcome: Progressing  Goal: Skin Health and Integrity  Outcome: Progressing  Goal: Optimal Wound Healing  Outcome: Progressing     Problem: Skin Injury Risk Increased  Goal: Skin Health and Integrity  Outcome: Progressing     Problem: Fall Injury Risk  Goal: Absence of Fall and Fall-Related Injury  Outcome: Progressing     Problem: Acute Kidney Injury/Impairment  Goal: Fluid and Electrolyte Balance  Outcome: Progressing  Goal: Improved Oral Intake  Outcome: Progressing  Goal: Effective Renal Function  Outcome: Progressing

## 2024-08-11 NOTE — ASSESSMENT & PLAN NOTE
This patient has long term use on an anticoagulant with Select Anticoagulant(s): Warfarin/Coumadin. Their long term anticoagulation will be Held or Continued: continued. They are on long term anticoagulation due to Reason for Anticoagulation: Atrial fibrillation and Mechanical heart valve.   Pharmacy to dose warfarin  Coumadin restricted diet

## 2024-08-11 NOTE — CONSULTS
[facility] - Telemetry ([facility])  Adult Nutrition  Consult Note    SUMMARY     Recommendations  1. Recommend pt continues Low Sodium, 2gm Fluid - 1500mL texture per ST.   2. Recommend pt receives Boost glucose BID.   3. Recommend pt receives Dirk BID.   4. Recommend pt receives Banatrol Plus as diarrhea persist.   5. Weigh daily.    Goals:   1. Pt will consume >75% prior to RD follow up.   2. Pt will receive and consume Dirk prior to RD follow up.   3. Pt diarrhea will improve prior to RD follow up.  Nutrition Goal Status: new  Communication of RD Recs: other (comment) (POC: Sticky Note)    Assessment and Plan    Nutrition Problem  Inadequate energy/protein intake  Increased protein needs  Alter GI function     Related to (etiology):   Psychological causes   Increased demand for nutrition  Alteration of GI tract function    Signs and Symptoms (as evidenced by):   AMS, PO intake <50%  Wound healing  Diarrhea     Interventions(treatment strategy):  1. Commercial beverage medical food supplement therapy  2. Amino acid medical food supplement therapy  3. Modified beverage medical food supplement therapy  4. Collaboration by nutrition professional with other providers.    Nutrition Diagnosis Status:   New       Malnutrition Assessment     Skin (Micronutrient): pallor, wounds unhealed (cool, Jacobo=13)   Micronutrient Evaluation Summary: suspected deficiency   Weight Loss (Malnutrition): greater than 10% in 6 months                         Reason for Assessment    Reason For Assessment: consult, identified at risk by screening criteria (For education on fluid and salt restriction)  Diagnosis: cardiac disease (Acute exacerbation of CHF (congestive heart failure))  Relevant Medical History: CHF, HLD, HTN, COVID, MARCUS/CKD, Hyperglycemia, COPD, CVA  Interdisciplinary Rounds: did not attend  General Information Comments:   8/10/24: 85 y/o female admitted with active principal problem of Acute exacerbation of CHF (congestive  "heart failure). Dietitian consulted to provide pt with heart failure nutrition education. Pt currently not appropriate to receive nutrition education. Pt identified at risk by screening criteria. Attempted to visit pt at bedside. Dietitian asked the pt how is her appetite, the pt screams she does not want her tray and tell me to get out. After exiting the pt room, The dietitian spoke with the pt RN whom states the pt consumed about 50% of breakfast and refused to eat lunch tray. NFPE not performed per pt currently has COVID-19 and pt refused nutrition assessment. Chart review: LBM 8/9/24, per son. Oxygen therapy: 2.5 nasal cannula, per home regimen. Weight: 163 lb, BMI 27.98 (Normal for age). Per the pt past weight encounters, the pt has lost 37 lb within the past 6 months, -23% wt change. Wounds: Pressure Injury midline Coccyx, Blister(s) anterior Thigh. Labs: A1C: 5.8 (WNL for age), Na: 134 (L), CO2: 17 (L), Glu: 188 (WNL), BUN: 44 (H), Cr: 1.7 (H), Albumin: 2.8 (L), GFR: 30 (MARCUS/CKD). Per report, the pt son reports symptoms of decreased appetite, generalized weakness, and diarrhea. Dietitian will continue to follow and monitor the pt nutrition status throughout hospital admission.  Nutrition Discharge Planning: Cardiac diet. Dirk BID    Nutrition Risk Screen    Nutrition Risk Screen: large or nonhealing wound, burn or pressure injury, unintentional loss of 10 lbs or more in the past 2 months  Nutrition Related Social Determinants of Health: SDOH: Unable to assess at this time.     Nutrition/Diet History    Spiritual, Cultural Beliefs, Cheondoism Practices, Values that Affect Care: no  Food Allergies: NKFA  Factors Affecting Nutritional Intake: behavioral (mealtime), decreased appetite    Anthropometrics    Temp: 97.6 °F (36.4 °C)  Height: 5' 4" (162.6 cm)  Height (inches): 64 in  Weight Method: Bed Scale  Weight: 73.9 kg (162 lb 14.7 oz)  Weight (lb): 162.92 lb  Ideal Body Weight (IBW), Female: 120 lb  % Ideal " "Body Weight, Female (lb): 135.77 %  BMI (Calculated): 28  BMI Grade: 25 - 29.9 - overweight (WNL for age)     Wt Readings from Last 20 Encounters:   08/10/24 73.9 kg (162 lb 14.7 oz)   08/06/24 90.2 kg (198 lb 13.7 oz)   03/06/24 90.5 kg (199 lb 8 oz)   03/04/24 89.8 kg (198 lb)   02/28/24 90.8 kg (200 lb 2.8 oz)   02/09/24 92.3 kg (203 lb 6.4 oz)   02/01/24 89 kg (196 lb 3.4 oz)   11/29/23 89.4 kg (197 lb)   11/27/23 96.2 kg (212 lb)   11/22/23 96.1 kg (211 lb 13.8 oz)   08/02/22 109.9 kg (242 lb 4.6 oz)   07/24/22 110.3 kg (243 lb 2.7 oz)   07/21/21 111.8 kg (246 lb 7.6 oz)   06/08/21 105.4 kg (232 lb 5.8 oz)   11/19/20 104.5 kg (230 lb 6.1 oz)   02/17/20 64.4 kg (142 lb)   12/04/19 65.8 kg (145 lb)   ]  Lab/Procedures/Meds  BMP  Lab Results   Component Value Date     (L) 08/09/2024    K 4.7 08/09/2024     08/09/2024    CO2 17 (L) 08/09/2024    BUN 44 (H) 08/09/2024    CREATININE 1.7 (H) 08/09/2024    CALCIUM 8.7 08/09/2024    ANIONGAP 15 08/09/2024    EGFRNORACEVR 30 (A) 08/09/2024     Lab Results   Component Value Date    CALCIUM 8.7 08/09/2024    PHOS 4.4 08/09/2024     No results for input(s): "POCTGLUCOSE" in the last 24 hours.  Lab Results   Component Value Date    LABPROT 20.4 (H) 08/09/2024    ALBUMIN 2.8 (L) 08/09/2024       Pertinent Labs Reviewed: reviewed  Pertinent Labs Comments: A1C: 5.8 (WNL for age), Na: 134 (L), CO2: 17 (L), Glu: 188 (WNL), BUN: 44 (H), Cr: 1.7 (H), Albumin: 2.8 (L), GFR: 30 (MARCUS/CKD).  Pertinent Medications Reviewed: reviewed  Scheduled Meds:   furosemide (LASIX) injection  40 mg Intravenous Daily    metoprolol tartrate  25 mg Oral BID    mupirocin   Nasal BID    warfarin  1.25 mg Oral Once per day on Sunday Tuesday Wednesday Thursday Saturday    [START ON 8/12/2024] warfarin  2.5 mg Oral Once per day on Monday Friday     Continuous Infusions:  PRN Meds:.  Current Facility-Administered Medications:     acetaminophen, 650 mg, Oral, Q6H PRN    ondansetron, 4 mg, " Intravenous, Q8H PRN    sodium chloride 0.9%, 10 mL, Intravenous, PRN    Physical Findings/Assessment         Estimated/Assessed Needs    Weight Used For Calorie Calculations: 73.9 kg (162 lb 14.7 oz)  Energy Calorie Requirements (kcal): 1774 (24 kcal/kg per CHF)  Energy Need Method: Kcal/kg  Protein Requirements: 59-74 (0.8-1.0 g/kg per MARCUS)  Weight Used For Protein Calculations: 73.9 kg (162 lb 14.7 oz)  Fluid Requirements (mL): 1500 (Per CHF)     RDA Method (mL): 1774  CHO Requirement: 222      Nutrition Prescription Ordered    Current Diet Order: Low Sodium, 2gm Fluid - 1500mL    Evaluation of Received Nutrient/Fluid Intake    % Kcal Needs: 50  % Protein Needs: 50  IV Fluid (mL): 1600  I/O: +1600 Since admit  Energy Calories Required: not meeting needs  Protein Required: not meeting needs  Fluid Required: meeting needs (IV Fluid)  Tolerance: not tolerating  % Intake of Estimated Energy Needs: 25 - 50 %  % Meal Intake: 25 - 50 %    Nutrition Risk    Level of Risk/Frequency of Follow-up: high (x2/week)       Monitor and Evaluation    Food and Nutrient Intake: energy intake, food and beverage intake  Food and Nutrient Adminstration: diet order  Knowledge/Beliefs/Attitudes: food and nutrition knowledge/skill, beliefs and attitudes  Physical Activity and Function: nutrition-related ADLs and IADLs, factors affecting access to physical activity  Anthropometric Measurements: weight, weight change, body mass index  Biochemical Data, Medical Tests and Procedures: electrolyte and renal panel, gastrointestinal profile, glucose/endocrine profile, inflammatory profile, lipid profile  Nutrition-Focused Physical Findings: overall appearance, extremities, muscles and bones, head and eyes, skin       Nutrition Follow-Up    RD Follow-up?: Yes  Sen Garcia MS, Registration Eligible, Provisional LDN

## 2024-08-11 NOTE — HOSPITAL COURSE
8/11 readmitted for worsening symptoms. Bnp significant elevated. Cardiology consulted. Echocardiogram with worsening ejection fraction, 25%. Continue intravenous diuresis. 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 Inflammatory markers elevated.     Patient was continued remdesivir and Decadron.  Her Coumadin was managed by pharmacy and 8/14 she became subtherapeutic.  She was started on treatment as Lovenox in addition to Coumadin until her INR is therapeutic.  Venous Doppler of lower extremities revealed Acute DVT seen within the left common femoral vein extending into the proximal femoral vein with involvement of the superficial greater saphenous vein.  This is likely secondary to hypercoagulable state with COVID.  Consulted Hematology for further recommendations as patient developed lower extremity DVT while on Coumadin, her inr was subtherapeutic. After speaking with hematology and cardiology will leave on lovenox for now and she will follow up with cardiology as outpatient.  Discussed with IR and no indication for thrombectomy at this time and they recommend repeat us in  2 weeks.   She will be discharged on lovenox and follow up with Dr Perkins and coumjosh nice.   Pt seen and examined on day of discharge and stable for dc. Pt's son will help facilitte PT eval.   Echo    Left Ventricle: The left ventricle is mildly dilated. Normal wall thickness. There is eccentric hypertrophy. Moderate global hypokinesis present. Septal motion is consistent with bundle branch block. There is severely reduced systolic function. Ejection fraction by visual approximation is 25%. Grade II diastolic dysfunction.    Right Ventricle: Moderate right ventricular enlargement. Wall thickness is normal. Systolic function is mildly reduced.    Right Atrium: Right atrium is moderately dilated.    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 There is mild  to moderate aortic regurgitation with a centrally directed jet.Increased gradient noted    Tricuspid Valve: There is mild regurgitation with a centrally directed jet.    Pulmonary Artery: The estimated pulmonary artery systolic pressure is 52 mmHg.    IVC/SVC: Normal venous pressure at 3 mmHg.

## 2024-08-11 NOTE — PLAN OF CARE
Nutrition Recs: 8/10/24  1. Recommend pt continues Low Sodium, 2gm Fluid - 1500mL texture per ST.   2. Recommend pt receives Boost glucose BID.   3. Recommend pt receives Dirk BID.   4. Recommend pt receives Banatrol Plus as diarrhea persist.   5. Weigh daily.    Goals:   1. Pt will consume >75% prior to RD follow up.   2. Pt will receive and consume Dirk prior to RD follow up.   3. Pt diarrhea will improve prior to RD follow up.  Nutrition Goal Status: new  Communication of RD Recs: other (comment) (POC: Sticky Note)  Sen Garcia MS, Registration Eligible, Provisional LDN

## 2024-08-12 LAB
ALBUMIN SERPL BCP-MCNC: 2.5 G/DL (ref 3.5–5.2)
ALP SERPL-CCNC: 142 U/L (ref 55–135)
ALT SERPL W/O P-5'-P-CCNC: 31 U/L (ref 10–44)
ANION GAP SERPL CALC-SCNC: 10 MMOL/L (ref 8–16)
ANION GAP SERPL CALC-SCNC: 10 MMOL/L (ref 8–16)
AST SERPL-CCNC: 20 U/L (ref 10–40)
BASOPHILS # BLD AUTO: 0.02 K/UL (ref 0–0.2)
BASOPHILS NFR BLD: 0.2 % (ref 0–1.9)
BILIRUB SERPL-MCNC: 1.9 MG/DL (ref 0.1–1)
BNP SERPL-MCNC: >4900 PG/ML (ref 0–99)
BUN SERPL-MCNC: 30 MG/DL (ref 8–23)
BUN SERPL-MCNC: 30 MG/DL (ref 8–23)
CALCIUM SERPL-MCNC: 8 MG/DL (ref 8.7–10.5)
CALCIUM SERPL-MCNC: 8 MG/DL (ref 8.7–10.5)
CHLORIDE SERPL-SCNC: 102 MMOL/L (ref 95–110)
CHLORIDE SERPL-SCNC: 102 MMOL/L (ref 95–110)
CO2 SERPL-SCNC: 30 MMOL/L (ref 23–29)
CO2 SERPL-SCNC: 30 MMOL/L (ref 23–29)
CREAT SERPL-MCNC: 1.3 MG/DL (ref 0.5–1.4)
CREAT SERPL-MCNC: 1.3 MG/DL (ref 0.5–1.4)
CRP SERPL-MCNC: 84 MG/L (ref 0–8.2)
DIFFERENTIAL METHOD BLD: ABNORMAL
EOSINOPHIL # BLD AUTO: 0 K/UL (ref 0–0.5)
EOSINOPHIL NFR BLD: 0 % (ref 0–8)
ERYTHROCYTE [DISTWIDTH] IN BLOOD BY AUTOMATED COUNT: 15.1 % (ref 11.5–14.5)
ERYTHROCYTE [DISTWIDTH] IN BLOOD BY AUTOMATED COUNT: 15.1 % (ref 11.5–14.5)
ERYTHROCYTE [SEDIMENTATION RATE] IN BLOOD BY WESTERGREN METHOD: 6 MM/HR (ref 0–20)
EST. GFR  (NO RACE VARIABLE): 41 ML/MIN/1.73 M^2
EST. GFR  (NO RACE VARIABLE): 41 ML/MIN/1.73 M^2
FERRITIN SERPL-MCNC: 92 NG/ML (ref 20–300)
GLUCOSE SERPL-MCNC: 134 MG/DL (ref 70–110)
GLUCOSE SERPL-MCNC: 134 MG/DL (ref 70–110)
HCT VFR BLD AUTO: 44.1 % (ref 37–48.5)
HCT VFR BLD AUTO: 44.1 % (ref 37–48.5)
HGB BLD-MCNC: 14.6 G/DL (ref 12–16)
HGB BLD-MCNC: 14.6 G/DL (ref 12–16)
IMM GRANULOCYTES # BLD AUTO: 0.07 K/UL (ref 0–0.04)
IMM GRANULOCYTES NFR BLD AUTO: 0.6 % (ref 0–0.5)
INR PPP: 2 (ref 0.8–1.2)
LYMPHOCYTES # BLD AUTO: 0.3 K/UL (ref 1–4.8)
LYMPHOCYTES NFR BLD: 2 % (ref 18–48)
MCH RBC QN AUTO: 28.3 PG (ref 27–31)
MCH RBC QN AUTO: 28.3 PG (ref 27–31)
MCHC RBC AUTO-ENTMCNC: 33.1 G/DL (ref 32–36)
MCHC RBC AUTO-ENTMCNC: 33.1 G/DL (ref 32–36)
MCV RBC AUTO: 86 FL (ref 82–98)
MCV RBC AUTO: 86 FL (ref 82–98)
MONOCYTES # BLD AUTO: 0.3 K/UL (ref 0.3–1)
MONOCYTES NFR BLD: 2.4 % (ref 4–15)
NEUTROPHILS # BLD AUTO: 11.9 K/UL (ref 1.8–7.7)
NEUTROPHILS NFR BLD: 94.8 % (ref 38–73)
NRBC BLD-RTO: 0 /100 WBC
PLATELET # BLD AUTO: 174 K/UL (ref 150–450)
PLATELET # BLD AUTO: 174 K/UL (ref 150–450)
PMV BLD AUTO: 10.7 FL (ref 9.2–12.9)
PMV BLD AUTO: 10.7 FL (ref 9.2–12.9)
POTASSIUM SERPL-SCNC: 3.8 MMOL/L (ref 3.5–5.1)
POTASSIUM SERPL-SCNC: 3.8 MMOL/L (ref 3.5–5.1)
PROT SERPL-MCNC: 5.7 G/DL (ref 6–8.4)
PROTHROMBIN TIME: 21.8 SEC (ref 9–12.5)
RBC # BLD AUTO: 5.15 M/UL (ref 4–5.4)
RBC # BLD AUTO: 5.15 M/UL (ref 4–5.4)
SODIUM SERPL-SCNC: 142 MMOL/L (ref 136–145)
SODIUM SERPL-SCNC: 142 MMOL/L (ref 136–145)
WBC # BLD AUTO: 12.56 K/UL (ref 3.9–12.7)
WBC # BLD AUTO: 12.56 K/UL (ref 3.9–12.7)

## 2024-08-12 PROCEDURE — 63600175 PHARM REV CODE 636 W HCPCS: Performed by: NURSE PRACTITIONER

## 2024-08-12 PROCEDURE — 99900035 HC TECH TIME PER 15 MIN (STAT)

## 2024-08-12 PROCEDURE — 27000207 HC ISOLATION

## 2024-08-12 PROCEDURE — 25000003 PHARM REV CODE 250: Performed by: NURSE PRACTITIONER

## 2024-08-12 PROCEDURE — 27000221 HC OXYGEN, UP TO 24 HOURS

## 2024-08-12 PROCEDURE — 25000003 PHARM REV CODE 250: Performed by: INTERNAL MEDICINE

## 2024-08-12 PROCEDURE — 92526 ORAL FUNCTION THERAPY: CPT

## 2024-08-12 PROCEDURE — 25000003 PHARM REV CODE 250

## 2024-08-12 PROCEDURE — 82728 ASSAY OF FERRITIN: CPT | Performed by: FAMILY MEDICINE

## 2024-08-12 PROCEDURE — 63600175 PHARM REV CODE 636 W HCPCS: Mod: JZ,TB | Performed by: FAMILY MEDICINE

## 2024-08-12 PROCEDURE — 94761 N-INVAS EAR/PLS OXIMETRY MLT: CPT

## 2024-08-12 PROCEDURE — 85025 COMPLETE CBC W/AUTO DIFF WBC: CPT | Performed by: FAMILY MEDICINE

## 2024-08-12 PROCEDURE — 21400001 HC TELEMETRY ROOM

## 2024-08-12 PROCEDURE — 25000003 PHARM REV CODE 250: Performed by: FAMILY MEDICINE

## 2024-08-12 PROCEDURE — 86140 C-REACTIVE PROTEIN: CPT | Performed by: FAMILY MEDICINE

## 2024-08-12 PROCEDURE — 80053 COMPREHEN METABOLIC PANEL: CPT | Performed by: FAMILY MEDICINE

## 2024-08-12 PROCEDURE — 85610 PROTHROMBIN TIME: CPT | Performed by: HOSPITALIST

## 2024-08-12 PROCEDURE — 99233 SBSQ HOSP IP/OBS HIGH 50: CPT | Mod: ,,, | Performed by: INTERNAL MEDICINE

## 2024-08-12 PROCEDURE — 85651 RBC SED RATE NONAUTOMATED: CPT | Performed by: FAMILY MEDICINE

## 2024-08-12 PROCEDURE — 83880 ASSAY OF NATRIURETIC PEPTIDE: CPT | Performed by: FAMILY MEDICINE

## 2024-08-12 PROCEDURE — 36415 COLL VENOUS BLD VENIPUNCTURE: CPT | Performed by: HOSPITALIST

## 2024-08-12 RX ORDER — SODIUM CHLORIDE 9 MG/ML
INJECTION, SOLUTION INTRAVENOUS
Status: DISCONTINUED | OUTPATIENT
Start: 2024-08-12 | End: 2024-08-16 | Stop reason: HOSPADM

## 2024-08-12 RX ORDER — FUROSEMIDE 10 MG/ML
40 INJECTION INTRAMUSCULAR; INTRAVENOUS EVERY 12 HOURS
Status: DISCONTINUED | OUTPATIENT
Start: 2024-08-12 | End: 2024-08-14

## 2024-08-12 RX ORDER — MIDODRINE HYDROCHLORIDE 5 MG/1
5 TABLET ORAL
Status: DISCONTINUED | OUTPATIENT
Start: 2024-08-12 | End: 2024-08-12

## 2024-08-12 RX ORDER — MIDODRINE HYDROCHLORIDE 5 MG/1
10 TABLET ORAL
Status: DISCONTINUED | OUTPATIENT
Start: 2024-08-12 | End: 2024-08-16 | Stop reason: HOSPADM

## 2024-08-12 RX ADMIN — DEXAMETHASONE SODIUM PHOSPHATE 8 MG: 4 INJECTION INTRA-ARTICULAR; INTRALESIONAL; INTRAMUSCULAR; INTRAVENOUS; SOFT TISSUE at 09:08

## 2024-08-12 RX ADMIN — REMDESIVIR 100 MG: 100 INJECTION, POWDER, LYOPHILIZED, FOR SOLUTION INTRAVENOUS at 09:08

## 2024-08-12 RX ADMIN — MIDODRINE HYDROCHLORIDE 10 MG: 5 TABLET ORAL at 05:08

## 2024-08-12 RX ADMIN — MUPIROCIN: 20 OINTMENT TOPICAL at 09:08

## 2024-08-12 RX ADMIN — FUROSEMIDE 40 MG: 10 INJECTION, SOLUTION INTRAMUSCULAR; INTRAVENOUS at 09:08

## 2024-08-12 RX ADMIN — WARFARIN SODIUM 2.5 MG: 2.5 TABLET ORAL at 10:08

## 2024-08-12 RX ADMIN — THERA TABS 1 TABLET: TAB at 09:08

## 2024-08-12 RX ADMIN — OXYCODONE HYDROCHLORIDE AND ACETAMINOPHEN 500 MG: 500 TABLET ORAL at 09:08

## 2024-08-12 RX ADMIN — MIDODRINE HYDROCHLORIDE 5 MG: 5 TABLET ORAL at 10:08

## 2024-08-12 NOTE — ASSESSMENT & PLAN NOTE
BNP >4900  Echo with EF 25%  OMT increased lasix to 40mg BID, can increase midodrine to 10mg TID, cont BB  Multiple allergies noted, no ACEi/ARB unsure of allergy?  -610cc, needs strict I/Os  Crt 1.3, cont IV diuresis

## 2024-08-12 NOTE — PROGRESS NOTES
[facility] Medicine  Progress Note    Patient Name: Serena Post  MRN: [id number]  Patient Class: IP- Inpatient   Admission Date: 8/9/2024  Length of Stay: 2 days  Attending Physician: June Acosta MD  Primary Care Provider: Evangelina Olivares MD        Subjective:     Principal Problem:Acute exacerbation of CHF (congestive heart failure)        HPI:  Serena Post is a 84 y.o. female with a PMH  has a past medical history of A-fib, Anticoagulant long-term use, Aortic valve disease, Cancer, Cardiomyopathy, CHF (congestive heart failure), COVID-19 (7/23/2022), heart valve replacement with mechanical valve, Hyperlipidemia, Hypertension, Pacemaker, Pacemaker, Sepsis (7/23/2022), and Stroke. presented to the Emergency Department for evaluation of AMS which onset today. Patient tested positive for COVID on 8/1/2024 and was recently discharged on 8/6/24 after being diuresed for CHF exacerbation and treated with decadron and neb treatments for her Covid. Patient deemed stable for discharge with homehealth physical/occupational therapy to be resumed and nurse practitioner to visit home program. However, patient's son reports patient started becoming incoherent today at 5PM. Son reports symptoms of decreased appetite, generalized weakness, and diarrhea. No mitigating or exacerbating factors reported. Son denies any vomiting. Son states that patient is taking her LASIX. No further complaints or concerns at this time.     ER workup revealed a CBC to be unremarkable, coags of the within therapeutic range, CMP revealed BUN/creatinine of 44/1.7 with EGFR of 30 (22/1.1 with EGFR 50 on 08/06/2024), BNP>4,900, initial lactic acid of 3.0 with repeat lactic acid of 2.0, procalcitonin negative, flu negative, COVID positive, UA unremarkable, chest x-ray unchanged from previous, CT abdomen and pelvis without IV contrast revealed:[Small bilateral pleural effusions, left  greater than right, with patchy bibasilar airspace opacities.  Findings may reflect edema, infection, aspiration, and/or atelectasis; Cardiomegaly.; Nonspecific bilateral perinephric edema and mild urinary bladder wall thickening.  Correlation to urinalysis to exclude infection advised; Diffuse body wall anasarca].  EKG revealed normal sinus rhythm with right bundle-branch block with a ventricular rate of 90 beats per minute with a QT/QTC of 400/489.  O2 saturation originally 93% on room air with a respiratory rate of 40 5 times per minute which resolved after being placed on 4 liters/minute via nasal cannula.  Patient currently satting 99% with a respiratory rate of 18.  No acute respiratory distress noted.  Patient received DuoNeb, 4.5 g of Zosyn, 1, 641 cc bolus of sodium chloride, and 1.7 g of vancomycin in ER.  Hospital Medicine consulted to admit patient for sepsis vs CHF exacerbation.  Patient admitted under inpatient status.      PCP: Evangelina Olivares      Overview/Hospital Course:  8/11 readmitted for worsening symptoms. Bnp significant elevated. Cardiology consulted. Echocardiogram with worsening ejection fraction, 25%. Continue intravenous diuresis. 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 Inflammatory markers pending    Echo    Left Ventricle: The left ventricle is mildly dilated. Normal wall thickness. There is eccentric hypertrophy. Moderate global hypokinesis present. Septal motion is consistent with bundle branch block. There is severely reduced systolic function. Ejection fraction by visual approximation is 25%. Grade II diastolic dysfunction.    Right Ventricle: Moderate right ventricular enlargement. Wall thickness is normal. Systolic function is mildly reduced.    Right Atrium: Right atrium is moderately dilated.    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 There is mild to moderate aortic  regurgitation with a centrally directed jet.Increased gradient noted    Tricuspid Valve: There is mild regurgitation with a centrally directed jet.    Pulmonary Artery: The estimated pulmonary artery systolic pressure is 52 mmHg.    IVC/SVC: Normal venous pressure at 3 mmHg.    Interval History:  Patient seen and examined at bedside.  She is lying in bed which is her baseline.  She denies any pain or shortness a breath.  Drinking some but not eating.    Review of Systems   Constitutional:  Positive for appetite change and fatigue. Negative for fever.   Respiratory:  Positive for shortness of breath. Negative for cough.    Cardiovascular:  Negative for chest pain and leg swelling.   Gastrointestinal:  Negative for nausea and vomiting.   Neurological:  Positive for weakness.   All other systems reviewed and are negative.    Objective:     Vital Signs (Most Recent):  Temp: 98.7 °F (37.1 °C) (08/12/24 1310)  Pulse: 84 (08/12/24 1505)  Resp: 16 (08/12/24 1310)  BP: 114/74 (08/12/24 1310)  SpO2: 96 % (08/12/24 1310) Vital Signs (24h Range):  Temp:  [97.6 °F (36.4 °C)-99.2 °F (37.3 °C)] 98.7 °F (37.1 °C)  Pulse:  [61-98] 84  Resp:  [16-18] 16  SpO2:  [92 %-96 %] 96 %  BP: (101-124)/(56-76) 114/74     Weight: 73.9 kg (162 lb 14.7 oz)  Body mass index is 27.97 kg/m².    Intake/Output Summary (Last 24 hours) at 8/12/2024 1547  Last data filed at 8/12/2024 1021  Gross per 24 hour   Intake 100 ml   Output 1500 ml   Net -1400 ml         Physical Exam  Vitals reviewed.   Constitutional:       Appearance: Normal appearance. She is ill-appearing (Chronically).   HENT:      Head: Normocephalic and atraumatic.      Mouth/Throat:      Mouth: Mucous membranes are moist.      Pharynx: Oropharynx is clear.   Eyes:      Extraocular Movements: Extraocular movements intact.      Conjunctiva/sclera: Conjunctivae normal.   Cardiovascular:      Rate and Rhythm: Normal rate and regular rhythm.      Pulses: Normal pulses.      Heart sounds:  "Normal heart sounds.   Pulmonary:      Effort: Pulmonary effort is normal.      Breath sounds: Normal breath sounds.   Abdominal:      General: Bowel sounds are normal.      Palpations: Abdomen is soft.   Musculoskeletal:         General: Normal range of motion.      Cervical back: Normal range of motion and neck supple.      Right lower leg: Edema present.      Left lower leg: Edema present.   Skin:     General: Skin is warm and dry.   Neurological:      Mental Status: She is alert. Mental status is at baseline.      Motor: Weakness present.             Significant Labs: All pertinent labs within the past 24 hours have been reviewed.  Blood Culture: No results for input(s): "LABBLOO" in the last 48 hours.  CBC:   Recent Labs   Lab 08/12/24  0448   WBC 12.56  12.56   HGB 14.6  14.6   HCT 44.1  44.1     174     CMP:   Recent Labs   Lab 08/11/24  0525 08/12/24 0448    142  142   K 3.7 3.8  3.8    102  102   CO2 25 30*  30*   GLU 84 134*  134*   BUN 35* 30*  30*   CREATININE 1.4 1.3  1.3   CALCIUM 8.6* 8.0*  8.0*   PROT  --  5.7*   ALBUMIN  --  2.5*   BILITOT  --  1.9*   ALKPHOS  --  142*   AST  --  20   ALT  --  31   ANIONGAP 13 10  10       Significant Imaging: I have reviewed all pertinent imaging results/findings within the past 24 hours.    Assessment/Plan:      * Acute exacerbation of CHF (congestive heart failure)  Patient is identified as having biventricular heart failure that is Acute on chronic. CHF is currently uncontrolled due to Continued edema of extremities, Dyspnea improved with supplemental oxygen. Rales/crackles on pulmonary exam, and Pulmonary edema/pleural effusion on CXR. Latest ECHO performed and demonstrates- Results for orders placed during the hospital encounter of 02/22/24    Echo    Interpretation Summary    Left Ventricle: The left ventricle is normal in size. Normal wall thickness. There is concentric hypertrophy. Global hypokinesis present. There is " moderately reduced systolic function. Ejection fraction by visual approximation is 35%.    Right Ventricle: Normal right ventricular cavity size. Systolic function is borderline low.    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 There is moderate aortic regurgitation.  Consider ORI if clinically needed to evaluate mechanical aortic valve further.    Mitral Valve: There is mild bileaflet sclerosis. There is mild regurgitation.    Pulmonary Artery: There is moderate pulmonary hypertension. The estimated pulmonary artery systolic pressure is 49 mmHg.    IVC/SVC: Normal venous pressure at 3 mmHg.    Ascending aorta is moderately dilated measuring 4.45 cm.      Repeat echocardiogram reveals decreased EF to 25%.  With grade 2 diastolic dysfunction    . Continue Beta Blocker and Furosemide and monitor clinical status closely. Monitor on telemetry. Patient is on CHF pathway.  Monitor strict Is&Os and daily weights.  Place on fluid restriction of 1.5 L. Cardiology has been consulted. Continue to stress to patient importance of self efficacy and  on diet for CHF. Last BNP reviewed- and noted below   Recent Labs   Lab 08/12/24  0448   BNP >4,900*     Continue intravenous diuresis as able  Hypotensive     Positive D dimer  In setting of covid infection  Respiratory status stable, not requiring increased supplemental oxygen from baseline  Echocardiogram without mention of right heart strain  Renal function test improving  Consider vq scan to rule out pulmonary embolism but already on warfarin for mechanical valve        Acute viral myocarditis  Recent Covid infection  Echocardiogram with significant decline in ejection fraction from normal one month ago to 25%   Echocardiogram with moderate global hypokinesis and eccentric hypertrophy  Oral systemic steroids on last admission  Continue systemic steroids intravenous   Remdesivir initiated     Elevated lactic acid  level  Resolved after IVF bolus in ER.        Hyperglycemia  Likely elevated secondary to Decadron use to treat recent COVID infection.  Plan:  -SSI as needed  -Accuchecks      MARCUS (acute kidney injury)   Baseline creatinine is  1.2 . Most recent creatinine and eGFR are listed below.  Recent Labs     08/09/24  2308 08/11/24  0525 08/12/24  0448   CREATININE 1.7* 1.4 1.3  1.3   EGFRNORACEVR 30* 38* 41*  41*        Plan  - MARCUS is  treated with sepsis bolus in ER.  - Avoid nephrotoxins and renally dose meds for GFR listed above  - Monitor urine output, serial BMP, and adjust therapy as needed  Improving  Monitor while intravenous diuresing       Resolved    Chronic hypoxic respiratory failure  Patient with Hypoxic Respiratory failure which is Acute on chronic.  she is on home oxygen at 2-3 LPM. Supplemental oxygen was provided and noted- Oxygen Concentration (%):  [32] 32    .   Signs/symptoms of respiratory failure include- lethargy. Contributing diagnoses includes - CHF, COPD, and covid  Labs and images were reviewed. Patient Has not had a recent ABG. Will treat underlying causes and adjust management of respiratory failure as follows- remdesivir, breathing treatments, intravenous systemic steroids, intravenous lasix, supplemental oxygen     COVID  Recently diagnosed on 08/01/2024 and treated with Decadron during last admission.  Sent home with Decadron p.o.  Plan:  -supplemental oxygen as needed  -continue decadron po  -duonebs prn  -monitor pulse ox as need/per unit protocol    Initiate remdesivir and increased systemic steroids for covid related myocarditis  Echocardiogram with significantly reduced ejection fraction and moderate global hypokinesis  Inflammatory markers  ferritin and sed rate normal    Paroxysmal atrial fibrillation  Patient with Paroxysmal (<7 days) atrial fibrillation which is controlled currently with Beta Blocker. Patient is currently in sinus rhythm.[id number] Score: 4. HASBLED Score: .  Anticoagulation indicated. Anticoagulation done with Coumadin .    H/O mechanical aortic valve replacement    Pharmacy to dose warfarin    Follow up with outpatient cardiologist upon discharge      Essential hypertension  Chronic, controlled. Latest blood pressure and vitals reviewed-     Temp:  [97.6 °F (36.4 °C)-99.2 °F (37.3 °C)]   Pulse:  [61-98]   Resp:  [16-18]   BP: (101-124)/(56-76)   SpO2:  [92 %-96 %] .   Home meds for hypertension were reviewed and noted below.   Hypertension Medications               furosemide (LASIX) 20 MG tablet Take 1 tablet (20 mg total) by mouth every morning. Hold for low blood pressure. Do not give if systolic blood pressure is below 110 and/or diastolic is below 60    metoprolol succinate (TOPROL-XL) 25 MG 24 hr tablet Take 1 tablet by mouth every evening.    metoprolol tartrate (LOPRESSOR) 25 MG tablet Take 1 tablet (25 mg total) by mouth 2 (two) times daily. Do not give if systolic blood pressure is below 110 and/or diastolic below 60            While in the hospital, will manage blood pressure as follows; low dose metoprolol    Will utilize p.r.n. blood pressure medication only if patient's blood pressure greater than 180/110 and she develops symptoms such as worsening chest pain or shortness of breath.    Dyslipidemia  Patient is not chronically on statin.will not continue for now. Last Lipid Panel:   Lab Results   Component Value Date    CHOL 225 (H) 11/01/2022    HDL 48 11/01/2022    LDLCALC 141 (H) 11/01/2022    TRIG 180 11/01/2022    CHOLHDL 4.7 (H) 11/01/2022     Plan:  -low fat/low calorie diet        Anticoagulant long-term use  This patient has long term use on an anticoagulant with Select Anticoagulant(s): Warfarin/Coumadin. Their long term anticoagulation will be Held or Continued: continued. They are on long term anticoagulation due to Reason for Anticoagulation: Atrial fibrillation and Mechanical heart valve.   Pharmacy to dose warfarin  Coumadin restricted  diet      VTE Risk Mitigation (From admission, onward)           Ordered     warfarin (COUMADIN) tablet 2.5 mg  Once per day on Monday Friday         08/10/24 0703     warfarin (COUMADIN) split tablet 1.25 mg  Once per day on Jayson Tuesday Wednesday Thursday Saturday         08/10/24 0703     IP VTE HIGH RISK PATIENT  Once         08/10/24 0307     Place sequential compression device  Until discontinued         08/10/24 0307                    Discharge Planning   RENE:      Code Status: Full Code   Is the patient medically ready for discharge?:     Reason for patient still in hospital (select all that apply): Patient trending condition  Discharge Plan A: Home with family, Home Health                  June Boykin MD  Department of Hospital Medicine   [facility] ([facility])

## 2024-08-12 NOTE — CONSULTS
[facility] Tele[facility] ([facility])  Wound Care    Patient Name:  Serena Post   MRN:  [id number]  Date: 8/12/2024  Diagnosis: Acute exacerbation of CHF (congestive heart failure)    History:     Past Medical History:   Diagnosis Date    A-fib     Anticoagulant long-term use     Aortic valve disease     Cancer     brain tumor, 10 years ago    Cardiomyopathy     CHF (congestive heart failure)     COVID-19 7/23/2022    Hx of heart valve replacement with mechanical valve     Hyperlipidemia     Hypertension     Pacemaker     Pacemaker     Sepsis 7/23/2022    Stroke     2007, residual weakness       Social History     Socioeconomic History    Marital status:    Tobacco Use    Smoking status: Never     Passive exposure: Never    Smokeless tobacco: Never   Substance and Sexual Activity    Alcohol use: Not Currently    Drug use: Never     Social Determinants of Health     Food Insecurity: No Food Insecurity (3/18/2024)    [facility] and Its Subsidiaries and Affiliates, [employer] [facility] and Its Subsidiaries and Affiliates    Hunger Vital Sign     Worried About Running Out of Food in the Last Year: Never true     Ran Out of Food in the Last Year: Never true   Transportation Needs: No Transportation Needs (3/18/2024)    Received from [facility] and Its Subsidiaries and Affiliates, [facility]    PRAPARE - Transportation     Lack of Transportation (Medical): No     Lack of Transportation (Non-Medical): No   Physical Activity: Inactive (9/25/2019)    Received from [facility], [facility]    Exercise Vital Sign     Days of Exercise per Week: 0 days     Minutes of Exercise per Session: 0 min       Precautions:     Allergies as of 08/09/2024 - Reviewed 08/09/2024   Allergen Reaction Noted    Amlodipine  Other (See Comments) 09/25/2019    Chlorthalidone Other (See Comments) 09/25/2019    Clonidine Other (See Comments) 09/25/2019    Codeine Other (See Comments) 09/25/2019    Escitalopram Other (See Comments) 09/25/2019    Lisinopril Other (See Comments) 09/25/2019    Losartan Other (See Comments) 09/25/2019    Meperidine Other (See Comments) 09/25/2019    Methadone Other (See Comments) 09/25/2019    Morphine Other (See Comments) 09/25/2019    Nebivolol Other (See Comments) 09/25/2019    Nitrofurantoin Other (See Comments) 09/25/2019    Omeprazole Other (See Comments) 09/25/2019    Pravastatin Other (See Comments) 09/25/2019    Propoxyphene Other (See Comments) 09/25/2019    Sulfamethoxazole-trimethoprim Other (See Comments) 09/25/2019       WOC Assessment Details/Treatment        08/12/24 1020   WOCN Assessment   WOCN Total Time (mins) 30   Visit Date 08/12/24   Visit Time 1020   Consult Type New   WOCN Speciality Wound   Wound pressure   Number of Wounds 1   Intervention assessed;applied;coordination of care;chart review   Teaching on-going   Positioning   Body Position turned;head facing, left   Head of Bed (HOB) Positioning HOB at 30-45 degrees   Positioning/Transfer Devices pillows;in use        Wound 08/10/24 0422 Incontinence associated dermatitis midline Coccyx   Date First Assessed/Time First Assessed: 08/10/24 0422   Present on Original Admission: Yes  Primary Wound Type: Incontinence associated dermatitis  Orientation: midline  Location: Coccyx  Is this injury device related?: No   Wound Image    Dressing Appearance Intact;Moist drainage   Drainage Amount Scant   Drainage Characteristics/Odor Serosanguineous   Appearance Red;Pink   Tissue loss description Full thickness   Red (%), Wound Tissue Color 100 %   Periwound Area Intact   Wound Edges Irregular   Wound Length (cm) 4.5 cm   Wound Width (cm) 1.2 cm   Wound Depth (cm) 0.1 cm   Wound Volume (cm^3) 0.54 cm^3   Wound Surface Area (cm^2) 5.4 cm^2   Care  Cleansed with:;Sterile normal saline   Periwound Care Moisture barrier applied       Consulted to evaluate wound to sacrum/buttocks.  Ms. Post is an 85 yo female admitted 8/9/24 for Acute exacerbation of CHF; was also diagnosed w/ UTI and + for COVID just prior to admission.  PMH includes:  A-fib, aortic valve disease w/ valve replacement, cancer, cardiomyopathy, CHF, HTN, hyperlipidemia, pacemaker, sepsis, and stroke.  Today, patient resting quietly in her bed, asleep and does not awaken easily even with repositioning in bed.  Turned to her L side:  removed foam dressing to note several small scattered wounds to coccyx due to IAD measuring approx 4.5X1.2X0.1 cm in total.  All are red w/ clean wound base.  Only minimal serosanguineous exudate noted.  Periwound is intact.  Recommend:  continue pressure injury prevention measures, as well as barrier paste to coccyx daily after bath & prn each episode of incontinence.  Will sign off for now.  Please re-consult for any would care issues while patient remains hospitalized.    08/12/2024

## 2024-08-12 NOTE — PLAN OF CARE
NAEON.    Aox4. Able to verbalize needs & follow commands. Delayed responses.    POC reviewed with pt. Interventions implemented as appropriate.    VS stable. BP runs soft.  AV-paced / NSR on tele-monitor, box # [id number].  On 3L NC. Productive cough w/ blood tinged sputum.    20g PIVs to RAC and R-wrist. Patent. Saline locked. Dsg CDI. Integrity maintained.    Receiving Remdesivir. No adverse reactions noted.  Receiving IV diuresis. Strict I/O's.  No new skin issues. Nonblanchable redness noted to groin and inner thighs. Wounds to sacrum.  Soft bite-sized cardiac, 2g sodium, Coumadin restriction; 1.5L fluid restricted diet w/ Dirk BID, and Banatrol w/ all meals. Tolerating well. No GI complaints.  Incontinent of b/b. External female catheter in place. No evidence of skin breakdown noted.    No c/o pain.   Generalized weakness. Assist x 2. Bed bound. Activity ad stephen.   Coumadin and Lovenox for VTE.   Frequent position changes encouraged. Able to reposition in bed independently. Educated on s/sx of pressure injury;  verbalized understanding.    NADN. Resting quietly in bed.   Free of falls. Hourly rounding complete.   All safety measures remain in place. SR up x2; bed low & locked. Bed alarm on and audible. Call light w/in reach.   Hourly monitoring continues throughout shift.   Chart check complete.

## 2024-08-12 NOTE — SUBJECTIVE & OBJECTIVE
Review of Systems   Reason unable to perform ROS: COVID isolation.     Objective:     Vital Signs (Most Recent):  Temp: 98.7 °F (37.1 °C) (08/12/24 1310)  Pulse: 89 (08/12/24 1310)  Resp: 16 (08/12/24 1310)  BP: 114/74 (08/12/24 1310)  SpO2: 96 % (08/12/24 1310) Vital Signs (24h Range):  Temp:  [97.6 °F (36.4 °C)-99.2 °F (37.3 °C)] 98.7 °F (37.1 °C)  Pulse:  [61-98] 89  Resp:  [16-18] 16  SpO2:  [92 %-96 %] 96 %  BP: (101-124)/(56-76) 114/74     Weight: 73.9 kg (162 lb 14.7 oz)  Body mass index is 27.97 kg/m².     SpO2: 96 %         Intake/Output Summary (Last 24 hours) at 8/12/2024 1327  Last data filed at 8/12/2024 1021  Gross per 24 hour   Intake 100 ml   Output 1500 ml   Net -1400 ml       Lines/Drains/Airways       Drain  Duration             Female External Urinary Catheter w/ Suction 08/11/24 2200 <1 day              Peripheral Intravenous Line  Duration                  Peripheral IV - Single Lumen 08/09/24 2325 20 G Anterior;Right Forearm 2 days         Peripheral IV - Single Lumen 08/10/24 0256 20 G Right Antecubital 2 days                     COVID isolation  Physical Exam  Vitals and nursing note reviewed.            Significant Labs: BMP:   Recent Labs   Lab 08/11/24  0525 08/12/24  0448   GLU 84 134*  134*    142  142   K 3.7 3.8  3.8    102  102   CO2 25 30*  30*   BUN 35* 30*  30*   CREATININE 1.4 1.3  1.3   CALCIUM 8.6* 8.0*  8.0*   , CMP   Recent Labs   Lab 08/11/24  0525 08/12/24  0448    142  142   K 3.7 3.8  3.8    102  102   CO2 25 30*  30*   GLU 84 134*  134*   BUN 35* 30*  30*   CREATININE 1.4 1.3  1.3   CALCIUM 8.6* 8.0*  8.0*   PROT  --  5.7*   ALBUMIN  --  2.5*   BILITOT  --  1.9*   ALKPHOS  --  142*   AST  --  20   ALT  --  31   ANIONGAP 13 10  10   , CBC   Recent Labs   Lab 08/12/24  0448   WBC 12.56  12.56   HGB 14.6  14.6   HCT 44.1  44.1     174   , INR   Recent Labs   Lab 08/11/24  0525 08/11/24  1418 08/12/24  0448   INR 2.2*  "2.2* 2.0*   , Lipid Panel No results for input(s): "CHOL", "HDL", "LDLCALC", "TRIG", "CHOLHDL" in the last 48 hours., Troponin   Recent Labs   Lab 08/11/24  1418   TROPONINI 0.044*   , and All pertinent lab results from the last 24 hours have been reviewed.    Significant Imaging: Cardiac Cath: reviewed, Echocardiogram: Transthoracic echo (TTE) complete (Cupid Only):   Results for orders placed or performed during the hospital encounter of 08/09/24   Echo   Result Value Ref Range    BSA 1.83 m2    LVOT stroke volume 75.63 cm3    LVIDd 5.48 3.5 - 6.0 cm    LV Systolic Volume 138.71 mL    LV Systolic Volume Index 77.5 mL/m2    LVIDs 5.36 (A) 2.1 - 4.0 cm    LV Diastolic Volume 146.22 mL    LV Diastolic Volume Index 81.69 mL/m2    Left Ventricular End Systolic Volume by Teichholz Method 138.71 mL    Left Ventricular End Diastolic Volume by Teichholz Method 146.22 mL    IVS 1.12 (A) 0.6 - 1.1 cm    LVOT diameter 2.24 cm    LVOT area 3.9 cm2    FS 2 (A) 28 - 44 %    Left Ventricle Relative Wall Thickness 0.42 cm    Posterior Wall 1.15 (A) 0.6 - 1.1 cm    LV mass 250.96 g    LV Mass Index 140 g/m2    MV Peak E Melo 0.95 m/s    TDI LATERAL 0.03 m/s    TDI SEPTAL 0.04 m/s    E/E' ratio 27.14 m/s    MV Peak A Melo 0.48 m/s    TR Max Melo 3.49 m/s    E/A ratio 1.98     E wave deceleration time 94.49 msec    LV SEPTAL E/E' RATIO 23.75 m/s    LV LATERAL E/E' RATIO 31.67 m/s    LVOT peak melo 1.05 m/s    Left Ventricular Outflow Tract Mean Velocity 0.76 cm/s    Left Ventricular Outflow Tract Mean Gradient 2.53 mmHg    RV-matson mid d 3.3 cm    RV mid diameter 3.32 cm    RVOT peak VTI 6.7 cm    TAPSE 1.05 cm    LA size 2.49 cm    Left Atrium Minor Axis 3.21 cm    Left Atrium Major Axis 4.08 cm    RA Major Axis 3.91 cm    AV regurgitation pressure 1/2 time 384.589284290606423 ms    AR Max Melo 3.74 m/s    AV mean gradient 43 mmHg    AV peak gradient 54 mmHg    Ao peak melo 3.67 m/s    Ao VTI 96.30 cm    LVOT peak VTI 19.20 cm    AV valve " area 0.79 cm²    AV Velocity Ratio 0.29     AV index (prosthetic) 0.20     AAMIR by Velocity Ratio 1.13 cm²    Mr max rafa 4.11 m/s    Triscuspid Valve Regurgitation Peak Gradient 49 mmHg    PV mean gradient 0 mmHg    RVOT peak rafa 0.39 m/s    Ao root annulus 3.21 cm    STJ 2.63 cm    Ascending aorta 2.69 cm    IVC diameter 1.77 cm    Mean e' 0.04 m/s    ZLVIDS 4.44     ZLVIDD 1.02     LA Volume Index 15.3 mL/m2    LA volume 27.38 cm3    LA WIDTH 3.6 cm    RA Width 3.1 cm    EF 25 %    TV resting pulmonary artery pressure 52 mmHg    RV TB RVSP 6 mmHg    Est. RA pres 3 mmHg    Narrative      Left Ventricle: The left ventricle is mildly dilated. Normal wall   thickness. There is eccentric hypertrophy. Moderate global hypokinesis   present. Septal motion is consistent with bundle branch block. There is   severely reduced systolic function. Ejection fraction by visual   approximation is 25%. Grade II diastolic dysfunction.    Right Ventricle: Moderate right ventricular enlargement. Wall thickness   is normal. Systolic function is mildly reduced.    Right Atrium: Right atrium is moderately dilated.    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 There   is mild to moderate aortic regurgitation with a centrally directed   jet.Increased gradient noted    Tricuspid Valve: There is mild regurgitation with a centrally directed   jet.    Pulmonary Artery: The estimated pulmonary artery systolic pressure is   52 mmHg.    IVC/SVC: Normal venous pressure at 3 mmHg.     , EKG: reviewed, Stress Test: reviewed, and X-Ray: CXR: X-Ray Chest 1 View (CXR): No results found for this visit on 08/09/24.

## 2024-08-12 NOTE — PROGRESS NOTES
[facility] Telemetry ([facility])  Cardiology  Progress Note    Patient Name: Serena Post  MRN: [id number]  Admission Date: 8/9/2024  Hospital Length of Stay: 2 days  Code Status: Full Code   Attending Physician: June Acosta MD   Primary Care Physician: Evangelina Olivares MD  Expected Discharge Date:   Principal Problem:Acute exacerbation of CHF (congestive heart failure)    Subjective:     Hospital Course:   HPI:     is a 84 year old female with a past medical history for mechanical AVR in 2007, PAF, history of pacemaker, chronic systolic CHF, chronic respiratory failure, , dementia, history of CVA, HLP, HTN, and pneumonia vs. CHF. She was admitted for reoccurrence respiratory failure. Symptoms were similar to previous admission. Patient was recently discharged. Chest X-ray shows CHF vs. Pneumonia. EKG unchanged and shows NSR and RBBB. BNP is greater than 490. Echo on July 31 shows normal EF. Mechanical AVR noted with questionable gradient.     Recommend IV diuresis, supportive care, as well as anticoagulants.      Hospital Course:     8/11/24- Echo shows EF 25%. Mild to moderate AI noted with mechanical AVR, significant gradient noted. Continue IV diuresis. Telemetry shows atrial pacing.      8/12/24 Chart reviewed, COVID isolation. No acute CV events reported. Labs reviewed, BNP >4900, CRP 84, Crt 1.3, Albumin low. Echo with 25% EF, Grade II DD, mechanical AVR        Review of Systems   Reason unable to perform ROS: COVID isolation.     Objective:     Vital Signs (Most Recent):  Temp: 98.7 °F (37.1 °C) (08/12/24 1310)  Pulse: 89 (08/12/24 1310)  Resp: 16 (08/12/24 1310)  BP: 114/74 (08/12/24 1310)  SpO2: 96 % (08/12/24 1310) Vital Signs (24h Range):  Temp:  [97.6 °F (36.4 °C)-99.2 °F (37.3 °C)] 98.7 °F (37.1 °C)  Pulse:  [61-98] 89  Resp:  [16-18] 16  SpO2:  [92 %-96 %] 96 %  BP: (101-124)/(56-76) 114/74     Weight: 73.9 kg (162 lb 14.7 oz)  Body mass index is 27.97 kg/m².    "  SpO2: 96 %         Intake/Output Summary (Last 24 hours) at 8/12/2024 1327  Last data filed at 8/12/2024 1021  Gross per 24 hour   Intake 100 ml   Output 1500 ml   Net -1400 ml       Lines/Drains/Airways       Drain  Duration             Female External Urinary Catheter w/ Suction 08/11/24 2200 <1 day              Peripheral Intravenous Line  Duration                  Peripheral IV - Single Lumen 08/09/24 2325 20 G Anterior;Right Forearm 2 days         Peripheral IV - Single Lumen 08/10/24 0256 20 G Right Antecubital 2 days                     COVID isolation  Physical Exam  Vitals and nursing note reviewed.            Significant Labs: BMP:   Recent Labs   Lab 08/11/24  0525 08/12/24 0448   GLU 84 134*  134*    142  142   K 3.7 3.8  3.8    102  102   CO2 25 30*  30*   BUN 35* 30*  30*   CREATININE 1.4 1.3  1.3   CALCIUM 8.6* 8.0*  8.0*   , CMP   Recent Labs   Lab 08/11/24  0525 08/12/24 0448    142  142   K 3.7 3.8  3.8    102  102   CO2 25 30*  30*   GLU 84 134*  134*   BUN 35* 30*  30*   CREATININE 1.4 1.3  1.3   CALCIUM 8.6* 8.0*  8.0*   PROT  --  5.7*   ALBUMIN  --  2.5*   BILITOT  --  1.9*   ALKPHOS  --  142*   AST  --  20   ALT  --  31   ANIONGAP 13 10  10   , CBC   Recent Labs   Lab 08/12/24 0448   WBC 12.56  12.56   HGB 14.6  14.6   HCT 44.1  44.1     174   , INR   Recent Labs   Lab 08/11/24  0525 08/11/24  1418 08/12/24 0448   INR 2.2* 2.2* 2.0*   , Lipid Panel No results for input(s): "CHOL", "HDL", "LDLCALC", "TRIG", "CHOLHDL" in the last 48 hours., Troponin   Recent Labs   Lab 08/11/24  1418   TROPONINI 0.044*   , and All pertinent lab results from the last 24 hours have been reviewed.    Significant Imaging: Cardiac Cath: reviewed, Echocardiogram: Transthoracic echo (TTE) complete (Cupid Only):   Results for orders placed or performed during the hospital encounter of 08/09/24   Echo   Result Value Ref Range    BSA 1.83 m2    LVOT stroke volume " 75.63 cm3    LVIDd 5.48 3.5 - 6.0 cm    LV Systolic Volume 138.71 mL    LV Systolic Volume Index 77.5 mL/m2    LVIDs 5.36 (A) 2.1 - 4.0 cm    LV Diastolic Volume 146.22 mL    LV Diastolic Volume Index 81.69 mL/m2    Left Ventricular End Systolic Volume by Teichholz Method 138.71 mL    Left Ventricular End Diastolic Volume by Teichholz Method 146.22 mL    IVS 1.12 (A) 0.6 - 1.1 cm    LVOT diameter 2.24 cm    LVOT area 3.9 cm2    FS 2 (A) 28 - 44 %    Left Ventricle Relative Wall Thickness 0.42 cm    Posterior Wall 1.15 (A) 0.6 - 1.1 cm    LV mass 250.96 g    LV Mass Index 140 g/m2    MV Peak E Melo 0.95 m/s    TDI LATERAL 0.03 m/s    TDI SEPTAL 0.04 m/s    E/E' ratio 27.14 m/s    MV Peak A Melo 0.48 m/s    TR Max Melo 3.49 m/s    E/A ratio 1.98     E wave deceleration time 94.49 msec    LV SEPTAL E/E' RATIO 23.75 m/s    LV LATERAL E/E' RATIO 31.67 m/s    LVOT peak melo 1.05 m/s    Left Ventricular Outflow Tract Mean Velocity 0.76 cm/s    Left Ventricular Outflow Tract Mean Gradient 2.53 mmHg    RV-matson mid d 3.3 cm    RV mid diameter 3.32 cm    RVOT peak VTI 6.7 cm    TAPSE 1.05 cm    LA size 2.49 cm    Left Atrium Minor Axis 3.21 cm    Left Atrium Major Axis 4.08 cm    RA Major Axis 3.91 cm    AV regurgitation pressure 1/2 time [id number] ms    AR Max Melo 3.74 m/s    AV mean gradient 43 mmHg    AV peak gradient 54 mmHg    Ao peak melo 3.67 m/s    Ao VTI 96.30 cm    LVOT peak VTI 19.20 cm    AV valve area 0.79 cm²    AV Velocity Ratio 0.29     AV index (prosthetic) 0.20     AAMIR by Velocity Ratio 1.13 cm²    Mr max melo 4.11 m/s    Triscuspid Valve Regurgitation Peak Gradient 49 mmHg    PV mean gradient 0 mmHg    RVOT peak melo 0.39 m/s    Ao root annulus 3.21 cm    STJ 2.63 cm    Ascending aorta 2.69 cm    IVC diameter 1.77 cm    Mean e' 0.04 m/s    ZLVIDS 4.44     ZLVIDD 1.02     LA Volume Index 15.3 mL/m2    LA volume 27.38 cm3    LA WIDTH 3.6 cm    RA Width 3.1 cm    EF 25 %    TV resting pulmonary artery  pressure 52 mmHg    RV TB RVSP 6 mmHg    Est. RA pres 3 mmHg    Narrative      Left Ventricle: The left ventricle is mildly dilated. Normal wall   thickness. There is eccentric hypertrophy. Moderate global hypokinesis   present. Septal motion is consistent with bundle branch block. There is   severely reduced systolic function. Ejection fraction by visual   approximation is 25%. Grade II diastolic dysfunction.    Right Ventricle: Moderate right ventricular enlargement. Wall thickness   is normal. Systolic function is mildly reduced.    Right Atrium: Right atrium is moderately dilated.    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 There   is mild to moderate aortic regurgitation with a centrally directed   jet.Increased gradient noted    Tricuspid Valve: There is mild regurgitation with a centrally directed   jet.    Pulmonary Artery: The estimated pulmonary artery systolic pressure is   52 mmHg.    IVC/SVC: Normal venous pressure at 3 mmHg.     , EKG: reviewed, Stress Test: reviewed, and X-Ray: CXR: X-Ray Chest 1 View (CXR): No results found for this visit on 08/09/24.  Assessment and Plan:       * Acute exacerbation of CHF (congestive heart failure)  BNP >4900  Echo with EF 25%  OMT increased lasix to 40mg BID, can increase midodrine to 10mg TID, cont BB  Multiple allergies noted, no ACEi/ARB unsure of allergy?  -610cc, needs strict I/Os  Crt 1.3, cont IV diuresis      Acute viral myocarditis  Echo with EF 25%  Cont antiviral  Repeat echo in future once recovered  Follow up with Dr. Perkins, recommend OP ORI    MARCUS (acute kidney injury)  Monitor with diuresis    COVID  Cont antiviral    Paroxysmal atrial fibrillation  SR on EKG  Cont BB and Coumadin  Follow up with EP, sees [facility]    H/O mechanical aortic valve replacement  Follow up with primary cardiologist, Dr. Perkins. Recommend ORI as OP    Essential hypertension  Titrate medications        VTE Risk Mitigation (From  admission, onward)           Ordered     warfarin (COUMADIN) tablet 2.5 mg  Once per day on Monday Friday         08/10/24 0703     warfarin (COUMADIN) split tablet 1.25 mg  Once per day on Jayson Tuesday Wednesday Thursday Saturday         08/10/24 0703     IP VTE HIGH RISK PATIENT  Once         08/10/24 0307     Place sequential compression device  Until discontinued         08/10/24 0307                    Jacquelyn Headley, NP  Cardiology  [facility] ([facility])

## 2024-08-12 NOTE — SUBJECTIVE & OBJECTIVE
Interval History:  Patient seen and examined at bedside.  She is lying in bed which is her baseline.  She denies any pain or shortness a breath.  Drinking some but not eating.    Review of Systems   Constitutional:  Positive for appetite change and fatigue. Negative for fever.   Respiratory:  Positive for shortness of breath. Negative for cough.    Cardiovascular:  Negative for chest pain and leg swelling.   Gastrointestinal:  Negative for nausea and vomiting.   Neurological:  Positive for weakness.   All other systems reviewed and are negative.    Objective:     Vital Signs (Most Recent):  Temp: 98.7 °F (37.1 °C) (08/12/24 1310)  Pulse: 84 (08/12/24 1505)  Resp: 16 (08/12/24 1310)  BP: 114/74 (08/12/24 1310)  SpO2: 96 % (08/12/24 1310) Vital Signs (24h Range):  Temp:  [97.6 °F (36.4 °C)-99.2 °F (37.3 °C)] 98.7 °F (37.1 °C)  Pulse:  [61-98] 84  Resp:  [16-18] 16  SpO2:  [92 %-96 %] 96 %  BP: (101-124)/(56-76) 114/74     Weight: 73.9 kg (162 lb 14.7 oz)  Body mass index is 27.97 kg/m².    Intake/Output Summary (Last 24 hours) at 8/12/2024 1547  Last data filed at 8/12/2024 1021  Gross per 24 hour   Intake 100 ml   Output 1500 ml   Net -1400 ml         Physical Exam  Vitals reviewed.   Constitutional:       Appearance: Normal appearance. She is ill-appearing (Chronically).   HENT:      Head: Normocephalic and atraumatic.      Mouth/Throat:      Mouth: Mucous membranes are moist.      Pharynx: Oropharynx is clear.   Eyes:      Extraocular Movements: Extraocular movements intact.      Conjunctiva/sclera: Conjunctivae normal.   Cardiovascular:      Rate and Rhythm: Normal rate and regular rhythm.      Pulses: Normal pulses.      Heart sounds: Normal heart sounds.   Pulmonary:      Effort: Pulmonary effort is normal.      Breath sounds: Normal breath sounds.   Abdominal:      General: Bowel sounds are normal.      Palpations: Abdomen is soft.   Musculoskeletal:         General: Normal range of motion.      Cervical back:  "Normal range of motion and neck supple.      Right lower leg: Edema present.      Left lower leg: Edema present.   Skin:     General: Skin is warm and dry.   Neurological:      Mental Status: She is alert. Mental status is at baseline.      Motor: Weakness present.             Significant Labs: All pertinent labs within the past 24 hours have been reviewed.  Blood Culture: No results for input(s): "LABBLOO" in the last 48 hours.  CBC:   Recent Labs   Lab 08/12/24  0448   WBC 12.56  12.56   HGB 14.6  14.6   HCT 44.1  44.1     174     CMP:   Recent Labs   Lab 08/11/24  0525 08/12/24  0448    142  142   K 3.7 3.8  3.8    102  102   CO2 25 30*  30*   GLU 84 134*  134*   BUN 35* 30*  30*   CREATININE 1.4 1.3  1.3   CALCIUM 8.6* 8.0*  8.0*   PROT  --  5.7*   ALBUMIN  --  2.5*   BILITOT  --  1.9*   ALKPHOS  --  142*   AST  --  20   ALT  --  31   ANIONGAP 13 10  10       Significant Imaging: I have reviewed all pertinent imaging results/findings within the past 24 hours.  "

## 2024-08-12 NOTE — ASSESSMENT & PLAN NOTE
Patient with Paroxysmal (<7 days) atrial fibrillation which is controlled currently with Beta Blocker. Patient is currently in sinus rhythm.[id number] Score: 4. HASBLED Score: . Anticoagulation indicated. Anticoagulation done with Coumadin .

## 2024-08-12 NOTE — PROGRESS NOTES
Clinical Pharmacy Progress Note: Coumadin Dosing and Monitoring     Indication: Mechanical AVR; Afib (controlled by beta blocker); pacemaker  Goal INR: 2-3    Lab Results   Component Value Date    INR 2.0 (H) 08/12/2024    INR 2.2 (H) 08/11/2024    INR 2.2 (H) 08/11/2024     Patient's son was educated by pharmacist via telephone on 8/3/24.     Recent dosing history:   8/10/23: Warfarin 1.25 mg given. INR = 1.8 on 8/9/24 at 23:08  8/11/23: Warfarin 1.25 mg given. INR = 2.2    Home dosing regimen: Warfarin 2.5 mg every Monday and Friday, and Warfarin 1.25 mg on all other days.     Drug interactions: Acetaminophen may increase the risk of bleeding while on warfarin.     Use of dexamethasone may result in either increased (increased risk of bleeding) or decreased effects (increased risk of treatment failure) of anticoagulants.    Lab order for daily PT/INR? Yes  Coumadin diet ordered? Yes    Plan:   - Give warfarin 2.5 mg today. INR is therapeutic  - Modified warfarin dose times to standard hospital time of 1700 daily.   - Pharmacy will continue to monitor daily PT/INR. Dose adjustments will be made accordingly.      Thank you for allowing us to participate in this patient's care.      Jyoti Nieves, [place] 08/12/2024 4:30 PM

## 2024-08-12 NOTE — ASSESSMENT & PLAN NOTE
Echo with EF 25%  Cont antiviral  Repeat echo in future once recovered  Follow up with Dr. Perkins, recommend OP ORI

## 2024-08-12 NOTE — ASSESSMENT & PLAN NOTE
Baseline creatinine is  1.2 . Most recent creatinine and eGFR are listed below.  Recent Labs     08/09/24  2308 08/11/24  0525 08/12/24  0448   CREATININE 1.7* 1.4 1.3  1.3   EGFRNORACEVR 30* 38* 41*  41*        Plan  - MARCUS is  treated with sepsis bolus in ER.  - Avoid nephrotoxins and renally dose meds for GFR listed above  - Monitor urine output, serial BMP, and adjust therapy as needed  Improving  Monitor while intravenous diuresing       Resolved

## 2024-08-12 NOTE — PROGRESS NOTES
[facility]  Progress Note    Patient Name: Serena Post  MRN: [id number]  Patient Class: IP- Inpatient   Admission Date: 8/9/2024  Length of Stay: 2 days  Attending Physician: June Acosta MD  Primary Care Provider: Evangelina Olivares MD        Subjective:     Principal Problem:Acute exacerbation of CHF (congestive heart failure)        HPI:  Serena Post is a 84 y.o. female with a PMH  has a past medical history of A-fib, Anticoagulant long-term use, Aortic valve disease, Cancer, Cardiomyopathy, CHF (congestive heart failure), COVID-19 (7/23/2022), heart valve replacement with mechanical valve, Hyperlipidemia, Hypertension, Pacemaker, Pacemaker, Sepsis (7/23/2022), and Stroke. presented to the Emergency Department for evaluation of AMS which onset today. Patient tested positive for COVID on 8/1/2024 and was recently discharged on 8/6/24 after being diuresed for CHF exacerbation and treated with decadron and neb treatments for her Covid. Patient deemed stable for discharge with homehealth physical/occupational therapy to be resumed and nurse practitioner to visit home program. However, patient's son reports patient started becoming incoherent today at 5PM. Son reports symptoms of decreased appetite, generalized weakness, and diarrhea. No mitigating or exacerbating factors reported. Son denies any vomiting. Son states that patient is taking her LASIX. No further complaints or concerns at this time.     ER workup revealed a CBC to be unremarkable, coags of the within therapeutic range, CMP revealed BUN/creatinine of 44/1.7 with EGFR of 30 (22/1.1 with EGFR 50 on 08/06/2024), BNP>4,900, initial lactic acid of 3.0 with repeat lactic acid of 2.0, procalcitonin negative, flu negative, COVID positive, UA unremarkable, chest x-ray unchanged from previous, CT abdomen and pelvis without IV contrast revealed:[Small bilateral pleural effusions, left  greater than right, with patchy bibasilar airspace opacities.  Findings may reflect edema, infection, aspiration, and/or atelectasis; Cardiomegaly.; Nonspecific bilateral perinephric edema and mild urinary bladder wall thickening.  Correlation to urinalysis to exclude infection advised; Diffuse body wall anasarca].  EKG revealed normal sinus rhythm with right bundle-branch block with a ventricular rate of 90 beats per minute with a QT/QTC of 400/489.  O2 saturation originally 93% on room air with a respiratory rate of 40 5 times per minute which resolved after being placed on 4 liters/minute via nasal cannula.  Patient currently satting 99% with a respiratory rate of 18.  No acute respiratory distress noted.  Patient received DuoNeb, 4.5 g of Zosyn, 1, 641 cc bolus of sodium chloride, and 1.7 g of vancomycin in ER.  Hospital Medicine consulted to admit patient for sepsis vs CHF exacerbation.  Patient admitted under inpatient status.      PCP: Evangelina Olivares      Overview/Hospital Course:  8/11 readmitted for worsening symptoms. Bnp significant elevated. Cardiology consulted. Echocardiogram with worsening ejection fraction, 25%. Continue intravenous diuresis. 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 Inflammatory markers pending    Echo    Left Ventricle: The left ventricle is mildly dilated. Normal wall thickness. There is eccentric hypertrophy. Moderate global hypokinesis present. Septal motion is consistent with bundle branch block. There is severely reduced systolic function. Ejection fraction by visual approximation is 25%. Grade II diastolic dysfunction.    Right Ventricle: Moderate right ventricular enlargement. Wall thickness is normal. Systolic function is mildly reduced.    Right Atrium: Right atrium is moderately dilated.    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 There is mild to moderate aortic  regurgitation with a centrally directed jet.Increased gradient noted    Tricuspid Valve: There is mild regurgitation with a centrally directed jet.    Pulmonary Artery: The estimated pulmonary artery systolic pressure is 52 mmHg.    IVC/SVC: Normal venous pressure at 3 mmHg.    No new subjective & objective note has been filed under this hospital service since the last note was generated.      Assessment/Plan:      * Acute exacerbation of CHF (congestive heart failure)  Patient is identified as having biventricular heart failure that is Acute on chronic. CHF is currently uncontrolled due to Continued edema of extremities, Dyspnea improved with supplemental oxygen. Rales/crackles on pulmonary exam, and Pulmonary edema/pleural effusion on CXR. Latest ECHO performed and demonstrates- Results for orders placed during the hospital encounter of 02/22/24    Echo    Interpretation Summary    Left Ventricle: The left ventricle is normal in size. Normal wall thickness. There is concentric hypertrophy. Global hypokinesis present. There is moderately reduced systolic function. Ejection fraction by visual approximation is 35%.    Right Ventricle: Normal right ventricular cavity size. Systolic function is borderline low.    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 There is moderate aortic regurgitation.  Consider ORI if clinically needed to evaluate mechanical aortic valve further.    Mitral Valve: There is mild bileaflet sclerosis. There is mild regurgitation.    Pulmonary Artery: There is moderate pulmonary hypertension. The estimated pulmonary artery systolic pressure is 49 mmHg.    IVC/SVC: Normal venous pressure at 3 mmHg.    Ascending aorta is moderately dilated measuring 4.45 cm.      Repeat echocardiogram reveals decreased EF to 25%.  With grade 2 diastolic dysfunction    . Continue Beta Blocker and Furosemide and monitor clinical status closely. Monitor on  telemetry. Patient is on CHF pathway.  Monitor strict Is&Os and daily weights.  Place on fluid restriction of 1.5 L. Cardiology has been consulted. Continue to stress to patient importance of self efficacy and  on diet for CHF. Last BNP reviewed- and noted below   Recent Labs   Lab 08/12/24  0448   BNP >4,900*     Continue intravenous diuresis as able  Hypotensive     Positive D dimer  In setting of covid infection  Respiratory status stable, not requiring increased supplemental oxygen from baseline  Echocardiogram without mention of right heart strain  Renal function test improving  Consider vq scan to rule out pulmonary embolism but already on warfarin for mechanical valve        Acute viral myocarditis  Recent Covid infection  Echocardiogram with significant decline in ejection fraction from normal one month ago to 25%   Echocardiogram with moderate global hypokinesis and eccentric hypertrophy  Oral systemic steroids on last admission  Continue systemic steroids intravenous   Remdesivir initiated     Elevated lactic acid level  Resolved after IVF bolus in ER.        Hyperglycemia  Likely elevated secondary to Decadron use to treat recent COVID infection.  Plan:  -SSI as needed  -Accuchecks      MARCUS (acute kidney injury)   Baseline creatinine is  1.2 . Most recent creatinine and eGFR are listed below.  Recent Labs     08/09/24  2308 08/11/24  0525 08/12/24  0448   CREATININE 1.7* 1.4 1.3  1.3   EGFRNORACEVR 30* 38* 41*  41*        Plan  - MARCUS is  treated with sepsis bolus in ER.  - Avoid nephrotoxins and renally dose meds for GFR listed above  - Monitor urine output, serial BMP, and adjust therapy as needed  Improving  Monitor while intravenous diuresing       Resolved    Chronic hypoxic respiratory failure  Patient with Hypoxic Respiratory failure which is Acute on chronic.  she is on home oxygen at 2-3 LPM. Supplemental oxygen was provided and noted- Oxygen Concentration (%):  [32] 32    .    Signs/symptoms of respiratory failure include- lethargy. Contributing diagnoses includes - CHF, COPD, and covid  Labs and images were reviewed. Patient Has not had a recent ABG. Will treat underlying causes and adjust management of respiratory failure as follows- remdesivir, breathing treatments, intravenous systemic steroids, intravenous lasix, supplemental oxygen     COVID  Recently diagnosed on 08/01/2024 and treated with Decadron during last admission.  Sent home with Decadron p.o.  Plan:  -supplemental oxygen as needed  -continue decadron po  -duonebs prn  -monitor pulse ox as need/per unit protocol    Initiate remdesivir and increased systemic steroids for covid related myocarditis  Echocardiogram with significantly reduced ejection fraction and moderate global hypokinesis  Inflammatory markers  ferritin and sed rate normal    Paroxysmal atrial fibrillation  Patient with Paroxysmal (<7 days) atrial fibrillation which is controlled currently with Beta Blocker. Patient is currently in sinus rhythm.[id number] Score: 4. HASBLED Score: . Anticoagulation indicated. Anticoagulation done with Coumadin .    H/O mechanical aortic valve replacement    Pharmacy to dose warfarin    Follow up with outpatient cardiologist upon discharge      Essential hypertension  Chronic, controlled. Latest blood pressure and vitals reviewed-     Temp:  [97.6 °F (36.4 °C)-99.2 °F (37.3 °C)]   Pulse:  [61-98]   Resp:  [16-18]   BP: (101-124)/(56-76)   SpO2:  [92 %-96 %] .   Home meds for hypertension were reviewed and noted below.   Hypertension Medications               furosemide (LASIX) 20 MG tablet Take 1 tablet (20 mg total) by mouth every morning. Hold for low blood pressure. Do not give if systolic blood pressure is below 110 and/or diastolic is below 60    metoprolol succinate (TOPROL-XL) 25 MG 24 hr tablet Take 1 tablet by mouth every evening.    metoprolol tartrate (LOPRESSOR) 25 MG tablet Take 1 tablet (25 mg total) by mouth 2  (two) times daily. Do not give if systolic blood pressure is below 110 and/or diastolic below 60            While in the hospital, will manage blood pressure as follows; low dose metoprolol    Will utilize p.r.n. blood pressure medication only if patient's blood pressure greater than 180/110 and she develops symptoms such as worsening chest pain or shortness of breath.    Dyslipidemia  Patient is not chronically on statin.will not continue for now. Last Lipid Panel:   Lab Results   Component Value Date    CHOL 225 (H) 11/01/2022    HDL 48 11/01/2022    LDLCALC 141 (H) 11/01/2022    TRIG 180 11/01/2022    CHOLHDL 4.7 (H) 11/01/2022     Plan:  -low fat/low calorie diet        Anticoagulant long-term use  This patient has long term use on an anticoagulant with Select Anticoagulant(s): Warfarin/Coumadin. Their long term anticoagulation will be Held or Continued: continued. They are on long term anticoagulation due to Reason for Anticoagulation: Atrial fibrillation and Mechanical heart valve.   Pharmacy to dose warfarin  Coumadin restricted diet      VTE Risk Mitigation (From admission, onward)           Ordered     warfarin (COUMADIN) tablet 2.5 mg  Once per day on Monday Friday         08/10/24 0703     warfarin (COUMADIN) split tablet 1.25 mg  Once per day on J[place] Tuesday Wednesday Thursday Saturday         08/10/24 0703     IP VTE HIGH RISK PATIENT  Once         08/10/24 0307     Place sequential compression device  Until discontinued         08/10/24 0307                    Discharge Planning   RENE:      Code Status: Full Code   Is the patient medically ready for discharge?:     Reason for patient still in hospital (select all that apply): Patient trending condition and Treatment  Discharge Plan A: Home with family, Home Health                  June Boykin MD  Department of Hospital Medicine   [facility] - Telemetry ([facility])

## 2024-08-12 NOTE — ASSESSMENT & PLAN NOTE
Patient is identified as having biventricular heart failure that is Acute on chronic. CHF is currently uncontrolled due to Continued edema of extremities, Dyspnea improved with supplemental oxygen. Rales/crackles on pulmonary exam, and Pulmonary edema/pleural effusion on CXR. Latest ECHO performed and demonstrates- Results for orders placed during the hospital encounter of 02/22/24    Echo    Interpretation Summary    Left Ventricle: The left ventricle is normal in size. Normal wall thickness. There is concentric hypertrophy. Global hypokinesis present. There is moderately reduced systolic function. Ejection fraction by visual approximation is 35%.    Right Ventricle: Normal right ventricular cavity size. Systolic function is borderline low.    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 There is moderate aortic regurgitation.  Consider ORI if clinically needed to evaluate mechanical aortic valve further.    Mitral Valve: There is mild bileaflet sclerosis. There is mild regurgitation.    Pulmonary Artery: There is moderate pulmonary hypertension. The estimated pulmonary artery systolic pressure is 49 mmHg.    IVC/SVC: Normal venous pressure at 3 mmHg.    Ascending aorta is moderately dilated measuring 4.45 cm.      Repeat echocardiogram reveals decreased EF to 25%.  With grade 2 diastolic dysfunction    . Continue Beta Blocker and Furosemide and monitor clinical status closely. Monitor on telemetry. Patient is on CHF pathway.  Monitor strict Is&Os and daily weights.  Place on fluid restriction of 1.5 L. Cardiology has been consulted. Continue to stress to patient importance of self efficacy and  on diet for CHF. Last BNP reviewed- and noted below   Recent Labs   Lab 08/12/24  0448   BNP >4,900*     Continue intravenous diuresis as able  Hypotensive

## 2024-08-12 NOTE — HOSPITAL COURSE
HPI:     is a 84 year old female with a past medical history for mechanical AVR in 2007, PAF, history of pacemaker, chronic systolic CHF, chronic respiratory failure, , dementia, history of CVA, HLP, HTN, and pneumonia vs. CHF. She was admitted for reoccurrence respiratory failure. Symptoms were similar to previous admission. Patient was recently discharged. Chest X-ray shows CHF vs. Pneumonia. EKG unchanged and shows NSR and RBBB. BNP is greater than 490. Echo on July 31 shows normal EF. Mechanical AVR noted with questionable gradient.     Recommend IV diuresis, supportive care, as well as anticoagulants.      Hospital Course:     8/11/24- Echo shows EF 25%. Mild to moderate AI noted with mechanical AVR, significant gradient noted. Continue IV diuresis. Telemetry shows atrial pacing.      8/12/24 Chart reviewed, COVID isolation. No acute CV events reported. Labs reviewed, BNP >4900, CRP 84, Crt 1.3, Albumin low. Echo with 25% EF, Grade II DD, mechanical AVR    8/13/24 Chart reviewed, no acute CV events reported. Labs reviewed, chart reviewed    8/14/24 Chart reviewed, no acute CV events reported. Labs reviewed, chart reviewed    8/15/24 Chart reviewed, no acute CV events reported. Labs reviewed, chart reviewed    8/16/24 Chart reviewed, no acute CV events reported. Labs reviewed, chart reviewed

## 2024-08-12 NOTE — PT/OT/SLP PROGRESS
Speech Language Pathology Treatment    Patient Name:  Serena Post   MRN:  [id number]  Admitting Diagnosis: Acute exacerbation of CHF (congestive heart failure)    Recommendations:                 General Recommendations:  Follow-up not indicated  Diet recommendations:  Soft & Bite Sized Diet - IDDSI Level 6, Liquid Diet Level: Thin liquids - IDDSI Level 0   Aspiration Precautions: Assistance with meals, Feed only when awake/alert, Frequent oral care, HOB to 90 degrees, Remain upright 30 minutes post meal, Small bites/sips, and Standard aspiration precautions   General Precautions: Standard, aspiration  Communication strategies:  provide increased time to answer    Assessment:     Serena Post is an 84 y.o. female admitted to [facility] acute with dx CHF exacerbation, COVID, viral myocarditis and worsening EF (25%).  She presents with adequate SPEEDY but slowed communication/responses.  No overt s/s of aspiration present during bedside CSE; however, efficiency reduced and recommended to remain on IDDSI 6-soft/bite sized solids and IDDSI 0-thin liquids.  Assist needed with meal preparation and upright positioning during po intake.  No further acute ST intervention indicated at this time.  MD to reconsult if needed.    Subjective     Pt seen bedside for ST.  Awake. No c/o pain.  No family present.  Patient goals: did not state     Pain/Comfort:  Pain Rating 1: 0/10  Pain Rating Post-Intervention 1: 0/10  Pain Rating 2: 0/10  Pain Rating Post-Intervention 2: 0/10    Objective:     Has the patient been evaluated by SLP for swallowing?   Yes  Keep patient NPO? No      Tx feeds of thin liquids via straw and solids.  Pt consumed mixed consistencies (cereal) without incident.  Fatigue noted with mastication and recommended to remain on soft solid diet at this time.  Able to self-feed, but required assist with positioning and preparation.    Goals:   Multidisciplinary Problems       SLP Goals          Problem: SLP     Goal Priority Disciplines Outcome   SLP Goal     SLP Adequate for Care Transition   Description: 1. BSA with full meal for possible upgrade to IDDSI 7 and continued thin liquids.  --Not met, continue IDDSI 6-soft/bite sized solids with IDDSI 0-thin liquids.                       Plan:     Patient to be seen:   (2-5 X Weekly)   Plan of Care expires:   (POC met)  Plan of Care reviewed with:  patient   SLP Follow-Up:  No       Discharge recommendations:  No Therapy Indicated   Barriers to Discharge:  None    Time Tracking:     SLP Treatment Date:   08/12/24  Speech Start Time:  1515  Speech Stop Time:  1530     Speech Total Time (min):  15 min    Billable Minutes: Treatment Swallowing Dysfunction 15 minutes    08/12/2024

## 2024-08-12 NOTE — ASSESSMENT & PLAN NOTE
Chronic, controlled. Latest blood pressure and vitals reviewed-     Temp:  [97.6 °F (36.4 °C)-99.2 °F (37.3 °C)]   Pulse:  [61-98]   Resp:  [16-18]   BP: (101-124)/(56-76)   SpO2:  [92 %-96 %] .   Home meds for hypertension were reviewed and noted below.   Hypertension Medications               furosemide (LASIX) 20 MG tablet Take 1 tablet (20 mg total) by mouth every morning. Hold for low blood pressure. Do not give if systolic blood pressure is below 110 and/or diastolic is below 60    metoprolol succinate (TOPROL-XL) 25 MG 24 hr tablet Take 1 tablet by mouth every evening.    metoprolol tartrate (LOPRESSOR) 25 MG tablet Take 1 tablet (25 mg total) by mouth 2 (two) times daily. Do not give if systolic blood pressure is below 110 and/or diastolic below 60            While in the hospital, will manage blood pressure as follows; low dose metoprolol    Will utilize p.r.n. blood pressure medication only if patient's blood pressure greater than 180/110 and she develops symptoms such as worsening chest pain or shortness of breath.

## 2024-08-12 NOTE — PLAN OF CARE
POC reviewed with pt. Pt verbalizes understanding of POC. No questions at this time.  AAOx4. NADN.  AV paced with BBB on cardiac monitor.  Pt remains free of falls.  No complaints at this time.  Safety measures in place. Will continue to monitor. Patient wearing non-skid footwear.  Informed pt to call for assistance before getting up. Pt verbalizes understanding.  Hourly rounding and chart check complete.   Bed in lowest position, wheels locked, side rails up x2.  Patient on 3L NC.  IV steroids transitioned to PO.  Midodrine added for soft blood pressures.   Remdesivir treatment continued.  IV lasix continued.

## 2024-08-12 NOTE — ASSESSMENT & PLAN NOTE
Recently diagnosed on 08/01/2024 and treated with Decadron during last admission.  Sent home with Decadron p.o.  Plan:  -supplemental oxygen as needed  -continue decadron po  -duonebs prn  -monitor pulse ox as need/per unit protocol    Initiate remdesivir and increased systemic steroids for covid related myocarditis  Echocardiogram with significantly reduced ejection fraction and moderate global hypokinesis  Inflammatory markers  ferritin and sed rate normal

## 2024-08-13 LAB
ANION GAP SERPL CALC-SCNC: 14 MMOL/L (ref 8–16)
BASOPHILS # BLD AUTO: 0.01 K/UL (ref 0–0.2)
BASOPHILS NFR BLD: 0.1 % (ref 0–1.9)
BUN SERPL-MCNC: 36 MG/DL (ref 8–23)
CALCIUM SERPL-MCNC: 8.5 MG/DL (ref 8.7–10.5)
CHLORIDE SERPL-SCNC: 102 MMOL/L (ref 95–110)
CO2 SERPL-SCNC: 27 MMOL/L (ref 23–29)
CREAT SERPL-MCNC: 1.1 MG/DL (ref 0.5–1.4)
D DIMER PPP IA.FEU-MCNC: 9.41 MG/L FEU
DIFFERENTIAL METHOD BLD: ABNORMAL
EOSINOPHIL # BLD AUTO: 0 K/UL (ref 0–0.5)
EOSINOPHIL NFR BLD: 0 % (ref 0–8)
ERYTHROCYTE [DISTWIDTH] IN BLOOD BY AUTOMATED COUNT: 15.1 % (ref 11.5–14.5)
EST. GFR  (NO RACE VARIABLE): 50 ML/MIN/1.73 M^2
GLUCOSE SERPL-MCNC: 135 MG/DL (ref 70–110)
HCT VFR BLD AUTO: 44.5 % (ref 37–48.5)
HGB BLD-MCNC: 14.4 G/DL (ref 12–16)
IMM GRANULOCYTES # BLD AUTO: 0.08 K/UL (ref 0–0.04)
IMM GRANULOCYTES NFR BLD AUTO: 0.5 % (ref 0–0.5)
INR PPP: 2.1 (ref 0.8–1.2)
LDH SERPL L TO P-CCNC: 385 U/L (ref 110–260)
LYMPHOCYTES # BLD AUTO: 0.3 K/UL (ref 1–4.8)
LYMPHOCYTES NFR BLD: 2.3 % (ref 18–48)
MCH RBC QN AUTO: 28 PG (ref 27–31)
MCHC RBC AUTO-ENTMCNC: 32.4 G/DL (ref 32–36)
MCV RBC AUTO: 87 FL (ref 82–98)
MONOCYTES # BLD AUTO: 0.7 K/UL (ref 0.3–1)
MONOCYTES NFR BLD: 4.7 % (ref 4–15)
NEUTROPHILS # BLD AUTO: 13.6 K/UL (ref 1.8–7.7)
NEUTROPHILS NFR BLD: 92.4 % (ref 38–73)
NRBC BLD-RTO: 0 /100 WBC
PLATELET # BLD AUTO: 180 K/UL (ref 150–450)
PMV BLD AUTO: 11.2 FL (ref 9.2–12.9)
POTASSIUM SERPL-SCNC: 4.2 MMOL/L (ref 3.5–5.1)
PROTHROMBIN TIME: 21.6 SEC (ref 9–12.5)
RBC # BLD AUTO: 5.14 M/UL (ref 4–5.4)
SODIUM SERPL-SCNC: 143 MMOL/L (ref 136–145)
WBC # BLD AUTO: 14.77 K/UL (ref 3.9–12.7)

## 2024-08-13 PROCEDURE — 83615 LACTATE (LD) (LDH) ENZYME: CPT | Performed by: FAMILY MEDICINE

## 2024-08-13 PROCEDURE — 63600175 PHARM REV CODE 636 W HCPCS: Performed by: INTERNAL MEDICINE

## 2024-08-13 PROCEDURE — 25000003 PHARM REV CODE 250: Performed by: FAMILY MEDICINE

## 2024-08-13 PROCEDURE — 63600175 PHARM REV CODE 636 W HCPCS

## 2024-08-13 PROCEDURE — 25000003 PHARM REV CODE 250: Performed by: NURSE PRACTITIONER

## 2024-08-13 PROCEDURE — 63600175 PHARM REV CODE 636 W HCPCS: Mod: JZ,TB | Performed by: FAMILY MEDICINE

## 2024-08-13 PROCEDURE — 85379 FIBRIN DEGRADATION QUANT: CPT | Performed by: FAMILY MEDICINE

## 2024-08-13 PROCEDURE — 85610 PROTHROMBIN TIME: CPT | Performed by: HOSPITALIST

## 2024-08-13 PROCEDURE — 27000221 HC OXYGEN, UP TO 24 HOURS

## 2024-08-13 PROCEDURE — 36415 COLL VENOUS BLD VENIPUNCTURE: CPT | Performed by: HOSPITALIST

## 2024-08-13 PROCEDURE — 25000003 PHARM REV CODE 250: Performed by: HOSPITALIST

## 2024-08-13 PROCEDURE — 27000207 HC ISOLATION

## 2024-08-13 PROCEDURE — 85025 COMPLETE CBC W/AUTO DIFF WBC: CPT | Performed by: FAMILY MEDICINE

## 2024-08-13 PROCEDURE — 99233 SBSQ HOSP IP/OBS HIGH 50: CPT | Mod: ,,, | Performed by: INTERNAL MEDICINE

## 2024-08-13 PROCEDURE — 25000003 PHARM REV CODE 250

## 2024-08-13 PROCEDURE — 36415 COLL VENOUS BLD VENIPUNCTURE: CPT | Performed by: FAMILY MEDICINE

## 2024-08-13 PROCEDURE — 94761 N-INVAS EAR/PLS OXIMETRY MLT: CPT

## 2024-08-13 PROCEDURE — 21400001 HC TELEMETRY ROOM

## 2024-08-13 PROCEDURE — 80048 BASIC METABOLIC PNL TOTAL CA: CPT | Performed by: NURSE PRACTITIONER

## 2024-08-13 RX ADMIN — METOPROLOL TARTRATE 25 MG: 25 TABLET, FILM COATED ORAL at 08:08

## 2024-08-13 RX ADMIN — FUROSEMIDE 40 MG: 10 INJECTION, SOLUTION INTRAMUSCULAR; INTRAVENOUS at 09:08

## 2024-08-13 RX ADMIN — MUPIROCIN: 20 OINTMENT TOPICAL at 08:08

## 2024-08-13 RX ADMIN — OXYCODONE HYDROCHLORIDE AND ACETAMINOPHEN 500 MG: 500 TABLET ORAL at 09:08

## 2024-08-13 RX ADMIN — DEXAMETHASONE 6 MG: 4 TABLET ORAL at 09:08

## 2024-08-13 RX ADMIN — THERA TABS 1 TABLET: TAB at 09:08

## 2024-08-13 RX ADMIN — MUPIROCIN: 20 OINTMENT TOPICAL at 09:08

## 2024-08-13 RX ADMIN — MIDODRINE HYDROCHLORIDE 10 MG: 5 TABLET ORAL at 12:08

## 2024-08-13 RX ADMIN — REMDESIVIR 100 MG: 100 INJECTION, POWDER, LYOPHILIZED, FOR SOLUTION INTRAVENOUS at 09:08

## 2024-08-13 RX ADMIN — WARFARIN SODIUM 1.25 MG: 2.5 TABLET ORAL at 05:08

## 2024-08-13 RX ADMIN — MIDODRINE HYDROCHLORIDE 10 MG: 5 TABLET ORAL at 05:08

## 2024-08-13 RX ADMIN — MIDODRINE HYDROCHLORIDE 10 MG: 5 TABLET ORAL at 09:08

## 2024-08-13 RX ADMIN — OXYCODONE HYDROCHLORIDE AND ACETAMINOPHEN 500 MG: 500 TABLET ORAL at 08:08

## 2024-08-13 NOTE — SUBJECTIVE & OBJECTIVE
Interval History:  Patient seen and examined several times today.  Long discussion with son.  Patient denies any complaints.  She denies shortness a breath.  No chest pain.    Review of Systems   Constitutional:  Positive for appetite change and fatigue. Negative for fever.   Respiratory:  Negative for cough and shortness of breath.    Cardiovascular:  Negative for chest pain and leg swelling.   Gastrointestinal:  Negative for nausea and vomiting.   Neurological:  Positive for weakness.   All other systems reviewed and are negative.    Objective:     Vital Signs (Most Recent):  Temp: 97.7 °F (36.5 °C) (08/13/24 1600)  Pulse: 70 (08/13/24 1600)  Resp: 18 (08/13/24 1600)  BP: (!) 107/59 (08/13/24 1600)  SpO2: (!) 91 % (08/13/24 1600) Vital Signs (24h Range):  Temp:  [97.3 °F (36.3 °C)-98.6 °F (37 °C)] 97.7 °F (36.5 °C)  Pulse:  [65-80] 70  Resp:  [16-20] 18  SpO2:  [91 %-97 %] 91 %  BP: ()/(51-64) 107/59     Weight: 87.2 kg (192 lb 3.9 oz)  Body mass index is 33 kg/m².    Intake/Output Summary (Last 24 hours) at 8/13/2024 1726  Last data filed at 8/13/2024 0436  Gross per 24 hour   Intake 247.92 ml   Output 300 ml   Net -52.08 ml         Physical Exam  Vitals reviewed.   Constitutional:       Appearance: Normal appearance. She is ill-appearing (Chronically).   HENT:      Head: Normocephalic and atraumatic.      Mouth/Throat:      Mouth: Mucous membranes are moist.      Pharynx: Oropharynx is clear.   Eyes:      Extraocular Movements: Extraocular movements intact.      Conjunctiva/sclera: Conjunctivae normal.   Cardiovascular:      Rate and Rhythm: Normal rate and regular rhythm.      Pulses: Normal pulses.      Heart sounds: Normal heart sounds.   Pulmonary:      Effort: Pulmonary effort is normal.      Breath sounds: Normal breath sounds.   Abdominal:      General: Bowel sounds are normal.      Palpations: Abdomen is soft.   Musculoskeletal:         General: Normal range of motion.      Cervical back: Normal  "range of motion and neck supple.      Right lower leg: Edema present.      Left lower leg: Edema present.   Skin:     General: Skin is warm and dry.   Neurological:      Mental Status: She is alert. Mental status is at baseline.      Motor: Weakness present.             Significant Labs: All pertinent labs within the past 24 hours have been reviewed.  Blood Culture: No results for input(s): "LABBLOO" in the last 48 hours.  CBC:   Recent Labs   Lab 08/12/24 0448 08/13/24  0513   WBC 12.56  12.56 14.77*   HGB 14.6  14.6 14.4   HCT 44.1  44.1 44.5     174 180     CMP:   Recent Labs   Lab 08/12/24 0448 08/13/24 0513     142 143   K 3.8  3.8 4.2     102 102   CO2 30*  30* 27   *  134* 135*   BUN 30*  30* 36*   CREATININE 1.3  1.3 1.1   CALCIUM 8.0*  8.0* 8.5*   PROT 5.7*  --    ALBUMIN 2.5*  --    BILITOT 1.9*  --    ALKPHOS 142*  --    AST 20  --    ALT 31  --    ANIONGAP 10  10 14     Cardiac Markers:   Recent Labs   Lab 08/12/24 0448   BNP >4,900*       Significant Imaging: I have reviewed all pertinent imaging results/findings within the past 24 hours.  "

## 2024-08-13 NOTE — PROGRESS NOTES
[facility] Telemetry ([facility])  Cardiology  Progress Note    Patient Name: Serena Post  MRN: [id number]  Admission Date: 8/9/2024  Hospital Length of Stay: 3 days  Code Status: Full Code   Attending Physician: June Acosta MD   Primary Care Physician: Evangelina Olivares MD  Expected Discharge Date:   Principal Problem:Acute exacerbation of CHF (congestive heart failure)    Subjective:     Hospital Course:   HPI:     is a 84 year old female with a past medical history for mechanical AVR in 2007, PAF, history of pacemaker, chronic systolic CHF, chronic respiratory failure, , dementia, history of CVA, HLP, HTN, and pneumonia vs. CHF. She was admitted for reoccurrence respiratory failure. Symptoms were similar to previous admission. Patient was recently discharged. Chest X-ray shows CHF vs. Pneumonia. EKG unchanged and shows NSR and RBBB. BNP is greater than 490. Echo on July 31 shows normal EF. Mechanical AVR noted with questionable gradient.     Recommend IV diuresis, supportive care, as well as anticoagulants.      Hospital Course:     8/11/24- Echo shows EF 25%. Mild to moderate AI noted with mechanical AVR, significant gradient noted. Continue IV diuresis. Telemetry shows atrial pacing.      8/12/24 Chart reviewed, COVID isolation. No acute CV events reported. Labs reviewed, BNP >4900, CRP 84, Crt 1.3, Albumin low. Echo with 25% EF, Grade II DD, mechanical AVR    8/13/24 Chart reviewed, no acute CV events reported. Labs reviewed, chart reviewed        Review of Systems   Reason unable to perform ROS: COVID isolation.     Objective:     Vital Signs (Most Recent):  Temp: 97.3 °F (36.3 °C) (08/13/24 1154)  Pulse: 72 (08/13/24 1417)  Resp: 18 (08/13/24 1154)  BP: (!) 108/56 (08/13/24 1154)  SpO2: 96 % (08/13/24 1154) Vital Signs (24h Range):  Temp:  [97.3 °F (36.3 °C)-98.6 °F (37 °C)] 97.3 °F (36.3 °C)  Pulse:  [65-91] 72  Resp:  [16-20] 18  SpO2:  [92 %-97 %] 96 %  BP:  "()/(51-64) 108/56     Weight: 87.2 kg (192 lb 3.9 oz)  Body mass index is 33 kg/m².     SpO2: 96 %         Intake/Output Summary (Last 24 hours) at 8/13/2024 1507  Last data filed at 8/13/2024 0436  Gross per 24 hour   Intake 247.92 ml   Output 300 ml   Net -52.08 ml       Lines/Drains/Airways       Peripheral Intravenous Line  Duration                  Peripheral IV - Single Lumen 08/09/24 2325 20 G Anterior;Right Forearm 3 days         Peripheral IV - Single Lumen 08/10/24 0256 20 G Right Antecubital 3 days                     COVID isolation  Physical Exam  Vitals and nursing note reviewed.            Significant Labs: BMP:   Recent Labs   Lab 08/12/24 0448 08/13/24 0513   *  134* 135*     142 143   K 3.8  3.8 4.2     102 102   CO2 30*  30* 27   BUN 30*  30* 36*   CREATININE 1.3  1.3 1.1   CALCIUM 8.0*  8.0* 8.5*   , CMP   Recent Labs   Lab 08/12/24 0448 08/13/24 0513     142 143   K 3.8  3.8 4.2     102 102   CO2 30*  30* 27   *  134* 135*   BUN 30*  30* 36*   CREATININE 1.3  1.3 1.1   CALCIUM 8.0*  8.0* 8.5*   PROT 5.7*  --    ALBUMIN 2.5*  --    BILITOT 1.9*  --    ALKPHOS 142*  --    AST 20  --    ALT 31  --    ANIONGAP 10  10 14   , CBC   Recent Labs   Lab 08/12/24 0448 08/13/24 0513   WBC 12.56  12.56 14.77*   HGB 14.6  14.6 14.4   HCT 44.1  44.1 44.5     174 180   , INR   Recent Labs   Lab 08/12/24 0448 08/13/24 0513   INR 2.0* 2.1*   , Lipid Panel No results for input(s): "CHOL", "HDL", "LDLCALC", "TRIG", "CHOLHDL" in the last 48 hours., Troponin   No results for input(s): "TROPONINI" in the last 48 hours.  , and All pertinent lab results from the last 24 hours have been reviewed.    Significant Imaging: Cardiac Cath: reviewed, Echocardiogram: Transthoracic echo (TTE) complete (Cupid Only):   Results for orders placed or performed during the hospital encounter of 08/09/24   Echo   Result Value Ref Range    BSA 1.83 m2    LVOT " stroke volume 75.63 cm3    LVIDd 5.48 3.5 - 6.0 cm    LV Systolic Volume 138.71 mL    LV Systolic Volume Index 77.5 mL/m2    LVIDs 5.36 (A) 2.1 - 4.0 cm    LV Diastolic Volume 146.22 mL    LV Diastolic Volume Index 81.69 mL/m2    Left Ventricular End Systolic Volume by Teichholz Method 138.71 mL    Left Ventricular End Diastolic Volume by Teichholz Method 146.22 mL    IVS 1.12 (A) 0.6 - 1.1 cm    LVOT diameter 2.24 cm    LVOT area 3.9 cm2    FS 2 (A) 28 - 44 %    Left Ventricle Relative Wall Thickness 0.42 cm    Posterior Wall 1.15 (A) 0.6 - 1.1 cm    LV mass 250.96 g    LV Mass Index 140 g/m2    MV Peak E Melo 0.95 m/s    TDI LATERAL 0.03 m/s    TDI SEPTAL 0.04 m/s    E/E' ratio 27.14 m/s    MV Peak A Melo 0.48 m/s    TR Max Melo 3.49 m/s    E/A ratio 1.98     E wave deceleration time 94.49 msec    LV SEPTAL E/E' RATIO 23.75 m/s    LV LATERAL E/E' RATIO 31.67 m/s    LVOT peak melo 1.05 m/s    Left Ventricular Outflow Tract Mean Velocity 0.76 cm/s    Left Ventricular Outflow Tract Mean Gradient 2.53 mmHg    RV-matson mid d 3.3 cm    RV mid diameter 3.32 cm    RVOT peak VTI 6.7 cm    TAPSE 1.05 cm    LA size 2.49 cm    Left Atrium Minor Axis 3.21 cm    Left Atrium Major Axis 4.08 cm    RA Major Axis 3.91 cm    AV regurgitation pressure 1/2 time [id number] ms    AR Max Melo 3.74 m/s    AV mean gradient 43 mmHg    AV peak gradient 54 mmHg    Ao peak melo 3.67 m/s    Ao VTI 96.30 cm    LVOT peak VTI 19.20 cm    AV valve area 0.79 cm²    AV Velocity Ratio 0.29     AV index (prosthetic) 0.20     AAMIR by Velocity Ratio 1.13 cm²    Mr max melo 4.11 m/s    Triscuspid Valve Regurgitation Peak Gradient 49 mmHg    PV mean gradient 0 mmHg    RVOT peak melo 0.39 m/s    Ao root annulus 3.21 cm    STJ 2.63 cm    Ascending aorta 2.69 cm    IVC diameter 1.77 cm    Mean e' 0.04 m/s    ZLVIDS 4.44     ZLVIDD 1.02     LA Volume Index 15.3 mL/m2    LA volume 27.38 cm3    LA WIDTH 3.6 cm    RA Width 3.1 cm    EF 25 %    TV resting pulmonary  artery pressure 52 mmHg    RV TB RVSP 6 mmHg    Est. RA pres 3 mmHg    Narrative      Left Ventricle: The left ventricle is mildly dilated. Normal wall   thickness. There is eccentric hypertrophy. Moderate global hypokinesis   present. Septal motion is consistent with bundle branch block. There is   severely reduced systolic function. Ejection fraction by visual   approximation is 25%. Grade II diastolic dysfunction.    Right Ventricle: Moderate right ventricular enlargement. Wall thickness   is normal. Systolic function is mildly reduced.    Right Atrium: Right atrium is moderately dilated.    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 There   is mild to moderate aortic regurgitation with a centrally directed   jet.Increased gradient noted    Tricuspid Valve: There is mild regurgitation with a centrally directed   jet.    Pulmonary Artery: The estimated pulmonary artery systolic pressure is   52 mmHg.    IVC/SVC: Normal venous pressure at 3 mmHg.     , EKG: reviewed, Stress Test: reviewed, and X-Ray: CXR: X-Ray Chest 1 View (CXR): No results found for this visit on 08/09/24.  Assessment and Plan:       * Acute exacerbation of CHF (congestive heart failure)  BNP >4900  Echo with EF 25%  OMT increased lasix to 40mg BID, can increase midodrine to 10mg TID, cont BB  Multiple allergies noted, no ACEi/ARB unsure of allergy?  -610cc, needs strict I/Os  Crt 1.3, cont IV diuresis    8/13/24  Cont IV diuresis      Acute viral myocarditis  Echo with EF 25%  Cont antiviral  Repeat echo in future once recovered  Follow up with Dr. Perkins, recommend OP ORI    MARCUS (acute kidney injury)  Monitor with diuresis    COVID  Cont antiviral    Paroxysmal atrial fibrillation  SR on EKG  Cont BB and Coumadin  Follow up with EP, sees [facility] for cardiology    H/O mechanical aortic valve replacement  Follow up with primary cardiologist, Dr. Perkins. Recommend ORI as OP    Essential hypertension  Titrate  medications        VTE Risk Mitigation (From admission, onward)           Ordered     warfarin (COUMADIN) tablet 2.5 mg  Once per day on Monday Friday         08/10/24 0703     warfarin (COUMADIN) split tablet 1.25 mg  Once per day on J[place] Tuesday Wednesday Thursday Saturday         08/10/24 0703     IP VTE HIGH RISK PATIENT  Once         08/10/24 0307     Place sequential compression device  Until discontinued         08/10/24 0307                    Jacquelyn Headley NP  Cardiology  [facility] - Telemetry ([facility])

## 2024-08-13 NOTE — ASSESSMENT & PLAN NOTE
BNP >4900  Echo with EF 25%  OMT increased lasix to 40mg BID, can increase midodrine to 10mg TID, cont BB  Multiple allergies noted, no ACEi/ARB unsure of allergy?  -610cc, needs strict I/Os  Crt 1.3, cont IV diuresis    8/13/24  Cont IV diuresis

## 2024-08-13 NOTE — ASSESSMENT & PLAN NOTE
Baseline creatinine is  1.2 . Most recent creatinine and eGFR are listed below.  Recent Labs     08/11/24  0525 08/12/24  0448 08/13/24  0513   CREATININE 1.4 1.3  1.3 1.1   EGFRNORACEVR 38* 41*  41* 50*        Plan  - MARCUS is  treated with sepsis bolus in ER.  - Avoid nephrotoxins and renally dose meds for GFR listed above  - Monitor urine output, serial BMP, and adjust therapy as needed  Improving  Monitor while intravenous diuresing       Resolved

## 2024-08-13 NOTE — PHYSICIAN QUERY
Provider, due to conflicting documentation please clarify the acuity of the patients respiratory failure.   Acute and (on) Chronic Respiratory Failure with Hypoxia       Cataract(s) are not yet visually significant to the patient. Recommend monitoring, and patient agrees with this plan.

## 2024-08-13 NOTE — PROGRESS NOTES
[facility] Medicine  Progress Note    Patient Name: Serena Post  MRN: [id number]  Patient Class: IP- Inpatient   Admission Date: 8/9/2024  Length of Stay: 3 days  Attending Physician: June Acosta MD  Primary Care Provider: Evangelina Olivares MD        Subjective:     Principal Problem:Acute exacerbation of CHF (congestive heart failure)        HPI:  Serena Post is a 84 y.o. female with a PMH  has a past medical history of A-fib, Anticoagulant long-term use, Aortic valve disease, Cancer, Cardiomyopathy, CHF (congestive heart failure), COVID-19 (7/23/2022), heart valve replacement with mechanical valve, Hyperlipidemia, Hypertension, Pacemaker, Pacemaker, Sepsis (7/23/2022), and Stroke. presented to the Emergency Department for evaluation of AMS which onset today. Patient tested positive for COVID on 8/1/2024 and was recently discharged on 8/6/24 after being diuresed for CHF exacerbation and treated with decadron and neb treatments for her Covid. Patient deemed stable for discharge with homehealth physical/occupational therapy to be resumed and nurse practitioner to visit home program. However, patient's son reports patient started becoming incoherent today at 5PM. Son reports symptoms of decreased appetite, generalized weakness, and diarrhea. No mitigating or exacerbating factors reported. Son denies any vomiting. Son states that patient is taking her LASIX. No further complaints or concerns at this time.     ER workup revealed a CBC to be unremarkable, coags of the within therapeutic range, CMP revealed BUN/creatinine of 44/1.7 with EGFR of 30 (22/1.1 with EGFR 50 on 08/06/2024), BNP>4,900, initial lactic acid of 3.0 with repeat lactic acid of 2.0, procalcitonin negative, flu negative, COVID positive, UA unremarkable, chest x-ray unchanged from previous, CT abdomen and pelvis without IV contrast revealed:[Small bilateral pleural effusions, left  greater than right, with patchy bibasilar airspace opacities.  Findings may reflect edema, infection, aspiration, and/or atelectasis; Cardiomegaly.; Nonspecific bilateral perinephric edema and mild urinary bladder wall thickening.  Correlation to urinalysis to exclude infection advised; Diffuse body wall anasarca].  EKG revealed normal sinus rhythm with right bundle-branch block with a ventricular rate of 90 beats per minute with a QT/QTC of 400/489.  O2 saturation originally 93% on room air with a respiratory rate of 40 5 times per minute which resolved after being placed on 4 liters/minute via nasal cannula.  Patient currently satting 99% with a respiratory rate of 18.  No acute respiratory distress noted.  Patient received DuoNeb, 4.5 g of Zosyn, 1, 641 cc bolus of sodium chloride, and 1.7 g of vancomycin in ER.  Hospital Medicine consulted to admit patient for sepsis vs CHF exacerbation.  Patient admitted under inpatient status.      PCP: Evangelina Olivares      Overview/Hospital Course:  8/11 readmitted for worsening symptoms. Bnp significant elevated. Cardiology consulted. Echocardiogram with worsening ejection fraction, 25%. Continue intravenous diuresis. 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 Inflammatory markers elevated.     Echo    Left Ventricle: The left ventricle is mildly dilated. Normal wall thickness. There is eccentric hypertrophy. Moderate global hypokinesis present. Septal motion is consistent with bundle branch block. There is severely reduced systolic function. Ejection fraction by visual approximation is 25%. Grade II diastolic dysfunction.    Right Ventricle: Moderate right ventricular enlargement. Wall thickness is normal. Systolic function is mildly reduced.    Right Atrium: Right atrium is moderately dilated.    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 There is mild to moderate aortic  regurgitation with a centrally directed jet.Increased gradient noted    Tricuspid Valve: There is mild regurgitation with a centrally directed jet.    Pulmonary Artery: The estimated pulmonary artery systolic pressure is 52 mmHg.    IVC/SVC: Normal venous pressure at 3 mmHg.    Interval History:  Patient seen and examined several times today.  Long discussion with son.  Patient denies any complaints.  She denies shortness a breath.  No chest pain.    Review of Systems   Constitutional:  Positive for appetite change and fatigue. Negative for fever.   Respiratory:  Negative for cough and shortness of breath.    Cardiovascular:  Negative for chest pain and leg swelling.   Gastrointestinal:  Negative for nausea and vomiting.   Neurological:  Positive for weakness.   All other systems reviewed and are negative.    Objective:     Vital Signs (Most Recent):  Temp: 97.7 °F (36.5 °C) (08/13/24 1600)  Pulse: 70 (08/13/24 1600)  Resp: 18 (08/13/24 1600)  BP: (!) 107/59 (08/13/24 1600)  SpO2: (!) 91 % (08/13/24 1600) Vital Signs (24h Range):  Temp:  [97.3 °F (36.3 °C)-98.6 °F (37 °C)] 97.7 °F (36.5 °C)  Pulse:  [65-80] 70  Resp:  [16-20] 18  SpO2:  [91 %-97 %] 91 %  BP: ()/(51-64) 107/59     Weight: 87.2 kg (192 lb 3.9 oz)  Body mass index is 33 kg/m².    Intake/Output Summary (Last 24 hours) at 8/13/2024 1726  Last data filed at 8/13/2024 0436  Gross per 24 hour   Intake 247.92 ml   Output 300 ml   Net -52.08 ml         Physical Exam  Vitals reviewed.   Constitutional:       Appearance: Normal appearance. She is ill-appearing (Chronically).   HENT:      Head: Normocephalic and atraumatic.      Mouth/Throat:      Mouth: Mucous membranes are moist.      Pharynx: Oropharynx is clear.   Eyes:      Extraocular Movements: Extraocular movements intact.      Conjunctiva/sclera: Conjunctivae normal.   Cardiovascular:      Rate and Rhythm: Normal rate and regular rhythm.      Pulses: Normal pulses.      Heart sounds: Normal heart  "sounds.   Pulmonary:      Effort: Pulmonary effort is normal.      Breath sounds: Normal breath sounds.   Abdominal:      General: Bowel sounds are normal.      Palpations: Abdomen is soft.   Musculoskeletal:         General: Normal range of motion.      Cervical back: Normal range of motion and neck supple.      Right lower leg: Edema present.      Left lower leg: Edema present.   Skin:     General: Skin is warm and dry.   Neurological:      Mental Status: She is alert. Mental status is at baseline.      Motor: Weakness present.             Significant Labs: All pertinent labs within the past 24 hours have been reviewed.  Blood Culture: No results for input(s): "LABBLOO" in the last 48 hours.  CBC:   Recent Labs   Lab 08/12/24  0448 08/13/24  0513   WBC 12.56  12.56 14.77*   HGB 14.6  14.6 14.4   HCT 44.1  44.1 44.5     174 180     CMP:   Recent Labs   Lab 08/12/24 0448 08/13/24  0513     142 143   K 3.8  3.8 4.2     102 102   CO2 30*  30* 27   *  134* 135*   BUN 30*  30* 36*   CREATININE 1.3  1.3 1.1   CALCIUM 8.0*  8.0* 8.5*   PROT 5.7*  --    ALBUMIN 2.5*  --    BILITOT 1.9*  --    ALKPHOS 142*  --    AST 20  --    ALT 31  --    ANIONGAP 10  10 14     Cardiac Markers:   Recent Labs   Lab 08/12/24 0448   BNP >4,900*       Significant Imaging: I have reviewed all pertinent imaging results/findings within the past 24 hours.    Assessment/Plan:      * Acute exacerbation of CHF (congestive heart failure)  Patient is identified as having biventricular heart failure that is Acute on chronic. CHF is currently uncontrolled due to Continued edema of extremities, Dyspnea improved with supplemental oxygen. Rales/crackles on pulmonary exam, and Pulmonary edema/pleural effusion on CXR. Latest ECHO performed and demonstrates- Results for orders placed during the hospital encounter of 02/22/24    Echo    Interpretation Summary    Left Ventricle: The left ventricle is normal in size. " Normal wall thickness. There is concentric hypertrophy. Global hypokinesis present. There is moderately reduced systolic function. Ejection fraction by visual approximation is 35%.    Right Ventricle: Normal right ventricular cavity size. Systolic function is borderline low.    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 There is moderate aortic regurgitation.  Consider ORI if clinically needed to evaluate mechanical aortic valve further.    Mitral Valve: There is mild bileaflet sclerosis. There is mild regurgitation.    Pulmonary Artery: There is moderate pulmonary hypertension. The estimated pulmonary artery systolic pressure is 49 mmHg.    IVC/SVC: Normal venous pressure at 3 mmHg.    Ascending aorta is moderately dilated measuring 4.45 cm.      Repeat echocardiogram reveals decreased EF to 25%.  With grade 2 diastolic dysfunction    . Continue Beta Blocker and Furosemide and monitor clinical status closely. Monitor on telemetry. Patient is on CHF pathway.  Monitor strict Is&Os and daily weights.  Place on fluid restriction of 1.5 L. Cardiology has been consulted. Continue to stress to patient importance of self efficacy and  on diet for CHF. Last BNP reviewed- and noted below   Recent Labs   Lab 08/12/24  0448   BNP >4,900*     Continue intravenous diuresis as able      Positive D dimer  In setting of covid infection  Respiratory status stable, not requiring increased supplemental oxygen from baseline  Echocardiogram without mention of right heart strain  Renal function test improving  Could Consider vq scan to rule out pulmonary embolism but already on warfarin for mechanical valve    With D-dimer up we will check Dopplers bilateral lower extremities        Acute viral myocarditis  Recent Covid infection  Echocardiogram with significant decline in ejection fraction from normal one month ago to 25%   Echocardiogram with moderate global hypokinesis and  eccentric hypertrophy  Oral systemic steroids on last admission  Continue systemic steroids intravenous   Remdesivir initiated     Elevated lactic acid level  Resolved after IVF bolus in ER.        Hyperglycemia  Likely elevated secondary to Decadron use to treat recent COVID infection.  Plan:  -SSI as needed  -Accuchecks      MARCUS (acute kidney injury)   Baseline creatinine is  1.2 . Most recent creatinine and eGFR are listed below.  Recent Labs     08/11/24  0525 08/12/24  0448 08/13/24  0513   CREATININE 1.4 1.3  1.3 1.1   EGFRNORACEVR 38* 41*  41* 50*        Plan  - MARCUS is  treated with sepsis bolus in ER.  - Avoid nephrotoxins and renally dose meds for GFR listed above  - Monitor urine output, serial BMP, and adjust therapy as needed  Improving  Monitor while intravenous diuresing       Resolved    Chronic hypoxic respiratory failure  Patient with Hypoxic Respiratory failure which is Acute on chronic.  she is on home oxygen at 2-3 LPM. Supplemental oxygen was provided and noted- Oxygen Concentration (%):  [32] 32    .   Signs/symptoms of respiratory failure include- lethargy. Contributing diagnoses includes - CHF, COPD, and covid  Labs and images were reviewed. Patient Has not had a recent ABG. Will treat underlying causes and adjust management of respiratory failure as follows- remdesivir, breathing treatments, intravenous systemic steroids, intravenous lasix, supplemental oxygen     His D-dimer has risen we will check Doppler of lower extremities to rule out DVT, doubt due to chronic Coumadin    COVID  Recently diagnosed on 08/01/2024 and treated with Decadron during last admission.  Sent home with Decadron p.o.  Plan:  -supplemental oxygen as needed  -continue decadron po  -duonebs prn  -monitor pulse ox as need/per unit protocol    Initiate remdesivir and increased systemic steroids for covid related myocarditis  Echocardiogram with significantly reduced ejection fraction and moderate global  hypokinesis  Inflammatory markers  ferritin and sed rate normal    Paroxysmal atrial fibrillation  Patient with Paroxysmal (<7 days) atrial fibrillation which is controlled currently with Beta Blocker. Patient is currently in sinus rhythm.[id number] Score: 4. HASBLED Score: . Anticoagulation indicated. Anticoagulation done with Coumadin .    H/O mechanical aortic valve replacement    Pharmacy to dose warfarin    Follow up with outpatient cardiologist upon discharge      Essential hypertension  Chronic, controlled. Latest blood pressure and vitals reviewed-     Temp:  [97.3 °F (36.3 °C)-98.6 °F (37 °C)]   Pulse:  [65-80]   Resp:  [16-20]   BP: ()/(51-64)   SpO2:  [91 %-97 %] .   Home meds for hypertension were reviewed and noted below.   Hypertension Medications               furosemide (LASIX) 20 MG tablet Take 1 tablet (20 mg total) by mouth every morning. Hold for low blood pressure. Do not give if systolic blood pressure is below 110 and/or diastolic is below 60    metoprolol succinate (TOPROL-XL) 25 MG 24 hr tablet Take 1 tablet by mouth every evening.    metoprolol tartrate (LOPRESSOR) 25 MG tablet Take 1 tablet (25 mg total) by mouth 2 (two) times daily. Do not give if systolic blood pressure is below 110 and/or diastolic below 60            While in the hospital, will manage blood pressure as follows; low dose metoprolol hold for systolic blood pressure less than 110    Will utilize p.r.n. blood pressure medication only if patient's blood pressure greater than 180/110 and she develops symptoms such as worsening chest pain or shortness of breath.    Dyslipidemia  Patient is not chronically on statin.will not continue for now. Last Lipid Panel:   Lab Results   Component Value Date    CHOL 225 (H) 11/01/2022    HDL 48 11/01/2022    LDLCALC 141 (H) 11/01/2022    TRIG 180 11/01/2022    CHOLHDL 4.7 (H) 11/01/2022     Plan:  -low fat/low calorie diet        Anticoagulant long-term use  This patient has long  term use on an anticoagulant with Select Anticoagulant(s): Warfarin/Coumadin. Their long term anticoagulation will be Held or Continued: continued. They are on long term anticoagulation due to Reason for Anticoagulation: Atrial fibrillation and Mechanical heart valve.   Pharmacy to dose warfarin  Coumadin restricted diet      VTE Risk Mitigation (From admission, onward)           Ordered     warfarin (COUMADIN) tablet 2.5 mg  Once per day on Monday Friday         08/10/24 0703     warfarin (COUMADIN) split tablet 1.25 mg  Once per day on Jayson Tuesday Wednesday Thursday Saturday         08/10/24 0703     IP VTE HIGH RISK PATIENT  Once         08/10/24 0307     Place sequential compression device  Until discontinued         08/10/24 0307                    Discharge Planning   RENE:      Code Status: Full Code   Is the patient medically ready for discharge?:     Reason for patient still in hospital (select all that apply): Patient trending condition, Laboratory test, and Treatment  Discharge Plan A: Home with family, Home Health                  June Boykin MD  Department of Hospital Medicine   [facility] - Telemetry ([facility])

## 2024-08-13 NOTE — PROGRESS NOTES
Clinical Pharmacy Progress Note: Coumadin Dosing and Monitoring      Indication: Mechanical AVR; Afib (controlled by beta blocker); pacemaker  Goal INR: 2-3  INR today = 2.1 trended up from 2 yesterday  Home dosing regimen: Warfarin 2.5 mg every Monday and Friday, and Warfarin 1.25 mg on all other days.      Continue home dosing today of 1.25 mg   Drug interactions: Acetaminophen/Remdesivir/Dexamethasone may increase the risk of bleeding while on warfarin.   /Erika Juárez [facility] 8/13/2024 12:43 PM

## 2024-08-13 NOTE — ASSESSMENT & PLAN NOTE
Patient with Paroxysmal (<7 days) atrial fibrillation which is controlled currently with Beta Blocker. Patient is currently in sinus rhythm.[id number] Score: 4. HASBLED Score: . Anticoagulation indicated. Anticoagulation done with Coumadin .

## 2024-08-13 NOTE — SUBJECTIVE & OBJECTIVE
"    Review of Systems   Reason unable to perform ROS: COVID isolation.     Objective:     Vital Signs (Most Recent):  Temp: 97.3 °F (36.3 °C) (08/13/24 1154)  Pulse: 72 (08/13/24 1417)  Resp: 18 (08/13/24 1154)  BP: (!) 108/56 (08/13/24 1154)  SpO2: 96 % (08/13/24 1154) Vital Signs (24h Range):  Temp:  [97.3 °F (36.3 °C)-98.6 °F (37 °C)] 97.3 °F (36.3 °C)  Pulse:  [65-91] 72  Resp:  [16-20] 18  SpO2:  [92 %-97 %] 96 %  BP: ()/(51-64) 108/56     Weight: 87.2 kg (192 lb 3.9 oz)  Body mass index is 33 kg/m².     SpO2: 96 %         Intake/Output Summary (Last 24 hours) at 8/13/2024 1507  Last data filed at 8/13/2024 0436  Gross per 24 hour   Intake 247.92 ml   Output 300 ml   Net -52.08 ml       Lines/Drains/Airways       Peripheral Intravenous Line  Duration                  Peripheral IV - Single Lumen 08/09/24 2325 20 G Anterior;Right Forearm 3 days         Peripheral IV - Single Lumen 08/10/24 0256 20 G Right Antecubital 3 days                     COVID isolation  Physical Exam  Vitals and nursing note reviewed.            Significant Labs: BMP:   Recent Labs   Lab 08/12/24  0448 08/13/24  0513   *  134* 135*     142 143   K 3.8  3.8 4.2     102 102   CO2 30*  30* 27   BUN 30*  30* 36*   CREATININE 1.3  1.3 1.1   CALCIUM 8.0*  8.0* 8.5*   , CMP   Recent Labs   Lab 08/12/24  0448 08/13/24  0513     142 143   K 3.8  3.8 4.2     102 102   CO2 30*  30* 27   *  134* 135*   BUN 30*  30* 36*   CREATININE 1.3  1.3 1.1   CALCIUM 8.0*  8.0* 8.5*   PROT 5.7*  --    ALBUMIN 2.5*  --    BILITOT 1.9*  --    ALKPHOS 142*  --    AST 20  --    ALT 31  --    ANIONGAP 10  10 14   , CBC   Recent Labs   Lab 08/12/24  0448 08/13/24  0513   WBC 12.56  12.56 14.77*   HGB 14.6  14.6 14.4   HCT 44.1  44.1 44.5     174 180   , INR   Recent Labs   Lab 08/12/24  0448 08/13/24  0513   INR 2.0* 2.1*   , Lipid Panel No results for input(s): "CHOL", "HDL", "LDLCALC", "TRIG", " ""CHOLHDL" in the last 48 hours., Troponin   No results for input(s): "TROPONINI" in the last 48 hours.  , and All pertinent lab results from the last 24 hours have been reviewed.    Significant Imaging: Cardiac Cath: reviewed, Echocardiogram: Transthoracic echo (TTE) complete (Cupid Only):   Results for orders placed or performed during the hospital encounter of 08/09/24   Echo   Result Value Ref Range    BSA 1.83 m2    LVOT stroke volume 75.63 cm3    LVIDd 5.48 3.5 - 6.0 cm    LV Systolic Volume 138.71 mL    LV Systolic Volume Index 77.5 mL/m2    LVIDs 5.36 (A) 2.1 - 4.0 cm    LV Diastolic Volume 146.22 mL    LV Diastolic Volume Index 81.69 mL/m2    Left Ventricular End Systolic Volume by Teichholz Method 138.71 mL    Left Ventricular End Diastolic Volume by Teichholz Method 146.22 mL    IVS 1.12 (A) 0.6 - 1.1 cm    LVOT diameter 2.24 cm    LVOT area 3.9 cm2    FS 2 (A) 28 - 44 %    Left Ventricle Relative Wall Thickness 0.42 cm    Posterior Wall 1.15 (A) 0.6 - 1.1 cm    LV mass 250.96 g    LV Mass Index 140 g/m2    MV Peak E Melo 0.95 m/s    TDI LATERAL 0.03 m/s    TDI SEPTAL 0.04 m/s    E/E' ratio 27.14 m/s    MV Peak A Melo 0.48 m/s    TR Max Melo 3.49 m/s    E/A ratio 1.98     E wave deceleration time 94.49 msec    LV SEPTAL E/E' RATIO 23.75 m/s    LV LATERAL E/E' RATIO 31.67 m/s    LVOT peak melo 1.05 m/s    Left Ventricular Outflow Tract Mean Velocity 0.76 cm/s    Left Ventricular Outflow Tract Mean Gradient 2.53 mmHg    RV-matson mid d 3.3 cm    RV mid diameter 3.32 cm    RVOT peak VTI 6.7 cm    TAPSE 1.05 cm    LA size 2.49 cm    Left Atrium Minor Axis 3.21 cm    Left Atrium Major Axis 4.08 cm    RA Major Axis 3.91 cm    AV regurgitation pressure 1/2 time [id number] ms    AR Max Melo 3.74 m/s    AV mean gradient 43 mmHg    AV peak gradient 54 mmHg    Ao peak melo 3.67 m/s    Ao VTI 96.30 cm    LVOT peak VTI 19.20 cm    AV valve area 0.79 cm²    AV Velocity Ratio 0.29     AV index (prosthetic) 0.20     AAMIR " by Velocity Ratio 1.13 cm²    Mr max rafa 4.11 m/s    Triscuspid Valve Regurgitation Peak Gradient 49 mmHg    PV mean gradient 0 mmHg    RVOT peak rafa 0.39 m/s    Ao root annulus 3.21 cm    STJ 2.63 cm    Ascending aorta 2.69 cm    IVC diameter 1.77 cm    Mean e' 0.04 m/s    ZLVIDS 4.44     ZLVIDD 1.02     LA Volume Index 15.3 mL/m2    LA volume 27.38 cm3    LA WIDTH 3.6 cm    RA Width 3.1 cm    EF 25 %    TV resting pulmonary artery pressure 52 mmHg    RV TB RVSP 6 mmHg    Est. RA pres 3 mmHg    Narrative      Left Ventricle: The left ventricle is mildly dilated. Normal wall   thickness. There is eccentric hypertrophy. Moderate global hypokinesis   present. Septal motion is consistent with bundle branch block. There is   severely reduced systolic function. Ejection fraction by visual   approximation is 25%. Grade II diastolic dysfunction.    Right Ventricle: Moderate right ventricular enlargement. Wall thickness   is normal. Systolic function is mildly reduced.    Right Atrium: Right atrium is moderately dilated.    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 There   is mild to moderate aortic regurgitation with a centrally directed   jet.Increased gradient noted    Tricuspid Valve: There is mild regurgitation with a centrally directed   jet.    Pulmonary Artery: The estimated pulmonary artery systolic pressure is   52 mmHg.    IVC/SVC: Normal venous pressure at 3 mmHg.     , EKG: reviewed, Stress Test: reviewed, and X-Ray: CXR: X-Ray Chest 1 View (CXR): No results found for this visit on 08/09/24.

## 2024-08-13 NOTE — ASSESSMENT & PLAN NOTE
Purpose: Session #8 (virtual visit). Pt provided his current location and reported having safe and private space to engage in session. Pt consented to the telehealth visit. Pt provided updates, reported having better and worse days. Pt denied concerns related to risk/safety. Clinical Interval Assessment (PCL5, PCL5 D&D, PHQ-9, GAD7, Sleep Study-Promis) was completed, tx goals were updated.    Intervention: Clinician checked in with pt. Clinician utilized open-ended questions and reflective listening to engage pt in the dialogue and self-reflection. Clinician assessed for risk and safety. Clinician administered Clinical Interval Assessment (PCL5, PCL5 D&D, PHQ-9, GAD7, Sleep Study-Promis) to continue to assess for symptoms and measure progress. Pt and clinician reviewed tx progress, increase in PCL5 scores was noted. Clinician provided psychoeducation on triggers and trauma responses and how sxs may fluctuate at times. Clinician reinforced pt's strength and participation in his care. Clinician encouraged pt to challenge unhelpful thoughts and engage in family and relaxation times, when able to. Reinforced use of coping/distraction techniques in moments of increased distress. Discussed availability and scheduled next 2 therapy appointments. Encouraged pt to reach out to the clinic with any questions.    Mental status: Pt's attitude was cooperative. Overall, normal mood w/ congruent affect, sad times. Pt was alert and oriented. Concentration appeared good. Immediate, recent and remote memory appeared intact. Thoughts appeared coherent. Speech was WNL, session completed in Syriac. Pt adamantly denied SI, HI, pt denied safety concerns, no evidence of psychosis. Behavior observed to be appropriate for the situation. Judgment and insight appeared adequate.     Plan: Pt will attend individual therapy. Clinician will continue to assess for symptoms and proper diagnosis. Pt's next therapy appointment is scheduled  This patient has long term use on an anticoagulant with Select Anticoagulant(s): Warfarin/Coumadin. Their long term anticoagulation will be Held or Continued: continued. They are on long term anticoagulation due to Reason for Anticoagulation: Atrial fibrillation and Mechanical heart valve.   Pharmacy to dose warfarin  Coumadin restricted diet   for 08/10 at 10a.m.

## 2024-08-13 NOTE — PLAN OF CARE
NAEON.    Aox4. Able to verbalize needs & follow commands. Delayed responses.    POC reviewed with pt. Interventions implemented as appropriate.    VS stable. BP runs soft. Lasix and Metoprolol held d/t low bp.  AV-paced on tele-monitor, box # [id number].  On 3L o2 nc. Productive cough w/ blood tinged sputum.    20g piv to rac. Patent. Saline locked. Dsg cdi. Integrity maintained.    Receiving Remdesivir. No adverse reactions noted.  No new skin issues. Nonblanchable redness noted to groin and inner thighs. Wounds to sacrum.  Soft bite-sized cardiac, 2g sodium, Coumadin restriction; 1.5L fluid restricted diet w/ Dirk bid, and Banatrol w/ all meals. Tolerating well. No n/v/d or other GI complaints.  Incontinent of b/b. External female catheter in place. No evidence of skin breakdown noted.    No c/o pain.   Generalized weakness. Assist x 2. Bed bound. Activity ad stephen.   Coumadin and Lovenox for VTE.   Frequent position changes encouraged. Able to reposition in bed independently. Educated on s/sx of pressure injury;  verbalized understanding.    NADN. Resting quietly in bed.   Free of falls. Hourly rounding complete.   All safety measures remain in place. SR up x2; bed low & locked. Bed alarm on and audible. Call light w/in reach.   Hourly monitoring continues throughout shift.   Chart check complete.

## 2024-08-13 NOTE — ASSESSMENT & PLAN NOTE
Patient with Hypoxic Respiratory failure which is Acute on chronic.  she is on home oxygen at 2-3 LPM. Supplemental oxygen was provided and noted- Oxygen Concentration (%):  [32] 32    .   Signs/symptoms of respiratory failure include- lethargy. Contributing diagnoses includes - CHF, COPD, and covid  Labs and images were reviewed. Patient Has not had a recent ABG. Will treat underlying causes and adjust management of respiratory failure as follows- remdesivir, breathing treatments, intravenous systemic steroids, intravenous lasix, supplemental oxygen     His D-dimer has risen we will check Doppler of lower extremities to rule out DVT, doubt due to chronic Coumadin

## 2024-08-13 NOTE — ASSESSMENT & PLAN NOTE
Chronic, controlled. Latest blood pressure and vitals reviewed-     Temp:  [97.3 °F (36.3 °C)-98.6 °F (37 °C)]   Pulse:  [65-80]   Resp:  [16-20]   BP: ()/(51-64)   SpO2:  [91 %-97 %] .   Home meds for hypertension were reviewed and noted below.   Hypertension Medications               furosemide (LASIX) 20 MG tablet Take 1 tablet (20 mg total) by mouth every morning. Hold for low blood pressure. Do not give if systolic blood pressure is below 110 and/or diastolic is below 60    metoprolol succinate (TOPROL-XL) 25 MG 24 hr tablet Take 1 tablet by mouth every evening.    metoprolol tartrate (LOPRESSOR) 25 MG tablet Take 1 tablet (25 mg total) by mouth 2 (two) times daily. Do not give if systolic blood pressure is below 110 and/or diastolic below 60            While in the hospital, will manage blood pressure as follows; low dose metoprolol hold for systolic blood pressure less than 110    Will utilize p.r.n. blood pressure medication only if patient's blood pressure greater than 180/110 and she develops symptoms such as worsening chest pain or shortness of breath.

## 2024-08-14 PROBLEM — I82.411 ACUTE DEEP VEIN THROMBOSIS (DVT) OF FEMORAL VEIN OF RIGHT LOWER EXTREMITY: Status: RESOLVED | Noted: 2024-08-14 | Resolved: 2024-08-14

## 2024-08-14 PROBLEM — I82.411 ACUTE DEEP VEIN THROMBOSIS (DVT) OF FEMORAL VEIN OF RIGHT LOWER EXTREMITY: Status: ACTIVE | Noted: 2024-08-14

## 2024-08-14 PROBLEM — I82.412 ACUTE DEEP VEIN THROMBOSIS (DVT) OF FEMORAL VEIN OF LEFT LOWER EXTREMITY: Status: ACTIVE | Noted: 2024-08-14

## 2024-08-14 LAB
ANION GAP SERPL CALC-SCNC: 12 MMOL/L (ref 8–16)
BASOPHILS # BLD AUTO: 0.02 K/UL (ref 0–0.2)
BASOPHILS NFR BLD: 0.1 % (ref 0–1.9)
BNP SERPL-MCNC: >4900 PG/ML (ref 0–99)
BUN SERPL-MCNC: 41 MG/DL (ref 8–23)
CALCIUM SERPL-MCNC: 8.5 MG/DL (ref 8.7–10.5)
CHLORIDE SERPL-SCNC: 101 MMOL/L (ref 95–110)
CO2 SERPL-SCNC: 31 MMOL/L (ref 23–29)
CREAT SERPL-MCNC: 1.1 MG/DL (ref 0.5–1.4)
DIFFERENTIAL METHOD BLD: ABNORMAL
EOSINOPHIL # BLD AUTO: 0 K/UL (ref 0–0.5)
EOSINOPHIL NFR BLD: 0 % (ref 0–8)
ERYTHROCYTE [DISTWIDTH] IN BLOOD BY AUTOMATED COUNT: 15.5 % (ref 11.5–14.5)
EST. GFR  (NO RACE VARIABLE): 50 ML/MIN/1.73 M^2
GLUCOSE SERPL-MCNC: 130 MG/DL (ref 70–110)
HCT VFR BLD AUTO: 46.4 % (ref 37–48.5)
HGB BLD-MCNC: 15.2 G/DL (ref 12–16)
IMM GRANULOCYTES # BLD AUTO: 0.12 K/UL (ref 0–0.04)
IMM GRANULOCYTES NFR BLD AUTO: 0.8 % (ref 0–0.5)
INR PPP: 1.8 (ref 0.8–1.2)
LYMPHOCYTES # BLD AUTO: 0.4 K/UL (ref 1–4.8)
LYMPHOCYTES NFR BLD: 2.8 % (ref 18–48)
MCH RBC QN AUTO: 28.1 PG (ref 27–31)
MCHC RBC AUTO-ENTMCNC: 32.8 G/DL (ref 32–36)
MCV RBC AUTO: 86 FL (ref 82–98)
MONOCYTES # BLD AUTO: 0.9 K/UL (ref 0.3–1)
MONOCYTES NFR BLD: 5.9 % (ref 4–15)
NEUTROPHILS # BLD AUTO: 13.7 K/UL (ref 1.8–7.7)
NEUTROPHILS NFR BLD: 90.4 % (ref 38–73)
NRBC BLD-RTO: 0 /100 WBC
PLATELET # BLD AUTO: 178 K/UL (ref 150–450)
PMV BLD AUTO: 10.6 FL (ref 9.2–12.9)
POTASSIUM SERPL-SCNC: 3.4 MMOL/L (ref 3.5–5.1)
PROTHROMBIN TIME: 19.8 SEC (ref 9–12.5)
RBC # BLD AUTO: 5.4 M/UL (ref 4–5.4)
SODIUM SERPL-SCNC: 144 MMOL/L (ref 136–145)
WBC # BLD AUTO: 15.15 K/UL (ref 3.9–12.7)

## 2024-08-14 PROCEDURE — 36415 COLL VENOUS BLD VENIPUNCTURE: CPT | Performed by: HOSPITALIST

## 2024-08-14 PROCEDURE — 27000207 HC ISOLATION

## 2024-08-14 PROCEDURE — 94761 N-INVAS EAR/PLS OXIMETRY MLT: CPT

## 2024-08-14 PROCEDURE — 85025 COMPLETE CBC W/AUTO DIFF WBC: CPT | Performed by: FAMILY MEDICINE

## 2024-08-14 PROCEDURE — 63600175 PHARM REV CODE 636 W HCPCS

## 2024-08-14 PROCEDURE — 25000003 PHARM REV CODE 250: Performed by: FAMILY MEDICINE

## 2024-08-14 PROCEDURE — 25000003 PHARM REV CODE 250

## 2024-08-14 PROCEDURE — 21400001 HC TELEMETRY ROOM

## 2024-08-14 PROCEDURE — 85610 PROTHROMBIN TIME: CPT | Performed by: HOSPITALIST

## 2024-08-14 PROCEDURE — 80048 BASIC METABOLIC PNL TOTAL CA: CPT | Performed by: NURSE PRACTITIONER

## 2024-08-14 PROCEDURE — 63600175 PHARM REV CODE 636 W HCPCS: Performed by: INTERNAL MEDICINE

## 2024-08-14 PROCEDURE — 63600175 PHARM REV CODE 636 W HCPCS: Mod: JZ,TB | Performed by: FAMILY MEDICINE

## 2024-08-14 PROCEDURE — 99900035 HC TECH TIME PER 15 MIN (STAT)

## 2024-08-14 PROCEDURE — 99233 SBSQ HOSP IP/OBS HIGH 50: CPT | Mod: ,,, | Performed by: INTERNAL MEDICINE

## 2024-08-14 PROCEDURE — 25000003 PHARM REV CODE 250: Performed by: NURSE PRACTITIONER

## 2024-08-14 PROCEDURE — 83880 ASSAY OF NATRIURETIC PEPTIDE: CPT | Performed by: INTERNAL MEDICINE

## 2024-08-14 PROCEDURE — 27000221 HC OXYGEN, UP TO 24 HOURS

## 2024-08-14 RX ORDER — ENOXAPARIN SODIUM 100 MG/ML
1 INJECTION SUBCUTANEOUS EVERY 12 HOURS
Status: DISCONTINUED | OUTPATIENT
Start: 2024-08-14 | End: 2024-08-16 | Stop reason: HOSPADM

## 2024-08-14 RX ORDER — POTASSIUM CHLORIDE 7.45 MG/ML
10 INJECTION INTRAVENOUS
Status: COMPLETED | OUTPATIENT
Start: 2024-08-14 | End: 2024-08-14

## 2024-08-14 RX ORDER — FUROSEMIDE 10 MG/ML
40 INJECTION INTRAMUSCULAR; INTRAVENOUS DAILY
Status: DISCONTINUED | OUTPATIENT
Start: 2024-08-15 | End: 2024-08-15

## 2024-08-14 RX ADMIN — DEXAMETHASONE 6 MG: 4 TABLET ORAL at 08:08

## 2024-08-14 RX ADMIN — REMDESIVIR 100 MG: 100 INJECTION, POWDER, LYOPHILIZED, FOR SOLUTION INTRAVENOUS at 08:08

## 2024-08-14 RX ADMIN — MUPIROCIN: 20 OINTMENT TOPICAL at 08:08

## 2024-08-14 RX ADMIN — OXYCODONE HYDROCHLORIDE AND ACETAMINOPHEN 500 MG: 500 TABLET ORAL at 08:08

## 2024-08-14 RX ADMIN — MIDODRINE HYDROCHLORIDE 10 MG: 5 TABLET ORAL at 07:08

## 2024-08-14 RX ADMIN — ENOXAPARIN SODIUM 90 MG: 100 INJECTION SUBCUTANEOUS at 10:08

## 2024-08-14 RX ADMIN — POTASSIUM CHLORIDE 10 MEQ: 7.46 INJECTION, SOLUTION INTRAVENOUS at 09:08

## 2024-08-14 RX ADMIN — MIDODRINE HYDROCHLORIDE 10 MG: 5 TABLET ORAL at 04:08

## 2024-08-14 RX ADMIN — MIDODRINE HYDROCHLORIDE 10 MG: 5 TABLET ORAL at 12:08

## 2024-08-14 RX ADMIN — POTASSIUM CHLORIDE 10 MEQ: 7.46 INJECTION, SOLUTION INTRAVENOUS at 10:08

## 2024-08-14 RX ADMIN — METOPROLOL TARTRATE 25 MG: 25 TABLET, FILM COATED ORAL at 08:08

## 2024-08-14 RX ADMIN — WARFARIN SODIUM 1.25 MG: 2.5 TABLET ORAL at 04:08

## 2024-08-14 RX ADMIN — THERA TABS 1 TABLET: TAB at 08:08

## 2024-08-14 RX ADMIN — ACETAMINOPHEN 650 MG: 325 TABLET ORAL at 08:08

## 2024-08-14 RX ADMIN — FUROSEMIDE 40 MG: 10 INJECTION, SOLUTION INTRAMUSCULAR; INTRAVENOUS at 08:08

## 2024-08-14 RX ADMIN — POTASSIUM CHLORIDE 10 MEQ: 7.46 INJECTION, SOLUTION INTRAVENOUS at 02:08

## 2024-08-14 RX ADMIN — POTASSIUM CHLORIDE 10 MEQ: 7.46 INJECTION, SOLUTION INTRAVENOUS at 11:08

## 2024-08-14 RX ADMIN — ENOXAPARIN SODIUM 90 MG: 100 INJECTION SUBCUTANEOUS at 09:08

## 2024-08-14 NOTE — ASSESSMENT & PLAN NOTE
In setting of covid infection  Respiratory status stable, not requiring increased supplemental oxygen from baseline  Echocardiogram without mention of right heart strain  Renal function test improving  Could Consider vq scan to rule out pulmonary embolism but already on warfarin for mechanical valve    With D-dimer up we will check Dopplers bilateral lower extremities positive for acute left lower extremity DVT

## 2024-08-14 NOTE — ASSESSMENT & PLAN NOTE
Patient with Paroxysmal (<7 days) atrial fibrillation which is controlled currently with Beta Blocker. Patient is currently in sinus rhythm.[id number] Score: 4. HASBLED Score: . Anticoagulation indicated. Anticoagulation done with Coumadin .

## 2024-08-14 NOTE — PLAN OF CARE
Nutrition Recommendations 8/14/23:  1. Recommend pt continues on Cardiac, Coumadin restriction, Low sodium 2 gm, Soft and bite-sized (IDDSI Level 6), 1500 mL fluid restriction diet as medically appropriate   2. Recommend continue Boost TID to assist filling nutritional gaps   3. Recommend continue Dirk BID for wound healing   4. Recommend continue Banatrol TID to assist with diarrhea as warranted   5. Recommend encourage intake and feeding assistance with all meals, snacks and ONS   6. Recommend consideration of appetite stimulant or alternative means of nutrition if PO intake 25% or < x 3 days  7. Updated weight, twice weekly  8. Collaboration by nutrition professional with other providers    Goals:   1. Pt will consume >75% EEN and EPN prior to RD follow up   2. Pt will consume Dirk BID prior to RD follow up  3. Pt diarrhea will improve prior to RD follow up (meeting)    CARIN Tafoya, RDN, LDN

## 2024-08-14 NOTE — SUBJECTIVE & OBJECTIVE
"    Review of Systems   Reason unable to perform ROS: COVID isolation.     Objective:     Vital Signs (Most Recent):  Temp: 98.2 °F (36.8 °C) (08/14/24 1108)  Pulse: 89 (08/14/24 1108)  Resp: 18 (08/14/24 1108)  BP: 124/62 (08/14/24 1108)  SpO2: 95 % (08/14/24 1108) Vital Signs (24h Range):  Temp:  [97.5 °F (36.4 °C)-98.6 °F (37 °C)] 98.2 °F (36.8 °C)  Pulse:  [65-89] 89  Resp:  [18-20] 18  SpO2:  [91 %-96 %] 95 %  BP: (107-124)/(55-69) 124/62     Weight: 87.1 kg (192 lb 0.3 oz)  Body mass index is 32.96 kg/m².     SpO2: 95 %         Intake/Output Summary (Last 24 hours) at 8/14/2024 1514  Last data filed at 8/14/2024 0704  Gross per 24 hour   Intake --   Output 1100 ml   Net -1100 ml       Lines/Drains/Airways       Peripheral Intravenous Line  Duration                  Peripheral IV - Single Lumen 08/09/24 2325 20 G Anterior;Right Forearm 4 days         Peripheral IV - Single Lumen 08/10/24 0256 20 G Right Antecubital 4 days                     COVID isolation  Physical Exam  Vitals and nursing note reviewed.            Significant Labs: BMP:   Recent Labs   Lab 08/13/24  0513 08/14/24  0512   * 130*    144   K 4.2 3.4*    101   CO2 27 31*   BUN 36* 41*   CREATININE 1.1 1.1   CALCIUM 8.5* 8.5*   , CMP   Recent Labs   Lab 08/13/24  0513 08/14/24  0512    144   K 4.2 3.4*    101   CO2 27 31*   * 130*   BUN 36* 41*   CREATININE 1.1 1.1   CALCIUM 8.5* 8.5*   ANIONGAP 14 12   , CBC   Recent Labs   Lab 08/13/24  0513 08/14/24  0512   WBC 14.77* 15.15*   HGB 14.4 15.2   HCT 44.5 46.4    178   , INR   Recent Labs   Lab 08/13/24  0513 08/14/24  0512   INR 2.1* 1.8*   , Lipid Panel No results for input(s): "CHOL", "HDL", "LDLCALC", "TRIG", "CHOLHDL" in the last 48 hours., Troponin   No results for input(s): "TROPONINI" in the last 48 hours.  , and All pertinent lab results from the last 24 hours have been reviewed.    Significant Imaging: Cardiac Cath: reviewed, Echocardiogram: " Transthoracic echo (TTE) complete (Cupid Only):   Results for orders placed or performed during the hospital encounter of 08/09/24   Echo   Result Value Ref Range    BSA 1.83 m2    LVOT stroke volume 75.63 cm3    LVIDd 5.48 3.5 - 6.0 cm    LV Systolic Volume 138.71 mL    LV Systolic Volume Index 77.5 mL/m2    LVIDs 5.36 (A) 2.1 - 4.0 cm    LV Diastolic Volume 146.22 mL    LV Diastolic Volume Index 81.69 mL/m2    Left Ventricular End Systolic Volume by Teichholz Method 138.71 mL    Left Ventricular End Diastolic Volume by Teichholz Method 146.22 mL    IVS 1.12 (A) 0.6 - 1.1 cm    LVOT diameter 2.24 cm    LVOT area 3.9 cm2    FS 2 (A) 28 - 44 %    Left Ventricle Relative Wall Thickness 0.42 cm    Posterior Wall 1.15 (A) 0.6 - 1.1 cm    LV mass 250.96 g    LV Mass Index 140 g/m2    MV Peak E Melo 0.95 m/s    TDI LATERAL 0.03 m/s    TDI SEPTAL 0.04 m/s    E/E' ratio 27.14 m/s    MV Peak A Melo 0.48 m/s    TR Max Melo 3.49 m/s    E/A ratio 1.98     E wave deceleration time 94.49 msec    LV SEPTAL E/E' RATIO 23.75 m/s    LV LATERAL E/E' RATIO 31.67 m/s    LVOT peak melo 1.05 m/s    Left Ventricular Outflow Tract Mean Velocity 0.76 cm/s    Left Ventricular Outflow Tract Mean Gradient 2.53 mmHg    RV-matson mid d 3.3 cm    RV mid diameter 3.32 cm    RVOT peak VTI 6.7 cm    TAPSE 1.05 cm    LA size 2.49 cm    Left Atrium Minor Axis 3.21 cm    Left Atrium Major Axis 4.08 cm    RA Major Axis 3.91 cm    AV regurgitation pressure 1/2 time [id number] ms    AR Max Melo 3.74 m/s    AV mean gradient 43 mmHg    AV peak gradient 54 mmHg    Ao peak melo 3.67 m/s    Ao VTI 96.30 cm    LVOT peak VTI 19.20 cm    AV valve area 0.79 cm²    AV Velocity Ratio 0.29     AV index (prosthetic) 0.20     AAMIR by Velocity Ratio 1.13 cm²    Mr max melo 4.11 m/s    Triscuspid Valve Regurgitation Peak Gradient 49 mmHg    PV mean gradient 0 mmHg    RVOT peak melo 0.39 m/s    Ao root annulus 3.21 cm    STJ 2.63 cm    Ascending aorta 2.69 cm    IVC diameter  1.77 cm    Mean e' 0.04 m/s    ZLVIDS 4.44     ZLVIDD 1.02     LA Volume Index 15.3 mL/m2    LA volume 27.38 cm3    LA WIDTH 3.6 cm    RA Width 3.1 cm    EF 25 %    TV resting pulmonary artery pressure 52 mmHg    RV TB RVSP 6 mmHg    Est. RA pres 3 mmHg    Narrative      Left Ventricle: The left ventricle is mildly dilated. Normal wall   thickness. There is eccentric hypertrophy. Moderate global hypokinesis   present. Septal motion is consistent with bundle branch block. There is   severely reduced systolic function. Ejection fraction by visual   approximation is 25%. Grade II diastolic dysfunction.    Right Ventricle: Moderate right ventricular enlargement. Wall thickness   is normal. Systolic function is mildly reduced.    Right Atrium: Right atrium is moderately dilated.    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 There   is mild to moderate aortic regurgitation with a centrally directed   jet.Increased gradient noted    Tricuspid Valve: There is mild regurgitation with a centrally directed   jet.    Pulmonary Artery: The estimated pulmonary artery systolic pressure is   52 mmHg.    IVC/SVC: Normal venous pressure at 3 mmHg.     , EKG: reviewed, Stress Test: reviewed, and X-Ray: CXR: X-Ray Chest 1 View (CXR): No results found for this visit on 08/09/24.

## 2024-08-14 NOTE — ASSESSMENT & PLAN NOTE
Chronic, controlled. Latest blood pressure and vitals reviewed-     Temp:  [97.5 °F (36.4 °C)-98.6 °F (37 °C)]   Pulse:  [65-89]   Resp:  [18-20]   BP: (107-124)/(55-69)   SpO2:  [91 %-96 %] .   Home meds for hypertension were reviewed and noted below.   Hypertension Medications               furosemide (LASIX) 20 MG tablet Take 1 tablet (20 mg total) by mouth every morning. Hold for low blood pressure. Do not give if systolic blood pressure is below 110 and/or diastolic is below 60    metoprolol succinate (TOPROL-XL) 25 MG 24 hr tablet Take 1 tablet by mouth every evening.    metoprolol tartrate (LOPRESSOR) 25 MG tablet Take 1 tablet (25 mg total) by mouth 2 (two) times daily. Do not give if systolic blood pressure is below 110 and/or diastolic below 60            While in the hospital, will manage blood pressure as follows; low dose metoprolol hold for systolic blood pressure less than 110    Will utilize p.r.n. blood pressure medication only if patient's blood pressure greater than 180/110 and she develops symptoms such as worsening chest pain or shortness of breath.

## 2024-08-14 NOTE — ASSESSMENT & PLAN NOTE
Baseline creatinine is  1.2 . Most recent creatinine and eGFR are listed below.  Recent Labs     08/12/24  0448 08/13/24  0513 08/14/24  0512   CREATININE 1.3  1.3 1.1 1.1   EGFRNORACEVR 41*  41* 50* 50*        Plan  - MARCUS is  treated with sepsis bolus in ER.  - Avoid nephrotoxins and renally dose meds for GFR listed above  - Monitor urine output, serial BMP, and adjust therapy as needed  Improving  Monitor while intravenous diuresing       Resolved

## 2024-08-14 NOTE — PROGRESS NOTES
[facility] Medicine  Progress Note    Patient Name: Serena Post  MRN: [id number]  Patient Class: IP- Inpatient   Admission Date: 8/9/2024  Length of Stay: 4 days  Attending Physician: June Acosta MD  Primary Care Provider: Evangelina Olivares MD        Subjective:     Principal Problem:Acute exacerbation of CHF (congestive heart failure)        HPI:  Serena Post is a 84 y.o. female with a PMH  has a past medical history of A-fib, Anticoagulant long-term use, Aortic valve disease, Cancer, Cardiomyopathy, CHF (congestive heart failure), COVID-19 (7/23/2022), heart valve replacement with mechanical valve, Hyperlipidemia, Hypertension, Pacemaker, Pacemaker, Sepsis (7/23/2022), and Stroke. presented to the Emergency Department for evaluation of AMS which onset today. Patient tested positive for COVID on 8/1/2024 and was recently discharged on 8/6/24 after being diuresed for CHF exacerbation and treated with decadron and neb treatments for her Covid. Patient deemed stable for discharge with homehealth physical/occupational therapy to be resumed and nurse practitioner to visit home program. However, patient's son reports patient started becoming incoherent today at 5PM. Son reports symptoms of decreased appetite, generalized weakness, and diarrhea. No mitigating or exacerbating factors reported. Son denies any vomiting. Son states that patient is taking her LASIX. No further complaints or concerns at this time.     ER workup revealed a CBC to be unremarkable, coags of the within therapeutic range, CMP revealed BUN/creatinine of 44/1.7 with EGFR of 30 (22/1.1 with EGFR 50 on 08/06/2024), BNP>4,900, initial lactic acid of 3.0 with repeat lactic acid of 2.0, procalcitonin negative, flu negative, COVID positive, UA unremarkable, chest x-ray unchanged from previous, CT abdomen and pelvis without IV contrast revealed:[Small bilateral pleural effusions, left  greater than right, with patchy bibasilar airspace opacities.  Findings may reflect edema, infection, aspiration, and/or atelectasis; Cardiomegaly.; Nonspecific bilateral perinephric edema and mild urinary bladder wall thickening.  Correlation to urinalysis to exclude infection advised; Diffuse body wall anasarca].  EKG revealed normal sinus rhythm with right bundle-branch block with a ventricular rate of 90 beats per minute with a QT/QTC of 400/489.  O2 saturation originally 93% on room air with a respiratory rate of 40 5 times per minute which resolved after being placed on 4 liters/minute via nasal cannula.  Patient currently satting 99% with a respiratory rate of 18.  No acute respiratory distress noted.  Patient received DuoNeb, 4.5 g of Zosyn, 1, 641 cc bolus of sodium chloride, and 1.7 g of vancomycin in ER.  Hospital Medicine consulted to admit patient for sepsis vs CHF exacerbation.  Patient admitted under inpatient status.      PCP: Evangelina Olivares      Overview/Hospital Course:  8/11 readmitted for worsening symptoms. Bnp significant elevated. Cardiology consulted. Echocardiogram with worsening ejection fraction, 25%. Continue intravenous diuresis. 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 Inflammatory markers elevated.     Patient was continued remdesivir and Decadron.  Her Coumadin was managed by pharmacy and 8/14 she became subtherapeutic.  She was started on treatment as Lovenox in addition to Coumadin until her INR is therapeutic.  Venous Doppler of lower extremities revealed Acute DVT seen within the left common femoral vein extending into the proximal femoral vein with involvement of the superficial greater saphenous vein.  This is likely secondary to hypercoagulable state with COVID.  Consult Hematology for further recommendations as patient developed lower extremity DVT while on Coumadin.    Echo    Left Ventricle: The left  ventricle is mildly dilated. Normal wall thickness. There is eccentric hypertrophy. Moderate global hypokinesis present. Septal motion is consistent with bundle branch block. There is severely reduced systolic function. Ejection fraction by visual approximation is 25%. Grade II diastolic dysfunction.    Right Ventricle: Moderate right ventricular enlargement. Wall thickness is normal. Systolic function is mildly reduced.    Right Atrium: Right atrium is moderately dilated.    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 There is mild to moderate aortic regurgitation with a centrally directed jet.Increased gradient noted    Tricuspid Valve: There is mild regurgitation with a centrally directed jet.    Pulmonary Artery: The estimated pulmonary artery systolic pressure is 52 mmHg.    IVC/SVC: Normal venous pressure at 3 mmHg.    Interval History:  Patient seen and examined at bedside.  She reports she is feeling well and has no complaints.  Doppler exam of lower extremities revealed left-sided DVT    Review of Systems   Constitutional:  Positive for appetite change and fatigue. Negative for fever.   Respiratory:  Negative for cough and shortness of breath.    Cardiovascular:  Negative for chest pain and leg swelling.   Gastrointestinal:  Negative for nausea and vomiting.   Neurological:  Positive for weakness.   All other systems reviewed and are negative.    Objective:     Vital Signs (Most Recent):  Temp: 98.2 °F (36.8 °C) (08/14/24 1108)  Pulse: 89 (08/14/24 1108)  Resp: 18 (08/14/24 1108)  BP: 124/62 (08/14/24 1108)  SpO2: 95 % (08/14/24 1108) Vital Signs (24h Range):  Temp:  [97.5 °F (36.4 °C)-98.6 °F (37 °C)] 98.2 °F (36.8 °C)  Pulse:  [65-89] 89  Resp:  [18-20] 18  SpO2:  [91 %-96 %] 95 %  BP: (107-124)/(55-69) 124/62     Weight: 87.1 kg (192 lb 0.3 oz)  Body mass index is 32.96 kg/m².    Intake/Output Summary (Last 24 hours) at 8/14/2024 1359  Last data filed at 8/14/2024 0704  Gross per 24  hour   Intake --   Output 1100 ml   Net -1100 ml         Physical Exam  Vitals reviewed.   Constitutional:       Appearance: Normal appearance. She is ill-appearing (Chronically).   HENT:      Head: Normocephalic and atraumatic.      Mouth/Throat:      Mouth: Mucous membranes are moist.      Pharynx: Oropharynx is clear.   Eyes:      Extraocular Movements: Extraocular movements intact.      Conjunctiva/sclera: Conjunctivae normal.   Cardiovascular:      Rate and Rhythm: Normal rate and regular rhythm.      Pulses: Normal pulses.      Heart sounds: Normal heart sounds.   Pulmonary:      Effort: Pulmonary effort is normal.      Breath sounds: Normal breath sounds.   Abdominal:      General: Bowel sounds are normal.      Palpations: Abdomen is soft.   Musculoskeletal:         General: Normal range of motion.      Cervical back: Normal range of motion and neck supple.      Right lower leg: Edema present.      Left lower leg: Edema present.   Skin:     General: Skin is warm and dry.   Neurological:      Mental Status: She is alert. Mental status is at baseline.      Motor: Weakness present.             Significant Labs: All pertinent labs within the past 24 hours have been reviewed.  CBC:   Recent Labs   Lab 08/13/24 0513 08/14/24  0512   WBC 14.77* 15.15*   HGB 14.4 15.2   HCT 44.5 46.4    178     CMP:   Recent Labs   Lab 08/13/24 0513 08/14/24  0512    144   K 4.2 3.4*    101   CO2 27 31*   * 130*   BUN 36* 41*   CREATININE 1.1 1.1   CALCIUM 8.5* 8.5*   ANIONGAP 14 12     Coagulation:   Recent Labs   Lab 08/14/24 0512   INR 1.8*       Significant Imaging: I have reviewed all pertinent imaging results/findings within the past 24 hours.    Assessment/Plan:      * Acute exacerbation of CHF (congestive heart failure)  Patient is identified as having biventricular heart failure that is Acute on chronic. CHF is currently uncontrolled due to Continued edema of extremities, Dyspnea improved with  supplemental oxygen. Rales/crackles on pulmonary exam, and Pulmonary edema/pleural effusion on CXR. Latest ECHO performed and demonstrates- Results for orders placed during the hospital encounter of 02/22/24    Echo    Interpretation Summary    Left Ventricle: The left ventricle is normal in size. Normal wall thickness. There is concentric hypertrophy. Global hypokinesis present. There is moderately reduced systolic function. Ejection fraction by visual approximation is 35%.    Right Ventricle: Normal right ventricular cavity size. Systolic function is borderline low.    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 There is moderate aortic regurgitation.  Consider ORI if clinically needed to evaluate mechanical aortic valve further.    Mitral Valve: There is mild bileaflet sclerosis. There is mild regurgitation.    Pulmonary Artery: There is moderate pulmonary hypertension. The estimated pulmonary artery systolic pressure is 49 mmHg.    IVC/SVC: Normal venous pressure at 3 mmHg.    Ascending aorta is moderately dilated measuring 4.45 cm.      Repeat echocardiogram reveals decreased EF to 25%.  With grade 2 diastolic dysfunction    . Continue Beta Blocker and Furosemide and monitor clinical status closely. Monitor on telemetry. Patient is on CHF pathway.  Monitor strict Is&Os and daily weights.  Place on fluid restriction of 1.5 L. Cardiology has been consulted. Continue to stress to patient importance of self efficacy and  on diet for CHF. Last BNP reviewed- and noted below   Recent Labs   Lab 08/14/24  0512   BNP >4,900*     Continue intravenous diuresis as able      Acute deep vein thrombosis (DVT) of femoral vein of left lower extremity  Patient admitted with diagnosis of COVID periods originally diagnosed on 08/01/2024.  She also has prosthetic valve and has been on chronic Coumadin therapy.  As part of COVID protocol serial D-dimers were checked which  were increased.  Doppler exam of lower extremities revealed acute large DVT in left femoral area.  Patient was started on treatment dose Lovenox in addition to Coumadin until Coumadin becomes therapeutic.  As this likely represents a failure of Coumadin in the face of COVID we will consult Hematology to see if they have any other recommendations.      Positive D dimer  In setting of covid infection  Respiratory status stable, not requiring increased supplemental oxygen from baseline  Echocardiogram without mention of right heart strain  Renal function test improving  Could Consider vq scan to rule out pulmonary embolism but already on warfarin for mechanical valve    With D-dimer up we will check Dopplers bilateral lower extremities positive for acute left lower extremity DVT        Acute viral myocarditis  Recent Covid infection  Echocardiogram with significant decline in ejection fraction from normal one month ago to 25%   Echocardiogram with moderate global hypokinesis and eccentric hypertrophy  Oral systemic steroids on last admission  Continue systemic steroids intravenous   Remdesivir initiated     Elevated lactic acid level  Resolved after IVF bolus in ER.        Hyperglycemia  Likely elevated secondary to Decadron use to treat recent COVID infection.  Plan:  -SSI as needed  -Accuchecks      MARCUS (acute kidney injury)   Baseline creatinine is  1.2 . Most recent creatinine and eGFR are listed below.  Recent Labs     08/12/24  0448 08/13/24  0513 08/14/24  0512   CREATININE 1.3  1.3 1.1 1.1   EGFRNORACEVR 41*  41* 50* 50*        Plan  - MARCUS is  treated with sepsis bolus in ER.  - Avoid nephrotoxins and renally dose meds for GFR listed above  - Monitor urine output, serial BMP, and adjust therapy as needed  Improving  Monitor while intravenous diuresing       Resolved    Chronic hypoxic respiratory failure  Patient with Hypoxic Respiratory failure which is Acute on chronic.  she is on home oxygen at 2-3 LPM.  Supplemental oxygen was provided and noted- Oxygen Concentration (%):  [32] 32    .   Signs/symptoms of respiratory failure include- lethargy. Contributing diagnoses includes - CHF, COPD, and covid  Labs and images were reviewed. Patient Has not had a recent ABG. Will treat underlying causes and adjust management of respiratory failure as follows- remdesivir, breathing treatments, intravenous systemic steroids, intravenous lasix, supplemental oxygen     Patient on chronic Coumadin therapy.  Doppler exams were checked due to elevated D-dimer and patient has significant clot    COVID  Recently diagnosed on 08/01/2024 and treated with Decadron during last admission.  Sent home with Decadron p.o.  Plan:  -supplemental oxygen as needed  -continue decadron po  -duonebs prn  -monitor pulse ox as need/per unit protocol    Initiate remdesivir and increased systemic steroids for covid related myocarditis  Echocardiogram with significantly reduced ejection fraction and moderate global hypokinesis  Inflammatory markers  ferritin and sed rate normal    Paroxysmal atrial fibrillation  Patient with Paroxysmal (<7 days) atrial fibrillation which is controlled currently with Beta Blocker. Patient is currently in sinus rhythm.[id number] Score: 4. HASBLED Score: . Anticoagulation indicated. Anticoagulation done with Coumadin .    H/O mechanical aortic valve replacement    Pharmacy to dose warfarin    Follow up with outpatient cardiologist upon discharge      Essential hypertension  Chronic, controlled. Latest blood pressure and vitals reviewed-     Temp:  [97.5 °F (36.4 °C)-98.6 °F (37 °C)]   Pulse:  [65-89]   Resp:  [18-20]   BP: (107-124)/(55-69)   SpO2:  [91 %-96 %] .   Home meds for hypertension were reviewed and noted below.   Hypertension Medications               furosemide (LASIX) 20 MG tablet Take 1 tablet (20 mg total) by mouth every morning. Hold for low blood pressure. Do not give if systolic blood pressure is below 110  and/or diastolic is below 60    metoprolol succinate (TOPROL-XL) 25 MG 24 hr tablet Take 1 tablet by mouth every evening.    metoprolol tartrate (LOPRESSOR) 25 MG tablet Take 1 tablet (25 mg total) by mouth 2 (two) times daily. Do not give if systolic blood pressure is below 110 and/or diastolic below 60            While in the hospital, will manage blood pressure as follows; low dose metoprolol hold for systolic blood pressure less than 110    Will utilize p.r.n. blood pressure medication only if patient's blood pressure greater than 180/110 and she develops symptoms such as worsening chest pain or shortness of breath.    Dyslipidemia  Patient is not chronically on statin.will not continue for now. Last Lipid Panel:   Lab Results   Component Value Date    CHOL 225 (H) 11/01/2022    HDL 48 11/01/2022    LDLCALC 141 (H) 11/01/2022    TRIG 180 11/01/2022    CHOLHDL 4.7 (H) 11/01/2022     Plan:  -low fat/low calorie diet        Anticoagulant long-term use  This patient has long term use on an anticoagulant with Select Anticoagulant(s): Warfarin/Coumadin. Their long term anticoagulation will be Held or Continued: continued. They are on long term anticoagulation due to Reason for Anticoagulation: Atrial fibrillation and Mechanical heart valve.   Pharmacy to dose warfarin subtherapeutic on 08/14.  Started on treatment dose Lovenox  Coumadin restricted diet      VTE Risk Mitigation (From admission, onward)           Ordered     enoxaparin injection 90 mg  Every 12 hours         08/14/24 0902     warfarin (COUMADIN) tablet 2.5 mg  Once per day on Monday Friday         08/10/24 0703     warfarin (COUMADIN) split tablet 1.25 mg  Once per day on Jayson Tuesday Wednesday Thursday Saturday         08/10/24 0703     IP VTE HIGH RISK PATIENT  Once         08/10/24 0307     Place sequential compression device  Until discontinued         08/10/24 0307                    Discharge Planning   RENE:      Code Status: Full Code   Is the  patient medically ready for discharge?:     Reason for patient still in hospital (select all that apply): Patient trending condition, Laboratory test, Treatment, and Consult recommendations  Discharge Plan A: Home Health                  June Boykin MD  Department of Hospital Medicine   '[place] - Telemetry ([facility])

## 2024-08-14 NOTE — PHYSICIAN QUERY
Provider, due to conflicting documentation please clarify the acuity and type of heart failure.       Acute on Chronic Combined Systolic and Diastolic Heart Failure

## 2024-08-14 NOTE — PROGRESS NOTES
[facility] - Telemetry ([facility])  Adult Nutrition  Progress Note    SUMMARY       Recommendations    Recommendation/Intervention:   1. Recommend pt continues on Cardiac, Coumadin restriction, Low sodium 2 gm, Soft and bite-sized (IDDSI Level 6), 1500 mL fluid restriction diet as medically appropriate   2. Recommend continue Boost TID to assist filling nutritional gaps   3. Recommend continue Dirk BID for wound healing   4. Recommend continue Banatrol TID to assist with diarrhea as warranted   5. Recommend encourage intake and feeding assistance with all meals, snacks and ONS   6. Recommend consideration of appetite stimulant or alternative means of nutrition if PO intake 25% or < x 3 days  7. Updated weight, twice weekly    Goals:   1. Pt will consume >75% EEN and EPN prior to RD follow up   2. Pt will consume Dirk BID prior to RD follow up  3. Pt diarrhea will improve prior to RD follow up (meeting)  Nutrition Goal Status: modified, continues, meeting  Communication of RD Recs: other (comment) (POC, sticky Note)    Assessment and Plan    Nutrition Problem  Inadequate energy/protein intake  Increased protein needs  Alter GI function (Improved)     Related to (etiology):   Psychological causes   Increased demand for nutrition  Alteration of GI tract function     Signs and Symptoms (as evidenced by):   AMS, PO intake 0-25% x 5 days   Wound healing  Diarrhea      Interventions(treatment strategy):  1. Commercial beverage medical food supplement therapy  2. Amino acid medical food supplement therapy  3. Modified beverage medical food supplement therapy  4. Meal set up management  5. Management of nutrition related prescription medication   6. Collaboration by nutrition professional with other providers.     Nutrition Diagnosis Status:   Continues/ Improving    Malnutrition Assessment     Skin (Micronutrient): pallor, dry, wounds unhealed (Jacobo score = 14 (moderate risk)   Micronutrient Evaluation Summary: suspected  deficiency   Weight Loss (Malnutrition): greater than 10% in 6 months  Energy Intake (Malnutrition): less than or equal to 50% for greater than or equal to 5 days                         Reason for Assessment    Reason For Assessment: consult, identified at risk by screening criteria (For education on fluid and salt restriction)  Diagnosis: cardiac disease (Acute exacerbation of CHF (congestive heart failure))  Relevant Medical History: CHF, HLD, HTN, COVID, MARCUS/CKD, Hyperglycemia, COPD, CVA  Interdisciplinary Rounds: did not attend  General Information Comments:   8/10/24: 83 y/o female admitted with active principal problem of Acute exacerbation of CHF (congestive heart failure). Dietitian consulted to provide pt with heart failure nutrition education. Pt currently not appropriate to receive nutrition education. Pt indentified at risk by screening criteria. Attempted to vist pt at bedside. Dietitian asked the pt how is her appetite, the pt screams she does not want her tray and tell me to get out. After exiting the pt room, The dietitian spoke with the pt RN whom states the pt consumed about 50% of breakfast and refused to eat lunch tray. NFPE not performed per pt currently has COVID-19 and pt refused nutrition assessment. Chart review: LBM 8/9/24, per son. Oxygen therapy: 2.5 nasal cannula, per home regimen. Weight: 163 lb, BMI 27.98 (Normal for age). Per the pt past weight encounters, the pt has lost 37 lb within the past 6 months, -23% wt chnage. Wounds: Pressure Injury midline Coccyx, Blister(s) anterior Thigh. Labs: A1C: 5.8 (WNL for age), Na: 134 (L), CO2: 17 (L), Glu: 188 (WNL), BUN: 44 (H), Cr: 1.7 (H), Albumin: 2.8 (L), GFR: 30 (MARCUS/CKD). Per report, the pt son reports symptoms of decreased appetite, generalized weakness, and diarrhea. Dietitian will continue to follow and monitor the pt nutrition status throughout hospital admission.    Follow up:   8/14/24: RD follow up. Pt currently on a Cardiac,  "Coumadin restriction, Low sodium 2 gm, Soft and bite-sized (IDDSI Level 6), 1500 mL fluid restriction diet, and on no contact isolation d/t Covid. EMR noted pt reported feelin well w/ no complaints, Doppler exam of L LE revealed DVT. Spoke to RN via secure chat, stated that the pt would "not wake up and eat today. Yesterday she took a few bites of her breakfast and lunch - no dinner. She will drink the Boost when we go in and offer it to her". Reviewed chart: 0.25% PO intake of meals charted x 5 days; LBM 8/11; Skin: pale, dry, flaky; Altered skin: coccyx full thickness tissue loss, thigh blister partial thickness tissue loss, details per Wound care note 8/12; Jacobo score: 14 (moderate risk); Edema: None. Labs, meds, weight reviewed. Note Warfarin, MTV, Lasix, vitamin C. Weight charted 8/10/24 162 lbs, 8/14/24 192 lbs (BMI 32.96, Obese), +30 lb wt gain x 4 days, note weight charted 2/9/24 203 lbs, -11 lb wt loss (5% wt change) x 6 months, insignificant wt loss. 8/10/24 weight likely incorrect, re weigh for accuracy warranted. Unable to perform NFPE d/t isolation status, will perform when appropriate. RD will continue to follow and monitor pt's nutritional status during admit.     Nutrition Discharge Planning: Cardiac, Coumadin restriction diet, texture per SLP recommendations + Dirk BID + Boost plus as warranted     Nutrition Risk Screen    Nutrition Risk Screen: large or nonhealing wound, burn or pressure injury    Nutrition Related Social Determinants of Health: SDOH: Unable to assess at this time.     Nutrition/Diet History    Spiritual, Cultural Beliefs, Sikhism Practices, Values that Affect Care: no  Food Allergies: NKFA  Factors Affecting Nutritional Intake: behavioral (mealtime), decreased appetite    Anthropometrics    Temp: 98.2 °F (36.8 °C)  Height: 5' 4" (162.6 cm)  Height (inches): 64 in  Weight Method: Bed Scale  Weight: 87.1 kg (192 lb 0.3 oz)  Weight (lb): 192.02 lb  Ideal Body Weight (IBW), " "Female: 120 lb  % Ideal Body Weight, Female (lb): 135.77 %  BMI (Calculated): 32.9  BMI Grade: 25 - 29.9 - overweight (WNL for age)     Wt Readings from Last 15 Encounters:   08/14/24 87.1 kg (192 lb 0.3 oz)   08/06/24 90.2 kg (198 lb 13.7 oz)   03/06/24 90.5 kg (199 lb 8 oz)   03/04/24 89.8 kg (198 lb)   02/28/24 90.8 kg (200 lb 2.8 oz)   02/09/24 92.3 kg (203 lb 6.4 oz)   02/01/24 89 kg (196 lb 3.4 oz)   11/29/23 89.4 kg (197 lb)   11/27/23 96.2 kg (212 lb)   11/22/23 96.1 kg (211 lb 13.8 oz)   08/02/22 109.9 kg (242 lb 4.6 oz)   07/24/22 110.3 kg (243 lb 2.7 oz)   07/21/21 111.8 kg (246 lb 7.6 oz)   06/08/21 105.4 kg (232 lb 5.8 oz)   11/19/20 104.5 kg (230 lb 6.1 oz)     Lab/Procedures/Meds    Pertinent Labs Reviewed: reviewed  Pertinent Labs Comments: A1C: 5.8 (WNL for age), Na: 134 (L), CO2: 17 (L), Glu: 188 (WNL), BUN: 44 (H), Cr: 1.7 (H), Albumin: 2.8 (L), GFR: 30 (MARCUS/CKD).  Pertinent Medications Reviewed: reviewed  BMP  Lab Results   Component Value Date     08/14/2024    K 3.4 (L) 08/14/2024     08/14/2024    CO2 31 (H) 08/14/2024    BUN 41 (H) 08/14/2024    CREATININE 1.1 08/14/2024    CALCIUM 8.5 (L) 08/14/2024    ANIONGAP 12 08/14/2024    EGFRNORACEVR 50 (A) 08/14/2024     Lab Results   Component Value Date    CALCIUM 8.5 (L) 08/14/2024    PHOS 4.4 08/09/2024     Lab Results   Component Value Date    ALBUMIN 2.5 (L) 08/12/2024     Lab Results   Component Value Date    ALT 31 08/12/2024    AST 20 08/12/2024    ALKPHOS 142 (H) 08/12/2024    BILITOT 1.9 (H) 08/12/2024     BNP  Recent Labs   Lab 08/14/24  0512   BNP >4,900*     No results for input(s): "POCTGLUCOSE" in the last 24 hours.    Lab Results   Component Value Date    HGBA1C 5.8 (H) 08/10/2024     Lab Results   Component Value Date    WBC 15.15 (H) 08/14/2024    HGB 15.2 08/14/2024    HCT 46.4 08/14/2024    MCV 86 08/14/2024     08/14/2024       Scheduled Meds:   ascorbic acid (vitamin C)  500 mg Oral BID    [START ON " 8/15/2024] dexAMETHasone  6 mg Oral Daily    enoxparin  1 mg/kg Subcutaneous Q12H (treatment, non-standard time)    [START ON 8/15/2024] furosemide (LASIX) injection  40 mg Intravenous Daily    metoprolol tartrate  25 mg Oral BID    midodrine  10 mg Oral TID WM    multivitamin  1 tablet Oral Daily    mupirocin   Nasal BID    remdesivir infusion  100 mg Intravenous Daily    warfarin  1.25 mg Oral Once per day on Sunday Tuesday Wednesday Thursday Saturday    warfarin  2.5 mg Oral Once per day on Monday Friday     Continuous Infusions:  PRN Meds:.  Current Facility-Administered Medications:     0.9% NaCl, , Intravenous, PRN    acetaminophen, 650 mg, Oral, Q6H PRN    dextrose 10%, 12.5 g, Intravenous, PRN    dextrose 10%, 25 g, Intravenous, PRN    glucagon (human recombinant), 1 mg, Intramuscular, PRN    glucose, 16 g, Oral, PRN    glucose, 24 g, Oral, PRN    ondansetron, 4 mg, Intravenous, Q8H PRN    sodium chloride 0.9%, 10 mL, Intravenous, PRN    sodium chloride 0.9%, 10 mL, Intravenous, PRN      Physical Findings/Assessment         Estimated/Assessed Needs    Weight Used For Calorie Calculations: 73.9 kg (162 lb 14.7 oz)  Energy Calorie Requirements (kcal): 1626 kcals (22 kcals/kg UBW (CHF)  Energy Need Method: Kcal/kg  Protein Requirements: 59-74 g (0.8-1.0 g/kg UBW (MARCUS, no dialysis vs CHF)  Weight Used For Protein Calculations: 73.9 kg (162 lb 14.7 oz)  Fluid Requirements (mL): 1500 mL (CHF)  Estimated Fluid Requirement Method: other (see comments)  RDA Method (mL): 1626  CHO Requirement: 203 g (1626 kcals/8)      Nutrition Prescription Ordered    Current Diet Order: Cardiac, Coumadin restrition, Low sodium 2 gm, Soft and bite-sized (IDDSI Level 6), 1500 mL fluid restriction diet  Oral Nutrition Supplement: Banatrol TID, Boost glucose control TID, Dirk BID    Evaluation of Received Nutrient/Fluid Intake  I/O: (Net since admit)   8/10/24: +1600 mL  8/14/24: -1662.1 mL    Energy Calories Required: not meeting  needs  Protein Required: not meeting needs  Fluid Required: not meeting needs   Total Fluid Intake (mL): 347.9  Total Fluid Output (mL): 300  Tolerance: tolerating   % Intake of Estimated Energy Needs: 0 - 25 %  % Meal Intake: 0 - 25 %    Nutrition Risk    Level of Risk/Frequency of Follow-up: high (x2/week)     Monitor and Evaluation    Food and Nutrient Intake: energy intake, food and beverage intake  Food and Nutrient Adminstration: diet order  Knowledge/Beliefs/Attitudes: food and nutrition knowledge/skill, beliefs and attitudes  Physical Activity and Function: nutrition-related ADLs and IADLs, factors affecting access to physical activity  Anthropometric Measurements: weight, weight change, body mass index  Biochemical Data, Medical Tests and Procedures: electrolyte and renal panel, gastrointestinal profile, glucose/endocrine profile, inflammatory profile, lipid profile  Nutrition-Focused Physical Findings: overall appearance, extremities, muscles and bones, head and eyes, skin     Nutrition Follow-Up    RD Follow-up?: Yes  Prema Aquino, BS, RDN, LDN

## 2024-08-14 NOTE — PHYSICIAN QUERY
Provider, due to conflicting documentation please clarify the acuity and type of heart failure.

## 2024-08-14 NOTE — ASSESSMENT & PLAN NOTE
Patient with Hypoxic Respiratory failure which is Acute on chronic.  she is on home oxygen at 2-3 LPM. Supplemental oxygen was provided and noted- Oxygen Concentration (%):  [32] 32    .   Signs/symptoms of respiratory failure include- lethargy. Contributing diagnoses includes - CHF, COPD, and covid  Labs and images were reviewed. Patient Has not had a recent ABG. Will treat underlying causes and adjust management of respiratory failure as follows- remdesivir, breathing treatments, intravenous systemic steroids, intravenous lasix, supplemental oxygen     Patient on chronic Coumadin therapy.  Doppler exams were checked due to elevated D-dimer and patient has significant clot

## 2024-08-14 NOTE — ASSESSMENT & PLAN NOTE
Patient admitted with diagnosis of COVID periods originally diagnosed on 08/01/2024.  She also has prosthetic valve and has been on chronic Coumadin therapy.  As part of COVID protocol serial D-dimers were checked which were increased.  Doppler exam of lower extremities revealed acute large DVT in left femoral area.  Patient was started on treatment dose Lovenox in addition to Coumadin until Coumadin becomes therapeutic.  As this likely represents a failure of Coumadin in the face of COVID we will consult Hematology to see if they have any other recommendations.

## 2024-08-14 NOTE — ASSESSMENT & PLAN NOTE
Patient is identified as having biventricular heart failure that is Acute on chronic. CHF is currently uncontrolled due to Continued edema of extremities, Dyspnea improved with supplemental oxygen. Rales/crackles on pulmonary exam, and Pulmonary edema/pleural effusion on CXR. Latest ECHO performed and demonstrates- Results for orders placed during the hospital encounter of 02/22/24    Echo    Interpretation Summary    Left Ventricle: The left ventricle is normal in size. Normal wall thickness. There is concentric hypertrophy. Global hypokinesis present. There is moderately reduced systolic function. Ejection fraction by visual approximation is 35%.    Right Ventricle: Normal right ventricular cavity size. Systolic function is borderline low.    Aortic Valve: There is a mechanical valve in the aortic position with elevated velocities noted. There is moderate aortic regurgitation.  Consider ORI if clinically needed to evaluate mechanical aortic valve further.    Mitral Valve: There is mild bileaflet sclerosis. There is mild regurgitation.    Pulmonary Artery: There is moderate pulmonary hypertension. The estimated pulmonary artery systolic pressure is 49 mmHg.    IVC/SVC: Normal venous pressure at 3 mmHg.    Ascending aorta is moderately dilated measuring 4.45 cm.      Repeat echocardiogram reveals decreased EF to 25%.  With grade 2 diastolic dysfunction    . Continue Beta Blocker and Furosemide and monitor clinical status closely. Monitor on telemetry. Patient is on CHF pathway.  Monitor strict Is&Os and daily weights.  Place on fluid restriction of 1.5 L. Cardiology has been consulted. Continue to stress to patient importance of self efficacy and  on diet for CHF. Last BNP reviewed- and noted below   Recent Labs   Lab 08/14/24  0512   BNP >4,900*     Continue intravenous diuresis as able

## 2024-08-14 NOTE — PLAN OF CARE
Hematology/Oncology Plan of Care      Serena Post is a 84 y.o. female with a PMH mechanical AVR in 2007, PAF, history of pacemaker, chronic systolic CHF, chronic respiratory failure, , dementia, history of CVA, HLP, HTN, and pneumonia vs. CHF. She was admitted for reoccurrence respiratory failure. Patient tested positive for COVID on 8/1/2024 She is followed in anticoagulation clinic on  and 8/14 subtherapeutic. INR 1.8.  She was started LMWH bridging . US venous LE shows  Acute DVT seen within the left common femoral vein extending into the proximal femoral vein with involvement of the superficial greater saphenous vein .  It is difficult to determine in the setting if patient with Coumadin failure and or hypercoagulable state secondary to COVID infection or both are contributing factors to DVT. In any event,  she would be an ideal candidate for anticoagulation with DOAC if okayed with Cardiology however;switching to alternate anticoagulation with a DOAC  is not feasible in this patient secondary to affordability. Therefore patient will likely need to remain on Coumadin and will continue with LMWH bridging with higher target therapeutic INR range closer to 2.5- 3.0  would be recommended in this patient as anticoagulation treatment options are unfortunately limited.     Shoshana Staton MD  Hematology/Oncology

## 2024-08-14 NOTE — SUBJECTIVE & OBJECTIVE
Interval History:  Patient seen and examined at bedside.  She reports she is feeling well and has no complaints.  Doppler exam of lower extremities revealed left-sided DVT    Review of Systems   Constitutional:  Positive for appetite change and fatigue. Negative for fever.   Respiratory:  Negative for cough and shortness of breath.    Cardiovascular:  Negative for chest pain and leg swelling.   Gastrointestinal:  Negative for nausea and vomiting.   Neurological:  Positive for weakness.   All other systems reviewed and are negative.    Objective:     Vital Signs (Most Recent):  Temp: 98.2 °F (36.8 °C) (08/14/24 1108)  Pulse: 89 (08/14/24 1108)  Resp: 18 (08/14/24 1108)  BP: 124/62 (08/14/24 1108)  SpO2: 95 % (08/14/24 1108) Vital Signs (24h Range):  Temp:  [97.5 °F (36.4 °C)-98.6 °F (37 °C)] 98.2 °F (36.8 °C)  Pulse:  [65-89] 89  Resp:  [18-20] 18  SpO2:  [91 %-96 %] 95 %  BP: (107-124)/(55-69) 124/62     Weight: 87.1 kg (192 lb 0.3 oz)  Body mass index is 32.96 kg/m².    Intake/Output Summary (Last 24 hours) at 8/14/2024 1356  Last data filed at 8/14/2024 0704  Gross per 24 hour   Intake --   Output 1100 ml   Net -1100 ml         Physical Exam  Vitals reviewed.   Constitutional:       Appearance: Normal appearance. She is ill-appearing (Chronically).   HENT:      Head: Normocephalic and atraumatic.      Mouth/Throat:      Mouth: Mucous membranes are moist.      Pharynx: Oropharynx is clear.   Eyes:      Extraocular Movements: Extraocular movements intact.      Conjunctiva/sclera: Conjunctivae normal.   Cardiovascular:      Rate and Rhythm: Normal rate and regular rhythm.      Pulses: Normal pulses.      Heart sounds: Normal heart sounds.   Pulmonary:      Effort: Pulmonary effort is normal.      Breath sounds: Normal breath sounds.   Abdominal:      General: Bowel sounds are normal.      Palpations: Abdomen is soft.   Musculoskeletal:         General: Normal range of motion.      Cervical back: Normal range of  motion and neck supple.      Right lower leg: Edema present.      Left lower leg: Edema present.   Skin:     General: Skin is warm and dry.   Neurological:      Mental Status: She is alert. Mental status is at baseline.      Motor: Weakness present.             Significant Labs: All pertinent labs within the past 24 hours have been reviewed.  CBC:   Recent Labs   Lab 08/13/24 0513 08/14/24 0512   WBC 14.77* 15.15*   HGB 14.4 15.2   HCT 44.5 46.4    178     CMP:   Recent Labs   Lab 08/13/24 0513 08/14/24 0512    144   K 4.2 3.4*    101   CO2 27 31*   * 130*   BUN 36* 41*   CREATININE 1.1 1.1   CALCIUM 8.5* 8.5*   ANIONGAP 14 12     Coagulation:   Recent Labs   Lab 08/14/24 0512   INR 1.8*       Significant Imaging: I have reviewed all pertinent imaging results/findings within the past 24 hours.

## 2024-08-14 NOTE — ASSESSMENT & PLAN NOTE
This patient has long term use on an anticoagulant with Select Anticoagulant(s): Warfarin/Coumadin. Their long term anticoagulation will be Held or Continued: continued. They are on long term anticoagulation due to Reason for Anticoagulation: Atrial fibrillation and Mechanical heart valve.   Pharmacy to dose warfarin subtherapeutic on 08/14.  Started on treatment dose Lovenox  Coumadin restricted diet

## 2024-08-14 NOTE — PLAN OF CARE
08/14/24 1311   Rounds   Attendance Provider;Nurse ;Charge nurse   Discharge Plan A Home Health   Why the patient remains in the hospital Requires continued medical care   Transition of Care Barriers None     Continue diuresing; possible d/c tomorrow

## 2024-08-14 NOTE — PLAN OF CARE
Spoke with [facility]. Advised son is closer to considering Hospice.  Request conversation be continued at discharge.  Office will notify Kim Roberts NP and SUSSY Genao

## 2024-08-14 NOTE — PROGRESS NOTES
[facility] ([facility])  Cardiology  Progress Note    Patient Name: Serena Post  MRN: [id number]  Admission Date: 8/9/2024  Hospital Length of Stay: 4 days  Code Status: Full Code   Attending Physician: June Acosta MD   Primary Care Physician: Evangelina Olivares MD  Expected Discharge Date:   Principal Problem:Acute exacerbation of CHF (congestive heart failure)    Subjective:     Hospital Course:   HPI:     is a 84 year old female with a past medical history for mechanical AVR in 2007, PAF, history of pacemaker, chronic systolic CHF, chronic respiratory failure, , dementia, history of CVA, HLP, HTN, and pneumonia vs. CHF. She was admitted for reoccurrence respiratory failure. Symptoms were similar to previous admission. Patient was recently discharged. Chest X-ray shows CHF vs. Pneumonia. EKG unchanged and shows NSR and RBBB. BNP is greater than 490. Echo on July 31 shows normal EF. Mechanical AVR noted with questionable gradient.     Recommend IV diuresis, supportive care, as well as anticoagulants.      Hospital Course:     8/11/24- Echo shows EF 25%. Mild to moderate AI noted with mechanical AVR, significant gradient noted. Continue IV diuresis. Telemetry shows atrial pacing.      8/12/24 Chart reviewed, COVID isolation. No acute CV events reported. Labs reviewed, BNP >4900, CRP 84, Crt 1.3, Albumin low. Echo with 25% EF, Grade II DD, mechanical AVR    8/13/24 Chart reviewed, no acute CV events reported. Labs reviewed, chart reviewed    8/14/24 Chart reviewed, no acute CV events reported. Labs reviewed, chart reviewed        Review of Systems   Reason unable to perform ROS: COVID isolation.     Objective:     Vital Signs (Most Recent):  Temp: 98.2 °F (36.8 °C) (08/14/24 1108)  Pulse: 89 (08/14/24 1108)  Resp: 18 (08/14/24 1108)  BP: 124/62 (08/14/24 1108)  SpO2: 95 % (08/14/24 1108) Vital Signs (24h Range):  Temp:  [97.5 °F (36.4 °C)-98.6 °F (37 °C)] 98.2 °F (36.8  "°C)  Pulse:  [65-89] 89  Resp:  [18-20] 18  SpO2:  [91 %-96 %] 95 %  BP: (107-124)/(55-69) 124/62     Weight: 87.1 kg (192 lb 0.3 oz)  Body mass index is 32.96 kg/m².     SpO2: 95 %         Intake/Output Summary (Last 24 hours) at 8/14/2024 1514  Last data filed at 8/14/2024 0704  Gross per 24 hour   Intake --   Output 1100 ml   Net -1100 ml       Lines/Drains/Airways       Peripheral Intravenous Line  Duration                  Peripheral IV - Single Lumen 08/09/24 2325 20 G Anterior;Right Forearm 4 days         Peripheral IV - Single Lumen 08/10/24 0256 20 G Right Antecubital 4 days                     COVID isolation  Physical Exam  Vitals and nursing note reviewed.            Significant Labs: BMP:   Recent Labs   Lab 08/13/24 0513 08/14/24  0512   * 130*    144   K 4.2 3.4*    101   CO2 27 31*   BUN 36* 41*   CREATININE 1.1 1.1   CALCIUM 8.5* 8.5*   , CMP   Recent Labs   Lab 08/13/24  0513 08/14/24  0512    144   K 4.2 3.4*    101   CO2 27 31*   * 130*   BUN 36* 41*   CREATININE 1.1 1.1   CALCIUM 8.5* 8.5*   ANIONGAP 14 12   , CBC   Recent Labs   Lab 08/13/24  0513 08/14/24  0512   WBC 14.77* 15.15*   HGB 14.4 15.2   HCT 44.5 46.4    178   , INR   Recent Labs   Lab 08/13/24  0513 08/14/24  0512   INR 2.1* 1.8*   , Lipid Panel No results for input(s): "CHOL", "HDL", "LDLCALC", "TRIG", "CHOLHDL" in the last 48 hours., Troponin   No results for input(s): "TROPONINI" in the last 48 hours.  , and All pertinent lab results from the last 24 hours have been reviewed.    Significant Imaging: Cardiac Cath: reviewed, Echocardiogram: Transthoracic echo (TTE) complete (Cupid Only):   Results for orders placed or performed during the hospital encounter of 08/09/24   Echo   Result Value Ref Range    BSA 1.83 m2    LVOT stroke volume 75.63 cm3    LVIDd 5.48 3.5 - 6.0 cm    LV Systolic Volume 138.71 mL    LV Systolic Volume Index 77.5 mL/m2    LVIDs 5.36 (A) 2.1 - 4.0 cm    LV " Diastolic Volume 146.22 mL    LV Diastolic Volume Index 81.69 mL/m2    Left Ventricular End Systolic Volume by Teichholz Method 138.71 mL    Left Ventricular End Diastolic Volume by Teichholz Method 146.22 mL    IVS 1.12 (A) 0.6 - 1.1 cm    LVOT diameter 2.24 cm    LVOT area 3.9 cm2    FS 2 (A) 28 - 44 %    Left Ventricle Relative Wall Thickness 0.42 cm    Posterior Wall 1.15 (A) 0.6 - 1.1 cm    LV mass 250.96 g    LV Mass Index 140 g/m2    MV Peak E Melo 0.95 m/s    TDI LATERAL 0.03 m/s    TDI SEPTAL 0.04 m/s    E/E' ratio 27.14 m/s    MV Peak A Melo 0.48 m/s    TR Max Melo 3.49 m/s    E/A ratio 1.98     E wave deceleration time 94.49 msec    LV SEPTAL E/E' RATIO 23.75 m/s    LV LATERAL E/E' RATIO 31.67 m/s    LVOT peak melo 1.05 m/s    Left Ventricular Outflow Tract Mean Velocity 0.76 cm/s    Left Ventricular Outflow Tract Mean Gradient 2.53 mmHg    RV-matson mid d 3.3 cm    RV mid diameter 3.32 cm    RVOT peak VTI 6.7 cm    TAPSE 1.05 cm    LA size 2.49 cm    Left Atrium Minor Axis 3.21 cm    Left Atrium Major Axis 4.08 cm    RA Major Axis 3.91 cm    AV regurgitation pressure 1/2 time [id number] ms    AR Max Melo 3.74 m/s    AV mean gradient 43 mmHg    AV peak gradient 54 mmHg    Ao peak melo 3.67 m/s    Ao VTI 96.30 cm    LVOT peak VTI 19.20 cm    AV valve area 0.79 cm²    AV Velocity Ratio 0.29     AV index (prosthetic) 0.20     AAMIR by Velocity Ratio 1.13 cm²    Mr max melo 4.11 m/s    Triscuspid Valve Regurgitation Peak Gradient 49 mmHg    PV mean gradient 0 mmHg    RVOT peak melo 0.39 m/s    Ao root annulus 3.21 cm    STJ 2.63 cm    Ascending aorta 2.69 cm    IVC diameter 1.77 cm    Mean e' 0.04 m/s    ZLVIDS 4.44     ZLVIDD 1.02     LA Volume Index 15.3 mL/m2    LA volume 27.38 cm3    LA WIDTH 3.6 cm    RA Width 3.1 cm    EF 25 %    TV resting pulmonary artery pressure 52 mmHg    RV TB RVSP 6 mmHg    Est. RA pres 3 mmHg    Narrative      Left Ventricle: The left ventricle is mildly dilated. Normal wall    thickness. There is eccentric hypertrophy. Moderate global hypokinesis   present. Septal motion is consistent with bundle branch block. There is   severely reduced systolic function. Ejection fraction by visual   approximation is 25%. Grade II diastolic dysfunction.    Right Ventricle: Moderate right ventricular enlargement. Wall thickness   is normal. Systolic function is mildly reduced.    Right Atrium: Right atrium is moderately dilated.    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 There   is mild to moderate aortic regurgitation with a centrally directed   jet.Increased gradient noted    Tricuspid Valve: There is mild regurgitation with a centrally directed   jet.    Pulmonary Artery: The estimated pulmonary artery systolic pressure is   52 mmHg.    IVC/SVC: Normal venous pressure at 3 mmHg.     , EKG: reviewed, Stress Test: reviewed, and X-Ray: CXR: X-Ray Chest 1 View (CXR): No results found for this visit on 08/09/24.  Assessment and Plan:       * Acute exacerbation of CHF (congestive heart failure)  BNP >4900  Echo with EF 25%  OMT increased lasix to 40mg BID, can increase midodrine to 10mg TID, cont BB  Multiple allergies noted, no ACEi/ARB unsure of allergy?  -610cc, needs strict I/Os  Crt 1.3, cont IV diuresis    8/13/24  Cont IV diuresis      Acute viral myocarditis  Echo with EF 25%  Cont antiviral  Repeat echo in future once recovered  Follow up with Dr. Perkins, recommend OP ORI    MARCUS (acute kidney injury)  Monitor with diuresis    COVID  Cont antiviral    Paroxysmal atrial fibrillation  SR on EKG  Cont BB and Coumadin  Follow up with EP, sees [facility] for cardiology    H/O mechanical aortic valve replacement  Follow up with primary cardiologist, Dr. Perkins. Recommend ORI as OP    Essential hypertension  Titrate medications        VTE Risk Mitigation (From admission, onward)           Ordered     enoxaparin injection 90 mg  Every 12 hours         08/14/24 0902     warfarin  (COUMADIN) tablet 2.5 mg  Once per day on Monday Friday         08/10/24 0703     warfarin (COUMADIN) split tablet 1.25 mg  Once per day on [place] Tuesday Wednesday Thursday Saturday         08/10/24 0703     IP VTE HIGH RISK PATIENT  Once         08/10/24 0307     Place sequential compression device  Until discontinued         08/10/24 0307                    Jacquelyn Headley NP  Cardiology  [facility] - Telemetry ([facility])

## 2024-08-14 NOTE — PLAN OF CARE
A203/A203 NITHIN Post is a 84 y.o.female admitted on 8/9/2024 for Acute exacerbation of CHF (congestive heart failure)   Code Status: Full Code MRN: [id number]   Review of patient's allergies indicates:   Allergen Reactions    Amlodipine Other (See Comments)     Not sure    Chlorthalidone Other (See Comments)     Not sure    Clonidine Other (See Comments)     Not sure    Codeine Other (See Comments)     Not sure    Escitalopram Other (See Comments)     Not sure    Lisinopril Other (See Comments)     Not sure    Losartan Other (See Comments)     Not sure    Meperidine Other (See Comments)     Not sure    Methadone Other (See Comments)     Not sure      Morphine Other (See Comments)     Not sure    Nebivolol Other (See Comments)     Not sure        Nitrofurantoin Other (See Comments)     Not sure        Omeprazole Other (See Comments)     Not sure      Pravastatin Other (See Comments)     Not sure      Propoxyphene Other (See Comments)     Not sure      Sulfamethoxazole-trimethoprim Other (See Comments)     Not sure         Past Medical History:   Diagnosis Date    A-fib     Anticoagulant long-term use     Aortic valve disease     Cancer     brain tumor, 10 years ago    Cardiomyopathy     CHF (congestive heart failure)     COVID-19 7/23/2022    Hx of heart valve replacement with mechanical valve     Hyperlipidemia     Hypertension     Pacemaker     Pacemaker     Sepsis 7/23/2022    Stroke     2007, residual weakness      PRN meds    0.9% NaCl, , PRN  acetaminophen, 650 mg, Q6H PRN  dextrose 10%, 12.5 g, PRN  dextrose 10%, 25 g, PRN  glucagon (human recombinant), 1 mg, PRN  glucose, 16 g, PRN  glucose, 24 g, PRN  ondansetron, 4 mg, Q8H PRN  sodium chloride 0.9%, 10 mL, PRN  sodium chloride 0.9%, 10 mL, PRN      Chart check completed. Will continue plan of care.      Orientation: disoriented to, time, place  [place] Coma Scale Score: 14     Lead Monitored: Lead II Rhythm: paced rhythm Frequency/Ectopy:  PVCs  Cardiac/Telemetry Box Number: [id number]  VTE Required Core Measure: Pharmacological prophylaxis initiated/maintained Last Bowel Movement: 08/11/24  Diet Cardiac Coumadin Restriction, Soft & Bite Sized (IDDSI Level 6), Low Sodium,2gm; Fluid - 1500mL  Voiding Characteristics: incontinence  Jacobo Score: 14  Fall Risk Score: 16  Accucheck []   Freq?      Lines/Drains/Airways       Peripheral Intravenous Line  Duration                  Peripheral IV - Single Lumen 08/09/24 2325 20 G Anterior;Right Forearm 4 days         Peripheral IV - Single Lumen 08/10/24 0256 20 G Right Antecubital 4 days

## 2024-08-14 NOTE — PROGRESS NOTES
Clinical Pharmacy Progress Note: Coumadin Dosing and Monitoring   Indication: Mechanical AVR; Afib (controlled by beta blocker); pacemaker  Acute PE/RO PE  Goal INR: 2-3  INR today = 1.8 trended down from 2.1  Added lovenox bridge 1mg/kg sq q12hrs until INR in therapeutic range   Continue home dosing of Warfarin 2.5 mg every Monday and Friday, and Warfarin 1.25 mg on all other days.     Drug interactions: Acetaminophen/Remdesivir/Dexamethasone may increase the risk of bleeding while on warfarin.   /Erika Juárez [facility] 8/14/2024 1:17 PM

## 2024-08-15 LAB
ANION GAP SERPL CALC-SCNC: 10 MMOL/L (ref 8–16)
BACTERIA BLD CULT: NORMAL
BACTERIA BLD CULT: NORMAL
BASOPHILS # BLD AUTO: 0.01 K/UL (ref 0–0.2)
BASOPHILS NFR BLD: 0.1 % (ref 0–1.9)
BUN SERPL-MCNC: 43 MG/DL (ref 8–23)
CALCIUM SERPL-MCNC: 8.3 MG/DL (ref 8.7–10.5)
CHLORIDE SERPL-SCNC: 100 MMOL/L (ref 95–110)
CO2 SERPL-SCNC: 33 MMOL/L (ref 23–29)
CREAT SERPL-MCNC: 1 MG/DL (ref 0.5–1.4)
DIFFERENTIAL METHOD BLD: ABNORMAL
EOSINOPHIL # BLD AUTO: 0 K/UL (ref 0–0.5)
EOSINOPHIL NFR BLD: 0 % (ref 0–8)
ERYTHROCYTE [DISTWIDTH] IN BLOOD BY AUTOMATED COUNT: 15.3 % (ref 11.5–14.5)
EST. GFR  (NO RACE VARIABLE): 56 ML/MIN/1.73 M^2
GLUCOSE SERPL-MCNC: 135 MG/DL (ref 70–110)
HCT VFR BLD AUTO: 43 % (ref 37–48.5)
HGB BLD-MCNC: 13.9 G/DL (ref 12–16)
IMM GRANULOCYTES # BLD AUTO: 0.07 K/UL (ref 0–0.04)
IMM GRANULOCYTES NFR BLD AUTO: 0.6 % (ref 0–0.5)
INR PPP: 1.8 (ref 0.8–1.2)
LYMPHOCYTES # BLD AUTO: 0.4 K/UL (ref 1–4.8)
LYMPHOCYTES NFR BLD: 3.4 % (ref 18–48)
MCH RBC QN AUTO: 27.9 PG (ref 27–31)
MCHC RBC AUTO-ENTMCNC: 32.3 G/DL (ref 32–36)
MCV RBC AUTO: 86 FL (ref 82–98)
MONOCYTES # BLD AUTO: 0.8 K/UL (ref 0.3–1)
MONOCYTES NFR BLD: 6.6 % (ref 4–15)
NEUTROPHILS # BLD AUTO: 10.5 K/UL (ref 1.8–7.7)
NEUTROPHILS NFR BLD: 89.3 % (ref 38–73)
NRBC BLD-RTO: 0 /100 WBC
PLATELET # BLD AUTO: 158 K/UL (ref 150–450)
PMV BLD AUTO: 10.7 FL (ref 9.2–12.9)
POTASSIUM SERPL-SCNC: 3.7 MMOL/L (ref 3.5–5.1)
PROTHROMBIN TIME: 19.6 SEC (ref 9–12.5)
RBC # BLD AUTO: 4.98 M/UL (ref 4–5.4)
SODIUM SERPL-SCNC: 143 MMOL/L (ref 136–145)
WBC # BLD AUTO: 11.74 K/UL (ref 3.9–12.7)

## 2024-08-15 PROCEDURE — 36415 COLL VENOUS BLD VENIPUNCTURE: CPT | Performed by: HOSPITALIST

## 2024-08-15 PROCEDURE — 27000207 HC ISOLATION

## 2024-08-15 PROCEDURE — 25000003 PHARM REV CODE 250: Performed by: NURSE PRACTITIONER

## 2024-08-15 PROCEDURE — 83735 ASSAY OF MAGNESIUM: CPT | Performed by: INTERNAL MEDICINE

## 2024-08-15 PROCEDURE — 63600175 PHARM REV CODE 636 W HCPCS: Performed by: INTERNAL MEDICINE

## 2024-08-15 PROCEDURE — 21400001 HC TELEMETRY ROOM

## 2024-08-15 PROCEDURE — 99233 SBSQ HOSP IP/OBS HIGH 50: CPT | Mod: ,,, | Performed by: INTERNAL MEDICINE

## 2024-08-15 PROCEDURE — 36415 COLL VENOUS BLD VENIPUNCTURE: CPT | Performed by: INTERNAL MEDICINE

## 2024-08-15 PROCEDURE — 85610 PROTHROMBIN TIME: CPT | Performed by: HOSPITALIST

## 2024-08-15 PROCEDURE — 25000003 PHARM REV CODE 250: Performed by: FAMILY MEDICINE

## 2024-08-15 PROCEDURE — 85025 COMPLETE CBC W/AUTO DIFF WBC: CPT | Performed by: INTERNAL MEDICINE

## 2024-08-15 PROCEDURE — 25000003 PHARM REV CODE 250

## 2024-08-15 PROCEDURE — 99900035 HC TECH TIME PER 15 MIN (STAT)

## 2024-08-15 PROCEDURE — 63600175 PHARM REV CODE 636 W HCPCS: Mod: JZ,TB | Performed by: FAMILY MEDICINE

## 2024-08-15 PROCEDURE — 80048 BASIC METABOLIC PNL TOTAL CA: CPT | Mod: 91 | Performed by: INTERNAL MEDICINE

## 2024-08-15 PROCEDURE — 94761 N-INVAS EAR/PLS OXIMETRY MLT: CPT

## 2024-08-15 PROCEDURE — 27000221 HC OXYGEN, UP TO 24 HOURS

## 2024-08-15 PROCEDURE — 80048 BASIC METABOLIC PNL TOTAL CA: CPT | Performed by: NURSE PRACTITIONER

## 2024-08-15 RX ORDER — FUROSEMIDE 10 MG/ML
80 INJECTION INTRAMUSCULAR; INTRAVENOUS DAILY
Status: DISCONTINUED | OUTPATIENT
Start: 2024-08-15 | End: 2024-08-16 | Stop reason: HOSPADM

## 2024-08-15 RX ADMIN — METOPROLOL TARTRATE 25 MG: 25 TABLET, FILM COATED ORAL at 10:08

## 2024-08-15 RX ADMIN — METOPROLOL TARTRATE 25 MG: 25 TABLET, FILM COATED ORAL at 08:08

## 2024-08-15 RX ADMIN — REMDESIVIR 100 MG: 100 INJECTION, POWDER, LYOPHILIZED, FOR SOLUTION INTRAVENOUS at 09:08

## 2024-08-15 RX ADMIN — ENOXAPARIN SODIUM 90 MG: 100 INJECTION SUBCUTANEOUS at 09:08

## 2024-08-15 RX ADMIN — FUROSEMIDE 80 MG: 10 INJECTION, SOLUTION INTRAMUSCULAR; INTRAVENOUS at 10:08

## 2024-08-15 RX ADMIN — THERA TABS 1 TABLET: TAB at 09:08

## 2024-08-15 RX ADMIN — MIDODRINE HYDROCHLORIDE 10 MG: 5 TABLET ORAL at 09:08

## 2024-08-15 RX ADMIN — OXYCODONE HYDROCHLORIDE AND ACETAMINOPHEN 500 MG: 500 TABLET ORAL at 08:08

## 2024-08-15 RX ADMIN — MIDODRINE HYDROCHLORIDE 10 MG: 5 TABLET ORAL at 12:08

## 2024-08-15 RX ADMIN — OXYCODONE HYDROCHLORIDE AND ACETAMINOPHEN 500 MG: 500 TABLET ORAL at 09:08

## 2024-08-15 RX ADMIN — ENOXAPARIN SODIUM 90 MG: 100 INJECTION SUBCUTANEOUS at 11:08

## 2024-08-15 RX ADMIN — DEXAMETHASONE 6 MG: 4 TABLET ORAL at 09:08

## 2024-08-15 RX ADMIN — MIDODRINE HYDROCHLORIDE 10 MG: 5 TABLET ORAL at 05:08

## 2024-08-15 NOTE — ASSESSMENT & PLAN NOTE
Pt will be treated with treatment dose lovenox until follow up with [facility] cardiology    Follow up with outpatient cardiologist upon discharge

## 2024-08-15 NOTE — ASSESSMENT & PLAN NOTE
Baseline creatinine is  1.2 . Most recent creatinine and eGFR are listed below.  Recent Labs     08/13/24  0513 08/14/24  0512 08/15/24  0517   CREATININE 1.1 1.1 1.0   EGFRNORACEVR 50* 50* 56*        Plan  - MARCUS is  treated with sepsis bolus in ER.  - Avoid nephrotoxins and renally dose meds for GFR listed above  - Monitor urine output, serial BMP, and adjust therapy as needed  Improving  Monitor while intravenous diuresing       Resolved

## 2024-08-15 NOTE — PLAN OF CARE
Problem: Adult Inpatient Plan of Care  Goal: Plan of Care Review  Outcome: Progressing  Goal: Patient-Specific Goal (Individualized)  Outcome: Progressing  Goal: Absence of Hospital-Acquired Illness or Injury  Outcome: Progressing  Goal: Optimal Comfort and Wellbeing  Outcome: Progressing  Goal: Readiness for Transition of Care  Outcome: Progressing     Problem: Wound  Goal: Optimal Coping  Outcome: Progressing  Goal: Optimal Functional Ability  Outcome: Progressing  Goal: Absence of Infection Signs and Symptoms  Outcome: Progressing  Goal: Improved Oral Intake  Outcome: Progressing  Goal: Optimal Pain Control and Function  Outcome: Progressing  Goal: Skin Health and Integrity  Outcome: Progressing  Goal: Optimal Wound Healing  Outcome: Progressing     Problem: Skin Injury Risk Increased  Goal: Skin Health and Integrity  Outcome: Progressing     Problem: Fall Injury Risk  Goal: Absence of Fall and Fall-Related Injury  Outcome: Progressing     Problem: Acute Kidney Injury/Impairment  Goal: Fluid and Electrolyte Balance  Outcome: Progressing  Goal: Improved Oral Intake  Outcome: Progressing  Goal: Effective Renal Function  Outcome: Progressing     Problem: Oral Intake Inadequate  Goal: Improved Oral Intake  Outcome: Progressing

## 2024-08-15 NOTE — ASSESSMENT & PLAN NOTE
Patient with Paroxysmal (<7 days) atrial fibrillation which is controlled currently with Beta Blocker. Patient is currently in sinus rhythm.[id number] Score: 4. HASBLED Score: . Anticoagulation indicated. Anticoagulation done with lovenox.

## 2024-08-15 NOTE — ASSESSMENT & PLAN NOTE
Patient admitted with diagnosis of COVID periods originally diagnosed on 08/01/2024.  She also has prosthetic valve and has been on chronic Coumadin therapy.  As part of COVID protocol serial D-dimers were checked which were increased.  Doppler exam of lower extremities revealed acute large DVT in left femoral area.  Patient was started on treatment dose Lovenox. Discussed with her cardiologist and she recommends treatment with lovenox until follow up with her.

## 2024-08-15 NOTE — ASSESSMENT & PLAN NOTE
This patient has long term use on an anticoagulant with Select Anticoagulant(s): Warfarin/Coumadin. Their long term anticoagulation will be Held or Continued: continued. They are on long term anticoagulation due to Reason for Anticoagulation: Atrial fibrillation and Mechanical heart valve.   Started on treatment dose Lovenox  Coumadin restricted diet

## 2024-08-15 NOTE — PLAN OF CARE
A203/A203 NITHIN Post is a 84 y.o.female admitted on 8/9/2024 for Acute exacerbation of CHF (congestive heart failure)   Code Status: Full Code MRN: [id number]   Review of patient's allergies indicates:   Allergen Reactions    Amlodipine Other (See Comments)     Not sure    Chlorthalidone Other (See Comments)     Not sure    Clonidine Other (See Comments)     Not sure    Codeine Other (See Comments)     Not sure    Escitalopram Other (See Comments)     Not sure    Lisinopril Other (See Comments)     Not sure    Losartan Other (See Comments)     Not sure    Meperidine Other (See Comments)     Not sure    Methadone Other (See Comments)     Not sure      Morphine Other (See Comments)     Not sure    Nebivolol Other (See Comments)     Not sure        Nitrofurantoin Other (See Comments)     Not sure        Omeprazole Other (See Comments)     Not sure      Pravastatin Other (See Comments)     Not sure      Propoxyphene Other (See Comments)     Not sure      Sulfamethoxazole-trimethoprim Other (See Comments)     Not sure         Past Medical History:   Diagnosis Date    A-fib     Anticoagulant long-term use     Aortic valve disease     Cancer     brain tumor, 10 years ago    Cardiomyopathy     CHF (congestive heart failure)     COVID-19 7/23/2022    Hx of heart valve replacement with mechanical valve     Hyperlipidemia     Hypertension     Pacemaker     Pacemaker     Sepsis 7/23/2022    Stroke     2007, residual weakness      PRN meds    0.9% NaCl, , PRN  acetaminophen, 650 mg, Q6H PRN  dextrose 10%, 12.5 g, PRN  dextrose 10%, 25 g, PRN  glucagon (human recombinant), 1 mg, PRN  glucose, 16 g, PRN  glucose, 24 g, PRN  ondansetron, 4 mg, Q8H PRN  sodium chloride 0.9%, 10 mL, PRN  sodium chloride 0.9%, 10 mL, PRN      Chart check completed. Will continue plan of care.      Orientation: oriented x 4  [place] Coma Scale Score: 14     Lead Monitored: Lead II Rhythm: paced rhythm Frequency/Ectopy:  PVCs  Cardiac/Telemetry Box Number: [id number]  VTE Required Core Measure: Pharmacological prophylaxis initiated/maintained Last Bowel Movement: 08/11/24  Diet Cardiac Coumadin Restriction, Soft & Bite Sized (IDDSI Level 6), Low Sodium,2gm; Fluid - 1500mL  Voiding Characteristics: incontinence  Jacobo Score: 13  Fall Risk Score: 16  Accucheck []   Freq?      Lines/Drains/Airways       Peripheral Intravenous Line  Duration                  Peripheral IV - Single Lumen 08/09/24 2325 20 G Anterior;Right Forearm 5 days         Peripheral IV - Single Lumen 08/10/24 0256 20 G Right Antecubital 5 days

## 2024-08-15 NOTE — ASSESSMENT & PLAN NOTE
Patient with Hypoxic Respiratory failure which is Acute on chronic.  she is on home oxygen at 2-3 LPM. Supplemental oxygen was provided and noted-      .   Signs/symptoms of respiratory failure include- lethargy. Contributing diagnoses includes - CHF, COPD, and covid  Labs and images were reviewed. Patient Has not had a recent ABG. Will treat underlying causes and adjust management of respiratory failure as follows- remdesivir, breathing treatments, intravenous systemic steroids, intravenous lasix, supplemental oxygen     Patient on chronic Coumadin therapy.  Doppler exams were checked due to elevated D-dimer and patient has significant clot

## 2024-08-15 NOTE — PROGRESS NOTES
[facility] ([facility])  Cardiology  Progress Note    Patient Name: Serena Post  MRN: [id number]  Admission Date: 8/9/2024  Hospital Length of Stay: 5 days  Code Status: Full Code   Attending Physician: June Acosta MD   Primary Care Physician: Evangelina Olivares MD  Expected Discharge Date:   Principal Problem:Acute exacerbation of CHF (congestive heart failure)    Subjective:     Hospital Course:   HPI:     is a 84 year old female with a past medical history for mechanical AVR in 2007, PAF, history of pacemaker, chronic systolic CHF, chronic respiratory failure, , dementia, history of CVA, HLP, HTN, and pneumonia vs. CHF. She was admitted for reoccurrence respiratory failure. Symptoms were similar to previous admission. Patient was recently discharged. Chest X-ray shows CHF vs. Pneumonia. EKG unchanged and shows NSR and RBBB. BNP is greater than 490. Echo on July 31 shows normal EF. Mechanical AVR noted with questionable gradient.     Recommend IV diuresis, supportive care, as well as anticoagulants.      Hospital Course:     8/11/24- Echo shows EF 25%. Mild to moderate AI noted with mechanical AVR, significant gradient noted. Continue IV diuresis. Telemetry shows atrial pacing.      8/12/24 Chart reviewed, COVID isolation. No acute CV events reported. Labs reviewed, BNP >4900, CRP 84, Crt 1.3, Albumin low. Echo with 25% EF, Grade II DD, mechanical AVR    8/13/24 Chart reviewed, no acute CV events reported. Labs reviewed, chart reviewed    8/14/24 Chart reviewed, no acute CV events reported. Labs reviewed, chart reviewed    8/15/24 Chart reviewed, no acute CV events reported. Labs reviewed, chart reviewed        Review of Systems   Reason unable to perform ROS: COVID isolation.     Objective:     Vital Signs (Most Recent):  Temp: 97.8 °F (36.6 °C) (08/15/24 1117)  Pulse: 70 (08/15/24 1117)  Resp: 18 (08/15/24 1117)  BP: (!) 104/56 (08/15/24 1117)  SpO2: 96 % (08/15/24 1117)  "Vital Signs (24h Range):  Temp:  [97.5 °F (36.4 °C)-97.9 °F (36.6 °C)] 97.8 °F (36.6 °C)  Pulse:  [64-72] 70  Resp:  [16-19] 18  SpO2:  [94 %-97 %] 96 %  BP: ()/(51-64) 104/56     Weight: 82.5 kg (181 lb 14.1 oz)  Body mass index is 31.22 kg/m².     SpO2: 96 %       No intake or output data in the 24 hours ending 08/15/24 1540      Lines/Drains/Airways       Peripheral Intravenous Line  Duration                  Peripheral IV - Single Lumen 08/09/24 2325 20 G Anterior;Right Forearm 5 days         Peripheral IV - Single Lumen 08/10/24 0256 20 G Right Antecubital 5 days                     COVID isolation  Physical Exam  Vitals and nursing note reviewed.            Significant Labs: BMP:   Recent Labs   Lab 08/14/24  0512 08/15/24  0517   * 135*    143   K 3.4* 3.7    100   CO2 31* 33*   BUN 41* 43*   CREATININE 1.1 1.0   CALCIUM 8.5* 8.3*   , CMP   Recent Labs   Lab 08/14/24  0512 08/15/24  0517    143   K 3.4* 3.7    100   CO2 31* 33*   * 135*   BUN 41* 43*   CREATININE 1.1 1.0   CALCIUM 8.5* 8.3*   ANIONGAP 12 10   , CBC   Recent Labs   Lab 08/14/24  0512 08/15/24  0517   WBC 15.15* 11.74   HGB 15.2 13.9   HCT 46.4 43.0    158   , INR   Recent Labs   Lab 08/14/24  0512 08/15/24  0517   INR 1.8* 1.8*   , Lipid Panel No results for input(s): "CHOL", "HDL", "LDLCALC", "TRIG", "CHOLHDL" in the last 48 hours., Troponin   No results for input(s): "TROPONINI" in the last 48 hours.  , and All pertinent lab results from the last 24 hours have been reviewed.    Significant Imaging: Cardiac Cath: reviewed, Echocardiogram: Transthoracic echo (TTE) complete (Cupid Only):   Results for orders placed or performed during the hospital encounter of 08/09/24   Echo   Result Value Ref Range    BSA 1.83 m2    LVOT stroke volume 75.63 cm3    LVIDd 5.48 3.5 - 6.0 cm    LV Systolic Volume 138.71 mL    LV Systolic Volume Index 77.5 mL/m2    LVIDs 5.36 (A) 2.1 - 4.0 cm    LV Diastolic " Volume 146.22 mL    LV Diastolic Volume Index 81.69 mL/m2    Left Ventricular End Systolic Volume by Teichholz Method 138.71 mL    Left Ventricular End Diastolic Volume by Teichholz Method 146.22 mL    IVS 1.12 (A) 0.6 - 1.1 cm    LVOT diameter 2.24 cm    LVOT area 3.9 cm2    FS 2 (A) 28 - 44 %    Left Ventricle Relative Wall Thickness 0.42 cm    Posterior Wall 1.15 (A) 0.6 - 1.1 cm    LV mass 250.96 g    LV Mass Index 140 g/m2    MV Peak E Melo 0.95 m/s    TDI LATERAL 0.03 m/s    TDI SEPTAL 0.04 m/s    E/E' ratio 27.14 m/s    MV Peak A Melo 0.48 m/s    TR Max Melo 3.49 m/s    E/A ratio 1.98     E wave deceleration time 94.49 msec    LV SEPTAL E/E' RATIO 23.75 m/s    LV LATERAL E/E' RATIO 31.67 m/s    LVOT peak melo 1.05 m/s    Left Ventricular Outflow Tract Mean Velocity 0.76 cm/s    Left Ventricular Outflow Tract Mean Gradient 2.53 mmHg    RV-matson mid d 3.3 cm    RV mid diameter 3.32 cm    RVOT peak VTI 6.7 cm    TAPSE 1.05 cm    LA size 2.49 cm    Left Atrium Minor Axis 3.21 cm    Left Atrium Major Axis 4.08 cm    RA Major Axis 3.91 cm    AV regurgitation pressure 1/2 time [id number] ms    AR Max Melo 3.74 m/s    AV mean gradient 43 mmHg    AV peak gradient 54 mmHg    Ao peak melo 3.67 m/s    Ao VTI 96.30 cm    LVOT peak VTI 19.20 cm    AV valve area 0.79 cm²    AV Velocity Ratio 0.29     AV index (prosthetic) 0.20     AAMIR by Velocity Ratio 1.13 cm²    Mr max melo 4.11 m/s    Triscuspid Valve Regurgitation Peak Gradient 49 mmHg    PV mean gradient 0 mmHg    RVOT peak melo 0.39 m/s    Ao root annulus 3.21 cm    STJ 2.63 cm    Ascending aorta 2.69 cm    IVC diameter 1.77 cm    Mean e' 0.04 m/s    ZLVIDS 4.44     ZLVIDD 1.02     LA Volume Index 15.3 mL/m2    LA volume 27.38 cm3    LA WIDTH 3.6 cm    RA Width 3.1 cm    EF 25 %    TV resting pulmonary artery pressure 52 mmHg    RV TB RVSP 6 mmHg    Est. RA pres 3 mmHg    Narrative      Left Ventricle: The left ventricle is mildly dilated. Normal wall   thickness.  There is eccentric hypertrophy. Moderate global hypokinesis   present. Septal motion is consistent with bundle branch block. There is   severely reduced systolic function. Ejection fraction by visual   approximation is 25%. Grade II diastolic dysfunction.    Right Ventricle: Moderate right ventricular enlargement. Wall thickness   is normal. Systolic function is mildly reduced.    Right Atrium: Right atrium is moderately dilated.    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 There   is mild to moderate aortic regurgitation with a centrally directed   jet.Increased gradient noted    Tricuspid Valve: There is mild regurgitation with a centrally directed   jet.    Pulmonary Artery: The estimated pulmonary artery systolic pressure is   52 mmHg.    IVC/SVC: Normal venous pressure at 3 mmHg.     , EKG: reviewed, Stress Test: reviewed, and X-Ray: CXR: X-Ray Chest 1 View (CXR): No results found for this visit on 08/09/24.  Assessment and Plan:       * Acute exacerbation of CHF (congestive heart failure)  BNP >4900  Echo with EF 25%  OMT increased lasix to 40mg BID, can increase midodrine to 10mg TID, cont BB  Multiple allergies noted, no ACEi/ARB unsure of allergy?  -610cc, needs strict I/Os  Crt 1.3, cont IV diuresis    8/13/24  Cont IV diuresis      Acute viral myocarditis  Echo with EF 25%  Cont antiviral  Repeat echo in future once recovered  Follow up with Dr. Perkins, recommend OP ORI    MARCUS (acute kidney injury)  Monitor with diuresis    COVID  Cont antiviral    Paroxysmal atrial fibrillation  SR on EKG  Cont BB and Coumadin  Follow up with EP, sees [facility] cardi[facility]    H/O mechanical aortic valve replacement  Follow up with primary cardiologist, Dr. Perkins. Recommend ORI as OP    Essential hypertension  Titrate medications        VTE Risk Mitigation (From admission, onward)           Ordered     enoxaparin injection 90 mg  Every 12 hours         08/14/24 0902     IP VTE HIGH RISK PATIENT   Once         08/10/24 0307     Place sequential compression device  Until discontinued         08/10/24 0307                    Jacquelyn Headley NP  Cardiology  [facility] Tele[facility] ([facility])

## 2024-08-15 NOTE — SUBJECTIVE & OBJECTIVE
Interval History:  Seen and examined at bedside.  She is much more alert today.  Reports that she is feeling well with no shortness breath.  Her O2 sat is adequate on 2 L. she denies any complaints of pain in her legs.    Review of Systems   Constitutional:  Positive for appetite change and fatigue. Negative for fever.   Respiratory:  Negative for cough and shortness of breath.    Cardiovascular:  Negative for chest pain and leg swelling.   Gastrointestinal:  Negative for nausea and vomiting.   Neurological:  Positive for weakness.   All other systems reviewed and are negative.    Objective:     Vital Signs (Most Recent):  Temp: 97.8 °F (36.6 °C) (08/15/24 1117)  Pulse: 70 (08/15/24 1117)  Resp: 18 (08/15/24 1117)  BP: (!) 104/56 (08/15/24 1117)  SpO2: 96 % (08/15/24 1117) Vital Signs (24h Range):  Temp:  [97.5 °F (36.4 °C)-97.9 °F (36.6 °C)] 97.8 °F (36.6 °C)  Pulse:  [64-83] 70  Resp:  [16-19] 18  SpO2:  [93 %-97 %] 96 %  BP: ()/(51-64) 104/56     Weight: 82.5 kg (181 lb 14.1 oz)  Body mass index is 31.22 kg/m².  No intake or output data in the 24 hours ending 08/15/24 1254      Physical Exam  Vitals reviewed.   Constitutional:       Appearance: Normal appearance. She is ill-appearing (Chronically).   HENT:      Head: Normocephalic and atraumatic.      Mouth/Throat:      Mouth: Mucous membranes are moist.      Pharynx: Oropharynx is clear.   Eyes:      Extraocular Movements: Extraocular movements intact.      Conjunctiva/sclera: Conjunctivae normal.   Cardiovascular:      Rate and Rhythm: Normal rate and regular rhythm.      Pulses: Normal pulses.      Heart sounds: Normal heart sounds.   Pulmonary:      Effort: Pulmonary effort is normal.      Breath sounds: Normal breath sounds.   Abdominal:      General: Bowel sounds are normal.      Palpations: Abdomen is soft.   Musculoskeletal:         General: Normal range of motion.      Cervical back: Normal range of motion and neck supple.      Right lower leg:  Edema present.      Left lower leg: Edema present.   Skin:     General: Skin is warm and dry.   Neurological:      Mental Status: She is alert. Mental status is at baseline.      Motor: Weakness present.             Significant Labs: All pertinent labs within the past 24 hours have been reviewed.  CBC:   Recent Labs   Lab 08/14/24  0512 08/15/24  0517   WBC 15.15* 11.74   HGB 15.2 13.9   HCT 46.4 43.0    158     CMP:   Recent Labs   Lab 08/14/24  0512 08/15/24  0517    143   K 3.4* 3.7    100   CO2 31* 33*   * 135*   BUN 41* 43*   CREATININE 1.1 1.0   CALCIUM 8.5* 8.3*   ANIONGAP 12 10       Significant Imaging: I have reviewed all pertinent imaging results/findings within the past 24 hours.

## 2024-08-15 NOTE — SUBJECTIVE & OBJECTIVE
"    Review of Systems   Reason unable to perform ROS: COVID isolation.     Objective:     Vital Signs (Most Recent):  Temp: 97.8 °F (36.6 °C) (08/15/24 1117)  Pulse: 70 (08/15/24 1117)  Resp: 18 (08/15/24 1117)  BP: (!) 104/56 (08/15/24 1117)  SpO2: 96 % (08/15/24 1117) Vital Signs (24h Range):  Temp:  [97.5 °F (36.4 °C)-97.9 °F (36.6 °C)] 97.8 °F (36.6 °C)  Pulse:  [64-72] 70  Resp:  [16-19] 18  SpO2:  [94 %-97 %] 96 %  BP: ()/(51-64) 104/56     Weight: 82.5 kg (181 lb 14.1 oz)  Body mass index is 31.22 kg/m².     SpO2: 96 %       No intake or output data in the 24 hours ending 08/15/24 1540      Lines/Drains/Airways       Peripheral Intravenous Line  Duration                  Peripheral IV - Single Lumen 08/09/24 2325 20 G Anterior;Right Forearm 5 days         Peripheral IV - Single Lumen 08/10/24 0256 20 G Right Antecubital 5 days                     COVID isolation  Physical Exam  Vitals and nursing note reviewed.            Significant Labs: BMP:   Recent Labs   Lab 08/14/24  0512 08/15/24  0517   * 135*    143   K 3.4* 3.7    100   CO2 31* 33*   BUN 41* 43*   CREATININE 1.1 1.0   CALCIUM 8.5* 8.3*   , CMP   Recent Labs   Lab 08/14/24  0512 08/15/24  0517    143   K 3.4* 3.7    100   CO2 31* 33*   * 135*   BUN 41* 43*   CREATININE 1.1 1.0   CALCIUM 8.5* 8.3*   ANIONGAP 12 10   , CBC   Recent Labs   Lab 08/14/24  0512 08/15/24  0517   WBC 15.15* 11.74   HGB 15.2 13.9   HCT 46.4 43.0    158   , INR   Recent Labs   Lab 08/14/24  0512 08/15/24  0517   INR 1.8* 1.8*   , Lipid Panel No results for input(s): "CHOL", "HDL", "LDLCALC", "TRIG", "CHOLHDL" in the last 48 hours., Troponin   No results for input(s): "TROPONINI" in the last 48 hours.  , and All pertinent lab results from the last 24 hours have been reviewed.    Significant Imaging: Cardiac Cath: reviewed, Echocardiogram: Transthoracic echo (TTE) complete (Cupid Only):   Results for orders placed or performed " during the hospital encounter of 08/09/24   Echo   Result Value Ref Range    BSA 1.83 m2    LVOT stroke volume 75.63 cm3    LVIDd 5.48 3.5 - 6.0 cm    LV Systolic Volume 138.71 mL    LV Systolic Volume Index 77.5 mL/m2    LVIDs 5.36 (A) 2.1 - 4.0 cm    LV Diastolic Volume 146.22 mL    LV Diastolic Volume Index 81.69 mL/m2    Left Ventricular End Systolic Volume by Teichholz Method 138.71 mL    Left Ventricular End Diastolic Volume by Teichholz Method 146.22 mL    IVS 1.12 (A) 0.6 - 1.1 cm    LVOT diameter 2.24 cm    LVOT area 3.9 cm2    FS 2 (A) 28 - 44 %    Left Ventricle Relative Wall Thickness 0.42 cm    Posterior Wall 1.15 (A) 0.6 - 1.1 cm    LV mass 250.96 g    LV Mass Index 140 g/m2    MV Peak E Melo 0.95 m/s    TDI LATERAL 0.03 m/s    TDI SEPTAL 0.04 m/s    E/E' ratio 27.14 m/s    MV Peak A Melo 0.48 m/s    TR Max Melo 3.49 m/s    E/A ratio 1.98     E wave deceleration time 94.49 msec    LV SEPTAL E/E' RATIO 23.75 m/s    LV LATERAL E/E' RATIO 31.67 m/s    LVOT peak melo 1.05 m/s    Left Ventricular Outflow Tract Mean Velocity 0.76 cm/s    Left Ventricular Outflow Tract Mean Gradient 2.53 mmHg    RV-matson mid d 3.3 cm    RV mid diameter 3.32 cm    RVOT peak VTI 6.7 cm    TAPSE 1.05 cm    LA size 2.49 cm    Left Atrium Minor Axis 3.21 cm    Left Atrium Major Axis 4.08 cm    RA Major Axis 3.91 cm    AV regurgitation pressure 1/2 time [id number] ms    AR Max Melo 3.74 m/s    AV mean gradient 43 mmHg    AV peak gradient 54 mmHg    Ao peak melo 3.67 m/s    Ao VTI 96.30 cm    LVOT peak VTI 19.20 cm    AV valve area 0.79 cm²    AV Velocity Ratio 0.29     AV index (prosthetic) 0.20     AAMIR by Velocity Ratio 1.13 cm²    Mr max melo 4.11 m/s    Triscuspid Valve Regurgitation Peak Gradient 49 mmHg    PV mean gradient 0 mmHg    RVOT peak melo 0.39 m/s    Ao root annulus 3.21 cm    STJ 2.63 cm    Ascending aorta 2.69 cm    IVC diameter 1.77 cm    Mean e' 0.04 m/s    ZLVIDS 4.44     ZLVIDD 1.02     LA Volume Index 15.3  mL/m2    LA volume 27.38 cm3    LA WIDTH 3.6 cm    RA Width 3.1 cm    EF 25 %    TV resting pulmonary artery pressure 52 mmHg    RV TB RVSP 6 mmHg    Est. RA pres 3 mmHg    Narrative      Left Ventricle: The left ventricle is mildly dilated. Normal wall   thickness. There is eccentric hypertrophy. Moderate global hypokinesis   present. Septal motion is consistent with bundle branch block. There is   severely reduced systolic function. Ejection fraction by visual   approximation is 25%. Grade II diastolic dysfunction.    Right Ventricle: Moderate right ventricular enlargement. Wall thickness   is normal. Systolic function is mildly reduced.    Right Atrium: Right atrium is moderately dilated.    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 There   is mild to moderate aortic regurgitation with a centrally directed   jet.Increased gradient noted    Tricuspid Valve: There is mild regurgitation with a centrally directed   jet.    Pulmonary Artery: The estimated pulmonary artery systolic pressure is   52 mmHg.    IVC/SVC: Normal venous pressure at 3 mmHg.     , EKG: reviewed, Stress Test: reviewed, and X-Ray: CXR: X-Ray Chest 1 View (CXR): No results found for this visit on 08/09/24.

## 2024-08-15 NOTE — ASSESSMENT & PLAN NOTE
Chronic, controlled. Latest blood pressure and vitals reviewed-     Temp:  [97.5 °F (36.4 °C)-97.9 °F (36.6 °C)]   Pulse:  [64-72]   Resp:  [16-19]   BP: ()/(51-64)   SpO2:  [94 %-97 %] .   Home meds for hypertension were reviewed and noted below.   Hypertension Medications               furosemide (LASIX) 20 MG tablet Take 1 tablet (20 mg total) by mouth every morning. Hold for low blood pressure. Do not give if systolic blood pressure is below 110 and/or diastolic is below 60    metoprolol succinate (TOPROL-XL) 25 MG 24 hr tablet Take 1 tablet by mouth every evening.    metoprolol tartrate (LOPRESSOR) 25 MG tablet Take 1 tablet (25 mg total) by mouth 2 (two) times daily. Do not give if systolic blood pressure is below 110 and/or diastolic below 60            While in the hospital, will manage blood pressure as follows; low dose metoprolol hold for systolic blood pressure less than 110    Will utilize p.r.n. blood pressure medication only if patient's blood pressure greater than 180/110 and she develops symptoms such as worsening chest pain or shortness of breath.

## 2024-08-15 NOTE — PROGRESS NOTES
[facility] ER Medicine  Progress Note    Patient Name: Serena Post  MRN: [id number]  Patient Class: IP- Inpatient   Admission Date: 8/9/2024  Length of Stay: 5 days  Attending Physician: June Acosta MD  Primary Care Provider: Evangelina Olivares MD        Subjective:     Principal Problem:Acute exacerbation of CHF (congestive heart failure)        HPI:  Serena Post is a 84 y.o. female with a PMH  has a past medical history of A-fib, Anticoagulant long-term use, Aortic valve disease, Cancer, Cardiomyopathy, CHF (congestive heart failure), COVID-19 (7/23/2022), heart valve replacement with mechanical valve, Hyperlipidemia, Hypertension, Pacemaker, Pacemaker, Sepsis (7/23/2022), and Stroke. presented to the Emergency Department for evaluation of AMS which onset today. Patient tested positive for COVID on 8/1/2024 and was recently discharged on 8/6/24 after being diuresed for CHF exacerbation and treated with decadron and neb treatments for her Covid. Patient deemed stable for discharge with homehealth physical/occupational therapy to be resumed and nurse practitioner to visit home program. However, patient's son reports patient started becoming incoherent today at 5PM. Son reports symptoms of decreased appetite, generalized weakness, and diarrhea. No mitigating or exacerbating factors reported. Son denies any vomiting. Son states that patient is taking her LASIX. No further complaints or concerns at this time.     ER workup revealed a CBC to be unremarkable, coags of the within therapeutic range, CMP revealed BUN/creatinine of 44/1.7 with EGFR of 30 (22/1.1 with EGFR 50 on 08/06/2024), BNP>4,900, initial lactic acid of 3.0 with repeat lactic acid of 2.0, procalcitonin negative, flu negative, COVID positive, UA unremarkable, chest x-ray unchanged from previous, CT abdomen and pelvis without IV contrast revealed:[Small bilateral pleural effusions, left  greater than right, with patchy bibasilar airspace opacities.  Findings may reflect edema, infection, aspiration, and/or atelectasis; Cardiomegaly.; Nonspecific bilateral perinephric edema and mild urinary bladder wall thickening.  Correlation to urinalysis to exclude infection advised; Diffuse body wall anasarca].  EKG revealed normal sinus rhythm with right bundle-branch block with a ventricular rate of 90 beats per minute with a QT/QTC of 400/489.  O2 saturation originally 93% on room air with a respiratory rate of 40 5 times per minute which resolved after being placed on 4 liters/minute via nasal cannula.  Patient currently satting 99% with a respiratory rate of 18.  No acute respiratory distress noted.  Patient received DuoNeb, 4.5 g of Zosyn, 1, 641 cc bolus of sodium chloride, and 1.7 g of vancomycin in ER.  Hospital Medicine consulted to admit patient for sepsis vs CHF exacerbation.  Patient admitted under inpatient status.      PCP: Evangelina Olivares      Overview/Hospital Course:  8/11 readmitted for worsening symptoms. Bnp significant elevated. Cardiology consulted. Echocardiogram with worsening ejection fraction, 25%. Continue intravenous diuresis. 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 Inflammatory markers elevated.     Patient was continued remdesivir and Decadron.  Her Coumadin was managed by pharmacy and 8/14 she became subtherapeutic.  She was started on treatment as Lovenox in addition to Coumadin until her INR is therapeutic.  Venous Doppler of lower extremities revealed Acute DVT seen within the left common femoral vein extending into the proximal femoral vein with involvement of the superficial greater saphenous vein.  This is likely secondary to hypercoagulable state with COVID.  Consulted Hematology for further recommendations as patient developed lower extremity DVT while on Coumadin, her inr was subtherapeutic. After  speaking with hematology and cardiology will leave on lovenox for now and she will follow up with cardiology as outpatient.  Discussed with IR and no indication for thrombectomy at this time and they recommend repeat us in  2 weeks.     Echo    Left Ventricle: The left ventricle is mildly dilated. Normal wall thickness. There is eccentric hypertrophy. Moderate global hypokinesis present. Septal motion is consistent with bundle branch block. There is severely reduced systolic function. Ejection fraction by visual approximation is 25%. Grade II diastolic dysfunction.    Right Ventricle: Moderate right ventricular enlargement. Wall thickness is normal. Systolic function is mildly reduced.    Right Atrium: Right atrium is moderately dilated.    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 There is mild to moderate aortic regurgitation with a centrally directed jet.Increased gradient noted    Tricuspid Valve: There is mild regurgitation with a centrally directed jet.    Pulmonary Artery: The estimated pulmonary artery systolic pressure is 52 mmHg.    IVC/SVC: Normal venous pressure at 3 mmHg.    Interval History:  Seen and examined at bedside.  She is much more alert today.  Reports that she is feeling well with no shortness breath.  Her O2 sat is adequate on 2 L. she denies any complaints of pain in her legs.    Review of Systems   Constitutional:  Positive for appetite change and fatigue. Negative for fever.   Respiratory:  Negative for cough and shortness of breath.    Cardiovascular:  Negative for chest pain and leg swelling.   Gastrointestinal:  Negative for nausea and vomiting.   Neurological:  Positive for weakness.   All other systems reviewed and are negative.    Objective:     Vital Signs (Most Recent):  Temp: 97.8 °F (36.6 °C) (08/15/24 1117)  Pulse: 70 (08/15/24 1117)  Resp: 18 (08/15/24 1117)  BP: (!) 104/56 (08/15/24 1117)  SpO2: 96 % (08/15/24 1117) Vital Signs (24h Range):  Temp:   [97.5 °F (36.4 °C)-97.9 °F (36.6 °C)] 97.8 °F (36.6 °C)  Pulse:  [64-83] 70  Resp:  [16-19] 18  SpO2:  [93 %-97 %] 96 %  BP: ()/(51-64) 104/56     Weight: 82.5 kg (181 lb 14.1 oz)  Body mass index is 31.22 kg/m².  No intake or output data in the 24 hours ending 08/15/24 1254      Physical Exam  Vitals reviewed.   Constitutional:       Appearance: Normal appearance. She is ill-appearing (Chronically).   HENT:      Head: Normocephalic and atraumatic.      Mouth/Throat:      Mouth: Mucous membranes are moist.      Pharynx: Oropharynx is clear.   Eyes:      Extraocular Movements: Extraocular movements intact.      Conjunctiva/sclera: Conjunctivae normal.   Cardiovascular:      Rate and Rhythm: Normal rate and regular rhythm.      Pulses: Normal pulses.      Heart sounds: Normal heart sounds.   Pulmonary:      Effort: Pulmonary effort is normal.      Breath sounds: Normal breath sounds.   Abdominal:      General: Bowel sounds are normal.      Palpations: Abdomen is soft.   Musculoskeletal:         General: Normal range of motion.      Cervical back: Normal range of motion and neck supple.      Right lower leg: Edema present.      Left lower leg: Edema present.   Skin:     General: Skin is warm and dry.   Neurological:      Mental Status: She is alert. Mental status is at baseline.      Motor: Weakness present.             Significant Labs: All pertinent labs within the past 24 hours have been reviewed.  CBC:   Recent Labs   Lab 08/14/24  0512 08/15/24  0517   WBC 15.15* 11.74   HGB 15.2 13.9   HCT 46.4 43.0    158     CMP:   Recent Labs   Lab 08/14/24  0512 08/15/24  0517    143   K 3.4* 3.7    100   CO2 31* 33*   * 135*   BUN 41* 43*   CREATININE 1.1 1.0   CALCIUM 8.5* 8.3*   ANIONGAP 12 10       Significant Imaging: I have reviewed all pertinent imaging results/findings within the past 24 hours.    Assessment/Plan:      * Acute exacerbation of CHF (congestive heart failure)  Patient is  identified as having biventricular heart failure that is Acute on chronic. CHF is currently uncontrolled due to Continued edema of extremities, Dyspnea improved with supplemental oxygen. Rales/crackles on pulmonary exam, and Pulmonary edema/pleural effusion on CXR. Latest ECHO performed and demonstrates- Results for orders placed during the hospital encounter of 02/22/24    Echo    Interpretation Summary    Left Ventricle: The left ventricle is normal in size. Normal wall thickness. There is concentric hypertrophy. Global hypokinesis present. There is moderately reduced systolic function. Ejection fraction by visual approximation is 35%.    Right Ventricle: Normal right ventricular cavity size. Systolic function is borderline low.    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 There is moderate aortic regurgitation.  Consider ORI if clinically needed to evaluate mechanical aortic valve further.    Mitral Valve: There is mild bileaflet sclerosis. There is mild regurgitation.    Pulmonary Artery: There is moderate pulmonary hypertension. The estimated pulmonary artery systolic pressure is 49 mmHg.    IVC/SVC: Normal venous pressure at 3 mmHg.    Ascending aorta is moderately dilated measuring 4.45 cm.      Repeat echocardiogram reveals decreased EF to 25%.  With grade 2 diastolic dysfunction    . Continue Beta Blocker and Furosemide and monitor clinical status closely. Monitor on telemetry. Patient is on CHF pathway.  Monitor strict Is&Os and daily weights.  Place on fluid restriction of 1.5 L. Cardiology has been consulted. Continue to stress to patient importance of self efficacy and  on diet for CHF. Last BNP reviewed- and noted below   Recent Labs   Lab 08/14/24  0512   BNP >4,900*     Continue intravenous diuresis as able      Acute deep vein thrombosis (DVT) of femoral vein of left lower extremity  Patient admitted with diagnosis of COVID periods  originally diagnosed on 08/01/2024.  She also has prosthetic valve and has been on chronic Coumadin therapy.  As part of COVID protocol serial D-dimers were checked which were increased.  Doppler exam of lower extremities revealed acute large DVT in left femoral area.  Patient was started on treatment dose Lovenox. Discussed with her cardiologist and she recommends treatment with lovenox until follow up with her.    Positive D dimer  In setting of covid infection  Respiratory status stable, not requiring increased supplemental oxygen from baseline  Echocardiogram without mention of right heart strain  Renal function test improving  Could Consider vq scan to rule out pulmonary embolism but already on warfarin for mechanical valve    With D-dimer up we will check Dopplers bilateral lower extremities positive for acute left lower extremity DVT        Acute viral myocarditis  Recent Covid infection  Echocardiogram with significant decline in ejection fraction from normal one month ago to 25%   Echocardiogram with moderate global hypokinesis and eccentric hypertrophy  Oral systemic steroids on last admission  Continue systemic steroids intravenous   Remdesivir initiated     Elevated lactic acid level  Resolved after IVF bolus in ER.        Hyperglycemia  Likely elevated secondary to Decadron use to treat recent COVID infection.  Plan:  -SSI as needed  -Accuchecks      MARCUS (acute kidney injury)   Baseline creatinine is  1.2 . Most recent creatinine and eGFR are listed below.  Recent Labs     08/13/24  0513 08/14/24  0512 08/15/24  0517   CREATININE 1.1 1.1 1.0   EGFRNORACEVR 50* 50* 56*        Plan  - MARCUS is  treated with sepsis bolus in ER.  - Avoid nephrotoxins and renally dose meds for GFR listed above  - Monitor urine output, serial BMP, and adjust therapy as needed  Improving  Monitor while intravenous diuresing       Resolved    Chronic hypoxic respiratory failure  Patient with Hypoxic Respiratory failure which is  Acute on chronic.  she is on home oxygen at 2-3 LPM. Supplemental oxygen was provided and noted-      .   Signs/symptoms of respiratory failure include- lethargy. Contributing diagnoses includes - CHF, COPD, and covid  Labs and images were reviewed. Patient Has not had a recent ABG. Will treat underlying causes and adjust management of respiratory failure as follows- remdesivir, breathing treatments, intravenous systemic steroids, intravenous lasix, supplemental oxygen     Patient on chronic Coumadin therapy.  Doppler exams were checked due to elevated D-dimer and patient has significant clot    COVID  Recently diagnosed on 08/01/2024 and treated with Decadron during last admission.  Sent home with Decadron p.o.  Plan:  -supplemental oxygen as needed  -continue decadron po  -duonebs prn  -monitor pulse ox as need/per unit protocol    Initiate remdesivir and increased systemic steroids for covid related myocarditis  Echocardiogram with significantly reduced ejection fraction and moderate global hypokinesis  Inflammatory markers  ferritin and sed rate normal    Paroxysmal atrial fibrillation  Patient with Paroxysmal (<7 days) atrial fibrillation which is controlled currently with Beta Blocker. Patient is currently in sinus rhythm.[id number] Score: 4. HASBLED Score: . Anticoagulation indicated. Anticoagulation done with lovenox.    H/O mechanical aortic valve replacement    Pt will be treated with treatment dose lovenox until follow up with [facility] cardiology    Follow up with outpatient cardiologist upon discharge      Essential hypertension  Chronic, controlled. Latest blood pressure and vitals reviewed-     Temp:  [97.5 °F (36.4 °C)-97.9 °F (36.6 °C)]   Pulse:  [64-72]   Resp:  [16-19]   BP: ()/(51-64)   SpO2:  [94 %-97 %] .   Home meds for hypertension were reviewed and noted below.   Hypertension Medications               furosemide (LASIX) 20 MG tablet Take 1 tablet (20 mg total) by mouth every morning. Hold  for low blood pressure. Do not give if systolic blood pressure is below 110 and/or diastolic is below 60    metoprolol succinate (TOPROL-XL) 25 MG 24 hr tablet Take 1 tablet by mouth every evening.    metoprolol tartrate (LOPRESSOR) 25 MG tablet Take 1 tablet (25 mg total) by mouth 2 (two) times daily. Do not give if systolic blood pressure is below 110 and/or diastolic below 60            While in the hospital, will manage blood pressure as follows; low dose metoprolol hold for systolic blood pressure less than 110    Will utilize p.r.n. blood pressure medication only if patient's blood pressure greater than 180/110 and she develops symptoms such as worsening chest pain or shortness of breath.    Dyslipidemia  Patient is not chronically on statin.will not continue for now. Last Lipid Panel:   Lab Results   Component Value Date    CHOL 225 (H) 11/01/2022    HDL 48 11/01/2022    LDLCALC 141 (H) 11/01/2022    TRIG 180 11/01/2022    CHOLHDL 4.7 (H) 11/01/2022     Plan:  -low fat/low calorie diet        Anticoagulant long-term use  This patient has long term use on an anticoagulant with Select Anticoagulant(s): Warfarin/Coumadin. Their long term anticoagulation will be Held or Continued: continued. They are on long term anticoagulation due to Reason for Anticoagulation: Atrial fibrillation and Mechanical heart valve.   Started on treatment dose Lovenox  Coumadin restricted diet      VTE Risk Mitigation (From admission, onward)           Ordered     enoxaparin injection 90 mg  Every 12 hours         08/14/24 0902     IP VTE HIGH RISK PATIENT  Once         08/10/24 0307     Place sequential compression device  Until discontinued         08/10/24 0307                    Discharge Planning   RENE:      Code Status: Full Code   Is the patient medically ready for discharge?:     Reason for patient still in hospital (select all that apply): Treatment  Discharge Plan A: Home Health                  June Boykin,  MD  Department of Hospital Medicine   [facility] - Telemetry ([facility])

## 2024-08-15 NOTE — ASSESSMENT & PLAN NOTE
Recently diagnosed on 08/01/2024 and treated with Decadron during last admission.  Sent home with Decadron p.o.  Plan:  -supplemental oxygen as needed  -continue decadron po  -duonebs prn  -monitor pulse ox as need/per unit protocol    Initiate remdesivir and increased systemic steroids for covid related myocarditis  Echocardiogram with significantly reduced ejection fraction and moderate global hypokinesis  Inflammatory markers  ferritin and sed rate normal   Type of Encounter:  Follow-up  Call completed with: DaughterSonal  Issues addressed: LTC Medicaid.    Briefly spoke with pt's daughter whom indicated she has not yet received a call from the [facility] to schedule functional screen; had been told upon request for options counseling that it would be 20-25 days before she'd receive a call.  Unable to discuss further due to phone connectivity issues; attempted to reach daughter again but left VM.    Resources Provided? No     Action plan for patient: Await call from [facility] to schedule functional screen    Action plan for SW: F/u on above    Social Work goal/plan reviewed: Daughter agrees with the Social Work goal/plan of care and call follow-up plan    Next encounter: Within 1-2 weeks

## 2024-08-16 VITALS
HEART RATE: 71 BPM | WEIGHT: 181.88 LBS | BODY MASS INDEX: 31.05 KG/M2 | OXYGEN SATURATION: 97 % | HEIGHT: 64 IN | TEMPERATURE: 99 F | SYSTOLIC BLOOD PRESSURE: 125 MMHG | DIASTOLIC BLOOD PRESSURE: 67 MMHG | RESPIRATION RATE: 16 BRPM

## 2024-08-16 LAB
ANION GAP SERPL CALC-SCNC: 11 MMOL/L (ref 8–16)
ANION GAP SERPL CALC-SCNC: 13 MMOL/L (ref 8–16)
BUN SERPL-MCNC: 42 MG/DL (ref 8–23)
BUN SERPL-MCNC: 44 MG/DL (ref 8–23)
CALCIUM SERPL-MCNC: 8 MG/DL (ref 8.7–10.5)
CALCIUM SERPL-MCNC: 8.2 MG/DL (ref 8.7–10.5)
CHLORIDE SERPL-SCNC: 100 MMOL/L (ref 95–110)
CHLORIDE SERPL-SCNC: 99 MMOL/L (ref 95–110)
CO2 SERPL-SCNC: 28 MMOL/L (ref 23–29)
CO2 SERPL-SCNC: 31 MMOL/L (ref 23–29)
CREAT SERPL-MCNC: 0.9 MG/DL (ref 0.5–1.4)
CREAT SERPL-MCNC: 1 MG/DL (ref 0.5–1.4)
EST. GFR  (NO RACE VARIABLE): 56 ML/MIN/1.73 M^2
EST. GFR  (NO RACE VARIABLE): >60 ML/MIN/1.73 M^2
GLUCOSE SERPL-MCNC: 125 MG/DL (ref 70–110)
GLUCOSE SERPL-MCNC: 135 MG/DL (ref 70–110)
MAGNESIUM SERPL-MCNC: 1.7 MG/DL (ref 1.6–2.6)
POTASSIUM SERPL-SCNC: 3.6 MMOL/L (ref 3.5–5.1)
POTASSIUM SERPL-SCNC: 4.2 MMOL/L (ref 3.5–5.1)
SODIUM SERPL-SCNC: 141 MMOL/L (ref 136–145)
SODIUM SERPL-SCNC: 141 MMOL/L (ref 136–145)

## 2024-08-16 PROCEDURE — 99233 SBSQ HOSP IP/OBS HIGH 50: CPT | Mod: ,,, | Performed by: INTERNAL MEDICINE

## 2024-08-16 PROCEDURE — 94761 N-INVAS EAR/PLS OXIMETRY MLT: CPT

## 2024-08-16 PROCEDURE — 25000003 PHARM REV CODE 250: Performed by: NURSE PRACTITIONER

## 2024-08-16 PROCEDURE — 80048 BASIC METABOLIC PNL TOTAL CA: CPT | Performed by: NURSE PRACTITIONER

## 2024-08-16 PROCEDURE — 63600175 PHARM REV CODE 636 W HCPCS: Performed by: INTERNAL MEDICINE

## 2024-08-16 PROCEDURE — 25000003 PHARM REV CODE 250: Performed by: FAMILY MEDICINE

## 2024-08-16 PROCEDURE — 36415 COLL VENOUS BLD VENIPUNCTURE: CPT | Performed by: NURSE PRACTITIONER

## 2024-08-16 PROCEDURE — 25000003 PHARM REV CODE 250

## 2024-08-16 PROCEDURE — 27000221 HC OXYGEN, UP TO 24 HOURS

## 2024-08-16 PROCEDURE — 25000003 PHARM REV CODE 250: Performed by: INTERNAL MEDICINE

## 2024-08-16 RX ORDER — FUROSEMIDE 20 MG/1
40 TABLET ORAL DAILY
Qty: 60 TABLET | Refills: 0 | Status: ON HOLD | OUTPATIENT
Start: 2024-08-16 | End: 2024-09-15

## 2024-08-16 RX ORDER — MAGNESIUM SULFATE HEPTAHYDRATE 40 MG/ML
2 INJECTION, SOLUTION INTRAVENOUS ONCE
Status: COMPLETED | OUTPATIENT
Start: 2024-08-16 | End: 2024-08-16

## 2024-08-16 RX ORDER — METOLAZONE 5 MG/1
5 TABLET ORAL ONCE
Status: COMPLETED | OUTPATIENT
Start: 2024-08-16 | End: 2024-08-16

## 2024-08-16 RX ORDER — MIDODRINE HYDROCHLORIDE 10 MG/1
10 TABLET ORAL
Qty: 90 TABLET | Refills: 0 | Status: ON HOLD | OUTPATIENT
Start: 2024-08-16 | End: 2024-09-15

## 2024-08-16 RX ORDER — ENOXAPARIN SODIUM 100 MG/ML
1 INJECTION SUBCUTANEOUS EVERY 12 HOURS
Qty: 28 ML | Refills: 0 | Status: ON HOLD | OUTPATIENT
Start: 2024-08-16 | End: 2024-08-30

## 2024-08-16 RX ADMIN — ENOXAPARIN SODIUM 90 MG: 100 INJECTION SUBCUTANEOUS at 09:08

## 2024-08-16 RX ADMIN — FUROSEMIDE 80 MG: 10 INJECTION, SOLUTION INTRAMUSCULAR; INTRAVENOUS at 09:08

## 2024-08-16 RX ADMIN — THERA TABS 1 TABLET: TAB at 09:08

## 2024-08-16 RX ADMIN — MAGNESIUM SULFATE HEPTAHYDRATE 2 G: 40 INJECTION, SOLUTION INTRAVENOUS at 01:08

## 2024-08-16 RX ADMIN — POTASSIUM BICARBONATE 40 MEQ: 391 TABLET, EFFERVESCENT ORAL at 02:08

## 2024-08-16 RX ADMIN — METOLAZONE 5 MG: 5 TABLET ORAL at 09:08

## 2024-08-16 RX ADMIN — MIDODRINE HYDROCHLORIDE 10 MG: 5 TABLET ORAL at 11:08

## 2024-08-16 RX ADMIN — MAGNESIUM SULFATE HEPTAHYDRATE 2 G: 40 INJECTION, SOLUTION INTRAVENOUS at 11:08

## 2024-08-16 RX ADMIN — METOPROLOL TARTRATE 25 MG: 25 TABLET, FILM COATED ORAL at 09:08

## 2024-08-16 RX ADMIN — OXYCODONE HYDROCHLORIDE AND ACETAMINOPHEN 500 MG: 500 TABLET ORAL at 09:08

## 2024-08-16 RX ADMIN — DEXAMETHASONE 6 MG: 4 TABLET ORAL at 09:08

## 2024-08-16 RX ADMIN — MIDODRINE HYDROCHLORIDE 10 MG: 5 TABLET ORAL at 08:08

## 2024-08-16 NOTE — PLAN OF CARE
[facility] - Telemetry (Hospital)  Discharge Final Note    Primary Care Provider: Evangelina Olivares MD    Expected Discharge Date: 8/16/2024    Final Discharge Note (most recent)       Final Note - 08/16/24 1617          Final Note    Assessment Type Final Discharge Note (P)      Anticipated Discharge Disposition Home-Health Care Svc (P)         Post-Acute Status    Post-Acute Authorization Home Health (P)      Home Health Status Set-up Complete/Auth obtained (P)    [facility]  [facility]    Discharge Delays None known at this time (P)                      Important Message from Medicare             Contact Info       Lydia Perkins MD   Specialty: Cardiology, Cardiovascular Disease    [address]  [address]  [facility] [address]   Phone: [phone]       Next Steps: Go on 8/27/2024    Instructions: @ 11:45am    [facility]   Specialty: Home Health Services, Home Therapy Services, Home Living Aide Services    [address] [facility], [address] [place] [id number]   Phone: [phone]       Next Steps: Follow up    Instructions: Home Health: to resume services

## 2024-08-16 NOTE — ASSESSMENT & PLAN NOTE
BNP >4900  Echo with EF 25%  OMT increased lasix to 40mg BID, can increase midodrine to 10mg TID, cont BB  Multiple allergies noted, no ACEi/ARB unsure of allergy?  -610cc, needs strict I/Os  Crt 1.3, cont IV diuresis    8/13/24  Cont IV diuresis    8/16/24  Crt 1.0  BNP repeatedly >4900  BP labile, IV diuresis, added metolazone  No I/Os documented

## 2024-08-16 NOTE — SIGNIFICANT EVENT
Patient with 10 beat run of nonsustained ventricular tachycardia, asymptomatic, hemodynamically stable.  Stat BNP and magnesium level ordered.  Potassium 3.6, magnesium level 1.7.  Give potassium bicarbonate 40 mEq p.o. x1 dose and magnesium sulfate 2 g IV x1 dose.  Continue telemetry monitoring.  Continue current regimen of Lopressor.

## 2024-08-16 NOTE — SUBJECTIVE & OBJECTIVE
"    Review of Systems   Reason unable to perform ROS: COVID isolation.     Objective:     Vital Signs (Most Recent):  Temp: 98.6 °F (37 °C) (08/16/24 1212)  Pulse: 66 (08/16/24 1212)  Resp: 16 (08/16/24 1212)  BP: (!) 119/56 (08/16/24 1212)  SpO2: 97 % (08/16/24 1212) Vital Signs (24h Range):  Temp:  [96.2 °F (35.7 °C)-98.6 °F (37 °C)] 98.6 °F (37 °C)  Pulse:  [63-88] 66  Resp:  [14-19] 16  SpO2:  [89 %-98 %] 97 %  BP: (108-119)/(56-66) 119/56     Weight: 82.5 kg (181 lb 14.1 oz)  Body mass index is 31.22 kg/m².     SpO2: 97 %         Intake/Output Summary (Last 24 hours) at 8/16/2024 1501  Last data filed at 8/15/2024 1715  Gross per 24 hour   Intake 240 ml   Output --   Net 240 ml         Lines/Drains/Airways       Peripheral Intravenous Line  Duration                  Peripheral IV - Single Lumen 08/09/24 2325 20 G Anterior;Right Forearm 6 days         Peripheral IV - Single Lumen 08/10/24 0256 20 G Right Antecubital 6 days                     COVID isolation  Physical Exam  Vitals and nursing note reviewed.            Significant Labs: BMP:   Recent Labs   Lab 08/15/24  0517 08/15/24  2331 08/16/24  0504   * 135* 125*    141 141   K 3.7 3.6 4.2    100 99   CO2 33* 28 31*   BUN 43* 44* 42*   CREATININE 1.0 0.9 1.0   CALCIUM 8.3* 8.0* 8.2*   MG  --  1.7  --    , CMP   Recent Labs   Lab 08/15/24  0517 08/15/24  2331 08/16/24  0504    141 141   K 3.7 3.6 4.2    100 99   CO2 33* 28 31*   * 135* 125*   BUN 43* 44* 42*   CREATININE 1.0 0.9 1.0   CALCIUM 8.3* 8.0* 8.2*   ANIONGAP 10 13 11   , CBC   Recent Labs   Lab 08/15/24  0517   WBC 11.74   HGB 13.9   HCT 43.0      , INR   Recent Labs   Lab 08/15/24  0517   INR 1.8*   , Lipid Panel No results for input(s): "CHOL", "HDL", "LDLCALC", "TRIG", "CHOLHDL" in the last 48 hours., Troponin   No results for input(s): "TROPONINI" in the last 48 hours.  , and All pertinent lab results from the last 24 hours have been " reviewed.    Significant Imaging: Cardiac Cath: reviewed, Echocardiogram: Transthoracic echo (TTE) complete (Cupid Only):   Results for orders placed or performed during the hospital encounter of 08/09/24   Echo   Result Value Ref Range    BSA 1.83 m2    LVOT stroke volume 75.63 cm3    LVIDd 5.48 3.5 - 6.0 cm    LV Systolic Volume 138.71 mL    LV Systolic Volume Index 77.5 mL/m2    LVIDs 5.36 (A) 2.1 - 4.0 cm    LV Diastolic Volume 146.22 mL    LV Diastolic Volume Index 81.69 mL/m2    Left Ventricular End Systolic Volume by Teichholz Method 138.71 mL    Left Ventricular End Diastolic Volume by Teichholz Method 146.22 mL    IVS 1.12 (A) 0.6 - 1.1 cm    LVOT diameter 2.24 cm    LVOT area 3.9 cm2    FS 2 (A) 28 - 44 %    Left Ventricle Relative Wall Thickness 0.42 cm    Posterior Wall 1.15 (A) 0.6 - 1.1 cm    LV mass 250.96 g    LV Mass Index 140 g/m2    MV Peak E Melo 0.95 m/s    TDI LATERAL 0.03 m/s    TDI SEPTAL 0.04 m/s    E/E' ratio 27.14 m/s    MV Peak A Melo 0.48 m/s    TR Max Melo 3.49 m/s    E/A ratio 1.98     E wave deceleration time 94.49 msec    LV SEPTAL E/E' RATIO 23.75 m/s    LV LATERAL E/E' RATIO 31.67 m/s    LVOT peak melo 1.05 m/s    Left Ventricular Outflow Tract Mean Velocity 0.76 cm/s    Left Ventricular Outflow Tract Mean Gradient 2.53 mmHg    RV-matson mid d 3.3 cm    RV mid diameter 3.32 cm    RVOT peak VTI 6.7 cm    TAPSE 1.05 cm    LA size 2.49 cm    Left Atrium Minor Axis 3.21 cm    Left Atrium Major Axis 4.08 cm    RA Major Axis 3.91 cm    AV regurgitation pressure 1/2 time [id number] ms    AR Max Melo 3.74 m/s    AV mean gradient 43 mmHg    AV peak gradient 54 mmHg    Ao peak melo 3.67 m/s    Ao VTI 96.30 cm    LVOT peak VTI 19.20 cm    AV valve area 0.79 cm²    AV Velocity Ratio 0.29     AV index (prosthetic) 0.20     AAMIR by Velocity Ratio 1.13 cm²    Mr max melo 4.11 m/s    Triscuspid Valve Regurgitation Peak Gradient 49 mmHg    PV mean gradient 0 mmHg    RVOT peak melo 0.39 m/s    Ao root  annulus 3.21 cm    STJ 2.63 cm    Ascending aorta 2.69 cm    IVC diameter 1.77 cm    Mean e' 0.04 m/s    ZLVIDS 4.44     ZLVIDD 1.02     LA Volume Index 15.3 mL/m2    LA volume 27.38 cm3    LA WIDTH 3.6 cm    RA Width 3.1 cm    EF 25 %    TV resting pulmonary artery pressure 52 mmHg    RV TB RVSP 6 mmHg    Est. RA pres 3 mmHg    Narrative      Left Ventricle: The left ventricle is mildly dilated. Normal wall   thickness. There is eccentric hypertrophy. Moderate global hypokinesis   present. Septal motion is consistent with bundle branch block. There is   severely reduced systolic function. Ejection fraction by visual   approximation is 25%. Grade II diastolic dysfunction.    Right Ventricle: Moderate right ventricular enlargement. Wall thickness   is normal. Systolic function is mildly reduced.    Right Atrium: Right atrium is moderately dilated.    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 There   is mild to moderate aortic regurgitation with a centrally directed   jet.Increased gradient noted    Tricuspid Valve: There is mild regurgitation with a centrally directed   jet.    Pulmonary Artery: The estimated pulmonary artery systolic pressure is   52 mmHg.    IVC/SVC: Normal venous pressure at 3 mmHg.     , EKG: reviewed, Stress Test: reviewed, and X-Ray: CXR: X-Ray Chest 1 View (CXR): No results found for this visit on 08/09/24.

## 2024-08-16 NOTE — PLAN OF CARE
A203/A203 NITHIN Post is a 84 y.o.female admitted on 8/9/2024 for Acute exacerbation of CHF (congestive heart failure)   Code Status: Full Code MRN: [id number]   Review of patient's allergies indicates:   Allergen Reactions    Amlodipine Other (See Comments)     Not sure    Chlorthalidone Other (See Comments)     Not sure    Clonidine Other (See Comments)     Not sure    Codeine Other (See Comments)     Not sure    Escitalopram Other (See Comments)     Not sure    Lisinopril Other (See Comments)     Not sure    Losartan Other (See Comments)     Not sure    Meperidine Other (See Comments)     Not sure    Methadone Other (See Comments)     Not sure      Morphine Other (See Comments)     Not sure    Nebivolol Other (See Comments)     Not sure        Nitrofurantoin Other (See Comments)     Not sure        Omeprazole Other (See Comments)     Not sure      Pravastatin Other (See Comments)     Not sure      Propoxyphene Other (See Comments)     Not sure      Sulfamethoxazole-trimethoprim Other (See Comments)     Not sure         Past Medical History:   Diagnosis Date    A-fib     Anticoagulant long-term use     Aortic valve disease     Cancer     brain tumor, 10 years ago    Cardiomyopathy     CHF (congestive heart failure)     COVID-19 7/23/2022    Hx of heart valve replacement with mechanical valve     Hyperlipidemia     Hypertension     Pacemaker     Pacemaker     Sepsis 7/23/2022    Stroke     2007, residual weakness      PRN meds    0.9% NaCl, , PRN  acetaminophen, 650 mg, Q6H PRN  dextrose 10%, 12.5 g, PRN  dextrose 10%, 25 g, PRN  glucagon (human recombinant), 1 mg, PRN  glucose, 16 g, PRN  glucose, 24 g, PRN  ondansetron, 4 mg, Q8H PRN  sodium chloride 0.9%, 10 mL, PRN  sodium chloride 0.9%, 10 mL, PRN      Chart check completed. Will continue plan of care.      Orientation: time, disoriented to  Chhaya Coma Scale Score: 14     Lead Monitored: Lead II Rhythm: paced rhythm Frequency/Ectopy:  PVCs  Cardiac/Telemetry Box Number: [id number]  VTE Required Core Measure: Pharmacological prophylaxis initiated/maintained Last Bowel Movement: 08/11/24  Diet Cardiac Coumadin Restriction, Soft & Bite Sized (IDDSI Level 6), Low Sodium,2gm; Fluid - 1500mL  Voiding Characteristics: incontinence  Jacobo Score: 14  Fall Risk Score: 12  Accucheck []   Freq?      Lines/Drains/Airways       Peripheral Intravenous Line  Duration                  Peripheral IV - Single Lumen 08/09/24 2325 20 G Anterior;Right Forearm 6 days         Peripheral IV - Single Lumen 08/10/24 0256 20 G Right Antecubital 6 days

## 2024-08-16 NOTE — PROGRESS NOTES
[facility] - Telemetry ([facility])  Cardiology  Progress Note    Patient Name: Serena Post  MRN: [id number]  Admission Date: 8/9/2024  Hospital Length of Stay: 6 days  Code Status: Full Code   Attending Physician: June Acosta MD   Primary Care Physician: Evangelina Olivares MD  Expected Discharge Date: 8/16/2024  Principal Problem:Acute exacerbation of CHF (congestive heart failure)    Subjective:     Hospital Course:   HPI:     is a 84 year old female with a past medical history for mechanical AVR in 2007, PAF, history of pacemaker, chronic systolic CHF, chronic respiratory failure, , dementia, history of CVA, HLP, HTN, and pneumonia vs. CHF. She was admitted for reoccurrence respiratory failure. Symptoms were similar to previous admission. Patient was recently discharged. Chest X-ray shows CHF vs. Pneumonia. EKG unchanged and shows NSR and RBBB. BNP is greater than 490. Echo on July 31 shows normal EF. Mechanical AVR noted with questionable gradient.     Recommend IV diuresis, supportive care, as well as anticoagulants.      Hospital Course:     8/11/24- Echo shows EF 25%. Mild to moderate AI noted with mechanical AVR, significant gradient noted. Continue IV diuresis. Telemetry shows atrial pacing.      8/12/24 Chart reviewed, COVID isolation. No acute CV events reported. Labs reviewed, BNP >4900, CRP 84, Crt 1.3, Albumin low. Echo with 25% EF, Grade II DD, mechanical AVR    8/13/24 Chart reviewed, no acute CV events reported. Labs reviewed, chart reviewed    8/14/24 Chart reviewed, no acute CV events reported. Labs reviewed, chart reviewed    8/15/24 Chart reviewed, no acute CV events reported. Labs reviewed, chart reviewed    8/16/24 Chart reviewed, no acute CV events reported. Labs reviewed, chart reviewed        Review of Systems   Reason unable to perform ROS: COVID isolation.     Objective:     Vital Signs (Most Recent):  Temp: 98.6 °F (37 °C) (08/16/24 1212)  Pulse: 66  "(08/16/24 1212)  Resp: 16 (08/16/24 1212)  BP: (!) 119/56 (08/16/24 1212)  SpO2: 97 % (08/16/24 1212) Vital Signs (24h Range):  Temp:  [96.2 °F (35.7 °C)-98.6 °F (37 °C)] 98.6 °F (37 °C)  Pulse:  [63-88] 66  Resp:  [14-19] 16  SpO2:  [89 %-98 %] 97 %  BP: (108-119)/(56-66) 119/56     Weight: 82.5 kg (181 lb 14.1 oz)  Body mass index is 31.22 kg/m².     SpO2: 97 %         Intake/Output Summary (Last 24 hours) at 8/16/2024 1501  Last data filed at 8/15/2024 1715  Gross per 24 hour   Intake 240 ml   Output --   Net 240 ml         Lines/Drains/Airways       Peripheral Intravenous Line  Duration                  Peripheral IV - Single Lumen 08/09/24 2325 20 G Anterior;Right Forearm 6 days         Peripheral IV - Single Lumen 08/10/24 0256 20 G Right Antecubital 6 days                     COVID isolation  Physical Exam  Vitals and nursing note reviewed.            Significant Labs: BMP:   Recent Labs   Lab 08/15/24  0517 08/15/24  2331 08/16/24  0504   * 135* 125*    141 141   K 3.7 3.6 4.2    100 99   CO2 33* 28 31*   BUN 43* 44* 42*   CREATININE 1.0 0.9 1.0   CALCIUM 8.3* 8.0* 8.2*   MG  --  1.7  --    , CMP   Recent Labs   Lab 08/15/24  0517 08/15/24  2331 08/16/24  0504    141 141   K 3.7 3.6 4.2    100 99   CO2 33* 28 31*   * 135* 125*   BUN 43* 44* 42*   CREATININE 1.0 0.9 1.0   CALCIUM 8.3* 8.0* 8.2*   ANIONGAP 10 13 11   , CBC   Recent Labs   Lab 08/15/24  0517   WBC 11.74   HGB 13.9   HCT 43.0      , INR   Recent Labs   Lab 08/15/24  0517   INR 1.8*   , Lipid Panel No results for input(s): "CHOL", "HDL", "LDLCALC", "TRIG", "CHOLHDL" in the last 48 hours., Troponin   No results for input(s): "TROPONINI" in the last 48 hours.  , and All pertinent lab results from the last 24 hours have been reviewed.    Significant Imaging: Cardiac Cath: reviewed, Echocardiogram: Transthoracic echo (TTE) complete (Cupid Only):   Results for orders placed or performed during the hospital " encounter of 08/09/24   Echo   Result Value Ref Range    BSA 1.83 m2    LVOT stroke volume 75.63 cm3    LVIDd 5.48 3.5 - 6.0 cm    LV Systolic Volume 138.71 mL    LV Systolic Volume Index 77.5 mL/m2    LVIDs 5.36 (A) 2.1 - 4.0 cm    LV Diastolic Volume 146.22 mL    LV Diastolic Volume Index 81.69 mL/m2    Left Ventricular End Systolic Volume by Teichholz Method 138.71 mL    Left Ventricular End Diastolic Volume by Teichholz Method 146.22 mL    IVS 1.12 (A) 0.6 - 1.1 cm    LVOT diameter 2.24 cm    LVOT area 3.9 cm2    FS 2 (A) 28 - 44 %    Left Ventricle Relative Wall Thickness 0.42 cm    Posterior Wall 1.15 (A) 0.6 - 1.1 cm    LV mass 250.96 g    LV Mass Index 140 g/m2    MV Peak E Melo 0.95 m/s    TDI LATERAL 0.03 m/s    TDI SEPTAL 0.04 m/s    E/E' ratio 27.14 m/s    MV Peak A Melo 0.48 m/s    TR Max Melo 3.49 m/s    E/A ratio 1.98     E wave deceleration time 94.49 msec    LV SEPTAL E/E' RATIO 23.75 m/s    LV LATERAL E/E' RATIO 31.67 m/s    LVOT peak melo 1.05 m/s    Left Ventricular Outflow Tract Mean Velocity 0.76 cm/s    Left Ventricular Outflow Tract Mean Gradient 2.53 mmHg    RV-matson mid d 3.3 cm    RV mid diameter 3.32 cm    RVOT peak VTI 6.7 cm    TAPSE 1.05 cm    LA size 2.49 cm    Left Atrium Minor Axis 3.21 cm    Left Atrium Major Axis 4.08 cm    RA Major Axis 3.91 cm    AV regurgitation pressure 1/2 time 384.783473961408167 ms    AR Max Melo 3.74 m/s    AV mean gradient 43 mmHg    AV peak gradient 54 mmHg    Ao peak melo 3.67 m/s    Ao VTI 96.30 cm    LVOT peak VTI 19.20 cm    AV valve area 0.79 cm²    AV Velocity Ratio 0.29     AV index (prosthetic) 0.20     AAMIR by Velocity Ratio 1.13 cm²    Mr max melo 4.11 m/s    Triscuspid Valve Regurgitation Peak Gradient 49 mmHg    PV mean gradient 0 mmHg    RVOT peak melo 0.39 m/s    Ao root annulus 3.21 cm    STJ 2.63 cm    Ascending aorta 2.69 cm    IVC diameter 1.77 cm    Mean e' 0.04 m/s    ZLVIDS 4.44     ZLVIDD 1.02     LA Volume Index 15.3 mL/m2    LA volume 27.38  cm3    LA WIDTH 3.6 cm    RA Width 3.1 cm    EF 25 %    TV resting pulmonary artery pressure 52 mmHg    RV TB RVSP 6 mmHg    Est. RA pres 3 mmHg    Narrative      Left Ventricle: The left ventricle is mildly dilated. Normal wall   thickness. There is eccentric hypertrophy. Moderate global hypokinesis   present. Septal motion is consistent with bundle branch block. There is   severely reduced systolic function. Ejection fraction by visual   approximation is 25%. Grade II diastolic dysfunction.    Right Ventricle: Moderate right ventricular enlargement. Wall thickness   is normal. Systolic function is mildly reduced.    Right Atrium: Right atrium is moderately dilated.    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 There   is mild to moderate aortic regurgitation with a centrally directed   jet.Increased gradient noted    Tricuspid Valve: There is mild regurgitation with a centrally directed   jet.    Pulmonary Artery: The estimated pulmonary artery systolic pressure is   52 mmHg.    IVC/SVC: Normal venous pressure at 3 mmHg.     , EKG: reviewed, Stress Test: reviewed, and X-Ray: CXR: X-Ray Chest 1 View (CXR): No results found for this visit on 08/09/24.  Assessment and Plan:       * Acute exacerbation of CHF (congestive heart failure)  BNP >4900  Echo with EF 25%  OMT increased lasix to 40mg BID, can increase midodrine to 10mg TID, cont BB  Multiple allergies noted, no ACEi/ARB unsure of allergy?  -610cc, needs strict I/Os  Crt 1.3, cont IV diuresis    8/13/24  Cont IV diuresis    8/16/24  Crt 1.0  BNP repeatedly >4900  BP labile, IV diuresis, added metolazone  No I/Os documented      Acute viral myocarditis  Echo with EF 25%  Cont antiviral  Repeat echo in future once recovered  Follow up with Dr. Perkins, recommend OP ORI    MARCUS (acute kidney injury)  Monitor with diuresis    COVID  Cont antiviral    Paroxysmal atrial fibrillation  SR on EKG  Cont BB and Coumadin  Follow up with EP, sees [facility]  for cardiology    H/O mechanical aortic valve replacement  Follow up with primary cardiologist, Dr. Perkins. Recommend ORI as OP    Essential hypertension  Titrate medications        VTE Risk Mitigation (From admission, onward)           Ordered     enoxaparin injection 90 mg  Every 12 hours         08/14/24 0902     IP VTE HIGH RISK PATIENT  Once         08/10/24 0307     Place sequential compression device  Until discontinued         08/10/24 0307                    Jacquelyn Headley NP  Cardiology  [facility] - Telemetry ([facility])

## 2024-08-17 ENCOUNTER — HOSPITAL ENCOUNTER (EMERGENCY)
Facility: HOSPITAL | Age: 84
Discharge: HOME | End: 2024-08-17
Attending: STUDENT IN AN ORGANIZED HEALTH CARE EDUCATION/TRAINING PROGRAM
Payer: MEDICARE

## 2024-08-17 VITALS
RESPIRATION RATE: 16 BRPM | WEIGHT: 180 LBS | OXYGEN SATURATION: 93 % | HEIGHT: 64 IN | HEART RATE: 90 BPM | BODY MASS INDEX: 30.73 KG/M2 | SYSTOLIC BLOOD PRESSURE: 107 MMHG | DIASTOLIC BLOOD PRESSURE: 61 MMHG | TEMPERATURE: 98 F

## 2024-08-17 DIAGNOSIS — R09.02 HYPOXIA: Primary | ICD-10-CM

## 2024-08-17 DIAGNOSIS — R06.02 SHORTNESS OF BREATH: ICD-10-CM

## 2024-08-17 LAB
ALBUMIN SERPL BCP-MCNC: 2.9 G/DL (ref 3.5–5.2)
ALP SERPL-CCNC: 146 U/L (ref 55–135)
ALT SERPL W/O P-5'-P-CCNC: 84 U/L (ref 10–44)
ANION GAP SERPL CALC-SCNC: 19 MMOL/L (ref 8–16)
AST SERPL-CCNC: 67 U/L (ref 10–40)
BASOPHILS # BLD AUTO: 0.03 K/UL (ref 0–0.2)
BASOPHILS NFR BLD: 0.2 % (ref 0–1.9)
BILIRUB SERPL-MCNC: 2.8 MG/DL (ref 0.1–1)
BNP SERPL-MCNC: 3306 PG/ML (ref 0–99)
BUN SERPL-MCNC: 54 MG/DL (ref 8–23)
CALCIUM SERPL-MCNC: 8.8 MG/DL (ref 8.7–10.5)
CHLORIDE SERPL-SCNC: 88 MMOL/L (ref 95–110)
CO2 SERPL-SCNC: 33 MMOL/L (ref 23–29)
CREAT SERPL-MCNC: 1.4 MG/DL (ref 0.5–1.4)
DIFFERENTIAL METHOD BLD: ABNORMAL
EOSINOPHIL # BLD AUTO: 0 K/UL (ref 0–0.5)
EOSINOPHIL NFR BLD: 0 % (ref 0–8)
ERYTHROCYTE [DISTWIDTH] IN BLOOD BY AUTOMATED COUNT: 15.1 % (ref 11.5–14.5)
EST. GFR  (NO RACE VARIABLE): 38 ML/MIN/1.73 M^2
GLUCOSE SERPL-MCNC: 132 MG/DL (ref 70–110)
HCT VFR BLD AUTO: 42.5 % (ref 37–48.5)
HGB BLD-MCNC: 14.2 G/DL (ref 12–16)
IMM GRANULOCYTES # BLD AUTO: 0.12 K/UL (ref 0–0.04)
IMM GRANULOCYTES NFR BLD AUTO: 0.6 % (ref 0–0.5)
LYMPHOCYTES # BLD AUTO: 0.8 K/UL (ref 1–4.8)
LYMPHOCYTES NFR BLD: 4.2 % (ref 18–48)
MCH RBC QN AUTO: 28.1 PG (ref 27–31)
MCHC RBC AUTO-ENTMCNC: 33.4 G/DL (ref 32–36)
MCV RBC AUTO: 84 FL (ref 82–98)
MONOCYTES # BLD AUTO: 2.2 K/UL (ref 0.3–1)
MONOCYTES NFR BLD: 11.3 % (ref 4–15)
NEUTROPHILS # BLD AUTO: 16.5 K/UL (ref 1.8–7.7)
NEUTROPHILS NFR BLD: 83.7 % (ref 38–73)
NRBC BLD-RTO: 0 /100 WBC
PLATELET # BLD AUTO: 178 K/UL (ref 150–450)
PMV BLD AUTO: 11.1 FL (ref 9.2–12.9)
POTASSIUM SERPL-SCNC: 3.5 MMOL/L (ref 3.5–5.1)
PROT SERPL-MCNC: 6.5 G/DL (ref 6–8.4)
RBC # BLD AUTO: 5.05 M/UL (ref 4–5.4)
SARS-COV-2 RDRP RESP QL NAA+PROBE: POSITIVE
SODIUM SERPL-SCNC: 140 MMOL/L (ref 136–145)
WBC # BLD AUTO: 19.67 K/UL (ref 3.9–12.7)

## 2024-08-17 PROCEDURE — 99285 EMERGENCY DEPT VISIT HI MDM: CPT | Mod: 25

## 2024-08-17 PROCEDURE — 83880 ASSAY OF NATRIURETIC PEPTIDE: CPT | Performed by: STUDENT IN AN ORGANIZED HEALTH CARE EDUCATION/TRAINING PROGRAM

## 2024-08-17 PROCEDURE — 85025 COMPLETE CBC W/AUTO DIFF WBC: CPT | Performed by: STUDENT IN AN ORGANIZED HEALTH CARE EDUCATION/TRAINING PROGRAM

## 2024-08-17 PROCEDURE — 93010 ELECTROCARDIOGRAM REPORT: CPT | Mod: ,,, | Performed by: INTERNAL MEDICINE

## 2024-08-17 PROCEDURE — U0002 COVID-19 LAB TEST NON-CDC: HCPCS | Performed by: STUDENT IN AN ORGANIZED HEALTH CARE EDUCATION/TRAINING PROGRAM

## 2024-08-17 PROCEDURE — 93005 ELECTROCARDIOGRAM TRACING: CPT

## 2024-08-17 PROCEDURE — 36415 COLL VENOUS BLD VENIPUNCTURE: CPT | Performed by: STUDENT IN AN ORGANIZED HEALTH CARE EDUCATION/TRAINING PROGRAM

## 2024-08-17 PROCEDURE — 80053 COMPREHEN METABOLIC PANEL: CPT | Performed by: STUDENT IN AN ORGANIZED HEALTH CARE EDUCATION/TRAINING PROGRAM

## 2024-08-17 NOTE — PLAN OF CARE
Met with patent and son in ED room 22. Confirmed patient has oxygen at home.  Son reports he had moved the patient's bed before she went to sleep last night.  When he checked on patient later, noted her oxygen sat to be in the 80s and her pulse elevated.  He called 911, and when ambulance arrived, EMT noted oxygen tubing to be kinked.  Reports frustration with himself for not realizing tubing kinked.    Discussed discharge needs.  Son denied needing any additional services, reports having home health and palliative care scheduled to start.  Additionally, has necessary dme.  Son will bring portable oxygen tank from home when patient released.    Charge nurse updated.

## 2024-08-17 NOTE — ED PROVIDER NOTES
SCRIBE #1 NOTE: I, Sridevi Avalos, am scribing for, and in the presence of, Salomón Hendrix MD. I have scribed the entire note.       History     Chief Complaint   Patient presents with    Shortness of Breath     Per ems pt was recently discharged from here for CHF exacerbation. This morning pt was found by ems weak and SOB. Pt is supposed to ware O2 all the time at home and when ems arrived she did not have her o2 on and she was 81 RA. Once o2 was applied pt was brought up to 98 on NC.       Review of patient's allergies indicates:   Allergen Reactions    Amlodipine Other (See Comments)     Not sure    Chlorthalidone Other (See Comments)     Not sure    Clonidine Other (See Comments)     Not sure    Codeine Other (See Comments)     Not sure    Escitalopram Other (See Comments)     Not sure    Lisinopril Other (See Comments)     Not sure    Losartan Other (See Comments)     Not sure    Meperidine Other (See Comments)     Not sure    Methadone Other (See Comments)     Not sure      Morphine Other (See Comments)     Not sure    Nebivolol Other (See Comments)     Not sure        Nitrofurantoin Other (See Comments)     Not sure        Omeprazole Other (See Comments)     Not sure      Pravastatin Other (See Comments)     Not sure      Propoxyphene Other (See Comments)     Not sure      Sulfamethoxazole-trimethoprim Other (See Comments)     Not sure             History of Present Illness     HPI    8/17/2024, 11:59 AM  History obtained from the son and pt      History of Present Illness: Serena Post is a 84 y.o. female patient with a PMHx of mechanical valve placement, pacemaker, stroke, A-fib, cancer, HTN, HLD, cardiomyopathy, CHF, and aortic valve disease who presents to the Emergency Department for evaluation of SOB which onset this morning. Pt was discharged from the hospital yesterday. She was admitted for CHF exacerbation on 8/10. Per pt's son, when EMS arrived today they discovered that there was a  kink in the tubing for pt's oxygen. When EMS arrived, pt's O2 sat was 81. Once she was placed back on oxygen, pt's O2 sat increased to 98. Symptoms are constant and moderate in severity. No mitigating or exacerbating factors reported. No associated sxs. Patient denies any and all other sxs at this time. No further complaints or concerns at this time.       Arrival mode: EMS    PCP: Evangelina Olivares MD        Past Medical History:  Past Medical History:   Diagnosis Date    A-fib     Anticoagulant long-term use     Aortic valve disease     Cancer     brain tumor, 10 years ago    Cardiomyopathy     CHF (congestive heart failure)     COVID-19 7/23/2022    Hx of heart valve replacement with mechanical valve     Hyperlipidemia     Hypertension     Pacemaker     Pacemaker     Sepsis 7/23/2022    Stroke     2007, residual weakness       Past Surgical History:  Past Surgical History:   Procedure Laterality Date    AORTIC VALVE REPLACEMENT      CHOLECYSTECTOMY      CORONARY ARTERY BYPASS GRAFT      HYSTERECTOMY      INSERTION OF PACEMAKER      TONSILLECTOMY           Family History:  Family History   Problem Relation Name Age of Onset    No Known Problems Mother      No Known Problems Father         Social History:  Social History     Tobacco Use    Smoking status: Never     Passive exposure: Never    Smokeless tobacco: Never   Substance and Sexual Activity    Alcohol use: Not Currently    Drug use: Never    Sexual activity: Not on file        Review of Systems     Review of Systems   Constitutional:  Negative for fever.   HENT:  Negative for sore throat.    Respiratory:  Positive for shortness of breath.    Cardiovascular:  Negative for chest pain.   Gastrointestinal:  Negative for nausea.   Genitourinary:  Negative for dysuria.   Musculoskeletal:  Negative for back pain.   Skin:  Negative for rash.   Neurological:  Negative for weakness.   Hematological:  Does not bruise/bleed easily.      Physical Exam  "    Initial Vitals [08/17/24 1121]   BP Pulse Resp Temp SpO2   (!) 114/55 92 16 98 °F (36.7 °C) 96 %      MAP       --          Physical Exam  Nursing Notes and Vital Signs Reviewed.  Constitutional: Patient is in no acute distress. Well-developed and well-nourished.  Head: Atraumatic. Normocephalic.  Eyes: PERRL. EOM intact. Conjunctivae are not pale. No scleral icterus.  ENT: Mucous membranes are moist. Oropharynx is clear and symmetric.    Neck: Supple. Full ROM. No lymphadenopathy.  Cardiovascular: Regular rate. Regular rhythm. No murmurs, rubs, or gallops. Distal pulses are 2+ and symmetric.  Pulmonary/Chest: No respiratory distress. Clear to auscultation bilaterally. No wheezing or rales.  Abdominal: Soft and non-distended.  There is no tenderness.  No rebound, guarding, or rigidity. Good bowel sounds.  Genitourinary: No CVA tenderness  Musculoskeletal: Moves all extremities. No obvious deformities. No edema. No calf tenderness.  Skin: Warm and dry.  Neurological:  Alert, awake, and appropriate.  Normal speech.  No acute focal neurological deficits are appreciated.  Psychiatric: Normal affect. Good eye contact. Appropriate in content.     ED Course   Procedures  ED Vital Signs:  Vitals:    08/17/24 1121 08/17/24 1220 08/17/24 1245 08/17/24 1330   BP: (!) 114/55  123/66 (!) 118/57   Pulse: 92 66 66 79   Resp: 16  17 18   Temp: 98 °F (36.7 °C)      TempSrc: Oral      SpO2: 96%  97% 98%   Weight: 81.6 kg (180 lb)      Height: 5' 4" (1.626 m)       08/17/24 1400 08/17/24 1651   BP: (!) 106/55 107/61   Pulse: 60 90   Resp: 16 16   Temp:  97.7 °F (36.5 °C)   TempSrc:  Oral   SpO2: 100% (!) 93%   Weight:     Height:         Abnormal Lab Results:  Labs Reviewed   CBC W/ AUTO DIFFERENTIAL - Abnormal       Result Value    WBC 19.67 (*)     RBC 5.05      Hemoglobin 14.2      Hematocrit 42.5      MCV 84      MCH 28.1      MCHC 33.4      RDW 15.1 (*)     Platelets 178      MPV 11.1      Immature Granulocytes 0.6 (*)     " Gran # (ANC) 16.5 (*)     Immature Grans (Abs) 0.12 (*)     Lymph # 0.8 (*)     Mono # 2.2 (*)     Eos # 0.0      Baso # 0.03      nRBC 0      Gran % 83.7 (*)     Lymph % 4.2 (*)     Mono % 11.3      Eosinophil % 0.0      Basophil % 0.2      Differential Method Automated     COMPREHENSIVE METABOLIC PANEL - Abnormal    Sodium 140      Potassium 3.5      Chloride 88 (*)     CO2 33 (*)     Glucose 132 (*)     BUN 54 (*)     Creatinine 1.4      Calcium 8.8      Total Protein 6.5      Albumin 2.9 (*)     Total Bilirubin 2.8 (*)     Alkaline Phosphatase 146 (*)     AST 67 (*)     ALT 84 (*)     eGFR 38 (*)     Anion Gap 19 (*)    SARS-COV-2 RNA AMPLIFICATION, QUAL - Abnormal    SARS-CoV-2 RNA, Amplification, Qual Positive (*)    B-TYPE NATRIURETIC PEPTIDE - Abnormal    BNP 3,306 (*)    B-TYPE NATRIURETIC PEPTIDE        All Lab Results:  Results for orders placed or performed during the hospital encounter of 08/17/24   CBC Auto Differential   Result Value Ref Range    WBC 19.67 (H) 3.90 - 12.70 K/uL    RBC 5.05 4.00 - 5.40 M/uL    Hemoglobin 14.2 12.0 - 16.0 g/dL    Hematocrit 42.5 37.0 - 48.5 %    MCV 84 82 - 98 fL    MCH 28.1 27.0 - 31.0 pg    MCHC 33.4 32.0 - 36.0 g/dL    RDW 15.1 (H) 11.5 - 14.5 %    Platelets 178 150 - 450 K/uL    MPV 11.1 9.2 - 12.9 fL    Immature Granulocytes 0.6 (H) 0.0 - 0.5 %    Gran # (ANC) 16.5 (H) 1.8 - 7.7 K/uL    Immature Grans (Abs) 0.12 (H) 0.00 - 0.04 K/uL    Lymph # 0.8 (L) 1.0 - 4.8 K/uL    Mono # 2.2 (H) 0.3 - 1.0 K/uL    Eos # 0.0 0.0 - 0.5 K/uL    Baso # 0.03 0.00 - 0.20 K/uL    nRBC 0 0 /100 WBC    Gran % 83.7 (H) 38.0 - 73.0 %    Lymph % 4.2 (L) 18.0 - 48.0 %    Mono % 11.3 4.0 - 15.0 %    Eosinophil % 0.0 0.0 - 8.0 %    Basophil % 0.2 0.0 - 1.9 %    Differential Method Automated    Comprehensive Metabolic Panel   Result Value Ref Range    Sodium 140 136 - 145 mmol/L    Potassium 3.5 3.5 - 5.1 mmol/L    Chloride 88 (L) 95 - 110 mmol/L    CO2 33 (H) 23 - 29 mmol/L    Glucose 132 (H)  70 - 110 mg/dL    BUN 54 (H) 8 - 23 mg/dL    Creatinine 1.4 0.5 - 1.4 mg/dL    Calcium 8.8 8.7 - 10.5 mg/dL    Total Protein 6.5 6.0 - 8.4 g/dL    Albumin 2.9 (L) 3.5 - 5.2 g/dL    Total Bilirubin 2.8 (H) 0.1 - 1.0 mg/dL    Alkaline Phosphatase 146 (H) 55 - 135 U/L    AST 67 (H) 10 - 40 U/L    ALT 84 (H) 10 - 44 U/L    eGFR 38 (A) >60 mL/min/1.73 m^2    Anion Gap 19 (H) 8 - 16 mmol/L   COVID-19 Rapid Screening   Result Value Ref Range    SARS-CoV-2 RNA, Amplification, Qual Positive (A) Negative   BNP   Result Value Ref Range    BNP 3,306 (H) 0 - 99 pg/mL   EKG 12-lead   Result Value Ref Range    QRS Duration 148 ms    [facility] QTC Calculation 486 ms        Imaging Results:  Imaging Results              X-Ray Chest 1 View (Final result)  Result time 08/17/24 12:39:48      Final result by Sara Knight MD (Timothy) (08/17/24 12:39:48)                   Impression:      Lungs appear clear.      Electronically signed by: Sara Knight MD  Date:    08/17/2024  Time:    12:39               Narrative:    EXAMINATION:  XR CHEST 1 VIEW    CLINICAL HISTORY:  , Shortness of breath;    COMPARISON:  Chest, 08/09/2024.    FINDINGS:  One view.  Pacemaker leads are present.  Prior median sternotomy.  Stable cardiomegaly.  Clear lungs.                                       The EKG was ordered, reviewed, and independently interpreted by the ED provider.  Interpretation time: 12:21  Rate: 60 BPM  Rhythm:  Atrial-paced rhythm  Interpretation: RBBB. Left anterior fascicular block. **Bifascicular block.** Minimal voltage criteria for LVH, may be normal variant (R in aVL.) Cannot rule out inferior infarct, age undetermined. No STEMI.           The Emergency Provider reviewed the vital signs and test results, which are outlined above.     ED Discussion       3:55 PM: Reassessed pt at this time. Discussed with pt all pertinent ED information and results. Discussed pt dx and plan of tx. Gave pt all f/u and return to the ED instructions. All  questions and concerns were addressed at this time. Pt expresses understanding of information and instructions, and is comfortable with plan to discharge. Pt is stable for discharge.    I discussed with patient and/or family/caretaker that evaluation in the ED does not suggest any emergent or life threatening medical conditions requiring immediate intervention beyond what was provided in the ED, and I believe patient is safe for discharge.  Regardless, an unremarkable evaluation in the ED does not preclude the development or presence of a serious of life threatening condition. As such, patient was instructed to return immediately for any worsening or change in current symptoms.     ED Course as of 08/26/24 2019   Sat Aug 17, 2024   1257 SARS-CoV-2 RNA, Amplification, Qual(!): Positive []   1257 X-Ray Chest 1 View  My independent interpretation reveals no acute focal consolidation, effusion, or pneumothorax.   []   1322 Social work spoke with the patient and the patient's son at the bedside.  They were instructed to maintain oxygen use at home.  The son states that it was because the line got kinked at home it was an accident, and she will wear it.  They also spoke with the patient's hospitalist Dr. Boykin who feels that the patient needs to be discharged.  We will plan to discharge the patient and have her follow up as planned. []   1331 Cardiac Monitor Interpretation:  This is my independent interpretation.   Rate: 66  Rhythm: NSR  Indication: SOB   []      ED Course User Index  [] Salomón Hendrix MD     Medical Decision Making  Differential diagnoses:  pneumonia, congestive heart failure, acute myocardial infarction, pleural effusion, pulmonary edema, pulmonary embolism, electrolyte imbalance, infectious etiology, COPD exacerbation, asthma exacerbation, pneumothorax, anemia, among others.    This patient was seen in the emergency department for shortness of breath.  Turns out, the patient was just  discharged earlier today from an admission due to shortness of breath.  She was discharged home on oxygen, and unfortunately her oxygen tubing got kinked at home, resulting in a significant episode of hypoxia.  This is what prompted her return to the emergency department.  Here in the emergency department, her O2 level is consistent with her baseline.  The patient's lab work she has a white blood cell count that is increased compared to her discharge, however, it is possible this is her stress response due to the patient's acute hypoxia.  No acute findings right now that would warrant a repeat admission.  Social work evaluated the patient and discussed with her the need to wear oxygen at home.  Patient to be discharged home.  Son given education at the bedside by social work and myself.    Amount and/or Complexity of Data Reviewed  External Data Reviewed: notes.     Details: Reviewed notes pertaining to the patient's hospital admission that terminated earlier today documenting the patient's shortness of breath admission, plan of care, treatment inpatient, and outpatient treatment plans.  Labs: ordered. Decision-making details documented in ED Course.  Radiology: ordered and independent interpretation performed. Decision-making details documented in ED Course.  ECG/medicine tests: ordered and independent interpretation performed. Decision-making details documented in ED Course.    Risk  OTC drugs.  Decision regarding hospitalization.                ED Medication(s):  Medications - No data to display    Discharge Medication List as of 8/17/2024  5:01 PM                  Scribe Attestation:   Scribe #1: I performed the above scribed service and the documentation accurately describes the services I performed. I attest to the accuracy of the note.     Attending:   Physician Attestation Statement for Scribe #1: I, Salomón Hendrix MD, personally performed the services described in this documentation, as scribed by Sridevi  Sandrita Avalos, in my presence, and it is both accurate and complete.       Scribe Attestation:   Scribe #1: I performed the above scribed service and the documentation accurately describes the services I performed. I attest to the accuracy of the note.         Clinical Impression       ICD-10-CM ICD-9-CM   1. Hypoxia  R09.02 799.02   2. Shortness of breath  R06.02 786.05       Disposition:   Disposition: Discharged  Condition: Stable        Salomón Hendrix MD  08/26/24 2019

## 2024-08-17 NOTE — DISCHARGE INSTRUCTIONS
Please follow-up with your PCP. Please call on the next business day to schedule this appointment.    Please use your oxygen at home as instructed! Failure to use the oxygen can result in worsening of your condition.     Return immediately for chest pain, shortness of breath, fevers/chills, nausea/vomiting, or any other concerns.

## 2024-08-18 LAB
OHS QRS DURATION: 148 MS
OHS QTC CALCULATION: 486 MS

## 2024-08-23 ENCOUNTER — PATIENT MESSAGE (OUTPATIENT)
Dept: FAMILY MEDICINE | Facility: CLINIC | Age: 84
End: 2024-08-23
Payer: MEDICARE

## 2024-08-23 ENCOUNTER — HOSPITAL ENCOUNTER (INPATIENT)
Facility: HOSPITAL | Age: 84
LOS: 5 days | Discharge: HOME HEALTH | DRG: 689 | End: 2024-08-28
Attending: EMERGENCY MEDICINE | Admitting: STUDENT IN AN ORGANIZED HEALTH CARE EDUCATION/TRAINING PROGRAM
Payer: MEDICARE

## 2024-08-23 ENCOUNTER — TELEPHONE (OUTPATIENT)
Dept: FAMILY MEDICINE | Facility: CLINIC | Age: 84
End: 2024-08-23
Payer: MEDICARE

## 2024-08-23 DIAGNOSIS — Z79.01 CHRONIC ANTICOAGULATION: ICD-10-CM

## 2024-08-23 DIAGNOSIS — Z95.2 H/O MECHANICAL AORTIC VALVE REPLACEMENT: ICD-10-CM

## 2024-08-23 DIAGNOSIS — N30.00 ACUTE CYSTITIS WITHOUT HEMATURIA: Primary | ICD-10-CM

## 2024-08-23 DIAGNOSIS — R53.1 WEAKNESS: ICD-10-CM

## 2024-08-23 DIAGNOSIS — S40.022A SUPERFICIAL BRUISING OF ARM, LEFT, INITIAL ENCOUNTER: ICD-10-CM

## 2024-08-23 DIAGNOSIS — M79.603 ARM PAIN: ICD-10-CM

## 2024-08-23 DIAGNOSIS — R30.0 DYSURIA: Primary | ICD-10-CM

## 2024-08-23 DIAGNOSIS — Z86.718 HISTORY OF DVT OF LOWER EXTREMITY: ICD-10-CM

## 2024-08-23 DIAGNOSIS — D62 ACUTE BLOOD LOSS ANEMIA: ICD-10-CM

## 2024-08-23 DIAGNOSIS — G93.40 ENCEPHALOPATHY ACUTE: ICD-10-CM

## 2024-08-23 PROBLEM — G93.41 ENCEPHALOPATHY, METABOLIC: Status: ACTIVE | Noted: 2024-08-23

## 2024-08-23 PROBLEM — R17 ELEVATED BILIRUBIN: Status: ACTIVE | Noted: 2024-08-23

## 2024-08-23 LAB
ALBUMIN SERPL BCP-MCNC: 2.8 G/DL (ref 3.5–5.2)
ALP SERPL-CCNC: 124 U/L (ref 55–135)
ALT SERPL W/O P-5'-P-CCNC: 25 U/L (ref 10–44)
ANION GAP SERPL CALC-SCNC: 15 MMOL/L (ref 8–16)
APTT PPP: 44.4 SEC (ref 21–32)
AST SERPL-CCNC: 29 U/L (ref 10–40)
BACTERIA #/AREA URNS HPF: ABNORMAL /HPF
BASOPHILS # BLD AUTO: 0.01 K/UL (ref 0–0.2)
BASOPHILS NFR BLD: 0.1 % (ref 0–1.9)
BILIRUB SERPL-MCNC: 3.7 MG/DL (ref 0.1–1)
BILIRUB UR QL STRIP: NEGATIVE
BNP SERPL-MCNC: 1312 PG/ML (ref 0–99)
BUN SERPL-MCNC: 42 MG/DL (ref 8–23)
CALCIUM SERPL-MCNC: 8.7 MG/DL (ref 8.7–10.5)
CHLORIDE SERPL-SCNC: 89 MMOL/L (ref 95–110)
CLARITY UR: ABNORMAL
CO2 SERPL-SCNC: 36 MMOL/L (ref 23–29)
COLOR UR: YELLOW
CREAT SERPL-MCNC: 1.3 MG/DL (ref 0.5–1.4)
DIFFERENTIAL METHOD BLD: ABNORMAL
EOSINOPHIL # BLD AUTO: 0.1 K/UL (ref 0–0.5)
EOSINOPHIL NFR BLD: 1.3 % (ref 0–8)
ERYTHROCYTE [DISTWIDTH] IN BLOOD BY AUTOMATED COUNT: 15.8 % (ref 11.5–14.5)
EST. GFR  (NO RACE VARIABLE): 41 ML/MIN/1.73 M^2
GLUCOSE SERPL-MCNC: 119 MG/DL (ref 70–110)
GLUCOSE UR QL STRIP: NEGATIVE
HCT VFR BLD AUTO: 31.8 % (ref 37–48.5)
HGB BLD-MCNC: 10.4 G/DL (ref 12–16)
HGB UR QL STRIP: ABNORMAL
HYALINE CASTS #/AREA URNS LPF: 0 /LPF
IMM GRANULOCYTES # BLD AUTO: 0.07 K/UL (ref 0–0.04)
IMM GRANULOCYTES NFR BLD AUTO: 0.7 % (ref 0–0.5)
INR PPP: 1.7 (ref 0.8–1.2)
KETONES UR QL STRIP: NEGATIVE
LACTATE SERPL-SCNC: 1.9 MMOL/L (ref 0.5–2.2)
LEUKOCYTE ESTERASE UR QL STRIP: ABNORMAL
LYMPHOCYTES # BLD AUTO: 1.1 K/UL (ref 1–4.8)
LYMPHOCYTES NFR BLD: 11.1 % (ref 18–48)
MAGNESIUM SERPL-MCNC: 2.5 MG/DL (ref 1.6–2.6)
MCH RBC QN AUTO: 29 PG (ref 27–31)
MCHC RBC AUTO-ENTMCNC: 32.7 G/DL (ref 32–36)
MCV RBC AUTO: 89 FL (ref 82–98)
MICROSCOPIC COMMENT: ABNORMAL
MONOCYTES # BLD AUTO: 0.6 K/UL (ref 0.3–1)
MONOCYTES NFR BLD: 6.2 % (ref 4–15)
NEUTROPHILS # BLD AUTO: 8.2 K/UL (ref 1.8–7.7)
NEUTROPHILS NFR BLD: 80.6 % (ref 38–73)
NITRITE UR QL STRIP: POSITIVE
NRBC BLD-RTO: 0 /100 WBC
PH UR STRIP: >8 [PH] (ref 5–8)
PLATELET # BLD AUTO: 153 K/UL (ref 150–450)
PMV BLD AUTO: 10.6 FL (ref 9.2–12.9)
POTASSIUM SERPL-SCNC: 3 MMOL/L (ref 3.5–5.1)
PROCALCITONIN SERPL IA-MCNC: 0.24 NG/ML
PROT SERPL-MCNC: 6.6 G/DL (ref 6–8.4)
PROT UR QL STRIP: ABNORMAL
PROTHROMBIN TIME: 19.3 SEC (ref 9–12.5)
RBC # BLD AUTO: 3.59 M/UL (ref 4–5.4)
RBC #/AREA URNS HPF: 31 /HPF (ref 0–4)
SODIUM SERPL-SCNC: 140 MMOL/L (ref 136–145)
SP GR UR STRIP: 1.02 (ref 1–1.03)
TROPONIN I SERPL DL<=0.01 NG/ML-MCNC: 0.38 NG/ML (ref 0–0.03)
URN SPEC COLLECT METH UR: ABNORMAL
UROBILINOGEN UR STRIP-ACNC: ABNORMAL EU/DL
WBC # BLD AUTO: 10.2 K/UL (ref 3.9–12.7)
WBC #/AREA URNS HPF: >100 /HPF (ref 0–5)
WBC CLUMPS URNS QL MICRO: ABNORMAL

## 2024-08-23 PROCEDURE — 83880 ASSAY OF NATRIURETIC PEPTIDE: CPT | Performed by: EMERGENCY MEDICINE

## 2024-08-23 PROCEDURE — 83605 ASSAY OF LACTIC ACID: CPT | Performed by: EMERGENCY MEDICINE

## 2024-08-23 PROCEDURE — 63600175 PHARM REV CODE 636 W HCPCS: Performed by: STUDENT IN AN ORGANIZED HEALTH CARE EDUCATION/TRAINING PROGRAM

## 2024-08-23 PROCEDURE — 83735 ASSAY OF MAGNESIUM: CPT | Performed by: EMERGENCY MEDICINE

## 2024-08-23 PROCEDURE — 84145 PROCALCITONIN (PCT): CPT | Performed by: EMERGENCY MEDICINE

## 2024-08-23 PROCEDURE — 85610 PROTHROMBIN TIME: CPT | Performed by: EMERGENCY MEDICINE

## 2024-08-23 PROCEDURE — 27000207 HC ISOLATION

## 2024-08-23 PROCEDURE — 25000003 PHARM REV CODE 250: Performed by: EMERGENCY MEDICINE

## 2024-08-23 PROCEDURE — 87040 BLOOD CULTURE FOR BACTERIA: CPT | Performed by: EMERGENCY MEDICINE

## 2024-08-23 PROCEDURE — 21400001 HC TELEMETRY ROOM

## 2024-08-23 PROCEDURE — 87186 SC STD MICRODIL/AGAR DIL: CPT | Mod: 59 | Performed by: EMERGENCY MEDICINE

## 2024-08-23 PROCEDURE — 84484 ASSAY OF TROPONIN QUANT: CPT | Performed by: EMERGENCY MEDICINE

## 2024-08-23 PROCEDURE — 96374 THER/PROPH/DIAG INJ IV PUSH: CPT

## 2024-08-23 PROCEDURE — 99285 EMERGENCY DEPT VISIT HI MDM: CPT | Mod: 25

## 2024-08-23 PROCEDURE — 85025 COMPLETE CBC W/AUTO DIFF WBC: CPT | Performed by: EMERGENCY MEDICINE

## 2024-08-23 PROCEDURE — 93005 ELECTROCARDIOGRAM TRACING: CPT

## 2024-08-23 PROCEDURE — 85730 THROMBOPLASTIN TIME PARTIAL: CPT | Performed by: EMERGENCY MEDICINE

## 2024-08-23 PROCEDURE — 63600175 PHARM REV CODE 636 W HCPCS: Performed by: EMERGENCY MEDICINE

## 2024-08-23 PROCEDURE — 87088 URINE BACTERIA CULTURE: CPT | Performed by: EMERGENCY MEDICINE

## 2024-08-23 PROCEDURE — 87086 URINE CULTURE/COLONY COUNT: CPT | Performed by: EMERGENCY MEDICINE

## 2024-08-23 PROCEDURE — 93010 ELECTROCARDIOGRAM REPORT: CPT | Mod: ,,, | Performed by: INTERNAL MEDICINE

## 2024-08-23 PROCEDURE — 80053 COMPREHEN METABOLIC PANEL: CPT | Performed by: EMERGENCY MEDICINE

## 2024-08-23 PROCEDURE — 81000 URINALYSIS NONAUTO W/SCOPE: CPT | Performed by: EMERGENCY MEDICINE

## 2024-08-23 RX ORDER — ACETAMINOPHEN 500 MG
500 TABLET ORAL EVERY 6 HOURS PRN
Status: DISCONTINUED | OUTPATIENT
Start: 2024-08-23 | End: 2024-08-28 | Stop reason: HOSPADM

## 2024-08-23 RX ORDER — SODIUM CHLORIDE 0.9 % (FLUSH) 0.9 %
10 SYRINGE (ML) INJECTION
Status: DISCONTINUED | OUTPATIENT
Start: 2024-08-23 | End: 2024-08-28 | Stop reason: HOSPADM

## 2024-08-23 RX ORDER — HYDRALAZINE HYDROCHLORIDE 20 MG/ML
10 INJECTION INTRAMUSCULAR; INTRAVENOUS EVERY 6 HOURS PRN
Status: DISCONTINUED | OUTPATIENT
Start: 2024-08-23 | End: 2024-08-28 | Stop reason: HOSPADM

## 2024-08-23 RX ORDER — SODIUM CHLORIDE, SODIUM LACTATE, POTASSIUM CHLORIDE, CALCIUM CHLORIDE 600; 310; 30; 20 MG/100ML; MG/100ML; MG/100ML; MG/100ML
INJECTION, SOLUTION INTRAVENOUS CONTINUOUS
Status: DISCONTINUED | OUTPATIENT
Start: 2024-08-23 | End: 2024-08-23

## 2024-08-23 RX ORDER — ONDANSETRON HYDROCHLORIDE 2 MG/ML
4 INJECTION, SOLUTION INTRAVENOUS EVERY 8 HOURS PRN
Status: DISCONTINUED | OUTPATIENT
Start: 2024-08-23 | End: 2024-08-28 | Stop reason: HOSPADM

## 2024-08-23 RX ORDER — POTASSIUM CHLORIDE 7.45 MG/ML
10 INJECTION INTRAVENOUS
Status: COMPLETED | OUTPATIENT
Start: 2024-08-23 | End: 2024-08-23

## 2024-08-23 RX ORDER — MIDODRINE HYDROCHLORIDE 5 MG/1
5 TABLET ORAL EVERY 12 HOURS
Status: DISCONTINUED | OUTPATIENT
Start: 2024-08-23 | End: 2024-08-24

## 2024-08-23 RX ORDER — POTASSIUM CHLORIDE 7.45 MG/ML
10 INJECTION INTRAVENOUS
Status: DISPENSED | OUTPATIENT
Start: 2024-08-23 | End: 2024-08-23

## 2024-08-23 RX ADMIN — CEFTRIAXONE 1 G: 1 INJECTION, POWDER, FOR SOLUTION INTRAMUSCULAR; INTRAVENOUS at 12:08

## 2024-08-23 RX ADMIN — POTASSIUM CHLORIDE 10 MEQ: 7.46 INJECTION, SOLUTION INTRAVENOUS at 02:08

## 2024-08-23 RX ADMIN — POTASSIUM CHLORIDE 10 MEQ: 7.46 INJECTION, SOLUTION INTRAVENOUS at 01:08

## 2024-08-23 RX ADMIN — SODIUM CHLORIDE, POTASSIUM CHLORIDE, SODIUM LACTATE AND CALCIUM CHLORIDE: 600; 310; 30; 20 INJECTION, SOLUTION INTRAVENOUS at 02:08

## 2024-08-23 NOTE — ASSESSMENT & PLAN NOTE
Waxing and waning mentation   Considered palliative evaluation, however patient's son who is healthcare power of , declined palliative consultation at this point

## 2024-08-23 NOTE — H&P
"  [facility] - Emergency Dept.  [facility] Medicine  History & Physical    Patient Name: Serena Post  MRN: [id number]  Patient Class: IP- Inpatient  Admission Date: 8/23/2024  Attending Physician: Genny Branch,*   Primary Care Provider: Evangelina Olivares MD         Patient information was obtained from patient and ER records/ son.     Subjective:     Principal Problem:Encephalopathy, metabolic    Chief Complaint:   Chief Complaint   Patient presents with    Hypotension     Pt with a hx of COPD and recurrent UTIs presents to the ER with c/o AMS, possible bleeding, and hypotension. L arm edema and bruising noted. Pt on 2.5L O2 via NC. Pt currently on 3L O2. Takes Lovenox d/t DVT to "one of her legs"; recently started taking Midodrine on 08/16/2024        HPI: Serena Post is a 84 y.o. female patient with a PMHx of HTN, HLD, CHF, A fib, CVA, cancer, COPD and a PSHx of s/p aortic valve replacement, s/p cholecystectomy, s/p insertion of pacemaker who presents to the Emergency Department for evaluation of AMS. The pt's son states that the pt usually does not talk much but has recently been below her baseline for the past 1-2 days. He states that the pt lives at home with him and receives care from pallitive care and home health. He states that he is concerned because the pt bruises/bleeds easily. The pt's son states that the pt's LUE is bruised and swollen. He mentions that they use the pt's arms to move her around. Pt's son states that the pt was recently seen here in the ED 3 times this month and was admitted twice (81/24 and 8/9/24). The pt's son also mentions that he is worried that the pt may have a UTI. Symptoms are constant and moderate in severity. Patient's son states that the pt was taking coumadin, but recently began taking Lovenox and mentions that she was recently dx with a DVT in her LLE. The pt is currently on 3L O2 at home. The pt's son states that the pt is compliant with " all of her medications. She recently began taking midodrine on 8/16/24. He also states that she took all of her medications this morning except for her Lovenox. Pt's son also states that the pt had an US on the LUE done at home 2 days ago. No further complaints or concerns at this time.     Past Medical History:   Diagnosis Date    A-fib     Anticoagulant long-term use     Aortic valve disease     Cancer     brain tumor, 10 years ago    Cardiomyopathy     CHF (congestive heart failure)     COVID-19 7/23/2022    Hx of heart valve replacement with mechanical valve     Hyperlipidemia     Hypertension     Pacemaker     Pacemaker     Sepsis 7/23/2022    Stroke     2007, residual weakness       Past Surgical History:   Procedure Laterality Date    AORTIC VALVE REPLACEMENT      CHOLECYSTECTOMY      CORONARY ARTERY BYPASS GRAFT      HYSTERECTOMY      INSERTION OF PACEMAKER      TONSILLECTOMY         Review of patient's allergies indicates:   Allergen Reactions    Amlodipine Other (See Comments)     Not sure    Chlorthalidone Other (See Comments)     Not sure    Clonidine Other (See Comments)     Not sure    Codeine Other (See Comments)     Not sure    Escitalopram Other (See Comments)     Not sure    Lisinopril Other (See Comments)     Not sure    Losartan Other (See Comments)     Not sure    Meperidine Other (See Comments)     Not sure    Methadone Other (See Comments)     Not sure      Morphine Other (See Comments)     Not sure    Nebivolol Other (See Comments)     Not sure        Nitrofurantoin Other (See Comments)     Not sure        Omeprazole Other (See Comments)     Not sure      Pravastatin Other (See Comments)     Not sure      Propoxyphene Other (See Comments)     Not sure      Sulfamethoxazole-trimethoprim Other (See Comments)     Not sure           No current facility-administered medications on file prior to encounter.     Current Outpatient Medications on File Prior to Encounter   Medication Sig    enoxaparin  (LOVENOX) 100 mg/mL Syrg Inject 0.9 mLs (90 mg total) into the skin every 12 (twelve) hours. for 14 days. Discard the remainings of each syringe    metoprolol succinate (TOPROL-XL) 25 MG 24 hr tablet Take 1 tablet by mouth every evening.    midodrine (PROAMATINE) 10 MG tablet Take 1 tablet (10 mg total) by mouth 3 (three) times daily with meals.    albuterol (PROVENTIL) 2.5 mg /3 mL (0.083 %) nebulizer solution Take 2.5 mg by nebulization every 4 (four) hours as needed.    fluticasone propionate (FLONASE) 50 mcg/actuation nasal spray Shake well and use 2 sprays (100 mcg total) by Each Nostril route once daily.    furosemide (LASIX) 20 MG tablet Take 2 tablets (40 mg total) by mouth once daily. Hold for low blood pressure. Do not give if systolic blood pressure is below 110 and/or diastolic is below 60    miconazole nitrate 2% (MICOTIN) 2 % Oint Apply topically 2 (two) times daily. for 7 days    multivitamin Tab Take 1 tablet by mouth once daily.    pulse oximeter (PULSE OXIMETER) device by Apply Externally route 2 (two) times a day. Use twice daily at 8 AM and 3 PM and [facility].    warfarin (COUMADIN) 2.5 MG tablet Take 2.5 mg by mouth every Monday and Friday.  Take 1.25 mg by mouth all other days.     Family History       Problem Relation (Age of Onset)    No Known Problems Mother, Father          Tobacco Use    Smoking status: Never     Passive exposure: Never    Smokeless tobacco: Never   Substance and Sexual Activity    Alcohol use: Not Currently    Drug use: Never    Sexual activity: Not on file     Review of Systems      Unable to perform ROS: Mental status change   Hematological:  Bruises/bleeds easily (LUE).   Psychiatric/Behavioral:          (+) AMS       Objective:     Vital Signs (Most Recent):  Temp: 97.4 °F (36.3 °C) (08/23/24 1420)  Pulse: 65 (08/23/24 1233)  Resp: 20 (08/23/24 1233)  BP: (!) 101/57 (08/23/24 1233)  SpO2: 99 % (08/23/24 1233) Vital Signs (24h  Range):  Temp:  [97.4 °F (36.3 °C)] 97.4 °F (36.3 °C)  Pulse:  [62-97] 65  Resp:  [18-20] 20  SpO2:  [97 %-100 %] 99 %  BP: ()/(55-57) 101/57     Weight: 78.6 kg (173 lb 4.5 oz)  Body mass index is 30.7 kg/m².     Physical Exam         Constitutional: Patient is in no acute distress. Patient is morbidly obese and chronically ill-appearing.   Head: Atraumatic. Normocephalic.  Eyes: PERRL. EOM intact. Conjunctivae are not pale. No scleral icterus.  ENT: Mucous membranes are dry. Oropharynx is clear and symmetric.    Cardiovascular: Regular rate. Regular rhythm. No murmurs, rubs, or gallops. Distal pulses are 2+ and symmetric.  Pulmonary/Chest: No respiratory distress. Clear to auscultation bilaterally. No wheezing or rales.  Abdominal: Soft and non-distended.  There is no tenderness.  No rebound, guarding, or rigidity.  Musculoskeletal: There is extensive soft tissue bruising throughout the entire LUE and L hand. Compartments are soft. There is dependent edema to the LUE and L hand. There is no bony deformity, no tenderness, and no BLE swelling. Patient is neurovascularly intact.   Neurological:  Alert and awake.   Psychiatric: Patient does not make eye contact, engage in hx taking, or conversation.             Significant Labs: All pertinent labs within the past 24 hours have been reviewed.  CBC:   Recent Labs   Lab 08/23/24  1105   WBC 10.20   HGB 10.4*   HCT 31.8*        CMP:   Recent Labs   Lab 08/23/24  1105      K 3.0*   CL 89*   CO2 36*   *   BUN 42*   CREATININE 1.3   CALCIUM 8.7   PROT 6.6   ALBUMIN 2.8*   BILITOT 3.7*   ALKPHOS 124   AST 29   ALT 25   ANIONGAP 15       Significant Imaging:     Imaging Results              CT Head Without Contrast (Final result)  Result time 08/23/24 12:25:32      Final result by Weston Escobar MD (08/23/24 12:25:32)                   Impression:      No acute intracranial CT abnormality.    All CT scans at this facility are performed  using dose  modulation techniques as appropriate to performed exam including the following:  automated exposure control; adjustment of mA and/or kV according to the patients size (this includes techniques or standardized protocols for targeted exams where dose is matched to indication/reason for exam: i.e. extremities or head);  iterative reconstruction technique.      Electronically signed by: Weston Escobar  Date:    08/23/2024  Time:    12:25               Narrative:    EXAMINATION:  CT HEAD WITHOUT CONTRAST    CLINICAL HISTORY:  Mental status change, unknown cause;    TECHNIQUE:  Low dose axial CT images obtained throughout the head without intravenous contrast. Sagittal and coronal reconstructions were performed.    COMPARISON:  Multiple.    FINDINGS:  Intracranial compartment:    Ventricles and sulci are normal in size for age without evidence of hydrocephalus. No extra-axial blood or fluid collections.    Moderate microvascular ischemic changes.  Remote left parietal encephalomalacia.  No parenchymal mass, hemorrhage, edema or major vascular distribution infarct.    Skull/extracranial contents (limited evaluation): No fracture. Mastoid air cells and paranasal sinuses are essentially clear.                                       X-Ray Chest AP Portable (Final result)  Result time 08/23/24 11:19:38      Final result by Weston Escobar MD (08/23/24 11:19:38)                   Impression:      No acute abnormality.      Electronically signed by: Weston Escobar  Date:    08/23/2024  Time:    11:19               Narrative:    EXAMINATION:  XR CHEST AP PORTABLE    CLINICAL HISTORY:  weakness;    TECHNIQUE:  Single frontal view of the chest was performed.    COMPARISON:  Multiple    FINDINGS:  Right chest cardiac pacing device.    The lungs are clear, with normal appearance of pulmonary vasculature and no pleural effusion or pneumothorax.    The cardiac silhouette is normal in size. The hilar and mediastinal contours are  unremarkable.    Bones are intact.                                       X-Ray Forearm Left (Final result)  Result time 08/23/24 11:20:49      Final result by Weston Escobar MD (08/23/24 11:20:49)                   Impression:      As above.  Correlation and further evaluation as warranted.      Electronically signed by: Weston Escobar  Date:    08/23/2024  Time:    11:20               Narrative:    EXAMINATION:  XR FOREARM LEFT    CLINICAL HISTORY:  Contusion of left upper arm, initial encounter    TECHNIQUE:  AP and lateral views of the left forearm were performed.    COMPARISON:  None    FINDINGS:  No fracture.  No traumatic malalignment.  No osseous destructive process.  Moderate degenerative changes.  Extensive soft tissue swelling.                                       X-Ray Elbow Complete Left (Final result)  Result time 08/23/24 11:22:38      Final result by Weston Escobar MD (08/23/24 11:22:38)                   Impression:      As above.      Electronically signed by: Weston Escobar  Date:    08/23/2024  Time:    11:22               Narrative:    EXAMINATION:  XR ELBOW COMPLETE 3 VIEW LEFT    CLINICAL HISTORY:  Pain in arm, unspecified    TECHNIQUE:  AP, lateral, and oblique views of the left elbow were performed.    COMPARISON:  None    FINDINGS:  No acute displaced fracture.  No traumatic malalignment.  No osseous destructive process.  Some intra-articular loose bodies suspected.  Advanced degenerative change.  Extensive soft tissue swelling.                                       X-Ray Humerus 2 View Left (Final result)  Result time 08/23/24 11:23:41      Final result by Weston Escobar MD (08/23/24 11:23:41)                   Impression:      As above.      Electronically signed by: Weston Escobar  Date:    08/23/2024  Time:    11:23               Narrative:    EXAMINATION:  XR HUMERUS 2 VIEW LEFT    CLINICAL HISTORY:  Contusion of left upper arm, initial encounter    TECHNIQUE:  Two views left  humerus    COMPARISON:  None    FINDINGS:  No acute displaced fracture.  No traumatic malalignment.  No osseous destructive process.  Age expected degenerative changes of the shoulder.  Extensive soft tissue swelling.                                     Assessment/Plan:     * Encephalopathy, metabolic  Likely secondary to UTI   CT head negative   Follow up on urine cultures, ammonia levels         Elevated bilirubin  Follow up on ultrasound right upper quadrant   Avoid hepatotoxins   Follow up on CMP      Acute cystitis without hematuria  UA suggestive of UTI   Empiric antibiotics   Follow up on urine cultures         COPD without exacerbation  Currently not in acute exacerbation   Supplemental oxygen support to maintain SpO2 above 90  Katey p.r.n.    Severe obesity (BMI 35.0-39.9) with comorbidity  Body mass index is 30.7 kg/m². Morbid obesity complicates all aspects of disease management from diagnostic modalities to treatment. Weight loss encouraged and health benefits explained to patient.         Cognitive impairment  Waxing and waning mentation   Considered palliative evaluation, however patient's son who is healthcare power of , declined palliative consultation at this point         Paroxysmal atrial fibrillation  On Toprol   Hypotensive/low heart rate on arrival, held Toprol for now   We will monitor and adjust regimen   Holding anticoagulation at this point given concerns for bleeding       History of DVT (deep vein thrombosis)  Recent history of DVT   On Lovenox until arrival   Noted to have bruising/swelling over left upper extremity   Family concerned about bleeding issues, preferred Heme-Onc evaluation for alternative options  Heme-Onc consulted   Patient prefers to hold blood thinners at this point until further Heme-Onc recommendations- held accordingly       Essential hypertension  Latest blood pressure and vitals reviewed-     Temp:  [97.4 °F (36.3 °C)]   Pulse:  [62-97]   Resp:  [18-20]    BP: ()/(55-57)   SpO2:  [97 %-100 %] .   Home meds for hypertension were reviewed and noted below.   Hypertension Medications               metoprolol succinate (TOPROL-XL) 25 MG 24 hr tablet Take 1 tablet by mouth every evening.    furosemide (LASIX) 20 MG tablet Take 2 tablets (40 mg total) by mouth once daily. Hold for low blood pressure. Do not give if systolic blood pressure is below 110 and/or diastolic is below 60            Hypotensive on arrival, hold blood pressure medications as of now   Monitor and adjust regimen   Hydralazine p.r.n.         VTE Risk Mitigation (From admission, onward)           Ordered     IP VTE HIGH RISK PATIENT  Once         08/23/24 1633     Place sequential compression device  Until discontinued         08/23/24 1633                                    Genny Branch MD  Department of Hospital Medicine  [facility] - Emergency Dept.

## 2024-08-23 NOTE — HPI
Serena Post is a 84 y.o. female patient with a PMHx of HTN, HLD, CHF, A fib, CVA, cancer, COPD and a PSHx of s/p aortic valve replacement, s/p cholecystectomy, s/p insertion of pacemaker who presents to the Emergency Department for evaluation of AMS. The pt's son states that the pt usually does not talk much but has recently been below her baseline for the past 1-2 days. He states that the pt lives at home with him and receives care from pallitive care and home health. He states that he is concerned because the pt bruises/bleeds easily. The pt's son states that the pt's LUE is bruised and swollen. He mentions that they use the pt's arms to move her around. Pt's son states that the pt was recently seen here in the ED 3 times this month and was admitted twice (81/24 and 8/9/24). The pt's son also mentions that he is worried that the pt may have a UTI. Symptoms are constant and moderate in severity. Patient's son states that the pt was taking coumadin, but recently began taking Lovenox and mentions that she was recently dx with a DVT in her LLE. The pt is currently on 3L O2 at home. The pt's son states that the pt is compliant with all of her medications. She recently began taking midodrine on 8/16/24. He also states that she took all of her medications this morning except for her Lovenox. Pt's son also states that the pt had an US on the LUE done at home 2 days ago. No further complaints or concerns at this time.

## 2024-08-23 NOTE — SUBJECTIVE & OBJECTIVE
Past Medical History:   Diagnosis Date    A-fib     Anticoagulant long-term use     Aortic valve disease     Cancer     brain tumor, 10 years ago    Cardiomyopathy     CHF (congestive heart failure)     COVID-19 7/23/2022    Hx of heart valve replacement with mechanical valve     Hyperlipidemia     Hypertension     Pacemaker     Pacemaker     Sepsis 7/23/2022    Stroke     2007, residual weakness       Past Surgical History:   Procedure Laterality Date    AORTIC VALVE REPLACEMENT      CHOLECYSTECTOMY      CORONARY ARTERY BYPASS GRAFT      HYSTERECTOMY      INSERTION OF PACEMAKER      TONSILLECTOMY         Review of patient's allergies indicates:   Allergen Reactions    Amlodipine Other (See Comments)     Not sure    Chlorthalidone Other (See Comments)     Not sure    Clonidine Other (See Comments)     Not sure    Codeine Other (See Comments)     Not sure    Escitalopram Other (See Comments)     Not sure    Lisinopril Other (See Comments)     Not sure    Losartan Other (See Comments)     Not sure    Meperidine Other (See Comments)     Not sure    Methadone Other (See Comments)     Not sure      Morphine Other (See Comments)     Not sure    Nebivolol Other (See Comments)     Not sure        Nitrofurantoin Other (See Comments)     Not sure        Omeprazole Other (See Comments)     Not sure      Pravastatin Other (See Comments)     Not sure      Propoxyphene Other (See Comments)     Not sure      Sulfamethoxazole-trimethoprim Other (See Comments)     Not sure           No current facility-administered medications on file prior to encounter.     Current Outpatient Medications on File Prior to Encounter   Medication Sig    enoxaparin (LOVENOX) 100 mg/mL Syrg Inject 0.9 mLs (90 mg total) into the skin every 12 (twelve) hours. for 14 days. Discard the remainings of each syringe    metoprolol succinate (TOPROL-XL) 25 MG 24 hr tablet Take 1 tablet by mouth every evening.    midodrine (PROAMATINE) 10 MG tablet Take 1 tablet  (10 mg total) by mouth 3 (three) times daily with meals.    albuterol (PROVENTIL) 2.5 mg /3 mL (0.083 %) nebulizer solution Take 2.5 mg by nebulization every 4 (four) hours as needed.    fluticasone propionate (FLONASE) 50 mcg/actuation nasal spray Shake well and use 2 sprays (100 mcg total) by Each Nostril route once daily.    furosemide (LASIX) 20 MG tablet Take 2 tablets (40 mg total) by mouth once daily. Hold for low blood pressure. Do not give if systolic blood pressure is below 110 and/or diastolic is below 60    miconazole nitrate 2% (MICOTIN) 2 % Oint Apply topically 2 (two) times daily. for 7 days    multivitamin Tab Take 1 tablet by mouth once daily.    pulse oximeter (PULSE OXIMETER) device by Apply Externally route 2 (two) times a day. Use twice daily at 8 AM and 3 PM and record the value in [facility] as directed.    warfarin (COUMADIN) 2.5 MG tablet Take 2.5 mg by mouth every Monday and Friday.  Take 1.25 mg by mouth all other days.     Family History       Problem Relation (Age of Onset)    No Known Problems Mother, Father          Tobacco Use    Smoking status: Never     Passive exposure: Never    Smokeless tobacco: Never   Substance and Sexual Activity    Alcohol use: Not Currently    Drug use: Never    Sexual activity: Not on file     Review of Systems      Unable to perform ROS: Mental status change   Hematological:  Bruises/bleeds easily (LUE).   Psychiatric/Behavioral:          (+) AMS       Objective:     Vital Signs (Most Recent):  Temp: 97.4 °F (36.3 °C) (08/23/24 1420)  Pulse: 65 (08/23/24 1233)  Resp: 20 (08/23/24 1233)  BP: (!) 101/57 (08/23/24 1233)  SpO2: 99 % (08/23/24 1233) Vital Signs (24h Range):  Temp:  [97.4 °F (36.3 °C)] 97.4 °F (36.3 °C)  Pulse:  [62-97] 65  Resp:  [18-20] 20  SpO2:  [97 %-100 %] 99 %  BP: ()/(55-57) 101/57     Weight: 78.6 kg (173 lb 4.5 oz)  Body mass index is 30.7 kg/m².     Physical Exam         Constitutional: Patient is in no acute distress. Patient is  morbidly obese and chronically ill-appearing.   Head: Atraumatic. Normocephalic.  Eyes: PERRL. EOM intact. Conjunctivae are not pale. No scleral icterus.  ENT: Mucous membranes are dry. Oropharynx is clear and symmetric.    Cardiovascular: Regular rate. Regular rhythm. No murmurs, rubs, or gallops. Distal pulses are 2+ and symmetric.  Pulmonary/Chest: No respiratory distress. Clear to auscultation bilaterally. No wheezing or rales.  Abdominal: Soft and non-distended.  There is no tenderness.  No rebound, guarding, or rigidity.  Musculoskeletal: There is extensive soft tissue bruising throughout the entire LUE and L hand. Compartments are soft. There is dependent edema to the LUE and L hand. There is no bony deformity, no tenderness, and no BLE swelling. Patient is neurovascularly intact.   Neurological:  Alert and awake.   Psychiatric: Patient does not make eye contact, engage in hx taking, or conversation.             Significant Labs: All pertinent labs within the past 24 hours have been reviewed.  CBC:   Recent Labs   Lab 08/23/24  1105   WBC 10.20   HGB 10.4*   HCT 31.8*        CMP:   Recent Labs   Lab 08/23/24  1105      K 3.0*   CL 89*   CO2 36*   *   BUN 42*   CREATININE 1.3   CALCIUM 8.7   PROT 6.6   ALBUMIN 2.8*   BILITOT 3.7*   ALKPHOS 124   AST 29   ALT 25   ANIONGAP 15       Significant Imaging:     Imaging Results              CT Head Without Contrast (Final result)  Result time 08/23/24 12:25:32      Final result by Weston Escobar MD (08/23/24 12:25:32)                   Impression:      No acute intracranial CT abnormality.    All CT scans at this facility are performed  using dose modulation techniques as appropriate to performed exam including the following:  automated exposure control; adjustment of mA and/or kV according to the patients size (this includes techniques or standardized protocols for targeted exams where dose is matched to indication/reason for exam: i.e.  extremities or head);  iterative reconstruction technique.      Electronically signed by: Weston Escobar  Date:    08/23/2024  Time:    12:25               Narrative:    EXAMINATION:  CT HEAD WITHOUT CONTRAST    CLINICAL HISTORY:  Mental status change, unknown cause;    TECHNIQUE:  Low dose axial CT images obtained throughout the head without intravenous contrast. Sagittal and coronal reconstructions were performed.    COMPARISON:  Multiple.    FINDINGS:  Intracranial compartment:    Ventricles and sulci are normal in size for age without evidence of hydrocephalus. No extra-axial blood or fluid collections.    Moderate microvascular ischemic changes.  Remote left parietal encephalomalacia.  No parenchymal mass, hemorrhage, edema or major vascular distribution infarct.    Skull/extracranial contents (limited evaluation): No fracture. Mastoid air cells and paranasal sinuses are essentially clear.                                       X-Ray Chest AP Portable (Final result)  Result time 08/23/24 11:19:38      Final result by Weston Escobar MD (08/23/24 11:19:38)                   Impression:      No acute abnormality.      Electronically signed by: Weston Escobar  Date:    08/23/2024  Time:    11:19               Narrative:    EXAMINATION:  XR CHEST AP PORTABLE    CLINICAL HISTORY:  weakness;    TECHNIQUE:  Single frontal view of the chest was performed.    COMPARISON:  Multiple    FINDINGS:  Right chest cardiac pacing device.    The lungs are clear, with normal appearance of pulmonary vasculature and no pleural effusion or pneumothorax.    The cardiac silhouette is normal in size. The hilar and mediastinal contours are unremarkable.    Bones are intact.                                       X-Ray Forearm Left (Final result)  Result time 08/23/24 11:20:49      Final result by Weston Escobar MD (08/23/24 11:20:49)                   Impression:      As above.  Correlation and further evaluation as  warranted.      Electronically signed by: Weston Escobar  Date:    08/23/2024  Time:    11:20               Narrative:    EXAMINATION:  XR FOREARM LEFT    CLINICAL HISTORY:  Contusion of left upper arm, initial encounter    TECHNIQUE:  AP and lateral views of the left forearm were performed.    COMPARISON:  None    FINDINGS:  No fracture.  No traumatic malalignment.  No osseous destructive process.  Moderate degenerative changes.  Extensive soft tissue swelling.                                       X-Ray Elbow Complete Left (Final result)  Result time 08/23/24 11:22:38      Final result by Weston Escobar MD (08/23/24 11:22:38)                   Impression:      As above.      Electronically signed by: Weston Escobar  Date:    08/23/2024  Time:    11:22               Narrative:    EXAMINATION:  XR ELBOW COMPLETE 3 VIEW LEFT    CLINICAL HISTORY:  Pain in arm, unspecified    TECHNIQUE:  AP, lateral, and oblique views of the left elbow were performed.    COMPARISON:  None    FINDINGS:  No acute displaced fracture.  No traumatic malalignment.  No osseous destructive process.  Some intra-articular loose bodies suspected.  Advanced degenerative change.  Extensive soft tissue swelling.                                       X-Ray Humerus 2 View Left (Final result)  Result time 08/23/24 11:23:41      Final result by Weston Escobar MD (08/23/24 11:23:41)                   Impression:      As above.      Electronically signed by: Weston Escobra  Date:    08/23/2024  Time:    11:23               Narrative:    EXAMINATION:  XR HUMERUS 2 VIEW LEFT    CLINICAL HISTORY:  Contusion of left upper arm, initial encounter    TECHNIQUE:  Two views left humerus    COMPARISON:  None    FINDINGS:  No acute displaced fracture.  No traumatic malalignment.  No osseous destructive process.  Age expected degenerative changes of the shoulder.  Extensive soft tissue swelling.

## 2024-08-23 NOTE — PLAN OF CARE
"[facility] - Emergency Dept.  Initial Discharge Assessment       Primary Care Provider: Evangelina Olivares MD    Admission Diagnosis: Acute cystitis without hematuria [N30.00]    Admission Date: 8/23/2024  Expected Discharge Date:     Transition of Care Barriers: None    Payor: MEDICARE / Plan: MEDICARE PART A & B / Product Type: Government /     Extended Emergency Contact Information  Primary Emergency Contact: Amish Post  Mobile Phone: [phone]  Relation: Son  Preferred language: English   needed? No    Discharge Plan A: Home Health  Discharge Plan B: Hospice/home      [id number] - [place], [place] - [address]  [address]  [place] [address]  Phone: [phone] Fax: [phone]      Initial Assessment (most recent)       Adult Discharge Assessment - 08/23/24 1337          Discharge Assessment    Assessment Type Discharge Planning Assessment     Confirmed/corrected address, phone number and insurance Yes     Source of Information family     Communicated RENE with patient/caregiver Date not available/Unable to determine     People in Home child(solange), adult     Do you expect to return to your current living situation? Yes     Do you have help at home or someone to help you manage your care at home? Yes     Who are your caregiver(s) and their phone number(s)? Amish melchor     Prior to hospitilization cognitive status: --   "comes and goes"    Current cognitive status: --   increased confusion    Walking or Climbing Stairs Difficulty yes     Walking or Climbing Stairs ambulation difficulty, requires equipment     Mobility Management WC, walker     Dressing/Bathing Difficulty yes     Dressing/Bathing bathing difficulty, requires equipment;bathing difficulty, assistance 1 person;dressing difficulty, assistance 1 person     Dressing/Bathing Management shower chair, family     Home Accessibility wheelchair accessible     Equipment " Currently Used at Home walker, rolling;wheelchair;shower chair;bedside commode;oxygen     Readmission within 30 days? Yes     Patient currently being followed by outpatient case management? No     Do you currently have service(s) that help you manage your care at home? Yes     Name and Contact number of agency [facility]; [facility]iative care of [facility]     Is the pt/caregiver preference to resume services with current agency Yes     Do you have prescription coverage? Yes     Do you have any problems affording any of your prescribed medications? No     Is the patient taking medications as prescribed? yes     Who is going to help you get home at discharge? family     How do you get to doctors appointments? family or friend will provide     Are you on dialysis? No     Do you take coumadin? No   switched to Lovenox    Discharge Plan A Home Health     Discharge Plan B Hospice/home     DME Needed Upon Discharge  none     Discharge Plan discussed with: Adult children     Transition of Care Barriers None        Physical Activity    On average, how many days per week do you engage in moderate to strenuous exercise (like a brisk walk)? 0 days     On average, how many minutes do you engage in exercise at this level? 0 min        Financial Resource Strain    How hard is it for you to pay for the very basics like food, housing, medical care, and heating? Not very hard        Housing Stability    In the last 12 months, was there a time when you were not able to pay the mortgage or rent on time? No     At any time in the past 12 months, were you homeless or living in a shelter (including now)? No        Transportation Needs    Has the lack of transportation kept you from medical appointments, meetings, work or from getting things needed for daily living? No        Food Insecurity    Within the past 12 months, you worried that your food would run out before you got the money to buy more. Never true     Within the past  12 months, the food you bought just didn't last and you didn't have money to get more. Never true        Stress    Do you feel stress - tense, restless, nervous, or anxious, or unable to sleep at night because your mind is troubled all the time - these days? Not at all        Social Isolation    How often do you feel lonely or isolated from those around you?  Never        Alcohol Use    Q1: How often do you have a drink containing alcohol? Never     Q2: How many drinks containing alcohol do you have on a typical day when you are drinking? Patient does not drink     Q3: How often do you have six or more drinks on one occasion? Never        Utilities    In the past 12 months has the electric, gas, oil, or water company threatened to shut off services in your home? No        Health Literacy    How often do you need to have someone help you when you read instructions, pamphlets, or other written material from your doctor or pharmacy? Sometimes                      Current with [facility]  and Palliative care of .  Home oxygen per [place].

## 2024-08-23 NOTE — ASSESSMENT & PLAN NOTE
On Toprol   Hypotensive/low heart rate on arrival, held Toprol for now   We will monitor and adjust regimen   Holding anticoagulation at this point given concerns for bleeding

## 2024-08-23 NOTE — ED NOTES
Bed: 13  Expected date:   Expected time:   Means of arrival: Personal Transportation  Comments:  
Pt coughed vigorously after swallowing water during swallow screen. Blood noted in sputum that was coughed up. Pt had dried blood to lips on initial assessment that was cleaned. MD notified.    
Report called to CHELSEA Cochran  
detailed exam

## 2024-08-23 NOTE — ASSESSMENT & PLAN NOTE
Currently not in acute exacerbation   Supplemental oxygen support to maintain SpO2 above 90  Katey kim.

## 2024-08-23 NOTE — ASSESSMENT & PLAN NOTE
Latest blood pressure and vitals reviewed-     Temp:  [97.4 °F (36.3 °C)]   Pulse:  [62-97]   Resp:  [18-20]   BP: ()/(55-57)   SpO2:  [97 %-100 %] .   Home meds for hypertension were reviewed and noted below.   Hypertension Medications               metoprolol succinate (TOPROL-XL) 25 MG 24 hr tablet Take 1 tablet by mouth every evening.    furosemide (LASIX) 20 MG tablet Take 2 tablets (40 mg total) by mouth once daily. Hold for low blood pressure. Do not give if systolic blood pressure is below 110 and/or diastolic is below 60            Hypotensive on arrival, hold blood pressure medications as of now   Monitor and adjust regimen   Hydralazine p.r.n.

## 2024-08-23 NOTE — ASSESSMENT & PLAN NOTE
Body mass index is 30.7 kg/m². Morbid obesity complicates all aspects of disease management from diagnostic modalities to treatment. Weight loss encouraged and health benefits explained to patient.

## 2024-08-23 NOTE — ED PROVIDER NOTES
"SCRIBE #1 NOTE: I, Henrietta Matias, am scribing for, and in the presence of, Chely Bailey MD. I have scribed the entire note.       History     Chief Complaint   Patient presents with    Hypotension     Pt with a hx of COPD and recurrent UTIs presents to the ER with c/o AMS, possible bleeding, and hypotension. L arm edema and bruising noted. Pt on 2.5L O2 via NC. Pt currently on 3L O2. Takes Lovenox d/t DVT to "one of her legs"; recently started taking Midodrine on 08/16/2024     Review of patient's allergies indicates:   Allergen Reactions    Amlodipine Other (See Comments)     Not sure    Chlorthalidone Other (See Comments)     Not sure    Clonidine Other (See Comments)     Not sure    Codeine Other (See Comments)     Not sure    Escitalopram Other (See Comments)     Not sure    Lisinopril Other (See Comments)     Not sure    Losartan Other (See Comments)     Not sure    Meperidine Other (See Comments)     Not sure    Methadone Other (See Comments)     Not sure      Morphine Other (See Comments)     Not sure    Nebivolol Other (See Comments)     Not sure        Nitrofurantoin Other (See Comments)     Not sure        Omeprazole Other (See Comments)     Not sure      Pravastatin Other (See Comments)     Not sure      Propoxyphene Other (See Comments)     Not sure      Sulfamethoxazole-trimethoprim Other (See Comments)     Not sure             History of Present Illness     HPI    8/23/2024, 10:29 AM  History obtained from the son      History of Present Illness: Serena Post is a 84 y.o. female patient with a PMHx of HTN, HLD, CHF, A fib, CVA, cancer, COPD and a PSHx of s/p aortic valve replacement, s/p cholecystectomy, s/p insertion of pacemaker who presents to the Emergency Department for evaluation of AMS. The pt's son states that the pt usually does not talk much but has recently been below her baseline for the past 1-2 days. He states that the pt lives at home with him and receives care from " pallitive care and home health. He states that he is concerned because the pt bruises/bleeds easily. The pt's son states that the pt's LUE is bruised and swollen. He mentions that they use the pt's arms to move her around. Pt's son states that the pt was recently seen here in the ED 3 times this month and was admitted twice (81/24 and 8/9/24). The pt's son also mentions that he is worried that the pt may have a UTI. Symptoms are constant and moderate in severity. Patient's son states that the pt was taking coumadin, but recently began taking Lovenox and mentions that she was recently dx with a DVT in her LLE. The pt is currently on 3L O2 at home. The pt's son states that the pt is compliant with all of her medications. She recently began taking midodrine on 8/16/24. He also states that she took all of her medications this morning except for her Lovenox. Pt's son also states that the pt had an US on the LUE done at home 2 days ago. No further complaints or concerns at this time.       Arrival mode: Personal vehicle    PCP: Evangelina Olivares MD        Past Medical History:  Past Medical History:   Diagnosis Date    A-fib     Anticoagulant long-term use     Aortic valve disease     Cancer     brain tumor, 10 years ago    Cardiomyopathy     CHF (congestive heart failure)     COVID-19 7/23/2022    Hx of heart valve replacement with mechanical valve     Hyperlipidemia     Hypertension     Pacemaker     Pacemaker     Sepsis 7/23/2022    Stroke     2007, residual weakness       Past Surgical History:  Past Surgical History:   Procedure Laterality Date    AORTIC VALVE REPLACEMENT      CHOLECYSTECTOMY      CORONARY ARTERY BYPASS GRAFT      HYSTERECTOMY      INSERTION OF PACEMAKER      TONSILLECTOMY           Family History:  Family History   Problem Relation Name Age of Onset    No Known Problems Mother      No Known Problems Father         Social History:  Social History     Tobacco Use    Smoking status: Never      Passive exposure: Never    Smokeless tobacco: Never   Substance and Sexual Activity    Alcohol use: Not Currently    Drug use: Never    Sexual activity: Not on file        Review of Systems     Review of Systems   Unable to perform ROS: Mental status change   Hematological:  Bruises/bleeds easily (LUE).   Psychiatric/Behavioral:          (+) AMS      Physical Exam     Initial Vitals [08/23/24 0946]   BP Pulse Resp Temp SpO2   (!) 94/57 97 18 97.4 °F (36.3 °C) 97 %      MAP       --          Physical Exam  Nursing Notes and Vital Signs Reviewed.  Constitutional: Patient is in no acute distress. Patient is morbidly obese and chronically ill-appearing.   Head: Atraumatic. Normocephalic.  Eyes: PERRL. EOM intact. Conjunctivae are not pale. No scleral icterus.  ENT: Mucous membranes are dry. Oropharynx is clear and symmetric.    Cardiovascular: Regular rate. Regular rhythm. No murmurs, rubs, or gallops. Distal pulses are 2+ and symmetric.  Pulmonary/Chest: No respiratory distress. Clear to auscultation bilaterally. No wheezing or rales.  Abdominal: Soft and non-distended.  There is no tenderness.  No rebound, guarding, or rigidity.  Musculoskeletal: There is extensive soft tissue bruising throughout the entire LUE and L hand. Compartments are soft. There is dependent edema to the LUE and L hand. There is no bony deformity, no tenderness, and no BLE swelling. Patient is neurovascularly intact.   Neurological:  Alert and awake.   Psychiatric: Patient does not make eye contact, engage in hx taking, or conversation.              ED Course   Critical Care    Date/Time: 8/27/2024 1:00 PM    Performed by: Chely Bailey MD  Authorized by: Chely Bailey MD  Direct patient critical care time: 45 minutes  Additional history critical care time: 8 minutes  Ordering / reviewing critical care time: 8 minutes  Documentation critical care time: 8 minutes  Consulting other physicians critical care time: 6 minutes  Consult with  family critical care time: 6 minutes  Total critical care time (exclusive of procedural time) : 81 minutes  Critical care was necessary to treat or prevent imminent or life-threatening deterioration of the following conditions: sepsis, renal failure, circulatory failure, cardiac failure, dehydration, respiratory failure and CNS failure or compromise.  Critical care was time spent personally by me on the following activities: blood draw for specimens, discussions with consultants, development of treatment plan with patient or surrogate, interpretation of cardiac output measurements, evaluation of patient's response to treatment, examination of patient, obtaining history from patient or surrogate, review of old charts, re-evaluation of patient's condition, pulse oximetry, ordering and review of radiographic studies, ordering and review of laboratory studies and ordering and performing treatments and interventions.        ED Vital Signs:  Vitals:    08/26/24 0849 08/26/24 0901 08/26/24 1045 08/26/24 1101   BP: (!) 90/55  (!) 107/57    Pulse:  72 71 67   Resp:   16    Temp:   98 °F (36.7 °C)    TempSrc:   Oral    SpO2:   100%    Weight:       Height:        08/26/24 1301 08/26/24 1400 08/26/24 1501 08/26/24 1545   BP:    124/76   Pulse: 77 78 73 104   Resp:    16   Temp:    98 °F (36.7 °C)   TempSrc:    Oral   SpO2:    99%   Weight:  77 kg (169 lb 12.1 oz)     Height:        08/26/24 1701 08/26/24 2022 08/26/24 2026 08/26/24 2039   BP:  100/64     Pulse: 69 80 81 79   Resp:  18 18    Temp:  97.8 °F (36.6 °C)     TempSrc:  Oral     SpO2:  99% 97%    Weight:  76.7 kg (169 lb 1.5 oz)     Height:        08/27/24 0011 08/27/24 0343 08/27/24 0515   BP: (!) 108/56  (!) 110/59   Pulse: 67 70 78   Resp: 18  18   Temp: 97.2 °F (36.2 °C)  97.1 °F (36.2 °C)   TempSrc: Tympanic  Axillary   SpO2: 100%  (!) 90%   Weight:      Height:          Abnormal Lab Results:  Labs Reviewed   CBC W/ AUTO DIFFERENTIAL - Abnormal       Result Value     WBC 10.20      RBC 3.59 (*)     Hemoglobin 10.4 (*)     Hematocrit 31.8 (*)     MCV 89      MCH 29.0      MCHC 32.7      RDW 15.8 (*)     Platelets 153      MPV 10.6      Immature Granulocytes 0.7 (*)     Gran # (ANC) 8.2 (*)     Immature Grans (Abs) 0.07 (*)     Lymph # 1.1      Mono # 0.6      Eos # 0.1      Baso # 0.01      nRBC 0      Gran % 80.6 (*)     Lymph % 11.1 (*)     Mono % 6.2      Eosinophil % 1.3      Basophil % 0.1      Differential Method Automated     COMPREHENSIVE METABOLIC PANEL - Abnormal    Sodium 140      Potassium 3.0 (*)     Chloride 89 (*)     CO2 36 (*)     Glucose 119 (*)     BUN 42 (*)     Creatinine 1.3      Calcium 8.7      Total Protein 6.6      Albumin 2.8 (*)     Total Bilirubin 3.7 (*)     Alkaline Phosphatase 124      AST 29      ALT 25      eGFR 41 (*)     Anion Gap 15     TROPONIN I - Abnormal    Troponin I 0.377 (*)    B-TYPE NATRIURETIC PEPTIDE - Abnormal    BNP 1,312 (*)    URINALYSIS, REFLEX TO URINE CULTURE - Abnormal    Specimen UA Urine, Catheterized      Color, UA Yellow      Appearance, UA Hazy (*)     pH, UA >8.0 (*)     Specific Gravity, UA 1.020      Protein, UA 1+ (*)     Glucose, UA Negative      Ketones, UA Negative      Bilirubin (UA) Negative      Occult Blood UA Trace (*)     Nitrite, UA Positive (*)     Urobilinogen, UA 4.0-6.0 (*)     Leukocytes, UA 3+ (*)     Narrative:     Specimen Source->Urine   PROTIME-INR - Abnormal    Prothrombin Time 19.3 (*)     INR 1.7 (*)    APTT - Abnormal    aPTT 44.4 (*)    URINALYSIS MICROSCOPIC - Abnormal    RBC, UA 31 (*)     WBC, UA >100 (*)     WBC Clumps, UA Many (*)     Bacteria Many (*)     Hyaline Casts, UA 0      Microscopic Comment SEE COMMENT      Narrative:     Specimen Source->Urine   CULTURE, BLOOD   CULTURE, BLOOD   LACTIC ACID, PLASMA    Lactate (Lactic Acid) 1.9     PROCALCITONIN    Procalcitonin 0.24     MAGNESIUM   MAGNESIUM    Magnesium 2.5          All Lab Results:  Results for orders placed or performed  during the hospital encounter of 08/23/24   Urine culture    Specimen: Urine   Result Value Ref Range    Urine Culture, Routine PROVIDENCIA RETTGERI  >100,000 cfu/ml   (A)     Urine Culture, Routine KLEBSIELLA PNEUMONIAE  10,000 - 49,999 cfu/ml   (A)        Susceptibility    Klebsiella pneumoniae - CULTURE, URINE     Amp/Sulbactam <=8/4 Sensitive mcg/mL     Ceftriaxone <=1 Sensitive mcg/mL     Cefazolin <=2 Sensitive mcg/mL     Ciprofloxacin <=0.25 Sensitive mcg/mL     Cefepime <=2 Sensitive mcg/mL     Ertapenem <=0.5 Sensitive mcg/mL     Nitrofurantoin 64 Intermediate mcg/mL     Gentamicin <=2 Sensitive mcg/mL     Levofloxacin <=0.5 Sensitive mcg/mL     Meropenem <=1 Sensitive mcg/mL     Piperacillin/Tazo <=8 Sensitive mcg/mL     Trimeth/Sulfa <=2/38 Sensitive mcg/mL     Tobramycin <=2 Sensitive mcg/mL    Providencia rettgeri - CULTURE, URINE     Amp/Sulbactam 16/8 Intermediate mcg/mL     Ceftriaxone <=1 Sensitive mcg/mL     Ciprofloxacin <=0.25 Sensitive mcg/mL     Cefepime <=2 Sensitive mcg/mL     Ertapenem <=0.5 Sensitive mcg/mL     Gentamicin <=2 Sensitive mcg/mL     Levofloxacin <=0.5 Sensitive mcg/mL     Meropenem <=1 Sensitive mcg/mL     Piperacillin/Tazo 32 Resistant mcg/mL     Trimeth/Sulfa <=2/38 Sensitive mcg/mL     Tobramycin <=2 Sensitive mcg/mL   Blood Culture #1 **CANNOT BE ORDERED STAT**    Specimen: Peripheral, Forearm, Left; Blood   Result Value Ref Range    Blood Culture, Routine No Growth to date     Blood Culture, Routine No Growth to date     Blood Culture, Routine No Growth to date     Blood Culture, Routine No Growth to date    Blood Culture #2 **CANNOT BE ORDERED STAT**    Specimen: Peripheral, Forearm, Right; Blood   Result Value Ref Range    Blood Culture, Routine No Growth to date     Blood Culture, Routine No Growth to date     Blood Culture, Routine No Growth to date     Blood Culture, Routine No Growth to date    CBC auto differential   Result Value Ref Range    WBC 10.20 3.90 - 12.70  K/uL    RBC 3.59 (L) 4.00 - 5.40 M/uL    Hemoglobin 10.4 (L) 12.0 - 16.0 g/dL    Hematocrit 31.8 (L) 37.0 - 48.5 %    MCV 89 82 - 98 fL    MCH 29.0 27.0 - 31.0 pg    MCHC 32.7 32.0 - 36.0 g/dL    RDW 15.8 (H) 11.5 - 14.5 %    Platelets 153 150 - 450 K/uL    MPV 10.6 9.2 - 12.9 fL    Immature Granulocytes 0.7 (H) 0.0 - 0.5 %    Gran # (ANC) 8.2 (H) 1.8 - 7.7 K/uL    Immature Grans (Abs) 0.07 (H) 0.00 - 0.04 K/uL    Lymph # 1.1 1.0 - 4.8 K/uL    Mono # 0.6 0.3 - 1.0 K/uL    Eos # 0.1 0.0 - 0.5 K/uL    Baso # 0.01 0.00 - 0.20 K/uL    nRBC 0 0 /100 WBC    Gran % 80.6 (H) 38.0 - 73.0 %    Lymph % 11.1 (L) 18.0 - 48.0 %    Mono % 6.2 4.0 - 15.0 %    Eosinophil % 1.3 0.0 - 8.0 %    Basophil % 0.1 0.0 - 1.9 %    Differential Method Automated    Comprehensive metabolic panel   Result Value Ref Range    Sodium 140 136 - 145 mmol/L    Potassium 3.0 (L) 3.5 - 5.1 mmol/L    Chloride 89 (L) 95 - 110 mmol/L    CO2 36 (H) 23 - 29 mmol/L    Glucose 119 (H) 70 - 110 mg/dL    BUN 42 (H) 8 - 23 mg/dL    Creatinine 1.3 0.5 - 1.4 mg/dL    Calcium 8.7 8.7 - 10.5 mg/dL    Total Protein 6.6 6.0 - 8.4 g/dL    Albumin 2.8 (L) 3.5 - 5.2 g/dL    Total Bilirubin 3.7 (H) 0.1 - 1.0 mg/dL    Alkaline Phosphatase 124 55 - 135 U/L    AST 29 10 - 40 U/L    ALT 25 10 - 44 U/L    eGFR 41 (A) >60 mL/min/1.73 m^2    Anion Gap 15 8 - 16 mmol/L   Troponin I #1   Result Value Ref Range    Troponin I 0.377 (H) 0.000 - 0.026 ng/mL   BNP   Result Value Ref Range    BNP 1,312 (H) 0 - 99 pg/mL   Urinalysis, Reflex to Urine Culture Urine, Catheterized    Specimen: Urine   Result Value Ref Range    Specimen UA Urine, Catheterized     Color, UA Yellow Yellow, Straw, Kamla    Appearance, UA Hazy (A) Clear    pH, UA >8.0 (A) 5.0 - 8.0    Specific Gravity, UA 1.020 1.005 - 1.030    Protein, UA 1+ (A) Negative    Glucose, UA Negative Negative    Ketones, UA Negative Negative    Bilirubin (UA) Negative Negative    Occult Blood UA Trace (A) Negative    Nitrite, UA  Positive (A) Negative    Urobilinogen, UA 4.0-6.0 (A) <2.0 EU/dL    Leukocytes, UA 3+ (A) Negative   Lactic acid, plasma   Result Value Ref Range    Lactate (Lactic Acid) 1.9 0.5 - 2.2 mmol/L   Protime-INR   Result Value Ref Range    Prothrombin Time 19.3 (H) 9.0 - 12.5 sec    INR 1.7 (H) 0.8 - 1.2   APTT   Result Value Ref Range    aPTT 44.4 (H) 21.0 - 32.0 sec   Urinalysis Microscopic   Result Value Ref Range    RBC, UA 31 (H) 0 - 4 /hpf    WBC, UA >100 (H) 0 - 5 /hpf    WBC Clumps, UA Many (A) None-Rare    Bacteria Many (A) None-Occ /hpf    Hyaline Casts, UA 0 0-1/lpf /lpf    Microscopic Comment SEE COMMENT    Procalcitonin   Result Value Ref Range    Procalcitonin 0.24 <0.25 ng/mL   Magnesium   Result Value Ref Range    Magnesium 2.5 1.6 - 2.6 mg/dL   Protime-INR   Result Value Ref Range    Prothrombin Time 16.3 (H) 9.0 - 12.5 sec    INR 1.5 (H) 0.8 - 1.2   Comprehensive Metabolic Panel   Result Value Ref Range    Sodium 139 136 - 145 mmol/L    Potassium 2.9 (L) 3.5 - 5.1 mmol/L    Chloride 92 (L) 95 - 110 mmol/L    CO2 34 (H) 23 - 29 mmol/L    Glucose 96 70 - 110 mg/dL    BUN 33 (H) 8 - 23 mg/dL    Creatinine 0.9 0.5 - 1.4 mg/dL    Calcium 8.1 (L) 8.7 - 10.5 mg/dL    Total Protein 5.3 (L) 6.0 - 8.4 g/dL    Albumin 2.4 (L) 3.5 - 5.2 g/dL    Total Bilirubin 2.8 (H) 0.1 - 1.0 mg/dL    Alkaline Phosphatase 109 55 - 135 U/L    AST 26 10 - 40 U/L    ALT 21 10 - 44 U/L    eGFR >60 >60 mL/min/1.73 m^2    Anion Gap 13 8 - 16 mmol/L   Ammonia   Result Value Ref Range    Ammonia 38 10 - 50 umol/L   CBC auto differential   Result Value Ref Range    WBC 10.22 3.90 - 12.70 K/uL    RBC 2.96 (L) 4.00 - 5.40 M/uL    Hemoglobin 8.6 (L) 12.0 - 16.0 g/dL    Hematocrit 26.2 (L) 37.0 - 48.5 %    MCV 89 82 - 98 fL    MCH 29.1 27.0 - 31.0 pg    MCHC 32.8 32.0 - 36.0 g/dL    RDW 15.9 (H) 11.5 - 14.5 %    Platelets 137 (L) 150 - 450 K/uL    MPV 10.9 9.2 - 12.9 fL    Immature Granulocytes 0.6 (H) 0.0 - 0.5 %    Gran # (ANC) 7.6 1.8 - 7.7  K/uL    Immature Grans (Abs) 0.06 (H) 0.00 - 0.04 K/uL    Lymph # 1.4 1.0 - 4.8 K/uL    Mono # 0.8 0.3 - 1.0 K/uL    Eos # 0.3 0.0 - 0.5 K/uL    Baso # 0.01 0.00 - 0.20 K/uL    nRBC 0 0 /100 WBC    Gran % 74.6 (H) 38.0 - 73.0 %    Lymph % 13.8 (L) 18.0 - 48.0 %    Mono % 8.1 4.0 - 15.0 %    Eosinophil % 2.8 0.0 - 8.0 %    Basophil % 0.1 0.0 - 1.9 %    Differential Method Automated    Comprehensive Metabolic Panel   Result Value Ref Range    Sodium 139 136 - 145 mmol/L    Potassium 2.7 (LL) 3.5 - 5.1 mmol/L    Chloride 93 (L) 95 - 110 mmol/L    CO2 35 (H) 23 - 29 mmol/L    Glucose 91 70 - 110 mg/dL    BUN 29 (H) 8 - 23 mg/dL    Creatinine 0.9 0.5 - 1.4 mg/dL    Calcium 8.0 (L) 8.7 - 10.5 mg/dL    Total Protein 5.5 (L) 6.0 - 8.4 g/dL    Albumin 2.2 (L) 3.5 - 5.2 g/dL    Total Bilirubin 2.3 (H) 0.1 - 1.0 mg/dL    Alkaline Phosphatase 106 55 - 135 U/L    AST 27 10 - 40 U/L    ALT 21 10 - 44 U/L    eGFR >60 >60 mL/min/1.73 m^2    Anion Gap 11 8 - 16 mmol/L   CBC auto differential   Result Value Ref Range    WBC 8.55 3.90 - 12.70 K/uL    RBC 2.98 (L) 4.00 - 5.40 M/uL    Hemoglobin 8.6 (L) 12.0 - 16.0 g/dL    Hematocrit 27.0 (L) 37.0 - 48.5 %    MCV 91 82 - 98 fL    MCH 28.9 27.0 - 31.0 pg    MCHC 31.9 (L) 32.0 - 36.0 g/dL    RDW 16.5 (H) 11.5 - 14.5 %    Platelets 141 (L) 150 - 450 K/uL    MPV 11.3 9.2 - 12.9 fL    Immature Granulocytes 0.5 0.0 - 0.5 %    Gran # (ANC) 6.2 1.8 - 7.7 K/uL    Immature Grans (Abs) 0.04 0.00 - 0.04 K/uL    Lymph # 1.3 1.0 - 4.8 K/uL    Mono # 0.7 0.3 - 1.0 K/uL    Eos # 0.4 0.0 - 0.5 K/uL    Baso # 0.01 0.00 - 0.20 K/uL    nRBC 0 0 /100 WBC    Gran % 72.9 38.0 - 73.0 %    Lymph % 14.6 (L) 18.0 - 48.0 %    Mono % 7.7 4.0 - 15.0 %    Eosinophil % 4.2 0.0 - 8.0 %    Basophil % 0.1 0.0 - 1.9 %    Differential Method Automated    Protime-INR   Result Value Ref Range    Prothrombin Time 15.2 (H) 9.0 - 12.5 sec    INR 1.4 (H) 0.8 - 1.2   Iron and TIBC   Result Value Ref Range    Iron 41 30 - 160  ug/dL    Transferrin 197 (L) 200 - 375 mg/dL    TIBC 292 250 - 450 ug/dL    Saturated Iron 14 (L) 20 - 50 %   Ferritin   Result Value Ref Range    Ferritin 160 20.0 - 300.0 ng/mL   Vitamin B12   Result Value Ref Range    Vitamin B-12 504 210 - 950 pg/mL   Folate   Result Value Ref Range    Folate 5.0 4.0 - 24.0 ng/mL   Comprehensive Metabolic Panel   Result Value Ref Range    Sodium 139 136 - 145 mmol/L    Potassium 3.7 3.5 - 5.1 mmol/L    Chloride 95 95 - 110 mmol/L    CO2 36 (H) 23 - 29 mmol/L    Glucose 93 70 - 110 mg/dL    BUN 25 (H) 8 - 23 mg/dL    Creatinine 0.9 0.5 - 1.4 mg/dL    Calcium 8.3 (L) 8.7 - 10.5 mg/dL    Total Protein 5.5 (L) 6.0 - 8.4 g/dL    Albumin 2.2 (L) 3.5 - 5.2 g/dL    Total Bilirubin 2.4 (H) 0.1 - 1.0 mg/dL    Alkaline Phosphatase 107 55 - 135 U/L    AST 25 10 - 40 U/L    ALT 20 10 - 44 U/L    eGFR >60 >60 mL/min/1.73 m^2    Anion Gap 8 8 - 16 mmol/L   CBC auto differential   Result Value Ref Range    WBC 8.83 3.90 - 12.70 K/uL    RBC 2.96 (L) 4.00 - 5.40 M/uL    Hemoglobin 8.6 (L) 12.0 - 16.0 g/dL    Hematocrit 27.6 (L) 37.0 - 48.5 %    MCV 93 82 - 98 fL    MCH 29.1 27.0 - 31.0 pg    MCHC 31.2 (L) 32.0 - 36.0 g/dL    RDW 17.2 (H) 11.5 - 14.5 %    Platelets 151 150 - 450 K/uL    MPV 10.8 9.2 - 12.9 fL    Immature Granulocytes 0.6 (H) 0.0 - 0.5 %    Gran # (ANC) 6.5 1.8 - 7.7 K/uL    Immature Grans (Abs) 0.05 (H) 0.00 - 0.04 K/uL    Lymph # 1.2 1.0 - 4.8 K/uL    Mono # 0.7 0.3 - 1.0 K/uL    Eos # 0.3 0.0 - 0.5 K/uL    Baso # 0.03 0.00 - 0.20 K/uL    nRBC 0 0 /100 WBC    Gran % 73.2 (H) 38.0 - 73.0 %    Lymph % 14.0 (L) 18.0 - 48.0 %    Mono % 8.4 4.0 - 15.0 %    Eosinophil % 3.5 0.0 - 8.0 %    Basophil % 0.3 0.0 - 1.9 %    Differential Method Automated    Protime-INR   Result Value Ref Range    Prothrombin Time 14.8 (H) 9.0 - 12.5 sec    INR 1.3 (H) 0.8 - 1.2   CBC auto differential   Result Value Ref Range    WBC 7.90 3.90 - 12.70 K/uL    RBC 3.09 (L) 4.00 - 5.40 M/uL    Hemoglobin 9.1 (L)  12.0 - 16.0 g/dL    Hematocrit 28.4 (L) 37.0 - 48.5 %    MCV 92 82 - 98 fL    MCH 29.4 27.0 - 31.0 pg    MCHC 32.0 32.0 - 36.0 g/dL    RDW 17.5 (H) 11.5 - 14.5 %    Platelets 193 150 - 450 K/uL    MPV 10.5 9.2 - 12.9 fL    Immature Granulocytes 0.8 (H) 0.0 - 0.5 %    Gran # (ANC) 5.5 1.8 - 7.7 K/uL    Immature Grans (Abs) 0.06 (H) 0.00 - 0.04 K/uL    Lymph # 1.3 1.0 - 4.8 K/uL    Mono # 0.8 0.3 - 1.0 K/uL    Eos # 0.3 0.0 - 0.5 K/uL    Baso # 0.04 0.00 - 0.20 K/uL    nRBC 0 0 /100 WBC    Gran % 69.8 38.0 - 73.0 %    Lymph % 15.9 (L) 18.0 - 48.0 %    Mono % 9.7 4.0 - 15.0 %    Eosinophil % 3.3 0.0 - 8.0 %    Basophil % 0.5 0.0 - 1.9 %    Differential Method Automated    Protime-INR   Result Value Ref Range    Prothrombin Time 14.1 (H) 9.0 - 12.5 sec    INR 1.3 (H) 0.8 - 1.2   EKG 12-lead   Result Value Ref Range    QRS Duration 148 ms    [facility] QTC Calculation 542 ms        Imaging Results:  Imaging Results              US Abdomen Limited (Final result)  Result time 08/24/24 13:47:50      Final result by Weston Escobar MD (08/24/24 13:47:50)                   Impression:      Probable cirrhosis.    Complete findings as above.      Electronically signed by: Weston Escobar  Date:    08/24/2024  Time:    13:47               Narrative:    EXAMINATION:  US ABDOMEN LIMITED    CLINICAL HISTORY:  elevated tbil;    TECHNIQUE:  Limited ultrasound of the right upper quadrant of the abdomen (including pancreas, liver, gallbladder, common bile duct, and Right kidney) was performed.    COMPARISON:  None.    FINDINGS:  Pancreas: No acute sonographic abnormality.    Liver: Normal in size, measuring 14.2 cm. Nodular contour suggesting cirrhosis. No focal hepatic lesions.    Gallbladder: The gallbladder is surgically absent.    Biliary system: The common duct is not dilated, measuring 4 mm.  No intrahepatic ductal dilatation.    Spleen measures 6.4 cm.    There is a left renal simple cyst incidentally visualized.    Suboptimal exam  secondary to patient factors.                                       CT Head Without Contrast (Final result)  Result time 08/23/24 12:25:32      Final result by Weston Escobar MD (08/23/24 12:25:32)                   Impression:      No acute intracranial CT abnormality.    All CT scans at this facility are performed  using dose modulation techniques as appropriate to performed exam including the following:  automated exposure control; adjustment of mA and/or kV according to the patients size (this includes techniques or standardized protocols for targeted exams where dose is matched to indication/reason for exam: i.e. extremities or head);  iterative reconstruction technique.      Electronically signed by: Weston Escobar  Date:    08/23/2024  Time:    12:25               Narrative:    EXAMINATION:  CT HEAD WITHOUT CONTRAST    CLINICAL HISTORY:  Mental status change, unknown cause;    TECHNIQUE:  Low dose axial CT images obtained throughout the head without intravenous contrast. Sagittal and coronal reconstructions were performed.    COMPARISON:  Multiple.    FINDINGS:  Intracranial compartment:    Ventricles and sulci are normal in size for age without evidence of hydrocephalus. No extra-axial blood or fluid collections.    Moderate microvascular ischemic changes.  Remote left parietal encephalomalacia.  No parenchymal mass, hemorrhage, edema or major vascular distribution infarct.    Skull/extracranial contents (limited evaluation): No fracture. Mastoid air cells and paranasal sinuses are essentially clear.                                       X-Ray Chest AP Portable (Final result)  Result time 08/23/24 11:19:38      Final result by Weston Escobar MD (08/23/24 11:19:38)                   Impression:      No acute abnormality.      Electronically signed by: Weston Escobar  Date:    08/23/2024  Time:    11:19               Narrative:    EXAMINATION:  XR CHEST AP PORTABLE    CLINICAL  HISTORY:  weakness;    TECHNIQUE:  Single frontal view of the chest was performed.    COMPARISON:  Multiple    FINDINGS:  Right chest cardiac pacing device.    The lungs are clear, with normal appearance of pulmonary vasculature and no pleural effusion or pneumothorax.    The cardiac silhouette is normal in size. The hilar and mediastinal contours are unremarkable.    Bones are intact.                                       X-Ray Forearm Left (Final result)  Result time 08/23/24 11:20:49      Final result by Weston Escobar MD (08/23/24 11:20:49)                   Impression:      As above.  Correlation and further evaluation as warranted.      Electronically signed by: Weston Escobar  Date:    08/23/2024  Time:    11:20               Narrative:    EXAMINATION:  XR FOREARM LEFT    CLINICAL HISTORY:  Contusion of left upper arm, initial encounter    TECHNIQUE:  AP and lateral views of the left forearm were performed.    COMPARISON:  None    FINDINGS:  No fracture.  No traumatic malalignment.  No osseous destructive process.  Moderate degenerative changes.  Extensive soft tissue swelling.                                       X-Ray Elbow Complete Left (Final result)  Result time 08/23/24 11:22:38      Final result by Weston Escobar MD (08/23/24 11:22:38)                   Impression:      As above.      Electronically signed by: Weston Escobar  Date:    08/23/2024  Time:    11:22               Narrative:    EXAMINATION:  XR ELBOW COMPLETE 3 VIEW LEFT    CLINICAL HISTORY:  Pain in arm, unspecified    TECHNIQUE:  AP, lateral, and oblique views of the left elbow were performed.    COMPARISON:  None    FINDINGS:  No acute displaced fracture.  No traumatic malalignment.  No osseous destructive process.  Some intra-articular loose bodies suspected.  Advanced degenerative change.  Extensive soft tissue swelling.                                       X-Ray Humerus 2 View Left (Final result)  Result time 08/23/24 11:23:41       Final result by Weston Escobar MD (08/23/24 11:23:41)                   Impression:      As above.      Electronically signed by: Weston Escobar  Date:    08/23/2024  Time:    11:23               Narrative:    EXAMINATION:  XR HUMERUS 2 VIEW LEFT    CLINICAL HISTORY:  Contusion of left upper arm, initial encounter    TECHNIQUE:  Two views left humerus    COMPARISON:  None    FINDINGS:  No acute displaced fracture.  No traumatic malalignment.  No osseous destructive process.  Age expected degenerative changes of the shoulder.  Extensive soft tissue swelling.                                       The EKG was ordered, reviewed, and independently interpreted by the ED provider.  Interpretation time: 9:56  Rate: 94 BPM  Rhythm: normal sinus rhythm  Interpretation: Left axis deviation. RBBB. Minimal voltage criteria for LVH, may be normal variant (R in aVL). Possible Lateral infarct, age undetermined. Inferior infarct, age undetermined. No STEMI.           The Emergency Provider reviewed the vital signs and test results, which are outlined above.     ED Discussion     12:40 PM: Discussed case with Mary Fontanez NP ([facility]). Dr. Branch agrees with current care and management of pt and accepts admission.   Admitting Service: Hospital Medicine  Admitting Physician: Dr. Branch  Admit to: Tele     12:44 PM: Re-evaluated pt. I have discussed test results, shared treatment plan, and the need for admission with patient's son at bedside. Pt's son expresses understanding at this time and agree with all information. All questions answered. Pt's son has no further questions or concerns at this time. Pt is ready for admit.        Medical Decision Making  DDX: 1. Infection 2. Acute on chronic anemia 3. ICH 4. ACS 5. CHF    ECG reviewed and has chronic RBBB, wbc normal, h/h slightly lower than baseline but no indication for transfusion, potassium 3.0, replaced,  BNP and troponin markedly elevated, UTI noted, iv  antibiotics, started, xrays reviewed and no concerns for obvious fracture, CXR normal, CT head negative for acute ischemia chronic findings noted, son at bedside, discussed possibly starting patient on hospice, pateint's son not wanting hospice, hospital med to admit.     Amount and/or Complexity of Data Reviewed  Independent Historian: caregiver  Labs: ordered. Decision-making details documented in ED Course.  Radiology: ordered. Decision-making details documented in ED Course.  ECG/medicine tests: ordered and independent interpretation performed. Decision-making details documented in ED Course.    Risk  Prescription drug management.  Decision regarding hospitalization.  Diagnosis or treatment significantly limited by social determinants of health.       Additional MDM:   Sepsis:   This patient does have evidence of infective focus  My overall impression is sepsis.  Source: Urinary Tract  Antibiotics given- Antibiotics     Patient Encounter Information Not Found      Latest lactate reviewed- 1.9  Organ dysfunction indicated by Encephalopathy    Fluid challenge Contraindicated- Fluid bolus is contraindicated in this patient due to Congestive Heart Failure     Post- resuscitation assessment No - Post resuscitation assessment not needed       Will Not start Pressors- Levophed for MAP of 65  Source control achieved by: iv rocephin                ED Medication(s):  Medications   cefTRIAXone (Rocephin) 1 g in D5W 100 mL IVPB (MB+) (0 g Intravenous Stopped 8/26/24 0931)   potassium chloride 10 mEq in 100 mL IVPB (10 mEq Intravenous Not Given 8/23/24 1703)   multivitamin tablet (1 tablet Oral Given 8/26/24 0849)   sodium chloride 0.9% flush 10 mL (has no administration in time range)   acetaminophen tablet 500 mg (has no administration in time range)   ondansetron injection 4 mg (has no administration in time range)   hydrALAZINE injection 10 mg (has no administration in time range)   midodrine tablet 10 mg (10 mg Oral  Given 8/26/24 2022)   potassium bicarbonate disintegrating tablet 50 mEq (50 mEq Oral Not Given 8/24/24 0945)   ferrous sulfate tablet 1 each (1 each Oral Given 8/26/24 1218)   enoxaparin injection 80 mg (80 mg Subcutaneous Not Given 8/26/24 2100)   cefTRIAXone (Rocephin) 1 g in D5W 100 mL IVPB (MB+) (0 g Intravenous Stopped 8/23/24 1306)   potassium chloride 10 mEq in 100 mL IVPB (0 mEq Intravenous Stopped 8/23/24 1409)   potassium bicarbonate disintegrating tablet 50 mEq (50 mEq Oral Given 8/25/24 1048)       Current Discharge Medication List                  Scribe Attestation:   Scribe #1: I performed the above scribed service and the documentation accurately describes the services I performed. I attest to the accuracy of the note.     Attending:   Physician Attestation Statement for Scribe #1: I, Chely Bailey MD, personally performed the services described in this documentation, as scribed by Henrietta Matias, in my presence, and it is both accurate and complete.           Clinical Impression       ICD-10-CM ICD-9-CM   1. Acute cystitis without hematuria  N30.00 595.0   2. Weakness  R53.1 780.79   3. Superficial bruising of arm, left, initial encounter  S40.022A 923.9   4. Arm pain  M79.603 729.5   5. Encephalopathy acute  G93.40 348.30   6. Chronic anticoagulation  Z79.01 V58.61   7. Acute blood loss anemia  D62 285.1   8. H/O mechanical aortic valve replacement  Z95.2 V43.3   9. History of DVT of lower extremity  Z86.718 V12.51       Disposition:   Disposition: Admitted  Condition: Chely Hernandez MD  08/27/24 0709

## 2024-08-23 NOTE — TELEPHONE ENCOUNTER
Spoke with patient son regarding faxing off paper work to [facility]. Pt. Son verbalized understanding.

## 2024-08-23 NOTE — ASSESSMENT & PLAN NOTE
Recent history of DVT   On Lovenox until arrival   Noted to have bruising/swelling over left upper extremity   Family concerned about bleeding issues, preferred Heme-Onc evaluation for alternative options  Heme-Onc consulted   Patient prefers to hold blood thinners at this point until further Heme-Onc recommendations- held accordingly

## 2024-08-23 NOTE — TELEPHONE ENCOUNTER
----- Message from Flori Roque sent at 8/23/2024  1:42 PM CDT -----  Who Called: Pt    What is the request in detail: Requesting call back to discuss recent missed call, pt unsure. Please advise    Can the clinic reply by [facility]? No    Best Call Back Number: [phone]      Additional Information:   Initial (On Arrival)

## 2024-08-24 LAB
ALBUMIN SERPL BCP-MCNC: 2.4 G/DL (ref 3.5–5.2)
ALP SERPL-CCNC: 109 U/L (ref 55–135)
ALT SERPL W/O P-5'-P-CCNC: 21 U/L (ref 10–44)
AMMONIA PLAS-SCNC: 38 UMOL/L (ref 10–50)
ANION GAP SERPL CALC-SCNC: 13 MMOL/L (ref 8–16)
AST SERPL-CCNC: 26 U/L (ref 10–40)
BASOPHILS # BLD AUTO: 0.01 K/UL (ref 0–0.2)
BASOPHILS NFR BLD: 0.1 % (ref 0–1.9)
BILIRUB SERPL-MCNC: 2.8 MG/DL (ref 0.1–1)
BUN SERPL-MCNC: 33 MG/DL (ref 8–23)
CALCIUM SERPL-MCNC: 8.1 MG/DL (ref 8.7–10.5)
CHLORIDE SERPL-SCNC: 92 MMOL/L (ref 95–110)
CO2 SERPL-SCNC: 34 MMOL/L (ref 23–29)
CREAT SERPL-MCNC: 0.9 MG/DL (ref 0.5–1.4)
DIFFERENTIAL METHOD BLD: ABNORMAL
EOSINOPHIL # BLD AUTO: 0.3 K/UL (ref 0–0.5)
EOSINOPHIL NFR BLD: 2.8 % (ref 0–8)
ERYTHROCYTE [DISTWIDTH] IN BLOOD BY AUTOMATED COUNT: 15.9 % (ref 11.5–14.5)
EST. GFR  (NO RACE VARIABLE): >60 ML/MIN/1.73 M^2
GLUCOSE SERPL-MCNC: 96 MG/DL (ref 70–110)
HCT VFR BLD AUTO: 26.2 % (ref 37–48.5)
HGB BLD-MCNC: 8.6 G/DL (ref 12–16)
IMM GRANULOCYTES # BLD AUTO: 0.06 K/UL (ref 0–0.04)
IMM GRANULOCYTES NFR BLD AUTO: 0.6 % (ref 0–0.5)
INR PPP: 1.5 (ref 0.8–1.2)
LYMPHOCYTES # BLD AUTO: 1.4 K/UL (ref 1–4.8)
LYMPHOCYTES NFR BLD: 13.8 % (ref 18–48)
MCH RBC QN AUTO: 29.1 PG (ref 27–31)
MCHC RBC AUTO-ENTMCNC: 32.8 G/DL (ref 32–36)
MCV RBC AUTO: 89 FL (ref 82–98)
MONOCYTES # BLD AUTO: 0.8 K/UL (ref 0.3–1)
MONOCYTES NFR BLD: 8.1 % (ref 4–15)
NEUTROPHILS # BLD AUTO: 7.6 K/UL (ref 1.8–7.7)
NEUTROPHILS NFR BLD: 74.6 % (ref 38–73)
NRBC BLD-RTO: 0 /100 WBC
PLATELET # BLD AUTO: 137 K/UL (ref 150–450)
PMV BLD AUTO: 10.9 FL (ref 9.2–12.9)
POTASSIUM SERPL-SCNC: 2.9 MMOL/L (ref 3.5–5.1)
PROT SERPL-MCNC: 5.3 G/DL (ref 6–8.4)
PROTHROMBIN TIME: 16.3 SEC (ref 9–12.5)
RBC # BLD AUTO: 2.96 M/UL (ref 4–5.4)
SODIUM SERPL-SCNC: 139 MMOL/L (ref 136–145)
WBC # BLD AUTO: 10.22 K/UL (ref 3.9–12.7)

## 2024-08-24 PROCEDURE — 25000003 PHARM REV CODE 250: Performed by: STUDENT IN AN ORGANIZED HEALTH CARE EDUCATION/TRAINING PROGRAM

## 2024-08-24 PROCEDURE — 36415 COLL VENOUS BLD VENIPUNCTURE: CPT | Performed by: STUDENT IN AN ORGANIZED HEALTH CARE EDUCATION/TRAINING PROGRAM

## 2024-08-24 PROCEDURE — 27000207 HC ISOLATION

## 2024-08-24 PROCEDURE — 92611 MOTION FLUOROSCOPY/SWALLOW: CPT

## 2024-08-24 PROCEDURE — 99223 1ST HOSP IP/OBS HIGH 75: CPT | Mod: ,,, | Performed by: INTERNAL MEDICINE

## 2024-08-24 PROCEDURE — 82140 ASSAY OF AMMONIA: CPT | Performed by: STUDENT IN AN ORGANIZED HEALTH CARE EDUCATION/TRAINING PROGRAM

## 2024-08-24 PROCEDURE — 80053 COMPREHEN METABOLIC PANEL: CPT | Performed by: STUDENT IN AN ORGANIZED HEALTH CARE EDUCATION/TRAINING PROGRAM

## 2024-08-24 PROCEDURE — 63600175 PHARM REV CODE 636 W HCPCS: Performed by: STUDENT IN AN ORGANIZED HEALTH CARE EDUCATION/TRAINING PROGRAM

## 2024-08-24 PROCEDURE — 92610 EVALUATE SWALLOWING FUNCTION: CPT

## 2024-08-24 PROCEDURE — 85610 PROTHROMBIN TIME: CPT | Performed by: STUDENT IN AN ORGANIZED HEALTH CARE EDUCATION/TRAINING PROGRAM

## 2024-08-24 PROCEDURE — 85025 COMPLETE CBC W/AUTO DIFF WBC: CPT | Performed by: STUDENT IN AN ORGANIZED HEALTH CARE EDUCATION/TRAINING PROGRAM

## 2024-08-24 PROCEDURE — 21400001 HC TELEMETRY ROOM

## 2024-08-24 RX ORDER — MIDODRINE HYDROCHLORIDE 5 MG/1
10 TABLET ORAL EVERY 12 HOURS
Status: DISCONTINUED | OUTPATIENT
Start: 2024-08-24 | End: 2024-08-28 | Stop reason: HOSPADM

## 2024-08-24 RX ADMIN — MIDODRINE HYDROCHLORIDE 10 MG: 5 TABLET ORAL at 08:08

## 2024-08-24 RX ADMIN — CEFTRIAXONE 1 G: 1 INJECTION, POWDER, FOR SOLUTION INTRAMUSCULAR; INTRAVENOUS at 08:08

## 2024-08-24 NOTE — PLAN OF CARE
MBSS to assess swallowing. Pt presented with no aspiration on thin liquids, nectar thick liquids, pureed or solids.  Pt is recommended for an IDDSI 6(Soft and Bite Sized) and Thin Liquids (IDDSI 0) with basic swallowing precautions.  Continue S.T. POC.

## 2024-08-24 NOTE — PT/OT/SLP PROGRESS
Speech Language Pathology Treatment  MODIFIED BARIUM SWALLOW STUDY    Patient Name:  Serena Post   MRN:  [id number]  Admitting Diagnosis: Encephalopathy, metabolic    Recommendations:                 General Recommendations:  Dysphagia therapy  Diet recommendations:  IDDSI 6(Soft and Bite-Sized);  Thin Liquids (IDDSI 0)  Aspiration Precautions: Standard aspiration precautions   General Precautions: Standard, aspiration  Communication strategies:  provide increased time to answer and go to room if call light pushed    Assessment:     Serena Post is an 84 y.o. female with an SLP diagnosis of Dysphagia.   Modified Barium Swallow Study completed with following results:   Thin Liquids: minimal oral delays; min pooling into vallecula and pyriform sinuses; No aspiration of material; 1 slight penetration of thin from spoon (this was first swallow).  No further penetration on thin liquids.  Pt needed 2 swallows to clear min residue.  Thick Liquids: minimal oral delays; minimal pooling into vallecula and pyriform sinus;  pt swallowed with no penetration or aspiration and cleared with 2 swallows.  Pureed: Minimal oral delays as she moved material anterior-posterior.  No penetration or aspiration and cleared with 1 swallow.  Cracker: Minimal oral delays as she masticated material. Pt with no penetration or aspiration and cleared material with 2 swallows.    Pt with appropriate tongue base retraction, epiglottic tilt and laryngeal elevation.   Pt did require cues throughout testing to remain alert.  She is recommended for an IDDSI 6(soft and bite sized diet) and thin liquids -IDDSI 0 with  swallowing precautions:  sit upright to eat/drink, small bites/sips, double swallows with each bite/sip to clear material and effortful swallow and assistance with meals.     Subjective     Pt seen in Radiology dept for MBSS  Patient goals: pt wants to start eating; currently NPO status     Pain/Comfort:  Pain Rating 1:  0/10    Respiratory Status: Room air    Objective:     Has the patient been evaluated by SLP for swallowing?   Yes  Keep patient NPO? No   Current Respiratory Status:    RA    See results of MBSS in Assessment above.      Goals:   Multidisciplinary Problems       SLP Goals          Problem: SLP    Goal Priority Disciplines Outcome   SLP Goal     SLP                    GOALS:  1. BSA with full meal to review swallowing precautions.                  2. Pt will complete  swallowing precautions with min A                  3. Effortful swallows x20     Plan:     Patient to be seen:   (3-5X Weekly)   Plan of Care expires:   08/31/24  Plan of Care reviewed with:  patient, son   SLP Follow-Up:    Yes     Discharge recommendations:    determine at discharge  Barriers to Discharge:  Level of Skilled Assistance Needed   and Safety Awareness      Time Tracking:     SLP Treatment Date:   08/31/24  Speech Start Time:  1315  Speech Stop Time:  1345  Speech Total Time (min):  30 min    Billable Minutes: Motion Fluoro Swallow, Cine/Vid 30 min    08/24/2024

## 2024-08-24 NOTE — SUBJECTIVE & OBJECTIVE
Review of Systems      Unable to perform ROS: Mental status change   Hematological:  Bruises/bleeds easily (LUE).   Psychiatric/Behavioral:          (+) AMS       Objective:     Vital Signs (Most Recent):  Temp: 97.9 °F (36.6 °C) (08/24/24 1136)  Pulse: 69 (08/24/24 1136)  Resp: 18 (08/24/24 1136)  BP: (!) 104/51 (08/24/24 1136)  SpO2: (!) 93 % (08/24/24 1136) Vital Signs (24h Range):  Temp:  [97.4 °F (36.3 °C)-98.7 °F (37.1 °C)] 97.9 °F (36.6 °C)  Pulse:  [63-97] 69  Resp:  [17-22] 18  SpO2:  [93 %-100 %] 93 %  BP: ()/(51-65) 104/51     Weight: 78.6 kg (173 lb 4.5 oz)  Body mass index is 30.7 kg/m².    Intake/Output Summary (Last 24 hours) at 8/24/2024 1257  Last data filed at 8/24/2024 0259  Gross per 24 hour   Intake 194.61 ml   Output 500 ml   Net -305.39 ml         Physical Exam      Constitutional: Patient is in no acute distress. Patient is morbidly obese and chronically ill-appearing.   Head: Atraumatic. Normocephalic.  Eyes: PERRL. EOM intact. Conjunctivae are not pale. No scleral icterus.  ENT: Mucous membranes are dry. Oropharynx is clear and symmetric.    Cardiovascular: Regular rate. Regular rhythm. No murmurs, rubs, or gallops. Distal pulses are 2+ and symmetric.  Pulmonary/Chest: No respiratory distress. Clear to auscultation bilaterally. No wheezing or rales.  Abdominal: Soft and non-distended.  There is no tenderness.  No rebound, guarding, or rigidity.  Musculoskeletal: There is extensive soft tissue bruising throughout the entire LUE and L hand. Compartments are soft. There is dependent edema to the LUE and L hand. There is no bony deformity, no tenderness, and no BLE swelling. Patient is neurovascularly intact.   Neurological:  Alert and awake.   Psychiatric: Patient does not make eye contact, engage in hx taking, or conversation.             Significant Labs: All pertinent labs within the past 24 hours have been reviewed.  CBC:   Recent Labs   Lab 08/23/24  1105 08/24/24  0607   WBC  10.20 10.22   HGB 10.4* 8.6*   HCT 31.8* 26.2*    137*     CMP:   Recent Labs   Lab 08/23/24  1105 08/24/24  0512    139   K 3.0* 2.9*   CL 89* 92*   CO2 36* 34*   * 96   BUN 42* 33*   CREATININE 1.3 0.9   CALCIUM 8.7 8.1*   PROT 6.6 5.3*   ALBUMIN 2.8* 2.4*   BILITOT 3.7* 2.8*   ALKPHOS 124 109   AST 29 26   ALT 25 21   ANIONGAP 15 13       Significant Imaging:     Imaging Results              US Abdomen Limited (In process)                      CT Head Without Contrast (Final result)  Result time 08/23/24 12:25:32      Final result by Weston Escobar MD (08/23/24 12:25:32)                   Impression:      No acute intracranial CT abnormality.    All CT scans at this facility are performed  using dose modulation techniques as appropriate to performed exam including the following:  automated exposure control; adjustment of mA and/or kV according to the patients size (this includes techniques or standardized protocols for targeted exams where dose is matched to indication/reason for exam: i.e. extremities or head);  iterative reconstruction technique.      Electronically signed by: Weston Escobar  Date:    08/23/2024  Time:    12:25               Narrative:    EXAMINATION:  CT HEAD WITHOUT CONTRAST    CLINICAL HISTORY:  Mental status change, unknown cause;    TECHNIQUE:  Low dose axial CT images obtained throughout the head without intravenous contrast. Sagittal and coronal reconstructions were performed.    COMPARISON:  Multiple.    FINDINGS:  Intracranial compartment:    Ventricles and sulci are normal in size for age without evidence of hydrocephalus. No extra-axial blood or fluid collections.    Moderate microvascular ischemic changes.  Remote left parietal encephalomalacia.  No parenchymal mass, hemorrhage, edema or major vascular distribution infarct.    Skull/extracranial contents (limited evaluation): No fracture. Mastoid air cells and paranasal sinuses are essentially clear.                                        X-Ray Chest AP Portable (Final result)  Result time 08/23/24 11:19:38      Final result by Weston Escobar MD (08/23/24 11:19:38)                   Impression:      No acute abnormality.      Electronically signed by: Westno Escobar  Date:    08/23/2024  Time:    11:19               Narrative:    EXAMINATION:  XR CHEST AP PORTABLE    CLINICAL HISTORY:  weakness;    TECHNIQUE:  Single frontal view of the chest was performed.    COMPARISON:  Multiple    FINDINGS:  Right chest cardiac pacing device.    The lungs are clear, with normal appearance of pulmonary vasculature and no pleural effusion or pneumothorax.    The cardiac silhouette is normal in size. The hilar and mediastinal contours are unremarkable.    Bones are intact.                                       X-Ray Forearm Left (Final result)  Result time 08/23/24 11:20:49      Final result by Weston Escobar MD (08/23/24 11:20:49)                   Impression:      As above.  Correlation and further evaluation as warranted.      Electronically signed by: Weston Escobar  Date:    08/23/2024  Time:    11:20               Narrative:    EXAMINATION:  XR FOREARM LEFT    CLINICAL HISTORY:  Contusion of left upper arm, initial encounter    TECHNIQUE:  AP and lateral views of the left forearm were performed.    COMPARISON:  None    FINDINGS:  No fracture.  No traumatic malalignment.  No osseous destructive process.  Moderate degenerative changes.  Extensive soft tissue swelling.                                       X-Ray Elbow Complete Left (Final result)  Result time 08/23/24 11:22:38      Final result by Weston Escobar MD (08/23/24 11:22:38)                   Impression:      As above.      Electronically signed by: Weston Escobar  Date:    08/23/2024  Time:    11:22               Narrative:    EXAMINATION:  XR ELBOW COMPLETE 3 VIEW LEFT    CLINICAL HISTORY:  Pain in arm, unspecified    TECHNIQUE:  AP, lateral, and oblique views of  the left elbow were performed.    COMPARISON:  None    FINDINGS:  No acute displaced fracture.  No traumatic malalignment.  No osseous destructive process.  Some intra-articular loose bodies suspected.  Advanced degenerative change.  Extensive soft tissue swelling.                                       X-Ray Humerus 2 View Left (Final result)  Result time 08/23/24 11:23:41      Final result by Weston Escobar MD (08/23/24 11:23:41)                   Impression:      As above.      Electronically signed by: Weston Escobar  Date:    08/23/2024  Time:    11:23               Narrative:    EXAMINATION:  XR HUMERUS 2 VIEW LEFT    CLINICAL HISTORY:  Contusion of left upper arm, initial encounter    TECHNIQUE:  Two views left humerus    COMPARISON:  None    FINDINGS:  No acute displaced fracture.  No traumatic malalignment.  No osseous destructive process.  Age expected degenerative changes of the shoulder.  Extensive soft tissue swelling.

## 2024-08-24 NOTE — PLAN OF CARE
Pt oriented x4.    Pt remained afebrile throughout this shift.   All meds administered per order.   Pt remained free of falls this shift.   Plan of care reviewed. Patient verbalizes understanding.   Pt moving/turning independently.   Patient A paced on monitor.   Soft bite size diet.  Bed low, side rails up x 2, wheels locked, call light in reach.   Patient instructed to call for assistance.  Will continue to monitor.

## 2024-08-24 NOTE — PT/OT/SLP EVAL
Speech Language Pathology Evaluation  Bedside Swallow    Patient Name:  Serena Post   MRN:  [id number]  Admitting Diagnosis: Encephalopathy, metabolic    Recommendations:                 General Recommendations:  Dysphagia therapy  Diet recommendations:  NPO, NPO   Aspiration Precautions: Strict aspiration precautions   General Precautions: Standard, aspiration  Communication strategies:  provide increased time to answer and go to room if call light pushed    Assessment:     Serena Post is a 84 y.o. female with an SLP diagnosis of Dysphagia.  She presents with coughing on thin liquids x1 with slight wet vocal quality.  Pt's son also reported that pt occasionally coughs with thin liquids.  Pt did tolerate pureed and cracker without difficulties but pt is recommended for a MBSS to further assess swallowing with further goals.     History:     Past Medical History:   Diagnosis Date    A-fib     Anticoagulant long-term use     Aortic valve disease     Cancer     brain tumor, 10 years ago    Cardiomyopathy     CHF (congestive heart failure)     COVID-19 7/23/2022    Hx of heart valve replacement with mechanical valve     Hyperlipidemia     Hypertension     Pacemaker     Pacemaker     Sepsis 7/23/2022    Stroke     2007, residual weakness       Past Surgical History:   Procedure Laterality Date    AORTIC VALVE REPLACEMENT      CHOLECYSTECTOMY      CORONARY ARTERY BYPASS GRAFT      HYSTERECTOMY      INSERTION OF PACEMAKER      TONSILLECTOMY         Social History: Patient lives with her son who assists in her care.    Prior Intubation HX:  none reported    Modified Barium Swallow: none reported; pt was seen by S.T. earlier this month (8/12, 8/14)  and was recommended for a IDDSI 6 Soft diet and thin liquids and was tolerating at that time.     Chest X-Rays: new results revealed no abnormalities     Prior diet: Son reported pt eats a regular consistency diet and thin liquids at  home.    Occupation/hobbies/homemaking: n/a.    Subjective     Pt sat up in bed and agreeable to work with S.T.  Patient goals: to go home     Pain/Comfort:  Pain Rating 1: 0/10    Respiratory Status: Room air    Objective:     Oral Musculature Evaluation  Oral Musculature: WFL  Dentition: present and adequate  Velar Elevation: WFL    Bedside Swallow Eval:   Consistencies Assessed:  Thin liquids no delays, no coughing or changes in vocal quality  Puree no delays, no coughing or changes in vocal quality  Solids no delays, no coughing or changes in vocal quality      Oral Phase:   WFL    Pharyngeal Phase:   coughing/choking    Compensatory Strategies  Effortful swallow        Goals:   Multidisciplinary Problems       SLP Goals          Problem: SLP    Goal Priority Disciplines Outcome   SLP Goal     SLP                    MBSS RECOMMENDED TO FURTHER ASSESS SWALLOWING WITH GOALS AS APPROPRIATE.      Plan:     Patient to be seen:   (3-5X Weekly)   Plan of Care expires:     Plan of Care reviewed with:  patient, son   SLP Follow-Up:          Discharge recommendations:    determine at discharge  Barriers to Discharge:  Level of Skilled Assistance Needed      Time Tracking:     SLP Treatment Date:   08/31/24  Speech Start Time:  1018  Speech Stop Time:  1035     Speech Total Time (min):  17 min    Billable Minutes: Eval Swallow and Oral Function 17 min    08/24/2024

## 2024-08-24 NOTE — CONSULTS
Hematology/Oncology  Consult Note   Admission Date: 8/23/2024  Hospital Length of Stay: 1  Code Status: Full Code  Attending Provider:    Consulting Provider: Donna Chahal MD  Primary Care Physician: Evangelina Olivares MD  Principal Problem: Encephalopathy, metabolic    Consult Requested By: Genny Branch,  Reason for Consult: h/o dvt/ afib; has been on lovenox; with subq bleeding; family wants alternate ther options/ hem inputs   HPI   Serena Post is a 84 y.o. female with history of HFrEF, mechanical aortic valve replacement in 2007 on Coumadin followed by the anticoagulation clinic at [facility], history of pacemaker placement, paroxysmal AFib, SVT status post ablation, hypertension, peripheral vascular disease, history of CVA with right-sided weakness and multi-infarct dementia, COPD on 3 L nasal cannula oxygen continuous, history of DVT, and rheumatoid arthritis.    She presented to [facility] ED 08/17/2024 with of acute encephalopathy by her son.  They also reported easy bruising.  This is the patient this is the patient's 3rd admission in August 20, 2024.  She has been diagnosed with COVID 08/01/2024 and UTI.  Per the patient's family she was on Coumadin however has recently been on Lovenox due to DVT.  On admission acute encephalopathy thought like to to be secondary to recurrent UTI.  CT head was negative.  Her anticoagulation was held due to concerns for bleeding.  Heme-Onc was consulted for further anticoagulant recommendations.      On my evaluation this morning the patient is oriented to person and place.  She requests that I speak with her son.      I spoke with her son, Mr. Post who reports that he was told her DVT was a result of COVID infection.  She was on warfarin and has had stable levels without any changes in her warfarin dose recently. She has been on Lovenox but developed bruising and what appears to be a left arm hematoma; he believes that she was  getting a higher dose of Lovenox than what was prescribed by her HH nurse. He did not notice any other bruising.  We discussed my recommendations as outline below.    Review of Systems   Constitutional:  Negative for chills, fever, malaise/fatigue and weight loss.   Respiratory:  Negative for shortness of breath.    Cardiovascular:  Negative for chest pain.   Gastrointestinal:  Negative for abdominal pain, blood in stool, constipation, diarrhea, melena, nausea and vomiting.   Genitourinary:  Negative for frequency.   Neurological:  Negative for dizziness and headaches.     Objective   Continuous Infusions:  Scheduled Meds:   cefTRIAXone (Rocephin) IV (PEDS and ADULTS)  1 g Intravenous Q24H    midodrine  10 mg Oral Q12H    multivitamin  1 tablet Oral Daily     PRN Meds:  Current Facility-Administered Medications:     acetaminophen, 500 mg, Oral, Q6H PRN    hydrALAZINE, 10 mg, Intravenous, Q6H PRN    ondansetron, 4 mg, Intravenous, Q8H PRN    sodium chloride 0.9%, 10 mL, Intravenous, PRN      Vital Signs Range (Last 24H):  Temp:  [97.4 °F (36.3 °C)-98.7 °F (37.1 °C)]   Pulse:  [62-97]   Resp:  [17-22]   BP: ()/(51-65)   SpO2:  [96 %-100 %]     Physical Exam  Constitutional:       General: She is not in acute distress.     Appearance: She is normal weight. She is not ill-appearing or toxic-appearing.   HENT:      Head: Normocephalic and atraumatic.   Cardiovascular:      Rate and Rhythm: Normal rate.   Pulmonary:      Effort: Pulmonary effort is normal.   Abdominal:      General: There is no distension.      Tenderness: There is no abdominal tenderness. There is no guarding.   Musculoskeletal:         General: Swelling (LUE/hematoma/extensive left arm bruising) present.   Skin:     General: Skin is warm.   Neurological:      General: No focal deficit present.      Mental Status: She is alert.   Psychiatric:         Mood and Affect: Mood normal.         Behavior: Behavior normal.       Laboratory   CBC with  "Differential:  Recent Labs   Lab 08/24/24  0607   WBC 10.22   LYMPH 13.8*  1.4   BASOPHIL 0.1   RBC 2.96*   HCT 26.2*   HGB 8.6*   MCV 89   MCH 29.1   RDW 15.9*   *   MPV 10.9     CMP:  Recent Labs   Lab 08/24/24  0512   GLU 96   CALCIUM 8.1*   ALBUMIN 2.4*   PROT 5.3*      K 2.9*   CO2 34*   CL 92*   BUN 33*   CREATININE 0.9   ALKPHOS 109   ALT 21   AST 26   BILITOT 2.8*     BMP:   Recent Labs   Lab 08/24/24  0512   GLU 96   CALCIUM 8.1*      K 2.9*   CO2 34*   CL 92*   BUN 33*   CREATININE 0.9     LFTs:   Recent Labs   Lab 08/24/24  0512   ALT 21   AST 26   ALKPHOS 109   BILITOT 2.8*   PROT 5.3*   ALBUMIN 2.4*     Haptoglobin: No results for input(s): "HAPTOGLOBIN" in the last 24 hours.  Tumor Markers: No results for input(s): "PSA", "CEA", "", "AFPTM", "UX9328", "" in the last 24 hours.    Invalid input(s): "ALGTM"  Immunology: No results for input(s): "SPEP", "INDIA", "ARVIND", "FREELAMBDALI" in the last 24 hours.  Coagulation:   Recent Labs   Lab 08/24/24 0512   INR 1.5*       Microbiology Results (last 7 days)       Procedure Component Value Units Date/Time    Blood Culture #1 **CANNOT BE ORDERED STAT** [[id number]] Collected: 08/23/24 1234    Order Status: Completed Specimen: Blood from Peripheral, Forearm, Left Updated: 08/24/24 0515     Blood Culture, Routine No Growth to date    Blood Culture #2 **CANNOT BE ORDERED STAT** [[id number]] Collected: 08/23/24 1237    Order Status: Completed Specimen: Blood from Peripheral, Forearm, Right Updated: 08/24/24 0515     Blood Culture, Routine No Growth to date    Blood culture [[id number]] Collected: 08/24/24 0512    Order Status: Sent Specimen: Blood     Blood culture [[id number]] Collected: 08/24/24 0511    Order Status: Sent Specimen: Blood     Urine culture [[id number]] Collected: 08/23/24 1036    Order Status: No result Specimen: Urine Updated: 08/23/24 1053          Imaging   US LOWER EXTREMITY VEINS BILATERAL - 08/14/2024     CLINICAL " HISTORY:  dvt;     TECHNIQUE:  Duplex and color flow Doppler and dynamic compression was performed of the bilateral lower extremity veins was performed.     COMPARISON:  08/10/2024     FINDINGS:  Right thigh veins: The common femoral, femoral, popliteal, upper greater saphenous, and deep femoral veins are patent and free of thrombus. The veins are normally compressible and have normal phasic flow and augmentation response.     Right calf veins: The visualized calf veins are patent.     Left thigh veins: Acute DVT seen within the left common femoral vein extending into the proximal femoral vein with involvement of the superficial greater saphenous vein. The mid to distal femoral,The common femoral, femoral, popliteal, upper greater saphenous, and deep femoral veins are patent and free of thrombus. The veins are normally compressible and have normal phasic flow and augmentation response.     Left calf veins: The visualized calf veins are patent.     Miscellaneous: None     Impression:     Acute DVT seen within the left common femoral vein extending into the proximal femoral vein with involvement of the superficial greater saphenous vein.  Recommend anticoagulation.        Electronically signed by:Сергей Martinez MD  Date:                                            08/14/2024  Time:                                           09:44    Assessment and Plan:   Anticoagulation Recommendations  Mechanical heart valve  LLE DVT  Bruising, LUE Hematoma  The anticoagulant of choice in a patient with a mechanical aortic valve is a vitamin K antagonist.   DOACs should not be using patients with mechanical prosthetic heart valves as they have not been adequately studies; dabigatran is contraindicated due to inferior efficacy and safety in comparison with the vitamin K antagonist.  It seems that her DVT may have been provoked by COVID infection; it is also possible that the clot formed while her warfarin levels were subtherapeutic as  they were subtherapeutic in June/July but noted to be therapeutic in August.  She was prescribed 90 mg Lovenox BID for treatment of DVT and warfarin was held.  Also of note, she may have been receiving 110 mg BID per her son; given the excessive dose, this would explain her potentiation for bleeding.  Would recommend once gross bleeding workup completed and ruled out, resume Lovenox at 1mg/kg for 3-6 months. If thrombus resolved, resume warfarin given this is the AC of choice for the mechanical heart valve.  If she cannot be anticoagulated, she will require an IVC filter.    Transfuse 1U PRBC for hemoglobin < 9 given extensive cardiac history        I appreciate the opportunity to participate in this patient's care. Please do not hesitate to contact me with any questions or concerns.     Donna Chahal MD  Hematology/Oncology

## 2024-08-24 NOTE — HOSPITAL COURSE
Serena Post is a 84 y.o. female patient  who presents to the Emergency Department for evaluation of AMS, increased bruises.    He states that the pt lives at home with him and receives care from pallitive care and home health.      pt was recently seen here in the ED 3 times this month and was admitted twice (81/24 and 8/9/24).   Patient's son states that the pt was taking coumadin, but recently began taking Lovenox and mentions that she was recently dx with a DVT in her LLE. The pt is currently on 3L O2 at home.   Pt's son also states that the pt had an US on the LUE done at home 2 days ago. No further complaints or concerns at this time.   On arrival UA suggestive of UTI, currently on empiric antibiotics, pending cultures     S/p SLP eval, MBSS on 8/25/24-- SLP recommended IDDSI 6;    Given questionable concerns about intake of high dose of Lovenox likely might have contributed to bruises per son, consulted Hematology,-- recommended to resume Lovenox at 70 mg b.i.d. once hemoglobin/INR stabilizes, bleeding workup results negative, bruising improves,; per hematology-- after 3 months of AC, If thrombus resolved, recommended to resume warfarin given this is the AC of choice for the mechanical heart valve. If she cannot be anticoagulated, recommended to consider IVC filter.     We will plan to resume Lovenox at 70 mg b.i.d. on 08/26/2024; monitor closely    Of note given patient's age, significant underlying comorbidities, recurrent hospitalizations, had extensive discussion with patient/son at bedside regarding goals of care, opted to continue with DNR DNI now, son stated that he is not open for any further palliative/hospice discussions at this moment, strongly opposed palliative consultation at this time;     08/26/2024  Continue treatment for UTI.  Patient is still confused.  Will start Lovenox.  Continue to monitor inpatient.  Possible DC tomorrow.  08/27/2024  Patient continues to be confused.  Will  obtain MRI brain.  TSH.  Continue Rocephin for UTI.  May need to consider LP should mentation not improve.    Patient's mentation returned to baseline.  She completed IV antibiotic therapy for UTI.  Will continue on Lovenox for 3 months per Hematology recommendations.  Outpatient follow-up with Hematology.  Patient was discharged home with home health and palliative care.

## 2024-08-24 NOTE — PLAN OF CARE
A227/A227 NITHIN Post is a 84 y.o.female admitted on 8/23/2024 for Encephalopathy, metabolic   Code Status: DNR MRN: [id number]   Review of patient's allergies indicates:   Allergen Reactions    Amlodipine Other (See Comments)     Not sure    Chlorthalidone Other (See Comments)     Not sure    Clonidine Other (See Comments)     Not sure    Codeine Other (See Comments)     Not sure    Escitalopram Other (See Comments)     Not sure    Lisinopril Other (See Comments)     Not sure    Losartan Other (See Comments)     Not sure    Meperidine Other (See Comments)     Not sure    Methadone Other (See Comments)     Not sure      Morphine Other (See Comments)     Not sure    Nebivolol Other (See Comments)     Not sure        Nitrofurantoin Other (See Comments)     Not sure        Omeprazole Other (See Comments)     Not sure      Pravastatin Other (See Comments)     Not sure      Propoxyphene Other (See Comments)     Not sure      Sulfamethoxazole-trimethoprim Other (See Comments)     Not sure         Past Medical History:   Diagnosis Date    A-fib     Anticoagulant long-term use     Aortic valve disease     Cancer     brain tumor, 10 years ago    Cardiomyopathy     CHF (congestive heart failure)     COVID-19 7/23/2022    Hx of heart valve replacement with mechanical valve     Hyperlipidemia     Hypertension     Pacemaker     Pacemaker     Sepsis 7/23/2022    Stroke     2007, residual weakness      PRN meds    acetaminophen, 500 mg, Q6H PRN  hydrALAZINE, 10 mg, Q6H PRN  ondansetron, 4 mg, Q8H PRN  sodium chloride 0.9%, 10 mL, PRN      Chart check completed. Will continue plan of care.      Orientation: oriented x 4  [place] Coma Scale Score: 15     Lead Monitored: Lead II Rhythm: paced rhythm, normal sinus rhythm Frequency/Ectopy: PVCs  Cardiac/Telemetry Box Number: [id number]       Diet NPO  Voiding Characteristics: external catheter  Jacobo Score: 15  Fall Risk Score: 6  Accucheck []   Freq?      Lines/Drains/Airways        Peripheral Intravenous Line  Duration                  Peripheral IV - Single Lumen 08/23/24 1654 20 G Anterior;Right Upper Arm <1 day

## 2024-08-24 NOTE — PROGRESS NOTES
[facility] Medicine  Progress Note    Patient Name: Serena Post  MRN: [id number]  Patient Class: IP- Inpatient   Admission Date: 8/23/2024  Length of Stay: 1 days  Attending Physician: Genny Branch,*  Primary Care Provider: Evangelina Olivares MD        Subjective:     Principal Problem:Encephalopathy, metabolic        HPI:  Serena Post is a 84 y.o. female patient with a PMHx of HTN, HLD, CHF, A fib, CVA, cancer, COPD and a PSHx of s/p aortic valve replacement, s/p cholecystectomy, s/p insertion of pacemaker who presents to the Emergency Department for evaluation of AMS. The pt's son states that the pt usually does not talk much but has recently been below her baseline for the past 1-2 days. He states that the pt lives at home with him and receives care from pallitive care and home health. He states that he is concerned because the pt bruises/bleeds easily. The pt's son states that the pt's LUE is bruised and swollen. He mentions that they use the pt's arms to move her around. Pt's son states that the pt was recently seen here in the ED 3 times this month and was admitted twice (81/24 and 8/9/24). The pt's son also mentions that he is worried that the pt may have a UTI. Symptoms are constant and moderate in severity. Patient's son states that the pt was taking coumadin, but recently began taking Lovenox and mentions that she was recently dx with a DVT in her LLE. The pt is currently on 3L O2 at home. The pt's son states that the pt is compliant with all of her medications. She recently began taking midodrine on 8/16/24. He also states that she took all of her medications this morning except for her Lovenox. Pt's son also states that the pt had an US on the LUE done at home 2 days ago. No further complaints or concerns at this time.     Overview/Hospital Course:  Serena Post is a 84 y.o. female patient with a PMHx of HTN, HLD, CHF, A fib,  CVA, cancer, COPD and a PSHx of s/p aortic valve replacement, s/p cholecystectomy, s/p insertion of pacemaker who presents to the Emergency Department for evaluation of AMS. The pt's son states that the pt usually does not talk much but has recently been below her baseline for the past 1-2 days. He states that the pt lives at home with him and receives care from pallitive care and home health. He states that he is concerned because the pt bruises/bleeds easily. The pt's son states that the pt's LUE is bruised and swollen. He mentions that they use the pt's arms to move her around. Pt's son states that the pt was recently seen here in the ED 3 times this month and was admitted twice (81/24 and 8/9/24). The pt's son also mentions that he is worried that the pt may have a UTI. Symptoms are constant and moderate in severity. Patient's son states that the pt was taking coumadin, but recently began taking Lovenox and mentions that she was recently dx with a DVT in her LLE. The pt is currently on 3L O2 at home. The pt's son states that the pt is compliant with all of her medications. She recently began taking midodrine on 8/16/24. He also states that she took all of her medications this morning except for her Lovenox. Pt's son also states that the pt had an US on the LUE done at home 2 days ago. No further complaints or concerns at this time.     08/24/2024   Patient alert awake, at baseline mentation   Soft blood pressure, increase midodrine dose   Given underlying heart issues, generalized edema, unable to consider IV fluids at this time   SLP evaluated and recommended NPO, recommended modified barium swallow study, ordered accordingly   Hem onc consulted  Guarded prognosis, had extensive conversation with patient/son at bedside, DNR/DNI for now, decline palliative consultation/further palliative/ hospice discussions at this point;        Review of Systems      Unable to perform ROS: Mental status change   Hematological:   Bruises/bleeds easily (LUE).   Psychiatric/Behavioral:          (+) AMS       Objective:     Vital Signs (Most Recent):  Temp: 97.9 °F (36.6 °C) (08/24/24 1136)  Pulse: 69 (08/24/24 1136)  Resp: 18 (08/24/24 1136)  BP: (!) 104/51 (08/24/24 1136)  SpO2: (!) 93 % (08/24/24 1136) Vital Signs (24h Range):  Temp:  [97.4 °F (36.3 °C)-98.7 °F (37.1 °C)] 97.9 °F (36.6 °C)  Pulse:  [63-97] 69  Resp:  [17-22] 18  SpO2:  [93 %-100 %] 93 %  BP: ()/(51-65) 104/51     Weight: 78.6 kg (173 lb 4.5 oz)  Body mass index is 30.7 kg/m².    Intake/Output Summary (Last 24 hours) at 8/24/2024 1257  Last data filed at 8/24/2024 0259  Gross per 24 hour   Intake 194.61 ml   Output 500 ml   Net -305.39 ml         Physical Exam      Constitutional: Patient is in no acute distress. Patient is morbidly obese and chronically ill-appearing.   Head: Atraumatic. Normocephalic.  Eyes: PERRL. EOM intact. Conjunctivae are not pale. No scleral icterus.  ENT: Mucous membranes are dry. Oropharynx is clear and symmetric.    Cardiovascular: Regular rate. Regular rhythm. No murmurs, rubs, or gallops. Distal pulses are 2+ and symmetric.  Pulmonary/Chest: No respiratory distress. Clear to auscultation bilaterally. No wheezing or rales.  Abdominal: Soft and non-distended.  There is no tenderness.  No rebound, guarding, or rigidity.  Musculoskeletal: There is extensive soft tissue bruising throughout the entire LUE and L hand. Compartments are soft. There is dependent edema to the LUE and L hand. There is no bony deformity, no tenderness, and no BLE swelling. Patient is neurovascularly intact.   Neurological:  Alert and awake.   Psychiatric: Patient does not make eye contact, engage in hx taking, or conversation.             Significant Labs: All pertinent labs within the past 24 hours have been reviewed.  CBC:   Recent Labs   Lab 08/23/24  1105 08/24/24  0607   WBC 10.20 10.22   HGB 10.4* 8.6*   HCT 31.8* 26.2*    137*     CMP:   Recent Labs   Lab  08/23/24  1105 08/24/24  0512    139   K 3.0* 2.9*   CL 89* 92*   CO2 36* 34*   * 96   BUN 42* 33*   CREATININE 1.3 0.9   CALCIUM 8.7 8.1*   PROT 6.6 5.3*   ALBUMIN 2.8* 2.4*   BILITOT 3.7* 2.8*   ALKPHOS 124 109   AST 29 26   ALT 25 21   ANIONGAP 15 13       Significant Imaging:     Imaging Results              US Abdomen Limited (In process)                      CT Head Without Contrast (Final result)  Result time 08/23/24 12:25:32      Final result by Weston Escobar MD (08/23/24 12:25:32)                   Impression:      No acute intracranial CT abnormality.    All CT scans at this facility are performed  using dose modulation techniques as appropriate to performed exam including the following:  automated exposure control; adjustment of mA and/or kV according to the patients size (this includes techniques or standardized protocols for targeted exams where dose is matched to indication/reason for exam: i.e. extremities or head);  iterative reconstruction technique.      Electronically signed by: Wetson Escobar  Date:    08/23/2024  Time:    12:25               Narrative:    EXAMINATION:  CT HEAD WITHOUT CONTRAST    CLINICAL HISTORY:  Mental status change, unknown cause;    TECHNIQUE:  Low dose axial CT images obtained throughout the head without intravenous contrast. Sagittal and coronal reconstructions were performed.    COMPARISON:  Multiple.    FINDINGS:  Intracranial compartment:    Ventricles and sulci are normal in size for age without evidence of hydrocephalus. No extra-axial blood or fluid collections.    Moderate microvascular ischemic changes.  Remote left parietal encephalomalacia.  No parenchymal mass, hemorrhage, edema or major vascular distribution infarct.    Skull/extracranial contents (limited evaluation): No fracture. Mastoid air cells and paranasal sinuses are essentially clear.                                       X-Ray Chest AP Portable (Final result)  Result time 08/23/24  11:19:38      Final result by Weston Escobar MD (08/23/24 11:19:38)                   Impression:      No acute abnormality.      Electronically signed by: Weston Escobar  Date:    08/23/2024  Time:    11:19               Narrative:    EXAMINATION:  XR CHEST AP PORTABLE    CLINICAL HISTORY:  weakness;    TECHNIQUE:  Single frontal view of the chest was performed.    COMPARISON:  Multiple    FINDINGS:  Right chest cardiac pacing device.    The lungs are clear, with normal appearance of pulmonary vasculature and no pleural effusion or pneumothorax.    The cardiac silhouette is normal in size. The hilar and mediastinal contours are unremarkable.    Bones are intact.                                       X-Ray Forearm Left (Final result)  Result time 08/23/24 11:20:49      Final result by Weston Escobar MD (08/23/24 11:20:49)                   Impression:      As above.  Correlation and further evaluation as warranted.      Electronically signed by: Weston Escobar  Date:    08/23/2024  Time:    11:20               Narrative:    EXAMINATION:  XR FOREARM LEFT    CLINICAL HISTORY:  Contusion of left upper arm, initial encounter    TECHNIQUE:  AP and lateral views of the left forearm were performed.    COMPARISON:  None    FINDINGS:  No fracture.  No traumatic malalignment.  No osseous destructive process.  Moderate degenerative changes.  Extensive soft tissue swelling.                                       X-Ray Elbow Complete Left (Final result)  Result time 08/23/24 11:22:38      Final result by Weston Escobar MD (08/23/24 11:22:38)                   Impression:      As above.      Electronically signed by: Weston Escobar  Date:    08/23/2024  Time:    11:22               Narrative:    EXAMINATION:  XR ELBOW COMPLETE 3 VIEW LEFT    CLINICAL HISTORY:  Pain in arm, unspecified    TECHNIQUE:  AP, lateral, and oblique views of the left elbow were performed.    COMPARISON:  None    FINDINGS:  No acute displaced  fracture.  No traumatic malalignment.  No osseous destructive process.  Some intra-articular loose bodies suspected.  Advanced degenerative change.  Extensive soft tissue swelling.                                       X-Ray Humerus 2 View Left (Final result)  Result time 08/23/24 11:23:41      Final result by Weston Escobar MD (08/23/24 11:23:41)                   Impression:      As above.      Electronically signed by: Weston Escobar  Date:    08/23/2024  Time:    11:23               Narrative:    EXAMINATION:  XR HUMERUS 2 VIEW LEFT    CLINICAL HISTORY:  Contusion of left upper arm, initial encounter    TECHNIQUE:  Two views left humerus    COMPARISON:  None    FINDINGS:  No acute displaced fracture.  No traumatic malalignment.  No osseous destructive process.  Age expected degenerative changes of the shoulder.  Extensive soft tissue swelling.                                       Assessment/Plan:      * Encephalopathy, metabolic  Likely secondary to UTI   CT head negative   Follow up on urine cultures, ammonia levels         Elevated bilirubin  Follow up on ultrasound right upper quadrant   Avoid hepatotoxins   Follow up on CMP      Acute cystitis without hematuria  UA suggestive of UTI   Empiric antibiotics   Follow up on urine cultures         COPD without exacerbation  Currently not in acute exacerbation   Supplemental oxygen support to maintain SpO2 above 90  Katey p.r.n.    Severe obesity (BMI 35.0-39.9) with comorbidity  Body mass index is 30.7 kg/m². Morbid obesity complicates all aspects of disease management from diagnostic modalities to treatment. Weight loss encouraged and health benefits explained to patient.         Cognitive impairment  Waxing and waning mentation   Considered palliative evaluation, however patient's son who is healthcare power of , declined palliative consultation at this point         Paroxysmal atrial fibrillation  On Toprol   Hypotensive/low heart rate on arrival,  held Toprol for now   We will monitor and adjust regimen   Holding anticoagulation at this point given concerns for bleeding       History of DVT (deep vein thrombosis)  Recent history of DVT   On Lovenox until arrival   Noted to have bruising/swelling over left upper extremity   Family concerned about bleeding issues, preferred Heme-Onc evaluation for alternative options  Heme-Onc consulted   Patient prefers to hold blood thinners at this point until further Heme-Onc recommendations- held accordingly       Essential hypertension  Latest blood pressure and vitals reviewed-     Temp:  [97.4 °F (36.3 °C)]   Pulse:  [62-97]   Resp:  [18-20]   BP: ()/(55-57)   SpO2:  [97 %-100 %] .   Home meds for hypertension were reviewed and noted below.   Hypertension Medications               metoprolol succinate (TOPROL-XL) 25 MG 24 hr tablet Take 1 tablet by mouth every evening.    furosemide (LASIX) 20 MG tablet Take 2 tablets (40 mg total) by mouth once daily. Hold for low blood pressure. Do not give if systolic blood pressure is below 110 and/or diastolic is below 60            Hypotensive on arrival, hold blood pressure medications as of now   Monitor and adjust regimen   Hydralazine p.r.n.         VTE Risk Mitigation (From admission, onward)           Ordered     IP VTE HIGH RISK PATIENT  Once         08/23/24 1633     Place sequential compression device  Until discontinued         08/23/24 1633                    Discharge Planning   RENE:      Code Status: DNR   Is the patient medically ready for discharge?:     Reason for patient still in hospital (select all that apply): Patient trending condition, Consult recommendations, and Pending disposition  Discharge Plan A: Home Health                  [facility] Karthik Branch MD  Department of Hospital Medicine   [place] - Telemetry ([facility])

## 2024-08-25 LAB
ALBUMIN SERPL BCP-MCNC: 2.2 G/DL (ref 3.5–5.2)
ALP SERPL-CCNC: 106 U/L (ref 55–135)
ALT SERPL W/O P-5'-P-CCNC: 21 U/L (ref 10–44)
ANION GAP SERPL CALC-SCNC: 11 MMOL/L (ref 8–16)
AST SERPL-CCNC: 27 U/L (ref 10–40)
BASOPHILS # BLD AUTO: 0.01 K/UL (ref 0–0.2)
BASOPHILS NFR BLD: 0.1 % (ref 0–1.9)
BILIRUB SERPL-MCNC: 2.3 MG/DL (ref 0.1–1)
BUN SERPL-MCNC: 29 MG/DL (ref 8–23)
CALCIUM SERPL-MCNC: 8 MG/DL (ref 8.7–10.5)
CHLORIDE SERPL-SCNC: 93 MMOL/L (ref 95–110)
CO2 SERPL-SCNC: 35 MMOL/L (ref 23–29)
CREAT SERPL-MCNC: 0.9 MG/DL (ref 0.5–1.4)
DIFFERENTIAL METHOD BLD: ABNORMAL
EOSINOPHIL # BLD AUTO: 0.4 K/UL (ref 0–0.5)
EOSINOPHIL NFR BLD: 4.2 % (ref 0–8)
ERYTHROCYTE [DISTWIDTH] IN BLOOD BY AUTOMATED COUNT: 16.5 % (ref 11.5–14.5)
EST. GFR  (NO RACE VARIABLE): >60 ML/MIN/1.73 M^2
FERRITIN SERPL-MCNC: 160 NG/ML (ref 20–300)
FOLATE SERPL-MCNC: 5 NG/ML (ref 4–24)
GLUCOSE SERPL-MCNC: 91 MG/DL (ref 70–110)
HCT VFR BLD AUTO: 27 % (ref 37–48.5)
HGB BLD-MCNC: 8.6 G/DL (ref 12–16)
IMM GRANULOCYTES # BLD AUTO: 0.04 K/UL (ref 0–0.04)
IMM GRANULOCYTES NFR BLD AUTO: 0.5 % (ref 0–0.5)
INR PPP: 1.4 (ref 0.8–1.2)
IRON SERPL-MCNC: 41 UG/DL (ref 30–160)
LYMPHOCYTES # BLD AUTO: 1.3 K/UL (ref 1–4.8)
LYMPHOCYTES NFR BLD: 14.6 % (ref 18–48)
MCH RBC QN AUTO: 28.9 PG (ref 27–31)
MCHC RBC AUTO-ENTMCNC: 31.9 G/DL (ref 32–36)
MCV RBC AUTO: 91 FL (ref 82–98)
MONOCYTES # BLD AUTO: 0.7 K/UL (ref 0.3–1)
MONOCYTES NFR BLD: 7.7 % (ref 4–15)
NEUTROPHILS # BLD AUTO: 6.2 K/UL (ref 1.8–7.7)
NEUTROPHILS NFR BLD: 72.9 % (ref 38–73)
NRBC BLD-RTO: 0 /100 WBC
OHS QRS DURATION: 148 MS
OHS QTC CALCULATION: 542 MS
PLATELET # BLD AUTO: 141 K/UL (ref 150–450)
PMV BLD AUTO: 11.3 FL (ref 9.2–12.9)
POTASSIUM SERPL-SCNC: 2.7 MMOL/L (ref 3.5–5.1)
PROT SERPL-MCNC: 5.5 G/DL (ref 6–8.4)
PROTHROMBIN TIME: 15.2 SEC (ref 9–12.5)
RBC # BLD AUTO: 2.98 M/UL (ref 4–5.4)
SATURATED IRON: 14 % (ref 20–50)
SODIUM SERPL-SCNC: 139 MMOL/L (ref 136–145)
TOTAL IRON BINDING CAPACITY: 292 UG/DL (ref 250–450)
TRANSFERRIN SERPL-MCNC: 197 MG/DL (ref 200–375)
VIT B12 SERPL-MCNC: 504 PG/ML (ref 210–950)
WBC # BLD AUTO: 8.55 K/UL (ref 3.9–12.7)

## 2024-08-25 PROCEDURE — 82607 VITAMIN B-12: CPT | Performed by: STUDENT IN AN ORGANIZED HEALTH CARE EDUCATION/TRAINING PROGRAM

## 2024-08-25 PROCEDURE — 85025 COMPLETE CBC W/AUTO DIFF WBC: CPT | Performed by: STUDENT IN AN ORGANIZED HEALTH CARE EDUCATION/TRAINING PROGRAM

## 2024-08-25 PROCEDURE — 82728 ASSAY OF FERRITIN: CPT | Performed by: STUDENT IN AN ORGANIZED HEALTH CARE EDUCATION/TRAINING PROGRAM

## 2024-08-25 PROCEDURE — 85610 PROTHROMBIN TIME: CPT | Performed by: STUDENT IN AN ORGANIZED HEALTH CARE EDUCATION/TRAINING PROGRAM

## 2024-08-25 PROCEDURE — 80053 COMPREHEN METABOLIC PANEL: CPT | Performed by: STUDENT IN AN ORGANIZED HEALTH CARE EDUCATION/TRAINING PROGRAM

## 2024-08-25 PROCEDURE — 21400001 HC TELEMETRY ROOM

## 2024-08-25 PROCEDURE — 84466 ASSAY OF TRANSFERRIN: CPT | Performed by: STUDENT IN AN ORGANIZED HEALTH CARE EDUCATION/TRAINING PROGRAM

## 2024-08-25 PROCEDURE — 63600175 PHARM REV CODE 636 W HCPCS: Performed by: STUDENT IN AN ORGANIZED HEALTH CARE EDUCATION/TRAINING PROGRAM

## 2024-08-25 PROCEDURE — 92526 ORAL FUNCTION THERAPY: CPT

## 2024-08-25 PROCEDURE — 36415 COLL VENOUS BLD VENIPUNCTURE: CPT | Performed by: STUDENT IN AN ORGANIZED HEALTH CARE EDUCATION/TRAINING PROGRAM

## 2024-08-25 PROCEDURE — 27000207 HC ISOLATION

## 2024-08-25 PROCEDURE — 82746 ASSAY OF FOLIC ACID SERUM: CPT | Performed by: STUDENT IN AN ORGANIZED HEALTH CARE EDUCATION/TRAINING PROGRAM

## 2024-08-25 PROCEDURE — 25000003 PHARM REV CODE 250: Performed by: STUDENT IN AN ORGANIZED HEALTH CARE EDUCATION/TRAINING PROGRAM

## 2024-08-25 RX ORDER — POTASSIUM CHLORIDE 20 MEQ/1
40 TABLET, EXTENDED RELEASE ORAL ONCE
Status: DISCONTINUED | OUTPATIENT
Start: 2024-08-25 | End: 2024-08-25

## 2024-08-25 RX ADMIN — POTASSIUM BICARBONATE 50 MEQ: 978 TABLET, EFFERVESCENT ORAL at 08:08

## 2024-08-25 RX ADMIN — THERA TABS 1 TABLET: TAB at 08:08

## 2024-08-25 RX ADMIN — MIDODRINE HYDROCHLORIDE 10 MG: 5 TABLET ORAL at 08:08

## 2024-08-25 RX ADMIN — CEFTRIAXONE 1 G: 1 INJECTION, POWDER, FOR SOLUTION INTRAMUSCULAR; INTRAVENOUS at 08:08

## 2024-08-25 RX ADMIN — POTASSIUM BICARBONATE 50 MEQ: 978 TABLET, EFFERVESCENT ORAL at 10:08

## 2024-08-25 NOTE — PLAN OF CARE
POC reviewed with pt. Pt verbalizes understanding of POC. No questions at this time.  AAOx4. NADN.  Paced rhythm on cardiac monitor.  Pt remains free of falls.  No complaints at this time.  Safety measures in place. Will continue to monitor. Patient wearing non-skid footwear.  Informed pt to call for assistance before getting up. Pt verbalizes understanding.  Hourly rounding and chart check complete.   Bed in lowest position, wheels locked, side rails up x2.

## 2024-08-25 NOTE — PT/OT/SLP PROGRESS
Speech Language Pathology Treatment    Patient Name:  Serena Post   MRN:  [id number]  Admitting Diagnosis: Encephalopathy, metabolic    Recommendations:                 General Recommendations:  Dysphagia therapy  Diet recommendations:  IDDSI 6(Soft and Bite Sized); Thin Liquids (IDDSI 0)   Aspiration Precautions: Standard aspiration precautions   General Precautions: Standard, aspiration  Communication strategies:  provide increased time to answer and go to room if call light pushed    Assessment:     Serena Post is a 84 y.o. female with an SLP diagnosis of Dysphagia.  She was seen at breakfast meal and S.T. assisted with feeding. Pt tolerated eggs, potatoes and juice without signs of swallowing difficulties.  Pt with limited intake this morning.  Results of MBSS and swallowing precautions reviewed with pt and her son.  Continue S.T. POC.    Subjective     Pt sat up and ate part of breakfast  Patient goals: pt reported that she was a little hungry this A.M     Pain/Comfort:   0/10    Respiratory Status:  see medical chart    Objective:     Has the patient been evaluated by SLP for swallowing?   Yes  Keep patient NPO? No   Current Respiratory Status:    see medical chart     See assessment note above     Goals:   Multidisciplinary Problems       SLP Goals          Problem: SLP    Goal Priority Disciplines Outcome   SLP Goal     SLP Progressing                   GOALS:  Pt will tolerate IDDSI 6 diet and and thin liquids-IDDSI 0 without signs of swallowing difficulties.     Pt will complete swallowing exercises to improve pharyngeal stage swallow (see MBSS report)    Plan:     Patient to be seen:   (3-5X Weekly)   Plan of Care expires:     Plan of Care reviewed with:  patient, son   SLP Follow-Up:          Discharge recommendations:   determine at d/c   Barriers to Discharge:  Level of Skilled Assistance Needed   and Safety Awareness      Time Tracking:     SLP Treatment Date:   08/31/24  Speech  Start Time:  1018  Speech Stop Time:  1035     Speech Total Time (min):  17 min    Billable Minutes: 17 minutes Total Time      08/25/2024

## 2024-08-25 NOTE — ASSESSMENT & PLAN NOTE
On Toprol   Hypotensive/low heart rate on arrival, held Toprol for now   We will monitor and adjust regimen   Holding anticoagulation at this point given concerns for bleeding     8/25/24  Plan to resume lovenox in am

## 2024-08-25 NOTE — PROGRESS NOTES
[facility] Medicine  Progress Note    Patient Name: Serena Post  MRN: [id number]  Patient Class: IP- Inpatient   Admission Date: 8/23/2024  Length of Stay: 2 days  Attending Physician: Genny Branch,*  Primary Care Provider: Evangelina Olivares MD        Subjective:     Principal Problem:Encephalopathy, metabolic        HPI:  Serena Post is a 84 y.o. female patient with a PMHx of HTN, HLD, CHF, A fib, CVA, cancer, COPD and a PSHx of s/p aortic valve replacement, s/p cholecystectomy, s/p insertion of pacemaker who presents to the Emergency Department for evaluation of AMS. The pt's son states that the pt usually does not talk much but has recently been below her baseline for the past 1-2 days. He states that the pt lives at home with him and receives care from pallitive care and home health. He states that he is concerned because the pt bruises/bleeds easily. The pt's son states that the pt's LUE is bruised and swollen. He mentions that they use the pt's arms to move her around. Pt's son states that the pt was recently seen here in the ED 3 times this month and was admitted twice (81/24 and 8/9/24). The pt's son also mentions that he is worried that the pt may have a UTI. Symptoms are constant and moderate in severity. Patient's son states that the pt was taking coumadin, but recently began taking Lovenox and mentions that she was recently dx with a DVT in her LLE. The pt is currently on 3L O2 at home. The pt's son states that the pt is compliant with all of her medications. She recently began taking midodrine on 8/16/24. He also states that she took all of her medications this morning except for her Lovenox. Pt's son also states that the pt had an US on the LUE done at home 2 days ago. No further complaints or concerns at this time.     Overview/Hospital Course:  Serena Post is a 84 y.o. female patient  who presents to the Emergency  Department for evaluation of AMS, increased bruises.    He states that the pt lives at home with him and receives care from pallitive care and home health.      pt was recently seen here in the ED 3 times this month and was admitted twice (81/24 and 8/9/24).   Patient's son states that the pt was taking coumadin, but recently began taking Lovenox and mentions that she was recently dx with a DVT in her LLE. The pt is currently on 3L O2 at home.   Pt's son also states that the pt had an US on the LUE done at home 2 days ago. No further complaints or concerns at this time.   On arrival UA suggestive of UTI, currently on empiric antibiotics, pending cultures     S/p SLP cory, MBSS on 8/25/24-- SLP recommended IDDSI 6;    Given questionable concerns about intake of high dose of Lovenox likely might have contributed to bruises per son, consulted Hematology,-- recommended to resume Lovenox at 70 mg b.i.d. once hemoglobin/INR stabilizes, bleeding workup results negative, bruising improves,; per hematology-- after 3 months of AC, If thrombus resolved, recommended to resume warfarin given this is the AC of choice for the mechanical heart valve. If she cannot be anticoagulated, recommended to consider IVC filter.     We will plan to resume Lovenox at 70 mg b.i.d. on 08/26/2024; monitor closely    Of note given patient's age, significant underlying comorbidities, recurrent hospitalizations, had extensive discussion with patient/son at bedside regarding goals of care, opted to continue with DNR DNI now, son stated that he is not open for any further palliative/hospice discussions at this moment, strongly opposed palliative consultation at this time;             Interval History:     Denied acute events overnight   Resting comfortably   Bruising on left upper extremity slightly improved   Hemoglobin stable   INR slightly trended down   Denied any overt signs of bleeding   Ordered for Lovenox to resume tomorrow morning with close  monitoring        Review of Systems      Unable to perform ROS: Mental status change   Hematological:  Bruises/bleeds easily (LUE).   Psychiatric/Behavioral:          (+) AMS          Objective:     Vital Signs (Most Recent):  Temp: 97.9 °F (36.6 °C) (08/25/24 1236)  Pulse: 73 (08/25/24 1301)  Resp: (!) 22 (08/25/24 1236)  BP: (!) 104/57 (08/25/24 1245)  SpO2: 99 % (08/25/24 1236) Vital Signs (24h Range):  Temp:  [97.6 °F (36.4 °C)-99.3 °F (37.4 °C)] 97.9 °F (36.6 °C)  Pulse:  [56-82] 73  Resp:  [18-22] 22  SpO2:  [91 %-99 %] 99 %  BP: ()/(50-58) 104/57     Weight: 78.6 kg (173 lb 4.5 oz)  Body mass index is 30.7 kg/m².    Intake/Output Summary (Last 24 hours) at 8/25/2024 1413  Last data filed at 8/25/2024 1101  Gross per 24 hour   Intake --   Output 1300 ml   Net -1300 ml         Physical Exam      Constitutional: Patient is in no acute distress. Patient is morbidly obese and chronically ill-appearing.   Head: Atraumatic. Normocephalic.  Eyes: PERRL. EOM intact. Conjunctivae are not pale. No scleral icterus.  ENT: Mucous membranes are dry. Oropharynx is clear and symmetric.    Cardiovascular: Regular rate. Regular rhythm. No murmurs, rubs, or gallops. Distal pulses are 2+ and symmetric.  Pulmonary/Chest: No respiratory distress. Clear to auscultation bilaterally. No wheezing or rales.  Abdominal: Soft and non-distended.  There is no tenderness.  No rebound, guarding, or rigidity.  Musculoskeletal: There is extensive soft tissue bruising throughout the entire LUE and L hand. Compartments are soft. There is dependent edema to the LUE and L hand. There is no bony deformity, no tenderness, and no BLE swelling. Patient is neurovascularly intact.   Neurological:  Alert and awake.   Psychiatric: Patient does not make eye contact, engage in hx taking, or conversation.             Significant Labs: All pertinent labs within the past 24 hours have been reviewed.  CBC:   Recent Labs   Lab 08/24/24  0607 08/25/24  7537    WBC 10.22 8.55   HGB 8.6* 8.6*   HCT 26.2* 27.0*   * 141*     CMP:   Recent Labs   Lab 08/24/24  0512 08/25/24  0505    139   K 2.9* 2.7*   CL 92* 93*   CO2 34* 35*   GLU 96 91   BUN 33* 29*   CREATININE 0.9 0.9   CALCIUM 8.1* 8.0*   PROT 5.3* 5.5*   ALBUMIN 2.4* 2.2*   BILITOT 2.8* 2.3*   ALKPHOS 109 106   AST 26 27   ALT 21 21   ANIONGAP 13 11       Significant Imaging:     Imaging Results              US Abdomen Limited (Final result)  Result time 08/24/24 13:47:50      Final result by Weston Escobar MD (08/24/24 13:47:50)                   Impression:      Probable cirrhosis.    Complete findings as above.      Electronically signed by: Weston Escobar  Date:    08/24/2024  Time:    13:47               Narrative:    EXAMINATION:  US ABDOMEN LIMITED    CLINICAL HISTORY:  elevated tbil;    TECHNIQUE:  Limited ultrasound of the right upper quadrant of the abdomen (including pancreas, liver, gallbladder, common bile duct, and Right kidney) was performed.    COMPARISON:  None.    FINDINGS:  Pancreas: No acute sonographic abnormality.    Liver: Normal in size, measuring 14.2 cm. Nodular contour suggesting cirrhosis. No focal hepatic lesions.    Gallbladder: The gallbladder is surgically absent.    Biliary system: The common duct is not dilated, measuring 4 mm.  No intrahepatic ductal dilatation.    Spleen measures 6.4 cm.    There is a left renal simple cyst incidentally visualized.    Suboptimal exam secondary to patient factors.                                       CT Head Without Contrast (Final result)  Result time 08/23/24 12:25:32      Final result by Weston Escobra MD (08/23/24 12:25:32)                   Impression:      No acute intracranial CT abnormality.    All CT scans at this facility are performed  using dose modulation techniques as appropriate to performed exam including the following:  automated exposure control; adjustment of mA and/or kV according to the patients size (this  includes techniques or standardized protocols for targeted exams where dose is matched to indication/reason for exam: i.e. extremities or head);  iterative reconstruction technique.      Electronically signed by: Weston Escobar  Date:    08/23/2024  Time:    12:25               Narrative:    EXAMINATION:  CT HEAD WITHOUT CONTRAST    CLINICAL HISTORY:  Mental status change, unknown cause;    TECHNIQUE:  Low dose axial CT images obtained throughout the head without intravenous contrast. Sagittal and coronal reconstructions were performed.    COMPARISON:  Multiple.    FINDINGS:  Intracranial compartment:    Ventricles and sulci are normal in size for age without evidence of hydrocephalus. No extra-axial blood or fluid collections.    Moderate microvascular ischemic changes.  Remote left parietal encephalomalacia.  No parenchymal mass, hemorrhage, edema or major vascular distribution infarct.    Skull/extracranial contents (limited evaluation): No fracture. Mastoid air cells and paranasal sinuses are essentially clear.                                       X-Ray Chest AP Portable (Final result)  Result time 08/23/24 11:19:38      Final result by Weston Escobar MD (08/23/24 11:19:38)                   Impression:      No acute abnormality.      Electronically signed by: Weston Escobar  Date:    08/23/2024  Time:    11:19               Narrative:    EXAMINATION:  XR CHEST AP PORTABLE    CLINICAL HISTORY:  weakness;    TECHNIQUE:  Single frontal view of the chest was performed.    COMPARISON:  Multiple    FINDINGS:  Right chest cardiac pacing device.    The lungs are clear, with normal appearance of pulmonary vasculature and no pleural effusion or pneumothorax.    The cardiac silhouette is normal in size. The hilar and mediastinal contours are unremarkable.    Bones are intact.                                       X-Ray Forearm Left (Final result)  Result time 08/23/24 11:20:49      Final result by Weston Escobar MD  (08/23/24 11:20:49)                   Impression:      As above.  Correlation and further evaluation as warranted.      Electronically signed by: Weston Escobar  Date:    08/23/2024  Time:    11:20               Narrative:    EXAMINATION:  XR FOREARM LEFT    CLINICAL HISTORY:  Contusion of left upper arm, initial encounter    TECHNIQUE:  AP and lateral views of the left forearm were performed.    COMPARISON:  None    FINDINGS:  No fracture.  No traumatic malalignment.  No osseous destructive process.  Moderate degenerative changes.  Extensive soft tissue swelling.                                       X-Ray Elbow Complete Left (Final result)  Result time 08/23/24 11:22:38      Final result by Weston Escobar MD (08/23/24 11:22:38)                   Impression:      As above.      Electronically signed by: Weston Escobar  Date:    08/23/2024  Time:    11:22               Narrative:    EXAMINATION:  XR ELBOW COMPLETE 3 VIEW LEFT    CLINICAL HISTORY:  Pain in arm, unspecified    TECHNIQUE:  AP, lateral, and oblique views of the left elbow were performed.    COMPARISON:  None    FINDINGS:  No acute displaced fracture.  No traumatic malalignment.  No osseous destructive process.  Some intra-articular loose bodies suspected.  Advanced degenerative change.  Extensive soft tissue swelling.                                       X-Ray Humerus 2 View Left (Final result)  Result time 08/23/24 11:23:41      Final result by Weston Escobar MD (08/23/24 11:23:41)                   Impression:      As above.      Electronically signed by: Weston Escobar  Date:    08/23/2024  Time:    11:23               Narrative:    EXAMINATION:  XR HUMERUS 2 VIEW LEFT    CLINICAL HISTORY:  Contusion of left upper arm, initial encounter    TECHNIQUE:  Two views left humerus    COMPARISON:  None    FINDINGS:  No acute displaced fracture.  No traumatic malalignment.  No osseous destructive process.  Age expected degenerative changes of the  shoulder.  Extensive soft tissue swelling.                                       Assessment/Plan:      * Encephalopathy, metabolic  Likely secondary to UTI   CT head negative   Follow up on urine cultures, ammonia levels         Elevated bilirubin  Follow up on ultrasound right upper quadrant   Avoid hepatotoxins   Follow up on CMP      Acute cystitis without hematuria  UA suggestive of UTI   Empiric antibiotics   Follow up on urine cultures         COPD without exacerbation  Currently not in acute exacerbation   Supplemental oxygen support to maintain SpO2 above 90  Katey p.r.n.    Severe obesity (BMI 35.0-39.9) with comorbidity  Body mass index is 30.7 kg/m². Morbid obesity complicates all aspects of disease management from diagnostic modalities to treatment. Weight loss encouraged and health benefits explained to patient.         Cognitive impairment  Waxing and waning mentation   Considered palliative evaluation, however patient's son who is healthcare power of , declined palliative consultation at this point         Paroxysmal atrial fibrillation  On Toprol   Hypotensive/low heart rate on arrival, held Toprol for now   We will monitor and adjust regimen   Holding anticoagulation at this point given concerns for bleeding     8/25/24  Plan to resume lovenox in am      History of DVT (deep vein thrombosis)  Recent history of DVT   On Lovenox until arrival   Noted to have bruising/swelling over left upper extremity   Family concerned about bleeding issues, preferred Heme-Onc evaluation for alternative options  Heme-Onc consulted   Patient/son prefers to hold blood thinners at this point until further Heme-Onc recommendations- held accordingly     8/25/24  Patient/son agrees for resuming Lovenox at 70 mg b.i.d. from tomorrow  Follow up on labs/overt signs of bleeding   Appreciate Heme-Onc recommendations      Essential hypertension  Latest blood pressure and vitals reviewed-     Temp:  [97.6 °F (36.4  °C)-99.3 °F (37.4 °C)]   Pulse:  [56-82]   Resp:  [18-22]   BP: ()/(50-58)   SpO2:  [91 %-99 %] .   Home meds for hypertension were reviewed and noted below.   Hypertension Medications               metoprolol succinate (TOPROL-XL) 25 MG 24 hr tablet Take 1 tablet by mouth every evening.    furosemide (LASIX) 20 MG tablet Take 2 tablets (40 mg total) by mouth once daily. Hold for low blood pressure. Do not give if systolic blood pressure is below 110 and/or diastolic is below 60            Hypotensive on arrival, hold blood pressure medications as of now   Monitor and adjust regimen   Hydralazine p.r.n.       Dyslipidemia  Patient is not chronically on statin.will not continue for now. Last Lipid Panel:         Lab Results   Component Value Date     CHOL 225 (H) 11/01/2022     HDL 48 11/01/2022     LDLCALC 141 (H) 11/01/2022     TRIG 180 11/01/2022     CHOLHDL 4.7 (H) 11/01/2022      Plan:  -low fat/low calorie diet        VTE Risk Mitigation (From admission, onward)           Ordered     IP VTE HIGH RISK PATIENT  Once         08/23/24 1633     Place sequential compression device  Until discontinued         08/23/24 1633                    Discharge Planning   RENE:      Code Status: DNR   Is the patient medically ready for discharge?:     Reason for patient still in hospital (select all that apply): Patient trending condition, Consult recommendations, and Pending disposition  Discharge Plan A: Home Health                  [facility] Karthik Branch MD  Department of Hospital Medicine   [facility] - Telemetry ([facility])

## 2024-08-25 NOTE — PLAN OF CARE
A227/A227 NITHIN Post is a 84 y.o.female admitted on 8/23/2024 for Encephalopathy, metabolic   Code Status: DNR MRN: [id number]   Review of patient's allergies indicates:   Allergen Reactions    Amlodipine Other (See Comments)     Not sure    Chlorthalidone Other (See Comments)     Not sure    Clonidine Other (See Comments)     Not sure    Codeine Other (See Comments)     Not sure    Escitalopram Other (See Comments)     Not sure    Lisinopril Other (See Comments)     Not sure    Losartan Other (See Comments)     Not sure    Meperidine Other (See Comments)     Not sure    Methadone Other (See Comments)     Not sure      Morphine Other (See Comments)     Not sure    Nebivolol Other (See Comments)     Not sure        Nitrofurantoin Other (See Comments)     Not sure        Omeprazole Other (See Comments)     Not sure      Pravastatin Other (See Comments)     Not sure      Propoxyphene Other (See Comments)     Not sure      Sulfamethoxazole-trimethoprim Other (See Comments)     Not sure         Past Medical History:   Diagnosis Date    A-fib     Anticoagulant long-term use     Aortic valve disease     Cancer     brain tumor, 10 years ago    Cardiomyopathy     CHF (congestive heart failure)     COVID-19 7/23/2022    Hx of heart valve replacement with mechanical valve     Hyperlipidemia     Hypertension     Pacemaker     Pacemaker     Sepsis 7/23/2022    Stroke     2007, residual weakness      PRN meds    acetaminophen, 500 mg, Q6H PRN  hydrALAZINE, 10 mg, Q6H PRN  ondansetron, 4 mg, Q8H PRN  sodium chloride 0.9%, 10 mL, PRN      Chart check completed. Will continue plan of care.      Orientation: oriented x 4  Chhaya Coma Scale Score: 15     Lead Monitored: Lead II Rhythm: paced rhythm Frequency/Ectopy: greater than 6/min, PVCs  Cardiac/Telemetry Box Number: [id number]    Last Bowel Movement: 08/10/24  Diet Soft & Bite Sized (IDDSI Level 6)  Voiding Characteristics: external catheter  Jacobo Score: 14  Fall Risk  Score: 9  Accucheck []   Freq?      Lines/Drains/Airways       Peripheral Intravenous Line  Duration                  Peripheral IV - Single Lumen 08/23/24 1654 20 G Anterior;Right Upper Arm 1 day

## 2024-08-25 NOTE — ASSESSMENT & PLAN NOTE
Latest blood pressure and vitals reviewed-     Temp:  [97.6 °F (36.4 °C)-99.3 °F (37.4 °C)]   Pulse:  [56-82]   Resp:  [18-22]   BP: ()/(50-58)   SpO2:  [91 %-99 %] .   Home meds for hypertension were reviewed and noted below.   Hypertension Medications               metoprolol succinate (TOPROL-XL) 25 MG 24 hr tablet Take 1 tablet by mouth every evening.    furosemide (LASIX) 20 MG tablet Take 2 tablets (40 mg total) by mouth once daily. Hold for low blood pressure. Do not give if systolic blood pressure is below 110 and/or diastolic is below 60            Hypotensive on arrival, hold blood pressure medications as of now   Monitor and adjust regimen   Hydralazine p.r.n.

## 2024-08-25 NOTE — ASSESSMENT & PLAN NOTE
Recent history of DVT   On Lovenox until arrival   Noted to have bruising/swelling over left upper extremity   Family concerned about bleeding issues, preferred Heme-Onc evaluation for alternative options  Heme-Onc consulted   Patient/son prefers to hold blood thinners at this point until further Heme-Onc recommendations- held accordingly     8/25/24  Patient/son agrees for resuming Lovenox at 70 mg b.i.d. from tomorrow  Follow up on labs/overt signs of bleeding   Appreciate Heme-Onc recommendations

## 2024-08-25 NOTE — PLAN OF CARE
Pt seen by BARBARA and tolerated breakfast meal-IDDSI 6(Soft and Bite-Sized) diet and thin liquids.  Son in room and swallowing precautions reviewed.  Continue S.T. POC.

## 2024-08-25 NOTE — SUBJECTIVE & OBJECTIVE
Interval History:     Denied acute events overnight   Resting comfortably   Bruising on left upper extremity slightly improved   Hemoglobin stable   INR slightly trended down   Denied any overt signs of bleeding   Ordered for Lovenox to resume tomorrow morning with close monitoring        Review of Systems      Unable to perform ROS: Mental status change   Hematological:  Bruises/bleeds easily (LUE).   Psychiatric/Behavioral:          (+) AMS          Objective:     Vital Signs (Most Recent):  Temp: 97.9 °F (36.6 °C) (08/25/24 1236)  Pulse: 73 (08/25/24 1301)  Resp: (!) 22 (08/25/24 1236)  BP: (!) 104/57 (08/25/24 1245)  SpO2: 99 % (08/25/24 1236) Vital Signs (24h Range):  Temp:  [97.6 °F (36.4 °C)-99.3 °F (37.4 °C)] 97.9 °F (36.6 °C)  Pulse:  [56-82] 73  Resp:  [18-22] 22  SpO2:  [91 %-99 %] 99 %  BP: ()/(50-58) 104/57     Weight: 78.6 kg (173 lb 4.5 oz)  Body mass index is 30.7 kg/m².    Intake/Output Summary (Last 24 hours) at 8/25/2024 1413  Last data filed at 8/25/2024 1101  Gross per 24 hour   Intake --   Output 1300 ml   Net -1300 ml         Physical Exam      Constitutional: Patient is in no acute distress. Patient is morbidly obese and chronically ill-appearing.   Head: Atraumatic. Normocephalic.  Eyes: PERRL. EOM intact. Conjunctivae are not pale. No scleral icterus.  ENT: Mucous membranes are dry. Oropharynx is clear and symmetric.    Cardiovascular: Regular rate. Regular rhythm. No murmurs, rubs, or gallops. Distal pulses are 2+ and symmetric.  Pulmonary/Chest: No respiratory distress. Clear to auscultation bilaterally. No wheezing or rales.  Abdominal: Soft and non-distended.  There is no tenderness.  No rebound, guarding, or rigidity.  Musculoskeletal: There is extensive soft tissue bruising throughout the entire LUE and L hand. Compartments are soft. There is dependent edema to the LUE and L hand. There is no bony deformity, no tenderness, and no BLE swelling. Patient is neurovascularly intact.    Neurological:  Alert and awake.   Psychiatric: Patient does not make eye contact, engage in hx taking, or conversation.             Significant Labs: All pertinent labs within the past 24 hours have been reviewed.  CBC:   Recent Labs   Lab 08/24/24  0607 08/25/24  0505   WBC 10.22 8.55   HGB 8.6* 8.6*   HCT 26.2* 27.0*   * 141*     CMP:   Recent Labs   Lab 08/24/24  0512 08/25/24  0505    139   K 2.9* 2.7*   CL 92* 93*   CO2 34* 35*   GLU 96 91   BUN 33* 29*   CREATININE 0.9 0.9   CALCIUM 8.1* 8.0*   PROT 5.3* 5.5*   ALBUMIN 2.4* 2.2*   BILITOT 2.8* 2.3*   ALKPHOS 109 106   AST 26 27   ALT 21 21   ANIONGAP 13 11       Significant Imaging:     Imaging Results              US Abdomen Limited (Final result)  Result time 08/24/24 13:47:50      Final result by Weston Escobar MD (08/24/24 13:47:50)                   Impression:      Probable cirrhosis.    Complete findings as above.      Electronically signed by: Weston Escobar  Date:    08/24/2024  Time:    13:47               Narrative:    EXAMINATION:  US ABDOMEN LIMITED    CLINICAL HISTORY:  elevated tbil;    TECHNIQUE:  Limited ultrasound of the right upper quadrant of the abdomen (including pancreas, liver, gallbladder, common bile duct, and Right kidney) was performed.    COMPARISON:  None.    FINDINGS:  Pancreas: No acute sonographic abnormality.    Liver: Normal in size, measuring 14.2 cm. Nodular contour suggesting cirrhosis. No focal hepatic lesions.    Gallbladder: The gallbladder is surgically absent.    Biliary system: The common duct is not dilated, measuring 4 mm.  No intrahepatic ductal dilatation.    Spleen measures 6.4 cm.    There is a left renal simple cyst incidentally visualized.    Suboptimal exam secondary to patient factors.                                       CT Head Without Contrast (Final result)  Result time 08/23/24 12:25:32      Final result by Weston Escobar MD (08/23/24 12:25:32)                   Impression:       No acute intracranial CT abnormality.    All CT scans at this facility are performed  using dose modulation techniques as appropriate to performed exam including the following:  automated exposure control; adjustment of mA and/or kV according to the patients size (this includes techniques or standardized protocols for targeted exams where dose is matched to indication/reason for exam: i.e. extremities or head);  iterative reconstruction technique.      Electronically signed by: Weston Escobar  Date:    08/23/2024  Time:    12:25               Narrative:    EXAMINATION:  CT HEAD WITHOUT CONTRAST    CLINICAL HISTORY:  Mental status change, unknown cause;    TECHNIQUE:  Low dose axial CT images obtained throughout the head without intravenous contrast. Sagittal and coronal reconstructions were performed.    COMPARISON:  Multiple.    FINDINGS:  Intracranial compartment:    Ventricles and sulci are normal in size for age without evidence of hydrocephalus. No extra-axial blood or fluid collections.    Moderate microvascular ischemic changes.  Remote left parietal encephalomalacia.  No parenchymal mass, hemorrhage, edema or major vascular distribution infarct.    Skull/extracranial contents (limited evaluation): No fracture. Mastoid air cells and paranasal sinuses are essentially clear.                                       X-Ray Chest AP Portable (Final result)  Result time 08/23/24 11:19:38      Final result by Weston Escobar MD (08/23/24 11:19:38)                   Impression:      No acute abnormality.      Electronically signed by: Weston Escobar  Date:    08/23/2024  Time:    11:19               Narrative:    EXAMINATION:  XR CHEST AP PORTABLE    CLINICAL HISTORY:  weakness;    TECHNIQUE:  Single frontal view of the chest was performed.    COMPARISON:  Multiple    FINDINGS:  Right chest cardiac pacing device.    The lungs are clear, with normal appearance of pulmonary vasculature and no pleural effusion or  pneumothorax.    The cardiac silhouette is normal in size. The hilar and mediastinal contours are unremarkable.    Bones are intact.                                       X-Ray Forearm Left (Final result)  Result time 08/23/24 11:20:49      Final result by Weston Escobar MD (08/23/24 11:20:49)                   Impression:      As above.  Correlation and further evaluation as warranted.      Electronically signed by: Weston Escobar  Date:    08/23/2024  Time:    11:20               Narrative:    EXAMINATION:  XR FOREARM LEFT    CLINICAL HISTORY:  Contusion of left upper arm, initial encounter    TECHNIQUE:  AP and lateral views of the left forearm were performed.    COMPARISON:  None    FINDINGS:  No fracture.  No traumatic malalignment.  No osseous destructive process.  Moderate degenerative changes.  Extensive soft tissue swelling.                                       X-Ray Elbow Complete Left (Final result)  Result time 08/23/24 11:22:38      Final result by Weston Escobar MD (08/23/24 11:22:38)                   Impression:      As above.      Electronically signed by: Weston Escobar  Date:    08/23/2024  Time:    11:22               Narrative:    EXAMINATION:  XR ELBOW COMPLETE 3 VIEW LEFT    CLINICAL HISTORY:  Pain in arm, unspecified    TECHNIQUE:  AP, lateral, and oblique views of the left elbow were performed.    COMPARISON:  None    FINDINGS:  No acute displaced fracture.  No traumatic malalignment.  No osseous destructive process.  Some intra-articular loose bodies suspected.  Advanced degenerative change.  Extensive soft tissue swelling.                                       X-Ray Humerus 2 View Left (Final result)  Result time 08/23/24 11:23:41      Final result by Weston Escobar MD (08/23/24 11:23:41)                   Impression:      As above.      Electronically signed by: Weston Escobar  Date:    08/23/2024  Time:    11:23               Narrative:    EXAMINATION:  XR HUMERUS 2 VIEW  LEFT    CLINICAL HISTORY:  Contusion of left upper arm, initial encounter    TECHNIQUE:  Two views left humerus    COMPARISON:  None    FINDINGS:  No acute displaced fracture.  No traumatic malalignment.  No osseous destructive process.  Age expected degenerative changes of the shoulder.  Extensive soft tissue swelling.

## 2024-08-26 LAB
ALBUMIN SERPL BCP-MCNC: 2.2 G/DL (ref 3.5–5.2)
ALP SERPL-CCNC: 107 U/L (ref 55–135)
ALT SERPL W/O P-5'-P-CCNC: 20 U/L (ref 10–44)
ANION GAP SERPL CALC-SCNC: 8 MMOL/L (ref 8–16)
AST SERPL-CCNC: 25 U/L (ref 10–40)
BACTERIA UR CULT: ABNORMAL
BACTERIA UR CULT: ABNORMAL
BASOPHILS # BLD AUTO: 0.03 K/UL (ref 0–0.2)
BASOPHILS NFR BLD: 0.3 % (ref 0–1.9)
BILIRUB SERPL-MCNC: 2.4 MG/DL (ref 0.1–1)
BUN SERPL-MCNC: 25 MG/DL (ref 8–23)
CALCIUM SERPL-MCNC: 8.3 MG/DL (ref 8.7–10.5)
CHLORIDE SERPL-SCNC: 95 MMOL/L (ref 95–110)
CO2 SERPL-SCNC: 36 MMOL/L (ref 23–29)
CREAT SERPL-MCNC: 0.9 MG/DL (ref 0.5–1.4)
DIFFERENTIAL METHOD BLD: ABNORMAL
EOSINOPHIL # BLD AUTO: 0.3 K/UL (ref 0–0.5)
EOSINOPHIL NFR BLD: 3.5 % (ref 0–8)
ERYTHROCYTE [DISTWIDTH] IN BLOOD BY AUTOMATED COUNT: 17.2 % (ref 11.5–14.5)
EST. GFR  (NO RACE VARIABLE): >60 ML/MIN/1.73 M^2
GLUCOSE SERPL-MCNC: 93 MG/DL (ref 70–110)
HCT VFR BLD AUTO: 27.6 % (ref 37–48.5)
HGB BLD-MCNC: 8.6 G/DL (ref 12–16)
IMM GRANULOCYTES # BLD AUTO: 0.05 K/UL (ref 0–0.04)
IMM GRANULOCYTES NFR BLD AUTO: 0.6 % (ref 0–0.5)
INR PPP: 1.3 (ref 0.8–1.2)
LYMPHOCYTES # BLD AUTO: 1.2 K/UL (ref 1–4.8)
LYMPHOCYTES NFR BLD: 14 % (ref 18–48)
MCH RBC QN AUTO: 29.1 PG (ref 27–31)
MCHC RBC AUTO-ENTMCNC: 31.2 G/DL (ref 32–36)
MCV RBC AUTO: 93 FL (ref 82–98)
MONOCYTES # BLD AUTO: 0.7 K/UL (ref 0.3–1)
MONOCYTES NFR BLD: 8.4 % (ref 4–15)
NEUTROPHILS # BLD AUTO: 6.5 K/UL (ref 1.8–7.7)
NEUTROPHILS NFR BLD: 73.2 % (ref 38–73)
NRBC BLD-RTO: 0 /100 WBC
PLATELET # BLD AUTO: 151 K/UL (ref 150–450)
PMV BLD AUTO: 10.8 FL (ref 9.2–12.9)
POTASSIUM SERPL-SCNC: 3.7 MMOL/L (ref 3.5–5.1)
PROT SERPL-MCNC: 5.5 G/DL (ref 6–8.4)
PROTHROMBIN TIME: 14.8 SEC (ref 9–12.5)
RBC # BLD AUTO: 2.96 M/UL (ref 4–5.4)
SODIUM SERPL-SCNC: 139 MMOL/L (ref 136–145)
WBC # BLD AUTO: 8.83 K/UL (ref 3.9–12.7)

## 2024-08-26 PROCEDURE — 63600175 PHARM REV CODE 636 W HCPCS: Performed by: FAMILY MEDICINE

## 2024-08-26 PROCEDURE — 80053 COMPREHEN METABOLIC PANEL: CPT | Performed by: STUDENT IN AN ORGANIZED HEALTH CARE EDUCATION/TRAINING PROGRAM

## 2024-08-26 PROCEDURE — 27000207 HC ISOLATION

## 2024-08-26 PROCEDURE — 85025 COMPLETE CBC W/AUTO DIFF WBC: CPT | Performed by: STUDENT IN AN ORGANIZED HEALTH CARE EDUCATION/TRAINING PROGRAM

## 2024-08-26 PROCEDURE — 63600175 PHARM REV CODE 636 W HCPCS: Performed by: STUDENT IN AN ORGANIZED HEALTH CARE EDUCATION/TRAINING PROGRAM

## 2024-08-26 PROCEDURE — 21400001 HC TELEMETRY ROOM

## 2024-08-26 PROCEDURE — 27000221 HC OXYGEN, UP TO 24 HOURS

## 2024-08-26 PROCEDURE — 36415 COLL VENOUS BLD VENIPUNCTURE: CPT | Performed by: STUDENT IN AN ORGANIZED HEALTH CARE EDUCATION/TRAINING PROGRAM

## 2024-08-26 PROCEDURE — 25000003 PHARM REV CODE 250: Performed by: FAMILY MEDICINE

## 2024-08-26 PROCEDURE — 92526 ORAL FUNCTION THERAPY: CPT

## 2024-08-26 PROCEDURE — 94761 N-INVAS EAR/PLS OXIMETRY MLT: CPT

## 2024-08-26 PROCEDURE — 25000003 PHARM REV CODE 250: Performed by: STUDENT IN AN ORGANIZED HEALTH CARE EDUCATION/TRAINING PROGRAM

## 2024-08-26 PROCEDURE — 85610 PROTHROMBIN TIME: CPT | Performed by: STUDENT IN AN ORGANIZED HEALTH CARE EDUCATION/TRAINING PROGRAM

## 2024-08-26 RX ORDER — ENOXAPARIN SODIUM 100 MG/ML
1 INJECTION SUBCUTANEOUS 2 TIMES DAILY
Status: DISCONTINUED | OUTPATIENT
Start: 2024-08-26 | End: 2024-08-28 | Stop reason: HOSPADM

## 2024-08-26 RX ORDER — LANOLIN ALCOHOL/MO/W.PET/CERES
1 CREAM (GRAM) TOPICAL DAILY
Status: DISCONTINUED | OUTPATIENT
Start: 2024-08-26 | End: 2024-08-28 | Stop reason: HOSPADM

## 2024-08-26 RX ADMIN — CEFTRIAXONE 1 G: 1 INJECTION, POWDER, FOR SOLUTION INTRAMUSCULAR; INTRAVENOUS at 09:08

## 2024-08-26 RX ADMIN — FERROUS SULFATE TAB 325 MG (65 MG ELEMENTAL FE) 1 EACH: 325 (65 FE) TAB at 12:08

## 2024-08-26 RX ADMIN — MIDODRINE HYDROCHLORIDE 10 MG: 5 TABLET ORAL at 08:08

## 2024-08-26 RX ADMIN — ENOXAPARIN SODIUM 80 MG: 100 INJECTION SUBCUTANEOUS at 12:08

## 2024-08-26 RX ADMIN — THERA TABS 1 TABLET: TAB at 08:08

## 2024-08-26 NOTE — PT/OT/SLP PROGRESS
Speech Language Pathology Treatment    Patient Name:  Serena Post   MRN:  [id number]  Admitting Diagnosis: Encephalopathy, metabolic    Recommendations:                 General Recommendations:  Follow-up not indicated  Diet recommendations:  Soft & Bite Sized Diet - IDDSI Level 6, Liquid Diet Level: Thin liquids - IDDSI Level 0   Aspiration Precautions: 1 bite/sip at a time, Assistance with meals, Eliminate distractions, Feed only when awake/alert, Frequent oral care, HOB to 90 degrees, Meds crushed in puree, Small bites/sips, and Standard aspiration precautions   General Precautions: Standard, aspiration  Communication strategies:  provide increased time to answer    Assessment:     Serena Post is a 84 y.o. female with a PMHx of HTN, HLD, CHF, A fib, CVA, cancer, COPD, and hx of cognitive impairment. Upon intake pt's son stated she does not usually talk much bit has been below her baseline for the past 1-2 days. Pt lives at home with son and receives care from pallitive care and home health. Pt was recently seen here in the ED 3 times this month and was admitted twice (8/1/24 and 8/9/24). MBSS completed on 08/24/24 w no aspiration appreciated throughout and recommendations for IDDSI 6/0 diet w upright to eat/drink, small bites/sips, double swallows with each bite/sip to clear material and effortful swallow and assistance with meals. Pt seen bedside for ST. Pt would respond to y/n questions w headnods or few verbalizations of y/n w much encouragement from ST. ST assisted pt w few bites of breakfast (eggs, juice, sausage, and grits) w no overt s/s of aspiration appreciated throughout. Pt likely at communicative/cognitive baseline and is recommended to continue current diet w meds crushed in puree. No further acute ST needed. Pt also would benefit from ongoing GOC w possible palliative medicine consultation given age, underlying comorbidities, and recurrent hospitalizations. MD to re-consult if  medically indicated.    Subjective     Pt seen bedside for ST w no family present.   Patient goals: unable to state     Pain/Comfort:  Pain Rating 1: 0/10  Pain Rating Post-Intervention 1: 0/10  Pain Rating 2: 0/10  Pain Rating Post-Intervention 2: 0/10    Objective:     Has the patient been evaluated by SLP for swallowing?   Yes  Keep patient NPO? No   Current Respiratory Status:        Pt seen bedside for ST. Pt awake and alert throughout, but responds to y/n questions w headnods or few verbalizations of y/n requiring much encouragement from ST. ST assisted pt w few bites of breakfast (eggs, juice, sausage, and grits) w no overt s/s of aspiration appreciated throughout before pt indicated she was no longer hungry and did not want any more. Pt likely at communicative/cognitive baseline and is recommended to continue current diet w meds crushed in puree and mealtime assistance. No further acute ST needed. Pt also would benefit from ongoing GOC w possible palliative medicine consultation given age, underlying comorbidities, and recurrent hospitalizations.    Goals:   Multidisciplinary Problems       SLP Goals       Not on file              Multidisciplinary Problems (Resolved)          Problem: SLP    Goal Priority Disciplines Outcome   SLP Goal   (Resolved)     SLP Met                       Plan:     Patient to be seen:   (3-5X Weekly)   Plan of Care expires:     Plan of Care reviewed with:  patient   SLP Follow-Up:  No       Discharge recommendations:  No Therapy Indicated (continue previous POC)   Barriers to Discharge:  None    Time Tracking:     SLP Treatment Date:   08/26/24  Speech Start Time:  1010  Speech Stop Time:  1025     Speech Total Time (min):  15 min    Billable Minutes: Treatment Swallowing Dysfunction 15    Nilda West MA, PL-SLP, CF-SLP  Speech Language Pathologist  08/26/2024

## 2024-08-26 NOTE — PROGRESS NOTES
[facility] Medicine  Progress Note    Patient Name: Serena Post  MRN: [id number]  Patient Class: IP- Inpatient   Admission Date: 8/23/2024  Length of Stay: 3 days  Attending Physician: Óscar Ogden MD  Primary Care Provider: Evangelina Olivares MD        Subjective:     Principal Problem:Encephalopathy, metabolic        HPI:  Serena Post is a 84 y.o. female patient with a PMHx of HTN, HLD, CHF, A fib, CVA, cancer, COPD and a PSHx of s/p aortic valve replacement, s/p cholecystectomy, s/p insertion of pacemaker who presents to the Emergency Department for evaluation of AMS. The pt's son states that the pt usually does not talk much but has recently been below her baseline for the past 1-2 days. He states that the pt lives at home with him and receives care from pallitive care and home health. He states that he is concerned because the pt bruises/bleeds easily. The pt's son states that the pt's LUE is bruised and swollen. He mentions that they use the pt's arms to move her around. Pt's son states that the pt was recently seen here in the ED 3 times this month and was admitted twice (81/24 and 8/9/24). The pt's son also mentions that he is worried that the pt may have a UTI. Symptoms are constant and moderate in severity. Patient's son states that the pt was taking coumadin, but recently began taking Lovenox and mentions that she was recently dx with a DVT in her LLE. The pt is currently on 3L O2 at home. The pt's son states that the pt is compliant with all of her medications. She recently began taking midodrine on 8/16/24. He also states that she took all of her medications this morning except for her Lovenox. Pt's son also states that the pt had an US on the LUE done at home 2 days ago. No further complaints or concerns at this time.     Overview/Hospital Course:  Serena Post is a 84 y.o. female patient  who presents to the Emergency Department for  evaluation of AMS, increased bruises.    He states that the pt lives at home with him and receives care from pallitive care and home health.      pt was recently seen here in the ED 3 times this month and was admitted twice (81/24 and 8/9/24).   Patient's son states that the pt was taking coumadin, but recently began taking Lovenox and mentions that she was recently dx with a DVT in her LLE. The pt is currently on 3L O2 at home.   Pt's son also states that the pt had an US on the LUE done at home 2 days ago. No further complaints or concerns at this time.   On arrival UA suggestive of UTI, currently on empiric antibiotics, pending cultures     S/p SLP cory MBSS on 8/25/24-- SLP recommended IDDSI 6;    Given questionable concerns about intake of high dose of Lovenox likely might have contributed to bruises per son, consulted Hematology,-- recommended to resume Lovenox at 70 mg b.i.d. once hemoglobin/INR stabilizes, bleeding workup results negative, bruising improves,; per hematology-- after 3 months of AC, If thrombus resolved, recommended to resume warfarin given this is the AC of choice for the mechanical heart valve. If she cannot be anticoagulated, recommended to consider IVC filter.     We will plan to resume Lovenox at 70 mg b.i.d. on 08/26/2024; monitor closely    Of note given patient's age, significant underlying comorbidities, recurrent hospitalizations, had extensive discussion with patient/son at bedside regarding goals of care, opted to continue with DNR DNI now, son stated that he is not open for any further palliative/hospice discussions at this moment, strongly opposed palliative consultation at this time;     08/26/2024  Continue treatment for UTI.  Patient is still confused.  Will start Lovenox.  Continue to monitor inpatient.  Possible DC tomorrow.    Interval History:  No acute issues overnight.  Patient non conversive today.  Uncertain on baseline mentation.    Review of Systems   Unable to  perform ROS: Acuity of condition     Objective:     Vital Signs (Most Recent):  Temp: 98 °F (36.7 °C) (08/26/24 1045)  Pulse: 67 (08/26/24 1101)  Resp: 16 (08/26/24 1045)  BP: (!) 107/57 (08/26/24 1045)  SpO2: 100 % (08/26/24 1045) Vital Signs (24h Range):  Temp:  [97.8 °F (36.6 °C)-98.1 °F (36.7 °C)] 98 °F (36.7 °C)  Pulse:  [64-77] 67  Resp:  [16-22] 16  SpO2:  [96 %-100 %] 100 %  BP: ()/(45-60) 107/57     Weight: 78.6 kg (173 lb 4.5 oz)  Body mass index is 30.7 kg/m².    Intake/Output Summary (Last 24 hours) at 8/26/2024 1329  Last data filed at 8/26/2024 0450  Gross per 24 hour   Intake --   Output 400 ml   Net -400 ml         Physical Exam  Constitutional:       General: She is not in acute distress.     Appearance: She is well-developed. She is ill-appearing. She is not diaphoretic.   HENT:      Head: Normocephalic and atraumatic.   Eyes:      Pupils: Pupils are equal, round, and reactive to light.   Cardiovascular:      Rate and Rhythm: Normal rate and regular rhythm.      Heart sounds: Normal heart sounds. No murmur heard.     No friction rub. No gallop.   Pulmonary:      Effort: Pulmonary effort is normal. No respiratory distress.      Breath sounds: Normal breath sounds. No stridor. No wheezing or rales.   Abdominal:      General: Bowel sounds are normal. There is no distension.      Palpations: Abdomen is soft. There is no mass.      Tenderness: There is no abdominal tenderness. There is no guarding.   Skin:     General: Skin is warm.      Coloration: Skin is not jaundiced.      Findings: Bruising present. No erythema.   Neurological:      General: No focal deficit present.      Mental Status: She is disoriented.             Significant Labs: All pertinent labs within the past 24 hours have been reviewed.    Significant Imaging: I have reviewed all pertinent imaging results/findings within the past 24 hours.    Assessment/Plan:      * Encephalopathy, metabolic  Likely secondary to UTI   CT head  negative             Elevated bilirubin  08/26/2024  Probable cirrhosis on abdominal ultrasound   Hyperbilirubinemia likely secondary to sepsis  Trending down   Ammonia levels within normal limits      Acute cystitis without hematuria  08/26/2024  Continue Rocephin   Urine culture reviewed   Plan to transition to oral antibiotics tomorrow        COPD without exacerbation  Currently not in acute exacerbation   Supplemental oxygen support to maintain SpO2 above 90  Katey [email or web address]    Severe obesity (BMI 35.0-39.9) with comorbidity  Body mass index is 30.7 kg/m². Morbid obesity complicates all aspects of disease management from diagnostic modalities to treatment. Weight loss encouraged and health benefits explained to patient.         Cognitive impairment  Waxing and waning mentation   Considered palliative evaluation, however patient's son who is healthcare power of , declined palliative consultation at this point         Paroxysmal atrial fibrillation  On Toprol   Hypotensive/low heart rate on arrival, held Toprol for now   We will monitor and adjust regimen   Holding anticoagulation at this point given concerns for bleeding     08/26/2024  H&H stable   Will resume Lovenox      History of DVT (deep vein thrombosis)  Recent history of DVT   On Lovenox until arrival   Noted to have bruising/swelling over left upper extremity   Family concerned about bleeding issues, preferred Heme-Onc evaluation for alternative options  Heme-Onc consulted   Patient/son prefers to hold blood thinners at this point until further Heme-Onc recommendations- held accordingly     08/26/2024  Patient/son agrees for resuming Lovenox at 70 mg b.i.d.   Follow up on labs/overt signs of bleeding   Appreciate Heme-Onc recommendations      Essential hypertension  Latest blood pressure and vitals reviewed-     Temp:  [97.6 °F (36.4 °C)-99.3 °F (37.4 °C)]   Pulse:  [56-82]   Resp:  [18-22]   BP: ()/(50-58)   SpO2:  [91 %-99 %] .   Home  meds for hypertension were reviewed and noted below.   Hypertension Medications               metoprolol succinate (TOPROL-XL) 25 MG 24 hr tablet Take 1 tablet by mouth every evening.    furosemide (LASIX) 20 MG tablet Take 2 tablets (40 mg total) by mouth once daily. Hold for low blood pressure. Do not give if systolic blood pressure is below 110 and/or diastolic is below 60            Hypotensive on arrival, hold blood pressure medications as of now   Monitor and adjust regimen   Hydralazine p.r.n.       Dyslipidemia  Patient is not chronically on statin.will not continue for now. Last Lipid Panel:         Lab Results   Component Value Date     CHOL 225 (H) 11/01/2022     HDL 48 11/01/2022     LDLCALC 141 (H) 11/01/2022     TRIG 180 11/01/2022     CHOLHDL 4.7 (H) 11/01/2022      Plan:  -low fat/low calorie diet        VTE Risk Mitigation (From admission, onward)           Ordered     enoxaparin injection 80 mg  2 times daily         08/26/24 1212     IP VTE HIGH RISK PATIENT  Once         08/23/24 1633     Place sequential compression device  Until discontinued         08/23/24 1633                    Discharge Planning   RENE:      Code Status: DNR   Is the patient medically ready for discharge?:     Reason for patient still in hospital (select all that apply): Patient trending condition  Discharge Plan A: Home Health                  Óscar Ogden MD  Department of Hospital Medicine   [facility] - Telemetry ([facility])

## 2024-08-26 NOTE — ASSESSMENT & PLAN NOTE
08/26/2024  Probable cirrhosis on abdominal ultrasound   Hyperbilirubinemia likely secondary to sepsis  Trending down   Ammonia levels within normal limits

## 2024-08-26 NOTE — ASSESSMENT & PLAN NOTE
Recent history of DVT   On Lovenox until arrival   Noted to have bruising/swelling over left upper extremity   Family concerned about bleeding issues, preferred Heme-Onc evaluation for alternative options  Heme-Onc consulted   Patient/son prefers to hold blood thinners at this point until further Heme-Onc recommendations- held accordingly     08/26/2024  Patient/son agrees for resuming Lovenox at 70 mg b.i.d.   Follow up on labs/overt signs of bleeding   Appreciate Heme-Onc recommendations

## 2024-08-26 NOTE — PLAN OF CARE
A227/A227 NITHIN Post is a 84 y.o.female admitted on 8/23/2024 for Encephalopathy, metabolic   Code Status: DNR MRN: [id number]   Review of patient's allergies indicates:   Allergen Reactions    Amlodipine Other (See Comments)     Not sure    Chlorthalidone Other (See Comments)     Not sure    Clonidine Other (See Comments)     Not sure    Codeine Other (See Comments)     Not sure    Escitalopram Other (See Comments)     Not sure    Lisinopril Other (See Comments)     Not sure    Losartan Other (See Comments)     Not sure    Meperidine Other (See Comments)     Not sure    Methadone Other (See Comments)     Not sure      Morphine Other (See Comments)     Not sure    Nebivolol Other (See Comments)     Not sure        Nitrofurantoin Other (See Comments)     Not sure        Omeprazole Other (See Comments)     Not sure      Pravastatin Other (See Comments)     Not sure      Propoxyphene Other (See Comments)     Not sure      Sulfamethoxazole-trimethoprim Other (See Comments)     Not sure         Past Medical History:   Diagnosis Date    A-fib     Anticoagulant long-term use     Aortic valve disease     Cancer     brain tumor, 10 years ago    Cardiomyopathy     CHF (congestive heart failure)     COVID-19 7/23/2022    Hx of heart valve replacement with mechanical valve     Hyperlipidemia     Hypertension     Pacemaker     Pacemaker     Sepsis 7/23/2022    Stroke     2007, residual weakness      PRN meds    acetaminophen, 500 mg, Q6H PRN  hydrALAZINE, 10 mg, Q6H PRN  ondansetron, 4 mg, Q8H PRN  sodium chloride 0.9%, 10 mL, PRN      Chart check completed. Will continue plan of care.      Orientation: oriented x 4  Chhaya Coma Scale Score: 15     Lead Monitored: Lead II Rhythm: paced rhythm Frequency/Ectopy: greater than 6/min, PVCs  Cardiac/Telemetry Box Number: [id number]    Last Bowel Movement: 08/10/24  Diet Soft & Bite Sized (IDDSI Level 6)  Voiding Characteristics: external catheter  Jacobo Score: 14  Fall Risk  Score: 12  Accucheck []   Freq?      Lines/Drains/Airways       Peripheral Intravenous Line  Duration                  Peripheral IV - Single Lumen 08/23/24 1654 20 G Anterior;Right Upper Arm 2 days

## 2024-08-26 NOTE — PLAN OF CARE
POC reviewed with pt. Pt verbalizes understanding of POC. No questions at this time.  AAOx4. NADN.  A paced rhythm on cardiac monitor.  Pt remains free of falls.  No complaints at this time.  Safety measures in place. Will continue to monitor.  Informed pt to call for assistance before getting up. Pt verbalizes understanding.  Hourly rounding and chart check complete.   Bed in lowest position, wheels locked, side rails up x2.  Wound care assessed sacral wounds.  Patient lethargic with flat affect.

## 2024-08-26 NOTE — ASSESSMENT & PLAN NOTE
08/26/2024  Continue Rocephin   Urine culture reviewed   Plan to transition to oral antibiotics tomorrow

## 2024-08-26 NOTE — CONSULTS
[facility] - Telemetry ([facility])  Wound Care    Patient Name:  Serena Post   MRN:  [id number]  Date: 8/26/2024  Diagnosis: Encephalopathy, metabolic    History:     Past Medical History:   Diagnosis Date    A-fib     Anticoagulant long-term use     Aortic valve disease     Cancer     brain tumor, 10 years ago    Cardiomyopathy     CHF (congestive heart failure)     COVID-19 7/23/2022    Hx of heart valve replacement with mechanical valve     Hyperlipidemia     Hypertension     Pacemaker     Pacemaker     Sepsis 7/23/2022    Stroke     2007, residual weakness       Social History     Socioeconomic History    Marital status:    Tobacco Use    Smoking status: Never     Passive exposure: Never    Smokeless tobacco: Never   Substance and Sexual Activity    Alcohol use: Not Currently    Drug use: Never     Social Determinants of Health     Financial Resource Strain: Low Risk  (8/23/2024)    Overall Financial Resource Strain (CARDIA)     Difficulty of Paying Living Expenses: Not very hard   Food Insecurity: No Food Insecurity (8/23/2024)    Hunger Vital Sign     Worried About Running Out of Food in the Last Year: Never true     Ran Out of Food in the Last Year: Never true   Transportation Needs: No Transportation Needs (8/23/2024)    TRANSPORTATION NEEDS     Transportation : No   Physical Activity: Inactive (8/23/2024)    Exercise Vital Sign     Days of Exercise per Week: 0 days     Minutes of Exercise per Session: 0 min   Stress: No Stress Concern Present (8/23/2024)    [employer] - Occupational Stress Questionnaire     Feeling of Stress : Not at all   Housing Stability: Low Risk  (8/23/2024)    Housing Stability Vital Sign     Unable to Pay for Housing in the Last Year: No     Homeless in the Last Year: No       Precautions:     Allergies as of 08/23/2024 - Reviewed 08/23/2024   Allergen Reaction Noted    Amlodipine Other (See Comments) 09/25/2019    Chlorthalidone Other (See  Comments) 09/25/2019    Clonidine Other (See Comments) 09/25/2019    Codeine Other (See Comments) 09/25/2019    Escitalopram Other (See Comments) 09/25/2019    Lisinopril Other (See Comments) 09/25/2019    Losartan Other (See Comments) 09/25/2019    Meperidine Other (See Comments) 09/25/2019    Methadone Other (See Comments) 09/25/2019    Morphine Other (See Comments) 09/25/2019    Nebivolol Other (See Comments) 09/25/2019    Nitrofurantoin Other (See Comments) 09/25/2019    Omeprazole Other (See Comments) 09/25/2019    Pravastatin Other (See Comments) 09/25/2019    Propoxyphene Other (See Comments) 09/25/2019    Sulfamethoxazole-trimethoprim Other (See Comments) 09/25/2019       WOC Assessment Details/Treatment        08/26/24 0855   WOCN Assessment   WOCN Total Time (mins) 45   Visit Date 08/26/24   Visit Time 0855   Consult Type New   WOCN Speciality Wound   Number of Wounds 2   Intervention assessed;applied;chart review;coordination of care   Teaching on-going        Wound 08/23/24 1600 Pressure Injury Coccyx   Date First Assessed/Time First Assessed: 08/23/24 1600   Primary Wound Type: Pressure Injury  Location: Coccyx   Wound Image    Pressure Injury Stage U   Dressing Appearance Intact;Moist drainage   Drainage Amount Scant   Drainage Characteristics/Odor Serous   Appearance Yellow;Red;Fibrin   Tissue loss description Full thickness   Red (%), Wound Tissue Color 35 %   Yellow (%), Wound Tissue Color 65 %   Periwound Area Intact   Wound Edges Open   Wound Length (cm) 3 cm   Wound Width (cm) 1.2 cm   Wound Depth (cm) 0.1 cm   Wound Volume (cm^3) 0.36 cm^3   Wound Surface Area (cm^2) 3.6 cm^2   Care Cleansed with:;Antimicrobial agent   Dressing Applied;Sodium chloride impregnated;Foam;Island/border;Silicone   Periwound Care Skin barrier film applied   Dressing Change Due 08/27/24        Wound 08/23/24 1600 Incontinence associated dermatitis midline Sacral spine   Date First Assessed/Time First Assessed: 08/23/24  1600   Primary Wound Type: Incontinence associated dermatitis  Orientation: midline  Location: Sacral spine   Wound Image   (see photo for wound to coccyx)   Dressing Appearance Intact;Moist drainage   Drainage Amount Scant   Drainage Characteristics/Odor Serous   Appearance Pink;Red;Yellow   Tissue loss description Full thickness   Red (%), Wound Tissue Color 85 %   Yellow (%), Wound Tissue Color 15 %   Periwound Area Intact   Wound Edges Irregular   Wound Length (cm) 3 cm   Wound Width (cm) 1.5 cm   Wound Depth (cm) 0.1 cm   Wound Volume (cm^3) 0.45 cm^3   Wound Surface Area (cm^2) 4.5 cm^2   Care Cleansed with:;Antimicrobial agent   Dressing Applied;Sodium chloride impregnated;Foam;Island/border;Silicone   Periwound Care Skin barrier film applied   Dressing Change Due 08/27/24     Consulted to evaluate wound to sacrum.  Ms. Post is a 85 yo female patient well known to wound care staff as I evaluated her on last hospital admission 8/9/24.  She was re-admitted 8/23/24 with hypotension, AMS and possible bleeding.  PMH includes:  HTN, HLD, CHF, A fib, CVA, cancer, COPD and a PSHx of s/p aortic valve replacement, s/p cholecystectomy, s/p insertion of pacemaker.  Patient resting quietly in bed, quite drowsy this morning; primary nurse is @ bedside.  Assisted to reposition patient onto her L side and removed dressing. Noted patient remains w/ sacral wound due to IAD; measured scattered small open sites = 3x1.2x0.1 cm in total.  These do appear full-thickness in some areas w/ minimal fibrin noted.  Also, new pressure injury (unstageable) noted to coccyx region; measured 2 open sites = 3x1.5x0.1 cm w/ mixture of red clean tissue as well as yellow fibrinous tissue.  Periwound intact; only minimal serous exudate noted.  Recommend:  application of Cavilon skin barrier w/ Mesalt dressing and foam border to be changed daily & prn.  Will implement pressure injury prevention measures, including turning schedule w/ wedge pillow.   Plan f/u in 1 week.          08/26/2024

## 2024-08-26 NOTE — SUBJECTIVE & OBJECTIVE
Interval History:  No acute issues overnight.  Patient non conversive today.  Uncertain on baseline mentation.    Review of Systems   Unable to perform ROS: Acuity of condition     Objective:     Vital Signs (Most Recent):  Temp: 98 °F (36.7 °C) (08/26/24 1045)  Pulse: 67 (08/26/24 1101)  Resp: 16 (08/26/24 1045)  BP: (!) 107/57 (08/26/24 1045)  SpO2: 100 % (08/26/24 1045) Vital Signs (24h Range):  Temp:  [97.8 °F (36.6 °C)-98.1 °F (36.7 °C)] 98 °F (36.7 °C)  Pulse:  [64-77] 67  Resp:  [16-22] 16  SpO2:  [96 %-100 %] 100 %  BP: ()/(45-60) 107/57     Weight: 78.6 kg (173 lb 4.5 oz)  Body mass index is 30.7 kg/m².    Intake/Output Summary (Last 24 hours) at 8/26/2024 1329  Last data filed at 8/26/2024 0450  Gross per 24 hour   Intake --   Output 400 ml   Net -400 ml         Physical Exam  Constitutional:       General: She is not in acute distress.     Appearance: She is well-developed. She is ill-appearing. She is not diaphoretic.   HENT:      Head: Normocephalic and atraumatic.   Eyes:      Pupils: Pupils are equal, round, and reactive to light.   Cardiovascular:      Rate and Rhythm: Normal rate and regular rhythm.      Heart sounds: Normal heart sounds. No murmur heard.     No friction rub. No gallop.   Pulmonary:      Effort: Pulmonary effort is normal. No respiratory distress.      Breath sounds: Normal breath sounds. No stridor. No wheezing or rales.   Abdominal:      General: Bowel sounds are normal. There is no distension.      Palpations: Abdomen is soft. There is no mass.      Tenderness: There is no abdominal tenderness. There is no guarding.   Skin:     General: Skin is warm.      Coloration: Skin is not jaundiced.      Findings: Bruising present. No erythema.   Neurological:      General: No focal deficit present.      Mental Status: She is disoriented.             Significant Labs: All pertinent labs within the past 24 hours have been reviewed.    Significant Imaging: I have reviewed all pertinent  imaging results/findings within the past 24 hours.

## 2024-08-26 NOTE — ASSESSMENT & PLAN NOTE
On Toprol   Hypotensive/low heart rate on arrival, held Toprol for now   We will monitor and adjust regimen   Holding anticoagulation at this point given concerns for bleeding     08/26/2024  H&H stable   Will resume Lovenox

## 2024-08-27 LAB
ALLENS TEST: ABNORMAL
ALLENS TEST: ABNORMAL
AMMONIA PLAS-SCNC: 35 UMOL/L (ref 10–50)
ANION GAP SERPL CALC-SCNC: 14 MMOL/L (ref 8–16)
BASOPHILS # BLD AUTO: 0.04 K/UL (ref 0–0.2)
BASOPHILS NFR BLD: 0.5 % (ref 0–1.9)
BUN SERPL-MCNC: 23 MG/DL (ref 8–23)
CALCIUM SERPL-MCNC: 8.4 MG/DL (ref 8.7–10.5)
CHLORIDE SERPL-SCNC: 95 MMOL/L (ref 95–110)
CO2 SERPL-SCNC: 30 MMOL/L (ref 23–29)
CREAT SERPL-MCNC: 0.8 MG/DL (ref 0.5–1.4)
DELSYS: ABNORMAL
DELSYS: ABNORMAL
DIFFERENTIAL METHOD BLD: ABNORMAL
EOSINOPHIL # BLD AUTO: 0.3 K/UL (ref 0–0.5)
EOSINOPHIL NFR BLD: 3.3 % (ref 0–8)
ERYTHROCYTE [DISTWIDTH] IN BLOOD BY AUTOMATED COUNT: 17.5 % (ref 11.5–14.5)
EST. GFR  (NO RACE VARIABLE): >60 ML/MIN/1.73 M^2
FLOW: 3
FLOW: 3
GLUCOSE SERPL-MCNC: 107 MG/DL (ref 70–110)
HCO3 UR-SCNC: 35.3 MMOL/L (ref 24–28)
HCO3 UR-SCNC: 35.3 MMOL/L (ref 24–28)
HCT VFR BLD AUTO: 28.4 % (ref 37–48.5)
HGB BLD-MCNC: 9.1 G/DL (ref 12–16)
IMM GRANULOCYTES # BLD AUTO: 0.06 K/UL (ref 0–0.04)
IMM GRANULOCYTES NFR BLD AUTO: 0.8 % (ref 0–0.5)
INR PPP: 1.3 (ref 0.8–1.2)
LYMPHOCYTES # BLD AUTO: 1.3 K/UL (ref 1–4.8)
LYMPHOCYTES NFR BLD: 15.9 % (ref 18–48)
MCH RBC QN AUTO: 29.4 PG (ref 27–31)
MCHC RBC AUTO-ENTMCNC: 32 G/DL (ref 32–36)
MCV RBC AUTO: 92 FL (ref 82–98)
MODE: ABNORMAL
MODE: ABNORMAL
MONOCYTES # BLD AUTO: 0.8 K/UL (ref 0.3–1)
MONOCYTES NFR BLD: 9.7 % (ref 4–15)
NEUTROPHILS # BLD AUTO: 5.5 K/UL (ref 1.8–7.7)
NEUTROPHILS NFR BLD: 69.8 % (ref 38–73)
NRBC BLD-RTO: 0 /100 WBC
PCO2 BLDA: 38.9 MMHG (ref 35–45)
PCO2 BLDA: 38.9 MMHG (ref 35–45)
PH SMN: 7.57 [PH] (ref 7.35–7.45)
PH SMN: 7.57 [PH] (ref 7.35–7.45)
PLATELET # BLD AUTO: 193 K/UL (ref 150–450)
PMV BLD AUTO: 10.5 FL (ref 9.2–12.9)
PO2 BLDA: 89 MMHG (ref 80–100)
PO2 BLDA: 89 MMHG (ref 80–100)
POC BE: 13 MMOL/L
POC BE: 13 MMOL/L
POC SATURATED O2: 98 % (ref 95–100)
POC SATURATED O2: 98 % (ref 95–100)
POTASSIUM SERPL-SCNC: 3.8 MMOL/L (ref 3.5–5.1)
PROTHROMBIN TIME: 14.1 SEC (ref 9–12.5)
RBC # BLD AUTO: 3.09 M/UL (ref 4–5.4)
SAMPLE: ABNORMAL
SAMPLE: ABNORMAL
SITE: ABNORMAL
SITE: ABNORMAL
SODIUM SERPL-SCNC: 139 MMOL/L (ref 136–145)
T4 FREE SERPL-MCNC: 1.07 NG/DL (ref 0.71–1.51)
TSH SERPL DL<=0.005 MIU/L-ACNC: 5.03 UIU/ML (ref 0.4–4)
WBC # BLD AUTO: 7.9 K/UL (ref 3.9–12.7)

## 2024-08-27 PROCEDURE — 85610 PROTHROMBIN TIME: CPT | Performed by: STUDENT IN AN ORGANIZED HEALTH CARE EDUCATION/TRAINING PROGRAM

## 2024-08-27 PROCEDURE — 27000207 HC ISOLATION

## 2024-08-27 PROCEDURE — 84439 ASSAY OF FREE THYROXINE: CPT | Performed by: FAMILY MEDICINE

## 2024-08-27 PROCEDURE — 36415 COLL VENOUS BLD VENIPUNCTURE: CPT | Performed by: STUDENT IN AN ORGANIZED HEALTH CARE EDUCATION/TRAINING PROGRAM

## 2024-08-27 PROCEDURE — 82803 BLOOD GASES ANY COMBINATION: CPT

## 2024-08-27 PROCEDURE — 25000003 PHARM REV CODE 250: Performed by: FAMILY MEDICINE

## 2024-08-27 PROCEDURE — 63600175 PHARM REV CODE 636 W HCPCS: Performed by: FAMILY MEDICINE

## 2024-08-27 PROCEDURE — 94761 N-INVAS EAR/PLS OXIMETRY MLT: CPT

## 2024-08-27 PROCEDURE — 21400001 HC TELEMETRY ROOM

## 2024-08-27 PROCEDURE — 85025 COMPLETE CBC W/AUTO DIFF WBC: CPT | Performed by: STUDENT IN AN ORGANIZED HEALTH CARE EDUCATION/TRAINING PROGRAM

## 2024-08-27 PROCEDURE — 25000003 PHARM REV CODE 250: Performed by: STUDENT IN AN ORGANIZED HEALTH CARE EDUCATION/TRAINING PROGRAM

## 2024-08-27 PROCEDURE — 36415 COLL VENOUS BLD VENIPUNCTURE: CPT | Performed by: FAMILY MEDICINE

## 2024-08-27 PROCEDURE — 27000221 HC OXYGEN, UP TO 24 HOURS

## 2024-08-27 PROCEDURE — 99900035 HC TECH TIME PER 15 MIN (STAT)

## 2024-08-27 PROCEDURE — 63600175 PHARM REV CODE 636 W HCPCS: Performed by: STUDENT IN AN ORGANIZED HEALTH CARE EDUCATION/TRAINING PROGRAM

## 2024-08-27 PROCEDURE — 36600 WITHDRAWAL OF ARTERIAL BLOOD: CPT

## 2024-08-27 PROCEDURE — 80048 BASIC METABOLIC PNL TOTAL CA: CPT | Performed by: FAMILY MEDICINE

## 2024-08-27 PROCEDURE — 82140 ASSAY OF AMMONIA: CPT | Performed by: FAMILY MEDICINE

## 2024-08-27 PROCEDURE — 84443 ASSAY THYROID STIM HORMONE: CPT | Performed by: FAMILY MEDICINE

## 2024-08-27 RX ADMIN — CEFTRIAXONE 1 G: 1 INJECTION, POWDER, FOR SOLUTION INTRAMUSCULAR; INTRAVENOUS at 08:08

## 2024-08-27 RX ADMIN — THERA TABS 1 TABLET: TAB at 08:08

## 2024-08-27 RX ADMIN — ENOXAPARIN SODIUM 80 MG: 100 INJECTION SUBCUTANEOUS at 08:08

## 2024-08-27 RX ADMIN — MIDODRINE HYDROCHLORIDE 10 MG: 5 TABLET ORAL at 08:08

## 2024-08-27 RX ADMIN — FERROUS SULFATE TAB 325 MG (65 MG ELEMENTAL FE) 1 EACH: 325 (65 FE) TAB at 08:08

## 2024-08-27 NOTE — ASSESSMENT & PLAN NOTE
08/27/2024  Patient continues to be altered   Continue Rocephin for UTI   Will obtain MRI brain   Repeat ammonia levels   May need LP should mentation not improve  Obtain ABG to check pCO2 levels

## 2024-08-27 NOTE — SUBJECTIVE & OBJECTIVE
Interval History:  No acute issues reported overnight.    Review of Systems   Unable to perform ROS: Acuity of condition     Objective:     Vital Signs (Most Recent):  Temp: 98.6 °F (37 °C) (08/27/24 1152)  Pulse: 77 (08/27/24 1152)  Resp: 18 (08/27/24 1152)  BP: (!) 115/55 (08/27/24 1152)  SpO2: 96 % (08/27/24 1152) Vital Signs (24h Range):  Temp:  [97.1 °F (36.2 °C)-98.6 °F (37 °C)] 98.6 °F (37 °C)  Pulse:  [] 77  Resp:  [16-18] 18  SpO2:  [90 %-100 %] 96 %  BP: (100-124)/(55-76) 115/55     Weight: 76.6 kg (168 lb 14 oz)  Body mass index is 29.91 kg/m².    Intake/Output Summary (Last 24 hours) at 8/27/2024 1304  Last data filed at 8/26/2024 1730  Gross per 24 hour   Intake 100 ml   Output 300 ml   Net -200 ml         Physical Exam  Constitutional:       General: She is not in acute distress.     Appearance: She is well-developed. She is ill-appearing. She is not diaphoretic.   HENT:      Head: Normocephalic and atraumatic.   Eyes:      Pupils: Pupils are equal, round, and reactive to light.   Cardiovascular:      Rate and Rhythm: Normal rate and regular rhythm.      Heart sounds: Normal heart sounds. No murmur heard.     No friction rub. No gallop.   Pulmonary:      Effort: Pulmonary effort is normal. No respiratory distress.      Breath sounds: Normal breath sounds. No stridor. No wheezing or rales.   Abdominal:      General: Bowel sounds are normal. There is no distension.      Palpations: Abdomen is soft. There is no mass.      Tenderness: There is no abdominal tenderness. There is no guarding.   Skin:     General: Skin is warm.      Coloration: Skin is not jaundiced.      Findings: Bruising present. No erythema.   Neurological:      General: No focal deficit present.      Mental Status: She is disoriented.             Significant Labs: All pertinent labs within the past 24 hours have been reviewed.    Significant Imaging: I have reviewed all pertinent imaging results/findings within the past 24 hours.

## 2024-08-27 NOTE — ASSESSMENT & PLAN NOTE
08/27/2024  Probable cirrhosis on abdominal ultrasound   Hyperbilirubinemia likely secondary to sepsis  Trending down   Repeat ammonia

## 2024-08-27 NOTE — PLAN OF CARE
A227/A227 NITHIN Post is a 84 y.o.female admitted on 8/23/2024 for Encephalopathy, metabolic   Code Status: DNR MRN: [id number]   Review of patient's allergies indicates:   Allergen Reactions    Amlodipine Other (See Comments)     Not sure    Chlorthalidone Other (See Comments)     Not sure    Clonidine Other (See Comments)     Not sure    Codeine Other (See Comments)     Not sure    Escitalopram Other (See Comments)     Not sure    Lisinopril Other (See Comments)     Not sure    Losartan Other (See Comments)     Not sure    Meperidine Other (See Comments)     Not sure    Methadone Other (See Comments)     Not sure      Morphine Other (See Comments)     Not sure    Nebivolol Other (See Comments)     Not sure        Nitrofurantoin Other (See Comments)     Not sure        Omeprazole Other (See Comments)     Not sure      Pravastatin Other (See Comments)     Not sure      Propoxyphene Other (See Comments)     Not sure      Sulfamethoxazole-trimethoprim Other (See Comments)     Not sure         Past Medical History:   Diagnosis Date    A-fib     Anticoagulant long-term use     Aortic valve disease     Cancer     brain tumor, 10 years ago    Cardiomyopathy     CHF (congestive heart failure)     COVID-19 7/23/2022    Hx of heart valve replacement with mechanical valve     Hyperlipidemia     Hypertension     Pacemaker     Pacemaker     Sepsis 7/23/2022    Stroke     2007, residual weakness      PRN meds    acetaminophen, 500 mg, Q6H PRN  hydrALAZINE, 10 mg, Q6H PRN  ondansetron, 4 mg, Q8H PRN  sodium chloride 0.9%, 10 mL, PRN      Chart check completed. Will continue plan of care.      Orientation: disoriented x 4  Chhaya Coma Scale Score: 11     Lead Monitored: Lead II Rhythm: normal sinus rhythm Frequency/Ectopy: PVCs  Cardiac/Telemetry Box Number: [id number]  VTE Required Core Measure: Pharmacological prophylaxis initiated/maintained Last Bowel Movement: 08/25/24  Diet Soft & Bite Sized (IDDSI Level 6)  Voiding  Characteristics: incontinence, external catheter  Jacobo Score: 10  Fall Risk Score: 16  Accucheck []   Freq?      Lines/Drains/Airways       Peripheral Intravenous Line  Duration                  Peripheral IV - Single Lumen 08/23/24 1654 20 G Anterior;Right Upper Arm 4 days

## 2024-08-27 NOTE — ASSESSMENT & PLAN NOTE
08/27/2024  Continue Rocephin   Urine culture reviewed   Plan to transition to oral antibiotics tomorrow

## 2024-08-27 NOTE — PROGRESS NOTES
[facility] Medicine  Progress Note    Patient Name: Serena Post  MRN: [id number]  Patient Class: IP- Inpatient   Admission Date: 8/23/2024  Length of Stay: 4 days  Attending Physician: Óscar Ogden MD  Primary Care Provider: Evangelina Olivares MD        Subjective:     Principal Problem:Encephalopathy, metabolic        HPI:  Serena Post is a 84 y.o. female patient with a PMHx of HTN, HLD, CHF, A fib, CVA, cancer, COPD and a PSHx of s/p aortic valve replacement, s/p cholecystectomy, s/p insertion of pacemaker who presents to the Emergency Department for evaluation of AMS. The pt's son states that the pt usually does not talk much but has recently been below her baseline for the past 1-2 days. He states that the pt lives at home with him and receives care from pallitive care and home health. He states that he is concerned because the pt bruises/bleeds easily. The pt's son states that the pt's LUE is bruised and swollen. He mentions that they use the pt's arms to move her around. Pt's son states that the pt was recently seen here in the ED 3 times this month and was admitted twice (81/24 and 8/9/24). The pt's son also mentions that he is worried that the pt may have a UTI. Symptoms are constant and moderate in severity. Patient's son states that the pt was taking coumadin, but recently began taking Lovenox and mentions that she was recently dx with a DVT in her LLE. The pt is currently on 3L O2 at home. The pt's son states that the pt is compliant with all of her medications. She recently began taking midodrine on 8/16/24. He also states that she took all of her medications this morning except for her Lovenox. Pt's son also states that the pt had an US on the LUE done at home 2 days ago. No further complaints or concerns at this time.     Overview/Hospital Course:  Serena Post is a 84 y.o. female patient  who presents to the Emergency Department for  evaluation of AMS, increased bruises.    He states that the pt lives at home with him and receives care from pallitive care and home health.      pt was recently seen here in the ED 3 times this month and was admitted twice (81/24 and 8/9/24).   Patient's son states that the pt was taking coumadin, but recently began taking Lovenox and mentions that she was recently dx with a DVT in her LLE. The pt is currently on 3L O2 at home.   Pt's son also states that the pt had an US on the LUE done at home 2 days ago. No further complaints or concerns at this time.   On arrival UA suggestive of UTI, currently on empiric antibiotics, pending cultures     S/p SLP cory MBSS on 8/25/24-- SLP recommended IDDSI 6;    Given questionable concerns about intake of high dose of Lovenox likely might have contributed to bruises per son, consulted Hematology,-- recommended to resume Lovenox at 70 mg b.i.d. once hemoglobin/INR stabilizes, bleeding workup results negative, bruising improves,; per hematology-- after 3 months of AC, If thrombus resolved, recommended to resume warfarin given this is the AC of choice for the mechanical heart valve. If she cannot be anticoagulated, recommended to consider IVC filter.     We will plan to resume Lovenox at 70 mg b.i.d. on 08/26/2024; monitor closely    Of note given patient's age, significant underlying comorbidities, recurrent hospitalizations, had extensive discussion with patient/son at bedside regarding goals of care, opted to continue with DNR DNI now, son stated that he is not open for any further palliative/hospice discussions at this moment, strongly opposed palliative consultation at this time;     08/26/2024  Continue treatment for UTI.  Patient is still confused.  Will start Lovenox.  Continue to monitor inpatient.  Possible DC tomorrow.  08/27/2024  Patient continues to be confused.  Will obtain MRI brain.  TSH.  Continue Rocephin for UTI.  May need to consider LP should mentation not  improve.    Interval History:  No acute issues reported overnight.    Review of Systems   Unable to perform ROS: Acuity of condition     Objective:     Vital Signs (Most Recent):  Temp: 98.6 °F (37 °C) (08/27/24 1152)  Pulse: 77 (08/27/24 1152)  Resp: 18 (08/27/24 1152)  BP: (!) 115/55 (08/27/24 1152)  SpO2: 96 % (08/27/24 1152) Vital Signs (24h Range):  Temp:  [97.1 °F (36.2 °C)-98.6 °F (37 °C)] 98.6 °F (37 °C)  Pulse:  [] 77  Resp:  [16-18] 18  SpO2:  [90 %-100 %] 96 %  BP: (100-124)/(55-76) 115/55     Weight: 76.6 kg (168 lb 14 oz)  Body mass index is 29.91 kg/m².    Intake/Output Summary (Last 24 hours) at 8/27/2024 1304  Last data filed at 8/26/2024 1730  Gross per 24 hour   Intake 100 ml   Output 300 ml   Net -200 ml         Physical Exam  Constitutional:       General: She is not in acute distress.     Appearance: She is well-developed. She is ill-appearing. She is not diaphoretic.   HENT:      Head: Normocephalic and atraumatic.   Eyes:      Pupils: Pupils are equal, round, and reactive to light.   Cardiovascular:      Rate and Rhythm: Normal rate and regular rhythm.      Heart sounds: Normal heart sounds. No murmur heard.     No friction rub. No gallop.   Pulmonary:      Effort: Pulmonary effort is normal. No respiratory distress.      Breath sounds: Normal breath sounds. No stridor. No wheezing or rales.   Abdominal:      General: Bowel sounds are normal. There is no distension.      Palpations: Abdomen is soft. There is no mass.      Tenderness: There is no abdominal tenderness. There is no guarding.   Skin:     General: Skin is warm.      Coloration: Skin is not jaundiced.      Findings: Bruising present. No erythema.   Neurological:      General: No focal deficit present.      Mental Status: She is disoriented.             Significant Labs: All pertinent labs within the past 24 hours have been reviewed.    Significant Imaging: I have reviewed all pertinent imaging results/findings within the past  24 hours.    Assessment/Plan:      * Encephalopathy, metabolic  08/27/2024  Patient continues to be altered   Continue Rocephin for UTI   Will obtain MRI brain   Repeat ammonia levels   May need LP should mentation not improve  Obtain ABG to check pCO2 levels              Elevated bilirubin  08/27/2024  Probable cirrhosis on abdominal ultrasound   Hyperbilirubinemia likely secondary to sepsis  Trending down   Repeat ammonia      Acute cystitis without hematuria  08/27/2024  Continue Rocephin   Urine culture reviewed   Plan to transition to oral antibiotics tomorrow        COPD without exacerbation  Currently not in acute exacerbation   Supplemental oxygen support to maintain SpO2 above 90  DuoNebs p.r.n.    Severe obesity (BMI 35.0-39.9) with comorbidity  Body mass index is 30.7 kg/m². Morbid obesity complicates all aspects of disease management from diagnostic modalities to treatment. Weight loss encouraged and health benefits explained to patient.         Cognitive impairment  Waxing and waning mentation   Considered palliative evaluation, however patient's son who is healthcare power of , declined palliative consultation at this point         Paroxysmal atrial fibrillation  On Toprol   Hypotensive/low heart rate on arrival, held Toprol for now   We will monitor and adjust regimen   Holding anticoagulation at this point given concerns for bleeding     08/26/2024  H&H stable   Will resume Lovenox      History of DVT (deep vein thrombosis)  Recent history of DVT   On Lovenox until arrival   Noted to have bruising/swelling over left upper extremity   Family concerned about bleeding issues, preferred Heme-Onc evaluation for alternative options  Heme-Onc consulted   Patient/son prefers to hold blood thinners at this point until further Heme-Onc recommendations- held accordingly     08/26/2024  Patient/son agrees for resuming Lovenox at 70 mg b.i.d.   Follow up on labs/overt signs of bleeding   Appreciate  Heme-Onc recommendations      Essential hypertension  Latest blood pressure and vitals reviewed-     Temp:  [97.6 °F (36.4 °C)-99.3 °F (37.4 °C)]   Pulse:  [56-82]   Resp:  [18-22]   BP: ()/(50-58)   SpO2:  [91 %-99 %] .   Home meds for hypertension were reviewed and noted below.   Hypertension Medications               metoprolol succinate (TOPROL-XL) 25 MG 24 hr tablet Take 1 tablet by mouth every evening.    furosemide (LASIX) 20 MG tablet Take 2 tablets (40 mg total) by mouth once daily. Hold for low blood pressure. Do not give if systolic blood pressure is below 110 and/or diastolic is below 60            Hypotensive on arrival, hold blood pressure medications as of now   Monitor and adjust regimen   Hydralazine p.r.n.       Dyslipidemia  Patient is not chronically on statin.will not continue for now. Last Lipid Panel:         Lab Results   Component Value Date     CHOL 225 (H) 11/01/2022     HDL 48 11/01/2022     LDLCALC 141 (H) 11/01/2022     TRIG 180 11/01/2022     CHOLHDL 4.7 (H) 11/01/2022      Plan:  -low fat/low calorie diet        VTE Risk Mitigation (From admission, onward)           Ordered     enoxaparin injection 80 mg  2 times daily         08/26/24 1212     IP VTE HIGH RISK PATIENT  Once         08/23/24 1633     Place sequential compression device  Until discontinued         08/23/24 1633                    Discharge Planning   RENE:      Code Status: DNR   Is the patient medically ready for discharge?:     Reason for patient still in hospital (select all that apply): Patient trending condition  Discharge Plan A: Home Health                  Óscar Ogden MD  Department of Hospital Medicine   [facility] - Telemetry ([facility])

## 2024-08-28 VITALS
HEIGHT: 63 IN | TEMPERATURE: 98 F | RESPIRATION RATE: 18 BRPM | HEART RATE: 78 BPM | SYSTOLIC BLOOD PRESSURE: 116 MMHG | WEIGHT: 174.19 LBS | OXYGEN SATURATION: 97 % | BODY MASS INDEX: 30.86 KG/M2 | DIASTOLIC BLOOD PRESSURE: 65 MMHG

## 2024-08-28 LAB
ANION GAP SERPL CALC-SCNC: 10 MMOL/L (ref 8–16)
BACTERIA BLD CULT: NORMAL
BACTERIA BLD CULT: NORMAL
BASOPHILS # BLD AUTO: 0.03 K/UL (ref 0–0.2)
BASOPHILS NFR BLD: 0.4 % (ref 0–1.9)
BUN SERPL-MCNC: 24 MG/DL (ref 8–23)
CALCIUM SERPL-MCNC: 8.5 MG/DL (ref 8.7–10.5)
CHLORIDE SERPL-SCNC: 95 MMOL/L (ref 95–110)
CO2 SERPL-SCNC: 33 MMOL/L (ref 23–29)
CREAT SERPL-MCNC: 0.8 MG/DL (ref 0.5–1.4)
DIFFERENTIAL METHOD BLD: ABNORMAL
EOSINOPHIL # BLD AUTO: 0.2 K/UL (ref 0–0.5)
EOSINOPHIL NFR BLD: 3 % (ref 0–8)
ERYTHROCYTE [DISTWIDTH] IN BLOOD BY AUTOMATED COUNT: 17.9 % (ref 11.5–14.5)
EST. GFR  (NO RACE VARIABLE): >60 ML/MIN/1.73 M^2
GLUCOSE SERPL-MCNC: 92 MG/DL (ref 70–110)
HCT VFR BLD AUTO: 28.9 % (ref 37–48.5)
HGB BLD-MCNC: 9.1 G/DL (ref 12–16)
IMM GRANULOCYTES # BLD AUTO: 0.02 K/UL (ref 0–0.04)
IMM GRANULOCYTES NFR BLD AUTO: 0.3 % (ref 0–0.5)
LYMPHOCYTES # BLD AUTO: 1.3 K/UL (ref 1–4.8)
LYMPHOCYTES NFR BLD: 17.8 % (ref 18–48)
MCH RBC QN AUTO: 29.1 PG (ref 27–31)
MCHC RBC AUTO-ENTMCNC: 31.5 G/DL (ref 32–36)
MCV RBC AUTO: 92 FL (ref 82–98)
MONOCYTES # BLD AUTO: 0.8 K/UL (ref 0.3–1)
MONOCYTES NFR BLD: 10.6 % (ref 4–15)
NEUTROPHILS # BLD AUTO: 5 K/UL (ref 1.8–7.7)
NEUTROPHILS NFR BLD: 67.9 % (ref 38–73)
NRBC BLD-RTO: 0 /100 WBC
PLATELET # BLD AUTO: 224 K/UL (ref 150–450)
PMV BLD AUTO: 10.3 FL (ref 9.2–12.9)
POTASSIUM SERPL-SCNC: 3.4 MMOL/L (ref 3.5–5.1)
RBC # BLD AUTO: 3.13 M/UL (ref 4–5.4)
SODIUM SERPL-SCNC: 138 MMOL/L (ref 136–145)
WBC # BLD AUTO: 7.34 K/UL (ref 3.9–12.7)

## 2024-08-28 PROCEDURE — 63600175 PHARM REV CODE 636 W HCPCS: Performed by: STUDENT IN AN ORGANIZED HEALTH CARE EDUCATION/TRAINING PROGRAM

## 2024-08-28 PROCEDURE — 25000003 PHARM REV CODE 250: Performed by: STUDENT IN AN ORGANIZED HEALTH CARE EDUCATION/TRAINING PROGRAM

## 2024-08-28 PROCEDURE — 27000221 HC OXYGEN, UP TO 24 HOURS

## 2024-08-28 PROCEDURE — 80048 BASIC METABOLIC PNL TOTAL CA: CPT | Performed by: FAMILY MEDICINE

## 2024-08-28 PROCEDURE — 99900035 HC TECH TIME PER 15 MIN (STAT)

## 2024-08-28 PROCEDURE — 85025 COMPLETE CBC W/AUTO DIFF WBC: CPT | Performed by: STUDENT IN AN ORGANIZED HEALTH CARE EDUCATION/TRAINING PROGRAM

## 2024-08-28 PROCEDURE — 36415 COLL VENOUS BLD VENIPUNCTURE: CPT | Performed by: FAMILY MEDICINE

## 2024-08-28 PROCEDURE — 25000003 PHARM REV CODE 250: Performed by: FAMILY MEDICINE

## 2024-08-28 PROCEDURE — 63600175 PHARM REV CODE 636 W HCPCS: Performed by: FAMILY MEDICINE

## 2024-08-28 PROCEDURE — 94761 N-INVAS EAR/PLS OXIMETRY MLT: CPT

## 2024-08-28 RX ORDER — ENOXAPARIN SODIUM 100 MG/ML
1 INJECTION SUBCUTANEOUS 2 TIMES DAILY
Qty: 48 ML | Refills: 2 | Status: SHIPPED | OUTPATIENT
Start: 2024-08-28 | End: 2024-11-26

## 2024-08-28 RX ORDER — FERROUS SULFATE 325(65) MG
325 TABLET, DELAYED RELEASE (ENTERIC COATED) ORAL DAILY
Qty: 30 TABLET | Refills: 0 | Status: SHIPPED | OUTPATIENT
Start: 2024-08-28

## 2024-08-28 RX ADMIN — THERA TABS 1 TABLET: TAB at 09:08

## 2024-08-28 RX ADMIN — MIDODRINE HYDROCHLORIDE 10 MG: 5 TABLET ORAL at 09:08

## 2024-08-28 RX ADMIN — CEFTRIAXONE 1 G: 1 INJECTION, POWDER, FOR SOLUTION INTRAMUSCULAR; INTRAVENOUS at 09:08

## 2024-08-28 RX ADMIN — FERROUS SULFATE TAB 325 MG (65 MG ELEMENTAL FE) 1 EACH: 325 (65 FE) TAB at 09:08

## 2024-08-28 RX ADMIN — ENOXAPARIN SODIUM 80 MG: 100 INJECTION SUBCUTANEOUS at 09:08

## 2024-08-28 NOTE — DISCHARGE SUMMARY
[facility] Telemetry [facility] Medicine  Discharge Summary      Patient Name: Serena Post  MRN: [id number]  ANDRES: [id number]  Patient Class: IP- Inpatient  Admission Date: 8/23/2024  Hospital Length of Stay: 5 days  Discharge Date and Time:  08/28/2024 2:54 PM  Attending Physician: Óscar Ogden MD   Discharging Provider: Óscar Ogden MD  Primary Care Provider: Evangelina Olivares MD    Primary Care Team: [facility] MEDICINE A    HPI:   Serena Post is a 84 y.o. female patient with a PMHx of HTN, HLD, CHF, A fib, CVA, cancer, COPD and a PSHx of s/p aortic valve replacement, s/p cholecystectomy, s/p insertion of pacemaker who presents to the Emergency Department for evaluation of AMS. The pt's son states that the pt usually does not talk much but has recently been below her baseline for the past 1-2 days. He states that the pt lives at home with him and receives care from pallitive care and home health. He states that he is concerned because the pt bruises/bleeds easily. The pt's son states that the pt's LUE is bruised and swollen. He mentions that they use the pt's arms to move her around. Pt's son states that the pt was recently seen here in the ED 3 times this month and was admitted twice (81/24 and 8/9/24). The pt's son also mentions that he is worried that the pt may have a UTI. Symptoms are constant and moderate in severity. Patient's son states that the pt was taking coumadin, but recently began taking Lovenox and mentions that she was recently dx with a DVT in her LLE. The pt is currently on 3L O2 at home. The pt's son states that the pt is compliant with all of her medications. She recently began taking midodrine on 8/16/24. He also states that she took all of her medications this morning except for her Lovenox. Pt's son also states that the pt had an US on the LUE done at home 2 days ago. No further complaints or concerns at this time.     * No surgery found *      Hospital Course:    Serena Post is a 84 y.o. female patient  who presents to the Emergency Department for evaluation of AMS, increased bruises.    He states that the pt lives at home with him and receives care from pallitive care and home health.      pt was recently seen here in the ED 3 times this month and was admitted twice (81/24 and 8/9/24).   Patient's son states that the pt was taking coumadin, but recently began taking Lovenox and mentions that she was recently dx with a DVT in her LLE. The pt is currently on 3L O2 at home.   Pt's son also states that the pt had an US on the LUE done at home 2 days ago. No further complaints or concerns at this time.   On arrival UA suggestive of UTI, currently on empiric antibiotics, pending cultures     S/p SLP eval, MBSS on 8/25/24-- SLP recommended IDDSI 6;    Given questionable concerns about intake of high dose of Lovenox likely might have contributed to bruises per son, consulted Hematology,-- recommended to resume Lovenox at 70 mg b.i.d. once hemoglobin/INR stabilizes, bleeding workup results negative, bruising improves,; per hematology-- after 3 months of AC, If thrombus resolved, recommended to resume warfarin given this is the AC of choice for the mechanical heart valve. If she cannot be anticoagulated, recommended to consider IVC filter.     We will plan to resume Lovenox at 70 mg b.i.d. on 08/26/2024; monitor closely    Of note given patient's age, significant underlying comorbidities, recurrent hospitalizations, had extensive discussion with patient/son at bedside regarding goals of care, opted to continue with DNR DNI now, son stated that he is not open for any further palliative/hospice discussions at this moment, strongly opposed palliative consultation at this time;     08/26/2024  Continue treatment for UTI.  Patient is still confused.  Will start Lovenox.  Continue to monitor inpatient.  Possible DC tomorrow.  08/27/2024  Patient continues to be confused.  Will  obtain MRI brain.  TSH.  Continue Rocephin for UTI.  May need to consider LP should mentation not improve.    Patient's mentation returned to baseline.  She completed IV antibiotic therapy for UTI.  Will continue on Lovenox for 3 months per Hematology recommendations.  Outpatient follow-up with Hematology.  Patient was discharged home with home health and palliative care.     Goals of Care Treatment Preferences:  Code Status: DNR      SDOH Screening:  The patient was screened for utility difficulties, food insecurity, transport difficulties, housing insecurity, and interpersonal safety and there were no concerns identified this admission.     Consults:   Consults (From admission, onward)          Status Ordering Provider     Inpatient consult to Registered Dietitian/Nutritionist  Once        Provider:  (Not yet assigned)    Acknowledged SOFIA OWEN     Inpatient consult to Hematology/Oncology  Once        Provider:  Donna Rider MD    Completed JOSÉ MIGUEL SALES            No new Assessment & Plan notes have been filed under this hospital service since the last note was generated.  Service: Hospital Medicine    Final Active Diagnoses:    Diagnosis Date Noted POA    PRINCIPAL PROBLEM:  Encephalopathy, metabolic [G93.41] 08/23/2024 Yes    Acute cystitis without hematuria [N30.00] 08/23/2024 Yes    Elevated bilirubin [R17] 08/23/2024 Yes    COPD without exacerbation [J44.9] 08/01/2024 Yes    Severe obesity (BMI 35.0-39.9) with comorbidity [E66.01] 11/29/2023 Yes    History of DVT (deep vein thrombosis) [Z86.718] 09/26/2019 Not Applicable    Dyslipidemia [E78.5] 09/26/2019 Yes    Essential hypertension [I10] 09/26/2019 Yes    Paroxysmal atrial fibrillation [I48.0] 09/26/2019 Yes    Cognitive impairment [R41.89] 09/26/2019 Yes      Problems Resolved During this Admission:       Discharged Condition: stable    Disposition: Home or Self Care    Follow Up:   Follow-up Information       Evangelina Olivares,  MD Follow up in 1 week(s).    Specialty: Family Medicine  Contact information:  [address] [facility] [address] [phone]               [facility] [place] Follow up.    Contact information:  [address] [facility] [place] [address] [phone]                         Patient Instructions:      SUBSEQUENT HOME HEALTH ORDERS     Order Specific Question Answer Comments   What Home Health Agency is the patient currently using? [facility]        Significant Diagnostic Studies: N/A    Pending Diagnostic Studies:       None           Medications:  Reconciled Home Medications:      Medication List        START taking these medications      ferrous sulfate 325 (65 FE) MG EC tablet  Take 1 tablet (325 mg total) by mouth once daily.            CHANGE how you take these medications      enoxaparin 80 mg/0.8 mL Syrg  Commonly known as: LOVENOX  Inject 0.8 mLs (80 mg total) into the skin 2 (two) times daily.  What changed:   medication strength  how much to take  when to take this            CONTINUE taking these medications      albuterol 2.5 mg /3 mL (0.083 %) nebulizer solution  Commonly known as: PROVENTIL  Take 2.5 mg by nebulization every 4 (four) hours as needed.     fluticasone propionate 50 mcg/actuation nasal spray  Commonly known as: FLONASE  Shake well and use 2 sprays (100 mcg total) by Each Nostril route once daily.     furosemide 20 MG tablet  Commonly known as: LASIX  Take 2 tablets (40 mg total) by mouth once daily. Hold for low blood pressure. Do not give if systolic blood pressure is below 110 and/or diastolic is below 60     metoprolol succinate 25 MG 24 hr tablet  Commonly known as: TOPROL-XL  Take 1 tablet by mouth every evening.     miconazole nitrate 2% 2 % Oint  Commonly known as: MICOTIN  Apply topically 2 (two) times daily. for 7 days     midodrine 10 MG tablet  Commonly known as: PROAMATINE  Take 1 tablet (10 mg total) by mouth 3 (three) times  daily with meals.     multivitamin Tab  Take 1 tablet by mouth once daily.     pulse oximeter device  Commonly known as: pulse oximeter  by Apply Externally route 2 (two) times a day. Use twice daily at 8 AM and 3 PM and record the value in MyChart as directed.            STOP taking these medications      warfarin 2.5 MG tablet  Commonly known as: COUMADIN              Indwelling Lines/Drains at time of discharge:   Lines/Drains/Airways       None                   Time spent on the discharge of patient: 37 minutes         Óscar Ogden MD  Department of Hospital Medicine  [facility] - Telemetry ([facility])

## 2024-08-28 NOTE — NURSING
Pts son arrived to transport pt home via his transport van (pt sits in wheelchair).  He refused to have [facility] transport pt home.  Once pt's son arrived in the room, pt began to show signs of being altered neurologically.  Asked pt where she was and if she was ready to go home.  She responded correctly and I inquired about who the gentleman in the room was; her response was correct.    When assisting pt to wheelchair, pt stood up with assistance and then began pulling on staff and bringing us down to the floor.  Extra staff come in to assist pt off of the floor and placed her back in the bed.  No injuries were noted and provider made aware of situation.  Son arrived back in the room and assisted the patient to the wheelchair.  The wheelchair did not have any feet extension so while transporting pt to the van, she required much reinforcement to  her feet so that they did not get injured.  Pt was transported on her home oxygen tank at 3L NC.  No signs of respiratory distress noted.

## 2024-08-28 NOTE — PLAN OF CARE
[facility] - Telemetry (Hospital)  Discharge Final Note    Primary Care Provider: Evangelina Olivares MD    Expected Discharge Date: 8/28/2024    Final Discharge Note (most recent)       Final Note - 08/28/24 1231          Final Note    Assessment Type Final Discharge Note     Anticipated Discharge Disposition Home-Health Care Svc        Post-Acute Status    Post-Acute Authorization Home Health     Home Health Status Referrals Sent     Discharge Delays None known at this time                     Important Message from Medicare             Contact Info       Evangelina Olivares MD   Specialty: Family Medicine   Relationship: PCP - [address] [address]   Phone: [phone]       Next Steps: Follow up in 1 week(s)    [facility] - [address] [address] [phone]       Next Steps: Follow up          Referral sent to [facility] to resume care at discharge. [facility] ordered for transportation home. No other CM needs.

## 2024-08-28 NOTE — PROGRESS NOTES
Reviewed discharge instructions with patient.  Will review with son once he arrives after work.  Lovenox and ferrous sulfate delivered at bedside. Will DC IV to pts right AC when son arrives.

## 2024-08-29 NOTE — DISCHARGE SUMMARY
[facility] Medicine  Discharge Summary      Patient Name: Serena Post  MRN: [id number]  ANDRES: [id number]  Patient Class: IP- Inpatient  Admission Date: 8/9/2024  Hospital Length of Stay: 6 days  Discharge Date and Time: 8/16/2024  8:14 PM  Attending Physician: No att. providers found   Discharging Provider: June Boykin MD  Primary Care Provider: Evangelina Olivares MD    Primary Care Team: Networked reference to record PCT     HPI:   Serena Post is a 84 y.o. female with a PMH  has a past medical history of A-fib, Anticoagulant long-term use, Aortic valve disease, Cancer, Cardiomyopathy, CHF (congestive heart failure), COVID-19 (7/23/2022), heart valve replacement with mechanical valve, Hyperlipidemia, Hypertension, Pacemaker, Pacemaker, Sepsis (7/23/2022), and Stroke. presented to the Emergency Department for evaluation of AMS which onset today. Patient tested positive for COVID on 8/1/2024 and was recently discharged on 8/6/24 after being diuresed for CHF exacerbation and treated with decadron and neb treatments for her Covid. Patient deemed stable for discharge with homehealth physical/occupational therapy to be resumed and nurse practitioner to visit home program. However, patient's son reports patient started becoming incoherent today at 5PM. Son reports symptoms of decreased appetite, generalized weakness, and diarrhea. No mitigating or exacerbating factors reported. Son denies any vomiting. Son states that patient is taking her LASIX. No further complaints or concerns at this time.     ER workup revealed a CBC to be unremarkable, coags of the within therapeutic range, CMP revealed BUN/creatinine of 44/1.7 with EGFR of 30 (22/1.1 with EGFR 50 on 08/06/2024), BNP>4,900, initial lactic acid of 3.0 with repeat lactic acid of 2.0, procalcitonin negative, flu negative, COVID positive, UA unremarkable, chest x-ray unchanged from previous, CT abdomen and  pelvis without IV contrast revealed:[Small bilateral pleural effusions, left greater than right, with patchy bibasilar airspace opacities.  Findings may reflect edema, infection, aspiration, and/or atelectasis; Cardiomegaly.; Nonspecific bilateral perinephric edema and mild urinary bladder wall thickening.  Correlation to urinalysis to exclude infection advised; Diffuse body wall anasarca].  EKG revealed normal sinus rhythm with right bundle-branch block with a ventricular rate of 90 beats per minute with a QT/QTC of 400/489.  O2 saturation originally 93% on room air with a respiratory rate of 40 5 times per minute which resolved after being placed on 4 liters/minute via nasal cannula.  Patient currently satting 99% with a respiratory rate of 18.  No acute respiratory distress noted.  Patient received DuoNeb, 4.5 g of Zosyn, 1, 641 cc bolus of sodium chloride, and 1.7 g of vancomycin in ER.  Hospital Medicine consulted to admit patient for sepsis vs CHF exacerbation.  Patient admitted under inpatient status.      PCP: Evangelina Olivares      * No surgery found *      Hospital Course:   8/11 readmitted for worsening symptoms. Bnp significant elevated. Cardiology consulted. Echocardiogram with worsening ejection fraction, 25%. Continue intravenous diuresis. 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 Inflammatory markers elevated.     Patient was continued remdesivir and Decadron.  Her Coumadin was managed by pharmacy and 8/14 she became subtherapeutic.  She was started on treatment as Lovenox in addition to Coumadin until her INR is therapeutic.  Venous Doppler of lower extremities revealed Acute DVT seen within the left common femoral vein extending into the proximal femoral vein with involvement of the superficial greater saphenous vein.  This is likely secondary to hypercoagulable state with COVID.  Consulted Hematology for further recommendations  as patient developed lower extremity DVT while on Coumadin, her inr was subtherapeutic. After speaking with hematology and cardiology will leave on lovenox for now and she will follow up with cardiology as outpatient.  Discussed with IR and no indication for thrombectomy at this time and they recommend repeat us in  2 weeks.   She will be discharged on lovenox and follow up with Dr Perkins and coumadin clasic.   Pt seen and examined on day of discharge and stable for dc. Pt's son will help facilitte PT eval.   Echo    Left Ventricle: The left ventricle is mildly dilated. Normal wall thickness. There is eccentric hypertrophy. Moderate global hypokinesis present. Septal motion is consistent with bundle branch block. There is severely reduced systolic function. Ejection fraction by visual approximation is 25%. Grade II diastolic dysfunction.    Right Ventricle: Moderate right ventricular enlargement. Wall thickness is normal. Systolic function is mildly reduced.    Right Atrium: Right atrium is moderately dilated.    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 There is mild to moderate aortic regurgitation with a centrally directed jet.Increased gradient noted    Tricuspid Valve: There is mild regurgitation with a centrally directed jet.    Pulmonary Artery: The estimated pulmonary artery systolic pressure is 52 mmHg.    IVC/SVC: Normal venous pressure at 3 mmHg.       Goals of Care Treatment Preferences:  Code Status: DNR         Consults:   Consults (From admission, onward)          Status Ordering Provider     Inpatient consult to Cardiology  Once        Provider:  Bharath Romero MD    Completed ELVIRA WETZEL     Inpatient consult to Social Work/Case Management  Once        Provider:  (Not yet assigned)    Completed ELVIRA WETZEL     Inpatient consult to Registered Dietitian/Nutritionist  Once        Provider:  (Not yet assigned)    Completed ELVIRA WETZEL            No new  Assessment & Plan notes have been filed under this hospital service since the last note was generated.  Service: Hospital Medicine    Final Active Diagnoses:    Diagnosis Date Noted POA    PRINCIPAL PROBLEM:  Acute exacerbation of CHF (congestive heart failure) [I50.9] 08/10/2024 Yes    Acute deep vein thrombosis (DVT) of femoral vein of left lower extremity [I82.412] 08/14/2024 Yes    Acute viral myocarditis [I40.0] 08/11/2024 Yes    Positive D dimer [R79.89] 08/11/2024 Yes    MARCUS (acute kidney injury) [N17.9] 08/10/2024 Yes    Hyperglycemia [R73.9] 08/10/2024 Yes    Elevated lactic acid level [R79.89] 08/10/2024 Yes    Chronic hypoxic respiratory failure [J96.11] 08/01/2024 Yes    COVID [U07.1] 07/23/2022 Yes    Anticoagulant long-term use [Z79.01] 09/26/2019 Not Applicable    Dyslipidemia [E78.5] 09/26/2019 Yes    Essential hypertension [I10] 09/26/2019 Yes    Paroxysmal atrial fibrillation [I48.0] 09/26/2019 Yes    H/O mechanical aortic valve replacement [Z95.2] 09/26/2019 Not Applicable      Problems Resolved During this Admission:    Diagnosis Date Noted Date Resolved POA    Acute deep vein thrombosis (DVT) of femoral vein of right lower extremity [I82.411] 08/14/2024 08/14/2024 Unknown       Discharged Condition: stable    Disposition: Home-Health Care [facility]    Follow Up:   Follow-up Information       Lydia Perkins MD. Go on 8/27/2024.    Specialties: Cardiology, Cardiovascular Disease  Why: @ 11:45am  Contact information:  [address]  [address]  [facility] [place] [facility] [address] [phone]               [facility] Follow up.    Specialties: Home Health Services, Home Therapy Services, Home Living Aide Services  Why: Home Health: to resume services  Contact information:  [address]  [place] [address] [phone]                         Patient Instructions:      SUBSEQUENT HOME HEALTH ORDERS   Order Comments: Resume home health,     Order Specific Question Answer  Comments   What Home Health Agency is the patient currently using? Other/External        Significant Diagnostic Studies: Labs: All labs within the past 24 hours have been reviewed    Pending Diagnostic Studies:       None           Medications:  Reconciled Home Medications:      Medication List        START taking these medications      midodrine 10 MG tablet  Commonly known as: PROAMATINE  Take 1 tablet (10 mg total) by mouth 3 (three) times daily with meals.     multivitamin Tab  Take 1 tablet by mouth once daily.            CHANGE how you take these medications      furosemide 20 MG tablet  Commonly known as: LASIX  Take 2 tablets (40 mg total) by mouth once daily. Hold for low blood pressure. Do not give if systolic blood pressure is below 110 and/or diastolic is below 60  What changed:   how much to take  when to take this            CONTINUE taking these medications      albuterol 2.5 mg /3 mL (0.083 %) nebulizer solution  Commonly known as: PROVENTIL  Take 2.5 mg by nebulization every 4 (four) hours as needed.     fluticasone propionate 50 mcg/actuation nasal spray  Commonly known as: FLONASE  Shake well and use 2 sprays (100 mcg total) by Each Nostril route once daily.     metoprolol succinate 25 MG 24 hr tablet  Commonly known as: TOPROL-XL  Take 1 tablet by mouth every evening.     miconazole nitrate 2% 2 % Oint  Commonly known as: MICOTIN  Apply topically 2 (two) times daily. for 7 days     pulse oximeter device  Commonly known as: pulse oximeter  by Apply Externally route 2 (two) times a day. Use twice daily at 8 AM and 3 PM and [facility].            STOP taking these medications      dexAMETHasone 2 MG tablet  Commonly known as: DECADRON     metoprolol tartrate 25 MG tablet  Commonly known as: LOPRESSOR              Indwelling Lines/Drains at time of discharge:   Lines/Drains/Airways       None                   Time spent on the discharge of patient: 36 minutes          June Boykin MD  Department of Hospital Medicine  '[facility] - Telemetry ([facility])

## 2024-09-03 ENCOUNTER — TELEPHONE (OUTPATIENT)
Dept: FAMILY MEDICINE | Facility: CLINIC | Age: 84
End: 2024-09-03
Payer: MEDICARE

## 2024-09-03 NOTE — TELEPHONE ENCOUNTER
Pt's son stopped by clinic asking for a referral in regards to pt's cognitive impairment. Scheduled pt a virtual appointment with Dr. Ascencio to discuss since pt's son states pt is bedridden. Appt scheduled for 9/4 at 1 pm.

## 2024-09-04 ENCOUNTER — OFFICE VISIT (OUTPATIENT)
Dept: FAMILY MEDICINE | Facility: CLINIC | Age: 84
End: 2024-09-04
Attending: FAMILY MEDICINE
Payer: MEDICARE

## 2024-09-04 ENCOUNTER — TELEPHONE (OUTPATIENT)
Dept: FAMILY MEDICINE | Facility: CLINIC | Age: 84
End: 2024-09-04

## 2024-09-04 DIAGNOSIS — Z86.718 HISTORY OF DVT (DEEP VEIN THROMBOSIS): ICD-10-CM

## 2024-09-04 DIAGNOSIS — I48.0 PAF (PAROXYSMAL ATRIAL FIBRILLATION): ICD-10-CM

## 2024-09-04 DIAGNOSIS — G93.41 ENCEPHALOPATHY, METABOLIC: Primary | ICD-10-CM

## 2024-09-04 DIAGNOSIS — R30.0 DYSURIA: ICD-10-CM

## 2024-09-04 DIAGNOSIS — N30.00 ACUTE CYSTITIS WITHOUT HEMATURIA: ICD-10-CM

## 2024-09-04 DIAGNOSIS — R41.89 COGNITIVE IMPAIRMENT: ICD-10-CM

## 2024-09-04 RX ORDER — POLYETHYLENE GLYCOL 3350 17 G/17G
17 POWDER, FOR SOLUTION ORAL DAILY
Qty: 30 PACKET | Refills: 0 | Status: SHIPPED | OUTPATIENT
Start: 2024-09-04

## 2024-09-04 NOTE — TELEPHONE ENCOUNTER
Spoke with patient son schedule hematology appointment for 9/19/2024 at 7:00am. Pt. Son verbalized understanding.

## 2024-09-04 NOTE — PROGRESS NOTES
Subjective:       Patient ID: Serena Post is a 84 y.o. female.    Chief Complaint: No chief complaint on file.    Transitional Care Note    Family and/or Caretaker present at visit?  Yes.  Diagnostic tests reviewed/disposition: I have reviewed all completed as well as pending diagnostic tests at the time of discharge.  Disease/illness education: yes   Home health/community services discussion/referrals: Patient has home health established at [facility] .   Establishment or re-establishment of referral orders for community resources: No other necessary community resources.   Discussion with other health care providers: No discussion with other health care providers necessary.          84 y old female with PMH of  HTN, HLD, CHF, A fib, CVA, cancer, COPD and a PSHx of s/p aortic valve replacement, s/p cholecystectomy, s/p insertion of pacemaker f.u  on hospitalization via TM  on 8/23  due to AMS. She was less talkitive than normal and had bruises Off note is that patient  was  on palliative care  and was hospitalized 2 additional times during the months of August due to COVID and LLE DVT hence she was transitioned to Lovenox . U/a was suggestive of UTI . She was started on Rocephin . Hematology was also consulted  who recommended continuing Lovenox 70 mg BID for 3 months   and if thrombus resolved re start Coumadin . Her cognitive status improved .Son refused palliative / hospice care .  Discharged on 8/28 . Complaining of dysuria . Eating very little . Also with constipation .              Review of Systems   Constitutional:  Positive for activity change and unexpected weight change.   HENT: Negative.  Negative for hearing loss, rhinorrhea and trouble swallowing.    Eyes: Negative.  Negative for discharge and visual disturbance.   Respiratory: Negative.  Negative for chest tightness and wheezing.    Cardiovascular:  Positive for palpitations. Negative for chest pain.   Gastrointestinal:  Positive for  constipation. Negative for blood in stool, diarrhea and vomiting.   Endocrine: Negative for polydipsia and polyuria.   Genitourinary:  Positive for dysuria. Negative for difficulty urinating, hematuria and menstrual problem.   Musculoskeletal:  Positive for arthralgias and neck pain. Negative for joint swelling.   Skin: Negative.    Neurological:  Positive for weakness and headaches.   Hematological: Negative.    Psychiatric/Behavioral:  Positive for confusion and dysphoric mood.        Objective:      Physical Exam  Constitutional:       Appearance: Normal appearance.   HENT:      Head: Normocephalic and atraumatic.   Eyes:      Extraocular Movements: Extraocular movements intact.   Pulmonary:      Effort: Pulmonary effort is normal.   Skin:     Coloration: Skin is not jaundiced or pale.   Neurological:      General: No focal deficit present.      Mental Status: She is alert.   Psychiatric:         Mood and Affect: Mood normal.         Behavior: Behavior normal.         Thought Content: Thought content normal.         Judgment: Judgment normal.         Assessment:       1. Encephalopathy, metabolic    2. Acute cystitis without hematuria    3. History of DVT (deep vein thrombosis)    4. Cognitive impairment    5. PAF (paroxysmal atrial fibrillation)        Plan:     Diagnoses and all orders for this visit:    Encephalopathy, metabolic    Acute cystitis without hematuria    History of DVT (deep vein thrombosis)  -     Ambulatory referral/consult to Hematology / Oncology; Future    Cognitive impairment    PAF (paroxysmal atrial fibrillation)    Other orders  -     polyethylene glycol (GLYCOLAX) 17 gram PwPk; Take 17 g by mouth once daily.  -     incontinence pad, liner, disp Pads; Please dispense Versette brand     Resolved   U/a  Continue Lovenox . F.u with hematology   Stable   F.u with Card

## 2024-09-05 ENCOUNTER — TELEPHONE (OUTPATIENT)
Dept: HEMATOLOGY/ONCOLOGY | Facility: CLINIC | Age: 84
End: 2024-09-05
Payer: MEDICARE

## 2024-09-05 NOTE — TELEPHONE ENCOUNTER
Lvm for a return call in reference to Hem/Onc appt scheduled on 9/19 has been canceled and will need to be r/s d/t being scheduled incorrectly on Onc schedule. Guevaraner message sent.

## 2024-09-09 ENCOUNTER — TELEPHONE (OUTPATIENT)
Dept: FAMILY MEDICINE | Facility: CLINIC | Age: 84
End: 2024-09-09
Payer: MEDICARE

## 2024-09-09 NOTE — PHYSICIAN QUERY
"Provider, due to the conflicting clinical picture, please clinically validate the diagnosis of "sepsis." If validated, please provide additional clinical support for the diagnosis.     The condition is not confirmed and/or it has been ruled out   "

## 2024-09-09 NOTE — TELEPHONE ENCOUNTER
----- Message from Shaunna Macdonald sent at 9/9/2024  1:47 PM CDT -----  Contact: ruiz Wellington   Ruiz Wellington would like a call back to let him know if you got a message from him  That is all he would tell me

## 2024-09-09 NOTE — PHYSICIAN QUERY
"Question: Please provide the integumentary diagnosis related to the documentation of "Coccyx":    Provider Query Response:  Pressure Injury/Decubitus Ulcer, Unstageable    "

## 2024-09-10 ENCOUNTER — TELEPHONE (OUTPATIENT)
Dept: FAMILY MEDICINE | Facility: CLINIC | Age: 84
End: 2024-09-10
Payer: MEDICARE

## 2024-09-10 NOTE — LETTER
[facility]  [address] [address]  Phone: [phone]  Fax: [phone] September 10, 2024     Patient: Serena Post   YOB: 1940   Date of Visit: 9/10/2024       To Whom It May Concern:    Serena Post has been under my care since /27/2023.  Ms. Post has been diagnosed with Chronic Obstructive Pulmonary Disease with exacerbation (COPD) and Chronic Hypoxic Respiratory Failure.  These conditions are chronic, lifelong, and not reasonably expected to resolve.  Management of these conditions includes Ms. Post being on oxygen.  Ms. Post needs electricity to charge the batteries for her oxygen tank in order to maintain her best health outcomes.  Electricity is a medical necessity for Ms. Post.    If you have any questions or concerns, please don't hesitate to contact my office.    Sincerely,        Evangelina Olivares MD

## 2024-09-10 NOTE — TELEPHONE ENCOUNTER
----- Message from Ray Ritchie sent at 9/10/2024  1:14 PM CDT -----  Contact: Amish/ Son  .Type:  Patient Returning Call    Who Called: Amish   Who Left Message for Patient: nurse   Does the patient know what this is regarding?: yes   Would the patient rather a call back or a response via [facility]?  Call back   Best Call Back Number: [phone]  Additional Information:        Thanks

## 2024-09-10 NOTE — LETTER
[facility]  [address] [address]  Phone: [phone]  Fax: [phone] September 10, 2024     Patient: Serena Post   YOB: 1940   Date of Visit: 9/10/2024       To Whom It May Concern:    Serena Post has been under my care since 11/27/2023.  Ms. Post has been diagnosed with Chronic Obstructive Pulmonary Disease with exacerbation (COPD) and Chronic Hypoxic Respiratory Failure.  These conditions are chronic, lifelong, and not reasonably expected to resolve.  Management of these conditions includes Ms. Post being on oxygen.  Ms. Post needs electricity to charge the batteries for her oxygen tank in order to maintain her best health outcomes.  Electricity is a medical necessity for Ms. Post.    If you have any questions or concerns, please don't hesitate to contact my office.    Sincerely,        Evangelina Olivares MD
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
## 2025-02-22 DIAGNOSIS — Z00.00 ENCOUNTER FOR MEDICARE ANNUAL WELLNESS EXAM: ICD-10-CM

## 2025-03-19 DIAGNOSIS — Z78.0 MENOPAUSE: ICD-10-CM

## 2025-05-21 DIAGNOSIS — Z78.0 MENOPAUSE: ICD-10-CM

## 2025-08-08 ENCOUNTER — POST MORTEM DOCUMENTATION (OUTPATIENT)
Dept: FAMILY MEDICINE | Facility: CLINIC | Age: 85
End: 2025-08-08
Payer: MEDICARE